# Patient Record
Sex: MALE | Race: WHITE | HISPANIC OR LATINO | Employment: OTHER | URBAN - METROPOLITAN AREA
[De-identification: names, ages, dates, MRNs, and addresses within clinical notes are randomized per-mention and may not be internally consistent; named-entity substitution may affect disease eponyms.]

---

## 2017-01-01 ENCOUNTER — ANTICOAGULATION VISIT (OUTPATIENT)
Dept: VASCULAR LAB | Facility: MEDICAL CENTER | Age: 57
End: 2017-01-01
Attending: INTERNAL MEDICINE
Payer: MEDICAID

## 2017-01-01 ENCOUNTER — NON-PROVIDER VISIT (OUTPATIENT)
Dept: HEMATOLOGY ONCOLOGY | Facility: MEDICAL CENTER | Age: 57
End: 2017-01-01
Payer: COMMERCIAL

## 2017-01-01 ENCOUNTER — HOSPITAL ENCOUNTER (OUTPATIENT)
Dept: RADIOLOGY | Facility: MEDICAL CENTER | Age: 57
End: 2017-06-02
Attending: INTERNAL MEDICINE
Payer: MEDICAID

## 2017-01-01 ENCOUNTER — TELEPHONE (OUTPATIENT)
Dept: HEMATOLOGY ONCOLOGY | Facility: MEDICAL CENTER | Age: 57
End: 2017-01-01

## 2017-01-01 ENCOUNTER — HOSPITAL ENCOUNTER (OUTPATIENT)
Dept: LAB | Facility: MEDICAL CENTER | Age: 57
End: 2017-03-20
Attending: NURSE PRACTITIONER
Payer: COMMERCIAL

## 2017-01-01 ENCOUNTER — APPOINTMENT (OUTPATIENT)
Dept: RADIOLOGY | Facility: MEDICAL CENTER | Age: 57
End: 2017-01-01
Attending: HOSPITALIST
Payer: MEDICAID

## 2017-01-01 ENCOUNTER — APPOINTMENT (OUTPATIENT)
Dept: RADIOLOGY | Facility: MEDICAL CENTER | Age: 57
DRG: 167 | End: 2017-01-01
Attending: EMERGENCY MEDICINE
Payer: MEDICAID

## 2017-01-01 ENCOUNTER — HOSPITAL ENCOUNTER (OUTPATIENT)
Dept: RADIATION ONCOLOGY | Facility: MEDICAL CENTER | Age: 57
End: 2017-04-20

## 2017-01-01 ENCOUNTER — PATIENT OUTREACH (OUTPATIENT)
Dept: HEALTH INFORMATION MANAGEMENT | Facility: OTHER | Age: 57
End: 2017-01-01

## 2017-01-01 ENCOUNTER — RESOLUTE PROFESSIONAL BILLING HOSPITAL PROF FEE (OUTPATIENT)
Dept: HOSPITALIST | Facility: MEDICAL CENTER | Age: 57
End: 2017-01-01
Payer: MEDICAID

## 2017-01-01 ENCOUNTER — APPOINTMENT (OUTPATIENT)
Dept: RADIOLOGY | Facility: IMAGING CENTER | Age: 57
End: 2017-01-01
Attending: NURSE PRACTITIONER
Payer: COMMERCIAL

## 2017-01-01 ENCOUNTER — OFFICE VISIT (OUTPATIENT)
Dept: MEDICAL GROUP | Facility: CLINIC | Age: 57
End: 2017-01-01
Payer: COMMERCIAL

## 2017-01-01 ENCOUNTER — HOSPITAL ENCOUNTER (OUTPATIENT)
Dept: RADIOLOGY | Facility: MEDICAL CENTER | Age: 57
End: 2017-01-16

## 2017-01-01 ENCOUNTER — OFFICE VISIT (OUTPATIENT)
Dept: HEMATOLOGY ONCOLOGY | Facility: MEDICAL CENTER | Age: 57
End: 2017-01-01
Payer: COMMERCIAL

## 2017-01-01 ENCOUNTER — RESOLUTE PROFESSIONAL BILLING HOSPITAL PROF FEE (OUTPATIENT)
Dept: HOSPITALIST | Facility: MEDICAL CENTER | Age: 57
End: 2017-01-01
Payer: COMMERCIAL

## 2017-01-01 ENCOUNTER — APPOINTMENT (OUTPATIENT)
Dept: RADIOLOGY | Facility: MEDICAL CENTER | Age: 57
End: 2017-01-01
Attending: INTERNAL MEDICINE
Payer: MEDICAID

## 2017-01-01 ENCOUNTER — APPOINTMENT (OUTPATIENT)
Dept: RADIOLOGY | Facility: MEDICAL CENTER | Age: 57
DRG: 871 | End: 2017-01-01
Attending: EMERGENCY MEDICINE
Payer: MEDICAID

## 2017-01-01 ENCOUNTER — TELEPHONE (OUTPATIENT)
Dept: VASCULAR LAB | Facility: MEDICAL CENTER | Age: 57
End: 2017-01-01

## 2017-01-01 ENCOUNTER — ANTICOAGULATION VISIT (OUTPATIENT)
Dept: VASCULAR LAB | Facility: MEDICAL CENTER | Age: 57
End: 2017-01-01
Attending: INTERNAL MEDICINE
Payer: COMMERCIAL

## 2017-01-01 ENCOUNTER — HOSPITAL ENCOUNTER (OUTPATIENT)
Dept: LAB | Facility: MEDICAL CENTER | Age: 57
End: 2017-08-11
Attending: INTERNAL MEDICINE
Payer: MEDICAID

## 2017-01-01 ENCOUNTER — HOSPITAL ENCOUNTER (OUTPATIENT)
Dept: RADIATION ONCOLOGY | Facility: MEDICAL CENTER | Age: 57
End: 2017-04-30
Attending: RADIOLOGY
Payer: COMMERCIAL

## 2017-01-01 ENCOUNTER — HOSPITAL ENCOUNTER (INPATIENT)
Facility: REHABILITATION | Age: 57
LOS: 18 days | DRG: 055 | End: 2017-02-28
Attending: PHYSICAL MEDICINE & REHABILITATION | Admitting: PHYSICAL MEDICINE & REHABILITATION
Payer: COMMERCIAL

## 2017-01-01 ENCOUNTER — HOME CARE VISIT (OUTPATIENT)
Dept: HOSPICE | Facility: HOSPICE | Age: 57
End: 2017-01-01
Payer: MEDICAID

## 2017-01-01 ENCOUNTER — TELEPHONE (OUTPATIENT)
Dept: MEDICAL GROUP | Facility: CLINIC | Age: 57
End: 2017-01-01

## 2017-01-01 ENCOUNTER — APPOINTMENT (OUTPATIENT)
Dept: RADIOLOGY | Facility: MEDICAL CENTER | Age: 57
DRG: 871 | End: 2017-01-01
Attending: INTERNAL MEDICINE
Payer: MEDICAID

## 2017-01-01 ENCOUNTER — APPOINTMENT (OUTPATIENT)
Dept: RADIOLOGY | Facility: MEDICAL CENTER | Age: 57
DRG: 193 | End: 2017-01-01
Attending: EMERGENCY MEDICINE
Payer: COMMERCIAL

## 2017-01-01 ENCOUNTER — HOSPITAL ENCOUNTER (EMERGENCY)
Facility: MEDICAL CENTER | Age: 57
End: 2017-08-09
Attending: EMERGENCY MEDICINE
Payer: MEDICAID

## 2017-01-01 ENCOUNTER — HOSPITAL ENCOUNTER (OUTPATIENT)
Dept: LAB | Facility: MEDICAL CENTER | Age: 57
End: 2017-08-14
Attending: FAMILY MEDICINE
Payer: MEDICAID

## 2017-01-01 ENCOUNTER — HOSPITAL ENCOUNTER (OUTPATIENT)
Dept: RADIOLOGY | Facility: MEDICAL CENTER | Age: 57
End: 2017-03-13
Attending: INTERNAL MEDICINE
Payer: COMMERCIAL

## 2017-01-01 ENCOUNTER — APPOINTMENT (OUTPATIENT)
Dept: PAIN MANAGEMENT | Facility: REHABILITATION | Age: 57
DRG: 055 | End: 2017-01-01
Attending: PHYSICAL MEDICINE & REHABILITATION
Payer: COMMERCIAL

## 2017-01-01 ENCOUNTER — HOSPITAL ENCOUNTER (OUTPATIENT)
Facility: MEDICAL CENTER | Age: 57
End: 2017-12-01
Attending: HOSPITALIST | Admitting: HOSPITALIST
Payer: MEDICAID

## 2017-01-01 ENCOUNTER — PATIENT OUTREACH (OUTPATIENT)
Dept: OTHER | Facility: MEDICAL CENTER | Age: 57
End: 2017-01-01

## 2017-01-01 ENCOUNTER — HOSPICE ADMISSION (OUTPATIENT)
Dept: HOSPICE | Facility: HOSPICE | Age: 57
End: 2017-01-01
Payer: MEDICAID

## 2017-01-01 ENCOUNTER — APPOINTMENT (OUTPATIENT)
Dept: LAB | Facility: MEDICAL CENTER | Age: 57
End: 2017-01-01
Attending: INTERNAL MEDICINE
Payer: COMMERCIAL

## 2017-01-01 ENCOUNTER — HOSPITAL ENCOUNTER (OUTPATIENT)
Dept: LAB | Facility: MEDICAL CENTER | Age: 57
End: 2017-07-08
Attending: INTERNAL MEDICINE
Payer: COMMERCIAL

## 2017-01-01 ENCOUNTER — HOSPITAL ENCOUNTER (OUTPATIENT)
Dept: RADIOLOGY | Facility: MEDICAL CENTER | Age: 57
End: 2017-07-14
Attending: NURSE PRACTITIONER
Payer: COMMERCIAL

## 2017-01-01 ENCOUNTER — APPOINTMENT (OUTPATIENT)
Dept: RADIOLOGY | Facility: MEDICAL CENTER | Age: 57
End: 2017-01-01
Attending: EMERGENCY MEDICINE
Payer: MEDICAID

## 2017-01-01 ENCOUNTER — APPOINTMENT (OUTPATIENT)
Dept: RADIOLOGY | Facility: REHABILITATION | Age: 57
DRG: 055 | End: 2017-01-01
Attending: PHYSICAL MEDICINE & REHABILITATION
Payer: COMMERCIAL

## 2017-01-01 ENCOUNTER — HOSPITAL ENCOUNTER (OUTPATIENT)
Facility: MEDICAL CENTER | Age: 57
End: 2017-07-11
Attending: NURSE PRACTITIONER
Payer: COMMERCIAL

## 2017-01-01 ENCOUNTER — TELEPHONE (OUTPATIENT)
Dept: OTHER | Facility: MEDICAL CENTER | Age: 57
End: 2017-01-01

## 2017-01-01 ENCOUNTER — RESOLUTE PROFESSIONAL BILLING HOSPITAL PROF FEE (OUTPATIENT)
Dept: PHYSICAL MEDICINE AND REHAB | Facility: REHABILITATION | Age: 57
End: 2017-01-01
Payer: COMMERCIAL

## 2017-01-01 ENCOUNTER — HOSPITAL ENCOUNTER (OUTPATIENT)
Facility: MEDICAL CENTER | Age: 57
End: 2017-02-10
Attending: EMERGENCY MEDICINE | Admitting: HOSPITALIST
Payer: COMMERCIAL

## 2017-01-01 ENCOUNTER — HOSPITAL ENCOUNTER (OUTPATIENT)
Dept: LAB | Facility: MEDICAL CENTER | Age: 57
End: 2017-05-31
Attending: RADIOLOGY
Payer: COMMERCIAL

## 2017-01-01 ENCOUNTER — HOSPITAL ENCOUNTER (OUTPATIENT)
Dept: RADIOLOGY | Facility: MEDICAL CENTER | Age: 57
End: 2017-03-10
Attending: RADIOLOGY
Payer: COMMERCIAL

## 2017-01-01 ENCOUNTER — APPOINTMENT (OUTPATIENT)
Dept: RADIOLOGY | Facility: MEDICAL CENTER | Age: 57
DRG: 949 | End: 2017-01-01
Attending: PHYSICAL MEDICINE & REHABILITATION
Payer: COMMERCIAL

## 2017-01-01 ENCOUNTER — APPOINTMENT (OUTPATIENT)
Dept: RADIOLOGY | Facility: MEDICAL CENTER | Age: 57
End: 2017-01-01
Attending: RADIOLOGY
Payer: COMMERCIAL

## 2017-01-01 ENCOUNTER — APPOINTMENT (OUTPATIENT)
Dept: RADIOLOGY | Facility: MEDICAL CENTER | Age: 57
DRG: 167 | End: 2017-01-01
Attending: HOSPITALIST
Payer: MEDICAID

## 2017-01-01 ENCOUNTER — APPOINTMENT (OUTPATIENT)
Dept: RADIOLOGY | Facility: MEDICAL CENTER | Age: 57
DRG: 871 | End: 2017-01-01
Attending: PSYCHIATRY & NEUROLOGY
Payer: MEDICAID

## 2017-01-01 ENCOUNTER — HOSPITAL ENCOUNTER (INPATIENT)
Facility: REHABILITATION | Age: 57
LOS: 2 days | DRG: 949 | End: 2017-02-06
Attending: PHYSICAL MEDICINE & REHABILITATION | Admitting: PHYSICAL MEDICINE & REHABILITATION
Payer: COMMERCIAL

## 2017-01-01 ENCOUNTER — AMB ONCOLOGY NURSE NAVIGATOR ENCOUNTER (OUTPATIENT)
Dept: OTHER | Facility: MEDICAL CENTER | Age: 57
End: 2017-01-01

## 2017-01-01 ENCOUNTER — HOSPITAL ENCOUNTER (OUTPATIENT)
Dept: RADIATION ONCOLOGY | Facility: MEDICAL CENTER | Age: 57
End: 2017-03-31
Attending: RADIOLOGY
Payer: COMMERCIAL

## 2017-01-01 ENCOUNTER — HOSPITAL ENCOUNTER (OUTPATIENT)
Dept: RADIATION ONCOLOGY | Facility: MEDICAL CENTER | Age: 57
End: 2017-06-30
Attending: RADIOLOGY
Payer: MEDICAID

## 2017-01-01 ENCOUNTER — HOSPITAL ENCOUNTER (INPATIENT)
Facility: MEDICAL CENTER | Age: 57
LOS: 11 days | DRG: 871 | End: 2017-10-25
Attending: EMERGENCY MEDICINE | Admitting: INTERNAL MEDICINE
Payer: MEDICAID

## 2017-01-01 ENCOUNTER — APPOINTMENT (OUTPATIENT)
Dept: HEMATOLOGY ONCOLOGY | Facility: MEDICAL CENTER | Age: 57
End: 2017-01-01
Payer: MEDICAID

## 2017-01-01 ENCOUNTER — APPOINTMENT (OUTPATIENT)
Dept: HEMATOLOGY ONCOLOGY | Facility: MEDICAL CENTER | Age: 57
End: 2017-01-01
Payer: COMMERCIAL

## 2017-01-01 ENCOUNTER — ANTICOAGULATION MONITORING (OUTPATIENT)
Dept: VASCULAR LAB | Facility: MEDICAL CENTER | Age: 57
End: 2017-01-01

## 2017-01-01 ENCOUNTER — HOSPITAL ENCOUNTER (OUTPATIENT)
Dept: RADIATION ONCOLOGY | Facility: MEDICAL CENTER | Age: 57
End: 2017-03-20

## 2017-01-01 ENCOUNTER — HOSPITAL ENCOUNTER (OUTPATIENT)
Dept: LAB | Facility: MEDICAL CENTER | Age: 57
End: 2017-09-16
Attending: INTERNAL MEDICINE
Payer: MEDICAID

## 2017-01-01 ENCOUNTER — RESOLUTE PROFESSIONAL BILLING HOSPITAL PROF FEE (OUTPATIENT)
Dept: MEDSURG UNIT | Facility: MEDICAL CENTER | Age: 57
End: 2017-01-01
Payer: MEDICAID

## 2017-01-01 ENCOUNTER — APPOINTMENT (OUTPATIENT)
Dept: VASCULAR LAB | Facility: MEDICAL CENTER | Age: 57
End: 2017-01-01
Payer: MEDICAID

## 2017-01-01 ENCOUNTER — HOSPITAL ENCOUNTER (OUTPATIENT)
Dept: RADIATION ONCOLOGY | Facility: MEDICAL CENTER | Age: 57
End: 2017-03-07

## 2017-01-01 ENCOUNTER — HOSPITAL ENCOUNTER (INPATIENT)
Facility: MEDICAL CENTER | Age: 57
LOS: 36 days | DRG: 193 | End: 2017-04-28
Attending: EMERGENCY MEDICINE | Admitting: INTERNAL MEDICINE
Payer: COMMERCIAL

## 2017-01-01 ENCOUNTER — TELEPHONE (OUTPATIENT)
Dept: RADIATION ONCOLOGY | Facility: MEDICAL CENTER | Age: 57
End: 2017-01-01

## 2017-01-01 ENCOUNTER — SUPERVISING PHYSICIAN REVIEW (OUTPATIENT)
Dept: MEDICAL GROUP | Facility: CLINIC | Age: 57
End: 2017-01-01

## 2017-01-01 ENCOUNTER — OFFICE VISIT (OUTPATIENT)
Dept: HEMATOLOGY ONCOLOGY | Facility: MEDICAL CENTER | Age: 57
End: 2017-01-01
Payer: MEDICAID

## 2017-01-01 ENCOUNTER — DOCUMENTATION (OUTPATIENT)
Dept: OTHER | Facility: MEDICAL CENTER | Age: 57
End: 2017-01-01

## 2017-01-01 ENCOUNTER — APPOINTMENT (OUTPATIENT)
Dept: LAB | Facility: MEDICAL CENTER | Age: 57
End: 2017-01-01
Payer: MEDICAID

## 2017-01-01 ENCOUNTER — APPOINTMENT (OUTPATIENT)
Dept: RADIOLOGY | Facility: MEDICAL CENTER | Age: 57
End: 2017-01-01
Attending: EMERGENCY MEDICINE
Payer: COMMERCIAL

## 2017-01-01 ENCOUNTER — HOSPITAL ENCOUNTER (INPATIENT)
Facility: MEDICAL CENTER | Age: 57
LOS: 2 days | DRG: 167 | End: 2017-06-24
Attending: EMERGENCY MEDICINE | Admitting: INTERNAL MEDICINE
Payer: MEDICAID

## 2017-01-01 ENCOUNTER — HOSPITAL ENCOUNTER (OUTPATIENT)
Dept: RADIATION ONCOLOGY | Facility: MEDICAL CENTER | Age: 57
End: 2017-01-31
Attending: RADIOLOGY
Payer: COMMERCIAL

## 2017-01-01 VITALS — RESPIRATION RATE: 15 BRPM | HEART RATE: 108 BPM | SYSTOLIC BLOOD PRESSURE: 70 MMHG | DIASTOLIC BLOOD PRESSURE: 46 MMHG

## 2017-01-01 VITALS
HEART RATE: 64 BPM | HEIGHT: 64 IN | RESPIRATION RATE: 16 BRPM | TEMPERATURE: 97.8 F | BODY MASS INDEX: 33.5 KG/M2 | SYSTOLIC BLOOD PRESSURE: 138 MMHG | DIASTOLIC BLOOD PRESSURE: 94 MMHG | OXYGEN SATURATION: 98 % | WEIGHT: 196.21 LBS

## 2017-01-01 VITALS
HEIGHT: 65 IN | RESPIRATION RATE: 22 BRPM | WEIGHT: 223 LBS | DIASTOLIC BLOOD PRESSURE: 92 MMHG | OXYGEN SATURATION: 92 % | SYSTOLIC BLOOD PRESSURE: 124 MMHG | BODY MASS INDEX: 37.15 KG/M2 | HEART RATE: 105 BPM

## 2017-01-01 VITALS
HEIGHT: 62 IN | OXYGEN SATURATION: 96 % | DIASTOLIC BLOOD PRESSURE: 70 MMHG | RESPIRATION RATE: 14 BRPM | TEMPERATURE: 97.3 F | WEIGHT: 214.95 LBS | HEART RATE: 73 BPM | SYSTOLIC BLOOD PRESSURE: 100 MMHG | BODY MASS INDEX: 39.56 KG/M2

## 2017-01-01 VITALS
HEART RATE: 79 BPM | DIASTOLIC BLOOD PRESSURE: 74 MMHG | OXYGEN SATURATION: 96 % | HEIGHT: 63 IN | RESPIRATION RATE: 20 BRPM | SYSTOLIC BLOOD PRESSURE: 112 MMHG | TEMPERATURE: 97.6 F | BODY MASS INDEX: 35.23 KG/M2 | WEIGHT: 198.85 LBS

## 2017-01-01 VITALS
SYSTOLIC BLOOD PRESSURE: 130 MMHG | HEART RATE: 111 BPM | WEIGHT: 204 LBS | BODY MASS INDEX: 35.92 KG/M2 | OXYGEN SATURATION: 95 % | DIASTOLIC BLOOD PRESSURE: 89 MMHG | TEMPERATURE: 98.2 F

## 2017-01-01 VITALS
HEIGHT: 65 IN | RESPIRATION RATE: 17 BRPM | DIASTOLIC BLOOD PRESSURE: 69 MMHG | TEMPERATURE: 97.3 F | HEART RATE: 61 BPM | SYSTOLIC BLOOD PRESSURE: 120 MMHG | OXYGEN SATURATION: 94 %

## 2017-01-01 VITALS
HEART RATE: 100 BPM | DIASTOLIC BLOOD PRESSURE: 114 MMHG | SYSTOLIC BLOOD PRESSURE: 126 MMHG | RESPIRATION RATE: 20 BRPM | OXYGEN SATURATION: 96 %

## 2017-01-01 VITALS
RESPIRATION RATE: 22 BRPM | SYSTOLIC BLOOD PRESSURE: 128 MMHG | WEIGHT: 226.41 LBS | HEIGHT: 65 IN | OXYGEN SATURATION: 92 % | BODY MASS INDEX: 37.72 KG/M2 | DIASTOLIC BLOOD PRESSURE: 80 MMHG | TEMPERATURE: 98.2 F | HEART RATE: 96 BPM

## 2017-01-01 VITALS
HEART RATE: 70 BPM | OXYGEN SATURATION: 99 % | RESPIRATION RATE: 16 BRPM | BODY MASS INDEX: 37.17 KG/M2 | TEMPERATURE: 97.6 F | DIASTOLIC BLOOD PRESSURE: 72 MMHG | SYSTOLIC BLOOD PRESSURE: 102 MMHG | WEIGHT: 209.77 LBS

## 2017-01-01 VITALS
HEART RATE: 70 BPM | SYSTOLIC BLOOD PRESSURE: 102 MMHG | BODY MASS INDEX: 37.17 KG/M2 | WEIGHT: 209.77 LBS | OXYGEN SATURATION: 99 % | RESPIRATION RATE: 16 BRPM | TEMPERATURE: 97.5 F | HEIGHT: 63 IN | DIASTOLIC BLOOD PRESSURE: 72 MMHG

## 2017-01-01 VITALS
TEMPERATURE: 97.7 F | RESPIRATION RATE: 16 BRPM | HEART RATE: 91 BPM | WEIGHT: 196.1 LBS | BODY MASS INDEX: 34.75 KG/M2 | DIASTOLIC BLOOD PRESSURE: 78 MMHG | OXYGEN SATURATION: 96 % | SYSTOLIC BLOOD PRESSURE: 110 MMHG | HEIGHT: 63 IN

## 2017-01-01 VITALS
RESPIRATION RATE: 16 BRPM | HEIGHT: 65 IN | BODY MASS INDEX: 37.21 KG/M2 | DIASTOLIC BLOOD PRESSURE: 83 MMHG | OXYGEN SATURATION: 93 % | TEMPERATURE: 97.8 F | HEART RATE: 84 BPM | WEIGHT: 223.33 LBS | SYSTOLIC BLOOD PRESSURE: 128 MMHG

## 2017-01-01 VITALS
RESPIRATION RATE: 14 BRPM | HEART RATE: 74 BPM | OXYGEN SATURATION: 95 % | DIASTOLIC BLOOD PRESSURE: 70 MMHG | SYSTOLIC BLOOD PRESSURE: 110 MMHG | TEMPERATURE: 97.8 F | WEIGHT: 203 LBS | BODY MASS INDEX: 34.66 KG/M2 | HEIGHT: 64 IN

## 2017-01-01 VITALS
OXYGEN SATURATION: 89 % | SYSTOLIC BLOOD PRESSURE: 86 MMHG | HEART RATE: 92 BPM | RESPIRATION RATE: 12 BRPM | DIASTOLIC BLOOD PRESSURE: 55 MMHG

## 2017-01-01 VITALS — HEART RATE: 104 BPM | RESPIRATION RATE: 14 BRPM

## 2017-01-01 VITALS
TEMPERATURE: 97.2 F | SYSTOLIC BLOOD PRESSURE: 140 MMHG | OXYGEN SATURATION: 94 % | HEART RATE: 103 BPM | DIASTOLIC BLOOD PRESSURE: 98 MMHG

## 2017-01-01 VITALS
DIASTOLIC BLOOD PRESSURE: 90 MMHG | RESPIRATION RATE: 16 BRPM | SYSTOLIC BLOOD PRESSURE: 108 MMHG | WEIGHT: 213.41 LBS | OXYGEN SATURATION: 94 % | HEART RATE: 110 BPM | TEMPERATURE: 97.5 F | OXYGEN SATURATION: 95 % | RESPIRATION RATE: 6 BRPM | SYSTOLIC BLOOD PRESSURE: 131 MMHG | HEIGHT: 66 IN | BODY MASS INDEX: 34.3 KG/M2 | HEART RATE: 113 BPM | DIASTOLIC BLOOD PRESSURE: 89 MMHG

## 2017-01-01 VITALS
HEART RATE: 104 BPM | SYSTOLIC BLOOD PRESSURE: 140 MMHG | BODY MASS INDEX: 39.14 KG/M2 | RESPIRATION RATE: 17 BRPM | OXYGEN SATURATION: 92 % | TEMPERATURE: 96.5 F | HEIGHT: 63 IN | WEIGHT: 220.9 LBS | DIASTOLIC BLOOD PRESSURE: 89 MMHG

## 2017-01-01 VITALS
SYSTOLIC BLOOD PRESSURE: 106 MMHG | HEART RATE: 119 BPM | BODY MASS INDEX: 38.02 KG/M2 | RESPIRATION RATE: 16 BRPM | OXYGEN SATURATION: 96 % | DIASTOLIC BLOOD PRESSURE: 82 MMHG | TEMPERATURE: 98.7 F | WEIGHT: 206.6 LBS | HEIGHT: 62 IN

## 2017-01-01 VITALS
SYSTOLIC BLOOD PRESSURE: 110 MMHG | WEIGHT: 209 LBS | TEMPERATURE: 99 F | OXYGEN SATURATION: 97 % | DIASTOLIC BLOOD PRESSURE: 80 MMHG | BODY MASS INDEX: 37.98 KG/M2 | HEART RATE: 70 BPM | RESPIRATION RATE: 16 BRPM

## 2017-01-01 VITALS
SYSTOLIC BLOOD PRESSURE: 136 MMHG | OXYGEN SATURATION: 92 % | WEIGHT: 215 LBS | TEMPERATURE: 96.4 F | DIASTOLIC BLOOD PRESSURE: 64 MMHG | HEART RATE: 108 BPM | RESPIRATION RATE: 20 BRPM | BODY MASS INDEX: 39.56 KG/M2 | HEIGHT: 62 IN

## 2017-01-01 VITALS
TEMPERATURE: 97.6 F | HEART RATE: 69 BPM | WEIGHT: 219.14 LBS | HEIGHT: 62 IN | OXYGEN SATURATION: 94 % | BODY MASS INDEX: 40.33 KG/M2 | SYSTOLIC BLOOD PRESSURE: 94 MMHG | RESPIRATION RATE: 16 BRPM | DIASTOLIC BLOOD PRESSURE: 59 MMHG

## 2017-01-01 VITALS
RESPIRATION RATE: 18 BRPM | SYSTOLIC BLOOD PRESSURE: 140 MMHG | HEIGHT: 65 IN | OXYGEN SATURATION: 91 % | HEART RATE: 101 BPM | DIASTOLIC BLOOD PRESSURE: 102 MMHG | TEMPERATURE: 97 F | WEIGHT: 222.88 LBS | BODY MASS INDEX: 37.13 KG/M2

## 2017-01-01 VITALS
HEART RATE: 107 BPM | DIASTOLIC BLOOD PRESSURE: 80 MMHG | BODY MASS INDEX: 35.17 KG/M2 | RESPIRATION RATE: 16 BRPM | WEIGHT: 206 LBS | OXYGEN SATURATION: 93 % | TEMPERATURE: 97.8 F | HEIGHT: 64 IN | SYSTOLIC BLOOD PRESSURE: 120 MMHG

## 2017-01-01 VITALS — SYSTOLIC BLOOD PRESSURE: 129 MMHG | DIASTOLIC BLOOD PRESSURE: 91 MMHG | HEART RATE: 86 BPM

## 2017-01-01 VITALS
DIASTOLIC BLOOD PRESSURE: 76 MMHG | BODY MASS INDEX: 37.5 KG/M2 | TEMPERATURE: 98.1 F | RESPIRATION RATE: 18 BRPM | SYSTOLIC BLOOD PRESSURE: 126 MMHG | HEIGHT: 63 IN | HEART RATE: 131 BPM | WEIGHT: 211.64 LBS | OXYGEN SATURATION: 96 %

## 2017-01-01 VITALS — DIASTOLIC BLOOD PRESSURE: 98 MMHG | SYSTOLIC BLOOD PRESSURE: 137 MMHG | HEART RATE: 75 BPM

## 2017-01-01 VITALS — DIASTOLIC BLOOD PRESSURE: 82 MMHG | SYSTOLIC BLOOD PRESSURE: 132 MMHG | HEART RATE: 100 BPM

## 2017-01-01 VITALS
HEIGHT: 62 IN | TEMPERATURE: 100.8 F | BODY MASS INDEX: 39.2 KG/M2 | DIASTOLIC BLOOD PRESSURE: 78 MMHG | HEART RATE: 131 BPM | OXYGEN SATURATION: 92 % | WEIGHT: 213 LBS | SYSTOLIC BLOOD PRESSURE: 130 MMHG | RESPIRATION RATE: 20 BRPM

## 2017-01-01 VITALS — SYSTOLIC BLOOD PRESSURE: 146 MMHG | HEART RATE: 69 BPM | DIASTOLIC BLOOD PRESSURE: 94 MMHG

## 2017-01-01 VITALS
HEART RATE: 64 BPM | SYSTOLIC BLOOD PRESSURE: 128 MMHG | DIASTOLIC BLOOD PRESSURE: 114 MMHG | RESPIRATION RATE: 18 BRPM | OXYGEN SATURATION: 88 %

## 2017-01-01 VITALS — HEART RATE: 98 BPM | SYSTOLIC BLOOD PRESSURE: 129 MMHG | DIASTOLIC BLOOD PRESSURE: 88 MMHG

## 2017-01-01 DIAGNOSIS — H53.9 VISION DISTURBANCE: ICD-10-CM

## 2017-01-01 DIAGNOSIS — G89.29 CHRONIC PAIN OF RIGHT KNEE: ICD-10-CM

## 2017-01-01 DIAGNOSIS — R05.9 COUGH: ICD-10-CM

## 2017-01-01 DIAGNOSIS — R60.1 ANASARCA: ICD-10-CM

## 2017-01-01 DIAGNOSIS — R60.9 EDEMA, UNSPECIFIED TYPE: ICD-10-CM

## 2017-01-01 DIAGNOSIS — C71.2 ANAPLASTIC ASTROCYTOMA OF TEMPORAL LOBE (HCC): ICD-10-CM

## 2017-01-01 DIAGNOSIS — I26.99 OTHER ACUTE PULMONARY EMBOLISM WITHOUT ACUTE COR PULMONALE (HCC): ICD-10-CM

## 2017-01-01 DIAGNOSIS — R07.81 PLEURITIC CHEST PAIN: ICD-10-CM

## 2017-01-01 DIAGNOSIS — G93.6 CEREBRAL EDEMA (HCC): ICD-10-CM

## 2017-01-01 DIAGNOSIS — I82.493 ACUTE DEEP VEIN THROMBOSIS (DVT) OF OTHER SPECIFIED VEIN OF BOTH LOWER EXTREMITIES (HCC): ICD-10-CM

## 2017-01-01 DIAGNOSIS — I26.99 RECURRENT PULMONARY EMBOLISM (HCC): ICD-10-CM

## 2017-01-01 DIAGNOSIS — R56.9 NEW ONSET SEIZURE (HCC): ICD-10-CM

## 2017-01-01 DIAGNOSIS — C71.9 ASTROCYTOMA BRAIN TUMOR (HCC): ICD-10-CM

## 2017-01-01 DIAGNOSIS — M17.11 PRIMARY OSTEOARTHRITIS OF RIGHT KNEE: ICD-10-CM

## 2017-01-01 DIAGNOSIS — R50.9 FEVER, UNSPECIFIED FEVER CAUSE: ICD-10-CM

## 2017-01-01 DIAGNOSIS — G40.909 SEIZURE DISORDER (HCC): ICD-10-CM

## 2017-01-01 DIAGNOSIS — C71.9 ASTROCYTOMA (HCC): ICD-10-CM

## 2017-01-01 DIAGNOSIS — B37.0 THRUSH, ORAL: ICD-10-CM

## 2017-01-01 DIAGNOSIS — M25.561 CHRONIC PAIN OF RIGHT KNEE: ICD-10-CM

## 2017-01-01 DIAGNOSIS — C71.9 MALIGNANT GLIOMA OF BRAIN (HCC): ICD-10-CM

## 2017-01-01 DIAGNOSIS — D64.9 ANEMIA, UNSPECIFIED TYPE: ICD-10-CM

## 2017-01-01 DIAGNOSIS — E66.9 OBESITY (BMI 35.0-39.9 WITHOUT COMORBIDITY): ICD-10-CM

## 2017-01-01 DIAGNOSIS — J18.9 PNEUMONIA OF LEFT LOWER LOBE DUE TO INFECTIOUS ORGANISM: ICD-10-CM

## 2017-01-01 DIAGNOSIS — D64.9 NORMOCYTIC ANEMIA: ICD-10-CM

## 2017-01-01 DIAGNOSIS — C71.8 MALIGNANT NEOPLASM OF OVERLAPPING SITES OF BRAIN (HCC): ICD-10-CM

## 2017-01-01 DIAGNOSIS — B37.0 THRUSH: ICD-10-CM

## 2017-01-01 DIAGNOSIS — Z09 HOSPITAL DISCHARGE FOLLOW-UP: ICD-10-CM

## 2017-01-01 DIAGNOSIS — R06.02 SOB (SHORTNESS OF BREATH): ICD-10-CM

## 2017-01-01 DIAGNOSIS — R51.9 PERSISTENT HEADACHES: ICD-10-CM

## 2017-01-01 DIAGNOSIS — J96.01 ACUTE RESPIRATORY FAILURE WITH HYPOXIA (HCC): ICD-10-CM

## 2017-01-01 DIAGNOSIS — I82.4Z3 ACUTE DEEP VEIN THROMBOSIS (DVT) OF DISTAL VEIN OF BOTH LOWER EXTREMITIES (HCC): ICD-10-CM

## 2017-01-01 DIAGNOSIS — Z95.828 PRESENCE OF IVC FILTER: ICD-10-CM

## 2017-01-01 DIAGNOSIS — R56.9 SEIZURE (HCC): ICD-10-CM

## 2017-01-01 DIAGNOSIS — C71.9 MALIGNANT NEOPLASM OF BRAIN, UNSPECIFIED LOCATION (HCC): ICD-10-CM

## 2017-01-01 DIAGNOSIS — T38.0X5A STEROID-INDUCED DIABETES MELLITUS (HCC): ICD-10-CM

## 2017-01-01 DIAGNOSIS — E09.9 STEROID-INDUCED DIABETES MELLITUS (HCC): ICD-10-CM

## 2017-01-01 DIAGNOSIS — R13.19 DYSPHAGIA, NEUROLOGIC: ICD-10-CM

## 2017-01-01 DIAGNOSIS — J40 BRONCHITIS: ICD-10-CM

## 2017-01-01 DIAGNOSIS — I26.99 PULMONARY EMBOLISM, OTHER: ICD-10-CM

## 2017-01-01 DIAGNOSIS — R06.02 SHORTNESS OF BREATH: ICD-10-CM

## 2017-01-01 DIAGNOSIS — C71.9 GLIOBLASTOMA (HCC): ICD-10-CM

## 2017-01-01 DIAGNOSIS — E44.0 MODERATE PROTEIN-CALORIE MALNUTRITION (HCC): ICD-10-CM

## 2017-01-01 DIAGNOSIS — R55 SYNCOPE, UNSPECIFIED SYNCOPE TYPE: ICD-10-CM

## 2017-01-01 DIAGNOSIS — Z91.81 AT RISK FOR FALL DUE TO COMORBID CONDITION: ICD-10-CM

## 2017-01-01 DIAGNOSIS — G51.0 LEFT-SIDED BELL'S PALSY: ICD-10-CM

## 2017-01-01 DIAGNOSIS — R58 BLEEDING: ICD-10-CM

## 2017-01-01 DIAGNOSIS — R41.0 CONFUSED: ICD-10-CM

## 2017-01-01 DIAGNOSIS — R10.9 ACUTE LEFT FLANK PAIN: ICD-10-CM

## 2017-01-01 LAB
ALBUMIN SERPL BCP-MCNC: 2.4 G/DL (ref 3.2–4.9)
ALBUMIN SERPL BCP-MCNC: 2.6 G/DL (ref 3.2–4.9)
ALBUMIN SERPL BCP-MCNC: 2.7 G/DL (ref 3.2–4.9)
ALBUMIN SERPL BCP-MCNC: 2.8 G/DL (ref 3.2–4.9)
ALBUMIN SERPL BCP-MCNC: 2.9 G/DL (ref 3.2–4.9)
ALBUMIN SERPL BCP-MCNC: 3 G/DL (ref 3.2–4.9)
ALBUMIN SERPL BCP-MCNC: 3.1 G/DL (ref 3.2–4.9)
ALBUMIN SERPL BCP-MCNC: 3.2 G/DL (ref 3.2–4.9)
ALBUMIN SERPL BCP-MCNC: 3.3 G/DL (ref 3.2–4.9)
ALBUMIN SERPL BCP-MCNC: 3.3 G/DL (ref 3.2–4.9)
ALBUMIN SERPL BCP-MCNC: 3.4 G/DL (ref 3.2–4.9)
ALBUMIN SERPL BCP-MCNC: 3.5 G/DL (ref 3.2–4.9)
ALBUMIN SERPL BCP-MCNC: 3.5 G/DL (ref 3.2–4.9)
ALBUMIN SERPL BCP-MCNC: 3.6 G/DL (ref 3.2–4.9)
ALBUMIN SERPL BCP-MCNC: 3.8 G/DL (ref 3.2–4.9)
ALBUMIN/GLOB SERPL: 0.8 G/DL
ALBUMIN/GLOB SERPL: 0.9 G/DL
ALBUMIN/GLOB SERPL: 1 G/DL
ALBUMIN/GLOB SERPL: 1.1 G/DL
ALBUMIN/GLOB SERPL: 1.2 G/DL
ALBUMIN/GLOB SERPL: 1.3 G/DL
ALBUMIN/GLOB SERPL: 1.4 G/DL
ALBUMIN/GLOB SERPL: 1.5 G/DL
ALBUMIN/GLOB SERPL: 1.5 G/DL
ALP SERPL-CCNC: 100 U/L (ref 30–99)
ALP SERPL-CCNC: 102 U/L (ref 30–99)
ALP SERPL-CCNC: 103 U/L (ref 30–99)
ALP SERPL-CCNC: 103 U/L (ref 30–99)
ALP SERPL-CCNC: 107 U/L (ref 30–99)
ALP SERPL-CCNC: 107 U/L (ref 30–99)
ALP SERPL-CCNC: 111 U/L (ref 30–99)
ALP SERPL-CCNC: 154 U/L (ref 30–99)
ALP SERPL-CCNC: 187 U/L (ref 30–99)
ALP SERPL-CCNC: 35 U/L (ref 30–99)
ALP SERPL-CCNC: 37 U/L (ref 30–99)
ALP SERPL-CCNC: 38 U/L (ref 30–99)
ALP SERPL-CCNC: 38 U/L (ref 30–99)
ALP SERPL-CCNC: 39 U/L (ref 30–99)
ALP SERPL-CCNC: 40 U/L (ref 30–99)
ALP SERPL-CCNC: 42 U/L (ref 30–99)
ALP SERPL-CCNC: 43 U/L (ref 30–99)
ALP SERPL-CCNC: 45 U/L (ref 30–99)
ALP SERPL-CCNC: 49 U/L (ref 30–99)
ALP SERPL-CCNC: 51 U/L (ref 30–99)
ALP SERPL-CCNC: 53 U/L (ref 30–99)
ALP SERPL-CCNC: 56 U/L (ref 30–99)
ALP SERPL-CCNC: 57 U/L (ref 30–99)
ALP SERPL-CCNC: 69 U/L (ref 30–99)
ALP SERPL-CCNC: 78 U/L (ref 30–99)
ALP SERPL-CCNC: 87 U/L (ref 30–99)
ALP SERPL-CCNC: 87 U/L (ref 30–99)
ALP SERPL-CCNC: 91 U/L (ref 30–99)
ALP SERPL-CCNC: 94 U/L (ref 30–99)
ALP SERPL-CCNC: 95 U/L (ref 30–99)
ALP SERPL-CCNC: 96 U/L (ref 30–99)
ALP SERPL-CCNC: 97 U/L (ref 30–99)
ALT SERPL-CCNC: 103 U/L (ref 2–50)
ALT SERPL-CCNC: 103 U/L (ref 2–50)
ALT SERPL-CCNC: 31 U/L (ref 2–50)
ALT SERPL-CCNC: 34 U/L (ref 2–50)
ALT SERPL-CCNC: 36 U/L (ref 2–50)
ALT SERPL-CCNC: 36 U/L (ref 2–50)
ALT SERPL-CCNC: 43 U/L (ref 2–50)
ALT SERPL-CCNC: 50 U/L (ref 2–50)
ALT SERPL-CCNC: 51 U/L (ref 2–50)
ALT SERPL-CCNC: 56 U/L (ref 2–50)
ALT SERPL-CCNC: 59 U/L (ref 2–50)
ALT SERPL-CCNC: 59 U/L (ref 2–50)
ALT SERPL-CCNC: 60 U/L (ref 2–50)
ALT SERPL-CCNC: 60 U/L (ref 2–50)
ALT SERPL-CCNC: 62 U/L (ref 2–50)
ALT SERPL-CCNC: 62 U/L (ref 2–50)
ALT SERPL-CCNC: 63 U/L (ref 2–50)
ALT SERPL-CCNC: 66 U/L (ref 2–50)
ALT SERPL-CCNC: 67 U/L (ref 2–50)
ALT SERPL-CCNC: 68 U/L (ref 2–50)
ALT SERPL-CCNC: 70 U/L (ref 2–50)
ALT SERPL-CCNC: 70 U/L (ref 2–50)
ALT SERPL-CCNC: 72 U/L (ref 2–50)
ALT SERPL-CCNC: 73 U/L (ref 2–50)
ALT SERPL-CCNC: 74 U/L (ref 2–50)
ALT SERPL-CCNC: 75 U/L (ref 2–50)
ALT SERPL-CCNC: 76 U/L (ref 2–50)
ALT SERPL-CCNC: 77 U/L (ref 2–50)
ALT SERPL-CCNC: 81 U/L (ref 2–50)
ALT SERPL-CCNC: 81 U/L (ref 2–50)
ALT SERPL-CCNC: 87 U/L (ref 2–50)
ALT SERPL-CCNC: 90 U/L (ref 2–50)
ALT SERPL-CCNC: 90 U/L (ref 2–50)
ALT SERPL-CCNC: 91 U/L (ref 2–50)
ALT SERPL-CCNC: 99 U/L (ref 2–50)
ALT SERPL-CCNC: 99 U/L (ref 2–50)
AMMONIA PLAS-SCNC: 28 UMOL/L (ref 11–45)
AMPHET UR QL SCN: NEGATIVE
ANION GAP SERPL CALC-SCNC: 10 MMOL/L (ref 0–11.9)
ANION GAP SERPL CALC-SCNC: 11 MMOL/L (ref 0–11.9)
ANION GAP SERPL CALC-SCNC: 12 MMOL/L (ref 0–11.9)
ANION GAP SERPL CALC-SCNC: 6 MMOL/L (ref 0–11.9)
ANION GAP SERPL CALC-SCNC: 7 MMOL/L (ref 0–11.9)
ANION GAP SERPL CALC-SCNC: 8 MMOL/L (ref 0–11.9)
ANION GAP SERPL CALC-SCNC: 9 MMOL/L (ref 0–11.9)
ANISOCYTOSIS BLD QL SMEAR: ABNORMAL
APPEARANCE UR: ABNORMAL
APPEARANCE UR: CLEAR
APTT PPP: 130.8 SEC (ref 24.7–36)
APTT PPP: 20.1 SEC (ref 24.7–36)
APTT PPP: 20.2 SEC (ref 24.7–36)
APTT PPP: 24.2 SEC (ref 24.7–36)
APTT PPP: 26.4 SEC (ref 24.7–36)
APTT PPP: 48.7 SEC (ref 24.7–36)
APTT PPP: 53 SEC (ref 24.7–36)
APTT PPP: 72.4 SEC (ref 24.7–36)
APTT PPP: 81.9 SEC (ref 24.7–36)
APTT PPP: 89.8 SEC (ref 24.7–36)
AST SERPL-CCNC: 14 U/L (ref 12–45)
AST SERPL-CCNC: 15 U/L (ref 12–45)
AST SERPL-CCNC: 16 U/L (ref 12–45)
AST SERPL-CCNC: 17 U/L (ref 12–45)
AST SERPL-CCNC: 18 U/L (ref 12–45)
AST SERPL-CCNC: 18 U/L (ref 12–45)
AST SERPL-CCNC: 19 U/L (ref 12–45)
AST SERPL-CCNC: 19 U/L (ref 12–45)
AST SERPL-CCNC: 20 U/L (ref 12–45)
AST SERPL-CCNC: 21 U/L (ref 12–45)
AST SERPL-CCNC: 21 U/L (ref 12–45)
AST SERPL-CCNC: 22 U/L (ref 12–45)
AST SERPL-CCNC: 23 U/L (ref 12–45)
AST SERPL-CCNC: 24 U/L (ref 12–45)
AST SERPL-CCNC: 25 U/L (ref 12–45)
AST SERPL-CCNC: 26 U/L (ref 12–45)
AST SERPL-CCNC: 28 U/L (ref 12–45)
AST SERPL-CCNC: 29 U/L (ref 12–45)
AST SERPL-CCNC: 29 U/L (ref 12–45)
AST SERPL-CCNC: 30 U/L (ref 12–45)
AST SERPL-CCNC: 31 U/L (ref 12–45)
AST SERPL-CCNC: 32 U/L (ref 12–45)
AST SERPL-CCNC: 32 U/L (ref 12–45)
AST SERPL-CCNC: 34 U/L (ref 12–45)
AST SERPL-CCNC: 42 U/L (ref 12–45)
AST SERPL-CCNC: 48 U/L (ref 12–45)
AST SERPL-CCNC: 48 U/L (ref 12–45)
AST SERPL-CCNC: 53 U/L (ref 12–45)
BACTERIA #/AREA URNS HPF: ABNORMAL /HPF
BACTERIA #/AREA URNS HPF: ABNORMAL /HPF
BACTERIA BLD CULT: NORMAL
BACTERIA UR CULT: NORMAL
BARBITURATES UR QL SCN: NEGATIVE
BASE EXCESS BLDA CALC-SCNC: 4 MMOL/L (ref -4–3)
BASE EXCESS BLDA CALC-SCNC: 4 MMOL/L (ref -4–3)
BASOPHILS # BLD AUTO: 0 % (ref 0–1.8)
BASOPHILS # BLD AUTO: 0 K/UL (ref 0–0.12)
BASOPHILS # BLD AUTO: 0.1 % (ref 0–1.8)
BASOPHILS # BLD AUTO: 0.2 % (ref 0–1.8)
BASOPHILS # BLD AUTO: 0.3 % (ref 0–1.8)
BASOPHILS # BLD AUTO: 0.4 % (ref 0–1.8)
BASOPHILS # BLD AUTO: 0.5 % (ref 0–1.8)
BASOPHILS # BLD AUTO: 0.6 % (ref 0–1.8)
BASOPHILS # BLD AUTO: 0.9 % (ref 0–1.8)
BASOPHILS # BLD AUTO: 1.1 % (ref 0–1.8)
BASOPHILS # BLD AUTO: 1.1 % (ref 0–1.8)
BASOPHILS # BLD AUTO: 3.3 % (ref 0–1.8)
BASOPHILS # BLD: 0 K/UL (ref 0–0.12)
BASOPHILS # BLD: 0.01 K/UL (ref 0–0.12)
BASOPHILS # BLD: 0.02 K/UL (ref 0–0.12)
BASOPHILS # BLD: 0.03 K/UL (ref 0–0.12)
BASOPHILS # BLD: 0.04 K/UL (ref 0–0.12)
BASOPHILS # BLD: 0.04 K/UL (ref 0–0.12)
BASOPHILS # BLD: 0.05 K/UL (ref 0–0.12)
BASOPHILS # BLD: 0.06 K/UL (ref 0–0.12)
BASOPHILS # BLD: 0.39 K/UL (ref 0–0.12)
BASOPHILS NFR BLD AUTO: 0 % (ref 0–1.8)
BENZODIAZ UR QL SCN: NEGATIVE
BILIRUB CONJ SERPL-MCNC: <0.1 MG/DL (ref 0.1–0.5)
BILIRUB INDIRECT SERPL-MCNC: ABNORMAL MG/DL (ref 0–1)
BILIRUB SERPL-MCNC: 0.3 MG/DL (ref 0.1–1.5)
BILIRUB SERPL-MCNC: 0.4 MG/DL (ref 0.1–1.5)
BILIRUB SERPL-MCNC: 0.5 MG/DL (ref 0.1–1.5)
BILIRUB SERPL-MCNC: 0.6 MG/DL (ref 0.1–1.5)
BILIRUB SERPL-MCNC: 0.7 MG/DL (ref 0.1–1.5)
BILIRUB SERPL-MCNC: 0.9 MG/DL (ref 0.1–1.5)
BILIRUB SERPL-MCNC: 1 MG/DL (ref 0.1–1.5)
BILIRUB SERPL-MCNC: 1.4 MG/DL (ref 0.1–1.5)
BILIRUB UR QL STRIP.AUTO: ABNORMAL
BILIRUB UR QL STRIP.AUTO: ABNORMAL
BILIRUB UR QL STRIP.AUTO: NEGATIVE
BNP SERPL-MCNC: 25 PG/ML (ref 0–100)
BNP SERPL-MCNC: 30 PG/ML (ref 0–100)
BNP SERPL-MCNC: 4 PG/ML (ref 0–100)
BNP SERPL-MCNC: 43 PG/ML (ref 0–100)
BNP SERPL-MCNC: 77 PG/ML (ref 0–100)
BODY TEMPERATURE: ABNORMAL CENTIGRADE
BODY TEMPERATURE: ABNORMAL CENTIGRADE
BUN SERPL-MCNC: 11 MG/DL (ref 8–22)
BUN SERPL-MCNC: 11 MG/DL (ref 8–22)
BUN SERPL-MCNC: 13 MG/DL (ref 8–22)
BUN SERPL-MCNC: 13 MG/DL (ref 8–22)
BUN SERPL-MCNC: 14 MG/DL (ref 8–22)
BUN SERPL-MCNC: 15 MG/DL (ref 8–22)
BUN SERPL-MCNC: 16 MG/DL (ref 8–22)
BUN SERPL-MCNC: 17 MG/DL (ref 8–22)
BUN SERPL-MCNC: 17 MG/DL (ref 8–22)
BUN SERPL-MCNC: 18 MG/DL (ref 8–22)
BUN SERPL-MCNC: 19 MG/DL (ref 8–22)
BUN SERPL-MCNC: 20 MG/DL (ref 8–22)
BUN SERPL-MCNC: 21 MG/DL (ref 8–22)
BUN SERPL-MCNC: 21 MG/DL (ref 8–22)
BUN SERPL-MCNC: 22 MG/DL (ref 8–22)
BUN SERPL-MCNC: 26 MG/DL (ref 8–22)
BUN SERPL-MCNC: 26 MG/DL (ref 8–22)
BUN SERPL-MCNC: 27 MG/DL (ref 8–22)
BUN SERPL-MCNC: 4 MG/DL (ref 8–22)
BUN SERPL-MCNC: 6 MG/DL (ref 8–22)
BUN SERPL-MCNC: 7 MG/DL (ref 8–22)
BUN SERPL-MCNC: 9 MG/DL (ref 8–22)
BZE UR QL SCN: NEGATIVE
CALCIUM SERPL-MCNC: 7.7 MG/DL (ref 8.5–10.5)
CALCIUM SERPL-MCNC: 7.8 MG/DL (ref 8.5–10.5)
CALCIUM SERPL-MCNC: 8 MG/DL (ref 8.5–10.5)
CALCIUM SERPL-MCNC: 8.1 MG/DL (ref 8.5–10.5)
CALCIUM SERPL-MCNC: 8.1 MG/DL (ref 8.5–10.5)
CALCIUM SERPL-MCNC: 8.2 MG/DL (ref 8.5–10.5)
CALCIUM SERPL-MCNC: 8.3 MG/DL (ref 8.4–10.2)
CALCIUM SERPL-MCNC: 8.3 MG/DL (ref 8.5–10.5)
CALCIUM SERPL-MCNC: 8.4 MG/DL (ref 8.4–10.2)
CALCIUM SERPL-MCNC: 8.4 MG/DL (ref 8.5–10.5)
CALCIUM SERPL-MCNC: 8.5 MG/DL (ref 8.4–10.2)
CALCIUM SERPL-MCNC: 8.5 MG/DL (ref 8.4–10.2)
CALCIUM SERPL-MCNC: 8.5 MG/DL (ref 8.5–10.5)
CALCIUM SERPL-MCNC: 8.5 MG/DL (ref 8.5–10.5)
CALCIUM SERPL-MCNC: 8.6 MG/DL (ref 8.5–10.5)
CALCIUM SERPL-MCNC: 8.7 MG/DL (ref 8.4–10.2)
CALCIUM SERPL-MCNC: 8.7 MG/DL (ref 8.5–10.5)
CALCIUM SERPL-MCNC: 8.8 MG/DL (ref 8.4–10.2)
CALCIUM SERPL-MCNC: 8.8 MG/DL (ref 8.4–10.2)
CALCIUM SERPL-MCNC: 8.8 MG/DL (ref 8.5–10.5)
CALCIUM SERPL-MCNC: 8.9 MG/DL (ref 8.5–10.5)
CALCIUM SERPL-MCNC: 9 MG/DL (ref 8.4–10.2)
CALCIUM SERPL-MCNC: 9 MG/DL (ref 8.4–10.2)
CALCIUM SERPL-MCNC: 9 MG/DL (ref 8.5–10.5)
CALCIUM SERPL-MCNC: 9.1 MG/DL (ref 8.5–10.5)
CALCIUM SERPL-MCNC: 9.4 MG/DL (ref 8.5–10.5)
CANNABINOIDS UR QL SCN: NEGATIVE
CHLORIDE SERPL-SCNC: 100 MMOL/L (ref 96–112)
CHLORIDE SERPL-SCNC: 101 MMOL/L (ref 96–112)
CHLORIDE SERPL-SCNC: 102 MMOL/L (ref 96–112)
CHLORIDE SERPL-SCNC: 103 MMOL/L (ref 96–112)
CHLORIDE SERPL-SCNC: 104 MMOL/L (ref 96–112)
CHLORIDE SERPL-SCNC: 105 MMOL/L (ref 96–112)
CHLORIDE SERPL-SCNC: 107 MMOL/L (ref 96–112)
CHLORIDE SERPL-SCNC: 108 MMOL/L (ref 96–112)
CHLORIDE SERPL-SCNC: 108 MMOL/L (ref 96–112)
CHLORIDE SERPL-SCNC: 93 MMOL/L (ref 96–112)
CHLORIDE SERPL-SCNC: 94 MMOL/L (ref 96–112)
CHLORIDE SERPL-SCNC: 95 MMOL/L (ref 96–112)
CHLORIDE SERPL-SCNC: 95 MMOL/L (ref 96–112)
CHLORIDE SERPL-SCNC: 96 MMOL/L (ref 96–112)
CHLORIDE SERPL-SCNC: 97 MMOL/L (ref 96–112)
CHLORIDE SERPL-SCNC: 97 MMOL/L (ref 96–112)
CHLORIDE SERPL-SCNC: 98 MMOL/L (ref 96–112)
CHLORIDE SERPL-SCNC: 99 MMOL/L (ref 96–112)
CHLORIDE SERPL-SCNC: 99 MMOL/L (ref 96–112)
CHOLEST SERPL-MCNC: 283 MG/DL (ref 100–199)
CHOLEST SERPL-MCNC: 341 MG/DL (ref 100–199)
CHOLEST SERPL-MCNC: 341 MG/DL (ref 100–199)
CK SERPL-CCNC: 29 U/L (ref 0–154)
CO2 SERPL-SCNC: 19 MMOL/L (ref 20–33)
CO2 SERPL-SCNC: 20 MMOL/L (ref 20–33)
CO2 SERPL-SCNC: 20 MMOL/L (ref 20–33)
CO2 SERPL-SCNC: 21 MMOL/L (ref 20–33)
CO2 SERPL-SCNC: 22 MMOL/L (ref 20–33)
CO2 SERPL-SCNC: 23 MMOL/L (ref 20–33)
CO2 SERPL-SCNC: 24 MMOL/L (ref 20–33)
CO2 SERPL-SCNC: 25 MMOL/L (ref 20–33)
CO2 SERPL-SCNC: 26 MMOL/L (ref 20–33)
CO2 SERPL-SCNC: 27 MMOL/L (ref 20–33)
CO2 SERPL-SCNC: 28 MMOL/L (ref 20–33)
CO2 SERPL-SCNC: 29 MMOL/L (ref 20–33)
CO2 SERPL-SCNC: 30 MMOL/L (ref 20–33)
CO2 SERPL-SCNC: 30 MMOL/L (ref 20–33)
COLOR UR: ABNORMAL
COLOR UR: COLORLESS
COLOR UR: COLORLESS
COLOR UR: YELLOW
COMMENT 1642: NORMAL
CORTIS SERPL-MCNC: 11.6 UG/DL (ref 0–23)
CREAT SERPL-MCNC: 0.31 MG/DL (ref 0.5–1.4)
CREAT SERPL-MCNC: 0.32 MG/DL (ref 0.5–1.4)
CREAT SERPL-MCNC: 0.33 MG/DL (ref 0.5–1.4)
CREAT SERPL-MCNC: 0.35 MG/DL (ref 0.5–1.4)
CREAT SERPL-MCNC: 0.35 MG/DL (ref 0.5–1.4)
CREAT SERPL-MCNC: 0.36 MG/DL (ref 0.5–1.4)
CREAT SERPL-MCNC: 0.37 MG/DL (ref 0.5–1.4)
CREAT SERPL-MCNC: 0.37 MG/DL (ref 0.5–1.4)
CREAT SERPL-MCNC: 0.38 MG/DL (ref 0.5–1.4)
CREAT SERPL-MCNC: 0.38 MG/DL (ref 0.5–1.4)
CREAT SERPL-MCNC: 0.4 MG/DL (ref 0.5–1.4)
CREAT SERPL-MCNC: 0.41 MG/DL (ref 0.5–1.4)
CREAT SERPL-MCNC: 0.43 MG/DL (ref 0.5–1.4)
CREAT SERPL-MCNC: 0.44 MG/DL (ref 0.5–1.4)
CREAT SERPL-MCNC: 0.46 MG/DL (ref 0.5–1.4)
CREAT SERPL-MCNC: 0.48 MG/DL (ref 0.5–1.4)
CREAT SERPL-MCNC: 0.48 MG/DL (ref 0.5–1.4)
CREAT SERPL-MCNC: 0.49 MG/DL (ref 0.5–1.4)
CREAT SERPL-MCNC: 0.49 MG/DL (ref 0.5–1.4)
CREAT SERPL-MCNC: 0.5 MG/DL (ref 0.5–1.4)
CREAT SERPL-MCNC: 0.5 MG/DL (ref 0.5–1.4)
CREAT SERPL-MCNC: 0.51 MG/DL (ref 0.5–1.4)
CREAT SERPL-MCNC: 0.52 MG/DL (ref 0.5–1.4)
CREAT SERPL-MCNC: 0.53 MG/DL (ref 0.5–1.4)
CREAT SERPL-MCNC: 0.54 MG/DL (ref 0.5–1.4)
CREAT SERPL-MCNC: 0.55 MG/DL (ref 0.5–1.4)
CREAT SERPL-MCNC: 0.55 MG/DL (ref 0.5–1.4)
CREAT SERPL-MCNC: 0.56 MG/DL (ref 0.5–1.4)
CREAT SERPL-MCNC: 0.57 MG/DL (ref 0.5–1.4)
CREAT SERPL-MCNC: 0.57 MG/DL (ref 0.5–1.4)
CREAT SERPL-MCNC: 0.58 MG/DL (ref 0.5–1.4)
CREAT SERPL-MCNC: 0.59 MG/DL (ref 0.5–1.4)
CREAT SERPL-MCNC: 0.62 MG/DL (ref 0.5–1.4)
CREAT SERPL-MCNC: 0.62 MG/DL (ref 0.5–1.4)
CREAT SERPL-MCNC: 0.63 MG/DL (ref 0.5–1.4)
CREAT SERPL-MCNC: 0.65 MG/DL (ref 0.5–1.4)
CREAT SERPL-MCNC: 0.7 MG/DL (ref 0.5–1.4)
CREAT SERPL-MCNC: 0.8 MG/DL (ref 0.5–1.4)
CRP SERPL HS-MCNC: 0.13 MG/DL (ref 0–0.75)
CRYSTALS 1718B: ABNORMAL /HPF
CRYSTALS 1718B: ABNORMAL /HPF
CULTURE IF INDICATED INDCX: NO UA CULTURE
CULTURE IF INDICATED INDCX: NO UA CULTURE
DEPRECATED D DIMER PPP IA-ACNC: 293 NG/ML(D-DU)
EKG IMPRESSION: NORMAL
EOSINOPHIL # BLD AUTO: 0 K/UL (ref 0–0.51)
EOSINOPHIL # BLD AUTO: 0.01 K/UL (ref 0–0.51)
EOSINOPHIL # BLD AUTO: 0.01 K/UL (ref 0–0.51)
EOSINOPHIL # BLD AUTO: 0.02 K/UL (ref 0–0.51)
EOSINOPHIL # BLD AUTO: 0.03 K/UL (ref 0–0.51)
EOSINOPHIL # BLD AUTO: 0.06 K/UL (ref 0–0.51)
EOSINOPHIL # BLD AUTO: 0.07 K/UL (ref 0–0.51)
EOSINOPHIL # BLD AUTO: 0.09 K/UL (ref 0–0.51)
EOSINOPHIL # BLD AUTO: 0.1 K/UL (ref 0–0.51)
EOSINOPHIL # BLD AUTO: 0.12 K/UL (ref 0–0.51)
EOSINOPHIL # BLD AUTO: 0.17 K/UL (ref 0–0.51)
EOSINOPHIL # BLD AUTO: 0.19 K/UL (ref 0–0.51)
EOSINOPHIL # BLD: 0.15 K/UL (ref 0–0.51)
EOSINOPHIL NFR BLD AUTO: 2.6 % (ref 0–6.9)
EOSINOPHIL NFR BLD: 0 % (ref 0–6.9)
EOSINOPHIL NFR BLD: 0.1 % (ref 0–6.9)
EOSINOPHIL NFR BLD: 0.2 % (ref 0–6.9)
EOSINOPHIL NFR BLD: 0.3 % (ref 0–6.9)
EOSINOPHIL NFR BLD: 0.5 % (ref 0–6.9)
EOSINOPHIL NFR BLD: 0.9 % (ref 0–6.9)
EOSINOPHIL NFR BLD: 1 % (ref 0–6.9)
EOSINOPHIL NFR BLD: 1 % (ref 0–6.9)
EOSINOPHIL NFR BLD: 1.5 % (ref 0–6.9)
EOSINOPHIL NFR BLD: 1.8 % (ref 0–6.9)
EOSINOPHIL NFR BLD: 1.9 % (ref 0–6.9)
EOSINOPHIL NFR BLD: 2.6 % (ref 0–6.9)
EPI CELLS #/AREA URNS HPF: ABNORMAL /HPF
ERYTHROCYTE [DISTWIDTH] IN BLOOD BY AUTOMATED COUNT: 43.9 FL (ref 35.9–50)
ERYTHROCYTE [DISTWIDTH] IN BLOOD BY AUTOMATED COUNT: 44.5 FL (ref 35.9–50)
ERYTHROCYTE [DISTWIDTH] IN BLOOD BY AUTOMATED COUNT: 44.8 FL (ref 35.9–50)
ERYTHROCYTE [DISTWIDTH] IN BLOOD BY AUTOMATED COUNT: 45 FL (ref 35.9–50)
ERYTHROCYTE [DISTWIDTH] IN BLOOD BY AUTOMATED COUNT: 45.3 FL (ref 35.9–50)
ERYTHROCYTE [DISTWIDTH] IN BLOOD BY AUTOMATED COUNT: 45.6 FL (ref 35.9–50)
ERYTHROCYTE [DISTWIDTH] IN BLOOD BY AUTOMATED COUNT: 46 FL (ref 35.9–50)
ERYTHROCYTE [DISTWIDTH] IN BLOOD BY AUTOMATED COUNT: 46.7 FL (ref 35.9–50)
ERYTHROCYTE [DISTWIDTH] IN BLOOD BY AUTOMATED COUNT: 47.1 FL (ref 35.9–50)
ERYTHROCYTE [DISTWIDTH] IN BLOOD BY AUTOMATED COUNT: 47.2 FL (ref 35.9–50)
ERYTHROCYTE [DISTWIDTH] IN BLOOD BY AUTOMATED COUNT: 52.2 FL (ref 35.9–50)
ERYTHROCYTE [DISTWIDTH] IN BLOOD BY AUTOMATED COUNT: 52.8 FL (ref 35.9–50)
ERYTHROCYTE [DISTWIDTH] IN BLOOD BY AUTOMATED COUNT: 53.1 FL (ref 35.9–50)
ERYTHROCYTE [DISTWIDTH] IN BLOOD BY AUTOMATED COUNT: 53.8 FL (ref 35.9–50)
ERYTHROCYTE [DISTWIDTH] IN BLOOD BY AUTOMATED COUNT: 53.8 FL (ref 35.9–50)
ERYTHROCYTE [DISTWIDTH] IN BLOOD BY AUTOMATED COUNT: 54.9 FL (ref 35.9–50)
ERYTHROCYTE [DISTWIDTH] IN BLOOD BY AUTOMATED COUNT: 55.2 FL (ref 35.9–50)
ERYTHROCYTE [DISTWIDTH] IN BLOOD BY AUTOMATED COUNT: 55.8 FL (ref 35.9–50)
ERYTHROCYTE [DISTWIDTH] IN BLOOD BY AUTOMATED COUNT: 55.8 FL (ref 35.9–50)
ERYTHROCYTE [DISTWIDTH] IN BLOOD BY AUTOMATED COUNT: 56 FL (ref 35.9–50)
ERYTHROCYTE [DISTWIDTH] IN BLOOD BY AUTOMATED COUNT: 56.1 FL (ref 35.9–50)
ERYTHROCYTE [DISTWIDTH] IN BLOOD BY AUTOMATED COUNT: 56.2 FL (ref 35.9–50)
ERYTHROCYTE [DISTWIDTH] IN BLOOD BY AUTOMATED COUNT: 56.8 FL (ref 35.9–50)
ERYTHROCYTE [DISTWIDTH] IN BLOOD BY AUTOMATED COUNT: 56.9 FL (ref 35.9–50)
ERYTHROCYTE [DISTWIDTH] IN BLOOD BY AUTOMATED COUNT: 56.9 FL (ref 35.9–50)
ERYTHROCYTE [DISTWIDTH] IN BLOOD BY AUTOMATED COUNT: 57 FL (ref 35.9–50)
ERYTHROCYTE [DISTWIDTH] IN BLOOD BY AUTOMATED COUNT: 57 FL (ref 35.9–50)
ERYTHROCYTE [DISTWIDTH] IN BLOOD BY AUTOMATED COUNT: 57.2 FL (ref 35.9–50)
ERYTHROCYTE [DISTWIDTH] IN BLOOD BY AUTOMATED COUNT: 57.7 FL (ref 35.9–50)
ERYTHROCYTE [DISTWIDTH] IN BLOOD BY AUTOMATED COUNT: 58.4 FL (ref 35.9–50)
ERYTHROCYTE [DISTWIDTH] IN BLOOD BY AUTOMATED COUNT: 58.6 FL (ref 35.9–50)
ERYTHROCYTE [DISTWIDTH] IN BLOOD BY AUTOMATED COUNT: 58.7 FL (ref 35.9–50)
ERYTHROCYTE [DISTWIDTH] IN BLOOD BY AUTOMATED COUNT: 58.8 FL (ref 35.9–50)
ERYTHROCYTE [DISTWIDTH] IN BLOOD BY AUTOMATED COUNT: 58.8 FL (ref 35.9–50)
ERYTHROCYTE [DISTWIDTH] IN BLOOD BY AUTOMATED COUNT: 58.9 FL (ref 35.9–50)
ERYTHROCYTE [DISTWIDTH] IN BLOOD BY AUTOMATED COUNT: 59 FL (ref 35.9–50)
ERYTHROCYTE [DISTWIDTH] IN BLOOD BY AUTOMATED COUNT: 59.1 FL (ref 35.9–50)
ERYTHROCYTE [DISTWIDTH] IN BLOOD BY AUTOMATED COUNT: 59.4 FL (ref 35.9–50)
ERYTHROCYTE [DISTWIDTH] IN BLOOD BY AUTOMATED COUNT: 59.6 FL (ref 35.9–50)
ERYTHROCYTE [DISTWIDTH] IN BLOOD BY AUTOMATED COUNT: 59.7 FL (ref 35.9–50)
ERYTHROCYTE [DISTWIDTH] IN BLOOD BY AUTOMATED COUNT: 59.8 FL (ref 35.9–50)
ERYTHROCYTE [DISTWIDTH] IN BLOOD BY AUTOMATED COUNT: 59.8 FL (ref 35.9–50)
ERYTHROCYTE [DISTWIDTH] IN BLOOD BY AUTOMATED COUNT: 60.2 FL (ref 35.9–50)
ERYTHROCYTE [DISTWIDTH] IN BLOOD BY AUTOMATED COUNT: 60.6 FL (ref 35.9–50)
ERYTHROCYTE [DISTWIDTH] IN BLOOD BY AUTOMATED COUNT: 61 FL (ref 35.9–50)
ERYTHROCYTE [DISTWIDTH] IN BLOOD BY AUTOMATED COUNT: 62.1 FL (ref 35.9–50)
ERYTHROCYTE [DISTWIDTH] IN BLOOD BY AUTOMATED COUNT: 63.7 FL (ref 35.9–50)
ERYTHROCYTE [DISTWIDTH] IN BLOOD BY AUTOMATED COUNT: 65.1 FL (ref 35.9–50)
ERYTHROCYTE [DISTWIDTH] IN BLOOD BY AUTOMATED COUNT: 67.9 FL (ref 35.9–50)
ERYTHROCYTE [DISTWIDTH] IN BLOOD BY AUTOMATED COUNT: 69.3 FL (ref 35.9–50)
ERYTHROCYTE [DISTWIDTH] IN BLOOD BY AUTOMATED COUNT: 71.1 FL (ref 35.9–50)
EST. AVERAGE GLUCOSE BLD GHB EST-MCNC: 108 MG/DL
EST. AVERAGE GLUCOSE BLD GHB EST-MCNC: 148 MG/DL
EST. AVERAGE GLUCOSE BLD GHB EST-MCNC: 186 MG/DL
FOLATE SERPL-MCNC: 20.2 NG/ML
GFR SERPL CREATININE-BSD FRML MDRD: >60 ML/MIN/1.73 M 2
GLOBULIN SER CALC-MCNC: 2 G/DL (ref 1.9–3.5)
GLOBULIN SER CALC-MCNC: 2 G/DL (ref 1.9–3.5)
GLOBULIN SER CALC-MCNC: 2.2 G/DL (ref 1.9–3.5)
GLOBULIN SER CALC-MCNC: 2.3 G/DL (ref 1.9–3.5)
GLOBULIN SER CALC-MCNC: 2.4 G/DL (ref 1.9–3.5)
GLOBULIN SER CALC-MCNC: 2.5 G/DL (ref 1.9–3.5)
GLOBULIN SER CALC-MCNC: 2.6 G/DL (ref 1.9–3.5)
GLOBULIN SER CALC-MCNC: 2.7 G/DL (ref 1.9–3.5)
GLOBULIN SER CALC-MCNC: 2.8 G/DL (ref 1.9–3.5)
GLOBULIN SER CALC-MCNC: 2.8 G/DL (ref 1.9–3.5)
GLOBULIN SER CALC-MCNC: 2.9 G/DL (ref 1.9–3.5)
GLOBULIN SER CALC-MCNC: 2.9 G/DL (ref 1.9–3.5)
GLOBULIN SER CALC-MCNC: 3 G/DL (ref 1.9–3.5)
GLOBULIN SER CALC-MCNC: 3.2 G/DL (ref 1.9–3.5)
GLUCOSE BLD-MCNC: 100 MG/DL (ref 65–99)
GLUCOSE BLD-MCNC: 100 MG/DL (ref 65–99)
GLUCOSE BLD-MCNC: 103 MG/DL (ref 65–99)
GLUCOSE BLD-MCNC: 104 MG/DL (ref 65–99)
GLUCOSE BLD-MCNC: 104 MG/DL (ref 65–99)
GLUCOSE BLD-MCNC: 105 MG/DL (ref 65–99)
GLUCOSE BLD-MCNC: 105 MG/DL (ref 65–99)
GLUCOSE BLD-MCNC: 106 MG/DL (ref 65–99)
GLUCOSE BLD-MCNC: 106 MG/DL (ref 65–99)
GLUCOSE BLD-MCNC: 107 MG/DL (ref 65–99)
GLUCOSE BLD-MCNC: 108 MG/DL (ref 65–99)
GLUCOSE BLD-MCNC: 109 MG/DL (ref 65–99)
GLUCOSE BLD-MCNC: 110 MG/DL (ref 65–99)
GLUCOSE BLD-MCNC: 111 MG/DL (ref 65–99)
GLUCOSE BLD-MCNC: 112 MG/DL (ref 65–99)
GLUCOSE BLD-MCNC: 113 MG/DL (ref 65–99)
GLUCOSE BLD-MCNC: 114 MG/DL (ref 65–99)
GLUCOSE BLD-MCNC: 115 MG/DL (ref 65–99)
GLUCOSE BLD-MCNC: 116 MG/DL (ref 65–99)
GLUCOSE BLD-MCNC: 118 MG/DL (ref 65–99)
GLUCOSE BLD-MCNC: 119 MG/DL (ref 65–99)
GLUCOSE BLD-MCNC: 120 MG/DL (ref 65–99)
GLUCOSE BLD-MCNC: 121 MG/DL (ref 65–99)
GLUCOSE BLD-MCNC: 122 MG/DL (ref 65–99)
GLUCOSE BLD-MCNC: 122 MG/DL (ref 65–99)
GLUCOSE BLD-MCNC: 123 MG/DL (ref 65–99)
GLUCOSE BLD-MCNC: 124 MG/DL (ref 65–99)
GLUCOSE BLD-MCNC: 125 MG/DL (ref 65–99)
GLUCOSE BLD-MCNC: 126 MG/DL (ref 65–99)
GLUCOSE BLD-MCNC: 127 MG/DL (ref 65–99)
GLUCOSE BLD-MCNC: 127 MG/DL (ref 65–99)
GLUCOSE BLD-MCNC: 128 MG/DL (ref 65–99)
GLUCOSE BLD-MCNC: 129 MG/DL (ref 65–99)
GLUCOSE BLD-MCNC: 130 MG/DL (ref 65–99)
GLUCOSE BLD-MCNC: 131 MG/DL (ref 65–99)
GLUCOSE BLD-MCNC: 132 MG/DL (ref 65–99)
GLUCOSE BLD-MCNC: 133 MG/DL (ref 65–99)
GLUCOSE BLD-MCNC: 134 MG/DL (ref 65–99)
GLUCOSE BLD-MCNC: 135 MG/DL (ref 65–99)
GLUCOSE BLD-MCNC: 136 MG/DL (ref 65–99)
GLUCOSE BLD-MCNC: 137 MG/DL (ref 65–99)
GLUCOSE BLD-MCNC: 138 MG/DL (ref 65–99)
GLUCOSE BLD-MCNC: 139 MG/DL (ref 65–99)
GLUCOSE BLD-MCNC: 140 MG/DL (ref 65–99)
GLUCOSE BLD-MCNC: 140 MG/DL (ref 65–99)
GLUCOSE BLD-MCNC: 141 MG/DL (ref 65–99)
GLUCOSE BLD-MCNC: 142 MG/DL (ref 65–99)
GLUCOSE BLD-MCNC: 143 MG/DL (ref 65–99)
GLUCOSE BLD-MCNC: 144 MG/DL (ref 65–99)
GLUCOSE BLD-MCNC: 145 MG/DL (ref 65–99)
GLUCOSE BLD-MCNC: 146 MG/DL (ref 65–99)
GLUCOSE BLD-MCNC: 147 MG/DL (ref 65–99)
GLUCOSE BLD-MCNC: 147 MG/DL (ref 65–99)
GLUCOSE BLD-MCNC: 148 MG/DL (ref 65–99)
GLUCOSE BLD-MCNC: 148 MG/DL (ref 65–99)
GLUCOSE BLD-MCNC: 149 MG/DL (ref 65–99)
GLUCOSE BLD-MCNC: 150 MG/DL (ref 65–99)
GLUCOSE BLD-MCNC: 151 MG/DL (ref 65–99)
GLUCOSE BLD-MCNC: 152 MG/DL (ref 65–99)
GLUCOSE BLD-MCNC: 152 MG/DL (ref 65–99)
GLUCOSE BLD-MCNC: 153 MG/DL (ref 65–99)
GLUCOSE BLD-MCNC: 153 MG/DL (ref 65–99)
GLUCOSE BLD-MCNC: 154 MG/DL (ref 65–99)
GLUCOSE BLD-MCNC: 154 MG/DL (ref 65–99)
GLUCOSE BLD-MCNC: 155 MG/DL (ref 65–99)
GLUCOSE BLD-MCNC: 156 MG/DL (ref 65–99)
GLUCOSE BLD-MCNC: 156 MG/DL (ref 65–99)
GLUCOSE BLD-MCNC: 157 MG/DL (ref 65–99)
GLUCOSE BLD-MCNC: 158 MG/DL (ref 65–99)
GLUCOSE BLD-MCNC: 159 MG/DL (ref 65–99)
GLUCOSE BLD-MCNC: 159 MG/DL (ref 65–99)
GLUCOSE BLD-MCNC: 160 MG/DL (ref 65–99)
GLUCOSE BLD-MCNC: 161 MG/DL (ref 65–99)
GLUCOSE BLD-MCNC: 162 MG/DL (ref 65–99)
GLUCOSE BLD-MCNC: 162 MG/DL (ref 65–99)
GLUCOSE BLD-MCNC: 163 MG/DL (ref 65–99)
GLUCOSE BLD-MCNC: 164 MG/DL (ref 65–99)
GLUCOSE BLD-MCNC: 164 MG/DL (ref 65–99)
GLUCOSE BLD-MCNC: 165 MG/DL (ref 65–99)
GLUCOSE BLD-MCNC: 165 MG/DL (ref 65–99)
GLUCOSE BLD-MCNC: 166 MG/DL (ref 65–99)
GLUCOSE BLD-MCNC: 167 MG/DL (ref 65–99)
GLUCOSE BLD-MCNC: 167 MG/DL (ref 65–99)
GLUCOSE BLD-MCNC: 168 MG/DL (ref 65–99)
GLUCOSE BLD-MCNC: 169 MG/DL (ref 65–99)
GLUCOSE BLD-MCNC: 170 MG/DL (ref 65–99)
GLUCOSE BLD-MCNC: 171 MG/DL (ref 65–99)
GLUCOSE BLD-MCNC: 172 MG/DL (ref 65–99)
GLUCOSE BLD-MCNC: 172 MG/DL (ref 65–99)
GLUCOSE BLD-MCNC: 173 MG/DL (ref 65–99)
GLUCOSE BLD-MCNC: 174 MG/DL (ref 65–99)
GLUCOSE BLD-MCNC: 175 MG/DL (ref 65–99)
GLUCOSE BLD-MCNC: 176 MG/DL (ref 65–99)
GLUCOSE BLD-MCNC: 177 MG/DL (ref 65–99)
GLUCOSE BLD-MCNC: 178 MG/DL (ref 65–99)
GLUCOSE BLD-MCNC: 179 MG/DL (ref 65–99)
GLUCOSE BLD-MCNC: 180 MG/DL (ref 65–99)
GLUCOSE BLD-MCNC: 181 MG/DL (ref 65–99)
GLUCOSE BLD-MCNC: 181 MG/DL (ref 65–99)
GLUCOSE BLD-MCNC: 182 MG/DL (ref 65–99)
GLUCOSE BLD-MCNC: 183 MG/DL (ref 65–99)
GLUCOSE BLD-MCNC: 184 MG/DL (ref 65–99)
GLUCOSE BLD-MCNC: 185 MG/DL (ref 65–99)
GLUCOSE BLD-MCNC: 185 MG/DL (ref 65–99)
GLUCOSE BLD-MCNC: 186 MG/DL (ref 65–99)
GLUCOSE BLD-MCNC: 187 MG/DL (ref 65–99)
GLUCOSE BLD-MCNC: 187 MG/DL (ref 65–99)
GLUCOSE BLD-MCNC: 188 MG/DL (ref 65–99)
GLUCOSE BLD-MCNC: 190 MG/DL (ref 65–99)
GLUCOSE BLD-MCNC: 191 MG/DL (ref 65–99)
GLUCOSE BLD-MCNC: 192 MG/DL (ref 65–99)
GLUCOSE BLD-MCNC: 192 MG/DL (ref 65–99)
GLUCOSE BLD-MCNC: 193 MG/DL (ref 65–99)
GLUCOSE BLD-MCNC: 194 MG/DL (ref 65–99)
GLUCOSE BLD-MCNC: 195 MG/DL (ref 65–99)
GLUCOSE BLD-MCNC: 196 MG/DL (ref 65–99)
GLUCOSE BLD-MCNC: 197 MG/DL (ref 65–99)
GLUCOSE BLD-MCNC: 197 MG/DL (ref 65–99)
GLUCOSE BLD-MCNC: 198 MG/DL (ref 65–99)
GLUCOSE BLD-MCNC: 199 MG/DL (ref 65–99)
GLUCOSE BLD-MCNC: 201 MG/DL (ref 65–99)
GLUCOSE BLD-MCNC: 201 MG/DL (ref 65–99)
GLUCOSE BLD-MCNC: 202 MG/DL (ref 65–99)
GLUCOSE BLD-MCNC: 203 MG/DL (ref 65–99)
GLUCOSE BLD-MCNC: 204 MG/DL (ref 65–99)
GLUCOSE BLD-MCNC: 205 MG/DL (ref 65–99)
GLUCOSE BLD-MCNC: 206 MG/DL (ref 65–99)
GLUCOSE BLD-MCNC: 206 MG/DL (ref 65–99)
GLUCOSE BLD-MCNC: 207 MG/DL (ref 65–99)
GLUCOSE BLD-MCNC: 208 MG/DL (ref 65–99)
GLUCOSE BLD-MCNC: 210 MG/DL (ref 65–99)
GLUCOSE BLD-MCNC: 211 MG/DL (ref 65–99)
GLUCOSE BLD-MCNC: 213 MG/DL (ref 65–99)
GLUCOSE BLD-MCNC: 213 MG/DL (ref 65–99)
GLUCOSE BLD-MCNC: 215 MG/DL (ref 65–99)
GLUCOSE BLD-MCNC: 216 MG/DL (ref 65–99)
GLUCOSE BLD-MCNC: 216 MG/DL (ref 65–99)
GLUCOSE BLD-MCNC: 217 MG/DL (ref 65–99)
GLUCOSE BLD-MCNC: 220 MG/DL (ref 65–99)
GLUCOSE BLD-MCNC: 222 MG/DL (ref 65–99)
GLUCOSE BLD-MCNC: 223 MG/DL (ref 65–99)
GLUCOSE BLD-MCNC: 223 MG/DL (ref 65–99)
GLUCOSE BLD-MCNC: 225 MG/DL (ref 65–99)
GLUCOSE BLD-MCNC: 227 MG/DL (ref 65–99)
GLUCOSE BLD-MCNC: 227 MG/DL (ref 65–99)
GLUCOSE BLD-MCNC: 228 MG/DL (ref 65–99)
GLUCOSE BLD-MCNC: 228 MG/DL (ref 65–99)
GLUCOSE BLD-MCNC: 229 MG/DL (ref 65–99)
GLUCOSE BLD-MCNC: 229 MG/DL (ref 65–99)
GLUCOSE BLD-MCNC: 232 MG/DL (ref 65–99)
GLUCOSE BLD-MCNC: 233 MG/DL (ref 65–99)
GLUCOSE BLD-MCNC: 233 MG/DL (ref 65–99)
GLUCOSE BLD-MCNC: 235 MG/DL (ref 65–99)
GLUCOSE BLD-MCNC: 239 MG/DL (ref 65–99)
GLUCOSE BLD-MCNC: 240 MG/DL (ref 65–99)
GLUCOSE BLD-MCNC: 241 MG/DL (ref 65–99)
GLUCOSE BLD-MCNC: 242 MG/DL (ref 65–99)
GLUCOSE BLD-MCNC: 253 MG/DL (ref 65–99)
GLUCOSE BLD-MCNC: 256 MG/DL (ref 65–99)
GLUCOSE BLD-MCNC: 257 MG/DL (ref 65–99)
GLUCOSE BLD-MCNC: 259 MG/DL (ref 65–99)
GLUCOSE BLD-MCNC: 262 MG/DL (ref 65–99)
GLUCOSE BLD-MCNC: 262 MG/DL (ref 65–99)
GLUCOSE BLD-MCNC: 263 MG/DL (ref 65–99)
GLUCOSE BLD-MCNC: 264 MG/DL (ref 65–99)
GLUCOSE BLD-MCNC: 266 MG/DL (ref 65–99)
GLUCOSE BLD-MCNC: 268 MG/DL (ref 65–99)
GLUCOSE BLD-MCNC: 271 MG/DL (ref 65–99)
GLUCOSE BLD-MCNC: 274 MG/DL (ref 65–99)
GLUCOSE BLD-MCNC: 278 MG/DL (ref 65–99)
GLUCOSE BLD-MCNC: 279 MG/DL (ref 65–99)
GLUCOSE BLD-MCNC: 281 MG/DL (ref 65–99)
GLUCOSE BLD-MCNC: 281 MG/DL (ref 65–99)
GLUCOSE BLD-MCNC: 283 MG/DL (ref 65–99)
GLUCOSE BLD-MCNC: 284 MG/DL (ref 65–99)
GLUCOSE BLD-MCNC: 285 MG/DL (ref 65–99)
GLUCOSE BLD-MCNC: 291 MG/DL (ref 65–99)
GLUCOSE BLD-MCNC: 291 MG/DL (ref 65–99)
GLUCOSE BLD-MCNC: 299 MG/DL (ref 65–99)
GLUCOSE BLD-MCNC: 300 MG/DL (ref 65–99)
GLUCOSE BLD-MCNC: 301 MG/DL (ref 65–99)
GLUCOSE BLD-MCNC: 302 MG/DL (ref 65–99)
GLUCOSE BLD-MCNC: 313 MG/DL (ref 65–99)
GLUCOSE BLD-MCNC: 317 MG/DL (ref 65–99)
GLUCOSE BLD-MCNC: 318 MG/DL (ref 65–99)
GLUCOSE BLD-MCNC: 321 MG/DL (ref 65–99)
GLUCOSE BLD-MCNC: 323 MG/DL (ref 65–99)
GLUCOSE BLD-MCNC: 370 MG/DL (ref 65–99)
GLUCOSE BLD-MCNC: 67 MG/DL (ref 65–99)
GLUCOSE BLD-MCNC: 67 MG/DL (ref 65–99)
GLUCOSE BLD-MCNC: 71 MG/DL (ref 65–99)
GLUCOSE BLD-MCNC: 73 MG/DL (ref 65–99)
GLUCOSE BLD-MCNC: 76 MG/DL (ref 65–99)
GLUCOSE BLD-MCNC: 78 MG/DL (ref 65–99)
GLUCOSE BLD-MCNC: 78 MG/DL (ref 65–99)
GLUCOSE BLD-MCNC: 79 MG/DL (ref 65–99)
GLUCOSE BLD-MCNC: 81 MG/DL (ref 65–99)
GLUCOSE BLD-MCNC: 83 MG/DL (ref 65–99)
GLUCOSE BLD-MCNC: 84 MG/DL (ref 65–99)
GLUCOSE BLD-MCNC: 86 MG/DL (ref 65–99)
GLUCOSE BLD-MCNC: 87 MG/DL (ref 65–99)
GLUCOSE BLD-MCNC: 88 MG/DL (ref 65–99)
GLUCOSE BLD-MCNC: 90 MG/DL (ref 65–99)
GLUCOSE BLD-MCNC: 93 MG/DL (ref 65–99)
GLUCOSE BLD-MCNC: 94 MG/DL (ref 65–99)
GLUCOSE BLD-MCNC: 95 MG/DL (ref 65–99)
GLUCOSE BLD-MCNC: 96 MG/DL (ref 65–99)
GLUCOSE SERPL-MCNC: 101 MG/DL (ref 65–99)
GLUCOSE SERPL-MCNC: 103 MG/DL (ref 65–99)
GLUCOSE SERPL-MCNC: 116 MG/DL (ref 65–99)
GLUCOSE SERPL-MCNC: 119 MG/DL (ref 65–99)
GLUCOSE SERPL-MCNC: 121 MG/DL (ref 65–99)
GLUCOSE SERPL-MCNC: 122 MG/DL (ref 65–99)
GLUCOSE SERPL-MCNC: 134 MG/DL (ref 65–99)
GLUCOSE SERPL-MCNC: 136 MG/DL (ref 65–99)
GLUCOSE SERPL-MCNC: 137 MG/DL (ref 65–99)
GLUCOSE SERPL-MCNC: 138 MG/DL (ref 65–99)
GLUCOSE SERPL-MCNC: 139 MG/DL (ref 65–99)
GLUCOSE SERPL-MCNC: 141 MG/DL (ref 65–99)
GLUCOSE SERPL-MCNC: 143 MG/DL (ref 65–99)
GLUCOSE SERPL-MCNC: 145 MG/DL (ref 65–99)
GLUCOSE SERPL-MCNC: 146 MG/DL (ref 65–99)
GLUCOSE SERPL-MCNC: 146 MG/DL (ref 65–99)
GLUCOSE SERPL-MCNC: 147 MG/DL (ref 65–99)
GLUCOSE SERPL-MCNC: 149 MG/DL (ref 65–99)
GLUCOSE SERPL-MCNC: 151 MG/DL (ref 65–99)
GLUCOSE SERPL-MCNC: 156 MG/DL (ref 65–99)
GLUCOSE SERPL-MCNC: 157 MG/DL (ref 65–99)
GLUCOSE SERPL-MCNC: 160 MG/DL (ref 65–99)
GLUCOSE SERPL-MCNC: 163 MG/DL (ref 65–99)
GLUCOSE SERPL-MCNC: 165 MG/DL (ref 65–99)
GLUCOSE SERPL-MCNC: 169 MG/DL (ref 65–99)
GLUCOSE SERPL-MCNC: 170 MG/DL (ref 65–99)
GLUCOSE SERPL-MCNC: 172 MG/DL (ref 65–99)
GLUCOSE SERPL-MCNC: 176 MG/DL (ref 65–99)
GLUCOSE SERPL-MCNC: 184 MG/DL (ref 65–99)
GLUCOSE SERPL-MCNC: 201 MG/DL (ref 65–99)
GLUCOSE SERPL-MCNC: 204 MG/DL (ref 65–99)
GLUCOSE SERPL-MCNC: 204 MG/DL (ref 65–99)
GLUCOSE SERPL-MCNC: 208 MG/DL (ref 65–99)
GLUCOSE SERPL-MCNC: 212 MG/DL (ref 65–99)
GLUCOSE SERPL-MCNC: 213 MG/DL (ref 65–99)
GLUCOSE SERPL-MCNC: 219 MG/DL (ref 65–99)
GLUCOSE SERPL-MCNC: 224 MG/DL (ref 65–99)
GLUCOSE SERPL-MCNC: 225 MG/DL (ref 65–99)
GLUCOSE SERPL-MCNC: 228 MG/DL (ref 65–99)
GLUCOSE SERPL-MCNC: 235 MG/DL (ref 65–99)
GLUCOSE SERPL-MCNC: 239 MG/DL (ref 65–99)
GLUCOSE SERPL-MCNC: 248 MG/DL (ref 65–99)
GLUCOSE SERPL-MCNC: 254 MG/DL (ref 65–99)
GLUCOSE SERPL-MCNC: 75 MG/DL (ref 65–99)
GLUCOSE SERPL-MCNC: 80 MG/DL (ref 65–99)
GLUCOSE SERPL-MCNC: 86 MG/DL (ref 65–99)
GLUCOSE SERPL-MCNC: 89 MG/DL (ref 65–99)
GLUCOSE SERPL-MCNC: 90 MG/DL (ref 65–99)
GLUCOSE SERPL-MCNC: 95 MG/DL (ref 65–99)
GLUCOSE SERPL-MCNC: 95 MG/DL (ref 65–99)
GLUCOSE UR STRIP.AUTO-MCNC: 100 MG/DL
GLUCOSE UR STRIP.AUTO-MCNC: 300 MG/DL
GLUCOSE UR STRIP.AUTO-MCNC: >1000 MG/DL
GLUCOSE UR STRIP.AUTO-MCNC: NEGATIVE MG/DL
HBA1C MFR BLD: 5.4 % (ref 0–5.6)
HBA1C MFR BLD: 6.8 % (ref 0–5.6)
HBA1C MFR BLD: 8.1 % (ref 0–5.6)
HBA1C MFR BLD: 8.3 % (ref ?–5.8)
HCO3 BLDA-SCNC: 28 MMOL/L (ref 17–25)
HCO3 BLDA-SCNC: 29 MMOL/L (ref 17–25)
HCT VFR BLD AUTO: 23.3 % (ref 42–52)
HCT VFR BLD AUTO: 23.4 % (ref 42–52)
HCT VFR BLD AUTO: 25.1 % (ref 42–52)
HCT VFR BLD AUTO: 26.7 % (ref 42–52)
HCT VFR BLD AUTO: 29.4 % (ref 42–52)
HCT VFR BLD AUTO: 32.5 % (ref 42–52)
HCT VFR BLD AUTO: 32.8 % (ref 42–52)
HCT VFR BLD AUTO: 35.8 % (ref 42–52)
HCT VFR BLD AUTO: 36.1 % (ref 42–52)
HCT VFR BLD AUTO: 36.5 % (ref 42–52)
HCT VFR BLD AUTO: 36.6 % (ref 42–52)
HCT VFR BLD AUTO: 36.7 % (ref 42–52)
HCT VFR BLD AUTO: 37.1 % (ref 42–52)
HCT VFR BLD AUTO: 37.1 % (ref 42–52)
HCT VFR BLD AUTO: 37.4 % (ref 42–52)
HCT VFR BLD AUTO: 37.7 % (ref 42–52)
HCT VFR BLD AUTO: 37.7 % (ref 42–52)
HCT VFR BLD AUTO: 37.8 % (ref 42–52)
HCT VFR BLD AUTO: 38.1 % (ref 42–52)
HCT VFR BLD AUTO: 38.4 % (ref 42–52)
HCT VFR BLD AUTO: 38.4 % (ref 42–52)
HCT VFR BLD AUTO: 38.6 % (ref 42–52)
HCT VFR BLD AUTO: 38.9 % (ref 42–52)
HCT VFR BLD AUTO: 39.1 % (ref 42–52)
HCT VFR BLD AUTO: 39.6 % (ref 42–52)
HCT VFR BLD AUTO: 39.6 % (ref 42–52)
HCT VFR BLD AUTO: 39.7 % (ref 42–52)
HCT VFR BLD AUTO: 39.8 % (ref 42–52)
HCT VFR BLD AUTO: 39.8 % (ref 42–52)
HCT VFR BLD AUTO: 40.1 % (ref 42–52)
HCT VFR BLD AUTO: 40.1 % (ref 42–52)
HCT VFR BLD AUTO: 40.3 % (ref 42–52)
HCT VFR BLD AUTO: 40.5 % (ref 42–52)
HCT VFR BLD AUTO: 40.5 % (ref 42–52)
HCT VFR BLD AUTO: 40.6 % (ref 42–52)
HCT VFR BLD AUTO: 40.8 % (ref 42–52)
HCT VFR BLD AUTO: 41 % (ref 42–52)
HCT VFR BLD AUTO: 41.2 % (ref 42–52)
HCT VFR BLD AUTO: 41.6 % (ref 42–52)
HCT VFR BLD AUTO: 42 % (ref 42–52)
HCT VFR BLD AUTO: 42.1 % (ref 42–52)
HCT VFR BLD AUTO: 42.7 % (ref 42–52)
HCT VFR BLD AUTO: 42.9 % (ref 42–52)
HCT VFR BLD AUTO: 43 % (ref 42–52)
HCT VFR BLD AUTO: 43 % (ref 42–52)
HCT VFR BLD AUTO: 43.1 % (ref 42–52)
HCT VFR BLD AUTO: 43.2 % (ref 42–52)
HCT VFR BLD AUTO: 43.2 % (ref 42–52)
HCT VFR BLD AUTO: 44 % (ref 42–52)
HCT VFR BLD AUTO: 44 % (ref 42–52)
HCT VFR BLD AUTO: 44.2 % (ref 42–52)
HCT VFR BLD AUTO: 44.3 % (ref 42–52)
HDLC SERPL-MCNC: 59 MG/DL
HDLC SERPL-MCNC: 59 MG/DL
HDLC SERPL-MCNC: 60 MG/DL
HEPIND PLTAB  51052: NEGATIVE
HGB BLD-MCNC: 10.6 G/DL (ref 14–18)
HGB BLD-MCNC: 10.9 G/DL (ref 14–18)
HGB BLD-MCNC: 11.2 G/DL (ref 14–18)
HGB BLD-MCNC: 11.4 G/DL (ref 14–18)
HGB BLD-MCNC: 11.6 G/DL (ref 14–18)
HGB BLD-MCNC: 11.8 G/DL (ref 14–18)
HGB BLD-MCNC: 12 G/DL (ref 14–18)
HGB BLD-MCNC: 12.1 G/DL (ref 14–18)
HGB BLD-MCNC: 12.1 G/DL (ref 14–18)
HGB BLD-MCNC: 12.2 G/DL (ref 14–18)
HGB BLD-MCNC: 12.3 G/DL (ref 14–18)
HGB BLD-MCNC: 12.3 G/DL (ref 14–18)
HGB BLD-MCNC: 12.4 G/DL (ref 14–18)
HGB BLD-MCNC: 12.7 G/DL (ref 14–18)
HGB BLD-MCNC: 12.7 G/DL (ref 14–18)
HGB BLD-MCNC: 12.8 G/DL (ref 14–18)
HGB BLD-MCNC: 13 G/DL (ref 14–18)
HGB BLD-MCNC: 13 G/DL (ref 14–18)
HGB BLD-MCNC: 13.1 G/DL (ref 14–18)
HGB BLD-MCNC: 13.2 G/DL (ref 14–18)
HGB BLD-MCNC: 13.4 G/DL (ref 14–18)
HGB BLD-MCNC: 13.5 G/DL (ref 14–18)
HGB BLD-MCNC: 13.6 G/DL (ref 14–18)
HGB BLD-MCNC: 13.6 G/DL (ref 14–18)
HGB BLD-MCNC: 13.8 G/DL (ref 14–18)
HGB BLD-MCNC: 13.8 G/DL (ref 14–18)
HGB BLD-MCNC: 13.9 G/DL (ref 14–18)
HGB BLD-MCNC: 14 G/DL (ref 14–18)
HGB BLD-MCNC: 14.1 G/DL (ref 14–18)
HGB BLD-MCNC: 14.3 G/DL (ref 14–18)
HGB BLD-MCNC: 14.5 G/DL (ref 14–18)
HGB BLD-MCNC: 14.9 G/DL (ref 14–18)
HGB BLD-MCNC: 7.5 G/DL (ref 14–18)
HGB BLD-MCNC: 7.8 G/DL (ref 14–18)
HGB BLD-MCNC: 8.2 G/DL (ref 14–18)
HGB BLD-MCNC: 8.4 G/DL (ref 14–18)
HGB BLD-MCNC: 9.8 G/DL (ref 14–18)
HGB RETIC QN AUTO: 19.2 PG/CELL (ref 29–35)
HIV 1+2 AB+HIV1 P24 AG SERPL QL IA: NON REACTIVE
IMM GRANULOCYTES # BLD AUTO: 0.06 K/UL (ref 0–0.11)
IMM GRANULOCYTES # BLD AUTO: 0.08 K/UL (ref 0–0.11)
IMM GRANULOCYTES # BLD AUTO: 0.09 K/UL (ref 0–0.11)
IMM GRANULOCYTES # BLD AUTO: 0.11 K/UL (ref 0–0.11)
IMM GRANULOCYTES # BLD AUTO: 0.11 K/UL (ref 0–0.11)
IMM GRANULOCYTES # BLD AUTO: 0.14 K/UL (ref 0–0.11)
IMM GRANULOCYTES # BLD AUTO: 0.17 K/UL (ref 0–0.11)
IMM GRANULOCYTES # BLD AUTO: 0.19 K/UL (ref 0–0.11)
IMM GRANULOCYTES # BLD AUTO: 0.2 K/UL (ref 0–0.11)
IMM GRANULOCYTES # BLD AUTO: 0.21 K/UL (ref 0–0.11)
IMM GRANULOCYTES # BLD AUTO: 0.21 K/UL (ref 0–0.11)
IMM GRANULOCYTES # BLD AUTO: 0.24 K/UL (ref 0–0.11)
IMM GRANULOCYTES # BLD AUTO: 0.26 K/UL (ref 0–0.11)
IMM GRANULOCYTES # BLD AUTO: 0.26 K/UL (ref 0–0.11)
IMM GRANULOCYTES # BLD AUTO: 0.28 K/UL (ref 0–0.11)
IMM GRANULOCYTES # BLD AUTO: 0.38 K/UL (ref 0–0.11)
IMM GRANULOCYTES # BLD AUTO: 0.44 K/UL (ref 0–0.11)
IMM GRANULOCYTES # BLD AUTO: 0.49 K/UL (ref 0–0.11)
IMM GRANULOCYTES # BLD AUTO: 0.5 K/UL (ref 0–0.11)
IMM GRANULOCYTES # BLD AUTO: 0.56 K/UL (ref 0–0.11)
IMM GRANULOCYTES # BLD AUTO: 0.76 K/UL (ref 0–0.11)
IMM GRANULOCYTES NFR BLD AUTO: 1.1 % (ref 0–0.9)
IMM GRANULOCYTES NFR BLD AUTO: 1.2 % (ref 0–0.9)
IMM GRANULOCYTES NFR BLD AUTO: 1.3 % (ref 0–0.9)
IMM GRANULOCYTES NFR BLD AUTO: 1.5 % (ref 0–0.9)
IMM GRANULOCYTES NFR BLD AUTO: 1.5 % (ref 0–0.9)
IMM GRANULOCYTES NFR BLD AUTO: 1.6 % (ref 0–0.9)
IMM GRANULOCYTES NFR BLD AUTO: 1.6 % (ref 0–0.9)
IMM GRANULOCYTES NFR BLD AUTO: 1.9 % (ref 0–0.9)
IMM GRANULOCYTES NFR BLD AUTO: 2.1 % (ref 0–0.9)
IMM GRANULOCYTES NFR BLD AUTO: 2.1 % (ref 0–0.9)
IMM GRANULOCYTES NFR BLD AUTO: 2.2 % (ref 0–0.9)
IMM GRANULOCYTES NFR BLD AUTO: 2.9 % (ref 0–0.9)
IMM GRANULOCYTES NFR BLD AUTO: 3 % (ref 0–0.9)
IMM GRANULOCYTES NFR BLD AUTO: 3.6 % (ref 0–0.9)
IMM GRANULOCYTES NFR BLD AUTO: 4 % (ref 0–0.9)
IMM GRANULOCYTES NFR BLD AUTO: 4.2 % (ref 0–0.9)
IMM GRANULOCYTES NFR BLD AUTO: 4.4 % (ref 0–0.9)
IMM GRANULOCYTES NFR BLD AUTO: 4.4 % (ref 0–0.9)
IMM GRANULOCYTES NFR BLD AUTO: 4.5 % (ref 0–0.9)
IMM GRANULOCYTES NFR BLD AUTO: 4.7 % (ref 0–0.9)
IMM GRANULOCYTES NFR BLD AUTO: 4.7 % (ref 0–0.9)
IMM GRANULOCYTES NFR BLD AUTO: 5.4 % (ref 0–0.9)
IMM GRANULOCYTES NFR BLD AUTO: 6 % (ref 0–0.9)
IMM RETICS NFR: 42.5 % (ref 9.3–17.4)
INHALED O2 FLOW RATE: 4 L/MIN (ref 2–10)
INR BLD: 1.8 (ref 0.9–1.2)
INR BLD: 2.1 (ref 0.9–1.2)
INR BLD: 2.3 (ref 0.9–1.2)
INR BLD: 2.5 (ref 0.9–1.2)
INR BLD: 3.4 (ref 0.9–1.2)
INR BLD: 3.5 (ref 0.9–1.2)
INR BLD: 3.6 (ref 0.9–1.2)
INR BLD: 3.6 (ref 0.9–1.2)
INR BLD: 3.7 (ref 0.9–1.2)
INR BLD: 3.9 (ref 0.9–1.2)
INR BLD: 7.2 (ref 0.9–1.2)
INR PPP: 0.82 (ref 0.87–1.13)
INR PPP: 0.95 (ref 0.87–1.13)
INR PPP: 1.13 (ref 0.87–1.13)
INR PPP: 1.13 (ref 0.87–1.13)
INR PPP: 1.8 (ref 2–3.5)
INR PPP: 1.85 (ref 0.87–1.13)
INR PPP: 2.05 (ref 0.87–1.13)
INR PPP: 2.1 (ref 2–3.5)
INR PPP: 2.14 (ref 0.87–1.13)
INR PPP: 2.3 (ref 2–3.5)
INR PPP: 2.5 (ref 2–3.5)
INR PPP: 3.3 (ref 0.87–1.13)
INR PPP: 3.4 (ref 2–3.5)
INR PPP: 3.5 (ref 2–3.5)
INR PPP: 3.6 (ref 2–3.5)
INR PPP: 3.6 (ref 2–3.5)
INR PPP: 3.7 (ref 2–3.5)
INR PPP: 3.9 (ref 2–3.5)
INR PPP: 7.2 (ref 2–3.5)
INT CON NEG: NEGATIVE
INT CON POS: POSITIVE
IRON SATN MFR SERPL: 16 % (ref 15–55)
IRON SATN MFR SERPL: 22 % (ref 15–55)
IRON SATN MFR SERPL: 5 % (ref 15–55)
IRON SERPL-MCNC: 21 UG/DL (ref 50–180)
IRON SERPL-MCNC: 59 UG/DL (ref 50–180)
IRON SERPL-MCNC: 71 UG/DL (ref 50–180)
KETONES UR STRIP.AUTO-MCNC: 80 MG/DL
KETONES UR STRIP.AUTO-MCNC: ABNORMAL MG/DL
KETONES UR STRIP.AUTO-MCNC: NEGATIVE MG/DL
LACTATE BLD-SCNC: 1.1 MMOL/L (ref 0.5–2)
LACTATE BLD-SCNC: 1.2 MMOL/L (ref 0.5–2)
LACTATE BLD-SCNC: 1.3 MMOL/L (ref 0.5–2)
LACTATE BLD-SCNC: 1.4 MMOL/L (ref 0.5–2)
LACTATE BLD-SCNC: 1.6 MMOL/L (ref 0.5–2)
LACTATE BLD-SCNC: 1.8 MMOL/L (ref 0.5–2)
LDH SERPL-CCNC: 312 U/L (ref 107–266)
LDH SERPL-CCNC: 316 U/L (ref 107–266)
LDH SERPL-CCNC: 368 U/L (ref 107–266)
LDH SERPL-CCNC: 407 U/L (ref 107–266)
LDH SERPL-CCNC: 453 U/L (ref 107–266)
LDLC SERPL CALC-MCNC: 198 MG/DL
LDLC SERPL CALC-MCNC: 243 MG/DL
LDLC SERPL CALC-MCNC: 245 MG/DL
LEUKOCYTE ESTERASE UR QL STRIP.AUTO: ABNORMAL
LEUKOCYTE ESTERASE UR QL STRIP.AUTO: NEGATIVE
LEVETIRACETAM SERPL-MCNC: 10 UG/ML (ref 12–46)
LEVETIRACETAM SERPL-MCNC: 18 UG/ML (ref 12–46)
LEVETIRACETAM SERPL-MCNC: 4 UG/ML (ref 12–46)
LIPASE SERPL-CCNC: 48 U/L (ref 11–82)
LIPASE SERPL-CCNC: 5 U/L (ref 11–82)
LIPASE SERPL-CCNC: 50 U/L (ref 11–82)
LIPASE SERPL-CCNC: 57 U/L (ref 11–82)
LV EJECT FRACT  99904: 50
LV EJECT FRACT MOD 2C 99903: 45.55
LV EJECT FRACT MOD 2C 99903: 58.77
LV EJECT FRACT MOD 4C 99902: 60.1
LV EJECT FRACT MOD 4C 99902: 61.04
LV EJECT FRACT MOD BP 99901: 57.39
LV EJECT FRACT MOD BP 99901: 58.36
LYMPHOCYTES # BLD AUTO: 0.18 K/UL (ref 1–4.8)
LYMPHOCYTES # BLD AUTO: 0.29 K/UL (ref 1–4.8)
LYMPHOCYTES # BLD AUTO: 0.3 K/UL (ref 1–4.8)
LYMPHOCYTES # BLD AUTO: 0.31 K/UL (ref 1–4.8)
LYMPHOCYTES # BLD AUTO: 0.37 K/UL (ref 1–4.8)
LYMPHOCYTES # BLD AUTO: 0.38 K/UL (ref 1–4.8)
LYMPHOCYTES # BLD AUTO: 0.4 K/UL (ref 1–4.8)
LYMPHOCYTES # BLD AUTO: 0.4 K/UL (ref 1–4.8)
LYMPHOCYTES # BLD AUTO: 0.45 K/UL (ref 1–4.8)
LYMPHOCYTES # BLD AUTO: 0.46 K/UL (ref 1–4.8)
LYMPHOCYTES # BLD AUTO: 0.46 K/UL (ref 1–4.8)
LYMPHOCYTES # BLD AUTO: 0.5 K/UL (ref 1–4.8)
LYMPHOCYTES # BLD AUTO: 0.5 K/UL (ref 1–4.8)
LYMPHOCYTES # BLD AUTO: 0.55 K/UL (ref 1–4.8)
LYMPHOCYTES # BLD AUTO: 0.57 K/UL (ref 1–4.8)
LYMPHOCYTES # BLD AUTO: 0.58 K/UL (ref 1–4.8)
LYMPHOCYTES # BLD AUTO: 0.59 K/UL (ref 1–4.8)
LYMPHOCYTES # BLD AUTO: 0.6 K/UL (ref 1–4.8)
LYMPHOCYTES # BLD AUTO: 0.6 K/UL (ref 1–4.8)
LYMPHOCYTES # BLD AUTO: 0.61 K/UL (ref 1–4.8)
LYMPHOCYTES # BLD AUTO: 0.62 K/UL (ref 1–4.8)
LYMPHOCYTES # BLD AUTO: 0.66 K/UL (ref 1–4.8)
LYMPHOCYTES # BLD AUTO: 0.69 K/UL (ref 1–4.8)
LYMPHOCYTES # BLD AUTO: 0.71 K/UL (ref 1–4.8)
LYMPHOCYTES # BLD AUTO: 0.72 K/UL (ref 1–4.8)
LYMPHOCYTES # BLD AUTO: 0.72 K/UL (ref 1–4.8)
LYMPHOCYTES # BLD AUTO: 0.78 K/UL (ref 1–4.8)
LYMPHOCYTES # BLD AUTO: 0.8 K/UL (ref 1–4.8)
LYMPHOCYTES # BLD AUTO: 0.81 K/UL (ref 1–4.8)
LYMPHOCYTES # BLD AUTO: 0.86 K/UL (ref 1–4.8)
LYMPHOCYTES # BLD AUTO: 0.88 K/UL (ref 1–4.8)
LYMPHOCYTES # BLD AUTO: 0.91 K/UL (ref 1–4.8)
LYMPHOCYTES # BLD AUTO: 0.94 K/UL (ref 1–4.8)
LYMPHOCYTES # BLD AUTO: 0.95 K/UL (ref 1–4.8)
LYMPHOCYTES # BLD AUTO: 0.99 K/UL (ref 1–4.8)
LYMPHOCYTES # BLD AUTO: 1.1 K/UL (ref 1–4.8)
LYMPHOCYTES # BLD AUTO: 1.16 K/UL (ref 1–4.8)
LYMPHOCYTES # BLD AUTO: 1.36 K/UL (ref 1–4.8)
LYMPHOCYTES # BLD AUTO: 2.05 K/UL (ref 1–4.8)
LYMPHOCYTES # BLD: 0.75 K/UL (ref 1–4.8)
LYMPHOCYTES NFR BLD AUTO: 13.2 % (ref 22–41)
LYMPHOCYTES NFR BLD: 10.4 % (ref 22–41)
LYMPHOCYTES NFR BLD: 10.6 % (ref 22–41)
LYMPHOCYTES NFR BLD: 12.2 % (ref 22–41)
LYMPHOCYTES NFR BLD: 12.3 % (ref 22–41)
LYMPHOCYTES NFR BLD: 12.8 % (ref 22–41)
LYMPHOCYTES NFR BLD: 13.1 % (ref 22–41)
LYMPHOCYTES NFR BLD: 13.1 % (ref 22–41)
LYMPHOCYTES NFR BLD: 13.6 % (ref 22–41)
LYMPHOCYTES NFR BLD: 14.9 % (ref 22–41)
LYMPHOCYTES NFR BLD: 2.6 % (ref 22–41)
LYMPHOCYTES NFR BLD: 20 % (ref 22–41)
LYMPHOCYTES NFR BLD: 20.9 % (ref 22–41)
LYMPHOCYTES NFR BLD: 3.5 % (ref 22–41)
LYMPHOCYTES NFR BLD: 3.7 % (ref 22–41)
LYMPHOCYTES NFR BLD: 4 % (ref 22–41)
LYMPHOCYTES NFR BLD: 4.3 % (ref 22–41)
LYMPHOCYTES NFR BLD: 5 % (ref 22–41)
LYMPHOCYTES NFR BLD: 5.3 % (ref 22–41)
LYMPHOCYTES NFR BLD: 5.4 % (ref 22–41)
LYMPHOCYTES NFR BLD: 6.1 % (ref 22–41)
LYMPHOCYTES NFR BLD: 6.1 % (ref 22–41)
LYMPHOCYTES NFR BLD: 6.3 % (ref 22–41)
LYMPHOCYTES NFR BLD: 6.4 % (ref 22–41)
LYMPHOCYTES NFR BLD: 6.4 % (ref 22–41)
LYMPHOCYTES NFR BLD: 6.6 % (ref 22–41)
LYMPHOCYTES NFR BLD: 6.7 % (ref 22–41)
LYMPHOCYTES NFR BLD: 7.1 % (ref 22–41)
LYMPHOCYTES NFR BLD: 7.2 % (ref 22–41)
LYMPHOCYTES NFR BLD: 7.2 % (ref 22–41)
LYMPHOCYTES NFR BLD: 7.4 % (ref 22–41)
LYMPHOCYTES NFR BLD: 7.5 % (ref 22–41)
LYMPHOCYTES NFR BLD: 7.7 % (ref 22–41)
LYMPHOCYTES NFR BLD: 7.8 % (ref 22–41)
LYMPHOCYTES NFR BLD: 7.8 % (ref 22–41)
LYMPHOCYTES NFR BLD: 8.5 % (ref 22–41)
LYMPHOCYTES NFR BLD: 8.6 % (ref 22–41)
LYMPHOCYTES NFR BLD: 8.6 % (ref 22–41)
LYMPHOCYTES NFR BLD: 8.8 % (ref 22–41)
LYMPHOCYTES NFR BLD: 8.8 % (ref 22–41)
LYMPHOCYTES NFR BLD: 9 % (ref 22–41)
LYMPHOCYTES NFR BLD: 9.6 % (ref 22–41)
LYMPHOCYTES NFR BLD: 9.8 % (ref 22–41)
MACROCYTES BLD QL SMEAR: ABNORMAL
MAGNESIUM SERPL-MCNC: 1.8 MG/DL (ref 1.5–2.5)
MAGNESIUM SERPL-MCNC: 1.9 MG/DL (ref 1.5–2.5)
MAGNESIUM SERPL-MCNC: 2 MG/DL (ref 1.5–2.5)
MAGNESIUM SERPL-MCNC: 2.1 MG/DL (ref 1.5–2.5)
MAGNESIUM SERPL-MCNC: 2.1 MG/DL (ref 1.5–2.5)
MAGNESIUM SERPL-MCNC: 2.2 MG/DL (ref 1.5–2.5)
MAGNESIUM SERPL-MCNC: 2.4 MG/DL (ref 1.5–2.5)
MAGNESIUM SERPL-MCNC: 2.5 MG/DL (ref 1.5–2.5)
MAGNESIUM SERPL-MCNC: 2.6 MG/DL (ref 1.5–2.5)
MANUAL DIFF BLD: ABNORMAL
MANUAL DIFF BLD: NORMAL
MCH RBC QN AUTO: 26.3 PG (ref 27–33)
MCH RBC QN AUTO: 26.4 PG (ref 27–33)
MCH RBC QN AUTO: 26.7 PG (ref 27–33)
MCH RBC QN AUTO: 27.3 PG (ref 27–33)
MCH RBC QN AUTO: 27.9 PG (ref 27–33)
MCH RBC QN AUTO: 27.9 PG (ref 27–33)
MCH RBC QN AUTO: 28.2 PG (ref 27–33)
MCH RBC QN AUTO: 28.2 PG (ref 27–33)
MCH RBC QN AUTO: 28.3 PG (ref 27–33)
MCH RBC QN AUTO: 28.3 PG (ref 27–33)
MCH RBC QN AUTO: 28.5 PG (ref 27–33)
MCH RBC QN AUTO: 28.6 PG (ref 27–33)
MCH RBC QN AUTO: 28.7 PG (ref 27–33)
MCH RBC QN AUTO: 28.7 PG (ref 27–33)
MCH RBC QN AUTO: 28.8 PG (ref 27–33)
MCH RBC QN AUTO: 28.9 PG (ref 27–33)
MCH RBC QN AUTO: 29 PG (ref 27–33)
MCH RBC QN AUTO: 29 PG (ref 27–33)
MCH RBC QN AUTO: 29.1 PG (ref 27–33)
MCH RBC QN AUTO: 29.1 PG (ref 27–33)
MCH RBC QN AUTO: 29.2 PG (ref 27–33)
MCH RBC QN AUTO: 29.4 PG (ref 27–33)
MCH RBC QN AUTO: 29.7 PG (ref 27–33)
MCH RBC QN AUTO: 29.8 PG (ref 27–33)
MCH RBC QN AUTO: 30 PG (ref 27–33)
MCH RBC QN AUTO: 30.1 PG (ref 27–33)
MCH RBC QN AUTO: 30.1 PG (ref 27–33)
MCH RBC QN AUTO: 30.2 PG (ref 27–33)
MCH RBC QN AUTO: 30.3 PG (ref 27–33)
MCH RBC QN AUTO: 30.5 PG (ref 27–33)
MCH RBC QN AUTO: 30.7 PG (ref 27–33)
MCH RBC QN AUTO: 31 PG (ref 27–33)
MCH RBC QN AUTO: 31.1 PG (ref 27–33)
MCH RBC QN AUTO: 31.4 PG (ref 27–33)
MCH RBC QN AUTO: 31.6 PG (ref 27–33)
MCH RBC QN AUTO: 31.7 PG (ref 27–33)
MCH RBC QN AUTO: 31.8 PG (ref 27–33)
MCH RBC QN AUTO: 31.9 PG (ref 27–33)
MCH RBC QN AUTO: 32 PG (ref 27–33)
MCH RBC QN AUTO: 32.2 PG (ref 27–33)
MCH RBC QN AUTO: 32.5 PG (ref 27–33)
MCH RBC QN AUTO: 32.7 PG (ref 27–33)
MCH RBC QN AUTO: 32.8 PG (ref 27–33)
MCH RBC QN AUTO: 33 PG (ref 27–33)
MCH RBC QN AUTO: 33.6 PG (ref 27–33)
MCH RBC QN AUTO: 33.7 PG (ref 27–33)
MCHC RBC AUTO-ENTMCNC: 29.1 G/DL (ref 33.7–35.3)
MCHC RBC AUTO-ENTMCNC: 29.4 G/DL (ref 33.7–35.3)
MCHC RBC AUTO-ENTMCNC: 29.4 G/DL (ref 33.7–35.3)
MCHC RBC AUTO-ENTMCNC: 30.7 G/DL (ref 33.7–35.3)
MCHC RBC AUTO-ENTMCNC: 30.7 G/DL (ref 33.7–35.3)
MCHC RBC AUTO-ENTMCNC: 31.8 G/DL (ref 33.7–35.3)
MCHC RBC AUTO-ENTMCNC: 32.1 G/DL (ref 33.7–35.3)
MCHC RBC AUTO-ENTMCNC: 32.2 G/DL (ref 33.7–35.3)
MCHC RBC AUTO-ENTMCNC: 32.2 G/DL (ref 33.7–35.3)
MCHC RBC AUTO-ENTMCNC: 32.3 G/DL (ref 33.7–35.3)
MCHC RBC AUTO-ENTMCNC: 32.3 G/DL (ref 33.7–35.3)
MCHC RBC AUTO-ENTMCNC: 32.4 G/DL (ref 33.7–35.3)
MCHC RBC AUTO-ENTMCNC: 32.4 G/DL (ref 33.7–35.3)
MCHC RBC AUTO-ENTMCNC: 32.5 G/DL (ref 33.7–35.3)
MCHC RBC AUTO-ENTMCNC: 32.6 G/DL (ref 33.7–35.3)
MCHC RBC AUTO-ENTMCNC: 32.7 G/DL (ref 33.7–35.3)
MCHC RBC AUTO-ENTMCNC: 32.7 G/DL (ref 33.7–35.3)
MCHC RBC AUTO-ENTMCNC: 32.8 G/DL (ref 33.7–35.3)
MCHC RBC AUTO-ENTMCNC: 32.9 G/DL (ref 33.7–35.3)
MCHC RBC AUTO-ENTMCNC: 33.2 G/DL (ref 33.7–35.3)
MCHC RBC AUTO-ENTMCNC: 33.2 G/DL (ref 33.7–35.3)
MCHC RBC AUTO-ENTMCNC: 33.3 G/DL (ref 33.7–35.3)
MCHC RBC AUTO-ENTMCNC: 33.4 G/DL (ref 33.7–35.3)
MCHC RBC AUTO-ENTMCNC: 33.5 G/DL (ref 33.7–35.3)
MCHC RBC AUTO-ENTMCNC: 33.6 G/DL (ref 33.7–35.3)
MCHC RBC AUTO-ENTMCNC: 33.7 G/DL (ref 33.7–35.3)
MCHC RBC AUTO-ENTMCNC: 33.9 G/DL (ref 33.7–35.3)
MCHC RBC AUTO-ENTMCNC: 34.4 G/DL (ref 33.7–35.3)
MCHC RBC AUTO-ENTMCNC: 34.5 G/DL (ref 33.7–35.3)
MCHC RBC AUTO-ENTMCNC: 34.5 G/DL (ref 33.7–35.3)
MCHC RBC AUTO-ENTMCNC: 34.7 G/DL (ref 33.7–35.3)
MCHC RBC AUTO-ENTMCNC: 34.8 G/DL (ref 33.7–35.3)
MCV RBC AUTO: 100.8 FL (ref 81.4–97.8)
MCV RBC AUTO: 100.9 FL (ref 81.4–97.8)
MCV RBC AUTO: 101.3 FL (ref 81.4–97.8)
MCV RBC AUTO: 85.8 FL (ref 81.4–97.8)
MCV RBC AUTO: 86.3 FL (ref 81.4–97.8)
MCV RBC AUTO: 86.7 FL (ref 81.4–97.8)
MCV RBC AUTO: 86.9 FL (ref 81.4–97.8)
MCV RBC AUTO: 87.5 FL (ref 81.4–97.8)
MCV RBC AUTO: 87.5 FL (ref 81.4–97.8)
MCV RBC AUTO: 87.6 FL (ref 81.4–97.8)
MCV RBC AUTO: 87.8 FL (ref 81.4–97.8)
MCV RBC AUTO: 87.8 FL (ref 81.4–97.8)
MCV RBC AUTO: 87.9 FL (ref 81.4–97.8)
MCV RBC AUTO: 88 FL (ref 81.4–97.8)
MCV RBC AUTO: 88.4 FL (ref 81.4–97.8)
MCV RBC AUTO: 88.4 FL (ref 81.4–97.8)
MCV RBC AUTO: 88.6 FL (ref 81.4–97.8)
MCV RBC AUTO: 88.7 FL (ref 81.4–97.8)
MCV RBC AUTO: 88.7 FL (ref 81.4–97.8)
MCV RBC AUTO: 88.9 FL (ref 81.4–97.8)
MCV RBC AUTO: 88.9 FL (ref 81.4–97.8)
MCV RBC AUTO: 89 FL (ref 81.4–97.8)
MCV RBC AUTO: 89.2 FL (ref 81.4–97.8)
MCV RBC AUTO: 89.4 FL (ref 81.4–97.8)
MCV RBC AUTO: 89.6 FL (ref 81.4–97.8)
MCV RBC AUTO: 89.7 FL (ref 81.4–97.8)
MCV RBC AUTO: 89.7 FL (ref 81.4–97.8)
MCV RBC AUTO: 89.8 FL (ref 81.4–97.8)
MCV RBC AUTO: 89.9 FL (ref 81.4–97.8)
MCV RBC AUTO: 90 FL (ref 81.4–97.8)
MCV RBC AUTO: 90 FL (ref 81.4–97.8)
MCV RBC AUTO: 90.1 FL (ref 81.4–97.8)
MCV RBC AUTO: 90.2 FL (ref 81.4–97.8)
MCV RBC AUTO: 90.4 FL (ref 81.4–97.8)
MCV RBC AUTO: 90.4 FL (ref 81.4–97.8)
MCV RBC AUTO: 91.4 FL (ref 81.4–97.8)
MCV RBC AUTO: 91.5 FL (ref 81.4–97.8)
MCV RBC AUTO: 91.5 FL (ref 81.4–97.8)
MCV RBC AUTO: 91.6 FL (ref 81.4–97.8)
MCV RBC AUTO: 91.8 FL (ref 81.4–97.8)
MCV RBC AUTO: 92.6 FL (ref 81.4–97.8)
MCV RBC AUTO: 93.3 FL (ref 81.4–97.8)
MCV RBC AUTO: 94.8 FL (ref 81.4–97.8)
MCV RBC AUTO: 95.3 FL (ref 81.4–97.8)
MCV RBC AUTO: 96.8 FL (ref 81.4–97.8)
MCV RBC AUTO: 97.1 FL (ref 81.4–97.8)
MCV RBC AUTO: 97.5 FL (ref 81.4–97.8)
MCV RBC AUTO: 97.9 FL (ref 81.4–97.8)
MCV RBC AUTO: 98.2 FL (ref 81.4–97.8)
METAMYELOCYTES NFR BLD MANUAL: 0.8 %
METAMYELOCYTES NFR BLD MANUAL: 0.9 %
METAMYELOCYTES NFR BLD MANUAL: 1 %
METAMYELOCYTES NFR BLD MANUAL: 1.1 %
METAMYELOCYTES NFR BLD MANUAL: 1.7 %
METAMYELOCYTES NFR BLD MANUAL: 1.8 %
METAMYELOCYTES NFR BLD MANUAL: 3.5 %
METAMYELOCYTES NFR BLD MANUAL: 5.3 %
METAMYELOCYTES NFR BLD MANUAL: 6.1 %
METHADONE UR QL SCN: NEGATIVE
MICRO URNS: ABNORMAL
MICRO URNS: NORMAL
MICRO URNS: NORMAL
MICROCYTES BLD QL SMEAR: ABNORMAL
MONOCYTES # BLD AUTO: 0.06 K/UL (ref 0–0.85)
MONOCYTES # BLD AUTO: 0.06 K/UL (ref 0–0.85)
MONOCYTES # BLD AUTO: 0.1 K/UL (ref 0–0.85)
MONOCYTES # BLD AUTO: 0.1 K/UL (ref 0–0.85)
MONOCYTES # BLD AUTO: 0.11 K/UL (ref 0–0.85)
MONOCYTES # BLD AUTO: 0.12 K/UL (ref 0–0.85)
MONOCYTES # BLD AUTO: 0.13 K/UL (ref 0–0.85)
MONOCYTES # BLD AUTO: 0.17 K/UL (ref 0–0.85)
MONOCYTES # BLD AUTO: 0.22 K/UL (ref 0–0.85)
MONOCYTES # BLD AUTO: 0.26 K/UL (ref 0–0.85)
MONOCYTES # BLD AUTO: 0.28 K/UL (ref 0–0.85)
MONOCYTES # BLD AUTO: 0.31 K/UL (ref 0–0.85)
MONOCYTES # BLD AUTO: 0.34 K/UL (ref 0–0.85)
MONOCYTES # BLD AUTO: 0.35 K/UL (ref 0–0.85)
MONOCYTES # BLD AUTO: 0.36 K/UL (ref 0–0.85)
MONOCYTES # BLD AUTO: 0.37 K/UL (ref 0–0.85)
MONOCYTES # BLD AUTO: 0.37 K/UL (ref 0–0.85)
MONOCYTES # BLD AUTO: 0.38 K/UL (ref 0–0.85)
MONOCYTES # BLD AUTO: 0.39 K/UL (ref 0–0.85)
MONOCYTES # BLD AUTO: 0.39 K/UL (ref 0–0.85)
MONOCYTES # BLD AUTO: 0.4 K/UL (ref 0–0.85)
MONOCYTES # BLD AUTO: 0.4 K/UL (ref 0–0.85)
MONOCYTES # BLD AUTO: 0.41 K/UL (ref 0–0.85)
MONOCYTES # BLD AUTO: 0.42 K/UL (ref 0–0.85)
MONOCYTES # BLD AUTO: 0.44 K/UL (ref 0–0.85)
MONOCYTES # BLD AUTO: 0.47 K/UL (ref 0–0.85)
MONOCYTES # BLD AUTO: 0.48 K/UL (ref 0–0.85)
MONOCYTES # BLD AUTO: 0.51 K/UL (ref 0–0.85)
MONOCYTES # BLD AUTO: 0.52 K/UL (ref 0–0.85)
MONOCYTES # BLD AUTO: 0.56 K/UL (ref 0–0.85)
MONOCYTES # BLD AUTO: 0.56 K/UL (ref 0–0.85)
MONOCYTES # BLD AUTO: 0.57 K/UL (ref 0–0.85)
MONOCYTES # BLD AUTO: 0.6 K/UL (ref 0–0.85)
MONOCYTES # BLD AUTO: 0.61 K/UL (ref 0–0.85)
MONOCYTES # BLD AUTO: 0.62 K/UL (ref 0–0.85)
MONOCYTES # BLD AUTO: 0.95 K/UL (ref 0–0.85)
MONOCYTES # BLD: 0.15 K/UL (ref 0–0.85)
MONOCYTES NFR BLD AUTO: 0.9 % (ref 0–13.4)
MONOCYTES NFR BLD AUTO: 1 % (ref 0–13.4)
MONOCYTES NFR BLD AUTO: 1.7 % (ref 0–13.4)
MONOCYTES NFR BLD AUTO: 1.7 % (ref 0–13.4)
MONOCYTES NFR BLD AUTO: 1.8 % (ref 0–13.4)
MONOCYTES NFR BLD AUTO: 2.6 % (ref 0–13.4)
MONOCYTES NFR BLD AUTO: 2.7 % (ref 0–13.4)
MONOCYTES NFR BLD AUTO: 2.9 % (ref 0–13.4)
MONOCYTES NFR BLD AUTO: 3 % (ref 0–13.4)
MONOCYTES NFR BLD AUTO: 3.3 % (ref 0–13.4)
MONOCYTES NFR BLD AUTO: 3.7 % (ref 0–13.4)
MONOCYTES NFR BLD AUTO: 3.7 % (ref 0–13.4)
MONOCYTES NFR BLD AUTO: 3.8 % (ref 0–13.4)
MONOCYTES NFR BLD AUTO: 3.8 % (ref 0–13.4)
MONOCYTES NFR BLD AUTO: 3.9 % (ref 0–13.4)
MONOCYTES NFR BLD AUTO: 4.3 % (ref 0–13.4)
MONOCYTES NFR BLD AUTO: 4.4 % (ref 0–13.4)
MONOCYTES NFR BLD AUTO: 4.5 % (ref 0–13.4)
MONOCYTES NFR BLD AUTO: 4.5 % (ref 0–13.4)
MONOCYTES NFR BLD AUTO: 4.7 % (ref 0–13.4)
MONOCYTES NFR BLD AUTO: 5.1 % (ref 0–13.4)
MONOCYTES NFR BLD AUTO: 5.3 % (ref 0–13.4)
MONOCYTES NFR BLD AUTO: 5.4 % (ref 0–13.4)
MONOCYTES NFR BLD AUTO: 5.6 % (ref 0–13.4)
MONOCYTES NFR BLD AUTO: 5.6 % (ref 0–13.4)
MONOCYTES NFR BLD AUTO: 5.9 % (ref 0–13.4)
MONOCYTES NFR BLD AUTO: 6.1 % (ref 0–13.4)
MONOCYTES NFR BLD AUTO: 6.2 % (ref 0–13.4)
MONOCYTES NFR BLD AUTO: 6.5 % (ref 0–13.4)
MONOCYTES NFR BLD AUTO: 6.5 % (ref 0–13.4)
MONOCYTES NFR BLD AUTO: 6.6 % (ref 0–13.4)
MONOCYTES NFR BLD AUTO: 6.8 % (ref 0–13.4)
MONOCYTES NFR BLD AUTO: 6.8 % (ref 0–13.4)
MONOCYTES NFR BLD AUTO: 7 % (ref 0–13.4)
MONOCYTES NFR BLD AUTO: 7.1 % (ref 0–13.4)
MONOCYTES NFR BLD AUTO: 7.4 % (ref 0–13.4)
MONOCYTES NFR BLD AUTO: 7.9 % (ref 0–13.4)
MONOCYTES NFR BLD AUTO: 7.9 % (ref 0–13.4)
MONOCYTES NFR BLD AUTO: 9.2 % (ref 0–13.4)
MORPHOLOGY BLD-IMP: NORMAL
MRSA DNA SPEC QL NAA+PROBE: NORMAL
MUCOUS THREADS #/AREA URNS HPF: ABNORMAL /HPF
MYELOCYTES NFR BLD MANUAL: 0.9 %
MYELOCYTES NFR BLD MANUAL: 0.9 %
MYELOCYTES NFR BLD MANUAL: 1.7 %
MYELOCYTES NFR BLD MANUAL: 2.6 %
MYELOCYTES NFR BLD MANUAL: 2.6 %
MYELOCYTES NFR BLD MANUAL: 3.5 %
MYELOCYTES NFR BLD MANUAL: 3.5 %
MYELOCYTES NFR BLD MANUAL: 3.6 %
MYELOCYTES NFR BLD MANUAL: 3.6 %
MYELOCYTES NFR BLD MANUAL: 4.3 %
MYELOCYTES NFR BLD MANUAL: 4.4 %
MYELOCYTES NFR BLD MANUAL: 4.7 %
MYELOCYTES NFR BLD MANUAL: 5.3 %
MYELOCYTES NFR BLD MANUAL: 5.7 %
MYELOCYTES NFR BLD MANUAL: 6.1 %
NEUTROPHILS # BLD AUTO: 11.18 K/UL (ref 1.82–7.42)
NEUTROPHILS # BLD AUTO: 14.57 K/UL (ref 1.82–7.42)
NEUTROPHILS # BLD AUTO: 3.35 K/UL (ref 1.82–7.42)
NEUTROPHILS # BLD AUTO: 3.56 K/UL (ref 1.82–7.42)
NEUTROPHILS # BLD AUTO: 3.68 K/UL (ref 1.82–7.42)
NEUTROPHILS # BLD AUTO: 4.25 K/UL (ref 1.82–7.42)
NEUTROPHILS # BLD AUTO: 4.41 K/UL (ref 1.82–7.42)
NEUTROPHILS # BLD AUTO: 4.73 K/UL (ref 1.82–7.42)
NEUTROPHILS # BLD AUTO: 4.78 K/UL (ref 1.82–7.42)
NEUTROPHILS # BLD AUTO: 4.88 K/UL (ref 1.82–7.42)
NEUTROPHILS # BLD AUTO: 5 K/UL (ref 1.82–7.42)
NEUTROPHILS # BLD AUTO: 5.05 K/UL (ref 1.82–7.42)
NEUTROPHILS # BLD AUTO: 5.08 K/UL (ref 1.82–7.42)
NEUTROPHILS # BLD AUTO: 5.13 K/UL (ref 1.82–7.42)
NEUTROPHILS # BLD AUTO: 5.52 K/UL (ref 1.82–7.42)
NEUTROPHILS # BLD AUTO: 5.56 K/UL (ref 1.82–7.42)
NEUTROPHILS # BLD AUTO: 5.58 K/UL (ref 1.82–7.42)
NEUTROPHILS # BLD AUTO: 5.77 K/UL (ref 1.82–7.42)
NEUTROPHILS # BLD AUTO: 5.81 K/UL (ref 1.82–7.42)
NEUTROPHILS # BLD AUTO: 5.85 K/UL (ref 1.82–7.42)
NEUTROPHILS # BLD AUTO: 5.99 K/UL (ref 1.82–7.42)
NEUTROPHILS # BLD AUTO: 6.26 K/UL (ref 1.82–7.42)
NEUTROPHILS # BLD AUTO: 6.3 K/UL (ref 1.82–7.42)
NEUTROPHILS # BLD AUTO: 6.57 K/UL (ref 1.82–7.42)
NEUTROPHILS # BLD AUTO: 6.59 K/UL (ref 1.82–7.42)
NEUTROPHILS # BLD AUTO: 6.72 K/UL (ref 1.82–7.42)
NEUTROPHILS # BLD AUTO: 7.09 K/UL (ref 1.82–7.42)
NEUTROPHILS # BLD AUTO: 7.33 K/UL (ref 1.82–7.42)
NEUTROPHILS # BLD AUTO: 7.35 K/UL (ref 1.82–7.42)
NEUTROPHILS # BLD AUTO: 7.42 K/UL (ref 1.82–7.42)
NEUTROPHILS # BLD AUTO: 7.45 K/UL (ref 1.82–7.42)
NEUTROPHILS # BLD AUTO: 7.54 K/UL (ref 1.82–7.42)
NEUTROPHILS # BLD AUTO: 7.65 K/UL (ref 1.82–7.42)
NEUTROPHILS # BLD AUTO: 7.69 K/UL (ref 1.82–7.42)
NEUTROPHILS # BLD AUTO: 7.9 K/UL (ref 1.82–7.42)
NEUTROPHILS # BLD AUTO: 8.46 K/UL (ref 1.82–7.42)
NEUTROPHILS # BLD AUTO: 8.46 K/UL (ref 1.82–7.42)
NEUTROPHILS # BLD AUTO: 8.6 K/UL (ref 1.82–7.42)
NEUTROPHILS # BLD AUTO: 9.22 K/UL (ref 1.82–7.42)
NEUTROPHILS # BLD AUTO: 9.3 K/UL (ref 1.82–7.42)
NEUTROPHILS # BLD AUTO: 9.69 K/UL (ref 1.82–7.42)
NEUTROPHILS # BLD AUTO: 9.89 K/UL (ref 1.82–7.42)
NEUTROPHILS # BLD: 4.35 K/UL (ref 1.82–7.42)
NEUTROPHILS NFR BLD AUTO: 68.4 % (ref 44–72)
NEUTROPHILS NFR BLD: 66.7 % (ref 44–72)
NEUTROPHILS NFR BLD: 72.8 % (ref 44–72)
NEUTROPHILS NFR BLD: 73 % (ref 44–72)
NEUTROPHILS NFR BLD: 73.3 % (ref 44–72)
NEUTROPHILS NFR BLD: 73.9 % (ref 44–72)
NEUTROPHILS NFR BLD: 76 % (ref 44–72)
NEUTROPHILS NFR BLD: 76.1 % (ref 44–72)
NEUTROPHILS NFR BLD: 76.6 % (ref 44–72)
NEUTROPHILS NFR BLD: 77 % (ref 44–72)
NEUTROPHILS NFR BLD: 77.6 % (ref 44–72)
NEUTROPHILS NFR BLD: 78.1 % (ref 44–72)
NEUTROPHILS NFR BLD: 78.1 % (ref 44–72)
NEUTROPHILS NFR BLD: 80.1 % (ref 44–72)
NEUTROPHILS NFR BLD: 80.1 % (ref 44–72)
NEUTROPHILS NFR BLD: 80.2 % (ref 44–72)
NEUTROPHILS NFR BLD: 80.7 % (ref 44–72)
NEUTROPHILS NFR BLD: 80.9 % (ref 44–72)
NEUTROPHILS NFR BLD: 81.1 % (ref 44–72)
NEUTROPHILS NFR BLD: 81.2 % (ref 44–72)
NEUTROPHILS NFR BLD: 81.5 % (ref 44–72)
NEUTROPHILS NFR BLD: 81.6 % (ref 44–72)
NEUTROPHILS NFR BLD: 82.4 % (ref 44–72)
NEUTROPHILS NFR BLD: 82.5 % (ref 44–72)
NEUTROPHILS NFR BLD: 82.8 % (ref 44–72)
NEUTROPHILS NFR BLD: 82.9 % (ref 44–72)
NEUTROPHILS NFR BLD: 83.2 % (ref 44–72)
NEUTROPHILS NFR BLD: 83.6 % (ref 44–72)
NEUTROPHILS NFR BLD: 83.8 % (ref 44–72)
NEUTROPHILS NFR BLD: 84 % (ref 44–72)
NEUTROPHILS NFR BLD: 84.2 % (ref 44–72)
NEUTROPHILS NFR BLD: 84.3 % (ref 44–72)
NEUTROPHILS NFR BLD: 84.5 % (ref 44–72)
NEUTROPHILS NFR BLD: 85.9 % (ref 44–72)
NEUTROPHILS NFR BLD: 86 % (ref 44–72)
NEUTROPHILS NFR BLD: 86.1 % (ref 44–72)
NEUTROPHILS NFR BLD: 86.3 % (ref 44–72)
NEUTROPHILS NFR BLD: 86.3 % (ref 44–72)
NEUTROPHILS NFR BLD: 86.4 % (ref 44–72)
NEUTROPHILS NFR BLD: 87.4 % (ref 44–72)
NEUTROPHILS NFR BLD: 87.8 % (ref 44–72)
NEUTROPHILS NFR BLD: 88 % (ref 44–72)
NEUTROPHILS NFR BLD: 90.3 % (ref 44–72)
NEUTS BAND NFR BLD MANUAL: 0.9 % (ref 0–10)
NEUTS BAND NFR BLD MANUAL: 1.7 % (ref 0–10)
NEUTS BAND NFR BLD MANUAL: 1.9 % (ref 0–10)
NEUTS BAND NFR BLD MANUAL: 17 % (ref 0–10)
NEUTS BAND NFR BLD MANUAL: 2.7 % (ref 0–10)
NEUTS BAND NFR BLD MANUAL: 3 % (ref 0–10)
NEUTS BAND NFR BLD MANUAL: 3.5 % (ref 0–10)
NEUTS BAND NFR BLD MANUAL: 4.4 % (ref 0–10)
NEUTS BAND NFR BLD MANUAL: 4.7 % (ref 0–10)
NEUTS BAND NFR BLD MANUAL: 5 % (ref 0–10)
NEUTS BAND NFR BLD MANUAL: 6.1 % (ref 0–10)
NEUTS BAND NFR BLD MANUAL: 7.9 % (ref 0–10)
NITRITE UR QL STRIP.AUTO: NEGATIVE
NITRITE UR QL STRIP.AUTO: POSITIVE
NRBC # BLD AUTO: 0 K/UL
NRBC # BLD AUTO: 0.02 K/UL
NRBC # BLD AUTO: 0.03 K/UL
NRBC # BLD AUTO: 0.04 K/UL
NRBC # BLD AUTO: 0.05 K/UL
NRBC # BLD AUTO: 0.06 K/UL
NRBC # BLD AUTO: 0.07 K/UL
NRBC # BLD AUTO: 0.09 K/UL
NRBC # BLD AUTO: 0.1 K/UL
NRBC # BLD AUTO: 0.11 K/UL
NRBC # BLD AUTO: 0.15 K/UL
NRBC # BLD AUTO: 0.18 K/UL
NRBC # BLD AUTO: 0.35 K/UL
NRBC # BLD AUTO: 0.94 K/UL
NRBC # BLD AUTO: 2.19 K/UL
NRBC BLD AUTO-RTO: 0 /100 WBC
NRBC BLD AUTO-RTO: 0.2 /100 WBC
NRBC BLD AUTO-RTO: 0.2 /100 WBC
NRBC BLD AUTO-RTO: 0.3 /100 WBC
NRBC BLD AUTO-RTO: 0.4 /100 WBC
NRBC BLD AUTO-RTO: 0.5 /100 WBC
NRBC BLD AUTO-RTO: 0.6 /100 WBC
NRBC BLD AUTO-RTO: 0.7 /100 WBC
NRBC BLD AUTO-RTO: 0.8 /100 WBC
NRBC BLD AUTO-RTO: 1.3 /100 WBC
NRBC BLD AUTO-RTO: 1.3 /100 WBC
NRBC BLD AUTO-RTO: 1.9 /100 WBC
NRBC BLD AUTO-RTO: 18.8 /100 WBC
NRBC BLD AUTO-RTO: 2.1 /100 WBC
NRBC BLD AUTO-RTO: 6.1 /100 WBC
NRBC BLD AUTO-RTO: 8.5 /100 WBC
NRBC BLD-RTO: 1.8 /100 WBC
OPIATES UR QL SCN: POSITIVE
OVALOCYTES BLD QL SMEAR: NORMAL
OXYCODONE UR QL SCN: POSITIVE
PCO2 BLDA: 39.9 MMHG (ref 26–37)
PCO2 BLDA: 42.5 MMHG (ref 26–37)
PCP UR QL SCN: NEGATIVE
PH BLDA: 7.45 [PH] (ref 7.4–7.5)
PH BLDA: 7.46 [PH] (ref 7.4–7.5)
PH UR STRIP.AUTO: 5.5 [PH]
PH UR STRIP.AUTO: 6 [PH]
PH UR STRIP.AUTO: 6 [PH]
PH UR STRIP.AUTO: 6.5 [PH]
PH UR STRIP.AUTO: 7 [PH]
PHOSPHATE SERPL-MCNC: 1.7 MG/DL (ref 2.5–4.5)
PHOSPHATE SERPL-MCNC: 2 MG/DL (ref 2.5–4.5)
PHOSPHATE SERPL-MCNC: 2.3 MG/DL (ref 2.5–4.5)
PHOSPHATE SERPL-MCNC: 2.3 MG/DL (ref 2.5–4.5)
PHOSPHATE SERPL-MCNC: 2.7 MG/DL (ref 2.5–4.5)
PHOSPHATE SERPL-MCNC: 3.1 MG/DL (ref 2.5–4.5)
PHOSPHATE SERPL-MCNC: 3.2 MG/DL (ref 2.5–4.5)
PHOSPHATE SERPL-MCNC: 3.2 MG/DL (ref 2.5–4.5)
PHOSPHATE SERPL-MCNC: 3.3 MG/DL (ref 2.5–4.5)
PHOSPHATE SERPL-MCNC: 3.3 MG/DL (ref 2.5–4.5)
PHOSPHATE SERPL-MCNC: 3.4 MG/DL (ref 2.5–4.5)
PHOSPHATE SERPL-MCNC: 3.4 MG/DL (ref 2.5–4.5)
PHOSPHATE SERPL-MCNC: 3.6 MG/DL (ref 2.5–4.5)
PHOSPHATE SERPL-MCNC: 3.8 MG/DL (ref 2.5–4.5)
PHOSPHATE SERPL-MCNC: 3.8 MG/DL (ref 2.5–4.5)
PHOSPHATE SERPL-MCNC: 4 MG/DL (ref 2.5–4.5)
PLATELET # BLD AUTO: 102 K/UL (ref 164–446)
PLATELET # BLD AUTO: 132 K/UL (ref 164–446)
PLATELET # BLD AUTO: 138 K/UL (ref 164–446)
PLATELET # BLD AUTO: 138 K/UL (ref 164–446)
PLATELET # BLD AUTO: 142 K/UL (ref 164–446)
PLATELET # BLD AUTO: 144 K/UL (ref 164–446)
PLATELET # BLD AUTO: 144 K/UL (ref 164–446)
PLATELET # BLD AUTO: 146 K/UL (ref 164–446)
PLATELET # BLD AUTO: 147 K/UL (ref 164–446)
PLATELET # BLD AUTO: 151 K/UL (ref 164–446)
PLATELET # BLD AUTO: 153 K/UL (ref 164–446)
PLATELET # BLD AUTO: 155 K/UL (ref 164–446)
PLATELET # BLD AUTO: 157 K/UL (ref 164–446)
PLATELET # BLD AUTO: 159 K/UL (ref 164–446)
PLATELET # BLD AUTO: 163 K/UL (ref 164–446)
PLATELET # BLD AUTO: 164 K/UL (ref 164–446)
PLATELET # BLD AUTO: 165 K/UL (ref 164–446)
PLATELET # BLD AUTO: 166 K/UL (ref 164–446)
PLATELET # BLD AUTO: 170 K/UL (ref 164–446)
PLATELET # BLD AUTO: 172 K/UL (ref 164–446)
PLATELET # BLD AUTO: 172 K/UL (ref 164–446)
PLATELET # BLD AUTO: 173 K/UL (ref 164–446)
PLATELET # BLD AUTO: 180 K/UL (ref 164–446)
PLATELET # BLD AUTO: 181 K/UL (ref 164–446)
PLATELET # BLD AUTO: 185 K/UL (ref 164–446)
PLATELET # BLD AUTO: 189 K/UL (ref 164–446)
PLATELET # BLD AUTO: 202 K/UL (ref 164–446)
PLATELET # BLD AUTO: 204 K/UL (ref 164–446)
PLATELET # BLD AUTO: 208 K/UL (ref 164–446)
PLATELET # BLD AUTO: 213 K/UL (ref 164–446)
PLATELET # BLD AUTO: 241 K/UL (ref 164–446)
PLATELET # BLD AUTO: 242 K/UL (ref 164–446)
PLATELET # BLD AUTO: 245 K/UL (ref 164–446)
PLATELET # BLD AUTO: 255 K/UL (ref 164–446)
PLATELET # BLD AUTO: 256 K/UL (ref 164–446)
PLATELET # BLD AUTO: 257 K/UL (ref 164–446)
PLATELET # BLD AUTO: 266 K/UL (ref 164–446)
PLATELET # BLD AUTO: 275 K/UL (ref 164–446)
PLATELET # BLD AUTO: 276 K/UL (ref 164–446)
PLATELET # BLD AUTO: 282 K/UL (ref 164–446)
PLATELET # BLD AUTO: 303 K/UL (ref 164–446)
PLATELET # BLD AUTO: 320 K/UL (ref 164–446)
PLATELET # BLD AUTO: 327 K/UL (ref 164–446)
PLATELET # BLD AUTO: 355 K/UL (ref 164–446)
PLATELET # BLD AUTO: 365 K/UL (ref 164–446)
PLATELET # BLD AUTO: 384 K/UL (ref 164–446)
PLATELET # BLD AUTO: 398 K/UL (ref 164–446)
PLATELET # BLD AUTO: 90 K/UL (ref 164–446)
PLATELET # BLD AUTO: 98 K/UL (ref 164–446)
PLATELET BLD QL SMEAR: NORMAL
PMV BLD AUTO: 10.1 FL (ref 9–12.9)
PMV BLD AUTO: 10.2 FL (ref 9–12.9)
PMV BLD AUTO: 8.3 FL (ref 9–12.9)
PMV BLD AUTO: 8.3 FL (ref 9–12.9)
PMV BLD AUTO: 8.4 FL (ref 9–12.9)
PMV BLD AUTO: 8.5 FL (ref 9–12.9)
PMV BLD AUTO: 8.5 FL (ref 9–12.9)
PMV BLD AUTO: 8.6 FL (ref 9–12.9)
PMV BLD AUTO: 8.7 FL (ref 9–12.9)
PMV BLD AUTO: 8.8 FL (ref 9–12.9)
PMV BLD AUTO: 8.9 FL (ref 9–12.9)
PMV BLD AUTO: 9 FL (ref 9–12.9)
PMV BLD AUTO: 9.1 FL (ref 9–12.9)
PMV BLD AUTO: 9.2 FL (ref 9–12.9)
PMV BLD AUTO: 9.3 FL (ref 9–12.9)
PMV BLD AUTO: 9.4 FL (ref 9–12.9)
PMV BLD AUTO: 9.4 FL (ref 9–12.9)
PMV BLD AUTO: 9.5 FL (ref 9–12.9)
PMV BLD AUTO: 9.6 FL (ref 9–12.9)
PMV BLD AUTO: 9.7 FL (ref 9–12.9)
PMV BLD AUTO: 9.7 FL (ref 9–12.9)
PMV BLD AUTO: 9.8 FL (ref 9–12.9)
PMV BLD AUTO: 9.8 FL (ref 9–12.9)
PO2 BLDA: 75.2 MMHG (ref 64–87)
PO2 BLDA: 79 MMHG (ref 64–87)
POIKILOCYTOSIS BLD QL SMEAR: NORMAL
POLYCHROMASIA BLD QL SMEAR: NORMAL
POTASSIUM SERPL-SCNC: 3.1 MMOL/L (ref 3.6–5.5)
POTASSIUM SERPL-SCNC: 3.3 MMOL/L (ref 3.6–5.5)
POTASSIUM SERPL-SCNC: 3.4 MMOL/L (ref 3.6–5.5)
POTASSIUM SERPL-SCNC: 3.5 MMOL/L (ref 3.6–5.5)
POTASSIUM SERPL-SCNC: 3.6 MMOL/L (ref 3.6–5.5)
POTASSIUM SERPL-SCNC: 3.7 MMOL/L (ref 3.6–5.5)
POTASSIUM SERPL-SCNC: 3.8 MMOL/L (ref 3.6–5.5)
POTASSIUM SERPL-SCNC: 3.9 MMOL/L (ref 3.6–5.5)
POTASSIUM SERPL-SCNC: 4 MMOL/L (ref 3.6–5.5)
POTASSIUM SERPL-SCNC: 4.1 MMOL/L (ref 3.6–5.5)
POTASSIUM SERPL-SCNC: 4.2 MMOL/L (ref 3.6–5.5)
POTASSIUM SERPL-SCNC: 4.4 MMOL/L (ref 3.6–5.5)
POTASSIUM SERPL-SCNC: 4.5 MMOL/L (ref 3.6–5.5)
POTASSIUM SERPL-SCNC: 4.6 MMOL/L (ref 3.6–5.5)
PREALB SERPL-MCNC: 25 MG/DL (ref 18–38)
PREALB SERPL-MCNC: 32 MG/DL (ref 18–38)
PREALB SERPL-MCNC: 33 MG/DL (ref 18–38)
PREALB SERPL-MCNC: 34 MG/DL (ref 18–38)
PREALB SERPL-MCNC: 8 MG/DL (ref 18–38)
PROCALCITONIN SERPL-MCNC: 0.17 NG/ML
PROCALCITONIN SERPL-MCNC: 0.27 NG/ML
PROCALCITONIN SERPL-MCNC: 0.44 NG/ML
PROPOXYPH UR QL SCN: NEGATIVE
PROT SERPL-MCNC: 4.8 G/DL (ref 6–8.2)
PROT SERPL-MCNC: 4.9 G/DL (ref 6–8.2)
PROT SERPL-MCNC: 5.1 G/DL (ref 6–8.2)
PROT SERPL-MCNC: 5.1 G/DL (ref 6–8.2)
PROT SERPL-MCNC: 5.2 G/DL (ref 6–8.2)
PROT SERPL-MCNC: 5.2 G/DL (ref 6–8.2)
PROT SERPL-MCNC: 5.3 G/DL (ref 6–8.2)
PROT SERPL-MCNC: 5.3 G/DL (ref 6–8.2)
PROT SERPL-MCNC: 5.4 G/DL (ref 6–8.2)
PROT SERPL-MCNC: 5.5 G/DL (ref 6–8.2)
PROT SERPL-MCNC: 5.6 G/DL (ref 6–8.2)
PROT SERPL-MCNC: 5.7 G/DL (ref 6–8.2)
PROT SERPL-MCNC: 5.8 G/DL (ref 6–8.2)
PROT SERPL-MCNC: 5.9 G/DL (ref 6–8.2)
PROT SERPL-MCNC: 6 G/DL (ref 6–8.2)
PROT SERPL-MCNC: 6 G/DL (ref 6–8.2)
PROT SERPL-MCNC: 6.1 G/DL (ref 6–8.2)
PROT SERPL-MCNC: 6.2 G/DL (ref 6–8.2)
PROT SERPL-MCNC: 6.2 G/DL (ref 6–8.2)
PROT SERPL-MCNC: 6.3 G/DL (ref 6–8.2)
PROT SERPL-MCNC: 6.6 G/DL (ref 6–8.2)
PROT UR QL STRIP: 100 MG/DL
PROT UR QL STRIP: NEGATIVE MG/DL
PROTHROMBIN TIME: 11.6 SEC (ref 12–14.6)
PROTHROMBIN TIME: 12.5 SEC (ref 12–14.6)
PROTHROMBIN TIME: 14.9 SEC (ref 12–14.6)
PROTHROMBIN TIME: 14.9 SEC (ref 12–14.6)
PROTHROMBIN TIME: 21.9 SEC (ref 12–14.6)
PROTHROMBIN TIME: 23.8 SEC (ref 12–14.6)
PROTHROMBIN TIME: 24.6 SEC (ref 12–14.6)
PROTHROMBIN TIME: 34.6 SEC (ref 12–14.6)
RBC # BLD AUTO: 2.32 M/UL (ref 4.7–6.1)
RBC # BLD AUTO: 2.49 M/UL (ref 4.7–6.1)
RBC # BLD AUTO: 2.91 M/UL (ref 4.7–6.1)
RBC # BLD AUTO: 3.1 M/UL (ref 4.7–6.1)
RBC # BLD AUTO: 3.51 M/UL (ref 4.7–6.1)
RBC # BLD AUTO: 3.58 M/UL (ref 4.7–6.1)
RBC # BLD AUTO: 3.67 M/UL (ref 4.7–6.1)
RBC # BLD AUTO: 3.75 M/UL (ref 4.7–6.1)
RBC # BLD AUTO: 3.81 M/UL (ref 4.7–6.1)
RBC # BLD AUTO: 3.82 M/UL (ref 4.7–6.1)
RBC # BLD AUTO: 3.84 M/UL (ref 4.7–6.1)
RBC # BLD AUTO: 3.85 M/UL (ref 4.7–6.1)
RBC # BLD AUTO: 4.02 M/UL (ref 4.7–6.1)
RBC # BLD AUTO: 4.04 M/UL (ref 4.7–6.1)
RBC # BLD AUTO: 4.15 M/UL (ref 4.7–6.1)
RBC # BLD AUTO: 4.17 M/UL (ref 4.7–6.1)
RBC # BLD AUTO: 4.17 M/UL (ref 4.7–6.1)
RBC # BLD AUTO: 4.23 M/UL (ref 4.7–6.1)
RBC # BLD AUTO: 4.23 M/UL (ref 4.7–6.1)
RBC # BLD AUTO: 4.25 M/UL (ref 4.7–6.1)
RBC # BLD AUTO: 4.29 M/UL (ref 4.7–6.1)
RBC # BLD AUTO: 4.32 M/UL (ref 4.7–6.1)
RBC # BLD AUTO: 4.32 M/UL (ref 4.7–6.1)
RBC # BLD AUTO: 4.34 M/UL (ref 4.7–6.1)
RBC # BLD AUTO: 4.35 M/UL (ref 4.7–6.1)
RBC # BLD AUTO: 4.37 M/UL (ref 4.7–6.1)
RBC # BLD AUTO: 4.4 M/UL (ref 4.7–6.1)
RBC # BLD AUTO: 4.44 M/UL (ref 4.7–6.1)
RBC # BLD AUTO: 4.45 M/UL (ref 4.7–6.1)
RBC # BLD AUTO: 4.46 M/UL (ref 4.7–6.1)
RBC # BLD AUTO: 4.51 M/UL (ref 4.7–6.1)
RBC # BLD AUTO: 4.52 M/UL (ref 4.7–6.1)
RBC # BLD AUTO: 4.53 M/UL (ref 4.7–6.1)
RBC # BLD AUTO: 4.53 M/UL (ref 4.7–6.1)
RBC # BLD AUTO: 4.55 M/UL (ref 4.7–6.1)
RBC # BLD AUTO: 4.55 M/UL (ref 4.7–6.1)
RBC # BLD AUTO: 4.61 M/UL (ref 4.7–6.1)
RBC # BLD AUTO: 4.62 M/UL (ref 4.7–6.1)
RBC # BLD AUTO: 4.67 M/UL (ref 4.7–6.1)
RBC # BLD AUTO: 4.71 M/UL (ref 4.7–6.1)
RBC # BLD AUTO: 4.72 M/UL (ref 4.7–6.1)
RBC # BLD AUTO: 4.74 M/UL (ref 4.7–6.1)
RBC # BLD AUTO: 4.8 M/UL (ref 4.7–6.1)
RBC # BLD AUTO: 4.81 M/UL (ref 4.7–6.1)
RBC # BLD AUTO: 4.82 M/UL (ref 4.7–6.1)
RBC # BLD AUTO: 4.85 M/UL (ref 4.7–6.1)
RBC # BLD AUTO: 4.9 M/UL (ref 4.7–6.1)
RBC # BLD AUTO: 4.94 M/UL (ref 4.7–6.1)
RBC # BLD AUTO: 4.95 M/UL (ref 4.7–6.1)
RBC # BLD AUTO: 5 M/UL (ref 4.7–6.1)
RBC # BLD AUTO: 5 M/UL (ref 4.7–6.1)
RBC # URNS HPF: >150 /HPF
RBC # URNS HPF: >150 /HPF
RBC # URNS HPF: ABNORMAL /HPF
RBC BLD AUTO: NORMAL
RBC BLD AUTO: PRESENT
RBC UR QL AUTO: ABNORMAL
RBC UR QL AUTO: ABNORMAL
RBC UR QL AUTO: NEGATIVE
RETICS # AUTO: 0.15 M/UL (ref 0.04–0.06)
RETICS/RBC NFR: 5.2 % (ref 0.8–2.1)
SAO2 % BLDA: 94.4 % (ref 93–99)
SAO2 % BLDA: 95.1 % (ref 93–99)
SIGNIFICANT IND 70042: NORMAL
SITE SITE: NORMAL
SMUDGE CELLS BLD QL SMEAR: NORMAL
SODIUM SERPL-SCNC: 129 MMOL/L (ref 135–145)
SODIUM SERPL-SCNC: 130 MMOL/L (ref 135–145)
SODIUM SERPL-SCNC: 130 MMOL/L (ref 135–145)
SODIUM SERPL-SCNC: 131 MMOL/L (ref 135–145)
SODIUM SERPL-SCNC: 132 MMOL/L (ref 135–145)
SODIUM SERPL-SCNC: 133 MMOL/L (ref 135–145)
SODIUM SERPL-SCNC: 134 MMOL/L (ref 135–145)
SODIUM SERPL-SCNC: 135 MMOL/L (ref 135–145)
SODIUM SERPL-SCNC: 136 MMOL/L (ref 135–145)
SODIUM SERPL-SCNC: 137 MMOL/L (ref 135–145)
SODIUM SERPL-SCNC: 138 MMOL/L (ref 135–145)
SODIUM SERPL-SCNC: 138 MMOL/L (ref 135–145)
SODIUM SERPL-SCNC: 139 MMOL/L (ref 135–145)
SODIUM SERPL-SCNC: 140 MMOL/L (ref 135–145)
SODIUM SERPL-SCNC: 140 MMOL/L (ref 135–145)
SODIUM SERPL-SCNC: 141 MMOL/L (ref 135–145)
SOURCE SOURCE: NORMAL
SP GR UR STRIP.AUTO: 1
SP GR UR STRIP.AUTO: 1.01
SP GR UR STRIP.AUTO: 1.02
SP GR UR STRIP.AUTO: 1.02
SP GR UR STRIP.AUTO: 1.03
SPHEROCYTES BLD QL SMEAR: NORMAL
SPHEROCYTES BLD QL SMEAR: NORMAL
T4 FREE SERPL-MCNC: 0.63 NG/DL (ref 0.53–1.43)
T4 FREE SERPL-MCNC: 1.08 NG/DL (ref 0.53–1.43)
TEST NAME 95000: ABNORMAL
TIBC SERPL-MCNC: 316 UG/DL (ref 250–450)
TIBC SERPL-MCNC: 370 UG/DL (ref 250–450)
TIBC SERPL-MCNC: 441 UG/DL (ref 250–450)
TOXIC GRANULES BLD QL SMEAR: SLIGHT
TREPONEMA PALLIDUM IGG+IGM AB [PRESENCE] IN SERUM OR PLASMA BY IMMUNOASSAY: NON REACTIVE
TRIGL SERPL-MCNC: 127 MG/DL (ref 0–149)
TRIGL SERPL-MCNC: 185 MG/DL (ref 0–149)
TRIGL SERPL-MCNC: 194 MG/DL (ref 0–149)
TROPONIN I SERPL-MCNC: 0.04 NG/ML (ref 0–0.04)
TROPONIN I SERPL-MCNC: <0.01 NG/ML (ref 0–0.04)
TSH SERPL DL<=0.005 MIU/L-ACNC: 1.31 UIU/ML (ref 0.3–3.7)
TSH SERPL DL<=0.005 MIU/L-ACNC: 2.26 UIU/ML (ref 0.3–3.7)
TSH SERPL DL<=0.005 MIU/L-ACNC: 3.24 UIU/ML (ref 0.3–3.7)
UNIDENT CRYS URNS QL MICRO: ABNORMAL /HPF
UROBILINOGEN UR STRIP.AUTO-MCNC: 0.2 MG/DL
UROBILINOGEN UR STRIP.AUTO-MCNC: 1 MG/DL
VIT B12 SERPL-MCNC: 313 PG/ML (ref 211–911)
VIT B12 SERPL-MCNC: 359 PG/ML (ref 211–911)
VIT B12 SERPL-MCNC: 839 PG/ML (ref 211–911)
WBC # BLD AUTO: 10 K/UL (ref 4.8–10.8)
WBC # BLD AUTO: 10.1 K/UL (ref 4.8–10.8)
WBC # BLD AUTO: 10.4 K/UL (ref 4.8–10.8)
WBC # BLD AUTO: 10.7 K/UL (ref 4.8–10.8)
WBC # BLD AUTO: 11 K/UL (ref 4.8–10.8)
WBC # BLD AUTO: 11.7 K/UL (ref 4.8–10.8)
WBC # BLD AUTO: 12.7 K/UL (ref 4.8–10.8)
WBC # BLD AUTO: 17.4 K/UL (ref 4.8–10.8)
WBC # BLD AUTO: 4.1 K/UL (ref 4.8–10.8)
WBC # BLD AUTO: 4.6 K/UL (ref 4.8–10.8)
WBC # BLD AUTO: 4.7 K/UL (ref 4.8–10.8)
WBC # BLD AUTO: 5.5 K/UL (ref 4.8–10.8)
WBC # BLD AUTO: 5.7 K/UL (ref 4.8–10.8)
WBC # BLD AUTO: 5.8 K/UL (ref 4.8–10.8)
WBC # BLD AUTO: 6 K/UL (ref 4.8–10.8)
WBC # BLD AUTO: 6.1 K/UL (ref 4.8–10.8)
WBC # BLD AUTO: 6.2 K/UL (ref 4.8–10.8)
WBC # BLD AUTO: 6.4 K/UL (ref 4.8–10.8)
WBC # BLD AUTO: 6.5 K/UL (ref 4.8–10.8)
WBC # BLD AUTO: 6.6 K/UL (ref 4.8–10.8)
WBC # BLD AUTO: 6.7 K/UL (ref 4.8–10.8)
WBC # BLD AUTO: 6.8 K/UL (ref 4.8–10.8)
WBC # BLD AUTO: 6.9 K/UL (ref 4.8–10.8)
WBC # BLD AUTO: 7 K/UL (ref 4.8–10.8)
WBC # BLD AUTO: 7 K/UL (ref 4.8–10.8)
WBC # BLD AUTO: 7.1 K/UL (ref 4.8–10.8)
WBC # BLD AUTO: 7.2 K/UL (ref 4.8–10.8)
WBC # BLD AUTO: 7.2 K/UL (ref 4.8–10.8)
WBC # BLD AUTO: 7.4 K/UL (ref 4.8–10.8)
WBC # BLD AUTO: 7.6 K/UL (ref 4.8–10.8)
WBC # BLD AUTO: 8 K/UL (ref 4.8–10.8)
WBC # BLD AUTO: 8.1 K/UL (ref 4.8–10.8)
WBC # BLD AUTO: 8.1 K/UL (ref 4.8–10.8)
WBC # BLD AUTO: 8.2 K/UL (ref 4.8–10.8)
WBC # BLD AUTO: 8.2 K/UL (ref 4.8–10.8)
WBC # BLD AUTO: 8.4 K/UL (ref 4.8–10.8)
WBC # BLD AUTO: 8.6 K/UL (ref 4.8–10.8)
WBC # BLD AUTO: 8.9 K/UL (ref 4.8–10.8)
WBC # BLD AUTO: 9 K/UL (ref 4.8–10.8)
WBC # BLD AUTO: 9.2 K/UL (ref 4.8–10.8)
WBC # BLD AUTO: 9.2 K/UL (ref 4.8–10.8)
WBC # BLD AUTO: 9.3 K/UL (ref 4.8–10.8)
WBC # BLD AUTO: 9.3 K/UL (ref 4.8–10.8)
WBC # BLD AUTO: 9.4 K/UL (ref 4.8–10.8)
WBC # BLD AUTO: 9.4 K/UL (ref 4.8–10.8)
WBC # BLD AUTO: 9.5 K/UL (ref 4.8–10.8)
WBC # BLD AUTO: 9.8 K/UL (ref 4.8–10.8)
WBC #/AREA URNS HPF: ABNORMAL /HPF

## 2017-01-01 PROCEDURE — 85025 COMPLETE CBC W/AUTO DIFF WBC: CPT

## 2017-01-01 PROCEDURE — 85730 THROMBOPLASTIN TIME PARTIAL: CPT

## 2017-01-01 PROCEDURE — 37191 INS ENDOVAS VENA CAVA FILTR: CPT

## 2017-01-01 PROCEDURE — 700102 HCHG RX REV CODE 250 W/ 637 OVERRIDE(OP): Performed by: HOSPITALIST

## 2017-01-01 PROCEDURE — 77386 HCHG IMRT DELIVERY COMPLEX: CPT | Performed by: RADIOLOGY

## 2017-01-01 PROCEDURE — A9270 NON-COVERED ITEM OR SERVICE: HCPCS | Performed by: INTERNAL MEDICINE

## 2017-01-01 PROCEDURE — 700102 HCHG RX REV CODE 250 W/ 637 OVERRIDE(OP): Performed by: INTERNAL MEDICINE

## 2017-01-01 PROCEDURE — 97535 SELF CARE MNGMENT TRAINING: CPT

## 2017-01-01 PROCEDURE — 92507 TX SP LANG VOICE COMM INDIV: CPT

## 2017-01-01 PROCEDURE — 82962 GLUCOSE BLOOD TEST: CPT

## 2017-01-01 PROCEDURE — 93970 EXTREMITY STUDY: CPT | Mod: 26 | Performed by: SURGERY

## 2017-01-01 PROCEDURE — 700111 HCHG RX REV CODE 636 W/ 250 OVERRIDE (IP): Performed by: HOSPITALIST

## 2017-01-01 PROCEDURE — 93005 ELECTROCARDIOGRAM TRACING: CPT | Performed by: EMERGENCY MEDICINE

## 2017-01-01 PROCEDURE — 80053 COMPREHEN METABOLIC PANEL: CPT

## 2017-01-01 PROCEDURE — 85610 PROTHROMBIN TIME: CPT

## 2017-01-01 PROCEDURE — 90471 IMMUNIZATION ADMIN: CPT

## 2017-01-01 PROCEDURE — 770020 HCHG ROOM/CARE - TELE (206)

## 2017-01-01 PROCEDURE — 84145 PROCALCITONIN (PCT): CPT

## 2017-01-01 PROCEDURE — 36415 COLL VENOUS BLD VENIPUNCTURE: CPT

## 2017-01-01 PROCEDURE — 700102 HCHG RX REV CODE 250 W/ 637 OVERRIDE(OP): Performed by: PHYSICAL MEDICINE & REHABILITATION

## 2017-01-01 PROCEDURE — 770009 HCHG ROOM/CARE - ONCOLOGY SEMI PRI*

## 2017-01-01 PROCEDURE — 99285 EMERGENCY DEPT VISIT HI MDM: CPT

## 2017-01-01 PROCEDURE — 97110 THERAPEUTIC EXERCISES: CPT

## 2017-01-01 PROCEDURE — 99232 SBSQ HOSP IP/OBS MODERATE 35: CPT | Performed by: HOSPITALIST

## 2017-01-01 PROCEDURE — 700111 HCHG RX REV CODE 636 W/ 250 OVERRIDE (IP): Performed by: INTERNAL MEDICINE

## 2017-01-01 PROCEDURE — 99214 OFFICE O/P EST MOD 30 MIN: CPT | Performed by: INTERNAL MEDICINE

## 2017-01-01 PROCEDURE — 77014 PR CT GUIDANCE PLACEMENT RAD THERAPY FIELDS: CPT | Mod: 26 | Performed by: RADIOLOGY

## 2017-01-01 PROCEDURE — 307059 PAD,EAR PROTECTOR: Performed by: INTERNAL MEDICINE

## 2017-01-01 PROCEDURE — 99233 SBSQ HOSP IP/OBS HIGH 50: CPT | Performed by: PHYSICAL MEDICINE & REHABILITATION

## 2017-01-01 PROCEDURE — 87070 CULTURE OTHR SPECIMN AEROBIC: CPT

## 2017-01-01 PROCEDURE — 700105 HCHG RX REV CODE 258: Performed by: INTERNAL MEDICINE

## 2017-01-01 PROCEDURE — 92526 ORAL FUNCTION THERAPY: CPT

## 2017-01-01 PROCEDURE — 87086 URINE CULTURE/COLONY COUNT: CPT

## 2017-01-01 PROCEDURE — 94668 MNPJ CHEST WALL SBSQ: CPT

## 2017-01-01 PROCEDURE — A9270 NON-COVERED ITEM OR SERVICE: HCPCS | Performed by: HOSPITALIST

## 2017-01-01 PROCEDURE — 85027 COMPLETE CBC AUTOMATED: CPT

## 2017-01-01 PROCEDURE — 83540 ASSAY OF IRON: CPT

## 2017-01-01 PROCEDURE — 99220 PR INITIAL OBSERVATION CARE,LEVL III: CPT | Performed by: HOSPITALIST

## 2017-01-01 PROCEDURE — 99232 SBSQ HOSP IP/OBS MODERATE 35: CPT | Performed by: INTERNAL MEDICINE

## 2017-01-01 PROCEDURE — 84100 ASSAY OF PHOSPHORUS: CPT

## 2017-01-01 PROCEDURE — 82962 GLUCOSE BLOOD TEST: CPT | Mod: 91

## 2017-01-01 PROCEDURE — 83605 ASSAY OF LACTIC ACID: CPT

## 2017-01-01 PROCEDURE — G0156 HHCP-SVS OF AIDE,EA 15 MIN: HCPCS

## 2017-01-01 PROCEDURE — 770001 HCHG ROOM/CARE - MED/SURG/GYN PRIV*

## 2017-01-01 PROCEDURE — A9579 GAD-BASE MR CONTRAST NOS,1ML: HCPCS | Performed by: PSYCHIATRY & NEUROLOGY

## 2017-01-01 PROCEDURE — A9270 NON-COVERED ITEM OR SERVICE: HCPCS | Performed by: ANESTHESIOLOGY

## 2017-01-01 PROCEDURE — 83036 HEMOGLOBIN GLYCOSYLATED A1C: CPT

## 2017-01-01 PROCEDURE — 97532 HCHG COGNITIVE SKILL DEV. 15 MIN: CPT

## 2017-01-01 PROCEDURE — 97530 THERAPEUTIC ACTIVITIES: CPT

## 2017-01-01 PROCEDURE — G8996 SWALLOW CURRENT STATUS: HCPCS | Mod: CL

## 2017-01-01 PROCEDURE — 700111 HCHG RX REV CODE 636 W/ 250 OVERRIDE (IP): Performed by: NURSE PRACTITIONER

## 2017-01-01 PROCEDURE — A9270 NON-COVERED ITEM OR SERVICE: HCPCS | Performed by: PHYSICAL MEDICINE & REHABILITATION

## 2017-01-01 PROCEDURE — 99233 SBSQ HOSP IP/OBS HIGH 50: CPT | Mod: GC | Performed by: INTERNAL MEDICINE

## 2017-01-01 PROCEDURE — 99214 OFFICE O/P EST MOD 30 MIN: CPT | Performed by: NURSE PRACTITIONER

## 2017-01-01 PROCEDURE — 92610 EVALUATE SWALLOWING FUNCTION: CPT

## 2017-01-01 PROCEDURE — 83735 ASSAY OF MAGNESIUM: CPT

## 2017-01-01 PROCEDURE — 83690 ASSAY OF LIPASE: CPT

## 2017-01-01 PROCEDURE — A9270 NON-COVERED ITEM OR SERVICE: HCPCS | Performed by: PSYCHIATRY & NEUROLOGY

## 2017-01-01 PROCEDURE — 85007 BL SMEAR W/DIFF WBC COUNT: CPT

## 2017-01-01 PROCEDURE — 99221 1ST HOSP IP/OBS SF/LOW 40: CPT | Performed by: HOSPITALIST

## 2017-01-01 PROCEDURE — G0299 HHS/HOSPICE OF RN EA 15 MIN: HCPCS

## 2017-01-01 PROCEDURE — 700105 HCHG RX REV CODE 258: Performed by: HOSPITALIST

## 2017-01-01 PROCEDURE — 80048 BASIC METABOLIC PNL TOTAL CA: CPT

## 2017-01-01 PROCEDURE — 700111 HCHG RX REV CODE 636 W/ 250 OVERRIDE (IP): Performed by: STUDENT IN AN ORGANIZED HEALTH CARE EDUCATION/TRAINING PROGRAM

## 2017-01-01 PROCEDURE — 700112 HCHG RX REV CODE 229: Performed by: HOSPITALIST

## 2017-01-01 PROCEDURE — 700101 HCHG RX REV CODE 250: Performed by: PHYSICAL MEDICINE & REHABILITATION

## 2017-01-01 PROCEDURE — 71010 DX-CHEST-PORTABLE (1 VIEW): CPT

## 2017-01-01 PROCEDURE — S9126 HOSPICE CARE, IN THE HOME, P: HCPCS

## 2017-01-01 PROCEDURE — 84484 ASSAY OF TROPONIN QUANT: CPT

## 2017-01-01 PROCEDURE — 87040 BLOOD CULTURE FOR BACTERIA: CPT | Mod: 91

## 2017-01-01 PROCEDURE — 92523 SPEECH SOUND LANG COMPREHEN: CPT

## 2017-01-01 PROCEDURE — 71275 CT ANGIOGRAPHY CHEST: CPT

## 2017-01-01 PROCEDURE — 700102 HCHG RX REV CODE 250 W/ 637 OVERRIDE(OP): Performed by: PSYCHIATRY & NEUROLOGY

## 2017-01-01 PROCEDURE — 700111 HCHG RX REV CODE 636 W/ 250 OVERRIDE (IP)

## 2017-01-01 PROCEDURE — 700102 HCHG RX REV CODE 250 W/ 637 OVERRIDE(OP)

## 2017-01-01 PROCEDURE — 77300 RADIATION THERAPY DOSE PLAN: CPT | Mod: 26 | Performed by: RADIOLOGY

## 2017-01-01 PROCEDURE — 86780 TREPONEMA PALLIDUM: CPT

## 2017-01-01 PROCEDURE — 82803 BLOOD GASES ANY COMBINATION: CPT

## 2017-01-01 PROCEDURE — 83615 LACTATE (LD) (LDH) ENZYME: CPT

## 2017-01-01 PROCEDURE — 99232 SBSQ HOSP IP/OBS MODERATE 35: CPT | Performed by: PHYSICAL MEDICINE & REHABILITATION

## 2017-01-01 PROCEDURE — 770010 HCHG ROOM/CARE - REHAB SEMI PRIVAT*

## 2017-01-01 PROCEDURE — G0378 HOSPITAL OBSERVATION PER HR: HCPCS

## 2017-01-01 PROCEDURE — 94760 N-INVAS EAR/PLS OXIMETRY 1: CPT

## 2017-01-01 PROCEDURE — 70450 CT HEAD/BRAIN W/O DYE: CPT

## 2017-01-01 PROCEDURE — 81001 URINALYSIS AUTO W/SCOPE: CPT

## 2017-01-01 PROCEDURE — 99226 PR SUBSEQUENT OBSERVATION CARE,LEVEL III: CPT | Performed by: HOSPITALIST

## 2017-01-01 PROCEDURE — 74000 DX-ABDOMEN-1 VIEW: CPT

## 2017-01-01 PROCEDURE — 80061 LIPID PANEL: CPT

## 2017-01-01 PROCEDURE — 99233 SBSQ HOSP IP/OBS HIGH 50: CPT | Performed by: HOSPITALIST

## 2017-01-01 PROCEDURE — 97116 GAIT TRAINING THERAPY: CPT

## 2017-01-01 PROCEDURE — 77263 THER RADIOLOGY TX PLNG CPLX: CPT | Performed by: RADIOLOGY

## 2017-01-01 PROCEDURE — 82607 VITAMIN B-12: CPT

## 2017-01-01 PROCEDURE — 82746 ASSAY OF FOLIC ACID SERUM: CPT

## 2017-01-01 PROCEDURE — 96367 TX/PROPH/DG ADDL SEQ IV INF: CPT

## 2017-01-01 PROCEDURE — 86022 PLATELET ANTIBODIES: CPT

## 2017-01-01 PROCEDURE — 99239 HOSP IP/OBS DSCHRG MGMT >30: CPT | Performed by: HOSPITALIST

## 2017-01-01 PROCEDURE — 92612 ENDOSCOPY SWALLOW (FEES) VID: CPT

## 2017-01-01 PROCEDURE — G8988 SELF CARE GOAL STATUS: HCPCS | Mod: CI

## 2017-01-01 PROCEDURE — 700117 HCHG RX CONTRAST REV CODE 255: Performed by: EMERGENCY MEDICINE

## 2017-01-01 PROCEDURE — 99233 SBSQ HOSP IP/OBS HIGH 50: CPT | Performed by: INTERNAL MEDICINE

## 2017-01-01 PROCEDURE — G8997 SWALLOW GOAL STATUS: HCPCS | Mod: CI

## 2017-01-01 PROCEDURE — 700105 HCHG RX REV CODE 258

## 2017-01-01 PROCEDURE — 83605 ASSAY OF LACTIC ACID: CPT | Mod: 91

## 2017-01-01 PROCEDURE — 700117 HCHG RX CONTRAST REV CODE 255: Performed by: HOSPITALIST

## 2017-01-01 PROCEDURE — 93306 TTE W/DOPPLER COMPLETE: CPT

## 2017-01-01 PROCEDURE — 99254 IP/OBS CNSLTJ NEW/EST MOD 60: CPT | Performed by: HOSPITALIST

## 2017-01-01 PROCEDURE — 99232 SBSQ HOSP IP/OBS MODERATE 35: CPT | Mod: GC | Performed by: INTERNAL MEDICINE

## 2017-01-01 PROCEDURE — G8980 MOBILITY D/C STATUS: HCPCS | Mod: CI

## 2017-01-01 PROCEDURE — A4344 CATH INDW FOLEY 2 WAY SILICN: HCPCS

## 2017-01-01 PROCEDURE — 99211 OFF/OP EST MAY X REQ PHY/QHP: CPT

## 2017-01-01 PROCEDURE — 700111 HCHG RX REV CODE 636 W/ 250 OVERRIDE (IP): Performed by: PSYCHIATRY & NEUROLOGY

## 2017-01-01 PROCEDURE — 84134 ASSAY OF PREALBUMIN: CPT

## 2017-01-01 PROCEDURE — 302128 INFUSION PUMP: Performed by: EMERGENCY MEDICINE

## 2017-01-01 PROCEDURE — 86140 C-REACTIVE PROTEIN: CPT

## 2017-01-01 PROCEDURE — A9579 GAD-BASE MR CONTRAST NOS,1ML: HCPCS | Performed by: HOSPITALIST

## 2017-01-01 PROCEDURE — 97162 PT EVAL MOD COMPLEX 30 MIN: CPT

## 2017-01-01 PROCEDURE — 77427 RADIATION TX MANAGEMENT X5: CPT | Performed by: RADIOLOGY

## 2017-01-01 PROCEDURE — 81003 URINALYSIS AUTO W/O SCOPE: CPT

## 2017-01-01 PROCEDURE — 700117 HCHG RX CONTRAST REV CODE 255: Performed by: NURSE PRACTITIONER

## 2017-01-01 PROCEDURE — 99212 OFFICE O/P EST SF 10 MIN: CPT | Performed by: RADIOLOGY

## 2017-01-01 PROCEDURE — A9270 NON-COVERED ITEM OR SERVICE: HCPCS

## 2017-01-01 PROCEDURE — G8997 SWALLOW GOAL STATUS: HCPCS | Mod: CH

## 2017-01-01 PROCEDURE — 97112 NEUROMUSCULAR REEDUCATION: CPT

## 2017-01-01 PROCEDURE — 85014 HEMATOCRIT: CPT

## 2017-01-01 PROCEDURE — 700101 HCHG RX REV CODE 250: Performed by: INTERNAL MEDICINE

## 2017-01-01 PROCEDURE — G8979 MOBILITY GOAL STATUS: HCPCS | Mod: CI

## 2017-01-01 PROCEDURE — 700102 HCHG RX REV CODE 250 W/ 637 OVERRIDE(OP): Performed by: ANESTHESIOLOGY

## 2017-01-01 PROCEDURE — 83880 ASSAY OF NATRIURETIC PEPTIDE: CPT

## 2017-01-01 PROCEDURE — G8996 SWALLOW CURRENT STATUS: HCPCS | Mod: CJ

## 2017-01-01 PROCEDURE — 36415 COLL VENOUS BLD VENIPUNCTURE: CPT | Performed by: NURSE PRACTITIONER

## 2017-01-01 PROCEDURE — A9579 GAD-BASE MR CONTRAST NOS,1ML: HCPCS | Performed by: NURSE PRACTITIONER

## 2017-01-01 PROCEDURE — 700105 HCHG RX REV CODE 258: Performed by: PSYCHIATRY & NEUROLOGY

## 2017-01-01 PROCEDURE — 74176 CT ABD & PELVIS W/O CONTRAST: CPT

## 2017-01-01 PROCEDURE — 80177 DRUG SCRN QUAN LEVETIRACETAM: CPT

## 2017-01-01 PROCEDURE — 93970 EXTREMITY STUDY: CPT

## 2017-01-01 PROCEDURE — 99212 OFFICE O/P EST SF 10 MIN: CPT | Performed by: NURSE PRACTITIONER

## 2017-01-01 PROCEDURE — 87389 HIV-1 AG W/HIV-1&-2 AB AG IA: CPT

## 2017-01-01 PROCEDURE — 700111 HCHG RX REV CODE 636 W/ 250 OVERRIDE (IP): Performed by: EMERGENCY MEDICINE

## 2017-01-01 PROCEDURE — 77334 RADIATION TREATMENT AID(S): CPT | Performed by: RADIOLOGY

## 2017-01-01 PROCEDURE — G8997 SWALLOW GOAL STATUS: HCPCS | Mod: CK

## 2017-01-01 PROCEDURE — 87641 MR-STAPH DNA AMP PROBE: CPT

## 2017-01-01 PROCEDURE — 92611 MOTION FLUOROSCOPY/SWALLOW: CPT

## 2017-01-01 PROCEDURE — 700112 HCHG RX REV CODE 229: Performed by: PHYSICAL MEDICINE & REHABILITATION

## 2017-01-01 PROCEDURE — A4554 DISPOSABLE UNDERPADS: HCPCS

## 2017-01-01 PROCEDURE — 96375 TX/PRO/DX INJ NEW DRUG ADDON: CPT

## 2017-01-01 PROCEDURE — 302244 HCHG LTACH STAT

## 2017-01-01 PROCEDURE — 99291 CRITICAL CARE FIRST HOUR: CPT | Performed by: INTERNAL MEDICINE

## 2017-01-01 PROCEDURE — 82140 ASSAY OF AMMONIA: CPT

## 2017-01-01 PROCEDURE — 77470 SPECIAL RADIATION TREATMENT: CPT | Mod: 26 | Performed by: RADIOLOGY

## 2017-01-01 PROCEDURE — 96376 TX/PRO/DX INJ SAME DRUG ADON: CPT

## 2017-01-01 PROCEDURE — 73560 X-RAY EXAM OF KNEE 1 OR 2: CPT | Mod: LT

## 2017-01-01 PROCEDURE — 85730 THROMBOPLASTIN TIME PARTIAL: CPT | Mod: 91

## 2017-01-01 PROCEDURE — 93306 TTE W/DOPPLER COMPLETE: CPT | Mod: 26 | Performed by: INTERNAL MEDICINE

## 2017-01-01 PROCEDURE — 99214 OFFICE O/P EST MOD 30 MIN: CPT | Performed by: RADIOLOGY

## 2017-01-01 PROCEDURE — 700117 HCHG RX CONTRAST REV CODE 255: Performed by: RADIOLOGY

## 2017-01-01 PROCEDURE — 99213 OFFICE O/P EST LOW 20 MIN: CPT | Performed by: NURSE PRACTITIONER

## 2017-01-01 PROCEDURE — 700117 HCHG RX CONTRAST REV CODE 255: Performed by: INTERNAL MEDICINE

## 2017-01-01 PROCEDURE — 85018 HEMOGLOBIN: CPT

## 2017-01-01 PROCEDURE — A4357 BEDSIDE DRAINAGE BAG: HCPCS | Performed by: INTERNAL MEDICINE

## 2017-01-01 PROCEDURE — 77301 RADIOTHERAPY DOSE PLAN IMRT: CPT | Mod: 26 | Performed by: RADIOLOGY

## 2017-01-01 PROCEDURE — 87449 NOS EACH ORGANISM AG IA: CPT

## 2017-01-01 PROCEDURE — 99205 OFFICE O/P NEW HI 60 MIN: CPT | Performed by: RADIOLOGY

## 2017-01-01 PROCEDURE — 77336 RADIATION PHYSICS CONSULT: CPT | Performed by: RADIOLOGY

## 2017-01-01 PROCEDURE — 99231 SBSQ HOSP IP/OBS SF/LOW 25: CPT | Performed by: PHYSICAL MEDICINE & REHABILITATION

## 2017-01-01 PROCEDURE — A9579 GAD-BASE MR CONTRAST NOS,1ML: HCPCS | Performed by: RADIOLOGY

## 2017-01-01 PROCEDURE — 80076 HEPATIC FUNCTION PANEL: CPT

## 2017-01-01 PROCEDURE — 96365 THER/PROPH/DIAG IV INF INIT: CPT

## 2017-01-01 PROCEDURE — DWY17ZZ CONTACT RADIATION OF HEAD AND NECK: ICD-10-PCS | Performed by: RADIOLOGY

## 2017-01-01 PROCEDURE — A5120 SKIN BARRIER, WIPE OR SWAB: HCPCS

## 2017-01-01 PROCEDURE — 77338 DESIGN MLC DEVICE FOR IMRT: CPT | Mod: 26 | Performed by: RADIOLOGY

## 2017-01-01 PROCEDURE — 93005 ELECTROCARDIOGRAM TRACING: CPT | Performed by: HOSPITALIST

## 2017-01-01 PROCEDURE — 85379 FIBRIN DEGRADATION QUANT: CPT

## 2017-01-01 PROCEDURE — 3E0234Z INTRODUCTION OF SERUM, TOXOID AND VACCINE INTO MUSCLE, PERCUTANEOUS APPROACH: ICD-10-PCS | Performed by: INTERNAL MEDICINE

## 2017-01-01 PROCEDURE — 77290 THER RAD SIMULAJ FIELD CPLX: CPT | Performed by: RADIOLOGY

## 2017-01-01 PROCEDURE — 97165 OT EVAL LOW COMPLEX 30 MIN: CPT | Mod: XE

## 2017-01-01 PROCEDURE — 83036 HEMOGLOBIN GLYCOSYLATED A1C: CPT | Performed by: NURSE PRACTITIONER

## 2017-01-01 PROCEDURE — 93978 VASCULAR STUDY: CPT

## 2017-01-01 PROCEDURE — 77280 THER RAD SIMULAJ FIELD SMPL: CPT | Mod: 26 | Performed by: RADIOLOGY

## 2017-01-01 PROCEDURE — 83550 IRON BINDING TEST: CPT

## 2017-01-01 PROCEDURE — 99284 EMERGENCY DEPT VISIT MOD MDM: CPT

## 2017-01-01 PROCEDURE — 70553 MRI BRAIN STEM W/O & W/DYE: CPT

## 2017-01-01 PROCEDURE — G8978 MOBILITY CURRENT STATUS: HCPCS | Mod: CK

## 2017-01-01 PROCEDURE — 700105 HCHG RX REV CODE 258: Performed by: EMERGENCY MEDICINE

## 2017-01-01 PROCEDURE — 97166 OT EVAL MOD COMPLEX 45 MIN: CPT

## 2017-01-01 PROCEDURE — 99212 OFFICE O/P EST SF 10 MIN: CPT

## 2017-01-01 PROCEDURE — 74230 X-RAY XM SWLNG FUNCJ C+: CPT

## 2017-01-01 PROCEDURE — 73560 X-RAY EXAM OF KNEE 1 OR 2: CPT | Mod: RT

## 2017-01-01 PROCEDURE — 90670 PCV13 VACCINE IM: CPT | Performed by: HOSPITALIST

## 2017-01-01 PROCEDURE — 84443 ASSAY THYROID STIM HORMONE: CPT

## 2017-01-01 PROCEDURE — C9254 INJECTION, LACOSAMIDE: HCPCS | Performed by: PSYCHIATRY & NEUROLOGY

## 2017-01-01 PROCEDURE — 87040 BLOOD CULTURE FOR BACTERIA: CPT

## 2017-01-01 PROCEDURE — 77300 RADIATION THERAPY DOSE PLAN: CPT | Performed by: RADIOLOGY

## 2017-01-01 PROCEDURE — G8979 MOBILITY GOAL STATUS: HCPCS | Mod: CJ

## 2017-01-01 PROCEDURE — 99217 PR OBSERVATION CARE DISCHARGE: CPT | Performed by: HOSPITALIST

## 2017-01-01 PROCEDURE — 85046 RETICYTE/HGB CONCENTRATE: CPT

## 2017-01-01 PROCEDURE — 77280 THER RAD SIMULAJ FIELD SMPL: CPT | Performed by: RADIOLOGY

## 2017-01-01 PROCEDURE — 71020 DX-CHEST-2 VIEWS: CPT | Mod: TC | Performed by: NURSE PRACTITIONER

## 2017-01-01 PROCEDURE — 82550 ASSAY OF CK (CPK): CPT

## 2017-01-01 PROCEDURE — A4520 INCONTINENCE GARMENT ANYTYPE: HCPCS

## 2017-01-01 PROCEDURE — G8987 SELF CARE CURRENT STATUS: HCPCS | Mod: CL

## 2017-01-01 PROCEDURE — 99239 HOSP IP/OBS DSCHRG MGMT >30: CPT | Performed by: PHYSICAL MEDICINE & REHABILITATION

## 2017-01-01 PROCEDURE — G8996 SWALLOW CURRENT STATUS: HCPCS | Mod: CM

## 2017-01-01 PROCEDURE — 84439 ASSAY OF FREE THYROXINE: CPT

## 2017-01-01 PROCEDURE — 96374 THER/PROPH/DIAG INJ IV PUSH: CPT

## 2017-01-01 PROCEDURE — 4A10X4Z MONITORING OF CENTRAL NERVOUS ELECTRICAL ACTIVITY, EXTERNAL APPROACH: ICD-10-PCS | Performed by: PSYCHIATRY & NEUROLOGY

## 2017-01-01 PROCEDURE — G8987 SELF CARE CURRENT STATUS: HCPCS | Mod: CJ

## 2017-01-01 PROCEDURE — 3E0234Z INTRODUCTION OF SERUM, TOXOID AND VACCINE INTO MUSCLE, PERCUTANEOUS APPROACH: ICD-10-PCS | Performed by: HOSPITALIST

## 2017-01-01 PROCEDURE — 99212 OFFICE O/P EST SF 10 MIN: CPT | Performed by: PHARMACIST

## 2017-01-01 PROCEDURE — 90686 IIV4 VACC NO PRSV 0.5 ML IM: CPT | Performed by: INTERNAL MEDICINE

## 2017-01-01 PROCEDURE — 96105 ASSESSMENT OF APHASIA: CPT | Mod: XE

## 2017-01-01 PROCEDURE — 94667 MNPJ CHEST WALL 1ST: CPT

## 2017-01-01 PROCEDURE — 95951 EEG: CPT | Mod: 52

## 2017-01-01 PROCEDURE — 99223 1ST HOSP IP/OBS HIGH 75: CPT | Performed by: INTERNAL MEDICINE

## 2017-01-01 PROCEDURE — 82533 TOTAL CORTISOL: CPT

## 2017-01-01 PROCEDURE — G0155 HHCP-SVS OF CSW,EA 15 MIN: HCPCS

## 2017-01-01 PROCEDURE — G8997 SWALLOW GOAL STATUS: HCPCS | Mod: CJ

## 2017-01-01 PROCEDURE — 93010 ELECTROCARDIOGRAM REPORT: CPT | Performed by: INTERNAL MEDICINE

## 2017-01-01 PROCEDURE — 99223 1ST HOSP IP/OBS HIGH 75: CPT | Performed by: PHYSICAL MEDICINE & REHABILITATION

## 2017-01-01 PROCEDURE — G8978 MOBILITY CURRENT STATUS: HCPCS | Mod: CI

## 2017-01-01 PROCEDURE — 99202 OFFICE O/P NEW SF 15 MIN: CPT

## 2017-01-01 PROCEDURE — 99211 OFF/OP EST MAY X REQ PHY/QHP: CPT | Performed by: PHARMACIST

## 2017-01-01 PROCEDURE — 302136 NUTRITION PUMP: Performed by: INTERNAL MEDICINE

## 2017-01-01 PROCEDURE — 93005 ELECTROCARDIOGRAM TRACING: CPT | Performed by: INTERNAL MEDICINE

## 2017-01-01 PROCEDURE — A9579 GAD-BASE MR CONTRAST NOS,1ML: HCPCS | Performed by: INTERNAL MEDICINE

## 2017-01-01 PROCEDURE — 99153 MOD SED SAME PHYS/QHP EA: CPT

## 2017-01-01 PROCEDURE — 97161 PT EVAL LOW COMPLEX 20 MIN: CPT

## 2017-01-01 PROCEDURE — 77338 DESIGN MLC DEVICE FOR IMRT: CPT | Performed by: RADIOLOGY

## 2017-01-01 PROCEDURE — 77470 SPECIAL RADIATION TREATMENT: CPT | Performed by: RADIOLOGY

## 2017-01-01 PROCEDURE — 99244 OFF/OP CNSLTJ NEW/EST MOD 40: CPT | Performed by: INTERNAL MEDICINE

## 2017-01-01 PROCEDURE — 71010 DX-CHEST-LIMITED (1 VIEW): CPT

## 2017-01-01 PROCEDURE — 99223 1ST HOSP IP/OBS HIGH 75: CPT | Mod: GC | Performed by: INTERNAL MEDICINE

## 2017-01-01 PROCEDURE — 06H03DZ INSERTION OF INTRALUMINAL DEVICE INTO INFERIOR VENA CAVA, PERCUTANEOUS APPROACH: ICD-10-PCS | Performed by: RADIOLOGY

## 2017-01-01 PROCEDURE — 700117 HCHG RX CONTRAST REV CODE 255: Performed by: PSYCHIATRY & NEUROLOGY

## 2017-01-01 PROCEDURE — 51798 US URINE CAPACITY MEASURE: CPT

## 2017-01-01 PROCEDURE — 80307 DRUG TEST PRSMV CHEM ANLYZR: CPT

## 2017-01-01 PROCEDURE — 97167 OT EVAL HIGH COMPLEX 60 MIN: CPT

## 2017-01-01 PROCEDURE — 77301 RADIOTHERAPY DOSE PLAN IMRT: CPT | Performed by: RADIOLOGY

## 2017-01-01 PROCEDURE — 77334 RADIATION TREATMENT AID(S): CPT | Mod: 26 | Performed by: RADIOLOGY

## 2017-01-01 PROCEDURE — 99215 OFFICE O/P EST HI 40 MIN: CPT | Performed by: INTERNAL MEDICINE

## 2017-01-01 RX ORDER — ENEMA 19; 7 G/133ML; G/133ML
1 ENEMA RECTAL
Status: DISCONTINUED | OUTPATIENT
Start: 2017-01-01 | End: 2017-01-01

## 2017-01-01 RX ORDER — SODIUM CHLORIDE AND POTASSIUM CHLORIDE 150; 900 MG/100ML; MG/100ML
INJECTION, SOLUTION INTRAVENOUS CONTINUOUS
Status: DISCONTINUED | OUTPATIENT
Start: 2017-01-01 | End: 2017-01-01

## 2017-01-01 RX ORDER — SODIUM CHLORIDE 9 MG/ML
INJECTION, SOLUTION INTRAVENOUS CONTINUOUS
Status: DISCONTINUED | OUTPATIENT
Start: 2017-01-01 | End: 2017-01-01 | Stop reason: HOSPADM

## 2017-01-01 RX ORDER — LORAZEPAM 2 MG/ML
INJECTION INTRAMUSCULAR
Status: DISPENSED
Start: 2017-01-01 | End: 2017-01-01

## 2017-01-01 RX ORDER — DEXTROSE MONOHYDRATE 25 G/50ML
25 INJECTION, SOLUTION INTRAVENOUS
Status: DISCONTINUED | OUTPATIENT
Start: 2017-01-01 | End: 2017-01-01 | Stop reason: HOSPADM

## 2017-01-01 RX ORDER — BISACODYL 10 MG
10 SUPPOSITORY, RECTAL RECTAL
Status: DISCONTINUED | OUTPATIENT
Start: 2017-01-01 | End: 2017-01-01 | Stop reason: HOSPADM

## 2017-01-01 RX ORDER — ATORVASTATIN CALCIUM 10 MG/1
20 TABLET, FILM COATED ORAL
Status: DISCONTINUED | OUTPATIENT
Start: 2017-01-01 | End: 2017-01-01

## 2017-01-01 RX ORDER — AMOXICILLIN 250 MG
1 CAPSULE ORAL DAILY
Status: ON HOLD | COMMUNITY
End: 2017-01-01

## 2017-01-01 RX ORDER — ACETAMINOPHEN 325 MG/1
650 TABLET ORAL EVERY 4 HOURS PRN
Status: DISCONTINUED | OUTPATIENT
Start: 2017-01-01 | End: 2017-01-01

## 2017-01-01 RX ORDER — HEPARIN SODIUM 1000 [USP'U]/ML
6000 INJECTION, SOLUTION INTRAVENOUS; SUBCUTANEOUS ONCE
Status: COMPLETED | OUTPATIENT
Start: 2017-01-01 | End: 2017-01-01

## 2017-01-01 RX ORDER — FAMOTIDINE 20 MG/1
20 TABLET, FILM COATED ORAL 2 TIMES DAILY
Status: CANCELLED | OUTPATIENT
Start: 2017-01-01

## 2017-01-01 RX ORDER — AMOXICILLIN 250 MG
1 CAPSULE ORAL DAILY
Qty: 30 TAB | Refills: 3 | Status: SHIPPED | OUTPATIENT
Start: 2017-01-01

## 2017-01-01 RX ORDER — DEXAMETHASONE SODIUM PHOSPHATE 4 MG/ML
12 INJECTION, SOLUTION INTRA-ARTICULAR; INTRALESIONAL; INTRAMUSCULAR; INTRAVENOUS; SOFT TISSUE ONCE
Status: COMPLETED | OUTPATIENT
Start: 2017-01-01 | End: 2017-01-01

## 2017-01-01 RX ORDER — LEVETIRACETAM 500 MG/1
1000 TABLET ORAL EVERY 12 HOURS
Status: DISCONTINUED | OUTPATIENT
Start: 2017-01-01 | End: 2017-01-01

## 2017-01-01 RX ORDER — FAMOTIDINE 20 MG/1
20 TABLET, FILM COATED ORAL 2 TIMES DAILY
Qty: 60 TAB | Status: ON HOLD | DISCHARGE
Start: 2017-01-01 | End: 2017-01-01

## 2017-01-01 RX ORDER — ENEMA 19; 7 G/133ML; G/133ML
1 ENEMA RECTAL
Status: DISCONTINUED | OUTPATIENT
Start: 2017-01-01 | End: 2017-01-01 | Stop reason: HOSPADM

## 2017-01-01 RX ORDER — SODIUM CHLORIDE 9 MG/ML
INJECTION, SOLUTION INTRAVENOUS
Status: ACTIVE
Start: 2017-01-01 | End: 2017-01-01

## 2017-01-01 RX ORDER — POTASSIUM CHLORIDE 20 MEQ/1
20 TABLET, EXTENDED RELEASE ORAL DAILY
Qty: 5 TAB | Refills: 0 | Status: ON HOLD | OUTPATIENT
Start: 2017-01-01 | End: 2017-01-01

## 2017-01-01 RX ORDER — LEVETIRACETAM 100 MG/ML
1500 SOLUTION ORAL EVERY 12 HOURS
Qty: 900 ML | Refills: 0 | Status: SHIPPED | OUTPATIENT
Start: 2017-01-01 | End: 2017-01-01

## 2017-01-01 RX ORDER — FUROSEMIDE 10 MG/ML
40 INJECTION INTRAMUSCULAR; INTRAVENOUS ONCE
Status: ACTIVE | OUTPATIENT
Start: 2017-01-01 | End: 2017-01-01

## 2017-01-01 RX ORDER — LEVETIRACETAM 100 MG/ML
1500 SOLUTION ORAL EVERY 12 HOURS
Status: DISCONTINUED | OUTPATIENT
Start: 2017-01-01 | End: 2017-01-01 | Stop reason: HOSPADM

## 2017-01-01 RX ORDER — FUROSEMIDE 10 MG/ML
40 INJECTION INTRAMUSCULAR; INTRAVENOUS ONCE
Status: COMPLETED | OUTPATIENT
Start: 2017-01-01 | End: 2017-01-01

## 2017-01-01 RX ORDER — ONDANSETRON 2 MG/ML
4 INJECTION INTRAMUSCULAR; INTRAVENOUS EVERY 4 HOURS PRN
Status: DISCONTINUED | OUTPATIENT
Start: 2017-01-01 | End: 2017-01-01 | Stop reason: HOSPADM

## 2017-01-01 RX ORDER — LEVETIRACETAM 100 MG/ML
1000 SOLUTION ORAL EVERY 12 HOURS
Status: DISCONTINUED | OUTPATIENT
Start: 2017-01-01 | End: 2017-01-01

## 2017-01-01 RX ORDER — ACETAMINOPHEN 325 MG/1
650 TABLET ORAL EVERY 6 HOURS PRN
Status: DISCONTINUED | OUTPATIENT
Start: 2017-01-01 | End: 2017-01-01 | Stop reason: HOSPADM

## 2017-01-01 RX ORDER — POTASSIUM CHLORIDE 20 MEQ/1
20 TABLET, EXTENDED RELEASE ORAL 2 TIMES DAILY
Qty: 60 TAB | Refills: 11 | Status: SHIPPED | OUTPATIENT
Start: 2017-01-01 | End: 2017-01-01 | Stop reason: SDUPTHER

## 2017-01-01 RX ORDER — SODIUM CHLORIDE 9 MG/ML
500 INJECTION, SOLUTION INTRAVENOUS ONCE
Status: COMPLETED | OUTPATIENT
Start: 2017-01-01 | End: 2017-01-01

## 2017-01-01 RX ORDER — BISACODYL 10 MG
10 SUPPOSITORY, RECTAL RECTAL DAILY
Status: ON HOLD | COMMUNITY
End: 2017-01-01

## 2017-01-01 RX ORDER — FUROSEMIDE 80 MG
80 TABLET ORAL ONCE
Qty: 1 TAB | Refills: 0 | Status: SHIPPED | OUTPATIENT
Start: 2017-01-01 | End: 2017-01-01

## 2017-01-01 RX ORDER — CHOLECALCIFEROL (VITAMIN D3) 125 MCG
1000 CAPSULE ORAL DAILY
Status: DISCONTINUED | OUTPATIENT
Start: 2017-01-01 | End: 2017-01-01

## 2017-01-01 RX ORDER — DEXAMETHASONE 1 MG
1 TABLET ORAL DAILY
Status: DISCONTINUED | OUTPATIENT
Start: 2017-01-01 | End: 2017-01-01

## 2017-01-01 RX ORDER — AMOXICILLIN 250 MG
1 CAPSULE ORAL NIGHTLY
Status: DISCONTINUED | OUTPATIENT
Start: 2017-01-01 | End: 2017-01-01 | Stop reason: HOSPADM

## 2017-01-01 RX ORDER — FAMOTIDINE 20 MG/1
20 TABLET, FILM COATED ORAL 2 TIMES DAILY
Status: DISCONTINUED | OUTPATIENT
Start: 2017-01-01 | End: 2017-01-01 | Stop reason: HOSPADM

## 2017-01-01 RX ORDER — BENZONATATE 100 MG/1
100 CAPSULE ORAL 3 TIMES DAILY PRN
Status: DISCONTINUED | OUTPATIENT
Start: 2017-01-01 | End: 2017-01-01 | Stop reason: HOSPADM

## 2017-01-01 RX ORDER — POTASSIUM CHLORIDE 20 MEQ/1
20 TABLET, EXTENDED RELEASE ORAL 2 TIMES DAILY
Qty: 2 TAB | Refills: 0 | Status: SHIPPED | OUTPATIENT
Start: 2017-01-01 | End: 2017-01-01

## 2017-01-01 RX ORDER — BISACODYL 10 MG
10 SUPPOSITORY, RECTAL RECTAL
Status: DISCONTINUED | OUTPATIENT
Start: 2017-01-01 | End: 2017-01-01

## 2017-01-01 RX ORDER — DOCUSATE SODIUM 100 MG/1
100 CAPSULE, LIQUID FILLED ORAL EVERY MORNING
Status: DISCONTINUED | OUTPATIENT
Start: 2017-01-01 | End: 2017-01-01 | Stop reason: HOSPADM

## 2017-01-01 RX ORDER — OXYCODONE HYDROCHLORIDE 10 MG/1
10 TABLET ORAL EVERY 4 HOURS PRN
Status: DISCONTINUED | OUTPATIENT
Start: 2017-01-01 | End: 2017-01-01 | Stop reason: HOSPADM

## 2017-01-01 RX ORDER — WARFARIN SODIUM 5 MG/1
5 TABLET ORAL DAILY
Qty: 30 TAB | Refills: 3 | Status: SHIPPED | OUTPATIENT
Start: 2017-01-01 | End: 2017-01-01

## 2017-01-01 RX ORDER — SODIUM CHLORIDE 9 MG/ML
INJECTION, SOLUTION INTRAVENOUS
Status: COMPLETED
Start: 2017-01-01 | End: 2017-01-01

## 2017-01-01 RX ORDER — ECHINACEA PURPUREA EXTRACT 125 MG
2 TABLET ORAL PRN
Status: DISCONTINUED | OUTPATIENT
Start: 2017-01-01 | End: 2017-01-01 | Stop reason: HOSPADM

## 2017-01-01 RX ORDER — PROMETHAZINE HYDROCHLORIDE 25 MG/1
12.5-25 TABLET ORAL EVERY 4 HOURS PRN
Status: DISCONTINUED | OUTPATIENT
Start: 2017-01-01 | End: 2017-01-01 | Stop reason: HOSPADM

## 2017-01-01 RX ORDER — ONDANSETRON 2 MG/ML
4 INJECTION INTRAMUSCULAR; INTRAVENOUS EVERY 4 HOURS PRN
Status: DISCONTINUED | OUTPATIENT
Start: 2017-01-01 | End: 2017-01-01 | Stop reason: ALTCHOICE

## 2017-01-01 RX ORDER — DEXAMETHASONE 1 MG
2 TABLET ORAL 2 TIMES DAILY
Status: DISCONTINUED | OUTPATIENT
Start: 2017-01-01 | End: 2017-01-01 | Stop reason: HOSPADM

## 2017-01-01 RX ORDER — DEXAMETHASONE SODIUM PHOSPHATE 4 MG/ML
4 INJECTION, SOLUTION INTRA-ARTICULAR; INTRALESIONAL; INTRAMUSCULAR; INTRAVENOUS; SOFT TISSUE EVERY 8 HOURS
Status: DISCONTINUED | OUTPATIENT
Start: 2017-01-01 | End: 2017-01-01 | Stop reason: HOSPADM

## 2017-01-01 RX ORDER — DEXAMETHASONE 4 MG/1
4 TABLET ORAL EVERY 8 HOURS
Start: 2017-01-01

## 2017-01-01 RX ORDER — DEXAMETHASONE 1 MG
2 TABLET ORAL 4 TIMES DAILY
Status: CANCELLED | OUTPATIENT
Start: 2017-01-01 | End: 2017-01-01

## 2017-01-01 RX ORDER — LACTULOSE 20 G/30ML
30 SOLUTION ORAL
Status: DISCONTINUED | OUTPATIENT
Start: 2017-01-01 | End: 2017-01-01 | Stop reason: HOSPADM

## 2017-01-01 RX ORDER — DEXAMETHASONE 1 MG
1 TABLET ORAL 4 TIMES DAILY
Status: DISCONTINUED | OUTPATIENT
Start: 2017-01-01 | End: 2017-01-01 | Stop reason: HOSPADM

## 2017-01-01 RX ORDER — LEVETIRACETAM 500 MG/1
500 TABLET ORAL EVERY 12 HOURS
Status: DISCONTINUED | OUTPATIENT
Start: 2017-01-01 | End: 2017-01-01

## 2017-01-01 RX ORDER — DEXAMETHASONE 2 MG/1
2 TABLET ORAL 2 TIMES DAILY
Qty: 60 TAB | Refills: 1 | Status: SHIPPED | OUTPATIENT
Start: 2017-01-01 | End: 2017-01-01

## 2017-01-01 RX ORDER — AMOXICILLIN 250 MG
2 CAPSULE ORAL 2 TIMES DAILY
Status: DISCONTINUED | OUTPATIENT
Start: 2017-01-01 | End: 2017-01-01 | Stop reason: HOSPADM

## 2017-01-01 RX ORDER — DEXTROSE MONOHYDRATE 25 G/50ML
25 INJECTION, SOLUTION INTRAVENOUS ONCE
Status: COMPLETED | OUTPATIENT
Start: 2017-01-01 | End: 2017-01-01

## 2017-01-01 RX ORDER — LEVETIRACETAM 500 MG/1
TABLET ORAL
Refills: 0 | COMMUNITY
Start: 2016-01-01 | End: 2017-01-01 | Stop reason: SDUPTHER

## 2017-01-01 RX ORDER — LORAZEPAM 2 MG/ML
1 INJECTION INTRAMUSCULAR EVERY 4 HOURS PRN
Refills: 0
Start: 2017-01-01

## 2017-01-01 RX ORDER — LACTULOSE 20 G/30ML
30 SOLUTION ORAL
Status: DISCONTINUED | OUTPATIENT
Start: 2017-01-01 | End: 2017-01-01

## 2017-01-01 RX ORDER — DEXAMETHASONE 1 MG
2 TABLET ORAL 4 TIMES DAILY
Status: DISCONTINUED | OUTPATIENT
Start: 2017-01-01 | End: 2017-01-01

## 2017-01-01 RX ORDER — DEXAMETHASONE 4 MG/1
4 TABLET ORAL 2 TIMES DAILY
Qty: 60 TAB | Refills: 1 | Status: CANCELLED | OUTPATIENT
Start: 2017-01-01

## 2017-01-01 RX ORDER — CEFTRIAXONE 2 G/1
2 INJECTION, POWDER, FOR SOLUTION INTRAMUSCULAR; INTRAVENOUS ONCE
Status: COMPLETED | OUTPATIENT
Start: 2017-01-01 | End: 2017-01-01

## 2017-01-01 RX ORDER — LACTULOSE 20 G/30ML
30 SOLUTION ORAL 3 TIMES DAILY
Status: ON HOLD | COMMUNITY
End: 2017-01-01

## 2017-01-01 RX ORDER — AMOXICILLIN 250 MG
1 CAPSULE ORAL
Status: DISCONTINUED | OUTPATIENT
Start: 2017-01-01 | End: 2017-01-01 | Stop reason: HOSPADM

## 2017-01-01 RX ORDER — ONDANSETRON 2 MG/ML
4 INJECTION INTRAMUSCULAR; INTRAVENOUS ONCE
Status: COMPLETED | OUTPATIENT
Start: 2017-01-01 | End: 2017-01-01

## 2017-01-01 RX ORDER — DEXAMETHASONE 4 MG/1
2 TABLET ORAL EVERY 12 HOURS
Status: DISCONTINUED | OUTPATIENT
Start: 2017-01-01 | End: 2017-01-01 | Stop reason: HOSPADM

## 2017-01-01 RX ORDER — ONDANSETRON 4 MG/1
4 TABLET, FILM COATED ORAL EVERY 4 HOURS PRN
Status: ON HOLD | COMMUNITY
End: 2017-01-01

## 2017-01-01 RX ORDER — FUROSEMIDE 10 MG/ML
40 INJECTION INTRAMUSCULAR; INTRAVENOUS ONCE
Status: CANCELLED | OUTPATIENT
Start: 2017-01-01 | End: 2017-01-01

## 2017-01-01 RX ORDER — HYDROCODONE BITARTRATE AND ACETAMINOPHEN 5; 325 MG/1; MG/1
1-2 TABLET ORAL EVERY 4 HOURS PRN
Qty: 20 TAB | Refills: 0 | Status: ON HOLD | OUTPATIENT
Start: 2017-01-01 | End: 2017-01-01

## 2017-01-01 RX ORDER — LORAZEPAM 2 MG/ML
0.5 INJECTION INTRAMUSCULAR EVERY 4 HOURS PRN
Status: DISCONTINUED | OUTPATIENT
Start: 2017-01-01 | End: 2017-01-01 | Stop reason: HOSPADM

## 2017-01-01 RX ORDER — WARFARIN SODIUM 2.5 MG/1
2.5 TABLET ORAL EVERY EVENING
Status: ON HOLD | COMMUNITY
End: 2017-01-01

## 2017-01-01 RX ORDER — DEXAMETHASONE 1 MG
1 TABLET ORAL 4 TIMES DAILY
Status: CANCELLED | OUTPATIENT
Start: 2017-01-01 | End: 2017-01-01

## 2017-01-01 RX ORDER — ZONISAMIDE 50 MG/1
200 CAPSULE ORAL 2 TIMES DAILY
Status: DISCONTINUED | OUTPATIENT
Start: 2017-01-01 | End: 2017-01-01 | Stop reason: HOSPADM

## 2017-01-01 RX ORDER — DOCUSATE SODIUM 100 MG/1
100 CAPSULE, LIQUID FILLED ORAL
Status: DISCONTINUED | OUTPATIENT
Start: 2017-01-01 | End: 2017-01-01 | Stop reason: HOSPADM

## 2017-01-01 RX ORDER — ONDANSETRON 4 MG/1
4 TABLET, ORALLY DISINTEGRATING ORAL EVERY 4 HOURS PRN
Status: DISCONTINUED | OUTPATIENT
Start: 2017-01-01 | End: 2017-01-01 | Stop reason: HOSPADM

## 2017-01-01 RX ORDER — FAMOTIDINE 20 MG/1
20 TABLET, FILM COATED ORAL
COMMUNITY
Start: 2017-01-01 | End: 2017-01-01

## 2017-01-01 RX ORDER — LORAZEPAM 2 MG/ML
2 INJECTION INTRAMUSCULAR
Status: DISCONTINUED | OUTPATIENT
Start: 2017-01-01 | End: 2017-01-01 | Stop reason: HOSPADM

## 2017-01-01 RX ORDER — DEXAMETHASONE 2 MG/1
2 TABLET ORAL DAILY
Qty: 30 TAB | Refills: 1 | Status: SHIPPED | OUTPATIENT
Start: 2017-01-01 | End: 2017-01-01 | Stop reason: SDUPTHER

## 2017-01-01 RX ORDER — LEVETIRACETAM 100 MG/ML
1500 SOLUTION ORAL EVERY 12 HOURS
Qty: 240 ML | Refills: 0 | Status: SHIPPED | OUTPATIENT
Start: 2017-01-01 | End: 2017-01-01 | Stop reason: SDUPTHER

## 2017-01-01 RX ORDER — DEXAMETHASONE 1 MG
1 TABLET ORAL EVERY 12 HOURS
Status: DISCONTINUED | OUTPATIENT
Start: 2017-01-01 | End: 2017-01-01 | Stop reason: DRUGHIGH

## 2017-01-01 RX ORDER — ACETAMINOPHEN 650 MG/1
650 SUPPOSITORY RECTAL EVERY 4 HOURS PRN
Status: ON HOLD | COMMUNITY
End: 2017-01-01

## 2017-01-01 RX ORDER — LIDOCAINE 50 MG/G
1 PATCH TOPICAL DAILY
Qty: 10 PATCH | Refills: 11 | Status: ON HOLD | OUTPATIENT
Start: 2017-01-01 | End: 2017-01-01

## 2017-01-01 RX ORDER — DEXAMETHASONE SODIUM PHOSPHATE 4 MG/ML
4 INJECTION, SOLUTION INTRA-ARTICULAR; INTRALESIONAL; INTRAMUSCULAR; INTRAVENOUS; SOFT TISSUE EVERY 6 HOURS
Status: DISCONTINUED | OUTPATIENT
Start: 2017-01-01 | End: 2017-01-01

## 2017-01-01 RX ORDER — ZOLPIDEM TARTRATE 5 MG/1
5 TABLET ORAL
Status: DISCONTINUED | OUTPATIENT
Start: 2017-01-01 | End: 2017-01-01 | Stop reason: HOSPADM

## 2017-01-01 RX ORDER — DEXAMETHASONE 4 MG/1
4 TABLET ORAL 3 TIMES DAILY
Qty: 90 TAB | Refills: 1 | Status: ON HOLD | OUTPATIENT
Start: 2017-01-01 | End: 2017-01-01

## 2017-01-01 RX ORDER — PANTOPRAZOLE SODIUM 20 MG/1
TABLET, DELAYED RELEASE ORAL
Qty: 30 TAB | Refills: 5 | Status: SHIPPED | OUTPATIENT
Start: 2017-01-01 | End: 2017-01-01

## 2017-01-01 RX ORDER — WARFARIN SODIUM 2.5 MG/1
2.5 TABLET ORAL DAILY
Status: DISCONTINUED | OUTPATIENT
Start: 2017-01-01 | End: 2017-01-01

## 2017-01-01 RX ORDER — AMOXICILLIN 250 MG
1 CAPSULE ORAL DAILY
Status: DISCONTINUED | OUTPATIENT
Start: 2017-01-01 | End: 2017-01-01

## 2017-01-01 RX ORDER — DEXAMETHASONE 1 MG
TABLET ORAL
Status: COMPLETED
Start: 2017-01-01 | End: 2017-01-01

## 2017-01-01 RX ORDER — CEFDINIR 300 MG/1
300 CAPSULE ORAL 2 TIMES DAILY
Qty: 14 CAP | Refills: 0 | Status: ON HOLD | OUTPATIENT
Start: 2017-01-01 | End: 2017-01-01

## 2017-01-01 RX ORDER — MIDAZOLAM HYDROCHLORIDE 1 MG/ML
.5-2 INJECTION INTRAMUSCULAR; INTRAVENOUS PRN
Status: ACTIVE | OUTPATIENT
Start: 2017-01-01 | End: 2017-01-01

## 2017-01-01 RX ORDER — ATORVASTATIN CALCIUM 20 MG/1
20 TABLET, FILM COATED ORAL
COMMUNITY

## 2017-01-01 RX ORDER — POTASSIUM CHLORIDE 20 MEQ/1
40 TABLET, EXTENDED RELEASE ORAL ONCE
Status: COMPLETED | OUTPATIENT
Start: 2017-01-01 | End: 2017-01-01

## 2017-01-01 RX ORDER — TEMOZOLOMIDE 180 MG/1
180 CAPSULE ORAL EVERY EVENING
Status: ON HOLD | COMMUNITY
End: 2017-01-01

## 2017-01-01 RX ORDER — LACTULOSE 20 G/30ML
30 SOLUTION ORAL
Status: CANCELLED | OUTPATIENT
Start: 2017-01-01

## 2017-01-01 RX ORDER — AMOXICILLIN 250 MG
1 CAPSULE ORAL NIGHTLY
Status: DISCONTINUED | OUTPATIENT
Start: 2017-01-01 | End: 2017-01-01

## 2017-01-01 RX ORDER — PROMETHAZINE HYDROCHLORIDE 25 MG/1
12.5-25 SUPPOSITORY RECTAL EVERY 4 HOURS PRN
Status: DISCONTINUED | OUTPATIENT
Start: 2017-01-01 | End: 2017-01-01 | Stop reason: ALTCHOICE

## 2017-01-01 RX ORDER — CLONIDINE HYDROCHLORIDE 0.1 MG/1
0.1 TABLET ORAL EVERY 6 HOURS PRN
Status: DISCONTINUED | OUTPATIENT
Start: 2017-01-01 | End: 2017-01-01 | Stop reason: HOSPADM

## 2017-01-01 RX ORDER — LEVETIRACETAM 100 MG/ML
1500 SOLUTION ORAL EVERY 12 HOURS
Qty: 240 ML | Refills: 11 | Status: ON HOLD | OUTPATIENT
Start: 2017-01-01 | End: 2017-01-01

## 2017-01-01 RX ORDER — POLYETHYLENE GLYCOL 3350 17 G/17G
1 POWDER, FOR SOLUTION ORAL
Status: DISCONTINUED | OUTPATIENT
Start: 2017-01-01 | End: 2017-01-01 | Stop reason: HOSPADM

## 2017-01-01 RX ORDER — POTASSIUM CHLORIDE 7.45 MG/ML
10 INJECTION INTRAVENOUS ONCE
Status: DISCONTINUED | OUTPATIENT
Start: 2017-01-01 | End: 2017-01-01

## 2017-01-01 RX ORDER — OXYCODONE HYDROCHLORIDE 5 MG/1
5 TABLET ORAL EVERY 4 HOURS PRN
Status: DISCONTINUED | OUTPATIENT
Start: 2017-01-01 | End: 2017-01-01 | Stop reason: HOSPADM

## 2017-01-01 RX ORDER — ZONISAMIDE 50 MG/1
200 CAPSULE ORAL DAILY
Status: DISCONTINUED | OUTPATIENT
Start: 2017-01-01 | End: 2017-01-01

## 2017-01-01 RX ORDER — POTASSIUM CHLORIDE 7.45 MG/ML
10 INJECTION INTRAVENOUS
Status: COMPLETED | OUTPATIENT
Start: 2017-01-01 | End: 2017-01-01

## 2017-01-01 RX ORDER — OMEPRAZOLE 20 MG/1
20 CAPSULE, DELAYED RELEASE ORAL DAILY
Status: DISCONTINUED | OUTPATIENT
Start: 2017-01-01 | End: 2017-01-01

## 2017-01-01 RX ORDER — ONDANSETRON 4 MG/1
4 TABLET, ORALLY DISINTEGRATING ORAL EVERY 4 HOURS PRN
Status: CANCELLED | OUTPATIENT
Start: 2017-01-01

## 2017-01-01 RX ORDER — BISACODYL 10 MG
10 SUPPOSITORY, RECTAL RECTAL
Status: CANCELLED | OUTPATIENT
Start: 2017-01-01

## 2017-01-01 RX ORDER — LIDOCAINE 50 MG/G
1 PATCH TOPICAL DAILY
Qty: 10 PATCH | Refills: 0 | Status: SHIPPED | OUTPATIENT
Start: 2017-01-01 | End: 2017-01-01 | Stop reason: SDUPTHER

## 2017-01-01 RX ORDER — LEVETIRACETAM 500 MG/1
1500 TABLET ORAL EVERY 12 HOURS
Status: DISCONTINUED | OUTPATIENT
Start: 2017-01-01 | End: 2017-01-01

## 2017-01-01 RX ORDER — SODIUM CHLORIDE 9 MG/ML
500 INJECTION, SOLUTION INTRAVENOUS PRN
Status: DISCONTINUED | OUTPATIENT
Start: 2017-01-01 | End: 2017-01-01 | Stop reason: HOSPADM

## 2017-01-01 RX ORDER — DEXAMETHASONE 4 MG/1
4 TABLET ORAL EVERY 6 HOURS
Status: DISCONTINUED | OUTPATIENT
Start: 2017-01-01 | End: 2017-01-01 | Stop reason: HOSPADM

## 2017-01-01 RX ORDER — DEXTROSE MONOHYDRATE, SODIUM CHLORIDE, SODIUM LACTATE, POTASSIUM CHLORIDE, CALCIUM CHLORIDE 5; 600; 310; 179; 20 G/100ML; MG/100ML; MG/100ML; MG/100ML; MG/100ML
INJECTION, SOLUTION INTRAVENOUS CONTINUOUS
Status: DISCONTINUED | OUTPATIENT
Start: 2017-01-01 | End: 2017-01-01

## 2017-01-01 RX ORDER — DEXAMETHASONE 1 MG
1 TABLET ORAL DAILY
Status: CANCELLED | OUTPATIENT
Start: 2017-01-01 | End: 2017-01-01

## 2017-01-01 RX ORDER — LEVETIRACETAM 1000 MG/1
TABLET ORAL
Status: ON HOLD | DISCHARGE
Start: 2017-01-01 | End: 2017-01-01

## 2017-01-01 RX ORDER — FUROSEMIDE 10 MG/ML
40 INJECTION INTRAMUSCULAR; INTRAVENOUS
Status: DISCONTINUED | OUTPATIENT
Start: 2017-01-01 | End: 2017-01-01

## 2017-01-01 RX ORDER — DEXAMETHASONE 1 MG
2 TABLET ORAL 4 TIMES DAILY
Status: DISCONTINUED | OUTPATIENT
Start: 2017-01-01 | End: 2017-01-01 | Stop reason: HOSPADM

## 2017-01-01 RX ORDER — PANTOPRAZOLE SODIUM 20 MG/1
TABLET, DELAYED RELEASE ORAL
Refills: 0 | COMMUNITY
Start: 2016-01-01 | End: 2017-01-01 | Stop reason: SDUPTHER

## 2017-01-01 RX ORDER — DEXAMETHASONE 4 MG/1
4 TABLET ORAL 2 TIMES DAILY
Qty: 60 TAB | Refills: 1 | Status: ON HOLD | OUTPATIENT
Start: 2017-01-01 | End: 2017-01-01

## 2017-01-01 RX ORDER — AMOXICILLIN 250 MG
1 CAPSULE ORAL
Status: CANCELLED | OUTPATIENT
Start: 2017-01-01

## 2017-01-01 RX ORDER — ACETAMINOPHEN 325 MG/1
650 TABLET ORAL EVERY 6 HOURS PRN
Status: CANCELLED | OUTPATIENT
Start: 2017-01-01

## 2017-01-01 RX ORDER — DOCUSATE SODIUM 100 MG/1
100 CAPSULE, LIQUID FILLED ORAL EVERY MORNING
Status: DISCONTINUED | OUTPATIENT
Start: 2017-01-01 | End: 2017-01-01

## 2017-01-01 RX ORDER — LEVETIRACETAM 500 MG/1
TABLET ORAL
Qty: 60 TAB | Refills: 5 | Status: ON HOLD | OUTPATIENT
Start: 2017-01-01 | End: 2017-01-01

## 2017-01-01 RX ORDER — FAMOTIDINE 20 MG/1
TABLET, FILM COATED ORAL
Status: COMPLETED
Start: 2017-01-01 | End: 2017-01-01

## 2017-01-01 RX ORDER — EPINEPHRINE 0.1 MG/ML
0.5 SYRINGE (ML) INJECTION
Status: DISCONTINUED | OUTPATIENT
Start: 2017-01-01 | End: 2017-01-01

## 2017-01-01 RX ORDER — DOCUSATE SODIUM 100 MG
CAPSULE ORAL
Refills: 0 | COMMUNITY
Start: 2016-01-01 | End: 2017-01-01

## 2017-01-01 RX ORDER — DEXAMETHASONE 1 MG
1 TABLET ORAL EVERY 12 HOURS
Status: DISCONTINUED | OUTPATIENT
Start: 2017-01-01 | End: 2017-01-01

## 2017-01-01 RX ORDER — HYDROCODONE BITARTRATE AND ACETAMINOPHEN 5; 325 MG/1; MG/1
1-2 TABLET ORAL EVERY 6 HOURS PRN
COMMUNITY

## 2017-01-01 RX ORDER — POTASSIUM CHLORIDE 7.45 MG/ML
10 INJECTION INTRAVENOUS ONCE
Status: COMPLETED | OUTPATIENT
Start: 2017-01-01 | End: 2017-01-01

## 2017-01-01 RX ORDER — ACETAMINOPHEN 325 MG/1
650 TABLET ORAL EVERY 6 HOURS PRN
Qty: 30 TAB | Refills: 0
Start: 2017-01-01

## 2017-01-01 RX ORDER — DOCUSATE SODIUM 100 MG/1
100 CAPSULE, LIQUID FILLED ORAL EVERY MORNING
Status: CANCELLED | OUTPATIENT
Start: 2017-01-01

## 2017-01-01 RX ORDER — DEXAMETHASONE 4 MG/1
4 TABLET ORAL 3 TIMES DAILY
Status: ON HOLD | DISCHARGE
Start: 2017-01-01 | End: 2017-01-01

## 2017-01-01 RX ORDER — DEXAMETHASONE 1 MG
1 TABLET ORAL DAILY
Status: DISCONTINUED | OUTPATIENT
Start: 2017-01-01 | End: 2017-01-01 | Stop reason: HOSPADM

## 2017-01-01 RX ORDER — TEMOZOLOMIDE 100 MG/1
100 CAPSULE ORAL DAILY
Status: DISCONTINUED | OUTPATIENT
Start: 2017-01-01 | End: 2017-01-01

## 2017-01-01 RX ORDER — SODIUM CHLORIDE 9 MG/ML
1000 INJECTION, SOLUTION INTRAVENOUS ONCE
Status: DISCONTINUED | OUTPATIENT
Start: 2017-01-01 | End: 2017-01-01

## 2017-01-01 RX ORDER — LIDOCAINE 50 MG/G
1 PATCH TOPICAL DAILY
Status: DISCONTINUED | OUTPATIENT
Start: 2017-01-01 | End: 2017-01-01 | Stop reason: HOSPADM

## 2017-01-01 RX ORDER — AMOXICILLIN 250 MG
1 CAPSULE ORAL NIGHTLY
Status: CANCELLED | OUTPATIENT
Start: 2017-01-01

## 2017-01-01 RX ORDER — ACETAMINOPHEN 325 MG/1
650 TABLET ORAL EVERY 6 HOURS PRN
Status: DISCONTINUED | OUTPATIENT
Start: 2017-01-01 | End: 2017-01-01

## 2017-01-01 RX ORDER — LABETALOL HYDROCHLORIDE 5 MG/ML
10 INJECTION, SOLUTION INTRAVENOUS EVERY 4 HOURS PRN
Status: DISCONTINUED | OUTPATIENT
Start: 2017-01-01 | End: 2017-01-01

## 2017-01-01 RX ORDER — OXYCODONE HYDROCHLORIDE 5 MG/1
5 TABLET ORAL EVERY 4 HOURS PRN
Status: DISCONTINUED | OUTPATIENT
Start: 2017-01-01 | End: 2017-01-01

## 2017-01-01 RX ORDER — DEXAMETHASONE 4 MG/1
TABLET ORAL
Qty: 30 TAB | Refills: 0 | Status: SHIPPED | OUTPATIENT
Start: 2017-01-01 | End: 2017-01-01 | Stop reason: SDUPTHER

## 2017-01-01 RX ORDER — FOLIC ACID 1 MG/1
1 TABLET ORAL DAILY
Status: DISCONTINUED | OUTPATIENT
Start: 2017-01-01 | End: 2017-01-01 | Stop reason: HOSPADM

## 2017-01-01 RX ORDER — WARFARIN SODIUM 2.5 MG/1
TABLET ORAL
Qty: 30 TAB | Refills: 5 | Status: SHIPPED | OUTPATIENT
Start: 2017-01-01 | End: 2017-01-01

## 2017-01-01 RX ORDER — POTASSIUM CHLORIDE 20 MEQ/1
40 TABLET, EXTENDED RELEASE ORAL ONCE
Status: DISCONTINUED | OUTPATIENT
Start: 2017-01-01 | End: 2017-01-01

## 2017-01-01 RX ORDER — FUROSEMIDE 20 MG/1
20 TABLET ORAL 2 TIMES DAILY
Qty: 60 TAB | Refills: 0 | Status: SHIPPED | OUTPATIENT
Start: 2017-01-01 | End: 2017-01-01 | Stop reason: SDUPTHER

## 2017-01-01 RX ORDER — OXYCODONE HYDROCHLORIDE 5 MG/1
5 TABLET ORAL EVERY 4 HOURS PRN
Status: CANCELLED | OUTPATIENT
Start: 2017-01-01

## 2017-01-01 RX ORDER — DOXYCYCLINE 100 MG/1
100 TABLET ORAL EVERY 12 HOURS
Status: COMPLETED | OUTPATIENT
Start: 2017-01-01 | End: 2017-01-01

## 2017-01-01 RX ORDER — DEXAMETHASONE 4 MG/1
4 TABLET ORAL EVERY 6 HOURS
Status: DISCONTINUED | OUTPATIENT
Start: 2017-01-01 | End: 2017-01-01

## 2017-01-01 RX ORDER — DEXAMETHASONE 1 MG
1 TABLET ORAL 3 TIMES DAILY
Status: DISCONTINUED | OUTPATIENT
Start: 2017-01-01 | End: 2017-01-01

## 2017-01-01 RX ORDER — ZONISAMIDE 100 MG/1
200 CAPSULE ORAL 2 TIMES DAILY
Qty: 30 CAP | Refills: 3
Start: 2017-01-01

## 2017-01-01 RX ORDER — LABETALOL HYDROCHLORIDE 5 MG/ML
10 INJECTION, SOLUTION INTRAVENOUS EVERY 4 HOURS PRN
Refills: 0 | Status: ON HOLD | DISCHARGE
Start: 2017-01-01 | End: 2017-01-01

## 2017-01-01 RX ORDER — TEMAZEPAM 15 MG/1
15 CAPSULE ORAL
Status: DISCONTINUED | OUTPATIENT
Start: 2017-01-01 | End: 2017-01-01 | Stop reason: HOSPADM

## 2017-01-01 RX ORDER — ACETAMINOPHEN 325 MG/1
650 TABLET ORAL EVERY 4 HOURS PRN
Status: CANCELLED | OUTPATIENT
Start: 2017-01-01

## 2017-01-01 RX ORDER — HYDROCODONE BITARTRATE AND ACETAMINOPHEN 5; 325 MG/1; MG/1
1-2 TABLET ORAL EVERY 4 HOURS PRN
Status: DISCONTINUED | OUTPATIENT
Start: 2017-01-01 | End: 2017-01-01 | Stop reason: HOSPADM

## 2017-01-01 RX ORDER — ONDANSETRON 2 MG/ML
4 INJECTION INTRAMUSCULAR; INTRAVENOUS PRN
Status: ACTIVE | OUTPATIENT
Start: 2017-01-01 | End: 2017-01-01

## 2017-01-01 RX ORDER — TEMOZOLOMIDE 140 MG/1
140 CAPSULE ORAL DAILY
Qty: 42 CAP | Refills: 0 | Status: SHIPPED | OUTPATIENT
Start: 2017-01-01 | End: 2017-01-01

## 2017-01-01 RX ORDER — HEPARIN SODIUM 1000 [USP'U]/ML
3200 INJECTION, SOLUTION INTRAVENOUS; SUBCUTANEOUS PRN
Status: DISCONTINUED | OUTPATIENT
Start: 2017-01-01 | End: 2017-01-01 | Stop reason: HOSPADM

## 2017-01-01 RX ORDER — HYDROCODONE BITARTRATE AND ACETAMINOPHEN 5; 325 MG/1; MG/1
1-2 TABLET ORAL EVERY 6 HOURS PRN
Status: DISCONTINUED | OUTPATIENT
Start: 2017-01-01 | End: 2017-01-01 | Stop reason: HOSPADM

## 2017-01-01 RX ORDER — DEXAMETHASONE 1 MG
3 TABLET ORAL 4 TIMES DAILY
Status: COMPLETED | OUTPATIENT
Start: 2017-01-01 | End: 2017-01-01

## 2017-01-01 RX ORDER — MIDAZOLAM HYDROCHLORIDE 1 MG/ML
INJECTION INTRAMUSCULAR; INTRAVENOUS
Status: COMPLETED
Start: 2017-01-01 | End: 2017-01-01

## 2017-01-01 RX ORDER — DOCUSATE SODIUM 100 MG/1
100 CAPSULE, LIQUID FILLED ORAL 2 TIMES DAILY
Status: ON HOLD | COMMUNITY
End: 2017-01-01

## 2017-01-01 RX ORDER — DEXAMETHASONE 1 MG
1 TABLET ORAL 4 TIMES DAILY
Status: DISCONTINUED | OUTPATIENT
Start: 2017-01-01 | End: 2017-01-01

## 2017-01-01 RX ORDER — LORAZEPAM 2 MG/ML
1 INJECTION INTRAMUSCULAR EVERY 4 HOURS PRN
Status: DISCONTINUED | OUTPATIENT
Start: 2017-01-01 | End: 2017-01-01 | Stop reason: HOSPADM

## 2017-01-01 RX ORDER — DEXAMETHASONE SODIUM PHOSPHATE 4 MG/ML
4 INJECTION, SOLUTION INTRA-ARTICULAR; INTRALESIONAL; INTRAMUSCULAR; INTRAVENOUS; SOFT TISSUE EVERY 6 HOURS
Status: DISCONTINUED | OUTPATIENT
Start: 2017-01-01 | End: 2017-01-01 | Stop reason: HOSPADM

## 2017-01-01 RX ORDER — PROMETHAZINE HYDROCHLORIDE 25 MG/1
12.5-25 SUPPOSITORY RECTAL EVERY 4 HOURS PRN
Status: DISCONTINUED | OUTPATIENT
Start: 2017-01-01 | End: 2017-01-01 | Stop reason: HOSPADM

## 2017-01-01 RX ORDER — LEVETIRACETAM 500 MG/1
500 TABLET ORAL EVERY 12 HOURS
Status: DISCONTINUED | OUTPATIENT
Start: 2017-01-01 | End: 2017-01-01 | Stop reason: HOSPADM

## 2017-01-01 RX ORDER — MORPHINE SULFATE 4 MG/ML
4 INJECTION, SOLUTION INTRAMUSCULAR; INTRAVENOUS ONCE
Status: COMPLETED | OUTPATIENT
Start: 2017-01-01 | End: 2017-01-01

## 2017-01-01 RX ORDER — LORAZEPAM 2 MG/ML
1 INJECTION INTRAMUSCULAR EVERY 4 HOURS PRN
Status: DISCONTINUED | OUTPATIENT
Start: 2017-01-01 | End: 2017-01-01

## 2017-01-01 RX ORDER — DEXAMETHASONE 4 MG/1
4 TABLET ORAL 3 TIMES DAILY
Status: DISCONTINUED | OUTPATIENT
Start: 2017-01-01 | End: 2017-01-01

## 2017-01-01 RX ORDER — ONDANSETRON 4 MG/1
4 TABLET, FILM COATED ORAL EVERY 4 HOURS PRN
Status: DISCONTINUED | OUTPATIENT
Start: 2017-01-01 | End: 2017-01-01

## 2017-01-01 RX ORDER — WARFARIN SODIUM 6 MG/1
6 TABLET ORAL
Status: DISCONTINUED | OUTPATIENT
Start: 2017-01-01 | End: 2017-01-01 | Stop reason: HOSPADM

## 2017-01-01 RX ORDER — DEXAMETHASONE SODIUM PHOSPHATE 4 MG/ML
4 INJECTION, SOLUTION INTRA-ARTICULAR; INTRALESIONAL; INTRAMUSCULAR; INTRAVENOUS; SOFT TISSUE EVERY 12 HOURS
Status: DISCONTINUED | OUTPATIENT
Start: 2017-01-01 | End: 2017-01-01

## 2017-01-01 RX ORDER — BISACODYL 10 MG
10 SUPPOSITORY, RECTAL RECTAL ONCE
Status: COMPLETED | OUTPATIENT
Start: 2017-01-01 | End: 2017-01-01

## 2017-01-01 RX ORDER — DEXAMETHASONE 1 MG
1 TABLET ORAL 3 TIMES DAILY
Status: DISCONTINUED | OUTPATIENT
Start: 2017-01-01 | End: 2017-01-01 | Stop reason: HOSPADM

## 2017-01-01 RX ORDER — LABETALOL HYDROCHLORIDE 5 MG/ML
10 INJECTION, SOLUTION INTRAVENOUS EVERY 4 HOURS PRN
Status: DISCONTINUED | OUTPATIENT
Start: 2017-01-01 | End: 2017-01-01 | Stop reason: HOSPADM

## 2017-01-01 RX ORDER — THIAMINE MONONITRATE (VIT B1) 100 MG
50 TABLET ORAL DAILY
Status: DISCONTINUED | OUTPATIENT
Start: 2017-01-01 | End: 2017-01-01 | Stop reason: HOSPADM

## 2017-01-01 RX ORDER — ENEMA 19; 7 G/133ML; G/133ML
1 ENEMA RECTAL
Status: CANCELLED | OUTPATIENT
Start: 2017-01-01

## 2017-01-01 RX ORDER — TEMAZEPAM 15 MG/1
15 CAPSULE ORAL NIGHTLY PRN
Status: ON HOLD | COMMUNITY
End: 2017-01-01

## 2017-01-01 RX ORDER — METHYLPREDNISOLONE 4 MG/1
TABLET ORAL
Refills: 0 | COMMUNITY
Start: 2016-01-01 | End: 2017-01-01

## 2017-01-01 RX ORDER — FAMOTIDINE 20 MG/1
20 TABLET, FILM COATED ORAL DAILY
Status: DISCONTINUED | OUTPATIENT
Start: 2017-01-01 | End: 2017-01-01 | Stop reason: HOSPADM

## 2017-01-01 RX ORDER — FUROSEMIDE 20 MG/1
20 TABLET ORAL 2 TIMES DAILY
Qty: 10 TAB | Refills: 0 | Status: ON HOLD | OUTPATIENT
Start: 2017-01-01 | End: 2017-01-01

## 2017-01-01 RX ORDER — PROMETHAZINE HYDROCHLORIDE 25 MG/1
12.5-25 TABLET ORAL EVERY 4 HOURS PRN
Status: DISCONTINUED | OUTPATIENT
Start: 2017-01-01 | End: 2017-01-01 | Stop reason: ALTCHOICE

## 2017-01-01 RX ORDER — TEMOZOLOMIDE 140 MG/1
140 CAPSULE ORAL DAILY
Status: ON HOLD | COMMUNITY
End: 2017-01-01

## 2017-01-01 RX ORDER — AMOXICILLIN 250 MG
1 CAPSULE ORAL ONCE
Status: COMPLETED | OUTPATIENT
Start: 2017-01-01 | End: 2017-01-01

## 2017-01-01 RX ORDER — DEXAMETHASONE 1 MG
1 TABLET ORAL 3 TIMES DAILY
Status: CANCELLED | OUTPATIENT
Start: 2017-01-01 | End: 2017-01-01

## 2017-01-01 RX ORDER — DEXAMETHASONE 4 MG/1
4 TABLET ORAL EVERY 12 HOURS
Status: DISCONTINUED | OUTPATIENT
Start: 2017-01-01 | End: 2017-01-01

## 2017-01-01 RX ORDER — AMOXICILLIN 250 MG
1 CAPSULE ORAL
Status: DISCONTINUED | OUTPATIENT
Start: 2017-01-01 | End: 2017-01-01

## 2017-01-01 RX ORDER — ONDANSETRON 4 MG/1
4 TABLET, ORALLY DISINTEGRATING ORAL EVERY 4 HOURS PRN
Status: DISCONTINUED | OUTPATIENT
Start: 2017-01-01 | End: 2017-01-01 | Stop reason: ALTCHOICE

## 2017-01-01 RX ORDER — AZITHROMYCIN 250 MG/1
TABLET, FILM COATED ORAL
Qty: 1 QUANTITY SUFFICIENT | Refills: 0 | Status: ON HOLD | OUTPATIENT
Start: 2017-01-01 | End: 2017-01-01

## 2017-01-01 RX ORDER — PROMETHAZINE HYDROCHLORIDE 25 MG/1
25 SUPPOSITORY RECTAL EVERY 6 HOURS PRN
Status: DISCONTINUED | OUTPATIENT
Start: 2017-01-01 | End: 2017-01-01 | Stop reason: HOSPADM

## 2017-01-01 RX ORDER — AZITHROMYCIN 500 MG/1
500 INJECTION, POWDER, LYOPHILIZED, FOR SOLUTION INTRAVENOUS ONCE
Status: COMPLETED | OUTPATIENT
Start: 2017-01-01 | End: 2017-01-01

## 2017-01-01 RX ORDER — ACETAMINOPHEN 325 MG/1
650 TABLET ORAL EVERY 4 HOURS PRN
Status: DISCONTINUED | OUTPATIENT
Start: 2017-01-01 | End: 2017-01-01 | Stop reason: HOSPADM

## 2017-01-01 RX ORDER — LEVETIRACETAM 500 MG/1
500 TABLET ORAL EVERY 12 HOURS
Status: CANCELLED | OUTPATIENT
Start: 2017-01-01

## 2017-01-01 RX ORDER — DEXAMETHASONE 1 MG
1 TABLET ORAL EVERY 12 HOURS
Status: CANCELLED | OUTPATIENT
Start: 2017-01-01 | End: 2017-01-01

## 2017-01-01 RX ORDER — LEVETIRACETAM 500 MG/1
TABLET ORAL
Status: COMPLETED
Start: 2017-01-01 | End: 2017-01-01

## 2017-01-01 RX ORDER — SULFAMETHOXAZOLE AND TRIMETHOPRIM 800; 160 MG/1; MG/1
1 TABLET ORAL
Qty: 18 TAB | Refills: 0 | Status: SHIPPED | OUTPATIENT
Start: 2017-01-01 | End: 2017-01-01

## 2017-01-01 RX ORDER — TEMOZOLOMIDE 100 MG/1
100 CAPSULE ORAL DAILY
COMMUNITY
End: 2017-01-01

## 2017-01-01 RX ORDER — LORAZEPAM 2 MG/ML
2 INJECTION INTRAMUSCULAR
Refills: 0 | Status: ON HOLD | DISCHARGE
Start: 2017-01-01 | End: 2017-01-01

## 2017-01-01 RX ORDER — DEXAMETHASONE 1 MG
1 TABLET ORAL EVERY 12 HOURS
Status: DISCONTINUED | OUTPATIENT
Start: 2017-01-01 | End: 2017-01-01 | Stop reason: HOSPADM

## 2017-01-01 RX ORDER — FAMOTIDINE 20 MG/1
20 TABLET, FILM COATED ORAL 2 TIMES DAILY
Status: DISCONTINUED | OUTPATIENT
Start: 2017-01-01 | End: 2017-01-01

## 2017-01-01 RX ADMIN — FOLIC ACID 1 MG: 1 TABLET ORAL at 12:51

## 2017-01-01 RX ADMIN — FAMOTIDINE 20 MG: 20 TABLET, FILM COATED ORAL at 08:56

## 2017-01-01 RX ADMIN — DIBASIC SODIUM PHOSPHATE, MONOBASIC POTASSIUM PHOSPHATE AND MONOBASIC SODIUM PHOSPHATE 1 TABLET: 852; 155; 130 TABLET ORAL at 02:02

## 2017-01-01 RX ADMIN — STANDARDIZED SENNA CONCENTRATE AND DOCUSATE SODIUM 1 TABLET: 8.6; 5 TABLET, FILM COATED ORAL at 20:46

## 2017-01-01 RX ADMIN — INSULIN LISPRO 2 UNITS: 100 INJECTION, SOLUTION INTRAVENOUS; SUBCUTANEOUS at 20:17

## 2017-01-01 RX ADMIN — LEVETIRACETAM 1000 MG: 100 SOLUTION ORAL at 21:07

## 2017-01-01 RX ADMIN — BENZONATATE 100 MG: 100 CAPSULE ORAL at 20:58

## 2017-01-01 RX ADMIN — LEVETIRACETAM 1500 MG: 100 SOLUTION ORAL at 20:46

## 2017-01-01 RX ADMIN — FENTANYL CITRATE 25 MCG: 50 INJECTION, SOLUTION INTRAMUSCULAR; INTRAVENOUS at 15:50

## 2017-01-01 RX ADMIN — DEXAMETHASONE 4 MG: 4 TABLET ORAL at 11:29

## 2017-01-01 RX ADMIN — FAMOTIDINE 20 MG: 20 TABLET ORAL at 08:15

## 2017-01-01 RX ADMIN — STANDARDIZED SENNA CONCENTRATE AND DOCUSATE SODIUM 2 TABLET: 8.6; 5 TABLET, FILM COATED ORAL at 07:33

## 2017-01-01 RX ADMIN — OMEPRAZOLE 40 MG: 20 CAPSULE, DELAYED RELEASE ORAL at 07:33

## 2017-01-01 RX ADMIN — NYSTATIN 500000 UNITS: 100000 SUSPENSION ORAL at 12:37

## 2017-01-01 RX ADMIN — NYSTATIN 500000 UNITS: 100000 SUSPENSION ORAL at 08:24

## 2017-01-01 RX ADMIN — NYSTATIN 500000 UNITS: 100000 SUSPENSION ORAL at 18:01

## 2017-01-01 RX ADMIN — DOXYCYCLINE 100 MG: 100 TABLET ORAL at 08:33

## 2017-01-01 RX ADMIN — INSULIN HUMAN 35 UNITS: 100 INJECTION, SUSPENSION SUBCUTANEOUS at 21:29

## 2017-01-01 RX ADMIN — INSULIN LISPRO 1 UNITS: 100 INJECTION, SOLUTION INTRAVENOUS; SUBCUTANEOUS at 07:50

## 2017-01-01 RX ADMIN — FAMOTIDINE 20 MG: 20 TABLET, FILM COATED ORAL at 09:10

## 2017-01-01 RX ADMIN — INSULIN LISPRO 2 UNITS: 100 INJECTION, SOLUTION INTRAVENOUS; SUBCUTANEOUS at 10:56

## 2017-01-01 RX ADMIN — DEXTROSE MONOHYDRATE 2000 MG: 50 INJECTION, SOLUTION INTRAVENOUS at 17:48

## 2017-01-01 RX ADMIN — INSULIN LISPRO 3 UNITS: 100 INJECTION, SOLUTION INTRAVENOUS; SUBCUTANEOUS at 12:21

## 2017-01-01 RX ADMIN — LEVETIRACETAM 1500 MG: 100 SOLUTION ORAL at 08:29

## 2017-01-01 RX ADMIN — FAMOTIDINE 20 MG: 20 TABLET ORAL at 07:52

## 2017-01-01 RX ADMIN — INSULIN HUMAN 35 UNITS: 100 INJECTION, SUSPENSION SUBCUTANEOUS at 17:47

## 2017-01-01 RX ADMIN — STANDARDIZED SENNA CONCENTRATE AND DOCUSATE SODIUM 1 TABLET: 8.6; 5 TABLET, FILM COATED ORAL at 20:32

## 2017-01-01 RX ADMIN — STANDARDIZED SENNA CONCENTRATE AND DOCUSATE SODIUM 2 TABLET: 8.6; 5 TABLET, FILM COATED ORAL at 11:04

## 2017-01-01 RX ADMIN — WARFARIN SODIUM 2.5 MG: 2.5 TABLET ORAL at 20:21

## 2017-01-01 RX ADMIN — RIVAROXABAN 20 MG: 20 TABLET, FILM COATED ORAL at 17:56

## 2017-01-01 RX ADMIN — NEOMYCIN AND POLYMYXIN B SULFATES AND BACITRACIN ZINC: 400; 3.5; 5 OINTMENT TOPICAL at 09:06

## 2017-01-01 RX ADMIN — HYDROCODONE BITARTRATE AND ACETAMINOPHEN 1 TABLET: 5; 325 TABLET ORAL at 02:02

## 2017-01-01 RX ADMIN — DOXYCYCLINE 100 MG: 100 INJECTION, POWDER, LYOPHILIZED, FOR SOLUTION INTRAVENOUS at 09:40

## 2017-01-01 RX ADMIN — STANDARDIZED SENNA CONCENTRATE AND DOCUSATE SODIUM 1 TABLET: 8.6; 5 TABLET, FILM COATED ORAL at 20:53

## 2017-01-01 RX ADMIN — INSULIN LISPRO 4 UNITS: 100 INJECTION, SOLUTION INTRAVENOUS; SUBCUTANEOUS at 17:27

## 2017-01-01 RX ADMIN — INSULIN LISPRO 3 UNITS: 100 INJECTION, SOLUTION INTRAVENOUS; SUBCUTANEOUS at 18:29

## 2017-01-01 RX ADMIN — LIDOCAINE 1 PATCH: 50 PATCH CUTANEOUS at 07:46

## 2017-01-01 RX ADMIN — AZITHROMYCIN FOR INJECTION INJECTION, POWDER, LYOPHILIZED, FOR SOLUTION 500 MG: 500 INJECTION INTRAVENOUS at 08:01

## 2017-01-01 RX ADMIN — DEXAMETHASONE SODIUM PHOSPHATE 4 MG: 4 INJECTION, SOLUTION INTRAMUSCULAR; INTRAVENOUS at 00:14

## 2017-01-01 RX ADMIN — INSULIN LISPRO 2 UNITS: 100 INJECTION, SOLUTION INTRAVENOUS; SUBCUTANEOUS at 20:48

## 2017-01-01 RX ADMIN — INSULIN HUMAN 35 UNITS: 100 INJECTION, SUSPENSION SUBCUTANEOUS at 17:40

## 2017-01-01 RX ADMIN — DEXTROSE MONOHYDRATE 1500 MG: 50 INJECTION, SOLUTION INTRAVENOUS at 09:49

## 2017-01-01 RX ADMIN — DIBASIC SODIUM PHOSPHATE, MONOBASIC POTASSIUM PHOSPHATE AND MONOBASIC SODIUM PHOSPHATE 1 TABLET: 852; 155; 130 TABLET ORAL at 17:51

## 2017-01-01 RX ADMIN — INSULIN LISPRO 1 UNITS: 100 INJECTION, SOLUTION INTRAVENOUS; SUBCUTANEOUS at 07:59

## 2017-01-01 RX ADMIN — INSULIN LISPRO 1 UNITS: 100 INJECTION, SOLUTION INTRAVENOUS; SUBCUTANEOUS at 17:09

## 2017-01-01 RX ADMIN — NYSTATIN 500000 UNITS: 100000 SUSPENSION ORAL at 09:00

## 2017-01-01 RX ADMIN — DEXAMETHASONE 4 MG: 4 TABLET ORAL at 14:44

## 2017-01-01 RX ADMIN — DEXAMETHASONE 4 MG: 4 TABLET ORAL at 04:54

## 2017-01-01 RX ADMIN — INSULIN LISPRO 5 UNITS: 100 INJECTION, SOLUTION INTRAVENOUS; SUBCUTANEOUS at 12:36

## 2017-01-01 RX ADMIN — INSULIN HUMAN 18 UNITS: 100 INJECTION, SUSPENSION SUBCUTANEOUS at 07:38

## 2017-01-01 RX ADMIN — LEVETIRACETAM 1500 MG: 100 SOLUTION ORAL at 20:24

## 2017-01-01 RX ADMIN — OMEPRAZOLE 40 MG: 20 CAPSULE, DELAYED RELEASE ORAL at 10:12

## 2017-01-01 RX ADMIN — LEVETIRACETAM 1500 MG: 100 SOLUTION ORAL at 20:32

## 2017-01-01 RX ADMIN — NYSTATIN 500000 UNITS: 100000 SUSPENSION ORAL at 17:57

## 2017-01-01 RX ADMIN — INSULIN HUMAN 35 UNITS: 100 INJECTION, SUSPENSION SUBCUTANEOUS at 17:57

## 2017-01-01 RX ADMIN — FAMOTIDINE 20 MG: 20 TABLET ORAL at 20:50

## 2017-01-01 RX ADMIN — INSULIN HUMAN 35 UNITS: 100 INJECTION, SUSPENSION SUBCUTANEOUS at 18:17

## 2017-01-01 RX ADMIN — FAMOTIDINE 20 MG: 20 TABLET, FILM COATED ORAL at 07:56

## 2017-01-01 RX ADMIN — STANDARDIZED SENNA CONCENTRATE AND DOCUSATE SODIUM 2 TABLET: 8.6; 5 TABLET, FILM COATED ORAL at 20:34

## 2017-01-01 RX ADMIN — LEVETIRACETAM 1500 MG: 100 SOLUTION ORAL at 08:36

## 2017-01-01 RX ADMIN — IOHEXOL 65 ML: 350 INJECTION, SOLUTION INTRAVENOUS at 05:08

## 2017-01-01 RX ADMIN — NYSTATIN 500000 UNITS: 100000 SUSPENSION ORAL at 10:25

## 2017-01-01 RX ADMIN — FAMOTIDINE 20 MG: 20 TABLET ORAL at 20:00

## 2017-01-01 RX ADMIN — LEVETIRACETAM 1500 MG: 100 SOLUTION ORAL at 08:41

## 2017-01-01 RX ADMIN — DEXAMETHASONE 2 MG: 4 TABLET ORAL at 21:14

## 2017-01-01 RX ADMIN — NYSTATIN 500000 UNITS: 100000 SUSPENSION ORAL at 08:08

## 2017-01-01 RX ADMIN — POTASSIUM CHLORIDE 10 MEQ: 7.46 INJECTION, SOLUTION INTRAVENOUS at 10:16

## 2017-01-01 RX ADMIN — LEVETIRACETAM 1000 MG: 100 SOLUTION ORAL at 08:12

## 2017-01-01 RX ADMIN — NYSTATIN 500000 UNITS: 100000 SUSPENSION ORAL at 07:53

## 2017-01-01 RX ADMIN — DEXAMETHASONE 4 MG: 4 TABLET ORAL at 14:13

## 2017-01-01 RX ADMIN — INSULIN LISPRO 2 UNITS: 100 INJECTION, SOLUTION INTRAVENOUS; SUBCUTANEOUS at 12:01

## 2017-01-01 RX ADMIN — RIVAROXABAN 20 MG: 20 TABLET, FILM COATED ORAL at 17:34

## 2017-01-01 RX ADMIN — STANDARDIZED SENNA CONCENTRATE AND DOCUSATE SODIUM 1 TABLET: 8.6; 5 TABLET, FILM COATED ORAL at 20:00

## 2017-01-01 RX ADMIN — DEXAMETHASONE SODIUM PHOSPHATE 4 MG: 4 INJECTION, SOLUTION INTRAMUSCULAR; INTRAVENOUS at 11:06

## 2017-01-01 RX ADMIN — THERA TABS 1 TABLET: TAB at 12:51

## 2017-01-01 RX ADMIN — DEXAMETHASONE 2 MG: 4 TABLET ORAL at 21:31

## 2017-01-01 RX ADMIN — FAMOTIDINE 20 MG: 20 TABLET, FILM COATED ORAL at 08:20

## 2017-01-01 RX ADMIN — THERA TABS 1 TABLET: TAB at 08:29

## 2017-01-01 RX ADMIN — LIDOCAINE 1 PATCH: 50 PATCH CUTANEOUS at 07:59

## 2017-01-01 RX ADMIN — DOXYCYCLINE 100 MG: 100 INJECTION, POWDER, LYOPHILIZED, FOR SOLUTION INTRAVENOUS at 02:23

## 2017-01-01 RX ADMIN — INSULIN LISPRO 3 UNITS: 100 INJECTION, SOLUTION INTRAVENOUS; SUBCUTANEOUS at 21:31

## 2017-01-01 RX ADMIN — INSULIN LISPRO 2 UNITS: 100 INJECTION, SOLUTION INTRAVENOUS; SUBCUTANEOUS at 17:50

## 2017-01-01 RX ADMIN — DEXTROSE MONOHYDRATE 500 MG: 50 INJECTION, SOLUTION INTRAVENOUS at 16:09

## 2017-01-01 RX ADMIN — LEVETIRACETAM 1500 MG: 100 SOLUTION ORAL at 09:46

## 2017-01-01 RX ADMIN — NYSTATIN 500000 UNITS: 100000 SUSPENSION ORAL at 12:11

## 2017-01-01 RX ADMIN — INSULIN LISPRO 3 UNITS: 100 INJECTION, SOLUTION INTRAVENOUS; SUBCUTANEOUS at 21:01

## 2017-01-01 RX ADMIN — ACETAMINOPHEN 650 MG: 325 TABLET, FILM COATED ORAL at 15:55

## 2017-01-01 RX ADMIN — LEVETIRACETAM 1500 MG: 100 SOLUTION ORAL at 21:31

## 2017-01-01 RX ADMIN — INSULIN LISPRO 2 UNITS: 100 INJECTION, SOLUTION INTRAVENOUS; SUBCUTANEOUS at 11:29

## 2017-01-01 RX ADMIN — DEXTROSE MONOHYDRATE 1500 MG: 50 INJECTION, SOLUTION INTRAVENOUS at 09:00

## 2017-01-01 RX ADMIN — CEFTRIAXONE SODIUM 2 G: 2 INJECTION, POWDER, FOR SOLUTION INTRAMUSCULAR; INTRAVENOUS at 23:07

## 2017-01-01 RX ADMIN — INSULIN LISPRO 1 UNITS: 100 INJECTION, SOLUTION INTRAVENOUS; SUBCUTANEOUS at 08:58

## 2017-01-01 RX ADMIN — DIBASIC SODIUM PHOSPHATE, MONOBASIC POTASSIUM PHOSPHATE AND MONOBASIC SODIUM PHOSPHATE 1 TABLET: 852; 155; 130 TABLET ORAL at 12:13

## 2017-01-01 RX ADMIN — INSULIN HUMAN 35 UNITS: 100 INJECTION, SUSPENSION SUBCUTANEOUS at 17:19

## 2017-01-01 RX ADMIN — NYSTATIN 500000 UNITS: 100000 SUSPENSION ORAL at 18:17

## 2017-01-01 RX ADMIN — DEXAMETHASONE 4 MG: 4 TABLET ORAL at 11:32

## 2017-01-01 RX ADMIN — CEFEPIME 2 G: 2 INJECTION, POWDER, FOR SOLUTION INTRAVENOUS at 08:04

## 2017-01-01 RX ADMIN — FAMOTIDINE 20 MG: 20 TABLET, FILM COATED ORAL at 08:48

## 2017-01-01 RX ADMIN — LEVETIRACETAM 1500 MG: 100 SOLUTION ORAL at 11:04

## 2017-01-01 RX ADMIN — HYDROCODONE BITARTRATE AND ACETAMINOPHEN 2 TABLET: 5; 325 TABLET ORAL at 20:20

## 2017-01-01 RX ADMIN — LEVETIRACETAM 500 MG: 500 TABLET ORAL at 20:33

## 2017-01-01 RX ADMIN — RIVAROXABAN 20 MG: 20 TABLET, FILM COATED ORAL at 18:17

## 2017-01-01 RX ADMIN — DEXAMETHASONE 4 MG: 4 TABLET ORAL at 00:08

## 2017-01-01 RX ADMIN — CEFEPIME 2 G: 2 INJECTION, POWDER, FOR SOLUTION INTRAVENOUS at 21:23

## 2017-01-01 RX ADMIN — STANDARDIZED SENNA CONCENTRATE AND DOCUSATE SODIUM 2 TABLET: 8.6; 5 TABLET, FILM COATED ORAL at 08:48

## 2017-01-01 RX ADMIN — LEVETIRACETAM 1500 MG: 100 SOLUTION ORAL at 08:04

## 2017-01-01 RX ADMIN — DEXAMETHASONE 4 MG: 4 TABLET ORAL at 17:18

## 2017-01-01 RX ADMIN — INSULIN HUMAN 35 UNITS: 100 INJECTION, SUSPENSION SUBCUTANEOUS at 06:36

## 2017-01-01 RX ADMIN — DIBASIC SODIUM PHOSPHATE, MONOBASIC POTASSIUM PHOSPHATE AND MONOBASIC SODIUM PHOSPHATE 1 TABLET: 852; 155; 130 TABLET ORAL at 17:36

## 2017-01-01 RX ADMIN — AMPICILLIN SODIUM AND SULBACTAM SODIUM 3 G: 2; 1 INJECTION, POWDER, FOR SOLUTION INTRAMUSCULAR; INTRAVENOUS at 18:03

## 2017-01-01 RX ADMIN — DEXAMETHASONE SODIUM PHOSPHATE 4 MG: 4 INJECTION, SOLUTION INTRAMUSCULAR; INTRAVENOUS at 17:41

## 2017-01-01 RX ADMIN — STANDARDIZED SENNA CONCENTRATE AND DOCUSATE SODIUM 2 TABLET: 8.6; 5 TABLET, FILM COATED ORAL at 09:45

## 2017-01-01 RX ADMIN — DEXAMETHASONE 4 MG: 4 TABLET ORAL at 01:40

## 2017-01-01 RX ADMIN — DEXAMETHASONE 4 MG: 4 TABLET ORAL at 09:22

## 2017-01-01 RX ADMIN — INSULIN HUMAN 18 UNITS: 100 INJECTION, SUSPENSION SUBCUTANEOUS at 07:35

## 2017-01-01 RX ADMIN — DEXAMETHASONE 2 MG: 4 TABLET ORAL at 20:21

## 2017-01-01 RX ADMIN — DEXAMETHASONE 4 MG: 4 TABLET ORAL at 14:26

## 2017-01-01 RX ADMIN — NYSTATIN 500000 UNITS: 100000 SUSPENSION ORAL at 08:13

## 2017-01-01 RX ADMIN — INSULIN LISPRO 3 UNITS: 100 INJECTION, SOLUTION INTRAVENOUS; SUBCUTANEOUS at 00:34

## 2017-01-01 RX ADMIN — NEOMYCIN AND POLYMYXIN B SULFATES AND BACITRACIN ZINC 1 APPLICATION: 400; 3.5; 5 OINTMENT TOPICAL at 20:10

## 2017-01-01 RX ADMIN — DEXAMETHASONE SODIUM PHOSPHATE 4 MG: 4 INJECTION, SOLUTION INTRAMUSCULAR; INTRAVENOUS at 17:03

## 2017-01-01 RX ADMIN — FAMOTIDINE 20 MG: 20 TABLET, FILM COATED ORAL at 09:45

## 2017-01-01 RX ADMIN — INSULIN LISPRO 3 UNITS: 100 INJECTION, SOLUTION INTRAVENOUS; SUBCUTANEOUS at 05:13

## 2017-01-01 RX ADMIN — INSULIN LISPRO 2 UNITS: 100 INJECTION, SOLUTION INTRAVENOUS; SUBCUTANEOUS at 21:53

## 2017-01-01 RX ADMIN — LEVETIRACETAM 1500 MG: 100 SOLUTION ORAL at 09:21

## 2017-01-01 RX ADMIN — NYSTATIN 500000 UNITS: 100000 SUSPENSION ORAL at 14:20

## 2017-01-01 RX ADMIN — STANDARDIZED SENNA CONCENTRATE AND DOCUSATE SODIUM 2 TABLET: 8.6; 5 TABLET, FILM COATED ORAL at 08:22

## 2017-01-01 RX ADMIN — STANDARDIZED SENNA CONCENTRATE AND DOCUSATE SODIUM 2 TABLET: 8.6; 5 TABLET, FILM COATED ORAL at 09:03

## 2017-01-01 RX ADMIN — NYSTATIN 500000 UNITS: 100000 SUSPENSION ORAL at 16:43

## 2017-01-01 RX ADMIN — INSULIN LISPRO 2 UNITS: 100 INJECTION, SOLUTION INTRAVENOUS; SUBCUTANEOUS at 06:36

## 2017-01-01 RX ADMIN — INSULIN LISPRO 3 UNITS: 100 INJECTION, SOLUTION INTRAVENOUS; SUBCUTANEOUS at 21:06

## 2017-01-01 RX ADMIN — INSULIN HUMAN 35 UNITS: 100 INJECTION, SUSPENSION SUBCUTANEOUS at 18:02

## 2017-01-01 RX ADMIN — STANDARDIZED SENNA CONCENTRATE AND DOCUSATE SODIUM 2 TABLET: 8.6; 5 TABLET, FILM COATED ORAL at 19:37

## 2017-01-01 RX ADMIN — NYSTATIN 500000 UNITS: 100000 SUSPENSION ORAL at 21:58

## 2017-01-01 RX ADMIN — DIBASIC SODIUM PHOSPHATE, MONOBASIC POTASSIUM PHOSPHATE AND MONOBASIC SODIUM PHOSPHATE 1 TABLET: 852; 155; 130 TABLET ORAL at 10:06

## 2017-01-01 RX ADMIN — DEXAMETHASONE 4 MG: 4 TABLET ORAL at 00:03

## 2017-01-01 RX ADMIN — FAMOTIDINE 20 MG: 20 TABLET, FILM COATED ORAL at 09:00

## 2017-01-01 RX ADMIN — STANDARDIZED SENNA CONCENTRATE AND DOCUSATE SODIUM 2 TABLET: 8.6; 5 TABLET, FILM COATED ORAL at 09:38

## 2017-01-01 RX ADMIN — STANDARDIZED SENNA CONCENTRATE AND DOCUSATE SODIUM 2 TABLET: 8.6; 5 TABLET, FILM COATED ORAL at 07:57

## 2017-01-01 RX ADMIN — DEXAMETHASONE SODIUM PHOSPHATE 4 MG: 4 INJECTION, SOLUTION INTRAMUSCULAR; INTRAVENOUS at 17:23

## 2017-01-01 RX ADMIN — LEVETIRACETAM 1000 MG: 100 SOLUTION ORAL at 07:59

## 2017-01-01 RX ADMIN — AMPICILLIN SODIUM AND SULBACTAM SODIUM 3 G: 2; 1 INJECTION, POWDER, FOR SOLUTION INTRAMUSCULAR; INTRAVENOUS at 00:20

## 2017-01-01 RX ADMIN — DEXAMETHASONE 4 MG: 4 TABLET ORAL at 09:10

## 2017-01-01 RX ADMIN — ENOXAPARIN SODIUM 40 MG: 100 INJECTION SUBCUTANEOUS at 20:12

## 2017-01-01 RX ADMIN — INSULIN HUMAN 35 UNITS: 100 INJECTION, SUSPENSION SUBCUTANEOUS at 05:49

## 2017-01-01 RX ADMIN — FAMOTIDINE 20 MG: 20 TABLET ORAL at 08:57

## 2017-01-01 RX ADMIN — INSULIN LISPRO 2 UNITS: 100 INJECTION, SOLUTION INTRAVENOUS; SUBCUTANEOUS at 21:00

## 2017-01-01 RX ADMIN — LEVETIRACETAM 1500 MG: 100 SOLUTION ORAL at 08:11

## 2017-01-01 RX ADMIN — DEXAMETHASONE 2 MG: 4 TABLET ORAL at 10:18

## 2017-01-01 RX ADMIN — DIBASIC SODIUM PHOSPHATE, MONOBASIC POTASSIUM PHOSPHATE AND MONOBASIC SODIUM PHOSPHATE 1 TABLET: 852; 155; 130 TABLET ORAL at 18:22

## 2017-01-01 RX ADMIN — INSULIN HUMAN 35 UNITS: 100 INJECTION, SUSPENSION SUBCUTANEOUS at 07:06

## 2017-01-01 RX ADMIN — DEXTROSE MONOHYDRATE 1000 MG: 50 INJECTION, SOLUTION INTRAVENOUS at 23:23

## 2017-01-01 RX ADMIN — INSULIN LISPRO 1 UNITS: 100 INJECTION, SOLUTION INTRAVENOUS; SUBCUTANEOUS at 18:02

## 2017-01-01 RX ADMIN — INSULIN HUMAN 18 UNITS: 100 INJECTION, SUSPENSION SUBCUTANEOUS at 07:55

## 2017-01-01 RX ADMIN — DIBASIC SODIUM PHOSPHATE, MONOBASIC POTASSIUM PHOSPHATE AND MONOBASIC SODIUM PHOSPHATE 1 TABLET: 852; 155; 130 TABLET ORAL at 12:08

## 2017-01-01 RX ADMIN — LEVETIRACETAM 1500 MG: 100 SOLUTION ORAL at 21:13

## 2017-01-01 RX ADMIN — NYSTATIN 500000 UNITS: 100000 SUSPENSION ORAL at 20:40

## 2017-01-01 RX ADMIN — NEOMYCIN AND POLYMYXIN B SULFATES AND BACITRACIN ZINC: 400; 3.5; 5 OINTMENT TOPICAL at 20:08

## 2017-01-01 RX ADMIN — INSULIN LISPRO 3 UNITS: 100 INJECTION, SOLUTION INTRAVENOUS; SUBCUTANEOUS at 05:12

## 2017-01-01 RX ADMIN — INSULIN LISPRO 3 UNITS: 100 INJECTION, SOLUTION INTRAVENOUS; SUBCUTANEOUS at 23:39

## 2017-01-01 RX ADMIN — DEXAMETHASONE 2 MG: 1 TABLET ORAL at 13:38

## 2017-01-01 RX ADMIN — NYSTATIN 500000 UNITS: 100000 SUSPENSION ORAL at 15:57

## 2017-01-01 RX ADMIN — STANDARDIZED SENNA CONCENTRATE AND DOCUSATE SODIUM 2 TABLET: 8.6; 5 TABLET, FILM COATED ORAL at 20:05

## 2017-01-01 RX ADMIN — LEVETIRACETAM 500 MG: 500 TABLET ORAL at 20:55

## 2017-01-01 RX ADMIN — AMPICILLIN SODIUM AND SULBACTAM SODIUM 3 G: 2; 1 INJECTION, POWDER, FOR SOLUTION INTRAMUSCULAR; INTRAVENOUS at 18:22

## 2017-01-01 RX ADMIN — TEMAZEPAM 15 MG: 15 CAPSULE ORAL at 20:00

## 2017-01-01 RX ADMIN — LEVETIRACETAM 1500 MG: 100 SOLUTION ORAL at 08:52

## 2017-01-01 RX ADMIN — INSULIN LISPRO 2 UNITS: 100 INJECTION, SOLUTION INTRAVENOUS; SUBCUTANEOUS at 05:27

## 2017-01-01 RX ADMIN — GADODIAMIDE 20 ML: 287 INJECTION INTRAVENOUS at 17:32

## 2017-01-01 RX ADMIN — POTASSIUM CHLORIDE 10 MEQ: 7.46 INJECTION, SOLUTION INTRAVENOUS at 20:17

## 2017-01-01 RX ADMIN — DEXAMETHASONE 4 MG: 4 TABLET ORAL at 08:20

## 2017-01-01 RX ADMIN — NYSTATIN 500000 UNITS: 100000 SUSPENSION ORAL at 17:27

## 2017-01-01 RX ADMIN — LEVETIRACETAM 1500 MG: 100 SOLUTION ORAL at 20:40

## 2017-01-01 RX ADMIN — LEVETIRACETAM 1500 MG: 100 SOLUTION ORAL at 08:23

## 2017-01-01 RX ADMIN — INSULIN LISPRO 2 UNITS: 100 INJECTION, SOLUTION INTRAVENOUS; SUBCUTANEOUS at 17:05

## 2017-01-01 RX ADMIN — DEXAMETHASONE SODIUM PHOSPHATE 4 MG: 4 INJECTION, SOLUTION INTRAMUSCULAR; INTRAVENOUS at 09:53

## 2017-01-01 RX ADMIN — POTASSIUM CHLORIDE 10 MEQ: 7.46 INJECTION, SOLUTION INTRAVENOUS at 19:09

## 2017-01-01 RX ADMIN — LEVETIRACETAM 1500 MG: 100 SOLUTION ORAL at 08:39

## 2017-01-01 RX ADMIN — INSULIN HUMAN 35 UNITS: 100 INJECTION, SUSPENSION SUBCUTANEOUS at 16:55

## 2017-01-01 RX ADMIN — NYSTATIN 500000 UNITS: 100000 SUSPENSION ORAL at 07:54

## 2017-01-01 RX ADMIN — INSULIN HUMAN 35 UNITS: 100 INJECTION, SUSPENSION SUBCUTANEOUS at 09:12

## 2017-01-01 RX ADMIN — AMPICILLIN SODIUM AND SULBACTAM SODIUM 3 G: 2; 1 INJECTION, POWDER, FOR SOLUTION INTRAMUSCULAR; INTRAVENOUS at 23:25

## 2017-01-01 RX ADMIN — LEVETIRACETAM 1500 MG: 100 SOLUTION ORAL at 21:14

## 2017-01-01 RX ADMIN — MIDAZOLAM HYDROCHLORIDE 2 MG: 1 INJECTION INTRAMUSCULAR; INTRAVENOUS at 15:50

## 2017-01-01 RX ADMIN — INSULIN LISPRO 3 UNITS: 100 INJECTION, SOLUTION INTRAVENOUS; SUBCUTANEOUS at 20:52

## 2017-01-01 RX ADMIN — AMPICILLIN SODIUM AND SULBACTAM SODIUM 3 G: 2; 1 INJECTION, POWDER, FOR SOLUTION INTRAMUSCULAR; INTRAVENOUS at 07:20

## 2017-01-01 RX ADMIN — LIDOCAINE 1 PATCH: 50 PATCH CUTANEOUS at 08:12

## 2017-01-01 RX ADMIN — DEXAMETHASONE 4 MG: 4 TABLET ORAL at 23:16

## 2017-01-01 RX ADMIN — INSULIN HUMAN 35 UNITS: 100 INJECTION, SUSPENSION SUBCUTANEOUS at 05:39

## 2017-01-01 RX ADMIN — NYSTATIN 500000 UNITS: 100000 SUSPENSION ORAL at 18:15

## 2017-01-01 RX ADMIN — DEXAMETHASONE 2 MG: 4 TABLET ORAL at 19:56

## 2017-01-01 RX ADMIN — NYSTATIN 500000 UNITS: 100000 SUSPENSION ORAL at 09:10

## 2017-01-01 RX ADMIN — INSULIN LISPRO 3 UNITS: 100 INJECTION, SOLUTION INTRAVENOUS; SUBCUTANEOUS at 20:21

## 2017-01-01 RX ADMIN — SODIUM CHLORIDE: 9 INJECTION, SOLUTION INTRAVENOUS at 11:49

## 2017-01-01 RX ADMIN — RIVAROXABAN 15 MG: 10 TABLET, FILM COATED ORAL at 08:20

## 2017-01-01 RX ADMIN — LORAZEPAM 0.5 MG: 2 INJECTION INTRAMUSCULAR at 14:31

## 2017-01-01 RX ADMIN — DEXAMETHASONE 4 MG: 4 TABLET ORAL at 21:31

## 2017-01-01 RX ADMIN — INSULIN LISPRO 2 UNITS: 100 INJECTION, SOLUTION INTRAVENOUS; SUBCUTANEOUS at 21:04

## 2017-01-01 RX ADMIN — AZITHROMYCIN MONOHYDRATE 500 MG: 500 INJECTION, POWDER, LYOPHILIZED, FOR SOLUTION INTRAVENOUS at 14:51

## 2017-01-01 RX ADMIN — PROCHLORPERAZINE EDISYLATE 10 MG: 5 INJECTION INTRAMUSCULAR; INTRAVENOUS at 00:31

## 2017-01-01 RX ADMIN — DEXTROSE MONOHYDRATE 1500 MG: 50 INJECTION, SOLUTION INTRAVENOUS at 21:33

## 2017-01-01 RX ADMIN — INSULIN LISPRO 3 UNITS: 100 INJECTION, SOLUTION INTRAVENOUS; SUBCUTANEOUS at 12:06

## 2017-01-01 RX ADMIN — INSULIN LISPRO 3 UNITS: 100 INJECTION, SOLUTION INTRAVENOUS; SUBCUTANEOUS at 17:32

## 2017-01-01 RX ADMIN — FAMOTIDINE 20 MG: 20 TABLET ORAL at 08:12

## 2017-01-01 RX ADMIN — INSULIN HUMAN 7 UNITS: 100 INJECTION, SUSPENSION SUBCUTANEOUS at 17:39

## 2017-01-01 RX ADMIN — AMPICILLIN SODIUM AND SULBACTAM SODIUM 3 G: 2; 1 INJECTION, POWDER, FOR SOLUTION INTRAMUSCULAR; INTRAVENOUS at 05:43

## 2017-01-01 RX ADMIN — NYSTATIN 500000 UNITS: 100000 SUSPENSION ORAL at 11:34

## 2017-01-01 RX ADMIN — DEXAMETHASONE SODIUM PHOSPHATE 4 MG: 4 INJECTION, SOLUTION INTRAMUSCULAR; INTRAVENOUS at 12:37

## 2017-01-01 RX ADMIN — INSULIN HUMAN 35 UNITS: 100 INJECTION, SUSPENSION SUBCUTANEOUS at 20:58

## 2017-01-01 RX ADMIN — DEXAMETHASONE 2 MG: 4 TABLET ORAL at 09:04

## 2017-01-01 RX ADMIN — NYSTATIN 500000 UNITS: 100000 SUSPENSION ORAL at 20:16

## 2017-01-01 RX ADMIN — LORAZEPAM 0.5 MG: 2 INJECTION INTRAMUSCULAR at 19:29

## 2017-01-01 RX ADMIN — NEOMYCIN AND POLYMYXIN B SULFATES AND BACITRACIN ZINC: 400; 3.5; 5 OINTMENT TOPICAL at 20:07

## 2017-01-01 RX ADMIN — DIBASIC SODIUM PHOSPHATE, MONOBASIC POTASSIUM PHOSPHATE AND MONOBASIC SODIUM PHOSPHATE 1 TABLET: 852; 155; 130 TABLET ORAL at 07:20

## 2017-01-01 RX ADMIN — DEXAMETHASONE SODIUM PHOSPHATE 4 MG: 4 INJECTION, SOLUTION INTRAMUSCULAR; INTRAVENOUS at 05:08

## 2017-01-01 RX ADMIN — AMPICILLIN SODIUM AND SULBACTAM SODIUM 3 G: 2; 1 INJECTION, POWDER, FOR SOLUTION INTRAMUSCULAR; INTRAVENOUS at 17:37

## 2017-01-01 RX ADMIN — NYSTATIN 500000 UNITS: 100000 SUSPENSION ORAL at 07:52

## 2017-01-01 RX ADMIN — LEVETIRACETAM 1500 MG: 100 SOLUTION ORAL at 08:20

## 2017-01-01 RX ADMIN — DEXAMETHASONE SODIUM PHOSPHATE 4 MG: 4 INJECTION, SOLUTION INTRAMUSCULAR; INTRAVENOUS at 21:04

## 2017-01-01 RX ADMIN — STANDARDIZED SENNA CONCENTRATE AND DOCUSATE SODIUM 2 TABLET: 8.6; 5 TABLET, FILM COATED ORAL at 21:03

## 2017-01-01 RX ADMIN — BENZOCAINE: 200 GEL TOPICAL at 18:16

## 2017-01-01 RX ADMIN — FAMOTIDINE 20 MG: 20 TABLET, FILM COATED ORAL at 08:43

## 2017-01-01 RX ADMIN — FAMOTIDINE 20 MG: 20 TABLET, FILM COATED ORAL at 08:52

## 2017-01-01 RX ADMIN — LEVETIRACETAM 1500 MG: 100 SOLUTION ORAL at 21:11

## 2017-01-01 RX ADMIN — CEFEPIME 2 G: 2 INJECTION, POWDER, FOR SOLUTION INTRAVENOUS at 20:44

## 2017-01-01 RX ADMIN — NYSTATIN 500000 UNITS: 100000 SUSPENSION ORAL at 20:08

## 2017-01-01 RX ADMIN — INSULIN HUMAN 35 UNITS: 100 INJECTION, SUSPENSION SUBCUTANEOUS at 17:52

## 2017-01-01 RX ADMIN — INSULIN LISPRO 2 UNITS: 100 INJECTION, SOLUTION INTRAVENOUS; SUBCUTANEOUS at 17:34

## 2017-01-01 RX ADMIN — DEXTROSE MONOHYDRATE 1500 MG: 50 INJECTION, SOLUTION INTRAVENOUS at 09:56

## 2017-01-01 RX ADMIN — DEXTROSE MONOHYDRATE, SODIUM CHLORIDE, SODIUM LACTATE, POTASSIUM CHLORIDE, CALCIUM CHLORIDE: 5; 600; 310; 179; 20 INJECTION, SOLUTION INTRAVENOUS at 11:28

## 2017-01-01 RX ADMIN — INSULIN LISPRO 3 UNITS: 100 INJECTION, SOLUTION INTRAVENOUS; SUBCUTANEOUS at 20:17

## 2017-01-01 RX ADMIN — NYSTATIN 500000 UNITS: 100000 SUSPENSION ORAL at 21:50

## 2017-01-01 RX ADMIN — DEXAMETHASONE 4 MG: 4 TABLET ORAL at 08:43

## 2017-01-01 RX ADMIN — LIDOCAINE 1 PATCH: 50 PATCH CUTANEOUS at 07:52

## 2017-01-01 RX ADMIN — INSULIN HUMAN 35 UNITS: 100 INJECTION, SUSPENSION SUBCUTANEOUS at 09:15

## 2017-01-01 RX ADMIN — FAMOTIDINE 20 MG: 20 TABLET ORAL at 07:51

## 2017-01-01 RX ADMIN — LIDOCAINE 1 PATCH: 50 PATCH CUTANEOUS at 09:02

## 2017-01-01 RX ADMIN — LEVETIRACETAM 1500 MG: 100 SOLUTION ORAL at 20:09

## 2017-01-01 RX ADMIN — NYSTATIN 500000 UNITS: 100000 SUSPENSION ORAL at 17:34

## 2017-01-01 RX ADMIN — INSULIN HUMAN 50 UNITS: 100 INJECTION, SUSPENSION SUBCUTANEOUS at 05:24

## 2017-01-01 RX ADMIN — METOPROLOL TARTRATE 25 MG: 25 TABLET ORAL at 07:59

## 2017-01-01 RX ADMIN — DIBASIC SODIUM PHOSPHATE, MONOBASIC POTASSIUM PHOSPHATE AND MONOBASIC SODIUM PHOSPHATE 1 TABLET: 852; 155; 130 TABLET ORAL at 05:08

## 2017-01-01 RX ADMIN — BENZONATATE 100 MG: 100 CAPSULE ORAL at 21:05

## 2017-01-01 RX ADMIN — DEXAMETHASONE 2 MG: 4 TABLET ORAL at 11:16

## 2017-01-01 RX ADMIN — DIBASIC SODIUM PHOSPHATE, MONOBASIC POTASSIUM PHOSPHATE AND MONOBASIC SODIUM PHOSPHATE 1 TABLET: 852; 155; 130 TABLET ORAL at 00:25

## 2017-01-01 RX ADMIN — STANDARDIZED SENNA CONCENTRATE AND DOCUSATE SODIUM 2 TABLET: 8.6; 5 TABLET, FILM COATED ORAL at 21:02

## 2017-01-01 RX ADMIN — DEXTROSE MONOHYDRATE 1500 MG: 50 INJECTION, SOLUTION INTRAVENOUS at 08:10

## 2017-01-01 RX ADMIN — FAMOTIDINE 20 MG: 20 TABLET, FILM COATED ORAL at 07:53

## 2017-01-01 RX ADMIN — LEVETIRACETAM 1500 MG: 100 SOLUTION ORAL at 08:43

## 2017-01-01 RX ADMIN — RIVAROXABAN 15 MG: 10 TABLET, FILM COATED ORAL at 20:09

## 2017-01-01 RX ADMIN — DIBASIC SODIUM PHOSPHATE, MONOBASIC POTASSIUM PHOSPHATE AND MONOBASIC SODIUM PHOSPHATE 1 TABLET: 852; 155; 130 TABLET ORAL at 12:15

## 2017-01-01 RX ADMIN — DEXAMETHASONE SODIUM PHOSPHATE 4 MG: 4 INJECTION, SOLUTION INTRAMUSCULAR; INTRAVENOUS at 05:41

## 2017-01-01 RX ADMIN — INSULIN LISPRO 4 UNITS: 100 INJECTION, SOLUTION INTRAVENOUS; SUBCUTANEOUS at 05:21

## 2017-01-01 RX ADMIN — INSULIN LISPRO 5 UNITS: 100 INJECTION, SOLUTION INTRAVENOUS; SUBCUTANEOUS at 11:53

## 2017-01-01 RX ADMIN — NYSTATIN 500000 UNITS: 100000 SUSPENSION ORAL at 21:00

## 2017-01-01 RX ADMIN — STANDARDIZED SENNA CONCENTRATE AND DOCUSATE SODIUM 2 TABLET: 8.6; 5 TABLET, FILM COATED ORAL at 09:22

## 2017-01-01 RX ADMIN — NEOMYCIN AND POLYMYXIN B SULFATES AND BACITRACIN ZINC: 400; 3.5; 5 OINTMENT TOPICAL at 08:14

## 2017-01-01 RX ADMIN — DEXAMETHASONE 4 MG: 4 TABLET ORAL at 20:25

## 2017-01-01 RX ADMIN — LEVETIRACETAM 1500 MG: 100 SOLUTION ORAL at 19:58

## 2017-01-01 RX ADMIN — DEXAMETHASONE SODIUM PHOSPHATE 4 MG: 4 INJECTION, SOLUTION INTRAMUSCULAR; INTRAVENOUS at 00:07

## 2017-01-01 RX ADMIN — FAMOTIDINE 20 MG: 20 TABLET, FILM COATED ORAL at 10:25

## 2017-01-01 RX ADMIN — NYSTATIN 500000 UNITS: 100000 SUSPENSION ORAL at 21:06

## 2017-01-01 RX ADMIN — STANDARDIZED SENNA CONCENTRATE AND DOCUSATE SODIUM 2 TABLET: 8.6; 5 TABLET, FILM COATED ORAL at 21:14

## 2017-01-01 RX ADMIN — LEVETIRACETAM 1500 MG: 100 SOLUTION ORAL at 21:24

## 2017-01-01 RX ADMIN — INSULIN LISPRO 1 UNITS: 100 INJECTION, SOLUTION INTRAVENOUS; SUBCUTANEOUS at 11:35

## 2017-01-01 RX ADMIN — TEMAZEPAM 15 MG: 15 CAPSULE ORAL at 20:50

## 2017-01-01 RX ADMIN — SODIUM CHLORIDE: 9 INJECTION, SOLUTION INTRAVENOUS at 18:26

## 2017-01-01 RX ADMIN — NYSTATIN 500000 UNITS: 100000 SUSPENSION ORAL at 21:03

## 2017-01-01 RX ADMIN — INSULIN LISPRO 2 UNITS: 100 INJECTION, SOLUTION INTRAVENOUS; SUBCUTANEOUS at 20:09

## 2017-01-01 RX ADMIN — DEXAMETHASONE SODIUM PHOSPHATE 4 MG: 4 INJECTION, SOLUTION INTRAMUSCULAR; INTRAVENOUS at 21:24

## 2017-01-01 RX ADMIN — INSULIN HUMAN 35 UNITS: 100 INJECTION, SUSPENSION SUBCUTANEOUS at 17:51

## 2017-01-01 RX ADMIN — STANDARDIZED SENNA CONCENTRATE AND DOCUSATE SODIUM 2 TABLET: 8.6; 5 TABLET, FILM COATED ORAL at 20:12

## 2017-01-01 RX ADMIN — DIBASIC SODIUM PHOSPHATE, MONOBASIC POTASSIUM PHOSPHATE AND MONOBASIC SODIUM PHOSPHATE 1 TABLET: 852; 155; 130 TABLET ORAL at 05:17

## 2017-01-01 RX ADMIN — NEOMYCIN AND POLYMYXIN B SULFATES AND BACITRACIN ZINC: 400; 3.5; 5 OINTMENT TOPICAL at 20:36

## 2017-01-01 RX ADMIN — INSULIN LISPRO 1 UNITS: 100 INJECTION, SOLUTION INTRAVENOUS; SUBCUTANEOUS at 17:13

## 2017-01-01 RX ADMIN — DEXAMETHASONE 2 MG: 4 TABLET ORAL at 09:00

## 2017-01-01 RX ADMIN — DEXAMETHASONE 4 MG: 4 TABLET ORAL at 05:07

## 2017-01-01 RX ADMIN — NYSTATIN 500000 UNITS: 100000 SUSPENSION ORAL at 14:11

## 2017-01-01 RX ADMIN — DEXAMETHASONE 4 MG: 4 TABLET ORAL at 09:46

## 2017-01-01 RX ADMIN — FENTANYL CITRATE 50 MCG: 50 INJECTION, SOLUTION INTRAMUSCULAR; INTRAVENOUS at 19:06

## 2017-01-01 RX ADMIN — DIBASIC SODIUM PHOSPHATE, MONOBASIC POTASSIUM PHOSPHATE AND MONOBASIC SODIUM PHOSPHATE 1 TABLET: 852; 155; 130 TABLET ORAL at 12:39

## 2017-01-01 RX ADMIN — DEXAMETHASONE SODIUM PHOSPHATE 4 MG: 4 INJECTION, SOLUTION INTRAMUSCULAR; INTRAVENOUS at 09:55

## 2017-01-01 RX ADMIN — FAMOTIDINE 20 MG: 20 TABLET, FILM COATED ORAL at 09:04

## 2017-01-01 RX ADMIN — DEXAMETHASONE 4 MG: 4 TABLET ORAL at 08:03

## 2017-01-01 RX ADMIN — LEVETIRACETAM 1000 MG: 100 SOLUTION ORAL at 19:56

## 2017-01-01 RX ADMIN — POTASSIUM CHLORIDE 10 MEQ: 7.46 INJECTION, SOLUTION INTRAVENOUS at 11:39

## 2017-01-01 RX ADMIN — DEXAMETHASONE 4 MG: 4 TABLET ORAL at 17:14

## 2017-01-01 RX ADMIN — DOXYCYCLINE 100 MG: 100 TABLET ORAL at 09:46

## 2017-01-01 RX ADMIN — STANDARDIZED SENNA CONCENTRATE AND DOCUSATE SODIUM 2 TABLET: 8.6; 5 TABLET, FILM COATED ORAL at 21:58

## 2017-01-01 RX ADMIN — INSULIN LISPRO 2 UNITS: 100 INJECTION, SOLUTION INTRAVENOUS; SUBCUTANEOUS at 11:41

## 2017-01-01 RX ADMIN — NYSTATIN 500000 UNITS: 100000 SUSPENSION ORAL at 13:46

## 2017-01-01 RX ADMIN — DEXAMETHASONE SODIUM PHOSPHATE 4 MG: 4 INJECTION, SOLUTION INTRAMUSCULAR; INTRAVENOUS at 05:43

## 2017-01-01 RX ADMIN — METOPROLOL TARTRATE 25 MG: 25 TABLET ORAL at 20:34

## 2017-01-01 RX ADMIN — INSULIN LISPRO 1 UNITS: 100 INJECTION, SOLUTION INTRAVENOUS; SUBCUTANEOUS at 16:58

## 2017-01-01 RX ADMIN — FAMOTIDINE 20 MG: 20 TABLET, FILM COATED ORAL at 08:34

## 2017-01-01 RX ADMIN — DEXAMETHASONE 4 MG: 4 TABLET ORAL at 05:44

## 2017-01-01 RX ADMIN — FAMOTIDINE 20 MG: 20 TABLET, FILM COATED ORAL at 08:23

## 2017-01-01 RX ADMIN — INSULIN LISPRO 2 UNITS: 100 INJECTION, SOLUTION INTRAVENOUS; SUBCUTANEOUS at 18:27

## 2017-01-01 RX ADMIN — INSULIN LISPRO 5 UNITS: 100 INJECTION, SOLUTION INTRAVENOUS; SUBCUTANEOUS at 11:32

## 2017-01-01 RX ADMIN — SODIUM CHLORIDE 500 ML: 9 INJECTION, SOLUTION INTRAVENOUS at 05:03

## 2017-01-01 RX ADMIN — DEXAMETHASONE 2 MG: 4 TABLET ORAL at 07:54

## 2017-01-01 RX ADMIN — AMPICILLIN SODIUM AND SULBACTAM SODIUM 3 G: 2; 1 INJECTION, POWDER, FOR SOLUTION INTRAMUSCULAR; INTRAVENOUS at 12:13

## 2017-01-01 RX ADMIN — INSULIN LISPRO 4 UNITS: 100 INJECTION, SOLUTION INTRAVENOUS; SUBCUTANEOUS at 05:18

## 2017-01-01 RX ADMIN — INSULIN HUMAN 35 UNITS: 100 INJECTION, SUSPENSION SUBCUTANEOUS at 06:07

## 2017-01-01 RX ADMIN — DEXAMETHASONE 4 MG: 4 TABLET ORAL at 05:16

## 2017-01-01 RX ADMIN — LORAZEPAM 1 MG: 2 INJECTION INTRAMUSCULAR at 11:26

## 2017-01-01 RX ADMIN — STANDARDIZED SENNA CONCENTRATE AND DOCUSATE SODIUM 2 TABLET: 8.6; 5 TABLET, FILM COATED ORAL at 08:30

## 2017-01-01 RX ADMIN — STANDARDIZED SENNA CONCENTRATE AND DOCUSATE SODIUM 1 TABLET: 8.6; 5 TABLET, FILM COATED ORAL at 23:22

## 2017-01-01 RX ADMIN — LEVETIRACETAM 1500 MG: 100 SOLUTION ORAL at 21:20

## 2017-01-01 RX ADMIN — LEVETIRACETAM 1500 MG: 100 SOLUTION ORAL at 08:15

## 2017-01-01 RX ADMIN — DEXAMETHASONE 2 MG: 4 TABLET ORAL at 21:15

## 2017-01-01 RX ADMIN — LEVETIRACETAM 1500 MG: 100 SOLUTION ORAL at 21:19

## 2017-01-01 RX ADMIN — AMPICILLIN SODIUM AND SULBACTAM SODIUM 3 G: 2; 1 INJECTION, POWDER, FOR SOLUTION INTRAMUSCULAR; INTRAVENOUS at 05:08

## 2017-01-01 RX ADMIN — DEXAMETHASONE 2 MG: 4 TABLET ORAL at 08:12

## 2017-01-01 RX ADMIN — RIVAROXABAN 20 MG: 20 TABLET, FILM COATED ORAL at 17:33

## 2017-01-01 RX ADMIN — INSULIN LISPRO 3 UNITS: 100 INJECTION, SOLUTION INTRAVENOUS; SUBCUTANEOUS at 11:40

## 2017-01-01 RX ADMIN — RIVAROXABAN 20 MG: 20 TABLET, FILM COATED ORAL at 17:18

## 2017-01-01 RX ADMIN — INSULIN LISPRO 2 UNITS: 100 INJECTION, SOLUTION INTRAVENOUS; SUBCUTANEOUS at 20:25

## 2017-01-01 RX ADMIN — INSULIN LISPRO 2 UNITS: 100 INJECTION, SOLUTION INTRAVENOUS; SUBCUTANEOUS at 05:54

## 2017-01-01 RX ADMIN — STANDARDIZED SENNA CONCENTRATE AND DOCUSATE SODIUM 1 TABLET: 8.6; 5 TABLET, FILM COATED ORAL at 20:49

## 2017-01-01 RX ADMIN — INSULIN LISPRO 1 UNITS: 100 INJECTION, SOLUTION INTRAVENOUS; SUBCUTANEOUS at 09:49

## 2017-01-01 RX ADMIN — DEXAMETHASONE 4 MG: 4 TABLET ORAL at 23:04

## 2017-01-01 RX ADMIN — ACYCLOVIR SODIUM 615 MG: 500 INJECTION, SOLUTION INTRAVENOUS at 05:43

## 2017-01-01 RX ADMIN — FAMOTIDINE 20 MG: 20 TABLET, FILM COATED ORAL at 08:10

## 2017-01-01 RX ADMIN — ATORVASTATIN CALCIUM 20 MG: 10 TABLET, FILM COATED ORAL at 20:35

## 2017-01-01 RX ADMIN — NYSTATIN 500000 UNITS: 100000 SUSPENSION ORAL at 08:14

## 2017-01-01 RX ADMIN — SODIUM CHLORIDE: 9 INJECTION, SOLUTION INTRAVENOUS at 08:19

## 2017-01-01 RX ADMIN — DEXAMETHASONE 2 MG: 4 TABLET ORAL at 20:05

## 2017-01-01 RX ADMIN — NEOMYCIN AND POLYMYXIN B SULFATES AND BACITRACIN ZINC: 400; 3.5; 5 OINTMENT TOPICAL at 08:12

## 2017-01-01 RX ADMIN — DEXAMETHASONE 3 MG: 1 TABLET ORAL at 22:34

## 2017-01-01 RX ADMIN — POTASSIUM CHLORIDE 40 MEQ: 1500 TABLET, EXTENDED RELEASE ORAL at 08:25

## 2017-01-01 RX ADMIN — DEXAMETHASONE 4 MG: 4 TABLET ORAL at 17:20

## 2017-01-01 RX ADMIN — RIVAROXABAN 20 MG: 20 TABLET, FILM COATED ORAL at 21:17

## 2017-01-01 RX ADMIN — METOPROLOL TARTRATE 18.75 MG: 25 TABLET ORAL at 17:29

## 2017-01-01 RX ADMIN — NYSTATIN 500000 UNITS: 100000 SUSPENSION ORAL at 17:31

## 2017-01-01 RX ADMIN — DEXAMETHASONE 4 MG: 4 TABLET ORAL at 05:19

## 2017-01-01 RX ADMIN — INSULIN LISPRO 3 UNITS: 100 INJECTION, SOLUTION INTRAVENOUS; SUBCUTANEOUS at 19:58

## 2017-01-01 RX ADMIN — METOPROLOL TARTRATE 25 MG: 25 TABLET ORAL at 21:04

## 2017-01-01 RX ADMIN — LIDOCAINE 1 PATCH: 50 PATCH CUTANEOUS at 08:19

## 2017-01-01 RX ADMIN — FAMOTIDINE 20 MG: 20 TABLET, FILM COATED ORAL at 08:36

## 2017-01-01 RX ADMIN — FAMOTIDINE 20 MG: 20 TABLET, FILM COATED ORAL at 08:12

## 2017-01-01 RX ADMIN — FAMOTIDINE 20 MG: 20 TABLET, FILM COATED ORAL at 08:17

## 2017-01-01 RX ADMIN — INSULIN HUMAN 18 UNITS: 100 INJECTION, SUSPENSION SUBCUTANEOUS at 17:10

## 2017-01-01 RX ADMIN — FAMOTIDINE 20 MG: 20 TABLET, FILM COATED ORAL at 08:03

## 2017-01-01 RX ADMIN — FOLIC ACID 1 MG: 1 TABLET ORAL at 09:40

## 2017-01-01 RX ADMIN — DEXAMETHASONE SODIUM PHOSPHATE 4 MG: 4 INJECTION, SOLUTION INTRAMUSCULAR; INTRAVENOUS at 00:00

## 2017-01-01 RX ADMIN — LEVETIRACETAM 1500 MG: 100 SOLUTION ORAL at 10:29

## 2017-01-01 RX ADMIN — LEVETIRACETAM 1500 MG: 100 SOLUTION ORAL at 19:56

## 2017-01-01 RX ADMIN — ONDANSETRON 4 MG: 2 INJECTION, SOLUTION INTRAMUSCULAR; INTRAVENOUS at 22:58

## 2017-01-01 RX ADMIN — DOCUSATE SODIUM 100 MG: 100 CAPSULE ORAL at 08:17

## 2017-01-01 RX ADMIN — RIVAROXABAN 20 MG: 20 TABLET, FILM COATED ORAL at 17:47

## 2017-01-01 RX ADMIN — INSULIN LISPRO 2 UNITS: 100 INJECTION, SOLUTION INTRAVENOUS; SUBCUTANEOUS at 17:20

## 2017-01-01 RX ADMIN — DEXAMETHASONE 2 MG: 4 TABLET ORAL at 08:41

## 2017-01-01 RX ADMIN — NEOMYCIN AND POLYMYXIN B SULFATES AND BACITRACIN ZINC: 400; 3.5; 5 OINTMENT TOPICAL at 20:46

## 2017-01-01 RX ADMIN — RIVAROXABAN 15 MG: 10 TABLET, FILM COATED ORAL at 08:12

## 2017-01-01 RX ADMIN — INSULIN LISPRO 1 UNITS: 100 INJECTION, SOLUTION INTRAVENOUS; SUBCUTANEOUS at 07:22

## 2017-01-01 RX ADMIN — DIBASIC SODIUM PHOSPHATE, MONOBASIC POTASSIUM PHOSPHATE AND MONOBASIC SODIUM PHOSPHATE 1 TABLET: 852; 155; 130 TABLET ORAL at 23:21

## 2017-01-01 RX ADMIN — POLYETHYLENE GLYCOL 3350 1 PACKET: 17 POWDER, FOR SOLUTION ORAL at 04:04

## 2017-01-01 RX ADMIN — INSULIN HUMAN 35 UNITS: 100 INJECTION, SUSPENSION SUBCUTANEOUS at 16:38

## 2017-01-01 RX ADMIN — CEFEPIME 2 G: 2 INJECTION, POWDER, FOR SOLUTION INTRAMUSCULAR; INTRAVENOUS at 21:30

## 2017-01-01 RX ADMIN — INSULIN HUMAN 30 UNITS: 100 INJECTION, SUSPENSION SUBCUTANEOUS at 17:10

## 2017-01-01 RX ADMIN — DEXTROSE MONOHYDRATE 1500 MG: 50 INJECTION, SOLUTION INTRAVENOUS at 09:28

## 2017-01-01 RX ADMIN — NYSTATIN 500000 UNITS: 100000 SUSPENSION ORAL at 16:46

## 2017-01-01 RX ADMIN — NYSTATIN 500000 UNITS: 100000 SUSPENSION ORAL at 09:04

## 2017-01-01 RX ADMIN — NYSTATIN 500000 UNITS: 100000 SUSPENSION ORAL at 14:44

## 2017-01-01 RX ADMIN — STANDARDIZED SENNA CONCENTRATE AND DOCUSATE SODIUM 2 TABLET: 8.6; 5 TABLET, FILM COATED ORAL at 21:04

## 2017-01-01 RX ADMIN — NYSTATIN 500000 UNITS: 100000 SUSPENSION ORAL at 17:52

## 2017-01-01 RX ADMIN — DEXAMETHASONE 4 MG: 4 TABLET ORAL at 21:05

## 2017-01-01 RX ADMIN — NYSTATIN 500000 UNITS: 100000 SUSPENSION ORAL at 17:37

## 2017-01-01 RX ADMIN — NYSTATIN 500000 UNITS: 100000 SUSPENSION ORAL at 17:33

## 2017-01-01 RX ADMIN — INSULIN HUMAN 18 UNITS: 100 INJECTION, SUSPENSION SUBCUTANEOUS at 17:15

## 2017-01-01 RX ADMIN — AMPICILLIN SODIUM AND SULBACTAM SODIUM 3 G: 2; 1 INJECTION, POWDER, FOR SOLUTION INTRAMUSCULAR; INTRAVENOUS at 17:36

## 2017-01-01 RX ADMIN — FAMOTIDINE 20 MG: 20 TABLET, FILM COATED ORAL at 10:56

## 2017-01-01 RX ADMIN — FAMOTIDINE 20 MG: 20 TABLET, FILM COATED ORAL at 08:41

## 2017-01-01 RX ADMIN — STANDARDIZED SENNA CONCENTRATE AND DOCUSATE SODIUM 1 TABLET: 8.6; 5 TABLET, FILM COATED ORAL at 23:23

## 2017-01-01 RX ADMIN — DOCUSATE SODIUM 100 MG: 100 CAPSULE ORAL at 09:14

## 2017-01-01 RX ADMIN — NYSTATIN 500000 UNITS: 100000 SUSPENSION ORAL at 13:59

## 2017-01-01 RX ADMIN — INSULIN LISPRO 1 UNITS: 100 INJECTION, SOLUTION INTRAVENOUS; SUBCUTANEOUS at 11:29

## 2017-01-01 RX ADMIN — STANDARDIZED SENNA CONCENTRATE AND DOCUSATE SODIUM 2 TABLET: 8.6; 5 TABLET, FILM COATED ORAL at 08:13

## 2017-01-01 RX ADMIN — DEXAMETHASONE SODIUM PHOSPHATE 4 MG: 4 INJECTION, SOLUTION INTRAMUSCULAR; INTRAVENOUS at 20:04

## 2017-01-01 RX ADMIN — INSULIN HUMAN 35 UNITS: 100 INJECTION, SUSPENSION SUBCUTANEOUS at 09:47

## 2017-01-01 RX ADMIN — AMPICILLIN SODIUM AND SULBACTAM SODIUM 3 G: 2; 1 INJECTION, POWDER, FOR SOLUTION INTRAMUSCULAR; INTRAVENOUS at 01:37

## 2017-01-01 RX ADMIN — METOPROLOL TARTRATE 18.75 MG: 25 TABLET ORAL at 17:36

## 2017-01-01 RX ADMIN — STANDARDIZED SENNA CONCENTRATE AND DOCUSATE SODIUM 2 TABLET: 8.6; 5 TABLET, FILM COATED ORAL at 21:20

## 2017-01-01 RX ADMIN — INSULIN LISPRO 2 UNITS: 100 INJECTION, SOLUTION INTRAVENOUS; SUBCUTANEOUS at 11:20

## 2017-01-01 RX ADMIN — NEOMYCIN AND POLYMYXIN B SULFATES AND BACITRACIN ZINC: 400; 3.5; 5 OINTMENT TOPICAL at 08:06

## 2017-01-01 RX ADMIN — DEXTROSE MONOHYDRATE 1500 MG: 50 INJECTION, SOLUTION INTRAVENOUS at 10:04

## 2017-01-01 RX ADMIN — INSULIN HUMAN 35 UNITS: 100 INJECTION, SUSPENSION SUBCUTANEOUS at 06:15

## 2017-01-01 RX ADMIN — FAMOTIDINE 20 MG: 20 TABLET, FILM COATED ORAL at 08:30

## 2017-01-01 RX ADMIN — DEXTROSE MONOHYDRATE 1000 MG: 50 INJECTION, SOLUTION INTRAVENOUS at 08:31

## 2017-01-01 RX ADMIN — LEVETIRACETAM 1500 MG: 100 SOLUTION ORAL at 10:18

## 2017-01-01 RX ADMIN — NYSTATIN 500000 UNITS: 100000 SUSPENSION ORAL at 17:22

## 2017-01-01 RX ADMIN — POTASSIUM CHLORIDE 10 MEQ: 7.46 INJECTION, SOLUTION INTRAVENOUS at 17:54

## 2017-01-01 RX ADMIN — SODIUM CHLORIDE 400 MG: 9 INJECTION, SOLUTION INTRAVENOUS at 19:36

## 2017-01-01 RX ADMIN — OXYCODONE HYDROCHLORIDE 5 MG: 5 TABLET ORAL at 11:43

## 2017-01-01 RX ADMIN — DOXYCYCLINE 100 MG: 100 TABLET ORAL at 20:41

## 2017-01-01 RX ADMIN — INSULIN LISPRO 3 UNITS: 100 INJECTION, SOLUTION INTRAVENOUS; SUBCUTANEOUS at 06:23

## 2017-01-01 RX ADMIN — INSULIN HUMAN 35 UNITS: 100 INJECTION, SUSPENSION SUBCUTANEOUS at 06:27

## 2017-01-01 RX ADMIN — LEVETIRACETAM 1000 MG: 500 TABLET ORAL at 20:49

## 2017-01-01 RX ADMIN — DEXAMETHASONE 3 MG: 1 TABLET ORAL at 18:32

## 2017-01-01 RX ADMIN — INSULIN LISPRO 2 UNITS: 100 INJECTION, SOLUTION INTRAVENOUS; SUBCUTANEOUS at 20:03

## 2017-01-01 RX ADMIN — STANDARDIZED SENNA CONCENTRATE AND DOCUSATE SODIUM 1 TABLET: 8.6; 5 TABLET, FILM COATED ORAL at 20:09

## 2017-01-01 RX ADMIN — DEXAMETHASONE 4 MG: 4 TABLET ORAL at 05:23

## 2017-01-01 RX ADMIN — FAMOTIDINE 20 MG: 20 TABLET, FILM COATED ORAL at 23:22

## 2017-01-01 RX ADMIN — LEVETIRACETAM 1500 MG: 100 SOLUTION ORAL at 07:56

## 2017-01-01 RX ADMIN — DEXAMETHASONE 2 MG: 4 TABLET ORAL at 22:07

## 2017-01-01 RX ADMIN — CEFEPIME 2 G: 2 INJECTION, POWDER, FOR SOLUTION INTRAVENOUS at 08:43

## 2017-01-01 RX ADMIN — DEXAMETHASONE 4 MG: 4 TABLET ORAL at 18:01

## 2017-01-01 RX ADMIN — STANDARDIZED SENNA CONCENTRATE AND DOCUSATE SODIUM 2 TABLET: 8.6; 5 TABLET, FILM COATED ORAL at 20:15

## 2017-01-01 RX ADMIN — DEXAMETHASONE 4 MG: 4 TABLET ORAL at 08:39

## 2017-01-01 RX ADMIN — DIBASIC SODIUM PHOSPHATE, MONOBASIC POTASSIUM PHOSPHATE AND MONOBASIC SODIUM PHOSPHATE 1 TABLET: 852; 155; 130 TABLET ORAL at 04:54

## 2017-01-01 RX ADMIN — INSULIN HUMAN 35 UNITS: 100 INJECTION, SUSPENSION SUBCUTANEOUS at 17:48

## 2017-01-01 RX ADMIN — DEXAMETHASONE SODIUM PHOSPHATE 4 MG: 4 INJECTION, SOLUTION INTRAMUSCULAR; INTRAVENOUS at 05:50

## 2017-01-01 RX ADMIN — ZONISAMIDE 200 MG: 50 CAPSULE ORAL at 07:58

## 2017-01-01 RX ADMIN — ACETAMINOPHEN 650 MG: 325 TABLET, FILM COATED ORAL at 12:26

## 2017-01-01 RX ADMIN — Medication 50 MG: at 12:51

## 2017-01-01 RX ADMIN — DEXTROSE MONOHYDRATE 1500 MG: 50 INJECTION, SOLUTION INTRAVENOUS at 21:04

## 2017-01-01 RX ADMIN — WARFARIN SODIUM 6 MG: 6 TABLET ORAL at 18:12

## 2017-01-01 RX ADMIN — LEVETIRACETAM 1500 MG: 100 SOLUTION ORAL at 21:17

## 2017-01-01 RX ADMIN — SODIUM CHLORIDE 200 MG: 9 INJECTION, SOLUTION INTRAVENOUS at 08:27

## 2017-01-01 RX ADMIN — SODIUM CHLORIDE: 9 INJECTION, SOLUTION INTRAVENOUS at 05:35

## 2017-01-01 RX ADMIN — NYSTATIN 500000 UNITS: 100000 SUSPENSION ORAL at 22:07

## 2017-01-01 RX ADMIN — DEXTROSE MONOHYDRATE 25 ML: 25 INJECTION, SOLUTION INTRAVENOUS at 10:33

## 2017-01-01 RX ADMIN — NYSTATIN 500000 UNITS: 100000 SUSPENSION ORAL at 21:05

## 2017-01-01 RX ADMIN — ZONISAMIDE 200 MG: 50 CAPSULE ORAL at 09:52

## 2017-01-01 RX ADMIN — AMPICILLIN SODIUM AND SULBACTAM SODIUM 3 G: 2; 1 INJECTION, POWDER, FOR SOLUTION INTRAMUSCULAR; INTRAVENOUS at 17:48

## 2017-01-01 RX ADMIN — STANDARDIZED SENNA CONCENTRATE AND DOCUSATE SODIUM 2 TABLET: 8.6; 5 TABLET, FILM COATED ORAL at 10:11

## 2017-01-01 RX ADMIN — INSULIN LISPRO 5 UNITS: 100 INJECTION, SOLUTION INTRAVENOUS; SUBCUTANEOUS at 20:08

## 2017-01-01 RX ADMIN — DEXAMETHASONE SODIUM PHOSPHATE 4 MG: 4 INJECTION, SOLUTION INTRAMUSCULAR; INTRAVENOUS at 18:22

## 2017-01-01 RX ADMIN — INSULIN LISPRO 2 UNITS: 100 INJECTION, SOLUTION INTRAVENOUS; SUBCUTANEOUS at 17:10

## 2017-01-01 RX ADMIN — FAMOTIDINE 20 MG: 20 TABLET ORAL at 20:10

## 2017-01-01 RX ADMIN — LEVETIRACETAM 1500 MG: 100 SOLUTION ORAL at 08:26

## 2017-01-01 RX ADMIN — FUROSEMIDE 40 MG: 10 INJECTION, SOLUTION INTRAVENOUS at 01:05

## 2017-01-01 RX ADMIN — NYSTATIN 500000 UNITS: 100000 SUSPENSION ORAL at 19:51

## 2017-01-01 RX ADMIN — NYSTATIN 500000 UNITS: 100000 SUSPENSION ORAL at 08:30

## 2017-01-01 RX ADMIN — LEVETIRACETAM 1500 MG: 100 SOLUTION ORAL at 08:38

## 2017-01-01 RX ADMIN — STANDARDIZED SENNA CONCENTRATE AND DOCUSATE SODIUM 2 TABLET: 8.6; 5 TABLET, FILM COATED ORAL at 20:24

## 2017-01-01 RX ADMIN — INSULIN LISPRO 3 UNITS: 100 INJECTION, SOLUTION INTRAVENOUS; SUBCUTANEOUS at 20:05

## 2017-01-01 RX ADMIN — DEXTROSE MONOHYDRATE 500 MG: 50 INJECTION, SOLUTION INTRAVENOUS at 11:10

## 2017-01-01 RX ADMIN — INSULIN LISPRO 4 UNITS: 100 INJECTION, SOLUTION INTRAVENOUS; SUBCUTANEOUS at 07:24

## 2017-01-01 RX ADMIN — DEXAMETHASONE 4 MG: 4 TABLET ORAL at 11:14

## 2017-01-01 RX ADMIN — STANDARDIZED SENNA CONCENTRATE AND DOCUSATE SODIUM 1 TABLET: 8.6; 5 TABLET, FILM COATED ORAL at 21:29

## 2017-01-01 RX ADMIN — NYSTATIN 500000 UNITS: 100000 SUSPENSION ORAL at 17:47

## 2017-01-01 RX ADMIN — LEVETIRACETAM 1000 MG: 100 SOLUTION ORAL at 07:52

## 2017-01-01 RX ADMIN — STANDARDIZED SENNA CONCENTRATE AND DOCUSATE SODIUM 2 TABLET: 8.6; 5 TABLET, FILM COATED ORAL at 08:23

## 2017-01-01 RX ADMIN — DIBASIC SODIUM PHOSPHATE, MONOBASIC POTASSIUM PHOSPHATE AND MONOBASIC SODIUM PHOSPHATE 1 TABLET: 852; 155; 130 TABLET ORAL at 13:22

## 2017-01-01 RX ADMIN — DEXTROSE MONOHYDRATE 1500 MG: 50 INJECTION, SOLUTION INTRAVENOUS at 20:21

## 2017-01-01 RX ADMIN — LEVETIRACETAM 1500 MG: 100 SOLUTION ORAL at 20:05

## 2017-01-01 RX ADMIN — RIVAROXABAN 20 MG: 20 TABLET, FILM COATED ORAL at 17:32

## 2017-01-01 RX ADMIN — DEXAMETHASONE 4 MG: 4 TABLET ORAL at 23:58

## 2017-01-01 RX ADMIN — POTASSIUM CHLORIDE 10 MEQ: 7.46 INJECTION, SOLUTION INTRAVENOUS at 07:30

## 2017-01-01 RX ADMIN — LEVETIRACETAM 1500 MG: 100 SOLUTION ORAL at 09:00

## 2017-01-01 RX ADMIN — DEXAMETHASONE 4 MG: 4 TABLET ORAL at 05:22

## 2017-01-01 RX ADMIN — ONDANSETRON 4 MG: 2 INJECTION INTRAMUSCULAR; INTRAVENOUS at 11:45

## 2017-01-01 RX ADMIN — POTASSIUM CHLORIDE 10 MEQ: 7.46 INJECTION, SOLUTION INTRAVENOUS at 07:54

## 2017-01-01 RX ADMIN — POTASSIUM CHLORIDE 10 MEQ: 7.46 INJECTION, SOLUTION INTRAVENOUS at 09:14

## 2017-01-01 RX ADMIN — INSULIN HUMAN 35 UNITS: 100 INJECTION, SUSPENSION SUBCUTANEOUS at 21:48

## 2017-01-01 RX ADMIN — FUROSEMIDE 40 MG: 10 INJECTION, SOLUTION INTRAVENOUS at 08:00

## 2017-01-01 RX ADMIN — NYSTATIN 500000 UNITS: 100000 SUSPENSION ORAL at 20:57

## 2017-01-01 RX ADMIN — HEPARIN SODIUM 1100 UNITS/HR: 5000 INJECTION, SOLUTION INTRAVENOUS at 05:20

## 2017-01-01 RX ADMIN — DEXAMETHASONE 2 MG: 1 TABLET ORAL at 21:11

## 2017-01-01 RX ADMIN — LEVETIRACETAM 1500 MG: 100 SOLUTION ORAL at 11:17

## 2017-01-01 RX ADMIN — INSULIN LISPRO 3 UNITS: 100 INJECTION, SOLUTION INTRAVENOUS; SUBCUTANEOUS at 11:57

## 2017-01-01 RX ADMIN — DEXTROSE MONOHYDRATE 1500 MG: 50 INJECTION, SOLUTION INTRAVENOUS at 10:17

## 2017-01-01 RX ADMIN — DEXAMETHASONE 2 MG: 4 TABLET ORAL at 09:44

## 2017-01-01 RX ADMIN — CEFEPIME 2 G: 2 INJECTION, POWDER, FOR SOLUTION INTRAVENOUS at 08:32

## 2017-01-01 RX ADMIN — NYSTATIN 500000 UNITS: 100000 SUSPENSION ORAL at 17:43

## 2017-01-01 RX ADMIN — INSULIN LISPRO 2 UNITS: 100 INJECTION, SOLUTION INTRAVENOUS; SUBCUTANEOUS at 11:07

## 2017-01-01 RX ADMIN — AMPICILLIN SODIUM AND SULBACTAM SODIUM 3 G: 2; 1 INJECTION, POWDER, FOR SOLUTION INTRAMUSCULAR; INTRAVENOUS at 05:17

## 2017-01-01 RX ADMIN — INSULIN LISPRO 4 UNITS: 100 INJECTION, SOLUTION INTRAVENOUS; SUBCUTANEOUS at 01:37

## 2017-01-01 RX ADMIN — NEOMYCIN AND POLYMYXIN B SULFATES AND BACITRACIN ZINC: 400; 3.5; 5 OINTMENT TOPICAL at 07:52

## 2017-01-01 RX ADMIN — STANDARDIZED SENNA CONCENTRATE AND DOCUSATE SODIUM 2 TABLET: 8.6; 5 TABLET, FILM COATED ORAL at 21:59

## 2017-01-01 RX ADMIN — NEOMYCIN AND POLYMYXIN B SULFATES AND BACITRACIN ZINC: 400; 3.5; 5 OINTMENT TOPICAL at 20:18

## 2017-01-01 RX ADMIN — INSULIN LISPRO 2 UNITS: 100 INJECTION, SOLUTION INTRAVENOUS; SUBCUTANEOUS at 18:53

## 2017-01-01 RX ADMIN — AMPICILLIN SODIUM AND SULBACTAM SODIUM 3 G: 2; 1 INJECTION, POWDER, FOR SOLUTION INTRAMUSCULAR; INTRAVENOUS at 23:21

## 2017-01-01 RX ADMIN — INSULIN HUMAN 50 UNITS: 100 INJECTION, SUSPENSION SUBCUTANEOUS at 08:41

## 2017-01-01 RX ADMIN — NYSTATIN 500000 UNITS: 100000 SUSPENSION ORAL at 08:43

## 2017-01-01 RX ADMIN — INSULIN LISPRO 3 UNITS: 100 INJECTION, SOLUTION INTRAVENOUS; SUBCUTANEOUS at 12:18

## 2017-01-01 RX ADMIN — LEVETIRACETAM 1500 MG: 100 SOLUTION ORAL at 20:07

## 2017-01-01 RX ADMIN — DEXAMETHASONE SODIUM PHOSPHATE 4 MG: 4 INJECTION, SOLUTION INTRAMUSCULAR; INTRAVENOUS at 10:23

## 2017-01-01 RX ADMIN — LORAZEPAM 0.5 MG: 2 INJECTION INTRAMUSCULAR at 08:03

## 2017-01-01 RX ADMIN — INSULIN LISPRO 4 UNITS: 100 INJECTION, SOLUTION INTRAVENOUS; SUBCUTANEOUS at 17:38

## 2017-01-01 RX ADMIN — STANDARDIZED SENNA CONCENTRATE AND DOCUSATE SODIUM 1 TABLET: 8.6; 5 TABLET, FILM COATED ORAL at 19:57

## 2017-01-01 RX ADMIN — DEXAMETHASONE SODIUM PHOSPHATE 4 MG: 4 INJECTION, SOLUTION INTRAMUSCULAR; INTRAVENOUS at 13:10

## 2017-01-01 RX ADMIN — LEVETIRACETAM 1500 MG: 100 SOLUTION ORAL at 09:04

## 2017-01-01 RX ADMIN — DEXAMETHASONE 4 MG: 4 TABLET ORAL at 20:14

## 2017-01-01 RX ADMIN — INSULIN LISPRO 3 UNITS: 100 INJECTION, SOLUTION INTRAVENOUS; SUBCUTANEOUS at 17:36

## 2017-01-01 RX ADMIN — LIDOCAINE 1 PATCH: 50 PATCH CUTANEOUS at 08:06

## 2017-01-01 RX ADMIN — NEOMYCIN AND POLYMYXIN B SULFATES AND BACITRACIN ZINC: 400; 3.5; 5 OINTMENT TOPICAL at 08:36

## 2017-01-01 RX ADMIN — DEXAMETHASONE SODIUM PHOSPHATE 4 MG: 4 INJECTION, SOLUTION INTRAMUSCULAR; INTRAVENOUS at 21:10

## 2017-01-01 RX ADMIN — RIVAROXABAN 20 MG: 20 TABLET, FILM COATED ORAL at 17:51

## 2017-01-01 RX ADMIN — ZONISAMIDE 200 MG: 50 CAPSULE ORAL at 10:14

## 2017-01-01 RX ADMIN — INSULIN HUMAN 35 UNITS: 100 INJECTION, SUSPENSION SUBCUTANEOUS at 06:13

## 2017-01-01 RX ADMIN — METOPROLOL TARTRATE 12.5 MG: 25 TABLET ORAL at 17:26

## 2017-01-01 RX ADMIN — AMPICILLIN SODIUM AND SULBACTAM SODIUM 3 G: 2; 1 INJECTION, POWDER, FOR SOLUTION INTRAMUSCULAR; INTRAVENOUS at 13:32

## 2017-01-01 RX ADMIN — RIVAROXABAN 20 MG: 20 TABLET, FILM COATED ORAL at 17:39

## 2017-01-01 RX ADMIN — INSULIN HUMAN 35 UNITS: 100 INJECTION, SUSPENSION SUBCUTANEOUS at 05:28

## 2017-01-01 RX ADMIN — ENOXAPARIN SODIUM 40 MG: 100 INJECTION SUBCUTANEOUS at 10:12

## 2017-01-01 RX ADMIN — STANDARDIZED SENNA CONCENTRATE AND DOCUSATE SODIUM 2 TABLET: 8.6; 5 TABLET, FILM COATED ORAL at 22:32

## 2017-01-01 RX ADMIN — LIDOCAINE 1 PATCH: 50 PATCH CUTANEOUS at 08:13

## 2017-01-01 RX ADMIN — HEPARIN SODIUM 1100 UNITS/HR: 5000 INJECTION, SOLUTION INTRAVENOUS at 05:26

## 2017-01-01 RX ADMIN — FAMOTIDINE 20 MG: 20 TABLET ORAL at 20:49

## 2017-01-01 RX ADMIN — INSULIN LISPRO 3 UNITS: 100 INJECTION, SOLUTION INTRAVENOUS; SUBCUTANEOUS at 11:44

## 2017-01-01 RX ADMIN — FAMOTIDINE 20 MG: 20 TABLET, FILM COATED ORAL at 08:22

## 2017-01-01 RX ADMIN — CEFTRIAXONE SODIUM 2 G: 2 INJECTION, POWDER, FOR SOLUTION INTRAMUSCULAR; INTRAVENOUS at 13:53

## 2017-01-01 RX ADMIN — ENOXAPARIN SODIUM 40 MG: 100 INJECTION SUBCUTANEOUS at 07:33

## 2017-01-01 RX ADMIN — INSULIN LISPRO 2 UNITS: 100 INJECTION, SOLUTION INTRAVENOUS; SUBCUTANEOUS at 17:44

## 2017-01-01 RX ADMIN — NYSTATIN 500000 UNITS: 100000 SUSPENSION ORAL at 13:00

## 2017-01-01 RX ADMIN — ENOXAPARIN SODIUM 100 MG: 100 INJECTION SUBCUTANEOUS at 08:13

## 2017-01-01 RX ADMIN — INSULIN LISPRO 2 UNITS: 100 INJECTION, SOLUTION INTRAVENOUS; SUBCUTANEOUS at 06:00

## 2017-01-01 RX ADMIN — INSULIN HUMAN 18 UNITS: 100 INJECTION, SUSPENSION SUBCUTANEOUS at 17:24

## 2017-01-01 RX ADMIN — DEXAMETHASONE 4 MG: 4 TABLET ORAL at 11:38

## 2017-01-01 RX ADMIN — FAMOTIDINE 20 MG: 20 TABLET ORAL at 20:35

## 2017-01-01 RX ADMIN — NYSTATIN 500000 UNITS: 100000 SUSPENSION ORAL at 12:44

## 2017-01-01 RX ADMIN — STANDARDIZED SENNA CONCENTRATE AND DOCUSATE SODIUM 2 TABLET: 8.6; 5 TABLET, FILM COATED ORAL at 19:57

## 2017-01-01 RX ADMIN — DEXAMETHASONE 2 MG: 1 TABLET ORAL at 08:40

## 2017-01-01 RX ADMIN — INSULIN LISPRO 3 UNITS: 100 INJECTION, SOLUTION INTRAVENOUS; SUBCUTANEOUS at 17:26

## 2017-01-01 RX ADMIN — DEXAMETHASONE 4 MG: 4 TABLET ORAL at 17:46

## 2017-01-01 RX ADMIN — DEXAMETHASONE 2 MG: 4 TABLET ORAL at 21:19

## 2017-01-01 RX ADMIN — DEXAMETHASONE 4 MG: 4 TABLET ORAL at 17:21

## 2017-01-01 RX ADMIN — DEXTROSE MONOHYDRATE 500 MG: 50 INJECTION, SOLUTION INTRAVENOUS at 23:15

## 2017-01-01 RX ADMIN — NYSTATIN 500000 UNITS: 100000 SUSPENSION ORAL at 20:05

## 2017-01-01 RX ADMIN — DEXAMETHASONE SODIUM PHOSPHATE 4 MG: 4 INJECTION, SOLUTION INTRAMUSCULAR; INTRAVENOUS at 23:22

## 2017-01-01 RX ADMIN — NYSTATIN 500000 UNITS: 100000 SUSPENSION ORAL at 08:34

## 2017-01-01 RX ADMIN — DEXAMETHASONE 2 MG: 4 TABLET ORAL at 19:37

## 2017-01-01 RX ADMIN — FAMOTIDINE 20 MG: 20 TABLET, FILM COATED ORAL at 09:19

## 2017-01-01 RX ADMIN — INSULIN HUMAN 35 UNITS: 100 INJECTION, SUSPENSION SUBCUTANEOUS at 05:40

## 2017-01-01 RX ADMIN — NYSTATIN 500000 UNITS: 100000 SUSPENSION ORAL at 18:32

## 2017-01-01 RX ADMIN — INSULIN HUMAN 7 UNITS: 100 INJECTION, SUSPENSION SUBCUTANEOUS at 08:09

## 2017-01-01 RX ADMIN — DEXTROSE MONOHYDRATE 1000 MG: 50 INJECTION, SOLUTION INTRAVENOUS at 22:18

## 2017-01-01 RX ADMIN — NYSTATIN 500000 UNITS: 100000 SUSPENSION ORAL at 17:00

## 2017-01-01 RX ADMIN — DEXTROSE MONOHYDRATE 1000 MG: 50 INJECTION, SOLUTION INTRAVENOUS at 21:28

## 2017-01-01 RX ADMIN — DEXAMETHASONE 4 MG: 4 TABLET ORAL at 12:01

## 2017-01-01 RX ADMIN — DEXAMETHASONE 2 MG: 4 TABLET ORAL at 08:56

## 2017-01-01 RX ADMIN — ACETAMINOPHEN 650 MG: 325 TABLET, FILM COATED ORAL at 16:53

## 2017-01-01 RX ADMIN — DIBASIC SODIUM PHOSPHATE, MONOBASIC POTASSIUM PHOSPHATE AND MONOBASIC SODIUM PHOSPHATE 1 TABLET: 852; 155; 130 TABLET ORAL at 17:37

## 2017-01-01 RX ADMIN — DOCUSATE SODIUM 100 MG: 100 CAPSULE, LIQUID FILLED ORAL at 08:15

## 2017-01-01 RX ADMIN — INSULIN HUMAN 35 UNITS: 100 INJECTION, SUSPENSION SUBCUTANEOUS at 18:37

## 2017-01-01 RX ADMIN — NYSTATIN 500000 UNITS: 100000 SUSPENSION ORAL at 21:20

## 2017-01-01 RX ADMIN — ZONISAMIDE 200 MG: 50 CAPSULE ORAL at 09:55

## 2017-01-01 RX ADMIN — FAMOTIDINE 20 MG: 20 TABLET ORAL at 07:46

## 2017-01-01 RX ADMIN — LEVETIRACETAM 1500 MG: 500 TABLET, FILM COATED ORAL at 20:12

## 2017-01-01 RX ADMIN — INSULIN LISPRO 3 UNITS: 100 INJECTION, SOLUTION INTRAVENOUS; SUBCUTANEOUS at 06:04

## 2017-01-01 RX ADMIN — NEOMYCIN AND POLYMYXIN B SULFATES AND BACITRACIN ZINC: 400; 3.5; 5 OINTMENT TOPICAL at 08:00

## 2017-01-01 RX ADMIN — INSULIN LISPRO 3 UNITS: 100 INJECTION, SOLUTION INTRAVENOUS; SUBCUTANEOUS at 18:03

## 2017-01-01 RX ADMIN — AMPICILLIN SODIUM AND SULBACTAM SODIUM 3 G: 2; 1 INJECTION, POWDER, FOR SOLUTION INTRAMUSCULAR; INTRAVENOUS at 12:06

## 2017-01-01 RX ADMIN — INSULIN LISPRO 2 UNITS: 100 INJECTION, SOLUTION INTRAVENOUS; SUBCUTANEOUS at 16:40

## 2017-01-01 RX ADMIN — INSULIN LISPRO 3 UNITS: 100 INJECTION, SOLUTION INTRAVENOUS; SUBCUTANEOUS at 17:37

## 2017-01-01 RX ADMIN — NYSTATIN 500000 UNITS: 100000 SUSPENSION ORAL at 21:31

## 2017-01-01 RX ADMIN — FAMOTIDINE 20 MG: 20 TABLET, FILM COATED ORAL at 10:22

## 2017-01-01 RX ADMIN — ENOXAPARIN SODIUM 40 MG: 100 INJECTION SUBCUTANEOUS at 08:24

## 2017-01-01 RX ADMIN — INSULIN HUMAN 35 UNITS: 100 INJECTION, SUSPENSION SUBCUTANEOUS at 17:37

## 2017-01-01 RX ADMIN — INSULIN LISPRO 3 UNITS: 100 INJECTION, SOLUTION INTRAVENOUS; SUBCUTANEOUS at 22:31

## 2017-01-01 RX ADMIN — LEVETIRACETAM 1000 MG: 100 SOLUTION ORAL at 20:00

## 2017-01-01 RX ADMIN — STANDARDIZED SENNA CONCENTRATE AND DOCUSATE SODIUM 2 TABLET: 8.6; 5 TABLET, FILM COATED ORAL at 08:56

## 2017-01-01 RX ADMIN — INSULIN LISPRO 3 UNITS: 100 INJECTION, SOLUTION INTRAVENOUS; SUBCUTANEOUS at 11:36

## 2017-01-01 RX ADMIN — INSULIN LISPRO 3 UNITS: 100 INJECTION, SOLUTION INTRAVENOUS; SUBCUTANEOUS at 18:10

## 2017-01-01 RX ADMIN — LEVETIRACETAM 1500 MG: 100 SOLUTION ORAL at 21:50

## 2017-01-01 RX ADMIN — THERA TABS 1 TABLET: TAB at 09:39

## 2017-01-01 RX ADMIN — DEXTROSE MONOHYDRATE 1500 MG: 50 INJECTION, SOLUTION INTRAVENOUS at 22:33

## 2017-01-01 RX ADMIN — LEVETIRACETAM 1500 MG: 100 SOLUTION ORAL at 21:05

## 2017-01-01 RX ADMIN — INSULIN LISPRO 2 UNITS: 100 INJECTION, SOLUTION INTRAVENOUS; SUBCUTANEOUS at 21:27

## 2017-01-01 RX ADMIN — DEXAMETHASONE 2 MG: 1 TABLET ORAL at 08:29

## 2017-01-01 RX ADMIN — NEOMYCIN AND POLYMYXIN B SULFATES AND BACITRACIN ZINC: 400; 3.5; 5 OINTMENT TOPICAL at 08:58

## 2017-01-01 RX ADMIN — INSULIN HUMAN 7 UNITS: 100 INJECTION, SUSPENSION SUBCUTANEOUS at 05:13

## 2017-01-01 RX ADMIN — DEXAMETHASONE 4 MG: 4 TABLET ORAL at 17:36

## 2017-01-01 RX ADMIN — FAMOTIDINE 20 MG: 20 TABLET ORAL at 20:53

## 2017-01-01 RX ADMIN — STANDARDIZED SENNA CONCENTRATE AND DOCUSATE SODIUM 2 TABLET: 8.6; 5 TABLET, FILM COATED ORAL at 22:02

## 2017-01-01 RX ADMIN — DEXAMETHASONE 4 MG: 4 TABLET ORAL at 21:20

## 2017-01-01 RX ADMIN — STANDARDIZED SENNA CONCENTRATE AND DOCUSATE SODIUM 1 TABLET: 8.6; 5 TABLET, FILM COATED ORAL at 21:07

## 2017-01-01 RX ADMIN — STANDARDIZED SENNA CONCENTRATE AND DOCUSATE SODIUM 1 TABLET: 8.6; 5 TABLET, FILM COATED ORAL at 21:51

## 2017-01-01 RX ADMIN — NYSTATIN 500000 UNITS: 100000 SUSPENSION ORAL at 17:06

## 2017-01-01 RX ADMIN — INSULIN HUMAN 35 UNITS: 100 INJECTION, SUSPENSION SUBCUTANEOUS at 17:50

## 2017-01-01 RX ADMIN — DEXAMETHASONE 4 MG: 4 TABLET ORAL at 00:19

## 2017-01-01 RX ADMIN — PNEUMOCOCCAL 13-VALENT CONJUGATE VACCINE 0.5 ML: 2.2; 2.2; 2.2; 2.2; 2.2; 4.4; 2.2; 2.2; 2.2; 2.2; 2.2; 2.2; 2.2 INJECTION, SUSPENSION INTRAMUSCULAR at 11:18

## 2017-01-01 RX ADMIN — INSULIN LISPRO 2 UNITS: 100 INJECTION, SOLUTION INTRAVENOUS; SUBCUTANEOUS at 17:08

## 2017-01-01 RX ADMIN — LIDOCAINE 1 PATCH: 50 PATCH CUTANEOUS at 08:03

## 2017-01-01 RX ADMIN — DEXTROSE MONOHYDRATE 1500 MG: 50 INJECTION, SOLUTION INTRAVENOUS at 21:00

## 2017-01-01 RX ADMIN — TEMAZEPAM 15 MG: 15 CAPSULE ORAL at 20:13

## 2017-01-01 RX ADMIN — DOXYCYCLINE 100 MG: 100 TABLET ORAL at 07:53

## 2017-01-01 RX ADMIN — NYSTATIN 500000 UNITS: 100000 SUSPENSION ORAL at 12:38

## 2017-01-01 RX ADMIN — DEXAMETHASONE 4 MG: 4 TABLET ORAL at 08:35

## 2017-01-01 RX ADMIN — FAMOTIDINE 20 MG: 20 TABLET ORAL at 07:53

## 2017-01-01 RX ADMIN — DEXAMETHASONE 2 MG: 4 TABLET ORAL at 08:30

## 2017-01-01 RX ADMIN — FAMOTIDINE 20 MG: 20 TABLET, FILM COATED ORAL at 09:38

## 2017-01-01 RX ADMIN — DEXAMETHASONE 4 MG: 4 TABLET ORAL at 05:02

## 2017-01-01 RX ADMIN — INSULIN LISPRO 3 UNITS: 100 INJECTION, SOLUTION INTRAVENOUS; SUBCUTANEOUS at 17:44

## 2017-01-01 RX ADMIN — ZONISAMIDE 200 MG: 50 CAPSULE ORAL at 19:57

## 2017-01-01 RX ADMIN — DIBASIC SODIUM PHOSPHATE, MONOBASIC POTASSIUM PHOSPHATE AND MONOBASIC SODIUM PHOSPHATE 1 TABLET: 852; 155; 130 TABLET ORAL at 13:20

## 2017-01-01 RX ADMIN — DEXTROSE MONOHYDRATE 1500 MG: 50 INJECTION, SOLUTION INTRAVENOUS at 08:15

## 2017-01-01 RX ADMIN — NYSTATIN 500000 UNITS: 100000 SUSPENSION ORAL at 08:52

## 2017-01-01 RX ADMIN — Medication 50 MG: at 08:29

## 2017-01-01 RX ADMIN — DIBASIC SODIUM PHOSPHATE, MONOBASIC POTASSIUM PHOSPHATE AND MONOBASIC SODIUM PHOSPHATE 1 TABLET: 852; 155; 130 TABLET ORAL at 19:55

## 2017-01-01 RX ADMIN — ZONISAMIDE 200 MG: 50 CAPSULE ORAL at 17:12

## 2017-01-01 RX ADMIN — STANDARDIZED SENNA CONCENTRATE AND DOCUSATE SODIUM 1 TABLET: 8.6; 5 TABLET, FILM COATED ORAL at 09:19

## 2017-01-01 RX ADMIN — INSULIN LISPRO 3 UNITS: 100 INJECTION, SOLUTION INTRAVENOUS; SUBCUTANEOUS at 21:32

## 2017-01-01 RX ADMIN — METOPROLOL TARTRATE 12.5 MG: 25 TABLET ORAL at 05:19

## 2017-01-01 RX ADMIN — INSULIN LISPRO 3 UNITS: 100 INJECTION, SOLUTION INTRAVENOUS; SUBCUTANEOUS at 23:27

## 2017-01-01 RX ADMIN — FAMOTIDINE 20 MG: 20 TABLET ORAL at 21:51

## 2017-01-01 RX ADMIN — LEVETIRACETAM 1500 MG: 100 SOLUTION ORAL at 20:18

## 2017-01-01 RX ADMIN — DEXAMETHASONE SODIUM PHOSPHATE 12 MG: 4 INJECTION, SOLUTION INTRAMUSCULAR; INTRAVENOUS at 18:19

## 2017-01-01 RX ADMIN — FAMOTIDINE 20 MG: 20 TABLET ORAL at 20:32

## 2017-01-01 RX ADMIN — NYSTATIN 500000 UNITS: 100000 SUSPENSION ORAL at 12:27

## 2017-01-01 RX ADMIN — VANCOMYCIN HYDROCHLORIDE 2400 MG: 100 INJECTION, POWDER, LYOPHILIZED, FOR SOLUTION INTRAVENOUS at 03:22

## 2017-01-01 RX ADMIN — DEXAMETHASONE 4 MG: 4 TABLET ORAL at 17:33

## 2017-01-01 RX ADMIN — ATORVASTATIN CALCIUM 20 MG: 10 TABLET, FILM COATED ORAL at 21:51

## 2017-01-01 RX ADMIN — INSULIN LISPRO 3 UNITS: 100 INJECTION, SOLUTION INTRAVENOUS; SUBCUTANEOUS at 13:26

## 2017-01-01 RX ADMIN — LIDOCAINE 1 PATCH: 50 PATCH CUTANEOUS at 08:11

## 2017-01-01 RX ADMIN — FAMOTIDINE 20 MG: 20 TABLET, FILM COATED ORAL at 11:03

## 2017-01-01 RX ADMIN — DEXAMETHASONE 4 MG: 4 TABLET ORAL at 00:21

## 2017-01-01 RX ADMIN — INSULIN LISPRO 5 UNITS: 100 INJECTION, SOLUTION INTRAVENOUS; SUBCUTANEOUS at 22:52

## 2017-01-01 RX ADMIN — STANDARDIZED SENNA CONCENTRATE AND DOCUSATE SODIUM 2 TABLET: 8.6; 5 TABLET, FILM COATED ORAL at 19:56

## 2017-01-01 RX ADMIN — DEXAMETHASONE 4 MG: 4 TABLET ORAL at 08:23

## 2017-01-01 RX ADMIN — BENZOCAINE: 200 GEL TOPICAL at 20:31

## 2017-01-01 RX ADMIN — NYSTATIN 500000 UNITS: 100000 SUSPENSION ORAL at 17:35

## 2017-01-01 RX ADMIN — RIVAROXABAN 20 MG: 20 TABLET, FILM COATED ORAL at 17:52

## 2017-01-01 RX ADMIN — NYSTATIN 500000 UNITS: 100000 SUSPENSION ORAL at 08:36

## 2017-01-01 RX ADMIN — ATORVASTATIN CALCIUM 20 MG: 10 TABLET, FILM COATED ORAL at 20:53

## 2017-01-01 RX ADMIN — SODIUM PHOSPHATE, MONOBASIC, MONOHYDRATE AND SODIUM PHOSPHATE, DIBASIC, ANHYDROUS 30 MMOL: 276; 142 INJECTION, SOLUTION INTRAVENOUS at 19:36

## 2017-01-01 RX ADMIN — STANDARDIZED SENNA CONCENTRATE AND DOCUSATE SODIUM 2 TABLET: 8.6; 5 TABLET, FILM COATED ORAL at 10:18

## 2017-01-01 RX ADMIN — LEVETIRACETAM 1000 MG: 100 SOLUTION ORAL at 20:09

## 2017-01-01 RX ADMIN — DEXAMETHASONE SODIUM PHOSPHATE 4 MG: 4 INJECTION, SOLUTION INTRAMUSCULAR; INTRAVENOUS at 05:27

## 2017-01-01 RX ADMIN — NYSTATIN 500000 UNITS: 100000 SUSPENSION ORAL at 18:16

## 2017-01-01 RX ADMIN — INSULIN LISPRO 3 UNITS: 100 INJECTION, SOLUTION INTRAVENOUS; SUBCUTANEOUS at 04:57

## 2017-01-01 RX ADMIN — INSULIN HUMAN 35 UNITS: 100 INJECTION, SUSPENSION SUBCUTANEOUS at 06:26

## 2017-01-01 RX ADMIN — INSULIN LISPRO 1 UNITS: 100 INJECTION, SOLUTION INTRAVENOUS; SUBCUTANEOUS at 11:12

## 2017-01-01 RX ADMIN — NYSTATIN 500000 UNITS: 100000 SUSPENSION ORAL at 14:02

## 2017-01-01 RX ADMIN — NYSTATIN 500000 UNITS: 100000 SUSPENSION ORAL at 12:22

## 2017-01-01 RX ADMIN — INSULIN LISPRO 3 UNITS: 100 INJECTION, SOLUTION INTRAVENOUS; SUBCUTANEOUS at 17:22

## 2017-01-01 RX ADMIN — STANDARDIZED SENNA CONCENTRATE AND DOCUSATE SODIUM 2 TABLET: 8.6; 5 TABLET, FILM COATED ORAL at 21:00

## 2017-01-01 RX ADMIN — NYSTATIN 500000 UNITS: 100000 SUSPENSION ORAL at 09:38

## 2017-01-01 RX ADMIN — INSULIN HUMAN 18 UNITS: 100 INJECTION, SUSPENSION SUBCUTANEOUS at 17:16

## 2017-01-01 RX ADMIN — MIDAZOLAM 2 MG: 1 INJECTION INTRAMUSCULAR; INTRAVENOUS at 15:50

## 2017-01-01 RX ADMIN — DEXAMETHASONE 4 MG: 4 TABLET ORAL at 11:12

## 2017-01-01 RX ADMIN — CEFEPIME 2 G: 2 INJECTION, POWDER, FOR SOLUTION INTRAMUSCULAR; INTRAVENOUS at 15:17

## 2017-01-01 RX ADMIN — LEVETIRACETAM 1500 MG: 100 SOLUTION ORAL at 23:23

## 2017-01-01 RX ADMIN — DEXAMETHASONE 2 MG: 1 TABLET ORAL at 09:39

## 2017-01-01 RX ADMIN — LEVETIRACETAM 1500 MG: 100 SOLUTION ORAL at 20:10

## 2017-01-01 RX ADMIN — BENZONATATE 100 MG: 100 CAPSULE ORAL at 21:20

## 2017-01-01 RX ADMIN — STANDARDIZED SENNA CONCENTRATE AND DOCUSATE SODIUM 1 TABLET: 8.6; 5 TABLET, FILM COATED ORAL at 20:08

## 2017-01-01 RX ADMIN — DEXAMETHASONE 4 MG: 4 TABLET ORAL at 01:13

## 2017-01-01 RX ADMIN — INSULIN LISPRO 4 UNITS: 100 INJECTION, SOLUTION INTRAVENOUS; SUBCUTANEOUS at 23:18

## 2017-01-01 RX ADMIN — FAMOTIDINE 20 MG: 20 TABLET, FILM COATED ORAL at 08:33

## 2017-01-01 RX ADMIN — ENOXAPARIN SODIUM 40 MG: 100 INJECTION SUBCUTANEOUS at 10:05

## 2017-01-01 RX ADMIN — INSULIN LISPRO 3 UNITS: 100 INJECTION, SOLUTION INTRAVENOUS; SUBCUTANEOUS at 12:56

## 2017-01-01 RX ADMIN — INSULIN HUMAN 7 UNITS: 100 INJECTION, SUSPENSION SUBCUTANEOUS at 05:27

## 2017-01-01 RX ADMIN — INSULIN LISPRO 3 UNITS: 100 INJECTION, SOLUTION INTRAVENOUS; SUBCUTANEOUS at 21:11

## 2017-01-01 RX ADMIN — NYSTATIN 500000 UNITS: 100000 SUSPENSION ORAL at 21:23

## 2017-01-01 RX ADMIN — DEXAMETHASONE SODIUM PHOSPHATE 4 MG: 4 INJECTION, SOLUTION INTRAMUSCULAR; INTRAVENOUS at 07:23

## 2017-01-01 RX ADMIN — FAMOTIDINE 20 MG: 20 TABLET ORAL at 19:57

## 2017-01-01 RX ADMIN — STANDARDIZED SENNA CONCENTRATE AND DOCUSATE SODIUM 2 TABLET: 8.6; 5 TABLET, FILM COATED ORAL at 20:20

## 2017-01-01 RX ADMIN — STANDARDIZED SENNA CONCENTRATE AND DOCUSATE SODIUM 1 TABLET: 8.6; 5 TABLET, FILM COATED ORAL at 21:24

## 2017-01-01 RX ADMIN — SODIUM CHLORIDE 500 ML: 9 INJECTION, SOLUTION INTRAVENOUS at 00:07

## 2017-01-01 RX ADMIN — INSULIN LISPRO 3 UNITS: 100 INJECTION, SOLUTION INTRAVENOUS; SUBCUTANEOUS at 19:56

## 2017-01-01 RX ADMIN — LEVETIRACETAM 1500 MG: 100 SOLUTION ORAL at 02:24

## 2017-01-01 RX ADMIN — NEOMYCIN AND POLYMYXIN B SULFATES AND BACITRACIN ZINC: 400; 3.5; 5 OINTMENT TOPICAL at 08:03

## 2017-01-01 RX ADMIN — NYSTATIN 500000 UNITS: 100000 SUSPENSION ORAL at 08:03

## 2017-01-01 RX ADMIN — LEVETIRACETAM 1500 MG: 100 SOLUTION ORAL at 09:45

## 2017-01-01 RX ADMIN — DEXAMETHASONE 2 MG: 1 TABLET ORAL at 20:56

## 2017-01-01 RX ADMIN — DEXAMETHASONE 4 MG: 4 TABLET ORAL at 00:00

## 2017-01-01 RX ADMIN — ZONISAMIDE 200 MG: 50 CAPSULE ORAL at 09:28

## 2017-01-01 RX ADMIN — DOXYCYCLINE 100 MG: 100 TABLET ORAL at 21:20

## 2017-01-01 RX ADMIN — NYSTATIN 500000 UNITS: 100000 SUSPENSION ORAL at 21:02

## 2017-01-01 RX ADMIN — FAMOTIDINE 20 MG: 20 TABLET ORAL at 21:00

## 2017-01-01 RX ADMIN — LEVETIRACETAM 1500 MG: 100 SOLUTION ORAL at 20:25

## 2017-01-01 RX ADMIN — INSULIN LISPRO 4 UNITS: 100 INJECTION, SOLUTION INTRAVENOUS; SUBCUTANEOUS at 20:42

## 2017-01-01 RX ADMIN — RIVAROXABAN 20 MG: 20 TABLET, FILM COATED ORAL at 18:32

## 2017-01-01 RX ADMIN — DEXAMETHASONE SODIUM PHOSPHATE 4 MG: 4 INJECTION, SOLUTION INTRAMUSCULAR; INTRAVENOUS at 17:09

## 2017-01-01 RX ADMIN — FAMOTIDINE 20 MG: 20 TABLET ORAL at 07:59

## 2017-01-01 RX ADMIN — Medication 16 G: at 07:05

## 2017-01-01 RX ADMIN — RIVAROXABAN 20 MG: 20 TABLET, FILM COATED ORAL at 18:26

## 2017-01-01 RX ADMIN — INSULIN LISPRO 2 UNITS: 100 INJECTION, SOLUTION INTRAVENOUS; SUBCUTANEOUS at 11:17

## 2017-01-01 RX ADMIN — STANDARDIZED SENNA CONCENTRATE AND DOCUSATE SODIUM 2 TABLET: 8.6; 5 TABLET, FILM COATED ORAL at 21:25

## 2017-01-01 RX ADMIN — DEXAMETHASONE 2 MG: 4 TABLET ORAL at 09:18

## 2017-01-01 RX ADMIN — STANDARDIZED SENNA CONCENTRATE AND DOCUSATE SODIUM 2 TABLET: 8.6; 5 TABLET, FILM COATED ORAL at 20:10

## 2017-01-01 RX ADMIN — STANDARDIZED SENNA CONCENTRATE AND DOCUSATE SODIUM 2 TABLET: 8.6; 5 TABLET, FILM COATED ORAL at 21:15

## 2017-01-01 RX ADMIN — HYDROCODONE BITARTRATE AND ACETAMINOPHEN 2 TABLET: 5; 325 TABLET ORAL at 17:08

## 2017-01-01 RX ADMIN — GADODIAMIDE 20 ML: 287 INJECTION INTRAVENOUS at 11:46

## 2017-01-01 RX ADMIN — DEXAMETHASONE 2 MG: 4 TABLET ORAL at 09:46

## 2017-01-01 RX ADMIN — GADODIAMIDE 20 ML: 287 INJECTION INTRAVENOUS at 17:35

## 2017-01-01 RX ADMIN — MAGNESIUM HYDROXIDE 30 ML: 400 SUSPENSION ORAL at 08:13

## 2017-01-01 RX ADMIN — LEVETIRACETAM 1000 MG: 100 SOLUTION ORAL at 20:07

## 2017-01-01 RX ADMIN — DEXAMETHASONE 4 MG: 4 TABLET ORAL at 18:16

## 2017-01-01 RX ADMIN — ZONISAMIDE 200 MG: 50 CAPSULE ORAL at 08:27

## 2017-01-01 RX ADMIN — FAMOTIDINE 20 MG: 20 TABLET, FILM COATED ORAL at 09:22

## 2017-01-01 RX ADMIN — DEXTROSE MONOHYDRATE 1500 MG: 50 INJECTION, SOLUTION INTRAVENOUS at 19:34

## 2017-01-01 RX ADMIN — FOLIC ACID 1 MG: 1 TABLET ORAL at 08:29

## 2017-01-01 RX ADMIN — FAMOTIDINE 20 MG: 20 TABLET, FILM COATED ORAL at 09:14

## 2017-01-01 RX ADMIN — DEXAMETHASONE 4 MG: 4 TABLET ORAL at 11:43

## 2017-01-01 RX ADMIN — AMPICILLIN SODIUM AND SULBACTAM SODIUM 3 G: 2; 1 INJECTION, POWDER, FOR SOLUTION INTRAMUSCULAR; INTRAVENOUS at 23:33

## 2017-01-01 RX ADMIN — ZONISAMIDE 200 MG: 50 CAPSULE ORAL at 21:04

## 2017-01-01 RX ADMIN — LEVETIRACETAM 1500 MG: 100 SOLUTION ORAL at 07:54

## 2017-01-01 RX ADMIN — INSULIN LISPRO 2 UNITS: 100 INJECTION, SOLUTION INTRAVENOUS; SUBCUTANEOUS at 17:18

## 2017-01-01 RX ADMIN — DEXAMETHASONE 4 MG: 4 TABLET ORAL at 17:26

## 2017-01-01 RX ADMIN — INSULIN LISPRO 1 UNITS: 100 INJECTION, SOLUTION INTRAVENOUS; SUBCUTANEOUS at 07:39

## 2017-01-01 RX ADMIN — DEXAMETHASONE 4 MG: 4 TABLET ORAL at 05:01

## 2017-01-01 RX ADMIN — INSULIN HUMAN 7 UNITS: 100 INJECTION, SUSPENSION SUBCUTANEOUS at 07:23

## 2017-01-01 RX ADMIN — INSULIN LISPRO 2 UNITS: 100 INJECTION, SOLUTION INTRAVENOUS; SUBCUTANEOUS at 11:22

## 2017-01-01 RX ADMIN — LACTULOSE 30 ML: 10 SOLUTION ORAL at 05:48

## 2017-01-01 RX ADMIN — RIVAROXABAN 20 MG: 20 TABLET, FILM COATED ORAL at 17:57

## 2017-01-01 RX ADMIN — DEXAMETHASONE 4 MG: 4 TABLET ORAL at 19:51

## 2017-01-01 RX ADMIN — AMPICILLIN SODIUM AND SULBACTAM SODIUM 3 G: 2; 1 INJECTION, POWDER, FOR SOLUTION INTRAMUSCULAR; INTRAVENOUS at 12:00

## 2017-01-01 RX ADMIN — INSULIN HUMAN 18 UNITS: 100 INJECTION, SUSPENSION SUBCUTANEOUS at 07:22

## 2017-01-01 RX ADMIN — NYSTATIN 500000 UNITS: 100000 SUSPENSION ORAL at 08:02

## 2017-01-01 RX ADMIN — INSULIN LISPRO 2 UNITS: 100 INJECTION, SOLUTION INTRAVENOUS; SUBCUTANEOUS at 12:53

## 2017-01-01 RX ADMIN — LEVETIRACETAM 1500 MG: 100 SOLUTION ORAL at 21:02

## 2017-01-01 RX ADMIN — GADODIAMIDE 20 ML: 287 INJECTION INTRAVENOUS at 10:43

## 2017-01-01 RX ADMIN — OMEPRAZOLE 40 MG: 20 CAPSULE, DELAYED RELEASE ORAL at 09:28

## 2017-01-01 RX ADMIN — STANDARDIZED SENNA CONCENTRATE AND DOCUSATE SODIUM 1 TABLET: 8.6; 5 TABLET, FILM COATED ORAL at 20:07

## 2017-01-01 RX ADMIN — STANDARDIZED SENNA CONCENTRATE AND DOCUSATE SODIUM 1 TABLET: 8.6; 5 TABLET, FILM COATED ORAL at 20:35

## 2017-01-01 RX ADMIN — SODIUM CHLORIDE 1000 ML: 9 INJECTION, SOLUTION INTRAVENOUS at 09:49

## 2017-01-01 RX ADMIN — DEXTROSE MONOHYDRATE 1500 MG: 50 INJECTION, SOLUTION INTRAVENOUS at 12:08

## 2017-01-01 RX ADMIN — LEVETIRACETAM 1500 MG: 100 SOLUTION ORAL at 10:23

## 2017-01-01 RX ADMIN — DEXAMETHASONE SODIUM PHOSPHATE 4 MG: 4 INJECTION, SOLUTION INTRAMUSCULAR; INTRAVENOUS at 05:15

## 2017-01-01 RX ADMIN — DEXAMETHASONE SODIUM PHOSPHATE 4 MG: 4 INJECTION, SOLUTION INTRAMUSCULAR; INTRAVENOUS at 20:50

## 2017-01-01 RX ADMIN — RIVAROXABAN 20 MG: 20 TABLET, FILM COATED ORAL at 17:22

## 2017-01-01 RX ADMIN — LEVETIRACETAM 1000 MG: 500 TABLET ORAL at 20:50

## 2017-01-01 RX ADMIN — STANDARDIZED SENNA CONCENTRATE AND DOCUSATE SODIUM 2 TABLET: 8.6; 5 TABLET, FILM COATED ORAL at 21:18

## 2017-01-01 RX ADMIN — LEVETIRACETAM 1500 MG: 100 SOLUTION ORAL at 20:36

## 2017-01-01 RX ADMIN — LIDOCAINE 1 PATCH: 50 PATCH CUTANEOUS at 08:37

## 2017-01-01 RX ADMIN — INSULIN LISPRO 3 UNITS: 100 INJECTION, SOLUTION INTRAVENOUS; SUBCUTANEOUS at 00:23

## 2017-01-01 RX ADMIN — INSULIN LISPRO 4 UNITS: 100 INJECTION, SOLUTION INTRAVENOUS; SUBCUTANEOUS at 20:17

## 2017-01-01 RX ADMIN — NYSTATIN 500000 UNITS: 100000 SUSPENSION ORAL at 20:43

## 2017-01-01 RX ADMIN — DEXAMETHASONE 4 MG: 4 TABLET ORAL at 08:10

## 2017-01-01 RX ADMIN — RIVAROXABAN 20 MG: 20 TABLET, FILM COATED ORAL at 17:44

## 2017-01-01 RX ADMIN — FAMOTIDINE 20 MG: 20 TABLET ORAL at 21:07

## 2017-01-01 RX ADMIN — DEXAMETHASONE 4 MG: 4 TABLET ORAL at 17:15

## 2017-01-01 RX ADMIN — NYSTATIN 500000 UNITS: 100000 SUSPENSION ORAL at 08:56

## 2017-01-01 RX ADMIN — SODIUM CHLORIDE: 9 INJECTION, SOLUTION INTRAVENOUS at 22:59

## 2017-01-01 RX ADMIN — STANDARDIZED SENNA CONCENTRATE AND DOCUSATE SODIUM 2 TABLET: 8.6; 5 TABLET, FILM COATED ORAL at 20:58

## 2017-01-01 RX ADMIN — DEXAMETHASONE 2 MG: 4 TABLET ORAL at 09:32

## 2017-01-01 RX ADMIN — SODIUM CHLORIDE 200 MG: 9 INJECTION, SOLUTION INTRAVENOUS at 10:05

## 2017-01-01 RX ADMIN — CEFEPIME 2 G: 2 INJECTION, POWDER, FOR SOLUTION INTRAVENOUS at 08:52

## 2017-01-01 RX ADMIN — INSULIN LISPRO 1 UNITS: 100 INJECTION, SOLUTION INTRAVENOUS; SUBCUTANEOUS at 17:29

## 2017-01-01 RX ADMIN — DEXAMETHASONE 4 MG: 4 TABLET ORAL at 11:34

## 2017-01-01 RX ADMIN — DEXAMETHASONE SODIUM PHOSPHATE 4 MG: 4 INJECTION, SOLUTION INTRAMUSCULAR; INTRAVENOUS at 11:53

## 2017-01-01 RX ADMIN — INSULIN HUMAN 7 UNITS: 100 INJECTION, SUSPENSION SUBCUTANEOUS at 17:08

## 2017-01-01 RX ADMIN — LEVETIRACETAM 1500 MG: 100 SOLUTION ORAL at 08:06

## 2017-01-01 RX ADMIN — ATORVASTATIN CALCIUM 20 MG: 10 TABLET, FILM COATED ORAL at 20:32

## 2017-01-01 RX ADMIN — DOXYCYCLINE 100 MG: 100 TABLET ORAL at 20:15

## 2017-01-01 RX ADMIN — DEXAMETHASONE 4 MG: 4 TABLET ORAL at 00:47

## 2017-01-01 RX ADMIN — AMPICILLIN SODIUM AND SULBACTAM SODIUM 3 G: 2; 1 INJECTION, POWDER, FOR SOLUTION INTRAMUSCULAR; INTRAVENOUS at 04:54

## 2017-01-01 RX ADMIN — DEXAMETHASONE 2 MG: 4 TABLET ORAL at 21:58

## 2017-01-01 RX ADMIN — INSULIN HUMAN 18 UNITS: 100 INJECTION, SUSPENSION SUBCUTANEOUS at 17:42

## 2017-01-01 RX ADMIN — DEXAMETHASONE 4 MG: 4 TABLET ORAL at 11:41

## 2017-01-01 RX ADMIN — DEXAMETHASONE 4 MG: 4 TABLET ORAL at 11:16

## 2017-01-01 RX ADMIN — DEXAMETHASONE 4 MG: 4 TABLET ORAL at 23:45

## 2017-01-01 RX ADMIN — FAMOTIDINE 20 MG: 20 TABLET, FILM COATED ORAL at 07:54

## 2017-01-01 RX ADMIN — INSULIN LISPRO 2 UNITS: 100 INJECTION, SOLUTION INTRAVENOUS; SUBCUTANEOUS at 06:13

## 2017-01-01 RX ADMIN — FUROSEMIDE 40 MG: 10 INJECTION, SOLUTION INTRAVENOUS at 08:07

## 2017-01-01 RX ADMIN — LEVETIRACETAM 1500 MG: 100 SOLUTION ORAL at 08:14

## 2017-01-01 RX ADMIN — HYDROCODONE BITARTRATE AND ACETAMINOPHEN 2 TABLET: 5; 325 TABLET ORAL at 12:12

## 2017-01-01 RX ADMIN — ACETAMINOPHEN 650 MG: 325 TABLET, FILM COATED ORAL at 08:15

## 2017-01-01 RX ADMIN — POTASSIUM CHLORIDE 10 MEQ: 10 INJECTION, SOLUTION INTRAVENOUS at 19:34

## 2017-01-01 RX ADMIN — NYSTATIN 500000 UNITS: 100000 SUSPENSION ORAL at 17:18

## 2017-01-01 RX ADMIN — DIBASIC SODIUM PHOSPHATE, MONOBASIC POTASSIUM PHOSPHATE AND MONOBASIC SODIUM PHOSPHATE 1 TABLET: 852; 155; 130 TABLET ORAL at 00:36

## 2017-01-01 RX ADMIN — DEXAMETHASONE 2 MG: 4 TABLET ORAL at 08:22

## 2017-01-01 RX ADMIN — LEVETIRACETAM 1500 MG: 100 SOLUTION ORAL at 08:30

## 2017-01-01 RX ADMIN — LEVETIRACETAM 1500 MG: 100 SOLUTION ORAL at 08:37

## 2017-01-01 RX ADMIN — VANCOMYCIN HYDROCHLORIDE 1600 MG: 100 INJECTION, POWDER, LYOPHILIZED, FOR SOLUTION INTRAVENOUS at 14:23

## 2017-01-01 RX ADMIN — Medication 50 MG: at 09:39

## 2017-01-01 RX ADMIN — DEXAMETHASONE 2 MG: 4 TABLET ORAL at 20:10

## 2017-01-01 RX ADMIN — LEVETIRACETAM 1500 MG: 100 SOLUTION ORAL at 09:38

## 2017-01-01 RX ADMIN — METOPROLOL TARTRATE 25 MG: 25 TABLET ORAL at 21:25

## 2017-01-01 RX ADMIN — AMPICILLIN SODIUM AND SULBACTAM SODIUM 3 G: 2; 1 INJECTION, POWDER, FOR SOLUTION INTRAMUSCULAR; INTRAVENOUS at 12:41

## 2017-01-01 RX ADMIN — NYSTATIN 500000 UNITS: 100000 SUSPENSION ORAL at 11:33

## 2017-01-01 RX ADMIN — INSULIN LISPRO 1 UNITS: 100 INJECTION, SOLUTION INTRAVENOUS; SUBCUTANEOUS at 07:57

## 2017-01-01 RX ADMIN — AMPICILLIN SODIUM AND SULBACTAM SODIUM 3 G: 2; 1 INJECTION, POWDER, FOR SOLUTION INTRAMUSCULAR; INTRAVENOUS at 12:21

## 2017-01-01 RX ADMIN — STANDARDIZED SENNA CONCENTRATE AND DOCUSATE SODIUM 2 TABLET: 8.6; 5 TABLET, FILM COATED ORAL at 09:27

## 2017-01-01 RX ADMIN — POTASSIUM BICARBONATE 50 MEQ: 25 TABLET, EFFERVESCENT ORAL at 09:54

## 2017-01-01 RX ADMIN — INSULIN LISPRO 5 UNITS: 100 INJECTION, SOLUTION INTRAVENOUS; SUBCUTANEOUS at 21:47

## 2017-01-01 RX ADMIN — ACETAMINOPHEN 650 MG: 325 TABLET, FILM COATED ORAL at 20:39

## 2017-01-01 RX ADMIN — INSULIN HUMAN 35 UNITS: 100 INJECTION, SUSPENSION SUBCUTANEOUS at 09:34

## 2017-01-01 RX ADMIN — HYDROMORPHONE HYDROCHLORIDE 0.2 MG: 1 INJECTION, SOLUTION INTRAMUSCULAR; INTRAVENOUS; SUBCUTANEOUS at 23:21

## 2017-01-01 RX ADMIN — CEFEPIME 2 G: 2 INJECTION, POWDER, FOR SOLUTION INTRAVENOUS at 21:01

## 2017-01-01 RX ADMIN — DEXAMETHASONE 4 MG: 4 TABLET ORAL at 00:54

## 2017-01-01 RX ADMIN — STANDARDIZED SENNA CONCENTRATE AND DOCUSATE SODIUM 1 TABLET: 8.6; 5 TABLET, FILM COATED ORAL at 21:00

## 2017-01-01 RX ADMIN — DEXAMETHASONE 2 MG: 4 TABLET ORAL at 21:03

## 2017-01-01 RX ADMIN — FUROSEMIDE 40 MG: 10 INJECTION, SOLUTION INTRAVENOUS at 10:06

## 2017-01-01 RX ADMIN — STANDARDIZED SENNA CONCENTRATE AND DOCUSATE SODIUM 2 TABLET: 8.6; 5 TABLET, FILM COATED ORAL at 08:25

## 2017-01-01 RX ADMIN — NYSTATIN 500000 UNITS: 100000 SUSPENSION ORAL at 21:15

## 2017-01-01 RX ADMIN — POTASSIUM CHLORIDE 10 MEQ: 7.46 INJECTION, SOLUTION INTRAVENOUS at 11:28

## 2017-01-01 RX ADMIN — FAMOTIDINE 20 MG: 20 TABLET ORAL at 20:30

## 2017-01-01 RX ADMIN — FUROSEMIDE 40 MG: 10 INJECTION, SOLUTION INTRAMUSCULAR; INTRAVENOUS at 13:52

## 2017-01-01 RX ADMIN — NYSTATIN 500000 UNITS: 100000 SUSPENSION ORAL at 12:57

## 2017-01-01 RX ADMIN — METOPROLOL TARTRATE 12.5 MG: 25 TABLET ORAL at 05:22

## 2017-01-01 RX ADMIN — AMPICILLIN SODIUM AND SULBACTAM SODIUM 3 G: 2; 1 INJECTION, POWDER, FOR SOLUTION INTRAMUSCULAR; INTRAVENOUS at 04:04

## 2017-01-01 RX ADMIN — NYSTATIN 500000 UNITS: 100000 SUSPENSION ORAL at 12:55

## 2017-01-01 RX ADMIN — DOXYCYCLINE 100 MG: 100 TABLET ORAL at 21:01

## 2017-01-01 RX ADMIN — CEFTRIAXONE 2 G: 2 INJECTION, POWDER, FOR SOLUTION INTRAMUSCULAR; INTRAVENOUS at 07:43

## 2017-01-01 RX ADMIN — DEXAMETHASONE 2 MG: 1 TABLET ORAL at 20:10

## 2017-01-01 RX ADMIN — DEXAMETHASONE 4 MG: 4 TABLET ORAL at 11:59

## 2017-01-01 RX ADMIN — LEVETIRACETAM 1500 MG: 100 SOLUTION ORAL at 21:57

## 2017-01-01 RX ADMIN — METOPROLOL TARTRATE 25 MG: 25 TABLET ORAL at 08:25

## 2017-01-01 RX ADMIN — DOXYCYCLINE 100 MG: 100 TABLET ORAL at 08:52

## 2017-01-01 RX ADMIN — INSULIN HUMAN 35 UNITS: 100 INJECTION, SUSPENSION SUBCUTANEOUS at 09:11

## 2017-01-01 RX ADMIN — AMPICILLIN SODIUM AND SULBACTAM SODIUM 3 G: 2; 1 INJECTION, POWDER, FOR SOLUTION INTRAMUSCULAR; INTRAVENOUS at 17:51

## 2017-01-01 RX ADMIN — DEXAMETHASONE SODIUM PHOSPHATE 4 MG: 4 INJECTION, SOLUTION INTRAMUSCULAR; INTRAVENOUS at 23:25

## 2017-01-01 RX ADMIN — NYSTATIN 500000 UNITS: 100000 SUSPENSION ORAL at 19:56

## 2017-01-01 RX ADMIN — DOXYCYCLINE 100 MG: 100 TABLET ORAL at 08:43

## 2017-01-01 RX ADMIN — DEXAMETHASONE 4 MG: 4 TABLET ORAL at 05:14

## 2017-01-01 RX ADMIN — BENZONATATE 100 MG: 100 CAPSULE ORAL at 03:26

## 2017-01-01 RX ADMIN — DEXAMETHASONE SODIUM PHOSPHATE 4 MG: 4 INJECTION, SOLUTION INTRAMUSCULAR; INTRAVENOUS at 22:17

## 2017-01-01 RX ADMIN — INSULIN HUMAN 35 UNITS: 100 INJECTION, SUSPENSION SUBCUTANEOUS at 17:33

## 2017-01-01 RX ADMIN — DEXAMETHASONE SODIUM PHOSPHATE 4 MG: 4 INJECTION, SOLUTION INTRAMUSCULAR; INTRAVENOUS at 12:39

## 2017-01-01 RX ADMIN — LEVETIRACETAM 1500 MG: 100 SOLUTION ORAL at 21:06

## 2017-01-01 RX ADMIN — FAMOTIDINE 20 MG: 20 TABLET ORAL at 08:35

## 2017-01-01 RX ADMIN — INSULIN HUMAN 35 UNITS: 100 INJECTION, SUSPENSION SUBCUTANEOUS at 07:02

## 2017-01-01 RX ADMIN — SODIUM CHLORIDE 200 MG: 9 INJECTION, SOLUTION INTRAVENOUS at 22:20

## 2017-01-01 RX ADMIN — DIBASIC SODIUM PHOSPHATE, MONOBASIC POTASSIUM PHOSPHATE AND MONOBASIC SODIUM PHOSPHATE 1 TABLET: 852; 155; 130 TABLET ORAL at 11:29

## 2017-01-01 RX ADMIN — INSULIN LISPRO 1 UNITS: 100 INJECTION, SOLUTION INTRAVENOUS; SUBCUTANEOUS at 11:24

## 2017-01-01 RX ADMIN — FUROSEMIDE 40 MG: 10 INJECTION, SOLUTION INTRAVENOUS at 07:59

## 2017-01-01 RX ADMIN — DEXAMETHASONE 4 MG: 4 TABLET ORAL at 17:56

## 2017-01-01 RX ADMIN — DEXAMETHASONE 2 MG: 4 TABLET ORAL at 07:56

## 2017-01-01 RX ADMIN — NYSTATIN 500000 UNITS: 100000 SUSPENSION ORAL at 09:46

## 2017-01-01 RX ADMIN — LIDOCAINE 1 PATCH: 50 PATCH CUTANEOUS at 09:04

## 2017-01-01 RX ADMIN — SENNOSIDES AND DOCUSATE SODIUM 2 TABLET: 8.6; 5 TABLET ORAL at 08:29

## 2017-01-01 RX ADMIN — ACETAMINOPHEN 650 MG: 325 TABLET, FILM COATED ORAL at 15:10

## 2017-01-01 RX ADMIN — DEXAMETHASONE 4 MG: 4 TABLET ORAL at 21:41

## 2017-01-01 RX ADMIN — INSULIN LISPRO 4 UNITS: 100 INJECTION, SOLUTION INTRAVENOUS; SUBCUTANEOUS at 20:37

## 2017-01-01 RX ADMIN — INFLUENZA A VIRUS A/MICHIGAN/45/2015 X-275 (H1N1) ANTIGEN (FORMALDEHYDE INACTIVATED), INFLUENZA A VIRUS A/HONG KONG/4801/2014 X-263B (H3N2) ANTIGEN (FORMALDEHYDE INACTIVATED), INFLUENZA B VIRUS B/PHUKET/3073/2013 ANTIGEN (FORMALDEHYDE INACTIVATED), AND INFLUENZA B VIRUS B/BRISBANE/60/2008 ANTIGEN (FORMALDEHYDE INACTIVATED) 0.5 ML: 15; 15; 15; 15 INJECTION, SUSPENSION INTRAMUSCULAR at 12:12

## 2017-01-01 RX ADMIN — DEXAMETHASONE 4 MG: 4 TABLET ORAL at 17:17

## 2017-01-01 RX ADMIN — LEVETIRACETAM 1500 MG: 100 SOLUTION ORAL at 21:01

## 2017-01-01 RX ADMIN — INSULIN HUMAN 35 UNITS: 100 INJECTION, SUSPENSION SUBCUTANEOUS at 06:12

## 2017-01-01 RX ADMIN — INSULIN LISPRO 2 UNITS: 100 INJECTION, SOLUTION INTRAVENOUS; SUBCUTANEOUS at 06:26

## 2017-01-01 RX ADMIN — DEXAMETHASONE 4 MG: 4 TABLET ORAL at 21:06

## 2017-01-01 RX ADMIN — INSULIN LISPRO 3 UNITS: 100 INJECTION, SOLUTION INTRAVENOUS; SUBCUTANEOUS at 17:57

## 2017-01-01 RX ADMIN — NYSTATIN 500000 UNITS: 100000 SUSPENSION ORAL at 17:54

## 2017-01-01 RX ADMIN — LORAZEPAM 1 MG: 2 INJECTION INTRAMUSCULAR at 12:15

## 2017-01-01 RX ADMIN — DEXAMETHASONE 4 MG: 4 TABLET ORAL at 14:22

## 2017-01-01 RX ADMIN — DEXAMETHASONE 2 MG: 4 TABLET ORAL at 21:05

## 2017-01-01 RX ADMIN — RIVAROXABAN 20 MG: 20 TABLET, FILM COATED ORAL at 17:37

## 2017-01-01 RX ADMIN — RIVAROXABAN 20 MG: 20 TABLET, FILM COATED ORAL at 17:01

## 2017-01-01 RX ADMIN — OMEPRAZOLE 40 MG: 20 CAPSULE, DELAYED RELEASE ORAL at 17:24

## 2017-01-01 RX ADMIN — DIBASIC SODIUM PHOSPHATE, MONOBASIC POTASSIUM PHOSPHATE AND MONOBASIC SODIUM PHOSPHATE 1 TABLET: 852; 155; 130 TABLET ORAL at 00:00

## 2017-01-01 RX ADMIN — RIVAROXABAN 20 MG: 20 TABLET, FILM COATED ORAL at 18:15

## 2017-01-01 RX ADMIN — INSULIN HUMAN 35 UNITS: 100 INJECTION, SUSPENSION SUBCUTANEOUS at 05:59

## 2017-01-01 RX ADMIN — DEXAMETHASONE 2 MG: 4 TABLET ORAL at 11:03

## 2017-01-01 RX ADMIN — FENTANYL CITRATE 25 MCG: 50 INJECTION, SOLUTION INTRAMUSCULAR; INTRAVENOUS at 12:44

## 2017-01-01 RX ADMIN — ENOXAPARIN SODIUM 40 MG: 100 INJECTION SUBCUTANEOUS at 09:55

## 2017-01-01 RX ADMIN — DEXAMETHASONE 2 MG: 4 TABLET ORAL at 10:25

## 2017-01-01 RX ADMIN — DEXAMETHASONE 3 MG: 1 TABLET ORAL at 08:16

## 2017-01-01 RX ADMIN — BENZONATATE 100 MG: 100 CAPSULE ORAL at 20:43

## 2017-01-01 RX ADMIN — FAMOTIDINE 20 MG: 20 TABLET, FILM COATED ORAL at 09:46

## 2017-01-01 RX ADMIN — INSULIN LISPRO 3 UNITS: 100 INJECTION, SOLUTION INTRAVENOUS; SUBCUTANEOUS at 21:04

## 2017-01-01 RX ADMIN — DEXTROSE MONOHYDRATE 2000 MG: 50 INJECTION, SOLUTION INTRAVENOUS at 18:09

## 2017-01-01 RX ADMIN — INSULIN LISPRO 2 UNITS: 100 INJECTION, SOLUTION INTRAVENOUS; SUBCUTANEOUS at 03:59

## 2017-01-01 RX ADMIN — SODIUM CHLORIDE: 9 INJECTION, SOLUTION INTRAVENOUS at 17:10

## 2017-01-01 RX ADMIN — DEXAMETHASONE 2 MG: 4 TABLET ORAL at 21:17

## 2017-01-01 RX ADMIN — FAMOTIDINE 20 MG: 20 TABLET, FILM COATED ORAL at 08:13

## 2017-01-01 RX ADMIN — DEXAMETHASONE 2 MG: 1 TABLET ORAL at 10:24

## 2017-01-01 RX ADMIN — DEXAMETHASONE 4 MG: 4 TABLET ORAL at 05:59

## 2017-01-01 RX ADMIN — LORAZEPAM 0.5 MG: 2 INJECTION INTRAMUSCULAR at 10:39

## 2017-01-01 RX ADMIN — INSULIN LISPRO 2 UNITS: 100 INJECTION, SOLUTION INTRAVENOUS; SUBCUTANEOUS at 17:09

## 2017-01-01 RX ADMIN — INSULIN HUMAN 35 UNITS: 100 INJECTION, SUSPENSION SUBCUTANEOUS at 20:27

## 2017-01-01 RX ADMIN — DEXAMETHASONE 2 MG: 4 TABLET ORAL at 10:21

## 2017-01-01 RX ADMIN — INSULIN LISPRO 2 UNITS: 100 INJECTION, SOLUTION INTRAVENOUS; SUBCUTANEOUS at 05:40

## 2017-01-01 RX ADMIN — BENZONATATE 100 MG: 100 CAPSULE ORAL at 20:14

## 2017-01-01 RX ADMIN — VANCOMYCIN HYDROCHLORIDE 2600 MG: 100 INJECTION, POWDER, LYOPHILIZED, FOR SOLUTION INTRAVENOUS at 12:08

## 2017-01-01 RX ADMIN — INSULIN HUMAN 35 UNITS: 100 INJECTION, SUSPENSION SUBCUTANEOUS at 08:05

## 2017-01-01 RX ADMIN — INSULIN LISPRO 4 UNITS: 100 INJECTION, SOLUTION INTRAVENOUS; SUBCUTANEOUS at 11:18

## 2017-01-01 RX ADMIN — VANCOMYCIN HYDROCHLORIDE 1600 MG: 100 INJECTION, POWDER, LYOPHILIZED, FOR SOLUTION INTRAVENOUS at 03:04

## 2017-01-01 RX ADMIN — LEVETIRACETAM 1500 MG: 100 SOLUTION ORAL at 20:59

## 2017-01-01 RX ADMIN — STANDARDIZED SENNA CONCENTRATE AND DOCUSATE SODIUM 2 TABLET: 8.6; 5 TABLET, FILM COATED ORAL at 19:55

## 2017-01-01 RX ADMIN — INSULIN LISPRO 1 UNITS: 100 INJECTION, SOLUTION INTRAVENOUS; SUBCUTANEOUS at 17:23

## 2017-01-01 RX ADMIN — INSULIN HUMAN 35 UNITS: 100 INJECTION, SUSPENSION SUBCUTANEOUS at 16:59

## 2017-01-01 RX ADMIN — NYSTATIN 500000 UNITS: 100000 SUSPENSION ORAL at 18:26

## 2017-01-01 RX ADMIN — NYSTATIN 500000 UNITS: 100000 SUSPENSION ORAL at 11:42

## 2017-01-01 RX ADMIN — LEVETIRACETAM 1500 MG: 100 SOLUTION ORAL at 09:10

## 2017-01-01 RX ADMIN — DEXAMETHASONE 4 MG: 4 TABLET ORAL at 11:46

## 2017-01-01 RX ADMIN — DEXAMETHASONE SODIUM PHOSPHATE 4 MG: 4 INJECTION, SOLUTION INTRAMUSCULAR; INTRAVENOUS at 13:23

## 2017-01-01 RX ADMIN — LEVETIRACETAM 1500 MG: 100 SOLUTION ORAL at 21:18

## 2017-01-01 RX ADMIN — RIVAROXABAN 20 MG: 20 TABLET, FILM COATED ORAL at 18:52

## 2017-01-01 RX ADMIN — INSULIN HUMAN 35 UNITS: 100 INJECTION, SUSPENSION SUBCUTANEOUS at 17:41

## 2017-01-01 RX ADMIN — DOXYCYCLINE 100 MG: 100 TABLET ORAL at 21:06

## 2017-01-01 RX ADMIN — NYSTATIN 500000 UNITS: 100000 SUSPENSION ORAL at 13:32

## 2017-01-01 RX ADMIN — INSULIN HUMAN 18 UNITS: 100 INJECTION, SUSPENSION SUBCUTANEOUS at 07:52

## 2017-01-01 RX ADMIN — STANDARDIZED SENNA CONCENTRATE AND DOCUSATE SODIUM 2 TABLET: 8.6; 5 TABLET, FILM COATED ORAL at 10:23

## 2017-01-01 RX ADMIN — DEXAMETHASONE 2 MG: 4 TABLET ORAL at 20:32

## 2017-01-01 RX ADMIN — POTASSIUM CHLORIDE 10 MEQ: 7.46 INJECTION, SOLUTION INTRAVENOUS at 10:17

## 2017-01-01 RX ADMIN — NYSTATIN 500000 UNITS: 100000 SUSPENSION ORAL at 11:04

## 2017-01-01 RX ADMIN — DEXAMETHASONE 4 MG: 4 TABLET ORAL at 11:31

## 2017-01-01 RX ADMIN — NYSTATIN 500000 UNITS: 100000 SUSPENSION ORAL at 14:27

## 2017-01-01 RX ADMIN — INSULIN HUMAN 35 UNITS: 100 INJECTION, SUSPENSION SUBCUTANEOUS at 17:08

## 2017-01-01 RX ADMIN — LEVETIRACETAM 500 MG: 500 TABLET ORAL at 08:15

## 2017-01-01 RX ADMIN — RIVAROXABAN 20 MG: 20 TABLET, FILM COATED ORAL at 17:31

## 2017-01-01 RX ADMIN — CEFEPIME 2 G: 2 INJECTION, POWDER, FOR SOLUTION INTRAVENOUS at 09:08

## 2017-01-01 RX ADMIN — LEVETIRACETAM 1500 MG: 100 SOLUTION ORAL at 08:48

## 2017-01-01 RX ADMIN — NYSTATIN 500000 UNITS: 100000 SUSPENSION ORAL at 20:25

## 2017-01-01 RX ADMIN — NEOMYCIN AND POLYMYXIN B SULFATES AND BACITRACIN ZINC: 400; 3.5; 5 OINTMENT TOPICAL at 21:24

## 2017-01-01 RX ADMIN — DEXAMETHASONE 4 MG: 4 TABLET ORAL at 09:38

## 2017-01-01 RX ADMIN — INSULIN HUMAN 7 UNITS: 100 INJECTION, SUSPENSION SUBCUTANEOUS at 18:04

## 2017-01-01 RX ADMIN — INSULIN HUMAN 18 UNITS: 100 INJECTION, SUSPENSION SUBCUTANEOUS at 07:57

## 2017-01-01 RX ADMIN — METOPROLOL TARTRATE 25 MG: 25 TABLET ORAL at 19:54

## 2017-01-01 RX ADMIN — DEXAMETHASONE 2 MG: 4 TABLET ORAL at 21:02

## 2017-01-01 RX ADMIN — INSULIN LISPRO 2 UNITS: 100 INJECTION, SOLUTION INTRAVENOUS; SUBCUTANEOUS at 11:31

## 2017-01-01 RX ADMIN — DEXAMETHASONE SODIUM PHOSPHATE 4 MG: 4 INJECTION, SOLUTION INTRAMUSCULAR; INTRAVENOUS at 20:33

## 2017-01-01 RX ADMIN — POTASSIUM PHOSPHATE, MONOBASIC AND POTASSIUM PHOSPHATE, DIBASIC 10 MMOL: 224; 236 INJECTION, SOLUTION INTRAVENOUS at 10:20

## 2017-01-01 RX ADMIN — DEXAMETHASONE 4 MG: 4 TABLET ORAL at 05:33

## 2017-01-01 RX ADMIN — NYSTATIN 500000 UNITS: 100000 SUSPENSION ORAL at 20:53

## 2017-01-01 RX ADMIN — INSULIN HUMAN 35 UNITS: 100 INJECTION, SUSPENSION SUBCUTANEOUS at 17:26

## 2017-01-01 RX ADMIN — LEVETIRACETAM 1500 MG: 100 SOLUTION ORAL at 08:12

## 2017-01-01 RX ADMIN — INSULIN HUMAN 35 UNITS: 100 INJECTION, SUSPENSION SUBCUTANEOUS at 06:01

## 2017-01-01 RX ADMIN — LEVETIRACETAM 1500 MG: 100 SOLUTION ORAL at 21:00

## 2017-01-01 RX ADMIN — INSULIN HUMAN 18 UNITS: 100 INJECTION, SUSPENSION SUBCUTANEOUS at 17:18

## 2017-01-01 RX ADMIN — TEMAZEPAM 15 MG: 15 CAPSULE ORAL at 20:10

## 2017-01-01 RX ADMIN — DEXAMETHASONE 4 MG: 4 TABLET ORAL at 14:27

## 2017-01-01 RX ADMIN — INSULIN HUMAN 18 UNITS: 100 INJECTION, SUSPENSION SUBCUTANEOUS at 08:58

## 2017-01-01 RX ADMIN — NYSTATIN 500000 UNITS: 100000 SUSPENSION ORAL at 17:46

## 2017-01-01 RX ADMIN — RIVAROXABAN 20 MG: 20 TABLET, FILM COATED ORAL at 17:54

## 2017-01-01 RX ADMIN — PROMETHAZINE HYDROCHLORIDE 25 MG: 25 TABLET ORAL at 21:29

## 2017-01-01 RX ADMIN — SODIUM CHLORIDE 200 MG: 9 INJECTION, SOLUTION INTRAVENOUS at 21:00

## 2017-01-01 RX ADMIN — FAMOTIDINE 20 MG: 20 TABLET ORAL at 08:59

## 2017-01-01 RX ADMIN — INSULIN HUMAN 18 UNITS: 100 INJECTION, SUSPENSION SUBCUTANEOUS at 17:22

## 2017-01-01 RX ADMIN — FAMOTIDINE 20 MG: 20 TABLET ORAL at 10:24

## 2017-01-01 RX ADMIN — DEXAMETHASONE SODIUM PHOSPHATE 4 MG: 4 INJECTION, SOLUTION INTRAMUSCULAR; INTRAVENOUS at 08:02

## 2017-01-01 RX ADMIN — NYSTATIN 500000 UNITS: 100000 SUSPENSION ORAL at 20:14

## 2017-01-01 RX ADMIN — FAMOTIDINE 20 MG: 20 TABLET, FILM COATED ORAL at 10:18

## 2017-01-01 RX ADMIN — DEXAMETHASONE 2 MG: 4 TABLET ORAL at 21:59

## 2017-01-01 RX ADMIN — VANCOMYCIN HYDROCHLORIDE 2000 MG: 100 INJECTION, POWDER, LYOPHILIZED, FOR SOLUTION INTRAVENOUS at 01:05

## 2017-01-01 RX ADMIN — LIDOCAINE 1 PATCH: 50 PATCH CUTANEOUS at 14:42

## 2017-01-01 RX ADMIN — NEOMYCIN AND POLYMYXIN B SULFATES AND BACITRACIN ZINC: 400; 3.5; 5 OINTMENT TOPICAL at 08:20

## 2017-01-01 RX ADMIN — DEXAMETHASONE 2 MG: 1 TABLET ORAL at 12:44

## 2017-01-01 RX ADMIN — ACYCLOVIR SODIUM 615 MG: 500 INJECTION, SOLUTION INTRAVENOUS at 21:30

## 2017-01-01 RX ADMIN — NEOMYCIN AND POLYMYXIN B SULFATES AND BACITRACIN ZINC: 400; 3.5; 5 OINTMENT TOPICAL at 20:53

## 2017-01-01 RX ADMIN — METOPROLOL TARTRATE 18.75 MG: 25 TABLET ORAL at 06:04

## 2017-01-01 RX ADMIN — FAMOTIDINE 20 MG: 20 TABLET ORAL at 20:55

## 2017-01-01 RX ADMIN — ENOXAPARIN SODIUM 40 MG: 100 INJECTION SUBCUTANEOUS at 09:53

## 2017-01-01 RX ADMIN — INSULIN LISPRO 1 UNITS: 100 INJECTION, SOLUTION INTRAVENOUS; SUBCUTANEOUS at 18:03

## 2017-01-01 RX ADMIN — RIVAROXABAN 20 MG: 20 TABLET, FILM COATED ORAL at 18:42

## 2017-01-01 RX ADMIN — FAMOTIDINE 20 MG: 20 TABLET ORAL at 08:00

## 2017-01-01 RX ADMIN — STANDARDIZED SENNA CONCENTRATE AND DOCUSATE SODIUM 2 TABLET: 8.6; 5 TABLET, FILM COATED ORAL at 20:07

## 2017-01-01 RX ADMIN — LEVETIRACETAM 1500 MG: 100 SOLUTION ORAL at 20:13

## 2017-01-01 RX ADMIN — INSULIN LISPRO 1 UNITS: 100 INJECTION, SOLUTION INTRAVENOUS; SUBCUTANEOUS at 11:21

## 2017-01-01 RX ADMIN — INSULIN HUMAN 35 UNITS: 100 INJECTION, SUSPENSION SUBCUTANEOUS at 06:25

## 2017-01-01 RX ADMIN — POTASSIUM CHLORIDE 10 MEQ: 7.46 INJECTION, SOLUTION INTRAVENOUS at 12:24

## 2017-01-01 RX ADMIN — LACTULOSE 30 ML: 10 SOLUTION ORAL at 05:25

## 2017-01-01 RX ADMIN — DIBASIC SODIUM PHOSPHATE, MONOBASIC POTASSIUM PHOSPHATE AND MONOBASIC SODIUM PHOSPHATE 1 TABLET: 852; 155; 130 TABLET ORAL at 02:00

## 2017-01-01 RX ADMIN — NYSTATIN 500000 UNITS: 100000 SUSPENSION ORAL at 09:47

## 2017-01-01 RX ADMIN — DEXAMETHASONE SODIUM PHOSPHATE 4 MG: 4 INJECTION, SOLUTION INTRAMUSCULAR; INTRAVENOUS at 13:55

## 2017-01-01 RX ADMIN — INSULIN LISPRO 2 UNITS: 100 INJECTION, SOLUTION INTRAVENOUS; SUBCUTANEOUS at 08:10

## 2017-01-01 RX ADMIN — DEXAMETHASONE 4 MG: 4 TABLET ORAL at 23:46

## 2017-01-01 RX ADMIN — SODIUM CHLORIDE 500 ML: 9 INJECTION, SOLUTION INTRAVENOUS at 23:03

## 2017-01-01 RX ADMIN — DEXAMETHASONE 4 MG: 4 TABLET ORAL at 20:43

## 2017-01-01 RX ADMIN — RIVAROXABAN 20 MG: 20 TABLET, FILM COATED ORAL at 17:43

## 2017-01-01 RX ADMIN — INSULIN LISPRO 3 UNITS: 100 INJECTION, SOLUTION INTRAVENOUS; SUBCUTANEOUS at 20:11

## 2017-01-01 RX ADMIN — INSULIN LISPRO 2 UNITS: 100 INJECTION, SOLUTION INTRAVENOUS; SUBCUTANEOUS at 13:00

## 2017-01-01 RX ADMIN — INSULIN LISPRO 1 UNITS: 100 INJECTION, SOLUTION INTRAVENOUS; SUBCUTANEOUS at 17:18

## 2017-01-01 RX ADMIN — LEVETIRACETAM 1500 MG: 100 SOLUTION ORAL at 09:37

## 2017-01-01 RX ADMIN — POTASSIUM CHLORIDE 10 MEQ: 7.46 INJECTION, SOLUTION INTRAVENOUS at 08:09

## 2017-01-01 RX ADMIN — SODIUM CHLORIDE: 9 INJECTION, SOLUTION INTRAVENOUS at 05:16

## 2017-01-01 RX ADMIN — STANDARDIZED SENNA CONCENTRATE AND DOCUSATE SODIUM 2 TABLET: 8.6; 5 TABLET, FILM COATED ORAL at 09:32

## 2017-01-01 RX ADMIN — INSULIN HUMAN 35 UNITS: 100 INJECTION, SUSPENSION SUBCUTANEOUS at 05:42

## 2017-01-01 RX ADMIN — AMPICILLIN SODIUM AND SULBACTAM SODIUM 3 G: 2; 1 INJECTION, POWDER, FOR SOLUTION INTRAMUSCULAR; INTRAVENOUS at 11:50

## 2017-01-01 RX ADMIN — OMEPRAZOLE 40 MG: 20 CAPSULE, DELAYED RELEASE ORAL at 10:05

## 2017-01-01 RX ADMIN — LEVETIRACETAM 1500 MG: 100 SOLUTION ORAL at 19:37

## 2017-01-01 RX ADMIN — INSULIN LISPRO 1 UNITS: 100 INJECTION, SOLUTION INTRAVENOUS; SUBCUTANEOUS at 07:37

## 2017-01-01 RX ADMIN — DEXAMETHASONE 4 MG: 4 TABLET ORAL at 18:02

## 2017-01-01 RX ADMIN — NYSTATIN 500000 UNITS: 100000 SUSPENSION ORAL at 13:58

## 2017-01-01 RX ADMIN — NYSTATIN 500000 UNITS: 100000 SUSPENSION ORAL at 20:10

## 2017-01-01 RX ADMIN — HEPARIN SODIUM 6000 UNITS: 1000 INJECTION, SOLUTION INTRAVENOUS; SUBCUTANEOUS at 06:35

## 2017-01-01 RX ADMIN — DIBASIC SODIUM PHOSPHATE, MONOBASIC POTASSIUM PHOSPHATE AND MONOBASIC SODIUM PHOSPHATE 1 TABLET: 852; 155; 130 TABLET ORAL at 13:10

## 2017-01-01 RX ADMIN — POTASSIUM CHLORIDE AND SODIUM CHLORIDE: 900; 150 INJECTION, SOLUTION INTRAVENOUS at 02:24

## 2017-01-01 RX ADMIN — RIVAROXABAN 15 MG: 10 TABLET, FILM COATED ORAL at 20:13

## 2017-01-01 RX ADMIN — INSULIN HUMAN 50 UNITS: 100 INJECTION, SUSPENSION SUBCUTANEOUS at 05:27

## 2017-01-01 RX ADMIN — LORAZEPAM 0.5 MG: 2 INJECTION INTRAMUSCULAR at 12:20

## 2017-01-01 RX ADMIN — DEXAMETHASONE SODIUM PHOSPHATE 4 MG: 4 INJECTION, SOLUTION INTRAMUSCULAR; INTRAVENOUS at 12:26

## 2017-01-01 RX ADMIN — LEVETIRACETAM 1500 MG: 100 SOLUTION ORAL at 08:10

## 2017-01-01 RX ADMIN — METOPROLOL TARTRATE 12.5 MG: 25 TABLET ORAL at 17:33

## 2017-01-01 RX ADMIN — DEXAMETHASONE 4 MG: 4 TABLET ORAL at 07:54

## 2017-01-01 RX ADMIN — FAMOTIDINE 20 MG: 20 TABLET, FILM COATED ORAL at 08:39

## 2017-01-01 RX ADMIN — DEXAMETHASONE 4 MG: 4 TABLET ORAL at 17:44

## 2017-01-01 RX ADMIN — NYSTATIN 500000 UNITS: 100000 SUSPENSION ORAL at 14:56

## 2017-01-01 RX ADMIN — LEVETIRACETAM 1500 MG: 100 SOLUTION ORAL at 09:22

## 2017-01-01 RX ADMIN — OMEPRAZOLE 40 MG: 20 CAPSULE, DELAYED RELEASE ORAL at 09:53

## 2017-01-01 RX ADMIN — ACETAMINOPHEN 650 MG: 325 TABLET, FILM COATED ORAL at 20:53

## 2017-01-01 RX ADMIN — INSULIN HUMAN 35 UNITS: 100 INJECTION, SUSPENSION SUBCUTANEOUS at 17:20

## 2017-01-01 RX ADMIN — LEVETIRACETAM 1500 MG: 100 SOLUTION ORAL at 08:13

## 2017-01-01 RX ADMIN — INSULIN LISPRO 1 UNITS: 100 INJECTION, SOLUTION INTRAVENOUS; SUBCUTANEOUS at 17:11

## 2017-01-01 RX ADMIN — DEXAMETHASONE 4 MG: 4 TABLET ORAL at 20:40

## 2017-01-01 RX ADMIN — DEXAMETHASONE 4 MG: 4 TABLET ORAL at 17:31

## 2017-01-01 RX ADMIN — CEFEPIME 2 G: 2 INJECTION, POWDER, FOR SOLUTION INTRAVENOUS at 21:21

## 2017-01-01 RX ADMIN — ENOXAPARIN SODIUM 40 MG: 100 INJECTION SUBCUTANEOUS at 07:58

## 2017-01-01 RX ADMIN — INSULIN LISPRO 2 UNITS: 100 INJECTION, SOLUTION INTRAVENOUS; SUBCUTANEOUS at 05:49

## 2017-01-01 RX ADMIN — LEVETIRACETAM 1000 MG: 100 SOLUTION ORAL at 07:51

## 2017-01-01 RX ADMIN — NYSTATIN 500000 UNITS: 100000 SUSPENSION ORAL at 21:17

## 2017-01-01 RX ADMIN — NYSTATIN 500000 UNITS: 100000 SUSPENSION ORAL at 20:18

## 2017-01-01 RX ADMIN — NYSTATIN 500000 UNITS: 100000 SUSPENSION ORAL at 08:20

## 2017-01-01 RX ADMIN — INSULIN LISPRO 2 UNITS: 100 INJECTION, SOLUTION INTRAVENOUS; SUBCUTANEOUS at 21:22

## 2017-01-01 RX ADMIN — DIBASIC SODIUM PHOSPHATE, MONOBASIC POTASSIUM PHOSPHATE AND MONOBASIC SODIUM PHOSPHATE 1 TABLET: 852; 155; 130 TABLET ORAL at 18:03

## 2017-01-01 RX ADMIN — DOXYCYCLINE 100 MG: 100 TABLET ORAL at 08:10

## 2017-01-01 RX ADMIN — FAMOTIDINE 20 MG: 20 TABLET, FILM COATED ORAL at 11:16

## 2017-01-01 RX ADMIN — CEFEPIME 2 G: 2 INJECTION, POWDER, FOR SOLUTION INTRAVENOUS at 20:17

## 2017-01-01 RX ADMIN — STANDARDIZED SENNA CONCENTRATE AND DOCUSATE SODIUM 2 TABLET: 8.6; 5 TABLET, FILM COATED ORAL at 21:31

## 2017-01-01 RX ADMIN — DEXAMETHASONE 4 MG: 4 TABLET ORAL at 23:01

## 2017-01-01 RX ADMIN — STANDARDIZED SENNA CONCENTRATE AND DOCUSATE SODIUM 1 TABLET: 8.6; 5 TABLET, FILM COATED ORAL at 22:18

## 2017-01-01 RX ADMIN — DIBASIC SODIUM PHOSPHATE, MONOBASIC POTASSIUM PHOSPHATE AND MONOBASIC SODIUM PHOSPHATE 1 TABLET: 852; 155; 130 TABLET ORAL at 17:08

## 2017-01-01 RX ADMIN — DEXTROSE MONOHYDRATE 1500 MG: 50 INJECTION, SOLUTION INTRAVENOUS at 20:34

## 2017-01-01 RX ADMIN — DOXYCYCLINE 100 MG: 100 TABLET ORAL at 20:43

## 2017-01-01 RX ADMIN — CEFEPIME 2 G: 2 INJECTION, POWDER, FOR SOLUTION INTRAMUSCULAR; INTRAVENOUS at 04:28

## 2017-01-01 RX ADMIN — RIVAROXABAN 20 MG: 20 TABLET, FILM COATED ORAL at 16:49

## 2017-01-01 RX ADMIN — NYSTATIN 500000 UNITS: 100000 SUSPENSION ORAL at 20:46

## 2017-01-01 RX ADMIN — DEXTROSE MONOHYDRATE 1500 MG: 50 INJECTION, SOLUTION INTRAVENOUS at 22:03

## 2017-01-01 RX ADMIN — DEXTROSE MONOHYDRATE 500 MG: 50 INJECTION, SOLUTION INTRAVENOUS at 09:32

## 2017-01-01 RX ADMIN — DEXAMETHASONE 4 MG: 4 TABLET ORAL at 05:48

## 2017-01-01 RX ADMIN — FAMOTIDINE 20 MG: 20 TABLET, FILM COATED ORAL at 21:29

## 2017-01-01 RX ADMIN — ENOXAPARIN SODIUM 40 MG: 100 INJECTION SUBCUTANEOUS at 09:28

## 2017-01-01 RX ADMIN — RIVAROXABAN 20 MG: 20 TABLET, FILM COATED ORAL at 17:20

## 2017-01-01 RX ADMIN — NEOMYCIN AND POLYMYXIN B SULFATES AND BACITRACIN ZINC: 400; 3.5; 5 OINTMENT TOPICAL at 20:13

## 2017-01-01 RX ADMIN — DEXAMETHASONE 4 MG: 4 TABLET ORAL at 15:28

## 2017-01-01 RX ADMIN — AMPICILLIN SODIUM AND SULBACTAM SODIUM 3 G: 2; 1 INJECTION, POWDER, FOR SOLUTION INTRAMUSCULAR; INTRAVENOUS at 12:19

## 2017-01-01 RX ADMIN — DEXAMETHASONE 4 MG: 4 TABLET ORAL at 01:00

## 2017-01-01 RX ADMIN — INSULIN LISPRO 1 UNITS: 100 INJECTION, SOLUTION INTRAVENOUS; SUBCUTANEOUS at 07:35

## 2017-01-01 RX ADMIN — INSULIN LISPRO 1 UNITS: 100 INJECTION, SOLUTION INTRAVENOUS; SUBCUTANEOUS at 17:41

## 2017-01-01 RX ADMIN — DEXAMETHASONE 4 MG: 4 TABLET ORAL at 08:36

## 2017-01-01 RX ADMIN — SODIUM CHLORIDE 200 MG: 9 INJECTION, SOLUTION INTRAVENOUS at 23:06

## 2017-01-01 RX ADMIN — TEMAZEPAM 15 MG: 15 CAPSULE ORAL at 21:24

## 2017-01-01 RX ADMIN — METOPROLOL TARTRATE 25 MG: 25 TABLET ORAL at 09:28

## 2017-01-01 RX ADMIN — FUROSEMIDE 40 MG: 10 INJECTION, SOLUTION INTRAVENOUS at 09:55

## 2017-01-01 RX ADMIN — BENZONATATE 100 MG: 100 CAPSULE ORAL at 06:32

## 2017-01-01 RX ADMIN — DEXTROSE MONOHYDRATE 1500 MG: 50 INJECTION, SOLUTION INTRAVENOUS at 20:59

## 2017-01-01 RX ADMIN — INSULIN LISPRO 2 UNITS: 100 INJECTION, SOLUTION INTRAVENOUS; SUBCUTANEOUS at 05:14

## 2017-01-01 RX ADMIN — STANDARDIZED SENNA CONCENTRATE AND DOCUSATE SODIUM 2 TABLET: 8.6; 5 TABLET, FILM COATED ORAL at 08:11

## 2017-01-01 RX ADMIN — LEVETIRACETAM 1000 MG: 100 SOLUTION ORAL at 10:54

## 2017-01-01 RX ADMIN — NYSTATIN 500000 UNITS: 100000 SUSPENSION ORAL at 09:22

## 2017-01-01 RX ADMIN — NYSTATIN 500000 UNITS: 100000 SUSPENSION ORAL at 12:19

## 2017-01-01 RX ADMIN — FAMOTIDINE 20 MG: 20 TABLET, FILM COATED ORAL at 22:18

## 2017-01-01 RX ADMIN — LEVETIRACETAM 1500 MG: 100 SOLUTION ORAL at 08:40

## 2017-01-01 RX ADMIN — INSULIN LISPRO 4 UNITS: 100 INJECTION, SOLUTION INTRAVENOUS; SUBCUTANEOUS at 20:51

## 2017-01-01 RX ADMIN — RIVAROXABAN 20 MG: 20 TABLET, FILM COATED ORAL at 17:06

## 2017-01-01 RX ADMIN — NYSTATIN 500000 UNITS: 100000 SUSPENSION ORAL at 21:14

## 2017-01-01 RX ADMIN — AMPICILLIN SODIUM AND SULBACTAM SODIUM 3 G: 2; 1 INJECTION, POWDER, FOR SOLUTION INTRAMUSCULAR; INTRAVENOUS at 02:00

## 2017-01-01 RX ADMIN — INSULIN LISPRO 2 UNITS: 100 INJECTION, SOLUTION INTRAVENOUS; SUBCUTANEOUS at 17:32

## 2017-01-01 RX ADMIN — LEVETIRACETAM 1500 MG: 100 SOLUTION ORAL at 07:52

## 2017-01-01 RX ADMIN — FUROSEMIDE 40 MG: 10 INJECTION, SOLUTION INTRAMUSCULAR; INTRAVENOUS at 07:33

## 2017-01-01 RX ADMIN — ENOXAPARIN SODIUM 100 MG: 100 INJECTION SUBCUTANEOUS at 20:21

## 2017-01-01 RX ADMIN — LEVETIRACETAM 1500 MG: 100 SOLUTION ORAL at 20:22

## 2017-01-01 RX ADMIN — INSULIN LISPRO 3 UNITS: 100 INJECTION, SOLUTION INTRAVENOUS; SUBCUTANEOUS at 20:22

## 2017-01-01 RX ADMIN — LEVETIRACETAM 1500 MG: 100 SOLUTION ORAL at 22:03

## 2017-01-01 RX ADMIN — ENOXAPARIN SODIUM 100 MG: 100 INJECTION SUBCUTANEOUS at 08:00

## 2017-01-01 RX ADMIN — DEXTROSE MONOHYDRATE 1500 MG: 50 INJECTION, SOLUTION INTRAVENOUS at 21:23

## 2017-01-01 RX ADMIN — DEXAMETHASONE 4 MG: 4 TABLET ORAL at 06:04

## 2017-01-01 RX ADMIN — INSULIN HUMAN 7 UNITS: 100 INJECTION, SUSPENSION SUBCUTANEOUS at 18:24

## 2017-01-01 RX ADMIN — INSULIN LISPRO 3 UNITS: 100 INJECTION, SOLUTION INTRAVENOUS; SUBCUTANEOUS at 11:42

## 2017-01-01 RX ADMIN — IOHEXOL 75 ML: 350 INJECTION, SOLUTION INTRAVENOUS at 02:15

## 2017-01-01 RX ADMIN — ENOXAPARIN SODIUM 100 MG: 100 INJECTION SUBCUTANEOUS at 19:33

## 2017-01-01 RX ADMIN — NYSTATIN 500000 UNITS: 100000 SUSPENSION ORAL at 10:23

## 2017-01-01 RX ADMIN — FAMOTIDINE 20 MG: 20 TABLET, FILM COATED ORAL at 09:32

## 2017-01-01 RX ADMIN — LORAZEPAM 0.5 MG: 2 INJECTION INTRAMUSCULAR at 19:07

## 2017-01-01 RX ADMIN — NYSTATIN 500000 UNITS: 100000 SUSPENSION ORAL at 13:24

## 2017-01-01 RX ADMIN — DEXAMETHASONE 2 MG: 4 TABLET ORAL at 08:48

## 2017-01-01 RX ADMIN — DIBASIC SODIUM PHOSPHATE, MONOBASIC POTASSIUM PHOSPHATE AND MONOBASIC SODIUM PHOSPHATE 1 TABLET: 852; 155; 130 TABLET ORAL at 05:41

## 2017-01-01 RX ADMIN — NYSTATIN 500000 UNITS: 100000 SUSPENSION ORAL at 17:13

## 2017-01-01 RX ADMIN — STANDARDIZED SENNA CONCENTRATE AND DOCUSATE SODIUM 2 TABLET: 8.6; 5 TABLET, FILM COATED ORAL at 09:00

## 2017-01-01 RX ADMIN — AMPICILLIN SODIUM AND SULBACTAM SODIUM 3 G: 2; 1 INJECTION, POWDER, FOR SOLUTION INTRAMUSCULAR; INTRAVENOUS at 17:24

## 2017-01-01 RX ADMIN — INSULIN HUMAN 35 UNITS: 100 INJECTION, SUSPENSION SUBCUTANEOUS at 18:15

## 2017-01-01 RX ADMIN — LEVETIRACETAM 1500 MG: 100 SOLUTION ORAL at 08:58

## 2017-01-01 RX ADMIN — INSULIN LISPRO 1 UNITS: 100 INJECTION, SOLUTION INTRAVENOUS; SUBCUTANEOUS at 07:09

## 2017-01-01 RX ADMIN — DEXTROSE MONOHYDRATE 1500 MG: 50 INJECTION, SOLUTION INTRAVENOUS at 10:14

## 2017-01-01 RX ADMIN — LEVETIRACETAM 1500 MG: 100 SOLUTION ORAL at 09:32

## 2017-01-01 RX ADMIN — DOXYCYCLINE 100 MG: 100 TABLET ORAL at 09:38

## 2017-01-01 RX ADMIN — DEXTROSE MONOHYDRATE 1500 MG: 50 INJECTION, SOLUTION INTRAVENOUS at 09:53

## 2017-01-01 RX ADMIN — INSULIN LISPRO 3 UNITS: 100 INJECTION, SOLUTION INTRAVENOUS; SUBCUTANEOUS at 17:41

## 2017-01-01 RX ADMIN — INSULIN LISPRO 1 UNITS: 100 INJECTION, SOLUTION INTRAVENOUS; SUBCUTANEOUS at 11:33

## 2017-01-01 RX ADMIN — NYSTATIN 500000 UNITS: 100000 SUSPENSION ORAL at 17:20

## 2017-01-01 RX ADMIN — DEXAMETHASONE 4 MG: 4 TABLET ORAL at 15:04

## 2017-01-01 RX ADMIN — DEXAMETHASONE 4 MG: 4 TABLET ORAL at 17:29

## 2017-01-01 RX ADMIN — BENZONATATE 100 MG: 100 CAPSULE ORAL at 14:26

## 2017-01-01 RX ADMIN — LEVETIRACETAM 1500 MG: 100 SOLUTION ORAL at 20:43

## 2017-01-01 RX ADMIN — INSULIN LISPRO 4 UNITS: 100 INJECTION, SOLUTION INTRAVENOUS; SUBCUTANEOUS at 20:09

## 2017-01-01 RX ADMIN — DOXYCYCLINE 100 MG: 100 TABLET ORAL at 21:23

## 2017-01-01 RX ADMIN — INSULIN LISPRO 2 UNITS: 100 INJECTION, SOLUTION INTRAVENOUS; SUBCUTANEOUS at 21:11

## 2017-01-01 RX ADMIN — POTASSIUM CHLORIDE 10 MEQ: 7.46 INJECTION, SOLUTION INTRAVENOUS at 13:32

## 2017-01-01 RX ADMIN — HEPARIN SODIUM 3200 UNITS: 1000 INJECTION, SOLUTION INTRAVENOUS; SUBCUTANEOUS at 20:03

## 2017-01-01 RX ADMIN — NYSTATIN 500000 UNITS: 100000 SUSPENSION ORAL at 08:12

## 2017-01-01 RX ADMIN — FAMOTIDINE 20 MG: 20 TABLET ORAL at 09:35

## 2017-01-01 RX ADMIN — DEXAMETHASONE 4 MG: 4 TABLET ORAL at 21:01

## 2017-01-01 RX ADMIN — DEXAMETHASONE 4 MG: 4 TABLET ORAL at 05:25

## 2017-01-01 RX ADMIN — LEVETIRACETAM 1500 MG: 100 SOLUTION ORAL at 08:56

## 2017-01-01 RX ADMIN — INSULIN HUMAN 18 UNITS: 100 INJECTION, SUSPENSION SUBCUTANEOUS at 07:39

## 2017-01-01 RX ADMIN — INSULIN LISPRO 2 UNITS: 100 INJECTION, SOLUTION INTRAVENOUS; SUBCUTANEOUS at 05:04

## 2017-01-01 RX ADMIN — MORPHINE SULFATE 4 MG: 4 INJECTION INTRAVENOUS at 11:45

## 2017-01-01 RX ADMIN — DEXAMETHASONE 4 MG: 4 TABLET ORAL at 20:07

## 2017-01-01 RX ADMIN — DEXAMETHASONE 4 MG: 4 TABLET ORAL at 08:34

## 2017-01-01 RX ADMIN — DEXAMETHASONE SODIUM PHOSPHATE 4 MG: 4 INJECTION, SOLUTION INTRAMUSCULAR; INTRAVENOUS at 07:33

## 2017-01-01 RX ADMIN — CEFEPIME 2 G: 2 INJECTION, POWDER, FOR SOLUTION INTRAVENOUS at 09:45

## 2017-01-01 RX ADMIN — INSULIN LISPRO 2 UNITS: 100 INJECTION, SOLUTION INTRAVENOUS; SUBCUTANEOUS at 12:34

## 2017-01-01 RX ADMIN — CEFEPIME 2 G: 2 INJECTION, POWDER, FOR SOLUTION INTRAVENOUS at 21:06

## 2017-01-01 RX ADMIN — DEXAMETHASONE 2 MG: 4 TABLET ORAL at 21:16

## 2017-01-01 RX ADMIN — INSULIN LISPRO 2 UNITS: 100 INJECTION, SOLUTION INTRAVENOUS; SUBCUTANEOUS at 16:59

## 2017-01-01 RX ADMIN — NYSTATIN 500000 UNITS: 100000 SUSPENSION ORAL at 12:21

## 2017-01-01 RX ADMIN — AMPICILLIN SODIUM AND SULBACTAM SODIUM 3 G: 2; 1 INJECTION, POWDER, FOR SOLUTION INTRAMUSCULAR; INTRAVENOUS at 23:22

## 2017-01-01 RX ADMIN — LEVETIRACETAM 1500 MG: 100 SOLUTION ORAL at 21:15

## 2017-01-01 RX ADMIN — HYDROCODONE BITARTRATE AND ACETAMINOPHEN 1 TABLET: 5; 325 TABLET ORAL at 20:12

## 2017-01-01 RX ADMIN — LEVETIRACETAM 500 MG: 500 TABLET ORAL at 10:24

## 2017-01-01 RX ADMIN — INSULIN LISPRO 2 UNITS: 100 INJECTION, SOLUTION INTRAVENOUS; SUBCUTANEOUS at 11:59

## 2017-01-01 RX ADMIN — STANDARDIZED SENNA CONCENTRATE AND DOCUSATE SODIUM 2 TABLET: 8.6; 5 TABLET, FILM COATED ORAL at 08:04

## 2017-01-01 RX ADMIN — DEXAMETHASONE 4 MG: 4 TABLET ORAL at 23:36

## 2017-01-01 RX ADMIN — METOPROLOL TARTRATE 25 MG: 25 TABLET ORAL at 09:53

## 2017-01-01 RX ADMIN — INSULIN LISPRO 2 UNITS: 100 INJECTION, SOLUTION INTRAVENOUS; SUBCUTANEOUS at 12:12

## 2017-01-01 RX ADMIN — INSULIN LISPRO 2 UNITS: 100 INJECTION, SOLUTION INTRAVENOUS; SUBCUTANEOUS at 17:45

## 2017-01-01 RX ADMIN — NYSTATIN 500000 UNITS: 100000 SUSPENSION ORAL at 11:29

## 2017-01-01 RX ADMIN — STANDARDIZED SENNA CONCENTRATE AND DOCUSATE SODIUM 1 TABLET: 8.6; 5 TABLET, FILM COATED ORAL at 20:10

## 2017-01-01 RX ADMIN — DOCUSATE SODIUM 100 MG: 100 CAPSULE ORAL at 08:31

## 2017-01-01 RX ADMIN — INSULIN LISPRO 1 UNITS: 100 INJECTION, SOLUTION INTRAVENOUS; SUBCUTANEOUS at 07:49

## 2017-01-01 RX ADMIN — AMPICILLIN SODIUM AND SULBACTAM SODIUM 3 G: 2; 1 INJECTION, POWDER, FOR SOLUTION INTRAMUSCULAR; INTRAVENOUS at 07:29

## 2017-01-01 RX ADMIN — STANDARDIZED SENNA CONCENTRATE AND DOCUSATE SODIUM 1 TABLET: 8.6; 5 TABLET, FILM COATED ORAL at 20:18

## 2017-01-01 RX ADMIN — DEXAMETHASONE SODIUM PHOSPHATE 4 MG: 4 INJECTION, SOLUTION INTRAMUSCULAR; INTRAVENOUS at 22:32

## 2017-01-01 RX ADMIN — INSULIN HUMAN 35 UNITS: 100 INJECTION, SUSPENSION SUBCUTANEOUS at 08:30

## 2017-01-01 RX ADMIN — INSULIN LISPRO 2 UNITS: 100 INJECTION, SOLUTION INTRAVENOUS; SUBCUTANEOUS at 17:52

## 2017-01-01 RX ADMIN — LEVETIRACETAM 1500 MG: 100 SOLUTION ORAL at 10:25

## 2017-01-01 RX ADMIN — LEVETIRACETAM 1500 MG: 100 SOLUTION ORAL at 09:50

## 2017-01-01 RX ADMIN — STANDARDIZED SENNA CONCENTRATE AND DOCUSATE SODIUM 1 TABLET: 8.6; 5 TABLET, FILM COATED ORAL at 09:42

## 2017-01-01 RX ADMIN — NEOMYCIN AND POLYMYXIN B SULFATES AND BACITRACIN ZINC: 400; 3.5; 5 OINTMENT TOPICAL at 21:53

## 2017-01-01 RX ADMIN — SODIUM CHLORIDE: 9 INJECTION, SOLUTION INTRAVENOUS at 12:26

## 2017-01-01 RX ADMIN — METOPROLOL TARTRATE 25 MG: 25 TABLET ORAL at 10:12

## 2017-01-01 RX ADMIN — INSULIN HUMAN 35 UNITS: 100 INJECTION, SUSPENSION SUBCUTANEOUS at 17:43

## 2017-01-01 RX ADMIN — INSULIN LISPRO 2 UNITS: 100 INJECTION, SOLUTION INTRAVENOUS; SUBCUTANEOUS at 05:42

## 2017-01-01 RX ADMIN — INSULIN LISPRO 2 UNITS: 100 INJECTION, SOLUTION INTRAVENOUS; SUBCUTANEOUS at 05:26

## 2017-01-01 RX ADMIN — STANDARDIZED SENNA CONCENTRATE AND DOCUSATE SODIUM 2 TABLET: 8.6; 5 TABLET, FILM COATED ORAL at 10:06

## 2017-01-01 RX ADMIN — CEFEPIME 2 G: 2 INJECTION, POWDER, FOR SOLUTION INTRAVENOUS at 20:40

## 2017-01-01 RX ADMIN — INSULIN LISPRO 1 UNITS: 100 INJECTION, SOLUTION INTRAVENOUS; SUBCUTANEOUS at 17:27

## 2017-01-01 RX ADMIN — INSULIN LISPRO 1 UNITS: 100 INJECTION, SOLUTION INTRAVENOUS; SUBCUTANEOUS at 07:47

## 2017-01-01 RX ADMIN — INSULIN LISPRO 3 UNITS: 100 INJECTION, SOLUTION INTRAVENOUS; SUBCUTANEOUS at 11:31

## 2017-01-01 RX ADMIN — FAMOTIDINE 20 MG: 20 TABLET, FILM COATED ORAL at 08:31

## 2017-01-01 RX ADMIN — LEVETIRACETAM 1000 MG: 100 SOLUTION ORAL at 08:01

## 2017-01-01 RX ADMIN — BENZOCAINE 1 APPLICATION: 200 GEL TOPICAL at 11:19

## 2017-01-01 RX ADMIN — RIVAROXABAN 20 MG: 20 TABLET, FILM COATED ORAL at 18:14

## 2017-01-01 RX ADMIN — POTASSIUM CHLORIDE 10 MEQ: 7.46 INJECTION, SOLUTION INTRAVENOUS at 21:36

## 2017-01-01 RX ADMIN — INSULIN LISPRO 2 UNITS: 100 INJECTION, SOLUTION INTRAVENOUS; SUBCUTANEOUS at 21:21

## 2017-01-01 RX ADMIN — DEXTROSE MONOHYDRATE 1000 MG: 50 INJECTION, SOLUTION INTRAVENOUS at 08:17

## 2017-01-01 RX ADMIN — OMEPRAZOLE 40 MG: 20 CAPSULE, DELAYED RELEASE ORAL at 08:25

## 2017-01-01 RX ADMIN — RIVAROXABAN 20 MG: 20 TABLET, FILM COATED ORAL at 18:02

## 2017-01-01 RX ADMIN — NYSTATIN 500000 UNITS: 100000 SUSPENSION ORAL at 20:36

## 2017-01-01 RX ADMIN — DEXAMETHASONE 4 MG: 4 TABLET ORAL at 11:26

## 2017-01-01 RX ADMIN — RIVAROXABAN 20 MG: 20 TABLET, FILM COATED ORAL at 17:46

## 2017-01-01 RX ADMIN — DEXAMETHASONE 4 MG: 4 TABLET ORAL at 08:52

## 2017-01-01 RX ADMIN — DEXAMETHASONE 4 MG: 4 TABLET ORAL at 20:58

## 2017-01-01 RX ADMIN — INSULIN HUMAN 35 UNITS: 100 INJECTION, SUSPENSION SUBCUTANEOUS at 11:22

## 2017-01-01 RX ADMIN — BENZONATATE 100 MG: 100 CAPSULE ORAL at 09:00

## 2017-01-01 RX ADMIN — NYSTATIN 500000 UNITS: 100000 SUSPENSION ORAL at 09:32

## 2017-01-01 RX ADMIN — INSULIN LISPRO 2 UNITS: 100 INJECTION, SOLUTION INTRAVENOUS; SUBCUTANEOUS at 17:51

## 2017-01-01 RX ADMIN — STANDARDIZED SENNA CONCENTRATE AND DOCUSATE SODIUM 2 TABLET: 8.6; 5 TABLET, FILM COATED ORAL at 08:03

## 2017-01-01 RX ADMIN — FAMOTIDINE 20 MG: 20 TABLET ORAL at 08:14

## 2017-01-01 RX ADMIN — RIVAROXABAN 20 MG: 20 TABLET, FILM COATED ORAL at 20:44

## 2017-01-01 RX ADMIN — INSULIN LISPRO 2 UNITS: 100 INJECTION, SOLUTION INTRAVENOUS; SUBCUTANEOUS at 05:59

## 2017-01-01 RX ADMIN — METOPROLOL TARTRATE 18.75 MG: 25 TABLET ORAL at 05:33

## 2017-01-01 RX ADMIN — ZONISAMIDE 200 MG: 50 CAPSULE ORAL at 07:33

## 2017-01-01 RX ADMIN — DEXAMETHASONE 4 MG: 4 TABLET ORAL at 00:09

## 2017-01-01 RX ADMIN — LEVETIRACETAM 1500 MG: 100 SOLUTION ORAL at 22:09

## 2017-01-01 RX ADMIN — DEXAMETHASONE 4 MG: 4 TABLET ORAL at 11:36

## 2017-01-01 RX ADMIN — INSULIN LISPRO 1 UNITS: 100 INJECTION, SOLUTION INTRAVENOUS; SUBCUTANEOUS at 12:20

## 2017-01-01 RX ADMIN — DEXAMETHASONE SODIUM PHOSPHATE 4 MG: 4 INJECTION, SOLUTION INTRAMUSCULAR; INTRAVENOUS at 04:04

## 2017-01-01 RX ADMIN — INSULIN HUMAN 35 UNITS: 100 INJECTION, SUSPENSION SUBCUTANEOUS at 17:53

## 2017-01-01 RX ADMIN — LEVETIRACETAM 1000 MG: 500 TABLET ORAL at 08:15

## 2017-01-01 RX ADMIN — DIBASIC SODIUM PHOSPHATE, MONOBASIC POTASSIUM PHOSPHATE AND MONOBASIC SODIUM PHOSPHATE 1 TABLET: 852; 155; 130 TABLET ORAL at 23:33

## 2017-01-01 RX ADMIN — STANDARDIZED SENNA CONCENTRATE AND DOCUSATE SODIUM 2 TABLET: 8.6; 5 TABLET, FILM COATED ORAL at 09:46

## 2017-01-01 RX ADMIN — INSULIN LISPRO 2 UNITS: 100 INJECTION, SOLUTION INTRAVENOUS; SUBCUTANEOUS at 16:54

## 2017-01-01 RX ADMIN — INSULIN LISPRO 3 UNITS: 100 INJECTION, SOLUTION INTRAVENOUS; SUBCUTANEOUS at 11:29

## 2017-01-01 RX ADMIN — INSULIN LISPRO 3 UNITS: 100 INJECTION, SOLUTION INTRAVENOUS; SUBCUTANEOUS at 17:56

## 2017-01-01 RX ADMIN — SODIUM CHLORIDE 500 ML: 9 INJECTION, SOLUTION INTRAVENOUS at 21:30

## 2017-01-01 RX ADMIN — DEXAMETHASONE 4 MG: 4 TABLET ORAL at 05:51

## 2017-01-01 RX ADMIN — INSULIN LISPRO 3 UNITS: 100 INJECTION, SOLUTION INTRAVENOUS; SUBCUTANEOUS at 20:35

## 2017-01-01 RX ADMIN — INSULIN HUMAN 35 UNITS: 100 INJECTION, SUSPENSION SUBCUTANEOUS at 17:31

## 2017-01-01 RX ADMIN — POTASSIUM CHLORIDE 40 MEQ: 1500 TABLET, EXTENDED RELEASE ORAL at 10:30

## 2017-01-01 RX ADMIN — STANDARDIZED SENNA CONCENTRATE AND DOCUSATE SODIUM 2 TABLET: 8.6; 5 TABLET, FILM COATED ORAL at 19:51

## 2017-01-01 RX ADMIN — DEXAMETHASONE 2 MG: 4 TABLET ORAL at 08:13

## 2017-01-01 RX ADMIN — INSULIN LISPRO 2 UNITS: 100 INJECTION, SOLUTION INTRAVENOUS; SUBCUTANEOUS at 17:54

## 2017-01-01 RX ADMIN — INSULIN LISPRO 2 UNITS: 100 INJECTION, SOLUTION INTRAVENOUS; SUBCUTANEOUS at 20:54

## 2017-01-01 RX ADMIN — DEXAMETHASONE 4 MG: 4 TABLET ORAL at 11:57

## 2017-01-01 RX ADMIN — NEOMYCIN AND POLYMYXIN B SULFATES AND BACITRACIN ZINC: 400; 3.5; 5 OINTMENT TOPICAL at 20:32

## 2017-01-01 RX ADMIN — NYSTATIN 500000 UNITS: 100000 SUSPENSION ORAL at 13:52

## 2017-01-01 RX ADMIN — FAMOTIDINE 20 MG: 20 TABLET ORAL at 20:07

## 2017-01-01 RX ADMIN — DEXAMETHASONE 2 MG: 1 TABLET ORAL at 08:15

## 2017-01-01 RX ADMIN — OMEPRAZOLE 40 MG: 20 CAPSULE, DELAYED RELEASE ORAL at 07:59

## 2017-01-01 RX ADMIN — STANDARDIZED SENNA CONCENTRATE AND DOCUSATE SODIUM 2 TABLET: 8.6; 5 TABLET, FILM COATED ORAL at 21:19

## 2017-01-01 RX ADMIN — LEVETIRACETAM 1000 MG: 100 SOLUTION ORAL at 07:53

## 2017-01-01 RX ADMIN — CEFEPIME 2 G: 2 INJECTION, POWDER, FOR SOLUTION INTRAVENOUS at 07:54

## 2017-01-01 RX ADMIN — DEXAMETHASONE SODIUM PHOSPHATE 4 MG: 4 INJECTION, SOLUTION INTRAMUSCULAR; INTRAVENOUS at 11:16

## 2017-01-01 RX ADMIN — BISACODYL 10 MG: 10 SUPPOSITORY RECTAL at 10:22

## 2017-01-01 RX ADMIN — ACYCLOVIR SODIUM 615 MG: 500 INJECTION, SOLUTION INTRAVENOUS at 12:20

## 2017-01-01 RX ADMIN — INSULIN HUMAN 35 UNITS: 100 INJECTION, SUSPENSION SUBCUTANEOUS at 05:05

## 2017-01-01 RX ADMIN — OMEPRAZOLE 40 MG: 20 CAPSULE, DELAYED RELEASE ORAL at 09:54

## 2017-01-01 RX ADMIN — INSULIN LISPRO 2 UNITS: 100 INJECTION, SOLUTION INTRAVENOUS; SUBCUTANEOUS at 21:02

## 2017-01-01 RX ADMIN — LEVETIRACETAM 1000 MG: 100 SOLUTION ORAL at 07:56

## 2017-01-01 RX ADMIN — DIBASIC SODIUM PHOSPHATE, MONOBASIC POTASSIUM PHOSPHATE AND MONOBASIC SODIUM PHOSPHATE 1 TABLET: 852; 155; 130 TABLET ORAL at 06:27

## 2017-01-01 RX ADMIN — NYSTATIN 500000 UNITS: 100000 SUSPENSION ORAL at 08:10

## 2017-01-01 RX ADMIN — INSULIN LISPRO 2 UNITS: 100 INJECTION, SOLUTION INTRAVENOUS; SUBCUTANEOUS at 14:31

## 2017-01-01 RX ADMIN — NEOMYCIN AND POLYMYXIN B SULFATES AND BACITRACIN ZINC: 400; 3.5; 5 OINTMENT TOPICAL at 20:09

## 2017-01-01 RX ADMIN — DEXTROSE MONOHYDRATE 1000 MG: 50 INJECTION, SOLUTION INTRAVENOUS at 10:49

## 2017-01-01 RX ADMIN — GADODIAMIDE 20 ML: 287 INJECTION INTRAVENOUS at 10:44

## 2017-01-01 RX ADMIN — LEVETIRACETAM 1500 MG: 100 SOLUTION ORAL at 20:53

## 2017-01-01 RX ADMIN — DEXAMETHASONE 3 MG: 1 TABLET ORAL at 13:38

## 2017-01-01 SDOH — ECONOMIC STABILITY: HOUSING INSECURITY: UNSAFE APPLIANCES: 0

## 2017-01-01 SDOH — ECONOMIC STABILITY: HOUSING INSECURITY: UNSAFE COOKING RANGE AREA: 0

## 2017-01-01 SDOH — ECONOMIC STABILITY: GENERAL

## 2017-01-01 ASSESSMENT — PAIN SCALES - GENERAL
PAINLEVEL_OUTOF10: ASSUMED PAIN PRESENT
PAINLEVEL_OUTOF10: 0
PAINLEVEL_OUTOF10: ASSUMED PAIN PRESENT
PAINLEVEL_OUTOF10: 0
PAINLEVEL_OUTOF10: 3
PAINLEVEL_OUTOF10: 0
PAINLEVEL: NO PAIN
PAINLEVEL_OUTOF10: 0
PAINLEVEL_OUTOF10: 7
PAINLEVEL_OUTOF10: 6
PAINLEVEL_OUTOF10: 0
PAINLEVEL_OUTOF10: 3
PAINLEVEL_OUTOF10: 0
PAINLEVEL_OUTOF10: 3
PAINLEVEL_OUTOF10: 3
PAINLEVEL: 8=MODERATE-SEVERE PAIN
PAINLEVEL_OUTOF10: 0
PAINLEVEL_OUTOF10: ASSUMED PAIN PRESENT
PAINLEVEL_OUTOF10: 0
PAINLEVEL: NO PAIN
PAINLEVEL_OUTOF10: 0
PAINLEVEL: NO PAIN
PAINLEVEL_OUTOF10: 0
PAINLEVEL: 10=SEVERE PAIN
PAINLEVEL_OUTOF10: 0
PAINLEVEL_OUTOF10: ASSUMED PAIN PRESENT
PAINLEVEL_OUTOF10: 0
PAINLEVEL_OUTOF10: 10
PAINLEVEL_OUTOF10: 0
PAINLEVEL_OUTOF10: ASSUMED PAIN PRESENT
PAINLEVEL_OUTOF10: 3
PAINLEVEL_OUTOF10: 0
PAINLEVEL_OUTOF10: ASSUMED PAIN PRESENT
PAINLEVEL_OUTOF10: 4
PAINLEVEL_OUTOF10: 0
PAINLEVEL_OUTOF10: ASSUMED PAIN PRESENT
PAINLEVEL_OUTOF10: 0
PAINLEVEL: 8=MODERATE-SEVERE PAIN
PAINLEVEL_OUTOF10: 0
PAINLEVEL_OUTOF10: 0
PAINLEVEL_OUTOF10: 3
PAINLEVEL_OUTOF10: 0
PAINLEVEL_OUTOF10: 0
PAINLEVEL_OUTOF10: ASSUMED PAIN PRESENT
PAINLEVEL_OUTOF10: ASSUMED PAIN PRESENT
PAINLEVEL_OUTOF10: 0
PAINLEVEL: NO PAIN
PAINLEVEL_OUTOF10: 3
PAINLEVEL_OUTOF10: 0
PAINLEVEL: 8=MODERATE-SEVERE PAIN
PAINLEVEL_OUTOF10: 0
PAINLEVEL_OUTOF10: 4
PAINLEVEL_OUTOF10: 0
PAINLEVEL_OUTOF10: 3
PAINLEVEL_OUTOF10: 0
PAINLEVEL_OUTOF10: 5
PAINLEVEL_OUTOF10: 0
PAINLEVEL_OUTOF10: 6
PAINLEVEL_OUTOF10: 0
PAINLEVEL_OUTOF10: ASSUMED PAIN PRESENT
PAINLEVEL_OUTOF10: 0
PAINLEVEL_OUTOF10: 3
PAINLEVEL_OUTOF10: 0
PAINLEVEL: 8=MODERATE-SEVERE PAIN
PAINLEVEL_OUTOF10: 0
PAINLEVEL_OUTOF10: 3
PAINLEVEL_OUTOF10: 3
PAINLEVEL_OUTOF10: 0

## 2017-01-01 ASSESSMENT — ENCOUNTER SYMPTOMS
STRIDOR: 0
NAUSEA: 0
SORE THROAT: 0
FEVER: 0
EYE PAIN: 0
ABDOMINAL PAIN: 0
FLANK PAIN: 0
INSOMNIA: 0
COUGH: 0
MYALGIAS: 0
BLURRED VISION: 0
MUSCULOSKELETAL NEGATIVE: 1
DEPRESSION: 0
HALLUCINATIONS: 0
FEVER: 0
HEARTBURN: 0
VOMITING: 0
INSOMNIA: 0
FEVER: 0
BACK PAIN: 0
DEPRESSION: 0
BLURRED VISION: 0
DIZZINESS: 0
HEADACHES: 0
SPEECH CHANGE: 0
DIZZINESS: 0
HEMOPTYSIS: 0
BLURRED VISION: 0
SHORTNESS OF BREATH: 0
BACK PAIN: 0
FLANK PAIN: 0
FEVER: 0
SHORTNESS OF BREATH: 0
SPEECH CHANGE: 0
BLURRED VISION: 0
DEPRESSION: 0
PALPITATIONS: 0
BLURRED VISION: 0
DEPRESSION: 0
ABDOMINAL PAIN: 0
SHORTNESS OF BREATH: 0
SORE THROAT: 0
BLURRED VISION: 0
COUGH: 0
CHILLS: 0
LOSS OF CONSCIOUSNESS: 1
PSYCHIATRIC NEGATIVE: 1
FOCAL WEAKNESS: 0
EYE PAIN: 0
DIARRHEA: 0
FEVER: 0
DIARRHEA: 0
MUSCULOSKELETAL NEGATIVE: 1
ABDOMINAL PAIN: 0
DIZZINESS: 0
ABDOMINAL PAIN: 0
COUGH: 0
FLANK PAIN: 0
SHORTNESS OF BREATH: 0
VOMITING: 0
BOWEL PATTERN NORMAL: 1
DIZZINESS: 1
INSOMNIA: 0
DIARRHEA: 0
HEADACHES: 0
FLANK PAIN: 0
CHILLS: 0
COUGH: 0
NAUSEA: 1
BLURRED VISION: 0
DIZZINESS: 0
HEADACHES: 0
NAUSEA: 0
FATIGUES EASILY: 1
VOMITING: 0
HEADACHES: 0
HEADACHES: 0
VOMITING: 1
EYE PAIN: 0
HEARTBURN: 0
VOMITING: 0
WEAKNESS: 1
HEARTBURN: 0
SHORTNESS OF BREATH: 1
GASTROINTESTINAL NEGATIVE: 1
WEAKNESS: 0
EYE PAIN: 0
SHORTNESS OF BREATH: 0
WEIGHT LOSS: 1
GASTROINTESTINAL NEGATIVE: 1
SEIZURES: 0
FEVER: 0
FEVER: 0
WEAKNESS: 0
EYE REDNESS: 0
MYALGIAS: 0
FEVER: 0
HALLUCINATIONS: 0
FEVER: 0
EYE PAIN: 0
BACK PAIN: 0
HEADACHES: 0
BLURRED VISION: 0
MUSCULOSKELETAL NEGATIVE: 1
HEADACHES: 0
DIZZINESS: 0
CONSTIPATION: 0
BLURRED VISION: 0
WEAKNESS: 1
HEADACHES: 0
MUSCULOSKELETAL NEGATIVE: 1
COUGH: 0
NAUSEA: 0
PALPITATIONS: 0
HEMOPTYSIS: 0
SEIZURES: 0
COUGH: 0
HEARTBURN: 0
FEVER: 0
FEVER: 0
DIARRHEA: 0
COUGH: 0
WEIGHT LOSS: 0
CHILLS: 0
SEIZURES: 0
LAST BOWEL MOVEMENT: 64618
COUGH: 0
NAUSEA: 0
PALPITATIONS: 0
HEADACHES: 0
WEAKNESS: 0
INSOMNIA: 0
HALLUCINATIONS: 0
FEVER: 0
NAUSEA: 0
STOOL FREQUENCY: LESS THAN DAILY
ABDOMINAL PAIN: 0
MYALGIAS: 1
DEPRESSION: 0
HALLUCINATIONS: 0
CARDIOVASCULAR NEGATIVE: 1
DEPRESSION: 0
DIZZINESS: 0
BRUISES/BLEEDS EASILY: 0
SEIZURES: 0
PALPITATIONS: 0
INSOMNIA: 0
FLANK PAIN: 0
NERVOUS/ANXIOUS: 0
SHORTNESS OF BREATH: 0
BLURRED VISION: 0
SHORTNESS OF BREATH: 0
FOCAL WEAKNESS: 0
ABDOMINAL PAIN: 0
BRUISES/BLEEDS EASILY: 0
HEADACHES: 0
CHILLS: 0
FEVER: 0
HALLUCINATIONS: 0
FEVER: 0
HALLUCINATIONS: 0
SEIZURES: 0
COUGH: 0
EYE PAIN: 0
COUGH: 0
TINGLING: 0
NAUSEA: 0
HEADACHES: 1
MYALGIAS: 0
VOMITING: 0
DIZZINESS: 0
MYALGIAS: 1
HEARTBURN: 0
HEADACHES: 0
DIZZINESS: 0
FEVER: 0
VOMITING: 0
NAUSEA: 0
GASTROINTESTINAL NEGATIVE: 1
CHILLS: 0
DIARRHEA: 0
MUSCULOSKELETAL NEGATIVE: 1
DEPRESSION: 0
SEIZURES: 0
DIARRHEA: 0
HEADACHES: 0
FLANK PAIN: 0
DIZZINESS: 0
SHORTNESS OF BREATH: 0
DEPRESSION: 0
DIZZINESS: 0
DIZZINESS: 0
INSOMNIA: 0
DIZZINESS: 0
INSOMNIA: 0
FEVER: 0
PALPITATIONS: 0
HEARTBURN: 0
LOSS OF CONSCIOUSNESS: 1
SHORTNESS OF BREATH: 0
NAUSEA: 0
STOOL FREQUENCY: DAILY
NERVOUS/ANXIOUS: 0
FOCAL WEAKNESS: 0
DEPRESSION: 0
INSOMNIA: 0
FOCAL WEAKNESS: 0
HEARTBURN: 0
DEPRESSION: 0
CHILLS: 1
RESPIRATORY NEGATIVE: 1
COUGH: 0
MYALGIAS: 0
HEADACHES: 0
ABDOMINAL PAIN: 0
EYE PAIN: 0
CHILLS: 0
PSYCHIATRIC NEGATIVE: 1
BOWEL PATTERN NORMAL: 1
CARDIOVASCULAR NEGATIVE: 1
DIZZINESS: 0
PHOTOPHOBIA: 0
DEPRESSION: 0
BLURRED VISION: 0
PHOTOPHOBIA: 0
BACK PAIN: 0
FEVER: 0
HALLUCINATIONS: 0
FEVER: 0
SORE THROAT: 0
CHILLS: 0
SHORTNESS OF BREATH: 0
NERVOUS/ANXIOUS: 0
HEADACHES: 0
EYE PAIN: 0
GASTROINTESTINAL NEGATIVE: 1
DIZZINESS: 0
SORE THROAT: 0
FEVER: 0
SPUTUM PRODUCTION: 0
INSOMNIA: 0
MYALGIAS: 0
DIARRHEA: 0
INSOMNIA: 0
BLURRED VISION: 0
WEAKNESS: 0
SHORTNESS OF BREATH: 0
PHOTOPHOBIA: 0
HEARTBURN: 0
PALPITATIONS: 0
ORTHOPNEA: 0
COUGH: 0
EYE PAIN: 0
WEAKNESS: 1
BLOOD IN STOOL: 0
ABDOMINAL PAIN: 0
TINGLING: 0
HEADACHES: 0
VOMITING: 0
FEVER: 0
BLURRED VISION: 0
HEADACHES: 0
CHANGE IN LEVEL OF CONSCIOUSNESS: 1
DIZZINESS: 0
HEARTBURN: 0
HEADACHES: 0
HALLUCINATIONS: 0
FLANK PAIN: 0
BLURRED VISION: 0
SHORTNESS OF BREATH: 0
HEADACHES: 0
ABDOMINAL PAIN: 0
EYE PAIN: 0
HEARTBURN: 0
BLURRED VISION: 0
DEPRESSION: 0
VOMITING: 0
DIARRHEA: 0
FEVER: 0
PALPITATIONS: 0
MUSCULOSKELETAL NEGATIVE: 1
STRIDOR: 0
COUGH: 0
SORE THROAT: 0
SEIZURES: 0
BLURRED VISION: 0
DEPRESSION: 0
BLURRED VISION: 0
DEPRESSION: 0
COUGH: 0
FEVER: 0
ABDOMINAL PAIN: 0
PALPITATIONS: 0
SHORTNESS OF BREATH: 0
DEPRESSION: 0
DIARRHEA: 0
NAUSEA: 0
NECK PAIN: 0
SORE THROAT: 0
NECK PAIN: 0
SPUTUM PRODUCTION: 0
WEIGHT LOSS: 0
SORE THROAT: 0
SHORTNESS OF BREATH: 1
SHORTNESS OF BREATH: 0
FEVER: 0
SORE THROAT: 0
BACK PAIN: 0
DIZZINESS: 0
DIZZINESS: 0
MYALGIAS: 1
INSOMNIA: 0
NECK PAIN: 0
EYE PAIN: 0
BLURRED VISION: 0
RESPIRATORY NEGATIVE: 1
WEAKNESS: 1
GASTROINTESTINAL NEGATIVE: 1
MYALGIAS: 0
ORTHOPNEA: 0
PALPITATIONS: 0
WEAKNESS: 1
MUSCULOSKELETAL NEGATIVE: 1
DIZZINESS: 0
VOMITING: 0
CHILLS: 0
GASTROINTESTINAL NEGATIVE: 1
EYES NEGATIVE: 1
COUGH: 0
TINGLING: 0
PALPITATIONS: 0
WEAKNESS: 0
HEADACHES: 0
INSOMNIA: 0
NAUSEA: 0
EYE REDNESS: 0
BLURRED VISION: 0
FEVER: 0
FATIGUE: 1
TINGLING: 0
SORE THROAT: 0
FEVER: 0
GASTROINTESTINAL NEGATIVE: 1
BRUISES/BLEEDS EASILY: 0
AGITATION: 1
WEAKNESS: 1
EYES NEGATIVE: 1
NAUSEA: 0
DIZZINESS: 0
HEADACHES: 0
TINGLING: 0
FEVER: 0
BLURRED VISION: 0
PHOTOPHOBIA: 0
HEADACHES: 1
NECK PAIN: 0
TINGLING: 0
FEVER: 0
ABDOMINAL PAIN: 0
PALPITATIONS: 0
EYE REDNESS: 0
MYALGIAS: 0
FOCAL WEAKNESS: 0
SHORTNESS OF BREATH: 0
NAUSEA: 0
VOMITING: 0
TREMORS: 0
EYE PAIN: 0
NECK PAIN: 0
DIZZINESS: 0
BLURRED VISION: 0
VOMITING: 0
FEVER: 0
TINGLING: 0
INSOMNIA: 0
PSYCHIATRIC NEGATIVE: 1
DIZZINESS: 0
NERVOUS/ANXIOUS: 0
NERVOUS/ANXIOUS: 0
HEARTBURN: 0
NECK PAIN: 0
HEARTBURN: 0
RESPIRATORY NEGATIVE: 1
INSOMNIA: 0
DIZZINESS: 0
HEARTBURN: 0
CHILLS: 0
LAST BOWEL MOVEMENT: 64618
VOMITING: 0
NERVOUS/ANXIOUS: 0
BACK PAIN: 0
PALPITATIONS: 0
ABDOMINAL PAIN: 0
BLURRED VISION: 0
HEADACHES: 0
BACK PAIN: 0
SHORTNESS OF BREATH: 0
SHORTNESS OF BREATH: 0
FEVER: 0
MYALGIAS: 0
NECK PAIN: 0
DIARRHEA: 0
ABDOMINAL PAIN: 0
TREMORS: 0
FEVER: 0
BLOOD IN STOOL: 0
FEVER: 0
BLURRED VISION: 0
VOMITING: 0
DOUBLE VISION: 0
FEVER: 0
EYES NEGATIVE: 1
CHILLS: 0
COUGH: 1
NERVOUS/ANXIOUS: 0
ABDOMINAL PAIN: 0
FEVER: 0
NERVOUS/ANXIOUS: 0
GASTROINTESTINAL NEGATIVE: 1
BLURRED VISION: 1
STRIDOR: 0
BACK PAIN: 0
DIARRHEA: 0
SEIZURES: 0
ABDOMINAL PAIN: 0
COUGH: 0
SEIZURES: 1
SHORTNESS OF BREATH: 0
COUGH: 0
DIZZINESS: 0
SPEECH CHANGE: 0
NECK PAIN: 0
NECK PAIN: 0
BLURRED VISION: 0
SHORTNESS OF BREATH: 0
BRUISES/BLEEDS EASILY: 0
SEIZURES: 0
VOMITING: 0
NAUSEA: 0
NAUSEA: 0
HALLUCINATIONS: 0
LOSS OF CONSCIOUSNESS: 1
HEADACHES: 0
BLURRED VISION: 0
SHORTNESS OF BREATH: 0
BLURRED VISION: 0
SKIN LESIONS: 1
SHORTNESS OF BREATH: 0
NERVOUS/ANXIOUS: 0
HEADACHES: 0
VOMITING: 0
PALPITATIONS: 0
DIARRHEA: 0
HEMOPTYSIS: 0
COUGH: 0
DEPRESSION: 0
BLOOD IN STOOL: 0
DIZZINESS: 0
MYALGIAS: 0
ABDOMINAL PAIN: 0
FEVER: 0
MYALGIAS: 0
HEADACHES: 0
FLANK PAIN: 0
ABDOMINAL PAIN: 0
HEADACHES: 0
WEAKNESS: 0
GASTROINTESTINAL NEGATIVE: 1
CHILLS: 0
INSOMNIA: 0
NERVOUS/ANXIOUS: 0
NAUSEA: 0
DIZZINESS: 0
HEADACHES: 0
NAUSEA: 0
CARDIOVASCULAR NEGATIVE: 1
EYES NEGATIVE: 1
PALPITATIONS: 0
WEIGHT LOSS: 0
HEARTBURN: 0
COUGH: 0
HALLUCINATIONS: 0
SEIZURES: 1
HEADACHES: 0
SPUTUM PRODUCTION: 1
DOUBLE VISION: 0
PALPITATIONS: 0
FOCAL WEAKNESS: 0
SPUTUM PRODUCTION: 0
WEIGHT LOSS: 0
FEVER: 0
ABDOMINAL PAIN: 0
COUGH: 0
RESPIRATORY NEGATIVE: 1
NAUSEA: 0
NECK PAIN: 0
NAUSEA: 0
NECK PAIN: 0
HEADACHES: 0
HEARTBURN: 0
NERVOUS/ANXIOUS: 0
SHORTNESS OF BREATH: 0
DEPRESSION: 0
NECK PAIN: 0
COUGH: 0
NECK PAIN: 0
INSOMNIA: 0
CHILLS: 0
NERVOUS/ANXIOUS: 0
CARDIOVASCULAR NEGATIVE: 1
HEARTBURN: 0
WHEEZING: 0
FLANK PAIN: 0
ORTHOPNEA: 0
GASTROINTESTINAL NEGATIVE: 1
DIZZINESS: 0
COUGH: 0
NAUSEA: 0
PALPITATIONS: 0
TINGLING: 0
CHILLS: 0
ABDOMINAL PAIN: 0
COUGH: 0
SORE THROAT: 0
COUGH: 0
ORTHOPNEA: 0
DEPRESSION: 0
PALPITATIONS: 0
MYALGIAS: 0
HALLUCINATIONS: 0
EYE PAIN: 0
SHORTNESS OF BREATH: 0
TINGLING: 0
BLURRED VISION: 0
WEAKNESS: 1
HEADACHES: 0
DEPRESSION: 0
SOMNOLENCE: 1
PSYCHIATRIC NEGATIVE: 1
BACK PAIN: 0
FOCAL WEAKNESS: 0
DOUBLE VISION: 0
ABDOMINAL PAIN: 0
GASTROINTESTINAL NEGATIVE: 1
HEADACHES: 0
SHORTNESS OF BREATH: 0
EYE PAIN: 0
NAUSEA: 0
DEPRESSION: 0
WEAKNESS: 0
PALPITATIONS: 0
DEPRESSION: 0
DIARRHEA: 0
FOCAL WEAKNESS: 0
SEIZURES: 1
DIZZINESS: 0
DIZZINESS: 0
EYE PAIN: 0
DIZZINESS: 0
DIZZINESS: 0
COUGH: 0
DOUBLE VISION: 1
BLURRED VISION: 0
BLURRED VISION: 0
DEPRESSION: 0
DIZZINESS: 0
SHORTNESS OF BREATH: 0
TREMORS: 0
FOCAL WEAKNESS: 0
MYALGIAS: 1
PALPITATIONS: 0
FEVER: 0
MYALGIAS: 0

## 2017-01-01 ASSESSMENT — ACTIVITIES OF DAILY LIVING (ADL)
MONEY MANAGEMENT (EXPENSES/BILLS): TOTALLY DEPENDENT
TOILETING_LEVEL_OF_ASSIST: REQUIRES SUPERVISION WITH TOILETING
TOILETING: INDEPENDENT
CONTINENCE_REQUIRES_ASSISTANCE: 1
DRESSING_REQUIRES_ASSISTANCE: 1
TOILETING: REQUIRES ASSIST
SHOWER_TRANSFER_LEVEL_OF_ASSIST: REQUIRES SUPERVISION WITH SHOWER TRANSFER
EATING_REQUIRES_ASSISTANCE: 1
PHYSICAL_TRANSFER_REQUIRES_ASSISTANCE: 1
BATHING_REQUIRES_ASSISTANCE: 1
TOILET_TRANSFER_LEVEL_OF_ASSIST: REQUIRES SUPERVISION WITH TOILET TRANSFER

## 2017-01-01 ASSESSMENT — GAIT ASSESSMENTS
DEVIATION: ANTALGIC;BRADYKINETIC;SHUFFLED GAIT;DECREASED HEEL STRIKE;DECREASED TOE OFF
DEVIATION: ANTALGIC;BRADYKINETIC;SHUFFLED GAIT;DECREASED BASE OF SUPPORT
GAIT LEVEL OF ASSIST: CONTACT GUARD ASSIST
GAIT LEVEL OF ASSIST: CONTACT GUARD ASSIST
DEVIATION: ANTALGIC;BRADYKINETIC;SHUFFLED GAIT
DISTANCE (FEET): 330
DEVIATION: ANTALGIC;BRADYKINETIC;SHUFFLED GAIT;DECREASED HEEL STRIKE;DECREASED TOE OFF
GAIT LEVEL OF ASSIST: CONTACT GUARD ASSIST
DISTANCE (FEET): 50
DISTANCE (FEET): 425
DEVIATION: BRADYKINETIC;SHUFFLED GAIT
DEVIATION: BRADYKINETIC;OTHER (COMMENT)
DISTANCE (FEET): 125
ASSISTIVE DEVICE: NONE;OTHER (COMMENTS)
GAIT LEVEL OF ASSIST: CONTACT GUARD ASSIST
DEVIATION: ANTALGIC;BRADYKINETIC;SHUFFLED GAIT;DECREASED HEEL STRIKE;DECREASED TOE OFF
ASSISTIVE DEVICE: PARALLEL BARS
GAIT LEVEL OF ASSIST: CONTACT GUARD ASSIST
GAIT LEVEL OF ASSIST: CONTACT GUARD ASSIST
ASSISTIVE DEVICE: OTHER (COMMENTS)
DISTANCE (FEET): 250
ASSISTIVE DEVICE: FRONT WHEEL WALKER
DISTANCE (FEET): 280
GAIT LEVEL OF ASSIST: CONTACT GUARD ASSIST
GAIT LEVEL OF ASSIST: CONTACT GUARD ASSIST
DISTANCE (FEET): 375
DEVIATION: ANTALGIC;BRADYKINETIC;SHUFFLED GAIT;DECREASED BASE OF SUPPORT
DISTANCE (FEET): 250
DEVIATION: ANTALGIC;BRADYKINETIC;SHUFFLED GAIT
DISTANCE (FEET): 500
GAIT LEVEL OF ASSIST: CONTACT GUARD ASSIST
DISTANCE (FEET): 250
GAIT LEVEL OF ASSIST: MINIMAL ASSIST
GAIT LEVEL OF ASSIST: UNABLE TO PARTICIPATE
DEVIATION: ANTALGIC;BRADYKINETIC;SHUFFLED GAIT;DECREASED HEEL STRIKE;DECREASED TOE OFF
GAIT LEVEL OF ASSIST: SUPERVISED
DISTANCE (FEET): 200
GAIT LEVEL OF ASSIST: CONTACT GUARD ASSIST
DISTANCE (FEET): 500
DEVIATION: DECREASED HEEL STRIKE;DECREASED TOE OFF;BRADYKINETIC
GAIT LEVEL OF ASSIST: STAND BY ASSIST
DEVIATION: ANTALGIC;BRADYKINETIC;SHUFFLED GAIT;DECREASED BASE OF SUPPORT
GAIT LEVEL OF ASSIST: CONTACT GUARD ASSIST
DISTANCE (FEET): 375
GAIT LEVEL OF ASSIST: STAND BY ASSIST
DEVIATION: ANTALGIC;BRADYKINETIC;DECREASED HEEL STRIKE;DECREASED TOE OFF
DISTANCE (FEET): 250
GAIT LEVEL OF ASSIST: STAND BY ASSIST
DISTANCE (FEET): 10
DISTANCE (FEET): 375

## 2017-01-01 ASSESSMENT — PATIENT HEALTH QUESTIONNAIRE - PHQ9
SUM OF ALL RESPONSES TO PHQ QUESTIONS 1-9: 0
1. LITTLE INTEREST OR PLEASURE IN DOING THINGS: NOT AT ALL
SUM OF ALL RESPONSES TO PHQ9 QUESTIONS 1 AND 2: 0
1. LITTLE INTEREST OR PLEASURE IN DOING THINGS: NOT AT ALL
SUM OF ALL RESPONSES TO PHQ9 QUESTIONS 1 AND 2: 0
2. FEELING DOWN, DEPRESSED, IRRITABLE, OR HOPELESS: NOT AT ALL
1. LITTLE INTEREST OR PLEASURE IN DOING THINGS: NOT AT ALL
1. LITTLE INTEREST OR PLEASURE IN DOING THINGS: NOT AT ALL
2. FEELING DOWN, DEPRESSED, IRRITABLE, OR HOPELESS: NOT AT ALL
CLINICAL INTERPRETATION OF PHQ2 SCORE: 2
SUM OF ALL RESPONSES TO PHQ9 QUESTIONS 1 AND 2: 0
2. FEELING DOWN, DEPRESSED, IRRITABLE, OR HOPELESS: NOT AT ALL
SUM OF ALL RESPONSES TO PHQ9 QUESTIONS 1 AND 2: 0
2. FEELING DOWN, DEPRESSED, IRRITABLE, OR HOPELESS: NOT AT ALL
SUM OF ALL RESPONSES TO PHQ QUESTIONS 1-9: 0

## 2017-01-01 ASSESSMENT — COGNITIVE AND FUNCTIONAL STATUS - GENERAL
DAILY ACTIVITIY SCORE: 12
MOVING FROM LYING ON BACK TO SITTING ON SIDE OF FLAT BED: A LITTLE
CLIMB 3 TO 5 STEPS WITH RAILING: A LOT
STANDING UP FROM CHAIR USING ARMS: A LITTLE
TURNING FROM BACK TO SIDE WHILE IN FLAT BAD: UNABLE
TURNING FROM BACK TO SIDE WHILE IN FLAT BAD: A LITTLE
EATING MEALS: A LOT
CLIMB 3 TO 5 STEPS WITH RAILING: TOTAL
MOBILITY SCORE: 13
SUGGESTED CMS G CODE MODIFIER DAILY ACTIVITY: CL
HELP NEEDED FOR BATHING: A LOT
PERSONAL GROOMING: A LOT
STANDING UP FROM CHAIR USING ARMS: A LITTLE
MOBILITY SCORE: 15
PERSONAL GROOMING: A LOT
DAILY ACTIVITIY SCORE: 11
DRESSING REGULAR UPPER BODY CLOTHING: A LOT
MOVING TO AND FROM BED TO CHAIR: UNABLE
EATING MEALS: TOTAL
HELP NEEDED FOR BATHING: A LOT
SUGGESTED CMS G CODE MODIFIER MOBILITY: CK
MOVING FROM LYING ON BACK TO SITTING ON SIDE OF FLAT BED: A LITTLE
TOILETING: A LOT
DRESSING REGULAR LOWER BODY CLOTHING: A LOT
WALKING IN HOSPITAL ROOM: A LOT
SUGGESTED CMS G CODE MODIFIER DAILY ACTIVITY: CL
DRESSING REGULAR UPPER BODY CLOTHING: A LOT
TOILETING: A LOT
MOVING TO AND FROM BED TO CHAIR: A LITTLE
DRESSING REGULAR LOWER BODY CLOTHING: A LOT
WALKING IN HOSPITAL ROOM: A LITTLE
SUGGESTED CMS G CODE MODIFIER MOBILITY: CL

## 2017-01-01 ASSESSMENT — LIFESTYLE VARIABLES
SUBSTANCE_ABUSE: 0
ALCOHOL_USE: NO
SUBSTANCE_ABUSE: 0
EVER_SMOKED: NEVER
SUBSTANCE_ABUSE: 0
DO YOU DRINK ALCOHOL: NO
EVER_SMOKED: YES
EVER_SMOKED: YES
DO YOU DRINK ALCOHOL: NO
SUBSTANCE_ABUSE: 0
DO YOU DRINK ALCOHOL: NO
DO YOU DRINK ALCOHOL: NO
EVER_SMOKED: YES
SUBSTANCE_ABUSE: 0
EVER_SMOKED: YES
EVER_SMOKED: YES
SUBSTANCE_ABUSE: 0
DO YOU DRINK ALCOHOL: NO
EVER_SMOKED: UNABLE TO EVALUATE AT THIS TIME - NEEDS ASSESSMENT PRIOR TO DISCHARGE
ALCOHOL_USE: NO
EVER_SMOKED: NEVER
EVER_SMOKED: YES
DO YOU DRINK ALCOHOL: NO

## 2017-01-01 ASSESSMENT — COPD QUESTIONNAIRES
DO YOU EVER COUGH UP ANY MUCUS OR PHLEGM?: NO/ONLY WITH OCCASIONAL COLDS OR INFECTIONS
COPD SCREENING SCORE: 3
DURING THE PAST 4 WEEKS HOW MUCH DID YOU FEEL SHORT OF BREATH: NONE/LITTLE OF THE TIME
HAVE YOU SMOKED AT LEAST 100 CIGARETTES IN YOUR ENTIRE LIFE: YES
HAVE YOU SMOKED AT LEAST 100 CIGARETTES IN YOUR ENTIRE LIFE: YES
DURING THE PAST 4 WEEKS HOW MUCH DID YOU FEEL SHORT OF BREATH: NONE/LITTLE OF THE TIME
HAVE YOU SMOKED AT LEAST 100 CIGARETTES IN YOUR ENTIRE LIFE: YES
COPD SCREENING SCORE: 3
COPD SCREENING SCORE: 3
DURING THE PAST 4 WEEKS HOW MUCH DID YOU FEEL SHORT OF BREATH: NONE/LITTLE OF THE TIME
DO YOU EVER COUGH UP ANY MUCUS OR PHLEGM?: NO/ONLY WITH OCCASIONAL COLDS OR INFECTIONS
DO YOU EVER COUGH UP ANY MUCUS OR PHLEGM?: NO/ONLY WITH OCCASIONAL COLDS OR INFECTIONS

## 2017-01-01 ASSESSMENT — BRIEF INTERVIEW FOR MENTAL STATUS (BIMS)
ASKED TO RECALL BLUE: NONSENSICAL
GENERAL MEMORY AND RECALL ABILITY: RECOGNIZES APPROPRIATE HEALTHCARE SETTING
ASKED TO RECALL SOCK: NONSENSICAL
BIMS SUMMARY SCORE: 99
INITIAL REPETITION OF BED BLUE SOCK - FIRST ATTEMPT: NONSENSICAL
WHAT YEAR IS IT: NONSENSICAL
WHAT MONTH IS IT: NONSENSICAL
ASKED TO RECALL BED: NONSENSICAL
WHAT DAY OF THE WEEK IS IT: NONSENSICAL

## 2017-01-01 ASSESSMENT — PAIN SCALES - WONG BAKER
WONGBAKER_NUMERICALRESPONSE: DOESN'T HURT AT ALL
WONGBAKER_NUMERICALRESPONSE: HURTS A LITTLE MORE
WONGBAKER_NUMERICALRESPONSE: DOESN'T HURT AT ALL
WONGBAKER_NUMERICALRESPONSE: DOESN'T HURT AT ALL

## 2017-01-03 NOTE — MR AVS SNAPSHOT
"        Dale Mathew   1/3/2017 7:00 AM   Office Visit   MRN: [id number]    Department:  [facility]   Dept Phone:  [phone]    Description:  Male : 1960   Provider:  JAGDEEP Kinsey           Reason for Visit     Hospital Follow-up fainting, prior work injury      Allergies as of 1/3/2017     No Known Allergies      You were diagnosed with     Hospital discharge follow-up   [171370]       Syncope, unspecified syncope type   [5648024]       Left-sided Bell's palsy   [6048032]         Vital Signs     Blood Pressure Pulse Temperature Respirations Height Weight    110/70 mmHg 74 36.6 °C (97.8 °F) 14 1.626 m (5' 4\") 92.08 kg (203 lb)    Body Mass Index Oxygen Saturation Smoking Status             34.83 kg/m2 95% Former Smoker         Basic Information     Date Of Birth Sex Race Ethnicity Preferred Language    1960 Male  or  Unknown English      Problem List              ICD-10-CM Priority Class Noted - Resolved    Obesity (BMI 30-39.9) E66.9   5/15/2015 - Present    Chronic pain of right knee M25.561, G89.29   5/15/2015 - Present      Health Maintenance        Date Due Completion Dates    IMM DTaP/Tdap/Td Vaccine (1 - Tdap) 1977 ---    COLONOSCOPY 2008 ---    IMM INFLUENZA (1) 2016 ---            Current Immunizations     No immunizations on file.      Below and/or attached are the medications your provider expects you to take. Review all of your home medications and newly ordered medications with your provider and/or pharmacist. Follow medication instructions as directed by your provider and/or pharmacist. Please keep your medication list with you and share with your provider. Update the information when medications are discontinued, doses are changed, or new medications (including over-the-counter products) are added; and carry medication information at all times in the event of emergency situations     Allergies:  No Known Allergies          Medications  " Valid as of: January 03, 2017 -  7:42 AM    Generic Name Brand Name Tablet Size Instructions for use    Acetaminophen (Tab) PAIN RELIEVER 325 MG TK 2 T PO Q 6 H PRN        Docusate Sodium (Cap) STOOL SOFTENER 100 MG TK ONE C PO  BID PRN        LevETIRAcetam (Tab) KEPPRA 500 MG TK  1 T PO  BID        Pantoprazole Sodium (Tablet Delayed Response) PROTONIX 20 MG TK 1 T PO QD        .                 Medicines prescribed today were sent to:     [id number] - [place], [place] - [address]    [address] [place] [address]    Phone: [phone] Fax: [phone]    Open 24 Hours?: No    [id number] - [place], [place] - [address]    [address] [place] [address]    Phone: [phone] Fax: [phone]    Open 24 Hours?: Yes      Medication refill instructions:       If your prescription bottle indicates you have medication refills left, it is not necessary to call your provider’s office. Please contact your pharmacy and they will refill your medication.    If your prescription bottle indicates you do not have any refills left, you may request refills at any time through one of the following ways: The online [employer] system (except Urgent Care), by calling your provider’s office, or by asking your pharmacy to contact your provider’s office with a refill request. Medication refills are processed only during regular business hours and may not be available until the next business day. Your provider may request additional information or to have a follow-up visit with you prior to refilling your medication.   *Please Note: Medication refills are assigned a new Rx number when refilled electronically. Your pharmacy may indicate that no refills were authorized even though a new prescription for the same medication is available at the pharmacy. Please request the medicine by name with the pharmacy before contacting your provider for a  refill.           MyChart Status: Patient Declined

## 2017-01-03 NOTE — PROGRESS NOTES
CC: Hospital Follow-up        HPI:     Dale presents today for the followin. Hospital discharge follow-up/Syncope, unspecified syncope type/ Left-sided Bell's palsy  Here with his daughter following up for [place] visit. He states that about 2 weeks ago he started with left-sided Bell's palsy. This was diagnosed as such in [place]. Then sometime after this he passed out at work was taken to the [place] by ambulance. He was diagnosed with what they described as a brain tumor. While he was present there there was some sort of debate if he needed to have a biopsy or further workup possibly surgery while inpatient however he is here today in my office stating they felt he was okay to be discharged and worked up as outpatient.  I have out fully no notes, imaging her discharge paperwork. He was put on Keppra for seizure prevention. He denies having any seizures. He was told he couldn't drive. Both the patient and his daughter unfortunately have very limited understanding of what was going on.    He believes a lot of this could've been related to an injury at work over year ago, daughter does not believe this to be the case. It sounds like this may been disproved while he was inpatient in the hospital.    He denies any headaches, dizziness, changes in vision. No nausea vomiting. Tolerating Keppra well. He is not driving. Current medications are Protonix and Keppra. Needs refills.    Current Outpatient Prescriptions   Medication Sig Dispense Refill   • pantoprazole (PROTONIX) 20 MG tablet TK 1 T PO QD 30 Tab 5   • levetiracetam (KEPPRA) 500 MG Tab TK  1 T PO  BID 60 Tab 5   • STOOL SOFTENER 100 MG Cap TK ONE C PO  BID PRN  0   • PAIN RELIEVER 325 MG Tab TK 2 T PO Q 6 H PRN  0     No current facility-administered medications for this visit.     Social History   Substance Use Topics   • Smoking status: Former Smoker -- 1.00 packs/day for 30 years     Types: Cigarettes   • Smokeless tobacco: Never Used   •  "Alcohol Use: No     I reviewed patients allergies, problem list and medications today in [facility].    ROS: Any/all pertinent positives listed in the HPI, otherwise all others reviewed are negative today.      /70 mmHg  Pulse 74  Temp(Src) 36.6 °C (97.8 °F)  Resp 14  Ht 1.626 m (5' 4\")  Wt 92.08 kg (203 lb)  BMI 34.83 kg/m2  SpO2 95%    Exam:    Gen: Alert and oriented, No apparent distress. WDWN  Psych: A+Ox3, normal affect and mood  Skin: Warm, dry and intact. Good turgor   No rashes in visible areas.  Eye: Conjunctiva clear, lids normal,   ENMT: Lips without lesions, good dentition  Lungs: Clear to auscultation bilaterally, no rales or rhonchi   Unlabored respiratory effort.   CV: Regular rate and rhythm, S1, S2. No murmurs.   No Edema  Ext: No clubbing, cyanosis, edema.   Normal speech. Normal gait. Improving Bell's palsy left side of the face  Normal DTRs patellar and radialis bilaterally symmetrical      Assessment and Plan.   56 y.o. male with the following issues.    1. Hospital discharge follow-up/Syncope, unspecified syncope type/Left-sided Bell's palsy  Stable. He'll continue his medications. Refills were given  Requesting records today and as soon as he receive them will likely be referring him urgently for neurosurgeon evaluation. This was discussed with the patient and his daughter. They do not receive information on how to make an appointment with regarding the referral today and will call my office on Thursday. I did discuss for any new or worsening symptoms they're to be evaluated again in an emergency room as I unfortunately cannot comment or elaborate further on anything other than what they told me today in the office.         "

## 2017-01-04 PROBLEM — C71.9: Status: ACTIVE | Noted: 2017-01-01

## 2017-01-04 NOTE — TELEPHONE ENCOUNTER
Records received from [facility] showing a diffuse malignant glioma of the brain, new onset seizure associated with a large mass on the left side of the brain.  Do not appear to have full records.  Patient is on [facility]  Urgent neurosurgery referral placed

## 2017-01-05 PROBLEM — R56.9 NEW ONSET SEIZURE (HCC): Status: ACTIVE | Noted: 2017-01-01

## 2017-01-05 PROBLEM — G93.6 CEREBRAL EDEMA (HCC): Status: ACTIVE | Noted: 2017-01-01

## 2017-01-05 NOTE — PROGRESS NOTES
"CC: Follow-Up        HPI:     Dale presents today for the followin. Malignant glioma of brain (HCC)  Here with his daughters for FMLA paperwork r.t to not being back at work since 17.  This was the last day he had a seziure and was inpt at [facility].  Received imaging reports from [facility] yesterday with CT scan of head showing a \"large left hemisphere malignant glioma\" with cerebral edema.  No discharge paperwork.  Family states he was supposed to be transported to [place] for surgery to remove it but were unable to get insurance approval.  Dr Presley was seeing patient in the hospital.  He was sent home at on  with a steroid taper for the brain swelling.  He was unable to finish, they are requesting more.  Family and patient are poor historians however daughters were not present when patient was discharged and state taht they were only told that he \"had something in his head that needed to come out\".    State he was being sent to [place] to have biopsy and removal done all at once versus two separate procedures here locally per Dr. Presley.  He did inform them that the mass was causing the seizures.    CT scan at [facility] does state that they compared it to one done previously 2016 and state there wasn't any change-suggesting his mass may have been present discussed with the patient at the time.  I do not have access to this  scan.  CT scan done at [facility]  was normal.    A referral was placed from our office to [facility] to consult.  At this point they have heard from their office, she is reviewing the case and will call them back today.    He is stable, has not had anymore symptoms    Current Outpatient Prescriptions   Medication Sig Dispense Refill   • PAIN RELIEVER 325 MG Tab TK 2 T PO Q 6 H PRN  0   • pantoprazole (PROTONIX) 20 MG tablet TK 1 T PO QD 30 Tab 5   • levetiracetam (KEPPRA) 500 MG Tab TK  1 T PO  BID 60 Tab 5   • STOOL SOFTENER 100 MG Cap TK ONE C PO  BID PRN  0 " "    No current facility-administered medications for this visit.     Social History   Substance Use Topics   • Smoking status: Former Smoker -- 1.00 packs/day for 30 years     Types: Cigarettes   • Smokeless tobacco: Never Used   • Alcohol Use: No     I reviewed patients allergies, problem list and medications today in [facility].    ROS: Any/all pertinent positives listed in the HPI, otherwise all others reviewed are negative today.      /80 mmHg  Pulse 107  Temp(Src) 36.6 °C (97.8 °F)  Resp 16  Ht 1.626 m (5' 4.02\")  Wt 93.441 kg (206 lb)  BMI 35.34 kg/m2  SpO2 93%    Exam:    Gen: Alert and oriented, No apparent distress. WDWN  Psych: A+Ox3, normal affect and mood  Skin: Warm, dry and intact. Good turgor   No rashes in visible areas.  Eye: Conjunctiva clear, lids normal  ENMT: Lips without lesions, good dentition    Lungs: Unlabored respiratory effort.         Assessment and Plan.   56 y.o. male with the following issues.    1. Malignant glioma of brain (HCC)  Stable.  FMLA paperwork completed.  Unsure to the amount of time for leave/treatment/prognosis.  Did place a referral to consult with onc as well.  They need to talk with neurosurg first.    - REFERRAL TO ONCOLOGY      Over 50% of this 25 minute visit was spent on counseling and coordination of care regarding medication management, side effects, and complications in addition to extensive review of her history, current medications and today's plan of care.    Reviewed with Dr. Knapp, did not receive [facility]'s notes. Started Decadron. This was reviewed with patient's family over the phone. See telephone note  "

## 2017-01-05 NOTE — MR AVS SNAPSHOT
"        Dale Carmona   2017 12:00 PM   Office Visit   MRN: [id number]    Department:  [facility]   Dept Phone:  [phone]    Description:  Male : 1960   Provider:  JAGDEEP Kinsey           Reason for Visit     Follow-Up Review [facility] results      Allergies as of 2017     No Known Allergies      You were diagnosed with     Malignant glioma of brain (HCC)   [[id number]]         Vital Signs     Blood Pressure Pulse Temperature Respirations Height Weight    120/80 mmHg 107 36.6 °C (97.8 °F) 16 1.626 m (5' 4.02\") 93.441 kg (206 lb)    Body Mass Index Oxygen Saturation Smoking Status             35.34 kg/m2 93% Former Smoker         Basic Information     Date Of Birth Sex Race Ethnicity Preferred Language    1960 Male  or  Unknown English      Problem List              ICD-10-CM Priority Class Noted - Resolved    Obesity (BMI 30-39.9) E66.9   5/15/2015 - Present    Chronic pain of right knee M25.561, G89.29   5/15/2015 - Present    Malignant glioma of brain (HCC) C71.9   2017 - Present      Health Maintenance        Date Due Completion Dates    IMM DTaP/Tdap/Td Vaccine (1 - Tdap) 1977 ---    COLONOSCOPY 2008 ---    IMM INFLUENZA (1) 2016 ---            Current Immunizations     No immunizations on file.      Below and/or attached are the medications your provider expects you to take. Review all of your home medications and newly ordered medications with your provider and/or pharmacist. Follow medication instructions as directed by your provider and/or pharmacist. Please keep your medication list with you and share with your provider. Update the information when medications are discontinued, doses are changed, or new medications (including over-the-counter products) are added; and carry medication information at all times in the event of emergency situations     Allergies:  No Known Allergies          Medications  Valid as of: 2017 - " 12:43 PM    Generic Name Brand Name Tablet Size Instructions for use    Acetaminophen (Tab) PAIN RELIEVER 325 MG TK 2 T PO Q 6 H PRN        Docusate Sodium (Cap) STOOL SOFTENER 100 MG TK ONE C PO  BID PRN        LevETIRAcetam (Tab) KEPPRA 500 MG TK  1 T PO  BID        Pantoprazole Sodium (Tablet Delayed Response) PROTONIX 20 MG TK 1 T PO QD        .                 Medicines prescribed today were sent to:     [id number] - [place], [place] - [address]    [address] [address]    Phone: [phone] Fax: [phone]    Open 24 Hours?: No    [id number] - [place], [place] - [address]    [address] [address]    Phone: [phone] Fax: [phone]    Open 24 Hours?: Yes      Medication refill instructions:       If your prescription bottle indicates you have medication refills left, it is not necessary to call your provider’s office. Please contact your pharmacy and they will refill your medication.    If your prescription bottle indicates you do not have any refills left, you may request refills at any time through one of the following ways: The online [employer] system (except Urgent Care), by calling your provider’s office, or by asking your pharmacy to contact your provider’s office with a refill request. Medication refills are processed only during regular business hours and may not be available until the next business day. Your provider may request additional information or to have a follow-up visit with you prior to refilling your medication.   *Please Note: Medication refills are assigned a new Rx number when refilled electronically. Your pharmacy may indicate that no refills were authorized even though a new prescription for the same medication is available at the pharmacy. Please request the medicine by name with the pharmacy before contacting your provider for a refill.        Referral     A  referral request has been sent to our patient care coordination department. Please allow 3-5 business days for us to process this request and contact you either by phone or mail. If you do not hear from us by the 5th business day, please call us at [phone].           MyChart Status: Patient Declined

## 2017-01-05 NOTE — TELEPHONE ENCOUNTER
1. Caller Name: Priscilla pts daughter                                         Call Back Number: [phone]      Patient approves a detailed voicemail message: N\A    pts daughter Priscilla left a vm stating she did spoke with Dr. Mota today and was told that Dr. Mota cannot see the pt because the pt had already saw Dr. Presley @ [facility] and he supposedly has already sent a referral to [facility] and since he has done that she cannot see him.    Priscilla did state that Dr. Mota is very upset at this moment because she stated pt should have already had the surgery and she does not understand why it is taking so long and that if Dr. Presley has not yet placed the referral then Dr. Mota will take over care for pt.     Pt has called Dr. Presley's office to see what the status of the referral is but she has not been able to reach anyone and has left a vm for them to call back. i also have tried to call multiple times and they are not currently answering their phones, i am just going to voicemail on all lines.

## 2017-01-06 NOTE — TELEPHONE ENCOUNTER
Reviewed patient's case with Dr. Knapp. Were unable to receive discharge notes from [facility] at this point. Suggested to use Decadron     Contacted [facility], spoke with Ting, to place an urgent.  Fax number given was [phone] for the referral.      Spoke with patient's daughter Priscilla. Discussed the place an urgent referral to [facility]. Discussed they told me they should review and get back to the patient within 24-48 hours. Discussion continue trying to contact Dr. Presley however we were unable to get a hold of him in the office today.  Discussed with her that I'm sending Decadron 4 mg twice daily. Discussed her father continues medication until he specifically told to stop it and how to stop it by a physician. I discussed this is very important. She voiced understanding.

## 2017-01-09 NOTE — PROGRESS NOTES
Situation was discussed 1/5/17I have reviewed and agree with history, assessment and plan for office encounter on 1/5/17 with midlevel provider: Lisette ANDERSON. We discussed the plan and Ms. Reji made a phone call to [facility]. An urgent referral has been placed. There was an attempt to reach Dr. suárez. We understand from his notes that he did desire a [facility] referral to be placed. We were not able to reach him but [facility] has says they will schedule urgently and will make contact with the patient and family directly..  Face to face encounter/direct observation: No  Suggested changes to plan or follow-up: none, very sad situation  Hyacinth Knapp M.D.

## 2017-01-10 NOTE — TELEPHONE ENCOUNTER
Spoke with Dr. Presley's office last night, they stated they are unsure what the status of pts [facility] referral was and they are still trying to find a slot to schedule the patient. i have called and left a voicemail for Karena Calvin's  to inform her that Dr. Presley's office still has not done the referral and is not able to get him in and to please schedule him with dr. Mota and to call pts simon cuevas for an appointment.   Priscilla: emergency# [phone] or #[phone]

## 2017-01-11 NOTE — TELEPHONE ENCOUNTER
Mayela from Dr. Darling's office left a vm @ 4:15pm, she asked for me to call her back. i have called her back at 5:28pm to ask her to call us back.     Ph#[phone]

## 2017-01-11 NOTE — TELEPHONE ENCOUNTER
"Spoke with Mayela @ 's office she stated she spoke with Lore @ Dr. Presley's office and Lore stated Dr. Presley needs to see the pt before he can write the referral and she tried to offer pts daughter Priscilla an appointment for either this Thursday or Monday, but she refused. Mayela did state she then called Priscilla and explained the situation again and stressed the importance for then to make an appointment with Dr. Presley's office.     I just spoke with Priscilla and informed her as well of what i have learned and i did inform her to try to contact Dr. Presley's office again today to make her father an appointment. Priscilla stated that she has tried calling them back last night and this morning but they \"never\" answer. I did inform her that the best thing to do is to continue to call them for an appointment until they answer or leave them voicemails for the  to answer. I offered to try to call for an appointment, Priscilla declined and stated she will call them herself and get the pt an appointment.   "

## 2017-01-11 NOTE — TELEPHONE ENCOUNTER
Spoke with Mayela ([phone]) @ Dr. Mota's office, she states she has been in contact with [facility] re:patient. Mayela states that Dr. Mota is unable to see the pt because Dr. Presley had seen the pt first at [facility] and that pt needs to have a urgent referral to be sent to [facility] or [facility] not [facility] [place]. Mayela states her and her  has spoke with Lore @ Dr. Presley's office and that Lore know's the pt does not need to see Dr. Presley they just need an urgent referral to [facility] or [facility].     i have left a vm for Dr. Presley's office to see what is going on with pts referral, Mayela did state that if needed she will call Dr. Presley's office and speak with them again about what needs to be done.

## 2017-01-16 NOTE — TELEPHONE ENCOUNTER
1. Caller Name: Sia with [facility]     Call Back Number: [phone]      Patient approves a detailed voicemail message: yes    So Sia is scheduling patient's Radiology appointment but she noticed there are no pathology reports. I looked in to patients chart as well & I did not see actual pathology. I will attempt to request records from [facility], but I am wondering if Lisette Mendoza perhaps saw some pathology report that wasn't scanned in yet?

## 2017-01-16 NOTE — TELEPHONE ENCOUNTER
As far as I know no exsiting path report.  He has not had a biopsy.  I would suggest holding off for radiology appt until neuro decides that what they are going to do at his appointment-send to [place] or do here.        Can you check to see if he has been seen by [facility] yet or if he has an appointment at least

## 2017-01-18 NOTE — PROGRESS NOTES
RADIATION ONCOLOGY CONSULT    DATE OF SERVICE: 1/17/2017    IDENTIFICATION: A 56 y.o. 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  He is here at the kind request of Dr. Suárez radiotherapy evaluation.    HISTORY OF PRESENT ILLNESS: Previously healthy male no significant past medical history works as a  at the "[employer]. Presented with Bell's palsy in mid December 2016. Had workup at [place] and was discharged after 3 days in hospital. Daughters state that MRI noted a mass in the brain at that hospitalization. He was discharged with neurosurgical follow-up. In the interim he had a seizure and readmitted. MRI brain demonstrated nonenhancing mass left temporal lobe. Dr. suárez consulted. Dr. suárez was arranged for patient to have neurosurgical evaluation at [place] [facility].    PAST MEDICAL HISTORY:   Past Medical History   Diagnosis Date   • Hyperlipidemia 2010     no meds   • New onset seizure (CMS-HCC) 1/5/2017   • Brain cancer (CMS-HCC)      Glioma       PAST SURGICAL HISTORY:  No past surgical history on file.    CURRENT MEDICATIONS:  Current Outpatient Prescriptions   Medication Sig Dispense Refill   • dexamethasone (DECADRON) 4 MG Tab Take 1 Tab by mouth 2 times a day. DO NOT STOP THIS MEDICATION WITHOUT DISCUSSING WITH YOUR DOCTOR 60 Tab 1   • PAIN RELIEVER 325 MG Tab TK 2 T PO Q 6 H PRN  0   • levetiracetam (KEPPRA) 500 MG Tab TK  1 T PO  BID 60 Tab 5     No current facility-administered medications for this encounter.       ALLERGIES:     Review of patient's allergies indicates no known allergies.    FAMILY HISTORY:    family history includes Seizures in his daughter. There is no history of Diabetes.    SOCIAL HISTORY:     reports that he has quit smoking. His smoking use included Cigarettes. He has a 30 pack-year smoking history. He has never used smokeless tobacco. He reports that he does not drink alcohol or use illicit drugs.    REVIEW OF SYSTEMS:  An 18-point review  of systems for today's date of service was reviewed in the electronic medical record.  Pertinence have been stated in the present illness.  Constitutional ROS: No unexpected change in weight, No weakness, No fatigue, No unexplained fevers, sweats, or chills  Eye ROS: No recent significant change in vision  Mouth/Throat ROS: No teeth or gum problems, No bleeding gums, No tongue complaints, No sore throat, No recent change in voice or hoarseness  Neck ROS: No lumps or masses, No swollen glands, No recent swelling in thyroid area, No significant pain in neck, No h/o goiter or thyroid disease  Pulmonary ROS: No chronic cough, sputum, or hemoptysis, No dyspnea on exertion, No wheezing, No shortness of breath, No recent change in breathing  Cardiovascular ROS: No chest pain, No shortness of breath, No dyspnea on exertion, No diaphoresis, No palpitations  Gastrointestinal ROS: No abdominal pain, No change in bowel habits, No significant change in appetite, No nausea, vomiting, diarrhea, or constipation, No hematemesis, No abdominal bloating or early satiety  Musculoskeletal/Extremities ROS: No clubbing, No cyanosis, No peripheral edema, No pain, redness or swelling on the joints  Skin/Integumentary ROS: color, texture and temperature normal, mobility and turgor normal, No evidence of bleeding or bruising, No abnormal skin lesions noted, No evidence of rash, No itching  Neurologic ROS: No chronic headaches, No weakness    PHYSICAL EXAM:    VITAL SIGNS: Weight: 207 pounds BP: 135/84 Pulse: 91 Respirations: 16 Temperature: 98 O2 Saturation: 92%   GENERAL: Alert oriented male no acute distress  HEENT:  Pupils are equal, round, and reactive to light.  Extraocular muscles   are intact. Sclerae nonicteric.  Conjunctivae pink.  Oral cavity, tongue   protrudes midline.   NECK:  Supple without evidence of thyromegaly.  NODES:  No peripheral adenopathy of the neck, supraclavicular fossa or axillae   bilaterally.  LUNGS:  Clear to  ascultation and resonant to percussion.  HEART:  Regular rate and rhythm.  No murmur appreciated  ABDOMEN:  Soft. No evidence of hepatosplenomegaly.  Positive bowel sounds.  EXTREMITIES:  Without Edema.  NEUROLOGIC:  Cranial nerves II through XII were intact.  Strength is 5/5 in   lower extremities bilaterally.  DTRs were symmetrical.  There was no focal   sensory deficit appreciated.    RADIOLOGY DATA:  MRI [facility] 12/19/2016        IMPRESSION:  A 56 y.o. 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    RECOMMENDATIONS: He is scheduled see Dr. Shea [place] neurosurgery department. Suspect he'll either have biopsy or resection. Have asked him to return here for follow-up of post surgery to review path and make radiation plans as needed.    Thank you for the opportunity to participate in his care.  If any questions or comments, please do not hesitate in calling.    Eugenia POOL M.D.

## 2017-01-18 NOTE — TELEPHONE ENCOUNTER
Great. He doesn't need to see radiology.    Can we please get the office notes from Fernandez's office requested and put in his chart.    thanks

## 2017-01-18 NOTE — TELEPHONE ENCOUNTER
Spoke with Jammie at Dr. Presley's office. They just saw patient on Monday. They do not have a path report and they are not planning to do a biopsy. They have referred patient out to a [facility] (she thought it was [facility]) in [place] with Dr. Shea. I will contact Radiology to forward this information.

## 2017-02-04 PROBLEM — D49.6 BRAIN TUMOR (HCC): Status: ACTIVE | Noted: 2017-01-01

## 2017-02-05 NOTE — H&P
REHABILITATION HISTORY AND PHYSICAL/POST ADMISSION EVALUATION    2/5/2017  1:30 PM  Dale GillMarciaCarmona  RH20/02    Reason for admission: Non tbi from brain tumor      HPI:    The patient is a 56 year old male who presented for medical care with  Left Bell's Palsy in mid December of 2016. Evaluation include an MR head scan revealed a left temporofrontal primarily nonenhancing tumor. [facility] and Decadron. He was hospitalized at [facility] in [place]. He was referred to [facility] and the MRI of the head from 1-23-17 revealed extensive infiltrating predominantly nonenhancing left temporofrontal tumor including involvemtn of the left insula and deep frontal lobe as well as a large portion of the antierior and mid temporal lobe with minimal punctate enhancement in the left temoporal region. The patient underwent a sterotactic left frontotemporal craniotomy with microscopic computer assisted partial resection of the left temporal low grade glioma using intraoperative functional cortical mapping with awake patient. The patient had difficulty with speech. The discharge summary stated that the patient had a new acute infarct posterior to the resection of the cavity in the left temporal occipital lobe. In addition, new small focus of GRE hypointensity in left frontoparietal regional likely represents small hemorrhage or blood products from surgery  Patient was evaluated by Rehab Medicine physician and therapists and determined to be appropriate for acute inpatient rehab and was transferred to [facility] on 2-4-2017    ROS:  no F/C, N/V, HA, vision changes/dizziness, CP/SOB, abd pain/constipation, diarrhea, dysuria/frequency/urgency, bowel/bladder incontinence, calf pain/swelling, joint pain/swelling/myalgias, anxiety/depression/mood changes.  History obtained from medical records due to aphasia  PMH:  Past Medical History   Diagnosis Date   • Hyperlipidemia  "2010     no meds   • New onset seizure (CMS-HCC) 1/5/2017   • Brain cancer (CMS-HCC)      Glioma   • Bell palsy 12/2016   • Cancer (CMS-HCC) 1/31/2017     Glioma   • Diabetes (CMS-[facility])      R/t steroid use per dtr   • Bell's palsy 1/2017       PSH:  Past Surgical History   Procedure Laterality Date   • Craniotomy tumor  1/31/2017     Lt frontotemporal       Family Hx: no hx of DVT    MEDICATIONS:  Current Facility-Administered Medications   Medication Dose   • Respiratory Care per Protocol     • levetiracetam (KEPPRA) tablet 500 mg  500 mg   • dexamethasone (DECADRON) tablet 3 mg  3 mg   • dexamethasone (DECADRON) tablet 2 mg  2 mg   • [START ON 2/7/2017] dexamethasone (DECADRON) tablet 1 mg  1 mg   • [START ON 2/9/2017] dexamethasone (DECADRON) tablet 1 mg  1 mg   • [START ON 2/11/2017] dexamethasone (DECADRON) tablet 1 mg  1 mg   • [START ON 2/10/2017] dexamethasone (DECADRON) tablet 1 mg  1 mg   • [START ON 2/15/2017] dexamethasone (DECADRON) tablet 1 mg  1 mg   • [START ON 2/11/2017] dexamethasone (DECADRON) tablet 1 mg  1 mg   • famotidine (PEPCID) tablet 20 mg  20 mg   • oxycodone immediate-release (ROXICODONE) tablet 5 mg  5 mg   • insulin NPH (HUMULIN,NOVOLIN) injection 7 Units  7 Units   • acetaminophen (TYLENOL) tablet 650 mg  650 mg   • insulin lispro (HUMALOG) injection 1-6 Units  1-6 Units       ALLERGIES:  Review of patient's allergies indicates no known allergies.    LEVEL OF FUNCTION PRIOR TO ADMISSION: min transfers, min eating,min dressing,     CURRENT LEVEL OF FUNCTION: Independent with self care and mobility,,living with family    PHYSICAL EXAM:     height is 1.626 m (5' 4\") and weight is 91 kg (200 lb 9.9 oz). His temperature is 36.4 °C (97.6 °F). His blood pressure is 112/81 and his pulse is 101. His respiration is 18 and oxygen saturation is 91%.     GEN: well hydrated, round face, left crani incision cdi, staples in place  HEENT: NC/AT; EOMI, PERRL, no nystagmus  CARDIAC: RRR  LUNGS: " CTA  ABD: +BS, soft, NT/ND  EXT: No contractures, spasticity, or edema.  No bilateral calf tenderness  2+ bilateral DP/PT pulses.    NEURO:    Mental status:  Alert to self, unable answers questions appropriately, has difficulty with following commands    Speech: nonfluent, +aphasia + dysarthria    CRANIAL NERVES:  2,3: visual acuity grossly intact, PERRL  3,4,6: EOMI bilaterally, no nystagmus or diplopia  7: no facial asymmetry  8: hearing grossly intact  9,10: impaired  11: SCM/Trapezius strength 5/5 bilaterally  12: impaired  Motor:    Right Left  Had difficulty with muscle strength testing but can move all extremities  Sensory: intact to light touch through out    Tone: no spasticity noted    Proprioception:  Coordination: finger to nose patient unable to understand instructions    Imaging:    Labs:  Recent Labs      02/05/17   0508   SODIUM  137   POTASSIUM  4.0   CHLORIDE  101   CO2  28   GLUCOSE  254*   BUN  26*   CREATININE  0.57   CALCIUM  8.6     Recent Labs      02/05/17   0508   WBC  7.6   RBC  4.17*   HEMOGLOBIN  12.4*   HEMATOCRIT  38.1*   MCV  91.4   MCH  29.7   MCHC  32.5*   RDW  46.0   PLATELETCT  147*   MPV  9.2             PRIMARY REHAB DIAGNOSIS:  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 S/0 sterotactic left frontotemporal craniotomy with microscopic computer assisted partial resection of the left temporal low grade glioma using intraoperative functional cortical mapping with awake patient.    new acute infarct posterior to the resection of the cavity in the left temporal occipital lobe. And new small focus of GRE hypointensity in left frontoparietal regional likely represents small hemorrhage or blood products from surgery  Aphasia  Decadron taper  Increase bun 26:  encourage fluids, repeat in a few days  Initiate bladder program  Initiate bowel program  IMPAIRMENTS:   Mobility  Self-care  IADLs  Cognitive  Speech  Swallow    SECONDARY DIAGNOSIS/MEDICAL CO-MORBIDITIES AFFECTING FUNCTION:    RELEVANT CHANGES SINCE PREADMISSION EVALUATION:    Status unchanged    The patient's rehabilitation potential is good  The patient's medical prognosis is good    PLAN:   Discussion and Recommendations:   1. The patient requires an acute inpatient rehabilitation program with a coordinated program of care at an intensity and frequency not available at a lower level of care. This recommendation is substantiated by the patient's medical physicians who recommend that the patient's intervention and assessment of medical issues needs to be done at an acute level of care for patient's safety and maximum outcome.   2. A coordinated program of care will be supplied by an interdisciplinary team of physical therapy, occupational therapy, rehab physician, rehab nursing, and, if needed, speech therapy and rehab psychology. Rehab team presents a patient-specific rehabilitation and education program concentrating on prevention of future problems related to accessibility, mobility, skin, bowel, bladder, sexuality, and psychosocial and medical/surgical problems.   3. Need for Rehabilitation Physician: The rehab physician will be evaluating the patient on a multi-weekly basis to help coordinate the program of care. The rehab physician communicates between medical physicians, therapists, and nurses to maximize the patient's potential outcome. Specific areas in which the rehab physician will be providing daily assessment include the following:   A. Assessing the patient's heart rate and blood pressure response (vitals monitoring) to activity and making adjustments in medications or conservative measures as needed.   B. The rehab physician will be assessing the frequency at which the program can be increased to  allow the patient to reach optimal functional outcome.   C. The rehab physician will also provide assessments in daily skin care, especially in light of patient's impairments in mobility.   D. The rehab physician will provide special expertise in understanding how to work with functional impairment and recommend appropriate interventions, compensatory techniques, and education that will facilitate the patient's outcome.   4. Rehab R.N.   The rehab RN will be working with patient to carry over in room mobility and activities of daily living when the patient is not in 3 hours of skilled therapy. Rehab nursing will be working in conjunction with rehab physician to address all the medical issues above and continue to assess laboratory work and discuss abnormalities with the treating physicians, assess vitals, and response to activity, and discuss and report abnormalities with the rehab physician. Rehab RN will also continue daily skin care, supervise bladder/bowel program, instruct in medication administration, and ensure patient safety.     MEDICAL DECISION MAKING and INTERDISCIPLINARY PLAN OF CARE:    REHABILITATION ISSUES/ADVERSE POTENTIAL::  New CVA (Cerebrovascular Accident):Patient demonstrates functional deficits in strength, balance, coordination, and ADL's. Patient is admitted to [facility] for comprehensive rehabilitation therapy as described below.   Rehabilitation nursing monitors bowel and bladder control, educates on medication administration, co-morbidities and monitors patient s    NonTBI (Traumatic Brain Injury): Patient demonstrates functional deficits in cognition, behavior, strength, balance, coordination, and ADL's. The patient requires therapy to correct these deficits prior to discharge. Patient is admitted to [facility] for comprehensive rehabilitation therapy, including physical, occupational and speech therapy.     Rehabilitation nursing monitors bowel  and bladder control, educates on medication administration, co-morbidities and monitors patient safety.    Therapies to treat at intensity and frequency of (may change after completion of evaluation by all therapeutic disciplines):       PT:  Physical therapy to address mobility, transfer, gait training and evaluation for adaptive equipment needs 1hour/day at least 5 days/week for the duration of the ELOS (see below)       OT:  Occupational therapy to address ADLs, self-care, home management training, functional mobility/transfers and assistive device evaluation, and community re-intergration 1hour/day at least 5 days/week for the duration of the ELOS (see below).        ST/Dysphagia:  Speech therapy to address speech, language,and cognitive deficits as well as swallowing difficulties with retraining/dysphagia management and community re-integration with comprehension, expression, cognitive training 1hour/day at least 5 days/week for the duration of the ELOS (see below).     2.  Neurostimulants: None at this time but continue to assess daily for need to initiate should status change.    3.  DVT prophylaxis:  Patient is on not due to small hemmrhage in brain post op for anticoagulation upon transfer. Encourage OOB. Monitor daily for signs and symptoms of DVT including but not limited to swelling and pain to prevent the development of DVT that may interfere with therapies.    4.  GI prophylaxis:  On prilosec to prevent gastritis/dyspepsia which may interfere with therapies.    5.  Pain: No issues with pain currently     6.  Nutrition/Dysphagia: Dietician monitors nutrient intake, recommend supplements prn and provide nutrition education to pt/family to promote optimal nutrition for wound healing/recovery.     7.  Bladder/bowel:  Start bowel and bladder program as described below, to prevent constipation, urinary retention (which may lead to UTI), and urinary incontinence (which will impact upon pt's functional  independence).   - TV Q3h while awake with post void bladder scans, I&O cath for PVRs >400  - up to commode after meal     8.  Skin/dermal ulcer prophylaxis: Monitor for new skin conditions with q.2 h. turns as required to prevent the development of skin breakdown.     9.  Cognition/Behavior:  Psychologist Dr. Kingsley provides adjustment counseling to illness and psychosocial barriers that may be potential barriers to rehabilitation.     10. Respiratory therapy: RT performs O2 management prn, breathing retraining, pulmonary hygeine and bronchospasm management prn to optimize participation in therapies.     MEDICAL CO-MORBIDITIES/ADVERSE POTENTIAL AFFECTING FUNCTION:       GOALS/EXPECTED LEVEL OF FUNCTION BASED ON CURRENT MEDICAL AND FUNCTIONAL STATUS (may change based on patient's medical status and rate of impairment recovery):     Transfers: sup to mod I     Mobility/Gait:sup to mod I     ADL's:sup to mod I     Cognition:sup    DISPOSITION: home with assistance     ELOS: 2 weeks

## 2017-02-05 NOTE — THERAPY
Occupational Therapy Initial Plan of Care Note    1) Assessment:  Patient is 56 y.o. male with a diagnosis of brain tumor resection in left temporal lobe.  Additional factors influencing patient status / progress (ie: cognitive factors, co-morbidities, social support, etc): Independent PLOF, motivation and good family support but is limited by decrease safety awareness/cognition, balance, activity tolerance during ADL and functional mobility.  Long term and short term goals have been discussed with patient and they are in agreement.  2) Plan:  Recommend Occupational Therapy  minutes per day 5-7 days per week for 4  weeks for the following treatments:  OT Group Therapy, OT Self Care/ADL, OT Cognitive Skill Dev, OT Community Reintegration, OT Neuro Re-Ed/Balance, OT Therapeutic Activity and OT Therapeutic Exercise.  3) Goals:  Please refer to care plan for goals.

## 2017-02-05 NOTE — THERAPY
Speech Therapy Initial Plan of Care Note    1) Assessment:  Patient is 56 y.o. male with a diagnosis of NTBI, Brain tumor.  Additional factors influencing patient status / progress (ie: cognitive factors, co-morbidities, social support, etc): diabetes.  Long term and short term goals have been discussed with patient and they are in agreement.  2) Plan:  Recommend Speech Therapy  minutes per day 5-7 days per week for 2  weeks for the following treatments:  SLP Aphasia Evaluation, SLP Speech Language Treatment, SLP Swallowing Dysfunction Treatment, SLP Video Swallow Evaluation, SLP Self Care / ADL Training , SLP Evaluation for Nonspeech-General Device, SLP Nonspeech General Device Treatment and SLP Cognitive Skill Development.  3) Goals:  Please refer to care plan for goals.

## 2017-02-05 NOTE — CARE PLAN
Problem: Communication  Goal: The ability to communicate needs accurately and effectively will improve  Outcome: PROGRESSING SLOWER THAN EXPECTED  Patient nods or uses a thumbs up or thumbs down to answer yes or no questions.  He responds the same way when spoken to in English or Malay.     Problem: Safety  Goal: Will remain free from injury  Outcome: PROGRESSING SLOWER THAN EXPECTED  Patient attempts to self transfer this evening. Bed alarm is in place and functional. He is instructed to use call light for assistance. He verbalizes understanding.

## 2017-02-05 NOTE — PROGRESS NOTES
Admitted via stretcher at 1745 with dx of brain tumor resection with Dr Vanegas attending. Cooperative, mainly Estonian speaking. At times appears to understand simple questions or commands but other times appears confused. Dtr, Desiree at bedside and states that this is the issue. Family is supportive. Pt denies pain. Head incision is dry with staples.

## 2017-02-05 NOTE — THERAPY
Physical Therapy Initial Plan of Care Note    1) Assessment: Patient is 56 y.o. male with a diagnosis of NTBI, brain tumor, L temporal lobe resection.  Additional factors influencing patient status / progress (ie: cognitive factors, co-morbidities, social support, etc): expressive aphasia, dec safety awareness.  Long term and short term goals have been discussed with patient and they are in agreement.  2) Plan: Recommend Physical Therapy  minutes per day 5-7 days per week for 2  weeks for the following treatments:  PT Group Therapy, PT E Stim Attended, PT Gait Training, PT Self Care/Home Eval, PT Therapeutic Exercises, PT Neuro Re-Ed/Balance, PT Aquatic Therapy, PT Therapeutic Activity and PT Manual Therapy.  3) Goals:  Please refer to care plan for goals.

## 2017-02-05 NOTE — FLOWSHEET NOTE
02/04/17 1805   Events/Summary/Plan   Events/Summary/Plan Received via ground transport from [place]   Respiratory WDL   Respiratory (WDL) X   Chest Exam   Respiration 16   Pulse 76   Breath Sounds   RUL Breath Sounds Clear   RML Breath Sounds Clear   RLL Breath Sounds Clear;Diminished   LAYA Breath Sounds Clear   LLL Breath Sounds Clear;Diminished   Secretions   Cough (no cough on request)   Oximetry   #Pulse Oximetry (Single Determination) Yes   Oxygen   Home O2 Use Prior To Admission? No   Pulse Oximetry 91 %   O2 (LPM) 0

## 2017-02-06 PROBLEM — R56.9 SEIZURE (HCC): Status: ACTIVE | Noted: 2017-01-01

## 2017-02-06 PROBLEM — C71.9 GLIOBLASTOMA (HCC): Status: ACTIVE | Noted: 2017-01-01

## 2017-02-06 NOTE — DISCHARGE PLANNING
Case management   TC to pt's dtr Desiree Gill  who provided me with information for assessment.   Full report to follow.

## 2017-02-06 NOTE — PROGRESS NOTES
"Rehab Progress Note     Interval Events (Subjective)  Patient seen and examined today. Responded to rapid response where patient seized. He had completed seizure. Has headache.   ROS: last bm 2/5/2017    Objective:  VITAL SIGNS: /76 mmHg  Pulse 56  Temp(Src) 36.8 °C (98.3 °F)  Resp 16  Ht 1.626 m (5' 4\")  Wt 89 kg (196 lb 3.4 oz)  BMI 33.66 kg/m2  SpO2 98%  Heart regular rate and rhythm  Lungs clear to auscultation   Abdomen bowel sounds x 4    Recent Results (from the past 72 hour(s))   ACCU-CHEK GLUCOSE    Collection Time: 02/04/17  6:06 PM   Result Value Ref Range    Glucose - Accu-Ck 208 (H) 65 - 99 mg/dL   ACCU-CHEK GLUCOSE    Collection Time: 02/04/17 10:31 PM   Result Value Ref Range    Glucose - Accu-Ck 253 (H) 65 - 99 mg/dL   CBC WITH DIFFERENTIAL    Collection Time: 02/05/17  5:08 AM   Result Value Ref Range    WBC 7.6 4.8 - 10.8 K/uL    RBC 4.17 (L) 4.70 - 6.10 M/uL    Hemoglobin 12.4 (L) 14.0 - 18.0 g/dL    Hematocrit 38.1 (L) 42.0 - 52.0 %    MCV 91.4 81.4 - 97.8 fL    MCH 29.7 27.0 - 33.0 pg    MCHC 32.5 (L) 33.7 - 35.3 g/dL    RDW 46.0 35.9 - 50.0 fL    Platelet Count 147 (L) 164 - 446 K/uL    MPV 9.2 9.0 - 12.9 fL    Nucleated RBC 0.40 /100 WBC    NRBC (Absolute) 0.03 K/uL    Neutrophils-Polys 80.20 (H) 44.00 - 72.00 %    Lymphocytes 7.20 (L) 22.00 - 41.00 %    Monocytes 4.50 0.00 - 13.40 %    Eosinophils 0.00 0.00 - 6.90 %    Basophils 0.00 0.00 - 1.80 %    Neutrophils (Absolute) 6.30 1.82 - 7.42 K/uL    Lymphs (Absolute) 0.55 (L) 1.00 - 4.80 K/uL    Monos (Absolute) 0.34 0.00 - 0.85 K/uL    Eos (Absolute) 0.00 0.00 - 0.51 K/uL    Baso (Absolute) 0.00 0.00 - 0.12 K/uL   COMP METABOLIC PANEL    Collection Time: 02/05/17  5:08 AM   Result Value Ref Range    Sodium 137 135 - 145 mmol/L    Potassium 4.0 3.6 - 5.5 mmol/L    Chloride 101 96 - 112 mmol/L    Co2 28 20 - 33 mmol/L    Anion Gap 8.0 0.0 - 11.9    Glucose 254 (H) 65 - 99 mg/dL    Bun 26 (H) 8 - 22 mg/dL    Creatinine 0.57 0.50 - " 1.40 mg/dL    Calcium 8.6 8.5 - 10.5 mg/dL    AST(SGOT) 17 12 - 45 U/L    ALT(SGPT) 43 2 - 50 U/L    Alkaline Phosphatase 35 30 - 99 U/L    Total Bilirubin 0.4 0.1 - 1.5 mg/dL    Albumin 3.1 (L) 3.2 - 4.9 g/dL    Total Protein 5.4 (L) 6.0 - 8.2 g/dL    Globulin 2.3 1.9 - 3.5 g/dL    A-G Ratio 1.3 g/dL   PREALBUMIN    Collection Time: 02/05/17  5:08 AM   Result Value Ref Range    Pre-Albumin 25.0 18.0 - 38.0 mg/dL   ESTIMATED GFR    Collection Time: 02/05/17  5:08 AM   Result Value Ref Range    GFR If African American >60 >60 mL/min/1.73 m 2    GFR If Non African American >60 >60 mL/min/1.73 m 2   DIFFERENTIAL MANUAL    Collection Time: 02/05/17  5:08 AM   Result Value Ref Range    Bands-Stabs 2.70 0.00 - 10.00 %    Metamyelocytes 1.80 %    Myelocytes 3.60 %    Manual Diff Status PERFORMED    PERIPHERAL SMEAR REVIEW    Collection Time: 02/05/17  5:08 AM   Result Value Ref Range    Peripheral Smear Review see below    PLATELET ESTIMATE    Collection Time: 02/05/17  5:08 AM   Result Value Ref Range    Plt Estimation Decreased    MORPHOLOGY    Collection Time: 02/05/17  5:08 AM   Result Value Ref Range    RBC Morphology Normal    URINALYSIS    Collection Time: 02/05/17  6:30 AM   Result Value Ref Range    Color Lt. Yellow     Character Clear     Specific Gravity 1.028 <1.035    Ph 6.5 5.0-8.0    Glucose 300 (A) Negative mg/dL    Ketones Negative Negative mg/dL    Protein Negative Negative mg/dL    Bilirubin Negative Negative    Nitrite Negative Negative    Leukocyte Esterase Negative Negative    Occult Blood Negative Negative    Micro Urine Req see below    ACCU-CHEK GLUCOSE    Collection Time: 02/05/17  7:49 AM   Result Value Ref Range    Glucose - Accu-Ck 182 (H) 65 - 99 mg/dL   ACCU-CHEK GLUCOSE    Collection Time: 02/05/17 11:39 AM   Result Value Ref Range    Glucose - Accu-Ck 206 (H) 65 - 99 mg/dL   ACCU-CHEK GLUCOSE    Collection Time: 02/05/17  4:56 PM   Result Value Ref Range    Glucose - Accu-Ck 198 (H) 65 - 99  mg/dL   ACCU-CHEK GLUCOSE    Collection Time: 02/05/17  9:04 PM   Result Value Ref Range    Glucose - Accu-Ck 299 (H) 65 - 99 mg/dL   ACCU-CHEK GLUCOSE    Collection Time: 02/06/17  7:58 AM   Result Value Ref Range    Glucose - Accu-Ck 135 (H) 65 - 99 mg/dL       Current Facility-Administered Medications   Medication Frequency   • [START ON 2/10/2017] dexamethasone (DECADRON) tablet 1 mg Q12HRS   • Respiratory Care per Protocol Continuous RT   • levetiracetam (KEPPRA) tablet 500 mg Q12HRS   • dexamethasone (DECADRON) tablet 2 mg 4X/DAY   • [START ON 2/7/2017] dexamethasone (DECADRON) tablet 1 mg 4X/DAY   • [START ON 2/9/2017] dexamethasone (DECADRON) tablet 1 mg TID   • [START ON 2/11/2017] dexamethasone (DECADRON) tablet 1 mg DAILY   • famotidine (PEPCID) tablet 20 mg BID   • oxycodone immediate-release (ROXICODONE) tablet 5 mg Q4HRS PRN   • insulin NPH (HUMULIN,NOVOLIN) injection 7 Units BID INSULIN   • acetaminophen (TYLENOL) tablet 650 mg Q4HRS PRN   • insulin lispro (HUMALOG) injection 1-6 Units 4X/DAY ACHS       Orders Placed This Encounter   Procedures   • DIET ORDER     Standing Status: Standing      Number of Occurrences: 1      Standing Expiration Date:      Order Specific Question:  Diet:     Answer:  Diabetic [3]     Order Specific Question:  Texture/Fiber modifications:     Answer:  Dysphagia 2(Pureed/Chopped)specify fluid consistency(question 6) [2]     Order Specific Question:  Consistency/Fluid modifications:     Answer:  Thin Liquids [3]       Assessment:  Active Hospital Problems    Diagnosis   • Brain tumor (CMS-HCC)     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 S/0 sterotactic left frontotemporal craniotomy with microscopic computer assisted partial resection of the left  temporal low grade glioma using intraoperative functional cortical mapping with awake patient.     new acute infarct posterior to the resection of the cavity in the left temporal occipital lobe. And new small focus of GRE hypointensity in left frontoparietal regional likely represents small hemorrhage or blood products from surgery  Aphasia  Decadron taper  Increase bun 26: encourage fluids, repeat in a few days  Initiate bladder program  Initiate bowel program  IMPAIRMENTS:    Mobility  Self-care  IADLs  Cognitive  Speech  Swallow    SECONDARY DIAGNOSIS/MEDICAL CO-MORBIDITIES AFFECTING FUNCTION:    RELEVANT CHANGES SINCE PREADMISSION EVALUATION:    Status unchanged    The patient's rehabilitation potential is good  The patient's medical prognosis is good    PLAN:    Discussion and Recommendations:    1. The patient requires an acute inpatient rehabilitation program with a coordinated program of care at an intensity and frequency not available at a lower level of care. This recommendation is substantiated by the patient's medical physicians who recommend that the patient's intervention and assessment of medical issues needs to be done at an acute level of care for patient's safety and maximum outcome.    2. A coordinated program of care will be supplied by an interdisciplinary team of physical therapy, occupational therapy, rehab physician, rehab nursing, and, if needed, speech therapy and rehab psychology. Rehab team presents a patient-specific rehabilitation and education program concentrating on prevention of future problems related to accessibility, mobility, skin, bowel, bladder, sexuality, and psychosocial and medical/surgical problems.    3. Need for Rehabilitation Physician: The rehab physician will be evaluating the patient on a multi-weekly basis to help coordinate the program of care. The rehab physician communicates between medical physicians, therapists, and nurses to maximize the patient's potential  outcome. Specific areas in which the rehab physician will be providing daily assessment include the following:    A. Assessing the patient's heart rate and blood pressure response (vitals monitoring) to activity and making adjustments in medications or conservative measures as needed.    B. The rehab physician will be assessing the frequency at which the program can be increased to allow the patient to reach optimal functional outcome.    C. The rehab physician will also provide assessments in daily skin care, especially in light of patient's impairments in mobility.    D. The rehab physician will provide special expertise in understanding how to work with functional impairment and recommend appropriate interventions, compensatory techniques, and education that will facilitate the patient's outcome.    4. Rehab R.N.    The rehab RN will be working with patient to carry over in room mobility and activities of daily living when the patient is not in 3 hours of skilled therapy. Rehab nursing will be working in conjunction with rehab physician to address all the medical issues above and continue to assess laboratory work and discuss abnormalities with the treating physicians, assess vitals, and response to activity, and discuss and report abnormalities with the rehab physician. Rehab RN will also continue daily skin care, supervise bladder/bowel program, instruct in medication administration, and ensure patient safety.     MEDICAL DECISION MAKING and INTERDISCIPLINARY PLAN OF CARE:    REHABILITATION ISSUES/ADVERSE POTENTIAL::  New CVA (Cerebrovascular Accident):Patient demonstrates functional deficits in strength, balance, coordination, and ADL's. Patient is admitted to [facility] for comprehensive rehabilitation therapy as described below.   Rehabilitation nursing monitors bowel and bladder control, educates on medication administration, co-morbidities and monitors patient s    NonTBI (Traumatic  Brain Injury): Patient demonstrates functional deficits in cognition, behavior, strength, balance, coordination, and ADL's. The patient requires therapy to correct these deficits prior to discharge. Patient is admitted to [facility] for comprehensive rehabilitation therapy, including physical, occupational and speech therapy.     Rehabilitation nursing monitors bowel and bladder control, educates on medication administration, co-morbidities and monitors patient safety.    Therapies to treat at intensity and frequency of (may change after completion of evaluation by all therapeutic disciplines):       PT:  Physical therapy to address mobility, transfer, gait training and evaluation for adaptive equipment needs 1hour/day at least 5 days/week for the duration of the ELOS (see below)       OT:  Occupational therapy to address ADLs, self-care, home management training, functional mobility/transfers and assistive device evaluation, and community re-intergration 1hour/day at least 5 days/week for the duration of the ELOS (see below).        ST/Dysphagia:  Speech therapy to address speech, language,and cognitive deficits as well as swallowing difficulties with retraining/dysphagia management and community re-integration with comprehension, expression, cognitive training 1hour/day at least 5 days/week for the duration of the ELOS (see below).     2.  Neurostimulants: None at this time but continue to assess daily for need to initiate should status change.    3.  DVT prophylaxis:  Patient is on not due to small hemmrhage in brain post op for anticoagulation upon transfer. Encourage OOB. Monitor daily for signs and symptoms of DVT including but not limited to swelling and pain to prevent the development of DVT that may interfere with therapies.    4.  GI prophylaxis:  On prilosec to prevent gastritis/dyspepsia which may interfere with therapies.    5.  Pain: No issues with pain currently     6.   Nutrition/Dysphagia: Dietician monitors nutrient intake, recommend supplements prn and provide nutrition education to pt/family to promote optimal nutrition for wound healing/recovery.     7.  Bladder/bowel:  Start bowel and bladder program as described below, to prevent constipation, urinary retention (which may lead to UTI), and urinary incontinence (which will impact upon pt's functional independence).    - TV Q3h while awake with post void bladder scans, I&O cath for PVRs >400  - up to commode after meal     8.  Skin/dermal ulcer prophylaxis: Monitor for new skin conditions with q.2 h. turns as required to prevent the development of skin breakdown.     9.  Cognition/Behavior:  Psychologist Dr. Kingsley provides adjustment counseling to illness and psychosocial barriers that may be potential barriers to rehabilitation.     10. Respiratory therapy: RT performs O2 management prn, breathing retraining, pulmonary hygeine and bronchospasm management prn to optimize participation in therapies.      MEDICAL CO-MORBIDITIES/ADVERSE POTENTIAL AFFECTING FUNCTION:        GOALS/EXPECTED LEVEL OF FUNCTION BASED ON CURRENT MEDICAL AND FUNCTIONAL STATUS (may change based on patient's medical status and rate of impairment recovery):     Transfers: sup to mod I     Mobility/Gait:sup to mod I     ADL's:sup to mod I     Cognition:sup    DISPOSITION: home with assistance     ELOS: 2 weeks                            Medical Decision Making and Plan:  Dr Quevedo also responded to rapid response. Patient had seizure and was alert. Still aphasic. Send patient to ER for CT scan stat(CT was scheduled for 5 pm today) not appropriate to wait that long for scan.     Total time:  >40minutes.  I spent greater than 50% of the time for patient care and coordination on this date, including unit/floor time, and face-to-face time with the patient as per assessment and plan above.    Lisette Vanegas D.O.

## 2017-02-06 NOTE — ED PROVIDER NOTES
ED Provider Note    CHIEF COMPLAINT  Chief Complaint   Patient presents with   • Seizure       HPI  Dale Dorado is a 56 y.o. male who presents from rehab after discussion with Dr. Nciole, the hospitalist at the rehab hospital. The patient had 2 seizures today, his concern was the patient was in status epilepticus. Most of the history is obtained from the chart, as the patient has aphasia. This apparently has been since his surgery. From the H&P to rehab, the patient had an extensive infiltrating left temporal frontal tumor which had a resection. Apparently there was a new infarct posterior to this area in the left temporal lobe. 2 small areas of blood were also noted post surgery. He is admitted to rehab on the 4th. The H&P at that time notes that he has a speech deficit, and was difficulty following commands. Apparently doing well until today.    PAST MEDICAL HISTORY  Past Medical History   Diagnosis Date   • Hyperlipidemia 2010     no meds   • New onset seizure (CMS-HCC) 1/5/2017   • Brain cancer (CMS-HCC)      Glioma   • Bell palsy 12/2016   • Cancer (CMS-[facility]) 1/31/2017     Glioma   • Diabetes (CMS-HCC)      R/t steroid use per dtr   • Bell's palsy 1/2017       FAMILY HISTORY  Family History   Problem Relation Age of Onset   • Diabetes Neg Hx    • Seizures Daughter        SOCIAL HISTORY  Social History   Substance Use Topics   • Smoking status: Former Smoker -- 1.00 packs/day for 30 years     Types: Cigarettes   • Smokeless tobacco: Never Used   • Alcohol Use: No         SURGICAL HISTORY  Past Surgical History   Procedure Laterality Date   • Craniotomy tumor  1/31/2017     Lt frontotemporal       CURRENT MEDICATIONS    I have reviewed the nurses notes and/or the list brought with the patient.    ALLERGIES  No Known Allergies    REVIEW OF SYSTEMS  See HPI for further details. Review of systems as above, limited secondary to aphasia. We are presents to try to track down records from  "[place].    PHYSICAL EXAM  VITAL SIGNS: /78 mmHg  Pulse 60  Temp(Src) 36.3 °C (97.3 °F)  Resp 16  Ht 1.68 m (5' 6.14\")  Wt 89 kg (196 lb 3.4 oz)  BMI 31.53 kg/m2  SpO2 97%  Constitutional: Although not toxic, he does appear chronically ill. However, he seems to be in good spirits.  HENT: Mucus membranes moist.  Oropharynx is clear. Stapled surgical incision of the left scalp, as well as above the left ear. Ecchymosis associated with this. No fluctuance or discharge. No tenderness or warmth.  Eyes: Pupils equally round.  No scleral icterus.   Neck: Full nontender range of motion. There is no meningismus, Brudzinski's or Kernig's signs.  Lymphatic: No cervical lymphadenopathy noted.   Cardiovascular: Regular heart rate and rhythm.  No murmurs, rubs, nor gallop appreciated.   Thorax & Lungs: Chest is nontender.  Lungs are clear to auscultation with good air movement bilaterally.  No wheeze, rhonchi, nor rales.   Abdomen: Soft, with no tenderness, rebound nor guarding.  No mass, pulsatile mass, nor hepatosplenomegaly appreciated.  Skin: As above. Also some bruising near left collarbone consistent with central line.  Extremities/Musculoskeletal: No sign of trauma.  Calves are nontender with no cords nor edema.  No Mickey's sign.  Pulses are intact all around.   Neurologic: Alert. Seems to be oriented. Cranial nerves notable for dense aphasia. Moving all extremities. Gait is not assessed  Psychiatric: Normal affect appropriate for the clinical situation.    EKG  I interpreted this EKG myself.  This is a 12-lead study.  The rhythm is sinus bradycardia with a rate of 56.  There are no ST segment nor T wave abnormalities.  Interpretation: No ST segment elevation myocardial infarction.    LABS  Labs Reviewed   CBC WITH DIFFERENTIAL - Abnormal; Notable for the following:     MCHC 33.6 (*)     Platelet Count 163 (*)     MPV 8.7 (*)     Lymphocytes 20.90 (*)     All other components within normal limits    " Narrative:     Indicate which anticoagulants the patient is on:->UNKNOWN   COMP METABOLIC PANEL - Abnormal; Notable for the following:     Sodium 134 (*)     Glucose 151 (*)     ALT(SGPT) 59 (*)     All other components within normal limits    Narrative:     Indicate which anticoagulants the patient is on:->UNKNOWN   PROTHROMBIN TIME - Abnormal; Notable for the following:     PT 11.6 (*)     INR 0.82 (*)     All other components within normal limits    Narrative:     Indicate which anticoagulants the patient is on:->UNKNOWN   APTT - Abnormal; Notable for the following:     APTT 20.2 (*)     All other components within normal limits    Narrative:     Indicate which anticoagulants the patient is on:->UNKNOWN   TROPONIN    Narrative:     Indicate which anticoagulants the patient is on:->UNKNOWN   LIPASE    Narrative:     Indicate which anticoagulants the patient is on:->UNKNOWN   ESTIMATED GFR    Narrative:     Indicate which anticoagulants the patient is on:->UNKNOWN   DIFFERENTIAL MANUAL    Narrative:     Indicate which anticoagulants the patient is on:->UNKNOWN   PERIPHERAL SMEAR REVIEW    Narrative:     Indicate which anticoagulants the patient is on:->UNKNOWN   PLATELET ESTIMATE    Narrative:     Indicate which anticoagulants the patient is on:->UNKNOWN   MORPHOLOGY    Narrative:     Indicate which anticoagulants the patient is on:->UNKNOWN   TSH   [facility] POC GLUCOSE   [facility] POC GLUCOSE         RADIOLOGY/PROCEDURES  I have reviewed the patient's film interpretations myself, and they are read out by the radiologist as:   CT-HEAD W/O   Final Result      1.  There has been interval left-sided craniotomy with a frontal temporal defect. There is associated pneumocephalus.   2.  There is been interval development of hypoattenuation in the left posterior frontal parietal junction, anterior left frontal region and left temporal lobe consistent with areas of encephalomalacia.   3.  There is one remaining area of  increased density in the left posterior frontal lobe medially which could be residual mass or focal ill-defined area of hemorrhage.   4.  There is a small amount of left sided holohemispheric extra-axial blood adjacent to the craniotomy site.   5.  There is estimated 9 mm of left-to-right midline shift.   6.  There is mass effect in the left lateral ventricle with slight dilatation of the left temporal horn.      Findings were discussed with HILLARY MACIAS on 2/6/2017 5:27 PM.      DX-CHEST-PORTABLE (1 VIEW)   Final Result      1.  Mildly enlarged cardiac silhouette with hypoinflation and probable atelectasis.        .    MEDICAL RECORD  I have reviewed patient's medical record and pertinent results are listed above.    COURSE & MEDICAL DECISION MAKING  I have reviewed any medical record information, laboratory studies and radiographic results as noted above.  Patient's sent over for question of status epilepticus, as noted discussed the patient's case with Dr. Nicole. At this point, his mental status seems to be good, and presently does not send to be in status. However, workup was undertaken to include CT laboratories. He is on Keppra 500 b.i.d. orally, will go ahead and increase that presently with a 500 mg dose IV. We will get further records from [place], although have had some difficulty doing this. I discussed the patient's case with Dr. Felix, radiology, who notes a significant abnormality on his CT as noted above. I discussed this with Dr. suárez, neurosurgery, who suggested I call the neurosurgeon who operated on him. This was done; I spoke with Dr. Shea who knows the patient well. He does not think he needs further neurosurgery, agrees with Keppra 1 g b.i.d., as well as steroids. I ordered this. He points out the much of that edema is actually tumor. He would like to be kept informed but does not think the patient needs neurosurgical intervention nor to be transferred this time. The family for their  part prefers he stay here possible, and I think that we can make this happen. I discussed the patient's case with Dr. Leavitt, hospitalist, who will be seeing the patient. Of note, the patient also is already established with radiation oncology; they were waiting for him was operatively before treating. While here, the patient did have what sounds to be a focal motor seizure which generalized as he was unresponsive however which spontaneously resolved. Obviously we will be keeping an eye on him. Does not seem to be infectious in etiology, do not think this is a postoperative infection nor do I think it is meningitis.    FINAL IMPRESSION  1. Seizure (CMS-HCC)    2. Malignant neoplasm of brain, unspecified location (CMS-HCC)     3.Critical care time, 35 minutes, which includes obtaining history from patient and/or family/prehospital historians, examination of patient, reevaluation of patient, direction of nursing staff, and discussion with referring, admitting and consulting physicians. It does not include any procedures.    This dictation was created using voice recognition software.    Electronically signed by: Syed Raya, 2/6/2017 3:32 PM

## 2017-02-06 NOTE — IP AVS SNAPSHOT
" Home Care Instructions                                                                                                                  Name:Dale Dorado  Medical Record Number:[id number]  CSN: [id number]    YOB: 1960   Age: 56 y.o.  Sex: male  HT:1.68 m (5' 6.14\") WT: 96.8 kg (213 lb 6.5 oz)          Admit Date: 2/6/2017     Discharge Date:   Today's Date: 2/10/2017  Attending Doctor:  Jayne Washington M.D.                  Allergies:  Review of patient's allergies indicates no known allergies.            Discharge Instructions       Discharge Instructions    Discharged to other by medical transportation with escort. Discharged via wheelchair, hospital escort: Yes.  Special equipment needed: Not Applicable    Be sure to schedule a follow-up appointment with your primary care doctor or any specialists as instructed.     Discharge Plan:   Diet Plan: Discussed  Activity Level: Discussed  Confirmed Follow up Appointment: Patient to Call and Schedule Appointment  Confirmed Symptoms Management: Discussed  Medication Reconciliation Updated: Yes  Influenza Vaccine Indication: Patient Refuses    I understand that a diet low in cholesterol, fat, and sodium is recommended for good health. Unless I have been given specific instructions below for another diet, I accept this instruction as my diet prescription.   Other diet:Dysphagia 2 nectar thick liquids      Special Instructions:   Convulsiones en el adulto  (Seizure, Adult)   Maria Antonia convulsión significa que hay maria antonia actividad inusual en el cerebro. Puede causar modificaciones en la atención o en la conducta. Las convulsiones causan sacudidas (convulsiones). Generalmente espinoza entre 30 segundos y 2 minutos.   CUIDADOS EN EL HOGAR   · Si le recetaron medicamentos, tómelos exactamente según las indicaciones del médico.  · Cumpla con las visitas de control jareth humaira se le indicó.  · No No conduzca vehículos ni practique natación hasta que el médico lo " autorice.  · Explique a otras personas qué deben hacer si usted sufre maria antonia convulsión. Ellos tienen que:  ¨ Recostarlo en el suelo.  ¨ Colocar un almohadón debajo de ramirez fredis.  ¨ Aflojar toda la ropa ajustada que pueda tener alrededor del moody.  ¨ Colocarlo de lado.  ¨ Permanecer con usted hasta que se sienta mejor.  SOLICITE AYUDA DE INMEDIATO SI:   · La convulsión dura más de 2 a 5 minutos.  · La convulsión es muy intensa.  · La persona no se despierta después de sufrir maria antonia convulsión.  · La atención o la conducta se modifican.  Lleve a la persona hasta un servicio de emergencias o comuníquese inmediatamente con el servicio de urgencias de ramirez localidad (911 en los [place]).  ASEGÚRESE DE QUE:   · Comprende estas instrucciones.  · Controlará ramirez enfermedad.  · Solicitará ayuda de inmediato si no mejora o empeora.     Esta información no tiene humaira fin reemplazar el consejo del médico. Asegúrese de hacerle al médico cualquier pregunta que tenga.     Document Released: 01/20/2012 Document Revised: 03/11/2013  [employer] Interactive Patient Education ©2016 [employer]      · Is patient discharged on Warfarin / Coumadin?   No     · Is patient Post Blood Transfusion?  No    Depression / Suicide Risk    As you are discharged from this [facility] facility, it is important to learn how to keep safe from harming yourself.    Recognize the warning signs:  · Abrupt changes in personality, positive or negative- including increase in energy   · Giving away possessions  · Change in eating patterns- significant weight changes-  positive or negative  · Change in sleeping patterns- unable to sleep or sleeping all the time   · Unwillingness or inability to communicate  · Depression  · Unusual sadness, discouragement and loneliness  · Talk of wanting to die  · Neglect of personal appearance   · Rebelliousness- reckless behavior  · Withdrawal from people/activities they love  · Confusion- inability to concentrate     If you  or a loved one observes any of these behaviors or has concerns about self-harm, here's what you can do:  · Talk about it- your feelings and reasons for harming yourself  · Remove any means that you might use to hurt yourself (examples: pills, rope, extension cords, firearm)  · Get professional help from the community (Mental Health, Substance Abuse, psychological counseling)  · Do not be alone:Call your Safe Contact- someone whom you trust who will be there for you.  · Call your local CRISIS HOTLINE 822-7928 or 881-654-4747  · Call your local Children's Mobile Crisis Response Team [place] (180) 627-8002 or www.Admittedly  · Call the toll free National Suicide Prevention Hotlines   · National Suicide Prevention Lifeline 575-056-WNYE (0582)  · [place] Hope Line Network 800-SUICIDE (669-9805)        Your appointments     Mar 16, 2017  2:00 PM   ONCOLOGY NEW PATIENT 60 MIN with Olivier Osman M.D.   [facility] (--)    [address], [address]  [facility] [address] [phone]              Follow-up Information     1. Follow up with JAGDEEP Kinsey In 2 weeks.    Specialty:  Family Medicine    Contact information    [address] [facility] [address]  [address]  [place] [address] [phone]          2. Follow up with Olivier Osman M.D. On 2/16/2017.    Specialty:  Oncology    Why:  Appt @ 2:00 pm    Contact information    [address] [facility]  [address]  [facility] [address] [phone]          3. Call Eugenia POOL M.D..    Specialty:  Radiation Therapy    Contact information    [address]  [address]  [place] [address] [phone]           Discharge Medication Instructions:    Below are the medications your physician expects you to take upon discharge:    Review all your home medications and newly ordered medications with your doctor and/or pharmacist. Follow medication instructions as directed by your doctor and/or pharmacist.    Please keep your medication list with you and share with your  physician.               Medication List      START taking these medications        Instructions    famotidine 20 MG Tabs   Last time this was given:  20 mg on 2/10/2017  8:31 AM   Commonly known as:  PEPCID    Take 1 Tab by mouth 2 Times a Day.   Dose:  20 mg       insulin lispro 100 UNIT/ML Soln   Last time this was given:  4 Units on 2/10/2017 11:18 AM   Commonly known as:  HUMALOG    Inject 1-6 Units as instructed 4 Times a Day,Before Meals and at Bedtime.   Dose:  1-6 Units       insulin  UNIT/ML Susp   Last time this was given:  7 Units on 2/10/2017  5:13 AM   Commonly known as:  HUMULIN,NOVOLIN    Inject 10 Units as instructed 2 Times a Day.   Dose:  10 Units       labetalol 5 MG/ML Soln   Commonly known as:  NORMODYNE,TRANDATE    2 mL by Intravenous route every four hours as needed (see admin instructions for parameters).   Dose:  10 mg       lorazepam 2 MG/ML Soln   Commonly known as:  ATIVAN    1 mL by Intravenous route every 1 hour as needed (May repeat x 1 if ineffective).   Dose:  2 mg         CHANGE how you take these medications        Instructions    dexamethasone 4 MG Tabs   What changed:  when to take this   Commonly known as:  DECADRON    Take 1 Tab by mouth 3 times a day. DO NOT STOP THIS MEDICATION WITHOUT DISCUSSING WITH YOUR DOCTOR   Dose:  4 mg       levetiracetam 1000 MG tablet   What changed:  medication strength   Commonly known as:  KEPPRA    TK  1 T PO  BID         CONTINUE taking these medications        Instructions    PAIN RELIEVER 325 MG Tabs   Last time this was given:  650 mg on 2/9/2017  3:10 PM   Generic drug:  acetaminophen    TK 2 T PO Q 6 H PRN               Instructions           Diet / Nutrition:    Follow any diet instructions given to you by your doctor or the dietician, including how much salt (sodium) you are allowed each day.    If you are overweight, talk to your doctor about a weight reduction plan.    Activity:    Remain physically active following your  doctor's instructions about exercise and activity.    Rest often.     Any time you become even a little tired or short of breath, SIT DOWN and rest.    Worsening Symptoms:    Report any of the following signs and symptoms to the doctor's office immediately:    *Pain of jaw, arm, or neck  *Chest pain not relieved by medication                               *Dizziness or loss of consciousness  *Difficulty breathing even when at rest   *More tired than usual                                       *Bleeding drainage or swelling of surgical site  *Swelling of feet, ankles, legs or stomach                 *Fever (>100ºF)  *Pink or blood tinged sputum  *Weight gain (3lbs/day or 5lbs /week)           *Shock from internal defibrillator (if applicable)  *Palpitations or irregular heartbeats                *Cool and/or numb extremities    Stroke Awareness    Common Risk Factors for Stroke include:    Age  Atrial Fibrillation  Carotid Artery Stenosis  Diabetes Mellitus  Excessive alcohol consumption  High blood pressure  Overweight   Physical inactivity  Smoking    Warning signs and symptoms of a stroke include:    *Sudden numbness or weakness of the face, arm or leg (especially on one side of the body).  *Sudden confusion, trouble speaking or understanding.  *Sudden trouble seeing in one or both eyes.  *Sudden trouble walking, dizziness, loss of balance or coordination.Sudden severe headache with no known cause.    It is very important to get treatment quickly when a stroke occurs. If you experience any of the above warning signs, call 911 immediately.                   Disclaimer         Quit Smoking / Tobacco Use:    I understand the use of any tobacco products increases my chance of suffering from future heart disease or stroke and could cause other illnesses which may shorten my life. Quitting the use of tobacco products is the single most important thing I can do to improve my health. For further information on smoking /  tobacco cessation call a Toll Free Quit Line at 1-168.828.7825 (*National Cancer [place]) or 1-678.355.7576 (American Lung Association) or you can access the web based program at www.lungusa.org.    [place] Tobacco Users Help Line:  (134) 822-1238       Toll Free: 1-330.642.2120  Quit Tobacco Program [facility] (063)469-5094    Crisis Hotline:    [place] Crisis Hotline:  7-084-TMBFSCP or 1-946.427.2624    [place] Crisis Hotline:    1-306.833.6173 or 613-949-7139    Discharge Survey:   Thank you for choosing [facility]. We hope we did everything we could to make your hospital stay a pleasant one. You may be receiving a phone survey and we would appreciate your time and participation in answering the questions. Your input is very valuable to us in our efforts to improve our service to our patients and their families.        My signature on this form indicates that:    1. I have reviewed and understand the above information.  2. My questions regarding this information have been answered to my satisfaction.  3. I have formulated a plan with my discharge nurse to obtain my prescribed medications for home.                  Disclaimer         __________________________________                     __________       ________                       Patient Signature                                                 Date                    Time

## 2017-02-06 NOTE — DISCHARGE PLANNING
CASE MANAGEMENT INITIAL ASSESSMENT    Admit Date:  2/4/2017     Tc to pt's dtr Desiree Gill  providing education on role of case management / discharge planning / team conference.  She provided me with information for assessment as pt is aphasic.  PT's  primary language is Bel[place]ian.  She reports he understands some English.  I left my card and introduction to case management letter @ bedside along with providing dtr my contact no.     Patient is a  56 y.o. male transferred from  [place] where he was hospitalized form .    Accepting MD to [facility] is Dr. Lisette Vanegas.     Diagnosis: NTBI  Brain tumor (CMS-HCC)    Co-morbidities:   Patient Active Problem List    Diagnosis Date Noted   • Brain tumor (CMS-HCC) 02/04/2017   • Cerebral edema (CMS-[facility]) 01/05/2017   • New onset seizure (CMS-[facility]) 01/05/2017   • Malignant glioma of brain (CMS-[facility]) 01/04/2017   • Obesity (BMI 30-39.9) 05/15/2015   • Chronic pain of right knee 05/15/2015     Prior Living Situation:  Housing / Facility: 2 Story Apartment in [place]; tub/shower combo; flight of stairs to negotiate.  Lives with - Patient's Self Care Capacity: Alone and Unable to Care For Self    Prior Level of Function:  Medication Management: Independent  Finances: Independent  Home Management: Independent  Shopping: Independent  Prior Level Of Mobility: Independent Without Device in Community  Driving / Transportation: Driving Independent    Support Systems:  Primary : Desiree Gill Dtr   Other support systems: Jeniferdevon Jairo Dtr   Advance Directives: No  Power of  (Name & Phone): None     Previous Services Utilized:   Equipment Owned: None  Prior Services: None    Other Information:  Occupation (Pre-Hospital Vocational): Employed Full Time (as a  @ [employer])  Pharmacy: [facility] @ [place]  Primary Payor Source: [employer] PPO plan  Primary Care Practitioner : Lisette ANDERSON  Other  "MDs: Dr. Presley Neurosurgeon; Dr. Gonzales and Dr Martinez both at [facility]       Additional Case Management Questions:  Have you ever received case management services for yourself or a family member?  Yes    Do you feel you have an an understanding of of what services  provide?  Yes    Do you have any additional questions regarding case management?    Yes-\"Do you know when my dad will be able to come home?\"  \"will he be able to live on his own?  \"do you think he is able to understand?\"     Patient / Family Goal:  Patient's dtr, Desiree indicates after Rehab, pt will be staying with her sister, Priscilla Chowdhury who lives in [place].  It is a single story home, no stairs, and a tub/shower  Combo.  Priscilla is not working out side of the home, and will be able to assist w/ pt's transition home.  Family has requested from pt's apartment complex, to be placed on a waiting list to move into a first floor apartment.  At this time, there are no first floor units available.  Pt's dtr was inquiring about the status of her father and if he will be able to live on his own.  I  Explained to her a team conference will be held on Thursday, and I will follow up with her after our meeting.      Plan:  1. Continue to follow patient through hospitalization and provide discharge planning in collaboration with patient, family, physicians and ancillary services.     2. Utilize community resources to ensure a safe discharge.         "

## 2017-02-06 NOTE — CARE PLAN
"Problem: Communication  Goal: The ability to communicate needs accurately and effectively will improve  Intervention: Develop alternate methods of communication with patient and significant other/support system  Patient with expressive aphasia.  Able to nod yes or no.  Able to say \"yes\".  Also makes needs known with gestures.  Able to follow commands for neurologic exam. Educated family regarding aphasia and medication regimen- reviewed printed material.  Family verbalized understanding.       Problem: Safety  Goal: Will remain free from injury  Outcome: PROGRESSING SLOWER THAN EXPECTED  Patient found sitting in wheelchair in bathroom with chair alarm going off - assisted patient to toilet. Alarms remain on for safety. Continue to offer toileting with hourly rounding.     Problem: Pain Management  Goal: Pain level will decrease to patient’s comfort goal  Outcome: PROGRESSING AS EXPECTED  Medicated this AM with tylenol for headache- patient unable to specify further- patient still reported headache.  Given roxicodone at around 1140 - no fruther complaints of headache. Will continue to monitor        "

## 2017-02-06 NOTE — ED NOTES
Pt bib ambulance from [facility], pt reported to have 2 seizures today, the second was at [id number] and lasted 2-3 min. Pt currently has brain cancer and had a tumor removed a few days ago. Vital signs stable.

## 2017-02-06 NOTE — IP AVS SNAPSHOT
" <p align=\"LEFT\"><IMG SRC=\"//[email or web address]/blob$/[email or web address]\" alt=\"Image\" WIDTH=\"50%\" HEIGHT=\"200\" BORDER=\"\"></p>                   Name:Dale Dorado  Medical Record Number:[id number]  CSN: [id number]    YOB: 1960   Age: 56 y.o.  Sex: male  HT:1.68 m (5' 6.14\") WT: 96.8 kg (213 lb 6.5 oz)          Admit Date: 2/6/2017     Discharge Date:   Today's Date: 2/10/2017  Attending Doctor:  Jayne Washington M.D.                  Allergies:  Review of patient's allergies indicates no known allergies.          Your appointments     Mar 16, 2017  2:00 PM   ONCOLOGY NEW PATIENT 60 MIN with Olivier Osman M.D.   [facility] (--)    [address], [address]  [facility] [address] [phone]              Follow-up Information     1. Follow up with JAGDEEP Kinsey In 2 weeks.    Specialty:  Family Medicine    Contact information    [address]  [address]  [facility] [address] [phone]          2. Follow up with Olivier Osman M.D. On 2/16/2017.    Specialty:  Oncology    Why:  Appt @ 2:00 pm    Contact information    [address] [facility]  [address]  [facility] [address] [phone]          3. Call Eugenia POOL M.D..    Specialty:  Radiation Therapy    Contact information    [address] [facility]  [address]  [facility] [address] [phone]           Medication List      Take these Medications        Instructions    dexamethasone 4 MG Tabs   What changed:  when to take this   Commonly known as:  DECADRON    Take 1 Tab by mouth 3 times a day. DO NOT STOP THIS MEDICATION WITHOUT DISCUSSING WITH YOUR DOCTOR   Dose:  4 mg       famotidine 20 MG Tabs   Commonly known as:  PEPCID    Take 1 Tab by mouth 2 Times a Day.   Dose:  20 mg       insulin lispro 100 UNIT/ML Soln   Commonly known as:  HUMALOG    Inject 1-6 Units as instructed 4 Times a Day,Before Meals and at Bedtime.   Dose:  1-6 Units       insulin  UNIT/ML Susp   Commonly known as:  HUMULIN,NOVOLIN    Inject 10 Units as " instructed 2 Times a Day.   Dose:  10 Units       labetalol 5 MG/ML Soln   Commonly known as:  NORMODYNE,TRANDATE    2 mL by Intravenous route every four hours as needed (see admin instructions for parameters).   Dose:  10 mg       levetiracetam 1000 MG tablet   What changed:  medication strength   Commonly known as:  KEPPRA    TK  1 T PO  BID       lorazepam 2 MG/ML Soln   Commonly known as:  ATIVAN    1 mL by Intravenous route every 1 hour as needed (May repeat x 1 if ineffective).   Dose:  2 mg       PAIN RELIEVER 325 MG Tabs   Generic drug:  acetaminophen    TK 2 T PO Q 6 H PRN

## 2017-02-06 NOTE — FLOWSHEET NOTE
02/06/17 1415   Events/Summary/Plan   Events/Summary/Plan Rapid Response, pt awake up in chair RA 97% HR 64, RR 16, pt possible seizure activity, [facility] called pt being shipped to have CT.   Respiratory WDL   Respiratory (WDL) X   Chest Exam   Work Of Breathing / Effort Mild   Respiration 16   Pulse 64   Breath Sounds   RUL Breath Sounds Clear   RML Breath Sounds Clear;Diminished   RLL Breath Sounds Diminished   LAYA Breath Sounds Clear   LLL Breath Sounds Diminished   Oximetry   #Pulse Oximetry (Single Determination) Yes   Oxygen   Home O2 Use Prior To Admission? No   Pulse Oximetry 98 %   O2 (LPM) 0   Events   Rapid Response Smart Text Completed? Yes

## 2017-02-06 NOTE — IP AVS SNAPSHOT
2/10/2017          Dale Gill-Mathew  [address]  [address]  [place] [address]    Dear Dale:    [facility] wants to ensure your discharge home is safe and you or your loved ones have had all your questions answered regarding your care after you leave the hospital.    You may receive a telephone call within two days of your discharge.  This call is to make certain you understand your discharge instructions as well as ensure we provided you with the best care possible during your stay with us.     The call will only last approximately 3-5 minutes and will be done by a nurse.    Once again, we want to ensure your discharge home is safe and that you have a clear understanding of any next steps in your care.  If you have any questions or concerns, please do not hesitate to contact us, we are here for you.  Thank you for choosing [facility] for your healthcare needs.    Sincerely,    Edis Mosqueda    [facility]

## 2017-02-06 NOTE — PROGRESS NOTES
This CN escorted pt to BR, pt had continent BM and assisted back to w/c and back to bedside where he wanted to watch TV. Pt began to twitch on right side of body, primarily his face and right eye closed. Pt was also biting the inside of his mouth, perhaps his cheek. RRT called, Dr Quevedo responding and determined pt could be closely monitored at [facility]. Pt was discharged to ED via [facility]. Family notified

## 2017-02-06 NOTE — PROGRESS NOTES
"Pt was in ST session when therapist noted pt having siezure.  ST stated \"lasted 2.5 mins, pt vomited 1 piece of undigested food, had gagging/convusive movements in upper body only, eyes were closed. Eyes re-opened approx 10 sec > siezure.\"   This RN notified MD. STAT CT ordered and hospitalist to follow pt.   "

## 2017-02-06 NOTE — IP AVS SNAPSHOT
Noquo Access Code: Activation code not generated  Current Noquo Status: Patient Declined    Your email address is not on file at [facility].  Email Addresses are required for you to sign up for Noquo, please contact [phone] to verify your personal information and to provide your email address prior to attempting to register for Noquo.    Dale Azaelligia Gill-Mathew  [address]  [place] [address]    Acrolinxt  A secure, online tool to manage your health information     [facility]’s Noquo® is a secure, online tool that connects you to your personalized health information from the privacy of your home -- day or night - making it very easy for you to manage your healthcare. Once the activation process is completed, you can even access your medical information using the Noquo radha, which is available for free in the Apple Radha store or Google Play store.     To learn more about Noquo, visit [email or web address]    There are two levels of access available (as shown below):   My Chart Features  [facility] Primary Care Doctor [facility]  Specialists [facility]  Urgent  Care Non-[facility] Primary Care Doctor   Email your healthcare team securely and privately 24/7 X X X    Manage appointments: schedule your next appointment; view details of past/upcoming appointments X      Request prescription refills. X      View recent personal medical records, including lab and immunizations X X X X   View health record, including health history, allergies, medications X X X X   Read reports about your outpatient visits, procedures, consult and ER notes X X X X   See your discharge summary, which is a recap of your hospital and/or ER visit that includes your diagnosis, lab results, and care plan X X  X     How to register for Acrolinxt:  Once your e-mail address has been verified, follow the following steps to sign up for Acrolinxt.     1. Go to  [email or web address]  2. Click on the Sign Up Now box, which takes  you to the New Member Sign Up page. You will need to provide the following information:  a. Enter your Enefgy Access Code exactly as it appears at the top of this page. (You will not need to use this code after you’ve completed the sign-up process. If you do not sign up before the expiration date, you must request a new code.)   b. Enter your date of birth.   c. Enter your home email address.   d. Click Submit, and follow the next screen’s instructions.  3. Create a Enefgy ID. This will be your Enefgy login ID and cannot be changed, so think of one that is secure and easy to remember.  4. Create a Enefgy password. You can change your password at any time.  5. Enter your Password Reset Question and Answer. This can be used at a later time if you forget your password.   6. Enter your e-mail address. This allows you to receive e-mail notifications when new information is available in Enefgy.  7. Click Sign Up. You can now view your health information.    For assistance activating your Enefgy account, call [phone]

## 2017-02-06 NOTE — PROGRESS NOTES
Patient noted to have apparent right facial and right arm jerking/twitching while eating dinner in cafeteria approximately 1750. Lasted for about 6-7 seconds. Patient spontaneously resumed eating dinner. Noted to have food spillage out of right side of face.  Patient able to make sounds, nod yes and no, and  nurse's hand  on command, still with difficulty articulating words. Denies pain or headache. Returned patient to bed - HOB elevated    Charge nurse notified on call physician - plan to check keppra level in AM.  Notify MD for any further suspected seizure activity as patient may require an additional dose of keppra

## 2017-02-06 NOTE — CARE PLAN
Problem: Safety  Goal: Will remain free from injury  Outcome: PROGRESSING AS EXPECTED  No seizure activity noted this shift. Keppra administered at bedtime. Seizure precautions are in place.     Problem: Pain Management  Goal: Pain level will decrease to patient’s comfort goal  Outcome: PROGRESSING AS EXPECTED  No complaints of pain reported this shift, no signs of distress observed, will continue to monitor for pain.

## 2017-02-06 NOTE — DISCHARGE PLANNING
Rapid Response called due to seizure.  CT of head ordered.   Lisa called.  COBRA completed.   Pt to transfer to Acute.   Since I had just spoken with pt's dtr, Desiree Gill, I called her  @ [phone] informed her pt was to be transferred out for a CT of head as this was his second seizure today.  She is in agreement w/ transfer.    Dtr is requesting a call back if pt is admitted to acute setting.

## 2017-02-06 NOTE — PROGRESS NOTES
This note enter in error at [facility]. This event occurred at [facility]. Note re-entered in correct [facility] chart at approx 8171 2/8/17.

## 2017-02-06 NOTE — PROGRESS NOTES
Dr Vanegas notified of recent seizure activity witnessed by OT staff, new orders received for STAT CT of head.

## 2017-02-07 NOTE — PROGRESS NOTES
Hospitalist RN note:    Per family request, call placed to Dr. Shea's office to obtain path results.  No results available at this time, office to fax when available.

## 2017-02-07 NOTE — ED NOTES
Pt had a witnessed seizure lasting approximately one minute. Pt had right sided rhythmic jerking with right sided facial ticks. Pt unsreponsive during seizure episode. Pt maintained a stable airway and stable VS during the seizure.   Pt is currently resting comfortably, follows commands and answers questions appropriately.

## 2017-02-07 NOTE — PROGRESS NOTES
Called to pt's room by daughters. Pt was having a seizure. No post ictal phase. Pt began smacking his lips and then became non responsive and not breathing; per daughters. They sat him up in bed. Pt was observed seizing for approximately 20 seconds by myself then came around. Was able to point to his head in pain. Iv keppra started and iv decadron please see mar. meds given for nausea.daughters were extremely tearful and stressed.  Families expectations are not congruent with pts' diagnosis.team aware of seizure ativan ordered. Cheli nurse practitioner notified about failed swallow and conversion of po meds to iv meds. Also made aware of seizre.

## 2017-02-07 NOTE — THERAPY
"Speech Language Therapy dysphagia treatment completed.   Functional Status:  The patient was seen for clinical swallow evaluation this date. The patient was awake, alert and attempted to respond to questions and directives however appeared hindered by apraxia and suspected receptive aphasia which hindered patient's ability to complete oral motor exam. Right labial droop noted at rest and during PO trials. The patient was given Po trials of ice chips, nectars, purees, thin liquids and soft solids. Patient was noted to have delayed cough following 50% of thin liquid trials despite external cues and attempts at swallow strategies. Soft solids were mastciated appropriately however, patient required mod cues to clear minimal pocketing of PO in right cheek.     Recommendations: 1)  begin D2/Nectar thick liquid meals with strict 1:1 feeding assistance and close monitoring for any s/s of aspiration.   Plan of Care: Will benefit from Speech Therapy 3 times per week.    See \"Rehab Therapy-Acute\" Patient Summary Report for complete documentation.     "

## 2017-02-07 NOTE — H&P
CHIEF COMPLAINT:  Acute seizure.    HISTORY OF CURRENT ILLNESS:  The patient is a 56-year-old gentleman who has a   recent history of glioblastoma.  The patient was sent down to [place] where   he had surgical resection of the glioblastoma on Tuesday.  He had a left-sided   craniotomy, which was closed and well healing and then he was transferred to   the [facility] on Saturday.  Over the weekend, the patient was   stable, but this morning, the patient suddenly had a seizure disorder.  He was   placed on 500 mg b.i.d. of Keppra orally for seizure prophylaxis.  Despite of   this, the patient had a seizure and thus the patient's Keppra will be   increased at this point to 1000 mg b.i.d. and we will monitor at this point   Keppra levels.  The patient will also be monitored for further neurological   deficits.  If the patient's condition stabilizes, then he will be able to be   discharged back to rehab.    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    PAST SURGICAL HISTORY:  Includes a recent craniotomy.    ALLERGIES:  No known drug allergies.    MEDICATIONS:  At the time of admission include Decadron 4 mg every 12 hours,   Keppra 500 mg twice daily and then he has a pain relieving medication that the   family was not sure of that he received every 6 hours as needed for pain.    SOCIAL HISTORY:  He is a former smoker.  He smoked a pack a day for 30 years,   quit 15 years ago.  No alcohol, no drug use.  He has 5 children.  He has an   advanced directive.  He wishes to be DNR code status.  He is .    FAMILY HISTORY:  Mother is diabetic.  Father is diabetic.    REVIEW OF SYSTEMS:  Unable to get true review of systems from the patient at   this point as he seems postictal and the patient at this point is able to   follow commands, but is not making sense, even to his  family.  The family   states, however, that he is close back to his baseline except for some of the   things that he answers.  He is hungry and is starting to eat an apple at this   point.    PHYSICAL EXAMINATION:  VITAL SIGNS:  Temperature 36.3 degrees Celsius, pulse 57, respirations 13,   blood pressure 125/78, he is 122/83 for his blood pressure.  His weight is 89   kilograms and his height is 168 cm tall, he has a BMI of 31.5.  GENERAL:  He is currently awake, alert, not oriented to person, place, or   time.  HEENT:  Head has a recent craniotomy, which is well healing.  There is no   discharge from the craniotomy site.  No redness, no tenderness around the   site.  The eyes follow in normal range of gaze.  Pupils are equal, round,   reactive bilaterally.  Nose midline without discharge.  Ears bilaterally   intact without discharge.  Oral cavity, moist mucous membranes.  No apparent   focus infection in the oral cavity.  NECK:  Soft, supple.  No JVD, carotid bruit, thyromegaly, or lymphadenopathy   appreciated.  CHEST:  Chest wall moves equal with inspiration and expiration.  No   paradoxical motion.  No reproducible chest wall tenderness.  HEART:  S1, S2.  No murmurs or gallops detected.  LUNGS:  At this point, clear to auscultation.  No rales or rhonchi detected.  ABDOMEN:  Soft.  Bowel sounds present in all 4 quadrants.  No hepatomegaly, no   splenomegaly, no rebound, no tenderness elicited.  GENITAL:  Exam within normal limits.  RECTAL:  Deferred.  EXTREMITIES:  Upper and lower extremities, positive pulses, no edema, good   muscle strength, good muscle tone, positive deep tendon reflexes.  NEUROLOGIC:  Cranial nerves II-XII grossly within normal limits without any   focal deficits appreciated.  SKIN:  Normal turgor.  No rashes or abrasions identified.    LABORATORY EVALUATION:  WBC count is 9.8 with a hemoglobin of 14.5, hematocrit   43.2, platelets are 163.  Sodium 134, potassium 3.7, chloride 97, CO2 of 28,    BUN 19, creatinine 0.55 with a calcium 9.4 and glucose of 151.  Liver function   tests are within normal limits.  INR is 0.82 with a troponin less than 0.01.    IMAGING STUDIES:  CT of the head shows interval left-sided craniotomy with a   frontotemporal defect.  There is associated pneumocephalus, interval   development of a hypoattenuation left posterior frontoparietal junction   consistent with area of encephalomalacia.  There is an increased density,   which remains in the left posterior frontal lobe medially which could be   residual mass or focally ill-defined hemorrhage, small amount of left-sided   holohemispheric extraaxial blood adjacent to the craniotomy site, 9 mm left to   right midline shift.  There is a mass effect in the left lateral ventricle   and slight dilation of the temporal horn.  Chest x-ray shows mildly enlarged   cardiac silhouette, atelectasis bilaterally.  Clavicles are intact.    Mediastinum is of normal size, heart is of normal size.  I have reviewed the   chest x-ray myself.  A 12-lead EKG, which I have reviewed from today shows a   sinus bradycardia, rate of 56 with a WY of 136, QT of 416.  There are no   ischemic changes.    IMPRESSION AND PLAN:  At this point:  1.  With the worsening edema, the patient had breakthrough seizure today and   was transferred from rehab to the emergency room.  We will be admitting him at   this point to the floor.  We will at this point increase his Decadron from   every 12 hours to every 6 hours.  We will monitor at this point for the   increased pressure.  If the pressure does not improve, neurosurgical   consultation will need to be requested.  2.  The patient in the meantime for his seizures, we will increase from a 500   mg b.i.d. dose to 1000 mg b.i.d. dose.  3.  The patient will continue with pain management.  We will continue at this   point with deep vein thrombosis and gastroesophageal reflux disease   prophylaxis.  4.  For his  steroid-induced diabetes, which was already aggressive with the   b.i.d. dosing of Decadron, will now be changed to a q.i.d. dosing of the   Decadron, thus at this point, we will increase his insulin coverage as well.  5.  At this point, patient will be observation admission to the neurology   floor.  Patient will also be evaluated by physical therapy and occupational   therapy and will try to get him back to rehab once he is stabilized.    Thank you for the opportunity to take care of this patient during his hospital   stay.       ____________________________________     MD ALBIN DEUTSCH / HARRIET    DD:  02/06/2017 21:13:01  DT:  02/06/2017 22:55:15    D#:  [id number]  Job#:  [id number]    cc: BETTY YOUNG MD, COREEN ROBERTSON

## 2017-02-07 NOTE — ED NOTES
Report received from ANÍBAL Bruno. [place] comfortably, [place] Pt and family updated on POC. All questions at this time. Will continue to monitor.

## 2017-02-07 NOTE — PROGRESS NOTES
"Pt admitted to floor for seizures. Side rails padded. o2 and suction at bs. Daughters are at bs. Pt failed swallow screen. Hob >30. C/o headache.  Does not speak english. Daughters translating. Understands and follows commands but can not express wishes or complaints. Words are not correct. Pt attempted to right but only quentin a box. Bed low. Alarm on. Wheels locked. Plan of care discussed with family. Full code. Discussed and educated family members in great detail what the plan of care is for pt. Discussed seizure precaution, and pain control. Also reinforced strict npo as pt failed initial swallow assessment. Pt and family could benefit from counseling in regards to pt's prognosis. When they were asked what they expected they stated \"we were told he would be normal again\". I encouraged them to write down questions for the doctors and make sure they ask them when the team rounds.  Family needs lots of reinforcement and emotional support. Pt and daughters were teary eyed. Expressed multiple concerns. They were not sure if they wanted him back at rehab or to take him home.   "

## 2017-02-08 NOTE — THERAPY
"Physical Therapy Evaluation completed.   Bed Mobility:  Supine to Sit: Minimal Assist  Transfers: Sit to Stand: Supervised  Gait: Level Of Assist: Supervised with No Equipment Needed       Plan of Care: Patient with no further skilled PT needs in the acute care setting at this time  Discharge Recommendations: Equipment: No Equipment Needed. Post-acute therapy recommended before discharged home.    See \"Rehab Therapy-Acute\" Patient Summary Report for complete documentation.     "

## 2017-02-08 NOTE — PROGRESS NOTES
Pt resting comfortably. Sitter at bs per order. No seizure activity noted. Up with one assist to br. Voids. Continent. Pleasant and cooperative.  No s/s of distress or pain at this time.

## 2017-02-08 NOTE — PROGRESS NOTES
Pt resting comfortably in bed. Maltese speaking, daughters are at bedside translating. Denies pain or discomfort. No seizure activity noted.

## 2017-02-08 NOTE — CONSULTS
"Reason for PC Consult: Advance Care Planning    Consulted by: Dr. Roberts    Assessment:  General: 56 year old male admitted from [facility] due to seizures on 2/6/17. Recent glioma resection 1/31/17 at [facility] - pending pathology. History of bell's palsy, DM2, and HLD. Patient is Latvian Speaking. Sleeping. Confused per daughters, RN, and SLP.     Dyspnea: No signs. 97% on room air with normal work of breathing.   Last BM: 02/08/17.   Pain: No sings.     Depression: Unable to assess.      Spiritual:  Is Sikh or spirituality important for coping with this illness? Yes. Cheondoism per daughters; daughters declined spiritual visit at this time; notified them to request spiritual care visit  via RN who can enter into EPIC or call me.   Has a  or spiritual provider visit been requested? No    Palliative Performance Scale: 40%    Advance Directive: None on file. Per daughters, they believe he completed AD at [facility] and Desiree stated, \"I signed something at [facility].\" Request for AD/POLST faxed to [facility] Medical Records [phone] as well as [facility] [phone].   DPOA: None on file.    POLST: None on file.     Code Status: Full. Per daughter Desiree, \"He didn't want to be on life support, but we would want that.\" Priscilla asked, \"Can he come off of life support?\" Education provided about various interventions and provided some generic statistics regarding survival rates of CPR. Encouraged them to take into account their father's wishes.     Outcome:  Introduced myself to patient's daughters and discussed role of palliative care. Desiree (youngest) and Priscilla (middle) called patient's oldest daughter Sia and placed on speaker phone. Daughters are awaiting pathology reports and are hesitant to discuss goals of care until they are more aware of patient's prognosis. Patient has an appointment with Dr. Barrios 3/1/17. They believe patient completed an AD and " "that, \"He didn't want to be on life support.\" They indicated however, that they would want him on life support if he had a reasonable chance to come off of it. Provided education regarding chest compressions, intubation/mechanical ventilation. I asked again the patient's values. Desiree stated, \"He doesn't want to suffer.\" Stated I would request medical records from [facility] as well as [facility] regarding ACP documents and that I would review with daughters if I receive them. Priscilla asked, \"Is it okay to wait to make a decision about his code status until then?\" Encouraged them to keep their father's wishes at the center of their decision making. Provided statements of support and answered all questions. Provided palliative care brochure as well as business cards and encouraged patient's daughters to call with any questions, needs, or concerns.     Updated: Bedside RN.     Plan: Follow-up after pathology received from [facility]. Hopeful to receive AD and/or POLST from [facility] or [facility] to assist daughters with goals of care.     Thank you for allowing Palliative Care to participate in this patient's care. Please feel free to call [phone] with any questions or concerns.  "

## 2017-02-08 NOTE — THERAPY
"Occupational Therapy Evaluation completed.   Functional Status:  Min A supine > EOB, min A LB dressing, SBA transfers without AD, min A standing oral care, CGA toileting   Plan of Care: Will benefit from Occupational Therapy 3 times per week  Discharge Recommendations:  Equipment: Will Continue to Assess for Equipment Needs. Post-acute therapy recommended before discharged home.    See \"Rehab Therapy-Acute\" Patient Summary Report for complete documentation.    56 y.o. male with recent dx of glioblastoma. Pt was at [facility] when he developed seizure d/o. Pt presents with receptive/expressive apasia, mild balance impairment, variable object apraxia. Pt initially trying to brush hair and chin with tootbrush. Once guided to brush teeth, was able to complete this step of task automatically. Pt would benefit from acute OT and post-acute therapies to maximize functional independence and safety.     "

## 2017-02-08 NOTE — THERAPY
"Speech Language Therapy Evaluation completed to address speech and communication  Functional Status:  Pt with severe deficits across expressive/receptive domains; pt with perseveration, with repetitive jargon and impaired naming, impaired reading of single, simple words written in Senegalese, impaired gesture and inappropriate obj use.  Pt was cooperative with all tasks.  Family not currently present; will follow-up with family ed as able.  Recommendations:  SLP post acute care discharge.  Plan of Care: 5x/wk    See \"Rehab Therapy-Acute\" Patient Summary Report for complete documentation.   "

## 2017-02-08 NOTE — PROGRESS NOTES
Hospital Medicine Progress Note, Adult, Complex               Author: Jayne Washington Date & Time created: 2/7/2017  4:51 PM     Interval History:    56 y.o male with hx HLD, hx of bell's palsy,Type 2 DM, admitted for acute seizure, after recent glioma resection several days ago at [place]. Patient was apparently on keppra 500mg BID, however had a break through seizure.   Patient denies fevers/chills, chest pain, shortness of breath or nausea/vommiting.   Will increased keppra to 1g BID, continue steroids  Prn IV ativan if breakthrough seizure.   Obtaining records from [facility] for pathology results. No results available yet.     Review of Systems:  Review of Systems   Constitutional: Negative for fever, chills and weight loss.   HENT: Negative for ear pain, hearing loss, sore throat and tinnitus.    Eyes: Negative for blurred vision, double vision and photophobia.   Respiratory: Negative for cough, hemoptysis, sputum production and stridor.    Cardiovascular: Negative for chest pain, palpitations and orthopnea.   Gastrointestinal: Negative for heartburn, nausea, vomiting and abdominal pain.   Genitourinary: Negative for dysuria, urgency, frequency and hematuria.   Musculoskeletal: Negative for myalgias, back pain, joint pain and neck pain.   Neurological: Negative for dizziness, tingling, tremors, speech change and headaches.   Psychiatric/Behavioral: Negative for depression, suicidal ideas, hallucinations and substance abuse.       Physical Exam:  Physical Exam   Constitutional: He is oriented to person, place, and time. He appears well-developed and well-nourished. No distress.   HENT:   Head: Normocephalic and atraumatic.   Mouth/Throat: No oropharyngeal exudate.   Multiple scalp staples, clean, no drainage.    Eyes: Conjunctivae are normal. Pupils are equal, round, and reactive to light. No scleral icterus.   Neck: Neck supple. No thyromegaly present.   Cardiovascular: Intact distal pulses.     Pulmonary/Chest: Effort normal and breath sounds normal. No stridor. No respiratory distress. He has no wheezes. He has no rales.   Abdominal: Soft. Bowel sounds are normal. He exhibits no distension. There is no tenderness. There is no rebound and no guarding.   Musculoskeletal: Normal range of motion. He exhibits no edema.   Neurological: He is alert and oriented to person, place, and time. No cranial nerve deficit.   Skin: Skin is warm and dry. No rash noted. He is not diaphoretic. No erythema.   Psychiatric: He has a normal mood and affect. His behavior is normal. Thought content normal.       Labs:        Invalid input(s): [id number]  Recent Labs      17   TROPONINI  <0.01     Recent Labs      17   SODIUM  137  134*  133*   POTASSIUM  4.0  3.7  4.0   CHLORIDE  101  97  98   CO2  28  28  26   BUN  26*  19  18   CREATININE  0.57  0.55  0.57   CALCIUM  8.6  9.4  9.1     Recent Labs      17   15017   0333   ALTSGPT  43  59*   --    ASTSGOT  17  25   --    ALKPHOSPHAT  35  49   --    TBILIRUBIN  0.4  0.6   --    LIPASE   --   48   --    PREALBUMIN  25.0   --    --    GLUCOSE  254*  151*  204*     Recent Labs      17   15017   0333   RBC  4.17*  4.72  4.46*   HEMOGLOBIN  12.4*  14.5  13.5*   HEMATOCRIT  38.1*  43.2  40.1*   PLATELETCT  147*  163*  170   PROTHROMBTM   --   11.6*   --    APTT   --   20.2*   --    INR   --   0.82*   --      Recent Labs      17   15017   0333   WBC  7.6  9.8  8.2   NEUTSPOLYS  80.20*  66.70   --    LYMPHOCYTES  7.20*  20.90*   --    MONOCYTES  4.50  2.90   --    EOSINOPHILS  0.00  1.90   --    BASOPHILS  0.00  0.00   --    ASTSGOT  17  25   --    ALTSGPT  43  59*   --    ALKPHOSPHAT  35  49   --    TBILIRUBIN  0.4  0.6   --            Hemodynamics:  Temp (24hrs), Av.5 °C (97.7 °F), Min:36.2 °C (97.1 °F), Max:36.8 °C (98.2  °F)  Temperature: 36.8 °C (98.2 °F)  Pulse  Av.5  Min: 52  Max: 117Heart Rate (Monitored): (!) 57  Blood Pressure: 113/75 mmHg, NIBP: 138/87 mmHg     Respiratory:    Respiration: 20, Pulse Oximetry: 93 %, O2 Daily Delivery Respiratory : Room Air with O2 Available           Fluids:    Intake/Output Summary (Last 24 hours) at 17 1651  Last data filed at 17 2200   Gross per 24 hour   Intake      0 ml   Output    225 ml   Net   -225 ml     Weight: 94.2 kg (207 lb 10.8 oz)  GI/Nutrition:  Orders Placed This Encounter   Procedures   • DIET ORDER     Standing Status: Standing      Number of Occurrences: 1      Standing Expiration Date:      Order Specific Question:  Diet:     Answer:  Regular [1]     Order Specific Question:  Texture/Fiber modifications:     Answer:  Dysphagia 2(Pureed/Chopped)specify fluid consistency(question 6) [2]     Order Specific Question:  Consistency/Fluid modifications:     Answer:  Nectar Thick [2]     Medical Decision Making, by Problem:  Active Hospital Problems    Diagnosis   • Seizure (CMS-HCC) [R56.9]  - most likely 2/2 to effects of glioma and recent resection.   - increase keppra to 1g BID from 500mg BID.  - increased decadron , will wean as tolerated.     • Glioblastoma (CMS-HCC) [C71.9]  - final pathology pending at [facility].       IDDM  - cont NPH, adjust as needed.  - Continue Insulin-sliding scale, accu-checks and hypoglycemia protocol.    Hyperlipidemia  - will need statin on discharge.    Patient plan of care discussed at multidisplinary team rounds and with patient and R.N at beside.      Radiology images reviewed, Labs reviewed and Medications reviewed  Mesa catheter: No Mesa        DVT prophylaxis - mechanical: SCDs  Ulcer prophylaxis: Yes

## 2017-02-08 NOTE — PROGRESS NOTES
Assumed pt care at 0700.  Received report from NOHEMI Stovall RN.  AM assessment completed.  Alert, unable to assess orientation at this time - expressive aphasia, dysarthria present. Pt denies SOB or pain at this time.  Pt educated on use of call light and bed alarm, provided pt with RN and CNA extension numbers on white board and encouraged pt to call when needed, and discussed plan of care for the day (sitter at bedside, safety, comfort); pt verbalizes understanding.  Denies any additional needs at this time.  Call light and phone within reach; treaded socks in place and bed alarm on.  Hourly rounding in place.  Will continue to monitor.

## 2017-02-08 NOTE — PROGRESS NOTES
Family at bs with multiple concerns. They are expressing their frustration that they have not gotten any questions answered by the team. The were also irate that the ed team promised them 24 hour supervision to monitor for seizures. I educated the family that we did not have video monitoring rooms available. Spoke with dr. connolly who placed a palliative consult and ordered a sitter for family comfort. Family is very emotional and distressed. Spent approximately 45 min trying to comfort pt and explain plan of care for shift.  Family appeared appreciative of sitter order. Will continue to monitor.

## 2017-02-08 NOTE — PROGRESS NOTES
Hospital Medicine Progress Note, Adult, Complex               Author: Jayne Washington Date & Time created: 2/8/2017  3:22 PM     Interval History:    56 y.o male with hx HLD, hx of bell's palsy,Type 2 DM, admitted for acute seizure, after recent glioma resection several days ago at [facility]. Patient was apparently on keppra 500mg BID, however had a break through seizure.     2/7  Patient denies fevers/chills, chest pain, shortness of breath or nausea/vommiting.   Will increased keppra to 1g BID, continue steroids  Prn IV ativan if breakthrough seizure.   Obtaining records from [facility] for pathology results. No results available yet.     2/8  No acute issues overnight, patient has not had recurrent seizure episodes. No acute complaints.   Pending pathology from [facility]  Return back to [facility]    Review of Systems:grossly unchanged  Review of Systems   Constitutional: Negative for fever, chills and weight loss.   HENT: Negative for ear pain, hearing loss, sore throat and tinnitus.    Eyes: Negative for blurred vision, double vision and photophobia.   Respiratory: Negative for cough, hemoptysis, sputum production and stridor.    Cardiovascular: Negative for chest pain, palpitations and orthopnea.   Gastrointestinal: Negative for heartburn, nausea, vomiting and abdominal pain.   Genitourinary: Negative for dysuria, urgency, frequency and hematuria.   Musculoskeletal: Negative for myalgias, back pain, joint pain and neck pain.   Neurological: Negative for dizziness, tingling, tremors, speech change and headaches.   Psychiatric/Behavioral: Negative for depression, suicidal ideas, hallucinations and substance abuse.       Physical Exam:grossly unchanged.  Physical Exam   Constitutional: He is oriented to person, place, and time. He appears well-developed and well-nourished. No distress.   HENT:   Head: Normocephalic and atraumatic.   Mouth/Throat: No oropharyngeal exudate.   Multiple scalp staples, clean, no drainage.     Eyes: Conjunctivae are normal. Pupils are equal, round, and reactive to light. No scleral icterus.   Neck: Neck supple. No thyromegaly present.   Cardiovascular: Intact distal pulses.    Pulmonary/Chest: Effort normal and breath sounds normal. No stridor. No respiratory distress. He has no wheezes. He has no rales.   Abdominal: Soft. Bowel sounds are normal. He exhibits no distension. There is no tenderness. There is no rebound and no guarding.   Musculoskeletal: Normal range of motion. He exhibits no edema.   Neurological: He is alert and oriented to person, place, and time. No cranial nerve deficit.   Skin: Skin is warm and dry. No rash noted. He is not diaphoretic. No erythema.   Psychiatric: He has a normal mood and affect. His behavior is normal. Thought content normal.       Labs:        Invalid input(s): [id number]  Recent Labs      02/06/17 1505   TROPONINI  <0.01     Recent Labs      02/06/17   1505  02/07/17 0333 02/08/17 0127   SODIUM  134*  133*  132*   POTASSIUM  3.7  4.0  4.0   CHLORIDE  97  98  101   CO2  28  26  23   BUN  19  18  21   CREATININE  0.55  0.57  0.59   MAGNESIUM   --    --   2.2   CALCIUM  9.4  9.1  8.5     Recent Labs      02/06/17   1505  02/07/17 0333 02/08/17 0127   ALTSGPT  59*   --   60*   ASTSGOT  25   --   17   ALKPHOSPHAT  49   --   39   TBILIRUBIN  0.6   --   0.5   LIPASE  48   --    --    GLUCOSE  151*  204*  213*     Recent Labs      02/06/17   1505  02/07/17 0333 02/08/17 0127   RBC  4.72  4.46*  4.25*   HEMOGLOBIN  14.5  13.5*  12.8*   HEMATOCRIT  43.2  40.1*  38.4*   PLATELETCT  163*  170  181   PROTHROMBTM  11.6*   --    --    APTT  20.2*   --    --    INR  0.82*   --    --      Recent Labs      02/06/17   1505  02/07/17 0333 02/08/17 0127   WBC  9.8  8.2  8.1   NEUTSPOLYS  66.70   --   73.90*   LYMPHOCYTES  20.90*   --   12.20*   MONOCYTES  2.90   --   4.30   EOSINOPHILS  1.90   --   0.00   BASOPHILS  0.00   --   0.00   ASTSGOT  25   --   17    ALTSGPT  59*   --   60*   ALKPHOSPHAT  49   --   39   TBILIRUBIN  0.6   --   0.5           Hemodynamics:  Temp (24hrs), Av.8 °C (98.2 °F), Min:36.6 °C (97.9 °F), Max:37.1 °C (98.8 °F)  Temperature: 36.7 °C (98.1 °F)  Pulse  Av.2  Min: 52  Max: 117  Blood Pressure: 140/82 mmHg     Respiratory:    Respiration: 16, Pulse Oximetry: 97 %           Fluids:    Intake/Output Summary (Last 24 hours) at 17 1522  Last data filed at 17 1400   Gross per 24 hour   Intake   1860 ml   Output      0 ml   Net   1860 ml        GI/Nutrition:  Orders Placed This Encounter   Procedures   • DIET ORDER     Standing Status: Standing      Number of Occurrences: 1      Standing Expiration Date:      Order Specific Question:  Diet:     Answer:  Regular [1]     Order Specific Question:  Texture/Fiber modifications:     Answer:  Dysphagia 2(Pureed/Chopped)specify fluid consistency(question 6) [2]     Order Specific Question:  Consistency/Fluid modifications:     Answer:  Nectar Thick [2]     Medical Decision Making, by Problem:  Active Hospital Problems    Diagnosis   • Seizure (CMS-HCC) [R56.9]  - most likely 2/2 to effects of glioma and recent resection.   - Continue keppra 1g BID.  - Continue increased decadron , will wean as tolerated.     • Glioblastoma (CMS-HCC) [C71.9]  - final pathology pending at [place].       IDDM  - cont NPH, adjust as needed.  - Continue Insulin-sliding scale, accu-checks and hypoglycemia protocol.    Hyperlipidemia  - will need statin on discharge.    Hyponatremia  - cont IVF, check bmp in the am.      Patient plan of care discussed at multidisplinary team rounds and with patient and R.N at [facility].      Radiology images reviewed, Labs reviewed and Medications reviewed  Mesa catheter: No Mesa        DVT prophylaxis - mechanical: SCDs  Ulcer prophylaxis: Yes

## 2017-02-08 NOTE — CARE PLAN
Problem: Safety  Goal: Will remain free from falls  Outcome: PROGRESSING AS EXPECTED  Fall precautions remain in place: treaded socks on pt, appropriate signs on doorway, armbands on pt, IV pole on side of bathroom, lowest bed rails down, bed in lowest position and locked, call light and phone within reach, bed alarm on.    Sitter at bedside.      Problem: Venous Thromboembolism (VTW)/Deep Vein Thrombosis (DVT) Prevention:  Goal: Patient will participate in Venous Thrombosis (VTE)/Deep Vein Thrombosis (DVT)Prevention Measures  Outcome: PROGRESSING AS EXPECTED  SCDs applied to BLEs.

## 2017-02-09 NOTE — PROGRESS NOTES
Late entry, entered in error, [facility] chart.       Received shift report and assumed care of patient.  Patient awake, calm and stable, currently positioned in bed for comfort and safety; call light within reach.  No S & S of pain or discomfort at this time.  Will continue to monitor.

## 2017-02-09 NOTE — PROGRESS NOTES
bs report. Sitter and family at bs. Pt is pleasant more alert and involved. Assisted pt with shower, linen change, new iv. No s/s of distress.no c/o pain at this time.

## 2017-02-09 NOTE — THERAPY
"Speech Language Therapy dysphagia treatment and speech treatment completed.   Functional Status:  Re communication, pt with severe-to-profound exp/rec deficits, however pt with subtle improvements in 24 hrs and is cooperative with all tasks!.  Re swallow, do not upgrade diet yet; overt asp of thin liquids during trial upgrade.  Recommendations: SLP rehab post acute care d/c.   Plan of Care: 5x/wk.    See \"Rehab Therapy-Acute\" Patient Summary Report for complete documentation.     "

## 2017-02-09 NOTE — CARE PLAN
Problem: Venous Thromboembolism (VTW)/Deep Vein Thrombosis (DVT) Prevention:  Goal: Patient will participate in Venous Thrombosis (VTE)/Deep Vein Thrombosis (DVT)Prevention Measures  Intervention: Ensure patient wears graduated elastic stockings (KRYSTEN hose) and/or SCDs, if ordered, when in bed or chair (Remove at least once per shift for skin check)  SCD's       Problem: Mobility  Goal: Risk for activity intolerance will decrease  Intervention: Assess and monitor signs of activity intolerance  Pt is up with one assist and a steady gait.

## 2017-02-09 NOTE — PREADMISSION SCREENING NOTE
Pre-Admission Screening Form    Patient Information:   Name: Dale Dorado     MRN: [id number]       : 1960      Age: 56 y.o.   Gender: male      Race: White [7]       Marital Status: Single [1]  Family Contact: JairoDesireeJenifer Mikenica        Relationship: Daughter [2]  Daughter [2]  Home Phone:   [phone]           Cell Phone: [phone]    Advanced Directives: None  Code Status:  FULL  Current Attending Provider: Jayne Washington M.D.  Referring Physician: Dr. Johnson       Physiatrist Consult: Dr. Lisette Vanegas       Referral Date: 2017  Primary Payor Source:  [employer] Approved by Shahana   Secondary Payor Source:      Medical Information:   Date of Admission to Acute Care [place]2017  Room Number: S178/02  Rehabilitation Diagnosis: 02.9 Other Brain  @IMM@  No Known Allergies  Past Medical History   Diagnosis Date   • Hyperlipidemia      no meds   • New onset seizure (CMS-HCC) 2017   • Brain cancer (CMS-HCC)      Glioma   • Bell palsy 2016   • Cancer (CMS-HCC) 2017     Glioma   • Diabetes (CMS-[facility])      R/t steroid use per dtr   • Bell's palsy 2017     Past Surgical History   Procedure Laterality Date   • Craniotomy tumor  2017     Lt frontotemporal       History Leading to Admission, Conditions that Caused the Need for Rehab (CMS):     Nicko Jade M.D. Physician Signed  H&P 2017  9:13 PM      Expand All Collapse All    CHIEF COMPLAINT:  Acute seizure.     HISTORY OF CURRENT ILLNESS:  The patient is a 56-year-old gentleman who has a    recent history of glioblastoma.  The patient was sent down to [place] where    he had surgical resection of the glioblastoma on Tuesday.  He had a left-sided   craniotomy, which was closed and well healing and then he was transferred to    the [facility] on Saturday.  Over the weekend, the patient was    stable, but this morning, the patient suddenly had a seizure disorder.  He  "was   placed on 500 mg b.i.d. of Keppra orally for seizure prophylaxis.  Despite of   this, the patient had a seizure and thus the patient's Keppra will be    increased at this point to 1000 mg b.i.d. and we will monitor at this point    Keppra levels.  The patient will also be monitored for further neurological    deficits.  If the patient's condition stabilizes, then he will be able to be    discharged back to rehab.            Co-morbidities: {CO-MORBIDITIES:42982}  Potential Risk - Complications: {RISK/COMPLICATIONS:18682}  Level of Risk: {LEVEL OF RISK:87313}    Ongoing Medical Management Needed (Medical/Nursing Needs):   Patient Active Problem List    Diagnosis Date Noted   • Seizure (CMS-HCC) 02/06/2017   • Glioblastoma (CMS-[facility]) 02/06/2017   • Brain tumor (CMS-[facility]) 02/04/2017   • Cerebral edema (CMS-[facility]) 01/05/2017   • New onset seizure (CMS-[facility]) 01/05/2017   • Malignant glioma of brain (CMS-[facility]) 01/04/2017   • Obesity (BMI 30-39.9) 05/15/2015   • Chronic pain of right knee 05/15/2015     ***  Current Vital Signs:   Temperature: 36.5 °C (97.7 °F) Pulse: 68 Respiration: 20 Blood Pressure: 124/84 mmHg  Weight: 96.8 kg (213 lb 6.5 oz) Height: 168 cm (5' 6.14\")  Pulse Oximetry: 96 % O2 (LPM): 0      Completed Laboratory Reports:  Recent Labs      02/06/17   1134  02/06/17   1505  02/07/17   0021  02/07/17   0333  02/07/17   1104  02/07/17   1729  02/08/17   0040  02/08/17   0127  02/08/17   1150  02/08/17   1651  02/09/17   0206  02/09/17   1054   WBC   --   9.8   --   8.2   --    --    --   8.1   --    --   9.4   --    HEMOGLOBIN   --   14.5   --   13.5*   --    --    --   12.8*   --    --   13.1*   --    HEMATOCRIT   --   43.2   --   40.1*   --    --    --   38.4*   --    --   37.7*   --    PLATELETCT   --   163*   --   170   --    --    --   181   --    --   189   --    SODIUM   --   134*   --   133*   --    --    --   132*   --    --   133*   --    POTASSIUM   --   3.7   --   4.0   --    --    --   4.0   --  "   --   3.9   --    BUN   --   19   --   18   --    --    --   21   --    --   16   --    CREATININE   --   0.55   --   0.57   --    --    --   0.59   --    --   0.50   --    ALBUMIN   --   3.8   --    --    --    --    --   2.9*   --    --   2.9*   --    GLUCOSE   --   151*   --   204*   --    --    --   213*   --    --   248*   --    POCGLUCOSE  143*   --   199*   --   198*  302*  203*   --   370*  204*   --   210*   INR   --   0.82*   --    --    --    --    --    --    --    --    --    --      Additional Labs: {none:67192}    Prior Living Situation:   Housing / Facility: [address] [place] [address] Apartment / [facility]  Lives with - Patient's Self Care Capacity: Alone and Unable to Care For Self    Prior Level of Function / Living Situation:   Physical Therapy: Prior Services: None (recent admit to Rehab)  Housing / Facility: [address] / [facility]  Lives with - Patient's Self Care Capacity: Alone and Unable to Care For Self  Bed Mobility: Unable To Determine At This Time  Current Level of Function:   Level Of Assist: Supervised  Assistive Device: None  Distance (Feet): 200  # of Stairs Climbed: 2  Level of Assist with Stairs: Contact Guard Assist  Weight Bearing Status: no restrictions  Supine to Sit: Minimal Assist  Sit to Supine:  (pt remained up in chair )  Scooting: Supervised  Rolling: Supervised  Sit to Stand: Supervised  Bed, Chair, Wheelchair Transfer: Stand by Assist  Toilet Transfers: Stand by Assist  Transfer Method: Stand Pivot  Sitting in Chair: 15+  Standin min   Occupational Therapy:   Self Feeding: Unable To Determine At This Time  Medication Management: Unable To Determine At This Time  Prior Services: None (recent admit to Rehab)  Housing / Facility: 2 Story Apartment / [facility]  Occupation (Pre-Hospital Vocational): Unable To Determine At This Time  Current Level of Function:   Eating: Supervision  Lower Body Dressing: Minimal Assist (sock donning, unable to tailor sit either side )  Toileting: Contact Guard  Assist  Speech Language Pathology:   Assessment : Full report to follow; pt with severe exp/rec deficits and inappropriate obj use intermittently.  Hand over hand to achieve a yes/no gesture.  Inconsistent yes/no, poor object naming, poor repetition. Characteristics of global-type aphasia.  Problem List: Expressive Language Deficit, Receptive Language Deficit  Diet / Liquid Recommendation: Dysphagia II, [place] Thick Liquid  Rehabilitation Prognosis/Potential: {good/fair/poor:83487}  Estimated Length of Stay: *** days    Nursing:   Orientation : Disoriented to Event, Disoriented to Place, Disoriented to Time  {Bladder/Bowel:28590}    Scope/Intensity of Services Recommended:  {SERVICE:44258}    Rehabilitation Goals and Plan (Expected frequency & duration of treatment in the IRF):   {GOALS & PLANS:95311}  Anticipated Date of Rehabilitation Admission: ***  Patient/Family oriented IRF level of care/facility/plan: {YES (DEF)/NO:50808}  Patient/Family willing to participate in IRF care/facility/plan: {YES (DEF)/NO:07035}  Patient able to tolerate IRF level of care proposed: {YES (DEF)/NO:08705}  Patient has potential to benefit IRF level of care proposed: {YES (DEF)/NO:75953}  Comments: {none:98097}    Special Needs or Precautions - Medical Necessity:  {SPECIAL NEEDS:56739}  Diet:   DIET ORDERS (Through next 24h)    Start     Ordered    02/07/17 1428  SUPPLEMENTS   ONCE     Question:  Which Supplement  Answer:  OTHER (see comments)  Comment:  Magic cups - vanilla    02/07/17 1428    02/07/17 1021  DIET ORDER   ALL MEALS     Question Answer Comment   Diet: Regular    Texture/Fiber modifications: Dysphagia 2(Pureed/Chopped)specify fluid consistency(question 6)    Consistency/Fluid modifications: Nectar Thick        02/07/17 1020          Anticipated Discharge Destination / Patient/Family Goal:  Destination: {DESTINATION:89033} Support System: {SUPPORT SYSTEM:13956}  Anticipated home health services: {HOME  HEALTH:82888}  Previously used HH service/ provider: {none:90950}  Anticipated DME Needs: {DME:52136}  Outpatient Services: {OP SERVICES:23099}  Alternative resources to address additional identified needs:   ***  Pre-Screen Completed: 2/9/2017 11:13 AM Preston Benitez

## 2017-02-09 NOTE — PROGRESS NOTES
Name:  Nic   ID Number:  [id number]     Content Discussed:  Assessment completed Pt A&O X 1 to self only. UT numbness and tingling, KELLY pain,  Assist of one. Pt in bed, bed in lowest position, call light in place, bed alarm is on, treaded slipper socks on.  Sitter is at bedside.

## 2017-02-10 PROBLEM — R56.9 SEIZURE (HCC): Status: RESOLVED | Noted: 2017-01-01 | Resolved: 2017-01-01

## 2017-02-10 NOTE — DISCHARGE INSTRUCTIONS
Discharge Instructions    Discharged to other by medical transportation with escort. Discharged via wheelchair, hospital escort: Yes.  Special equipment needed: Not Applicable    Be sure to schedule a follow-up appointment with your primary care doctor or any specialists as instructed.     Discharge Plan:   Diet Plan: Discussed  Activity Level: Discussed  Confirmed Follow up Appointment: Patient to Call and Schedule Appointment  Confirmed Symptoms Management: Discussed  Medication Reconciliation Updated: Yes  Influenza Vaccine Indication: Patient Refuses    I understand that a diet low in cholesterol, fat, and sodium is recommended for good health. Unless I have been given specific instructions below for another diet, I accept this instruction as my diet prescription.   Other diet:Dysphagia 2 nectar thick liquids      Special Instructions:   Convulsiones en el adulto  (Seizure, Adult)   Maria Antonia convulsión significa que hay maria antonia actividad inusual en el cerebro. Puede causar modificaciones en la atención o en la conducta. Las convulsiones causan sacudidas (convulsiones). Generalmente espinoza entre 30 segundos y 2 minutos.   CUIDADOS EN EL HOGAR   · Si le recetaron medicamentos, tómelos exactamente según las indicaciones del médico.  · Cumpla con las visitas de control jareth humaira se le indicó.  · No No conduzca vehículos ni practique natación hasta que el médico lo autorice.  · Explique a otras personas qué deben hacer si usted sufre maria antonia convulsión. Ellos tienen que:  ¨ Recostarlo en el suelo.  ¨ Colocar un almohadón debajo de ramirez fredis.  ¨ Aflojar toda la ropa ajustada que pueda tener alrededor del moody.  ¨ Colocarlo de lado.  ¨ Permanecer con usted hasta que se sienta mejor.  SOLICITE AYUDA DE INMEDIATO SI:   · La convulsión dura más de 2 a 5 minutos.  · La convulsión es muy intensa.  · La persona no se despierta después de sufrir maria antonia convulsión.  · La atención o la conducta se modifican.  Lleve a la persona hasta un  servicio de emergencias o comuníquese inmediatamente con el servicio de urgencias de ramirez localidad (911 en [place]).  ASEGÚRESE DE QUE:   · Comprende estas instrucciones.  · Controlará ramirez enfermedad.  · Solicitará ayuda de inmediato si no mejora o empeora.     Esta información no tiene humaira fin reemplazar el consejo del médico. Asegúrese de hacerle al médico cualquier pregunta que tenga.     Document Released: 01/20/2012 Document Revised: 03/11/2013  [employer] Interactive Patient Education ©2016 [employer]      · Is patient discharged on Warfarin / Coumadin?   No     · Is patient Post Blood Transfusion?  No    Depression / Suicide Risk    As you are discharged from this [facility] facility, it is important to learn how to keep safe from harming yourself.    Recognize the warning signs:  · Abrupt changes in personality, positive or negative- including increase in energy   · Giving away possessions  · Change in eating patterns- significant weight changes-  positive or negative  · Change in sleeping patterns- unable to sleep or sleeping all the time   · Unwillingness or inability to communicate  · Depression  · Unusual sadness, discouragement and loneliness  · Talk of wanting to die  · Neglect of personal appearance   · Rebelliousness- reckless behavior  · Withdrawal from people/activities they love  · Confusion- inability to concentrate     If you or a loved one observes any of these behaviors or has concerns about self-harm, here's what you can do:  · Talk about it- your feelings and reasons for harming yourself  · Remove any means that you might use to hurt yourself (examples: pills, rope, extension cords, firearm)  · Get professional help from the community (Mental Health, Substance Abuse, psychological counseling)  · Do not be alone:Call your Safe Contact- someone whom you trust who will be there for you.  · Call your local CRISIS HOTLINE 435-6554 or 373-812-2372  · Call your local [employer]  Crisis Response Team [place] (068) 892-5700 or www.[employer]  · Call the toll free National Suicide Prevention Hotlines   · National Suicide Prevention Lifeline 397-107-GKUL (1178)  · National Hope Line Network 800-SUICIDE (458-0532)

## 2017-02-10 NOTE — PROGRESS NOTES
"Received report from night nurse at the bedside. Assume care of Pt at 0700. Assessment completed Pt A&O X 1 to self only. Pt not responding to other questions besides to say \"I Don't know\" or \"okay\". Assist of one stand by. Staples in place to left scalp Pt in bed, bed in lowest position, call light in place, bed alarm is on, sitter is at bedside, treaded slipper socks on.  "

## 2017-02-10 NOTE — CARE PLAN
Problem: Venous Thromboembolism (VTW)/Deep Vein Thrombosis (DVT) Prevention:  Goal: Patient will participate in Venous Thrombosis (VTE)/Deep Vein Thrombosis (DVT)Prevention Measures  Intervention: Ensure patient wears graduated elastic stockings (KRYSTEN hose) and/or SCDs, if ordered, when in bed or chair (Remove at least once per shift for skin check)  SCDs are on.      Problem: Mobility  Goal: Risk for activity intolerance will decrease  Pt is up one assist with sitter.

## 2017-02-10 NOTE — DISCHARGE SUMMARY
Hospital Medicine Discharge Note     Admit Date:  2/6/2017       Discharge Date:   2/10/2017      Primary Care Provider:    JAGDEEP Kinsey    Attending Physician:  Jayne Washington     Discharge Diagnoses:   Active Problems:    Glioblastoma (CMS-HCC)        Chronic Medical Problems:      Consultants:      None    Studies:    Imaging/ Testing:      CT-HEAD W/O   Final Result      1.  There has been interval left-sided craniotomy with a frontal temporal defect. There is associated pneumocephalus.   2.  There is been interval development of hypoattenuation in the left posterior frontal parietal junction, anterior left frontal region and left temporal lobe consistent with areas of encephalomalacia.   3.  There is one remaining area of increased density in the left posterior frontal lobe medially which could be residual mass or focal ill-defined area of hemorrhage.   4.  There is a small amount of left sided holohemispheric extra-axial blood adjacent to the craniotomy site.   5.  There is estimated 9 mm of left-to-right midline shift.   6.  There is mass effect in the left lateral ventricle with slight dilatation of the left temporal horn.      Findings were discussed with HILLARY MACIAS on 2/6/2017 5:27 PM.      DX-CHEST-PORTABLE (1 VIEW)   Final Result      1.  Mildly enlarged cardiac silhouette with hypoinflation and probable atelectasis.      CT-HEAD W/O    (Results Pending)         Procedures:        None    Hospital Summary (Brief Narrative):         In brief, Dale Dorado is a very pleasant 56 y.o. male who was admitted 2/6/2017 for acute seizure, after recent glioma resection several days ago at [place]. Patient was apparently on keppra 500mg BID, however had a break through seizure. As a result patient was placed on Keppra 1g BID, and his steroids were increased from 4mg decadron bid to IV q6, currently decreased to TID 4mg. 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 He has an appointment to see  in the next several weeks, thus he would not be undergoing radiation near his recent resection anyway's. We have also made an appointment for outpatient oncology with Olivier Osman MD        For H and P on admission, please refer to full H and P dictated by Dr. Kalie URBINA        Disposition:   Discharge home    Condition:  Stable    Activity:   As tolerated     Diet:   Diabetic Diet    Discharge Medications:           Medication List      START taking these medications       Instructions    famotidine 20 MG Tabs   Last time this was given:  20 mg on 2/10/2017  8:31 AM   Commonly known as:  PEPCID    Take 1 Tab by mouth 2 Times a Day.   Dose:  20 mg       insulin lispro 100 UNIT/ML Soln   Last time this was given:  4 Units on 2/10/2017 11:18 AM   Commonly known as:  HUMALOG    Inject 1-6 Units as instructed 4 Times a Day,Before Meals and at Bedtime.   Dose:  1-6 Units       insulin  UNIT/ML Susp   Last time this was given:  7 Units on 2/10/2017  5:13 AM   Commonly known as:  HUMULIN,NOVOLIN    Inject 10 Units as instructed 2 Times a Day.   Dose:  10 Units       labetalol 5 MG/ML Soln   Commonly known as:  NORMODYNE,TRANDATE    2 mL by Intravenous route every four hours as needed (see admin instructions for parameters).   Dose:  10 mg       lorazepam 2 MG/ML Soln   Commonly known as:  ATIVAN    1 mL by Intravenous route every 1 hour as needed (May repeat x 1 if ineffective).   Dose:  2 mg         CHANGE how you take these medications       Instructions    dexamethasone 4 MG Tabs   What changed:  when to take this   Commonly known as:  DECADRON    Take 1 Tab by mouth 3 times a day. DO NOT STOP THIS MEDICATION WITHOUT DISCUSSING WITH YOUR DOCTOR   Dose:  4 mg       levetiracetam 1000 MG tablet   What changed:  medication strength   Commonly known as:  KEPPRA    TK  1 T PO  BID         CONTINUE taking these medications        Instructions    PAIN RELIEVER 325 MG Tabs   Last time this was given:  650 mg on 2/9/2017  3:10 PM   Generic drug:  acetaminophen    TK 2 T PO Q 6 H PRN               Follow up appointment details :      I encouraged him to call his PCP to confirm follow up after discharge.    No future appointments.      Instructions:      The were given instructions to return to the ER if patient's condition worsens      Time Spent on Discharge:     Discharge instructions were discussed with the patient at bedside. Patient  expressed understanding and agreed to comply with all discharge instructions.    37 minutes were spent in the discharge planning and management of this  patient, including more than 50% of the time spent face to face in   Counseling.

## 2017-02-10 NOTE — IP AVS SNAPSHOT
" Home Care Instructions                                                                                                                  Name:Dale Calzada  Medical Record Number:[id number]  CSN: [id number]    YOB: 1960   Age: 56 y.o.  Sex: male  HT:1.6 m (5' 2.99\") WT: 90.2 kg (198 lb 13.7 oz)          Admit Date: 2/10/2017     Discharge Date:   Today's Date: 2/28/2017  Attending Doctor:  Lisette Vanegas D.O.                  Allergies:  Review of patient's allergies indicates no known allergies.            Discharge Instructions           [facility] NURSING DISCHARGE INSTRUCTIONS    Blood Pressure: 111/71 mmHg  Weight: 90.2 kg (198 lb 13.7 oz)  Nursing recommendations for Dale Dorado at time of discharge are as follows:  Client and Family Member verbalized understanding of all discharge instructions and prescriptions.     Review all your home medications and newly ordered medications with your doctor and/or pharmacist. Follow medication instructions as directed by your doctor and/or pharmacist.    Pain Management:   Discharge Pain Medication Instructions:  Comfort Goal: Comfort at Rest, Comfort with Movement, Sleep Comfortably  Notify your primary care provider if pain is unrelieved with these measures, if the pain is new, or increased in intensity.    Discharge Skin Characteristics: Warm, Dry  Discharge Skin Exam: Clear  Surgical Incision  Incision Left Posterior Head (Active)   Wound Bed Pale;Pink 2/27/2017  8:23 PM   Drainage  None 2/27/2017  8:23 PM   Periwound Skin Pink 2/27/2017  8:23 PM   Daily - Wound Closure Approximated;Open to Air 2/27/2017  8:23 PM   Dressing Options Open to Air 2/27/2017  8:23 PM   Dressing Status / Change Not Applicable 2/27/2017  8:23 PM   Weekly Photo (Inpatient Only) 02/25/17 2/25/2017  7:30 PM     Skin / Wound Care Instructions: Please contact your primary care physician for any change in skin integrity.     If " You Have Surgical Incisions / Wounds:  Monitor surgical site(s) for signs of increased swelling, redness or symptoms of drainage from the site or fever as this could indicate signs and symptoms of infection. If these symptoms are noted, notifiy your primary care provider.      Discharge Safety Instructions: Should Not Be Left Alone In The House     Discharge Safety Concerns: Balance Problems (Dizziness, Light Headedness), Impaired Judgement, History Of Falls, Unsteady Gait  The interdisciplinary team has made recommendation that you should not be left alone  in the house due to balance problem, impaired judgment, history of falls and unsteady gait  Anti-embolic stockings are not required to increase circulation to the lower extremities.    Discharge Diet: Diabetic     Discharge Liquids: Thin liquids  Discharge Bowel Function: Continent  Please contact your primary care physician for any changes in bowel habits.  Discharge Bowel Program:    Discharge Bladder Function: Continent  Discharge Urinary Devices:        Nursing Discharge Plan:   Influenza Vaccine Indication: Patient Refuses    Case Management Discharge Instructions:   Discharge Location: Home with Outpatient Services  Agency Name/Address/Phone: [facility] for Physical Therapy, Occupational Therapy, and Speech Therapy. [phone].  Address-[address] [place] [address].  Please call to schedule follow up appointments.    Home Health:    Outpatient Services:    DME Provider/Phone: None  Medical Equipment Ordered:    Prescription Faxed to:        Discharge Medication Instructions:  Below are the medications your physician expects you to take upon discharge: See attached.    Tumor cerebral  (Brain Tumor)  Un tumor cerebral es maria antonia enfermedad en la cual las células cancerosas (malignas) comienzan a desarrollarse en los tejidos del cerebro. Representan entre el 85% y el 90% de todos los tumores primarios del sistema nervioso central (SNC). Un tumor  "cerebral \"primario\" es el que comienza en el cerebro, a diferencia del que se ha diseminado desde maria antonia dafne diferente del cuerpo.  El cerebro controla la memoria y el aprendizaje, los sentidos (la audición, la vista, el olftato, el gusto y el tacto) y las emociones. También controla otras partes del cuerpo, incluyendo músculos, órganos y vasos sanguíneos. Ramu folleto se refiere a los tumores que comienzan en el cerebro (tumores cerebrales primarios).   La mayoría comienzan en personas de 45 años o más. Un yolanda de tumores raros ocurre jay exclusivamente en niños.   Los tumores cerebrales primarios son mucho menos frecuentes que los secundarios. Un tumor cerebral secundario comienza en maria antonia parte diferente del cuerpo y se disemina al cerebro.  CAUSAS  La mayoría de los tumores cerebrales primarios comienzan cuando las células normales desarrollan errores en ramirez genética. En muchos casos no se conoce el modo en que comienza ramu proceso. Estos errores hacen que las células se desarrollen y se dividan a mayor velocidad y continúen ramirez ciclo mientras las células sanas mueren. El resultado es un yolanda de células anormales que chuy un tumor.   Hay diferentes tipos de neuronas. En muchos casos, un tipo de neurona desarrolla un error que conduce a la formación del tumor. El tumor recibe ramirez nombre según el tipo de célula en la que se ha desarrollado.  Los factores ambientales y las infecciones que pueden causar un tumor cerebral son:  1. Exposición a sustancias químicas y solventes. La exposición puede ocurrir en el trabajo o realizando un hobby. Por ejemplo, contacto con las siguientes sustancias:   2. Cloruro de vinilo   3. Pesticidas   4. Maria Antonia variedad de químicos industriales.   Se están llevando a cabo trabajos de investigación debido a que no se sabe si existe maria antonia relación cierta entre estas sustancias químicas y el desarrollo de tumores cerebrales primarios.  1. La exposición de la fredis a la radiación, humaira dez " un accidente nuclear.   2. Infección por virus de Larry-Barr puede causar un tumor cerebral poco frecuente.   3. Los transplantados y pacientes con síndrome de inmunodeficiencia adquirida.   SÍNTOMAS  Los síntomas de tumor cerebral se relacionan con la dafne del cerebro que afectan. Por ejemplo, un tumor cerebral que afecta la dafne del cerebro que controla la visión puede afectar ramu sentido.  Los signos y síntomas generales son:   · Dolor de fredis. Cambio en los patrones, más frecuentes o más graves.   · Problemas estomacales o intestinales humaira:   · Náuseas.   · Pérdida del apetito.   · Vómitos   · Cambios en:   · La personalidad.   · El estado de ánimo   · La capacidad mental   · La concentración   · Trastornos del equilibrio   · Confusión en las actividades cotidianas   · Trastornos del habla   · Convulsiones en alguien que nunca las tuvo. [place] puede ocurrir en el 20% de los pacientes que tienen el tumor localizado en la dafne superior del cerebro. Las convulsiones pueden aparecer dez meses antes de tener un diagnóstico confirmado.   DIAGNÓSTICO  El diagnóstico se basa en:  · La historia clínica   · El examen neurológico   · Procedimientos diagnósticos. Los procedimientos diagnósticos son:   · Tomografía computarizada (TC) y resonancia magnética (IMR).   · Después del tratamiento, puede ser de utilidad que le soliciten un SPECT (tomografía por emisión de fotón único) y un PET (tomografía por emisión de positrones) para observar si existe un nuevo desarrollo tumoral. Ambas pruebas consisten en formas muy especiales de obtener imágenes del cerebro. Estas pruebas sólo están disponibles en los grandes hospitales.   Veronica vez que el tumor cerebral es hallado, se realizarán nuevos estudios para determinar el tipo de tumor. El médico también necesitará saber si las células cerebrales son diferentes de las células de los tejidos circundantes. [place] se denomina lisa histológico del tumor. Para elaborar un plan de  tratamiento, es importante conocer el tipo y lisa del tumor cerebral.  TRATAMIENTO  Se dispone de cuatro tipos de tratamiento: Los que valdez de elegirse dependen del tipo y ubicación del tumor.   · Cirugía: es el tratamiento más frecuente. Para extirpar el tumor, el cirujano seccionará maria antonia dafne del hueso del cráneo para llegar hasta el cerebro. Esta operación se denomina craneotomía. Luego de extirpar el tumor, el hueso se coloca nuevamente en ramirez lugar, o se utiliza maria antonia pieza metálica o algún tejido para cubrir el orificio del cráneo.   · Terapia de radiación y radiocirugía: Se utilizan donald x para destruir las células cancerosas y achicar el tumor desde el exterior. La terapia de radiación también puede utilizarse colocando materiales que producen radiación (radioisótopos) a través de delgados tubos plásticos dentro del tumor para destruir las células desde el interior.   · Quimioterapia: se utilizan drogas para destruir las células cancerosas. La quimioterápia se denomina tratamiento sistémico debido a que la droga ingresa en el torrente sanguíneo, a trasvés del organismo, y destruye las células cancerosas de todo el cuerpo. La quimioterapia puede ser:   · Ugo medicamentos.   · Colocar maria antonia aguja en maria antonia vena o músculo.   · La terapia biológica utiliza el sistema inmunológico del organismo para luchar contra el cáncer. Se están realizando ensayos clínicos para usar en el tratamiento del cáncer cerebral. Se utilizan productos del organismo o fabricados en laboratorio para ayudar a que las defensas naturales del cuerpo luchen contra la enfermedad.   La rehabilitación después del tratamiento  Debido a que los tumores cerebrales pueden desarrollarse en zonas del cerebro que controlan las habilidades, el habla, la visión y el pensamiento, la rehabilitación puede ser maria antonia parte necesaria de la recuperación. El cerebro en algunos casos puede curarse solo después de un traumatismo o tratamiento de un tumor cerebral, kristy  puede llevar tiempo y paciencia.   · Rehabilitación cognitiva: ayuda a enfrentar los problemas de pensamiento y conciencia.   · Terapia física: ayuda a recuperar las capacidades motoras o la fuerza muscular perdidas.   · Terapia vocacional ayuda a volver al trabajo luego de sufrir esta enfermedad.   · Especialistas en dificultades del habla (patólogos del habla) son sólo melita de los muchos tipos de terapeutas que pueden ayudarlo a recuperarse.   Los niños en edad escolar que sufren tumores cerebrales pueden beneficiarse especialmente de tutores humaira parte de un plan de tratamiento integral. Un tumor cerebral puede causar modificaciones en el cerebro que afectan el pensamiento y el aprendizaje. Cuanto antes se identifiquen estos problemas, más rápidamente podrán tratarse con estrategias que proporcionan mayores beneficios para el bettye.   SOLICITE ANTENCIÓN MÉDICA SI:  · Siente efectos adversos por los medicamentos prescriptos.   · Siente que los analgésicos no son efectivos.   · Desarrolla nuevos síntomas.   SOLICITE ATENCIÓN MÉDICA DE INMEDIATO SI:  · Le sube la fiebre, tiene rigidez en el moody o está confundido.   · Sufre un dolor de fredis que se vuelve más intenso, o que no responde a los medicamentos para aliviar el dolor.   · Tiene un episodio de desmayo.   · Sufre convulsiones.   · Aparece maria antonia erupción cutánea.   · Comienza a vomitar de manera preocupante o no puede tolerar los alimentos o los líquidos.         Prevención de caídas en el hogar   (Fall Prevention in the Home)  Las caídas pueden causar lesiones. Las personas de todas las edades pueden sufrir caídas. Hay muchas cosas que puede hacer para que ramirez casa sea un lugar seguro y para ayudar a prevenir las caídas.   ¿QUÉ PUEDO HACER EN EL EXTERIOR DE MI CASA?  5. Repare habitualmente los bordes de las aceras y las calzadas, así humaira las grietas.  6. Retire las cosas que pueden hacerlo tropezar cuando cruce maria antonia maurizio, por ejemplo, un escalón o un umbral  elevados.  7. Pode los arbustos o los árboles que se encuentran en el abhijeet a ramirez casa.  8. Use maria antonia iluminación brillante en el exterior.  9. Retire los objetos que estén en el abhijeet y que pueden hacer que maria antonia persona se tropiece, humaira piedras o herramientas.  10. Verifique con frecuencia que los pasamanos no estén flojos ni rotos. Asegúrese de que haya pasamanos a ambos lados de los escalones.  11. Las lilly y las galerías elevadas deben tener barandillas de protección en los bordes.  12. Limpie regularmente las hojas, la halima y el hielo.  13. Utilice arena o sal en los caminos dez el invierno.  14. Limpie de inmediato los derrames en el garaje. [place] incluye los derrames de aceite o grasa.  ¿QUÉ PUEDO HACER EN EL BAÑO?   4. Use luces nocturnas.  5. Instale barras de apoyo en el inodoro, en la bañera y en la ducha. No use los toalleros humaira barras de apoyo.  6. Utilice alfombras o calcomanías antideslizantes en la bañera o ducha.  7. Si necesita sentarse mientras está debajo de la ducha, use un banco plástico antideslizante.  8. Mantenga el piso seco. Seque de inmediato cualquier derrame de agua en el piso.  9. Elimine con frecuencia la acumulación de jabón en la bañera o la ducha.  10. Asegure las alfombras del baño con maria antonia cinta antideslizante doble tracy para alfombras.  11. No tenga alfombras ni otros objetos en el piso que puedan hacerlo tropezar.  ¿QUÉ PUEDO HACER EN LA HABITACIÓN?  · Use luces nocturnas.  · Asegúrese de tener maria antonia ernesto junto a la cama que sea fácil de alcanzar.  · No use sábanas ni mantas que modesto demasiado grandes para la cama. No deben colgar sobre el piso.  · Tenga maria antonia silla firme con apoyabrazos. Puede usarla para apoyarse cuando se viste.  · No tenga alfombras ni otros objetos en el piso que puedan hacerlo tropezar.  ¿QUÉ PUEDO HACER EN LA COCINA?  · Limpie de inmediato cualquier derrame.  · Evite caminar sobre pisos mojados.  · Mantenga los objetos que usa con frecuencia en lugares  de fácil acceso.  · Si necesita alcanzar algo que esté elevado, use maria antonia mariann firme con maria antonia javier de apoyo.  · Mantenga los cables eléctricos fuera del abhijeet.  · No use un pulidor o cera para pisos que dejen los pisos resbaladizos. Si tiene que usar cera, utilice cera antideslizante.  · No tenga alfombras ni otros objetos en el piso que puedan hacerlo tropezar.  ¿QUÉ PUEDO HACER CON LAS ESCALERAS?  · No deje ningún objeto en las escaleras.  · Asegúrese de que haya pasamanos en ambos lados de las escaleras y úselos. Repare los pasamanos que estén flojos o rotos. Asegúrese de que los pasamanos tengan la misma longitud que la mariann.  · Verifique que las alfombras estén libertad adheridas a las escaleras. Arregle las alfombras flojas o gastadas.  · No coloque alfombras en la parte superior o inferior de las escaleras. Si tiene alfombras, péguelas al piso con cinta adhesiva para alfombras.  · Asegúrese de tener un interruptor de ernesto en la parte superior e inferior de las escaleras. Si no lo tiene, pídale a alguien que se lo instale.  ¿QUÉ MÁS PUEDO HACER PARA AYUDAR A PREVENIR LAS CAÍDAS?  · Use calzado con estas características:  ¨ No tienen tacos altos.  ¨ Tienen suela de goma.  ¨ Son cómodos y le quedan libertad.  ¨ Son cerrados. No use sandalias.  · Si usa maria antonia mariann de mano:  ¨ Asegúrese de que esté abierta por completo. No suba a maria antonia mariann de mano cerrada.  ¨ Verifique que ambos lados de la mariann de mano estén asegurados.  ¨ Pídale a maria antonia persona que la sostenga, si es posible.  · Ubique claramente y asegúrese de que puede rosanna lo siguiente:  ¨ Las barras de apoyo o los pasamanos.  ¨ El primer y el último escalón.  ¨ Dónde está el borde de cada escalón.  · Use herramientas que lo ayuden a desplazarse (dispositivos de ayuda para la movilidad), si son necesarias. Estas incluyen las siguientes:  ¨ Bastones.  ¨ Andadores.  ¨ Patinetes con soporte para el pie.  ¨ Muletas.  · Encienda las luces cuando ingrese a maria antonia  dafne oscura. Reemplace las bombillas de ernesto tovar pronto humaira se quemen.  · Organice los muebles de modo de que el abhijeet esté despejado. No cambie los muebles de lugar.  · Si los pisos están desparejos, repárelos.  · Si hay mascotas cerca, fíjese dónde están.  · Revise los medicamentos con el médico. Algunos medicamentos pueden hacer que se sienta mareado. [place] puede aumentar el riesgo de caerse.  Pregúntele al médico qué otras cosas puede hacer para ayudar a prevenir las caídas.     Esta información no tiene humaira fin reemplazar el consejo del médico. Asegúrese de hacerle al médico cualquier pregunta que tenga.         Depresión / Riesgo al Suicidio    Cuando se le da de hussein de alguna entidad de [facility], es importante aprender a mantener a arsenio de hacerse daño a sí mismo.    Reconocer los signos de advertencia:  · Cambios bruscos en la peronalidad, positiva o negativa, incluyendo aumento de la energia  · Regalar posesiones  · Cambios en patrones de comer - significativos cambios de peso - positivos o negativos  · Cambio de patrones para dormir - no poder dormir o dormir todo el tiempo  · Falta de voluntad o incapacidad de comunicarse  · Depresión  · Radha, desánimo y tristeza inusual  · Habla de querer morir  · Descuido del aspecto personal  · Rebeldía - comportamiento imprudente  · Retiro de personas y actividades que les gusta  · Confusión-incapacidad para concentrarse    Si usted o un ser querido observa cualquiera de estos comportamientos o tiene preocupaciones de hacerse daño a si mismo, aquí le damos lo que usted puede hacer:  · Hablar de ti - tus sentimientos y razones para hacerse santo a si mismo  · Retire cualquier medio que se podría utilizar para lastimarse (ejemplos: píldoras, cuerdas, cordones de extensión, arma de tino)  · Obtenga ayuda profesional de la comunidad (luz mental, abuso de sustancias, orientación psicológica)  · No estar solo: llamar a un contacto seguro - maria antonia persona que  confía en que estará allí por usted  · Llamada local L´INEA de CRISIS [phone] y [phone]  · Llamada local [employer] Crisis de [place] [phone] or www.[employer].WireImage  · Llamada gratuita nacional, líneas de prevención del suicidio  · Preventión del Suicidio Nacional Lifeline 125-017-FTIH (9672)  · Línea Nacional de la [place] 800-SUICIDE (004-0034)          Occupational Therapy Discharge Instructions for Dale HernandezKoko    2/27/2017    Level of Assist Required for Eating: Requires Supervision with Eating  Level of Assist Required for Grooming: Requires Supervision with Grooming  Level of Assist Required for Dressing: Requires Supervision with Dressing  Equipment for Dressing: Other  (Stool to elevated feet to assist with donning socks/shoes)  Level of Assist Required for Toileting: Requires Supervision with Toileting  Level of Assist Required for Toilet Transfer: Requires Supervision with Toilet Transfer  Level of Assist Required for Bathing: Requires Supervision with Bathing  Equipment for Bathing: Shower Chair  Level of Assist Required for Shower Transfer: Requires Supervision with Shower Transfer  Equipment for Shower Transfer: Shower Chair  Driving: May not Drive, Please Contact Physician for Further Information  Home Exercise Program: None Issued    Good luck Dale on your continued recovery!   Jenny, you father needs 24 hours supervision for safety.  He would benefit from a shower chair to assist with bathing when washing his lower body.  Please remind him to sit down when completing lower body dressing.  He benefited from a small foot stool to elevate his feet to assist with putting on and taking off his socks and shoes.  If you have any questions or concerns, please do not hesitate to ask!  Best wishes,  Wendy Sandhu OTR/L          Physical Therapy Discharge Instructions for Dale [address]  2/28/2017  Level of  Assist Required for Ambulation: Assist for Balance on Flat Surfaces, Physical Assist on Stairs, Should Not Attempt Curbs at This Time  Distance Patient May Ambulate: 250 feet  Device Recommended for Ambulation:  (Gait Belt)  Level of Assist Required for Transfers: Supervision  Device Recommended for Transfers:  (bed rail as needed only)  Home Exercise Program: None Issued  Prosthesis / Orthosis Recommendation / Location: No Prosthesis  or Orthosis Recommended    Terapia Física Instrucciones de hussein para Dale Gill-Carmona  2/28/2017  Nivel de Asistencia Requerido para la Ambulación: Asistencia para el Balance en Superficies Silvina, Asistencia Física en Escaleras, No Debe Tentar Bordillos en ramu Momento   Distancia Paciente Puede Ambular: 250 pies   Dispositivo recomendado para ambulación: (Cinturón de marcha)   Nivel de asistencia requerida para las transferencias: Supervisión   Dispositivo recomendado para transferencias: (giovana de cama según sea necesario)   Programa de ejercicios para el hogar: [place]   Prótesis / Orthosis Recomendación / localización: [place] prótesis o orthosis recomendada    Speech Therapy Discharge Instructions for Dale Donahue-Carmona    2/28/2017    Diet: Regular - all foods allowed, Thin Liquids - any liquid like water, coffee, tea, juices, or clear fluids like Gatorade, etc.  Swallow Strategies: n/a  Other Diet Instructions: n/a  Supervision: 24 hour supervision is recommended for safety      Aphasia is a communication impairment usually acquired as a result of a stroke or other brain injury. It affects both the ability to express oneself through speech, gesture, and writing, and to understand the speech, gesture, and writing of others. Aphasia thus changes the way in which we communicate with those people most important to us: family, friends, and co-workers. Make sure you have the person's attention before communicating.   1. During conversation, minimize or  eliminate background noise (such as television, radio, other people) as much as possible.   2. Keep communication simple but adult. Simplify your own sentence structure and reduce your own rate of speech. You don't need to speak louder than normal but do emphasize key words. Don't talk down to the person with aphasia.   3. Encourage and use other modes of communication (writing, drawing, yes/no responses, choices, gestures, eye contact, facial expressions) in addition to speech. Use what seems to work consistently.  4. Give them time to talk and let them have a reasonable amount of time to respond. Avoid speaking for the person with aphasia except when necessary and ask permission before doing so.   5. Praise all attempts to speak; make speaking a pleasant experience and provide stimulating conversation. Downplay errors and avoid frequent criticisms/corrections. Avoid insisting that each word be produced perfectly.   6. Augment speech with gesture and visual aids whenever possible. Repeat a statement when necessary.   7. Encourage them to be as independent as possible. Avoid being overprotective.   Whenever possible continue normal activities (such as dinner with family, company, going out). Do not shield people with aphasia from family or friends or ignore them in a group conversation. Rather, try to involve them in family decision-making as much as possible. Keep them informed of events but avoid burdening them with day to day details.     Dale is able to better understand language when gestures or pictures are used along with speech (For example, if asking about using the bathroom, point to the bathroom so he is able to understand the topic you are discussing).  Speak in short sentences, one at a time so he is not overwhelmed with information.  He is also able to understand some singel written words.   Moises Flower MS, CCC-SLP         Follow-up Information     1. Follow up with JAGDEEP Kinsey On  3/13/2017.    Specialty:  Family Medicine    Why:  Monday @ 11:40am with a 11:30am check in time.     Contact information    [address]  [address]  [place] [id number] [phone]          2. Follow up with Olivier Osman M.D. On 3/6/2017.    Specialty:  Oncology    Why:  Monday @ 1:00pm.     Contact information    [address] [facility]  [address]  [place] [id number] [phone]           Discharge Medication Instructions:    Below are the medications your physician expects you to take upon discharge:    Review all your home medications and newly ordered medications with your doctor and/or pharmacist. Follow medication instructions as directed by your doctor and/or pharmacist.    Please keep your medication list with you and share with your physician.               Medication List      START taking these medications        Instructions    Morning Afternoon Evening Bedtime    levetiracetam 100 MG/ML Soln   Last time this was given:  1,500 mg on 2/28/2017  8:11 AM   Commonly known as:  KEPPRA   Replaces:  levetiracetam 1000 MG tablet   Next Dose Due:  2/28/17, 8PM   Notes to Patient:  Seizure prevention    Take 15 mL by mouth every 12 hours.   Dose:  1500 mg                        lidocaine 5 % Ptch   Last time this was given:  1 Patch on 2/28/2017  8:11 AM   Commonly known as:  LIDODERM   Next Dose Due:  Take off at night and replace at 8AM each morning.   Notes to Patient:  Topical pain management.    Apply 1 Patch to skin as directed every day.   Dose:  1 Patch                        metoprolol 25 MG Tabs   Last time this was given:  18.75 mg on 2/28/2017  6:04 AM   Commonly known as:  LOPRESSOR   Next Dose Due:  2/28/17, 6PM   Notes to Patient:  Blood pressure and heart rate management    Take 0.75 Tabs by mouth 2 Times a Day.   Dose:  18.75 mg                        rivaroxaban 15 MG Tabs tablet   Last time this was given:  15 mg on 2/28/2017  8:12 AM   Commonly known as:  XARELTO   Next Dose Due:  2/28/17,  bedtime.   Notes to Patient:  Prevent blood clots from forming or getting bigger.    Take 1 Tab by mouth 2 Times a Day.   Dose:  15 mg                          CHANGE how you take these medications        Instructions    Morning Afternoon Evening Bedtime    dexamethasone 4 MG Tabs   What changed:    - how much to take  - how to take this  - when to take this  - additional instructions   Last time this was given:  4 mg on 2/28/2017 11:12 AM   Commonly known as:  DECADRON   Next Dose Due:  2/28/17, 6PM   Notes to Patient:  To help with shrinking/stablizing tumor.    Doctor's comments:  Take 4 mg q 6 hours. Patient has oncologist appointment on 3-6-2017 and oncologist to determine if decadron is to continue or taper.   Take 4 mg q 6 hours. Patient has oncologist appointment on                        * insulin  UNIT/ML Susp   What changed:    - how much to take  - when to take this   Last time this was given:  35 Units on 2/28/2017  8:13 AM   Commonly known as:  HUMULIN,NOVOLIN   Next Dose Due:  3/1/17, breakfast time.   Notes to Patient:  To control blood sugar.    Inject 35 Units as instructed every morning.   Dose:  35 Units                        * insulin  UNIT/ML Susp   What changed:  You were already taking a medication with the same name, and this prescription was added. Make sure you understand how and when to take each.   Last time this was given:  35 Units on 2/28/2017  8:13 AM   Commonly known as:  HUMULIN,NOVOLIN   Next Dose Due:  Before dinner, 2/28/17   Notes to Patient:  To control blood sugar.    Inject 35 Units as instructed 1/2 hour before dinner.   Dose:  35 Units                        * Notice:  This list has 2 medication(s) that are the same as other medications prescribed for you. Read the directions carefully, and ask your doctor or other care provider to review them with you.      STOP taking these medications     acetaminophen 650 MG Supp   Commonly known as:  TYLENOL        bisacodyl 10 MG Supp   Commonly known as:  DULCOLAX       docusate sodium 100 MG Caps   Commonly known as:  COLACE       famotidine 20 MG Tabs   Commonly known as:  PEPCID       insulin lispro 100 UNIT/ML Soln   Commonly known as:  HUMALOG       labetalol 5 MG/ML Soln   Commonly known as:  NORMODYNE,TRANDATE       lactulose 20 GM/30ML Soln       levetiracetam 1000 MG tablet   Commonly known as:  KEPPRA   Replaced by:  levetiracetam 100 MG/ML Soln       lorazepam 2 MG/ML Soln   Commonly known as:  ATIVAN       PAIN RELIEVER 325 MG Tabs   Generic drug:  acetaminophen       senna-docusate 8.6-50 MG Tabs   Commonly known as:  PERICOLACE or SENOKOT S       temazepam 15 MG Caps   Commonly known as:  RESTORIL       ZOFRAN 4 MG Tabs tablet   Generic drug:  ondansetron               Instructions           Diet / Nutrition:    Follow any diet instructions given to you by your doctor or the dietician, including how much salt (sodium) you are allowed each day.    If you are overweight, talk to your doctor about a weight reduction plan.    Activity:    Remain physically active following your doctor's instructions about exercise and activity.    Rest often.     Any time you become even a little tired or short of breath, SIT DOWN and rest.    Worsening Symptoms:    Report any of the following signs and symptoms to the doctor's office immediately:    *Pain of jaw, arm, or neck  *Chest pain not relieved by medication                               *Dizziness or loss of consciousness  *Difficulty breathing even when at rest   *More tired than usual                                       *Bleeding drainage or swelling of surgical site  *Swelling of feet, ankles, legs or stomach                 *Fever (>100ºF)  *Pink or blood tinged sputum  *Weight gain (3lbs/day or 5lbs /week)           *Shock from internal defibrillator (if applicable)  *Palpitations or irregular heartbeats                *Cool and/or numb extremities    Stroke  Awareness    Common Risk Factors for Stroke include:    Age  Atrial Fibrillation  Carotid Artery Stenosis  Diabetes Mellitus  Excessive alcohol consumption  High blood pressure  Overweight   Physical inactivity  Smoking    Warning signs and symptoms of a stroke include:    *Sudden numbness or weakness of the face, arm or leg (especially on one side of the body).  *Sudden confusion, trouble speaking or understanding.  *Sudden trouble seeing in one or both eyes.  *Sudden trouble walking, dizziness, loss of balance or coordination.Sudden severe headache with no known cause.    It is very important to get treatment quickly when a stroke occurs. If you experience any of the above warning signs, call 911 immediately.                   Disclaimer         Quit Smoking / Tobacco Use:    I understand the use of any tobacco products increases my chance of suffering from future heart disease or stroke and could cause other illnesses which may shorten my life. Quitting the use of tobacco products is the single most important thing I can do to improve my health. For further information on smoking / tobacco cessation call a Toll Free Quit Line at 1-659.550.8597 (*[employer]) or 1-369.902.4409 ([employer]) or you can access the web based program at www.lungusa.org.    [place] Tobacco Users Help Line:  (309) 645-6836       Toll Free: 1-419.390.5675  Quit Tobacco Program [facility] (311)086-0316    Crisis Hotline:    [place] Crisis Hotline:  3-857-KLNVZWQ or 1-910.802.9464    [place] Crisis Hotline:    1-294.985.6301 or 050-055-9492    Discharge Survey:   Thank you for choosing [facility]. We hope we did everything we could to make your hospital stay a pleasant one. You may be receiving a phone survey and we would appreciate your time and participation in answering the questions. Your input is very valuable to us in our efforts to improve our service to our patients and their  families.        My signature on this form indicates that:    1. I have reviewed and understand the above information.  2. My questions regarding this information have been answered to my satisfaction.  3. I have formulated a plan with my discharge nurse to obtain my prescribed medications for home.                  Disclaimer         __________________________________                     __________       ________                       Patient Signature                                                 Date                    Time

## 2017-02-10 NOTE — PROGRESS NOTES
Name:  [id number]   ID Number:  Mallory    Content Discussed:  Discussed pt discharging to go back to Rehab and that pt need to get dressed. Pt said okay however still appears confused.

## 2017-02-10 NOTE — PREADMISSION SCREENING NOTE
Pre-Admission Screening Form    Patient Information:   Name: Dale Dorado     MRN: [id number]       : 1960      Age: 56 y.o.   Gender: male      Race: White [7]       Marital Status: Single [1]  Family Contact: DavidceciliakemarDesireecarl ChowdhuryPriscilla        Relationship: Daughter [2]  Daughter [2]  Home Phone:   [phone]           Cell Phone: [phone]    Advanced Directives: None  Code Status:  FULL  Current Attending Provider: Jayne Washington M.D.  Referring Physician: Dr. Washington  Physiatrist Consult: Dr. Lisette Vanegas  Referral Date: 17  Primary Payor Source:  [facility]  Secondary Payor Source:      Medical Information:   Date of Admission to Acute Care [facility]  Room Number: S178/02  Rehabilitation Diagnosis: 02.1 Non-Traumatic and 03.9 Other Neurologic  @IMM@  No Known Allergies  Past Medical History   Diagnosis Date   • Hyperlipidemia      no meds   • New onset seizure (CMS-HCC) 2017   • Brain cancer (CMS-[facility])      Glioma   • Bell palsy 2016   • Cancer (CMS-HCC) 2017     Glioma   • Diabetes (CMS-HCC)      R/t steroid use per dtr   • Bell's palsy 2017     Past Surgical History   Procedure Laterality Date   • Craniotomy tumor  2017     Lt frontotemporal       History Leading to Admission, Conditions that Caused the Need for Rehab (CMS):     Nicko Jade M.D. Physician Signed  H&P 2017  9:13 PM      Expand All Collapse All    CHIEF COMPLAINT:  Acute seizure.     HISTORY OF CURRENT ILLNESS:  The patient is a 56-year-old gentleman who has a    recent history of glioblastoma.  The patient was sent down to [facility] where    he had surgical resection of the glioblastoma on Tuesday.  He had a left-sided   craniotomy, which was closed and well healing and then he was transferred to    the [facility] on Saturday.  Over the weekend, the patient was    stable, but this morning, the patient suddenly had a seizure disorder.  He  was   placed on 500 mg b.i.d. of Keppra orally for seizure prophylaxis.  Despite of   this, the patient had a seizure and thus the patient's Keppra will be    increased at this point to 1000 mg b.i.d. and we will monitor at this point    Keppra levels.  The patient will also be monitored for further neurological    deficits.  If the patient's condition stabilizes, then he will be able to be    discharged back to rehab.     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     PAST SURGICAL HISTORY:  Includes a recent craniotomy.     ALLERGIES:  No known drug allergies.     MEDICATIONS:  At the time of admission include Decadron 4 mg every 12 hours,    Keppra 500 mg twice daily and then he has a pain relieving medication that the   family was not sure of that he received every 6 hours as needed for pain.     SOCIAL HISTORY:  He is a former smoker.  He smoked a pack a day for 30 years,    quit 15 years ago.  No alcohol, no drug use.  He has 5 children.  He has an    advanced directive.  He wishes to be DNR code status.  He is .     FAMILY HISTORY:  Mother is diabetic.  Father is diabetic.     REVIEW OF SYSTEMS:  Unable to get true review of systems from the patient at    this point as he seems postictal and the patient at this point is able to    follow commands, but is not making sense, even to his family.  The family    states, however, that he is close back to his baseline except for some of the    things that he answers.  He is hungry and is starting to eat an apple at this    point.     PHYSICAL EXAMINATION:  VITAL SIGNS:  Temperature 36.3 degrees Celsius, pulse 57, respirations 13,    blood pressure 125/78, he is 122/83 for his blood pressure.  His weight is 89    kilograms and his height is 168 cm tall, he has a BMI of 31.5.  GENERAL:  He is currently awake, alert, not  oriented to person, place, or    time.  HEENT:  Head has a recent craniotomy, which is well healing.  There is no    discharge from the craniotomy site.  No redness, no tenderness around the    site.  The eyes follow in normal range of gaze.  Pupils are equal, round,    reactive bilaterally.  Nose midline without discharge.  Ears bilaterally    intact without discharge.  Oral cavity, moist mucous membranes.  No apparent    focus infection in the oral cavity.  NECK:  Soft, supple.  No JVD, carotid bruit, thyromegaly, or lymphadenopathy    appreciated.  CHEST:  Chest wall moves equal with inspiration and expiration.  No    paradoxical motion.  No reproducible chest wall tenderness.  HEART:  S1, S2.  No murmurs or gallops detected.  LUNGS:  At this point, clear to auscultation.  No rales or rhonchi detected.  ABDOMEN:  Soft.  Bowel sounds present in all 4 quadrants.  No hepatomegaly, no   splenomegaly, no rebound, no tenderness elicited.  GENITAL:  Exam within normal limits.  RECTAL:  Deferred.  EXTREMITIES:  Upper and lower extremities, positive pulses, no edema, good    muscle strength, good muscle tone, positive deep tendon reflexes.  NEUROLOGIC:  Cranial nerves II-XII grossly within normal limits without any    focal deficits appreciated.  SKIN:  Normal turgor.  No rashes or abrasions identified.     LABORATORY EVALUATION:  WBC count is 9.8 with a hemoglobin of 14.5, hematocrit   43.2, platelets are 163.  Sodium 134, potassium 3.7, chloride 97, CO2 of 28,    BUN 19, creatinine 0.55 with a calcium 9.4 and glucose of 151.  Liver function   tests are within normal limits.  INR is 0.82 with a troponin less than 0.01.     IMAGING STUDIES:  CT of the head shows interval left-sided craniotomy with a    frontotemporal defect.  There is associated pneumocephalus, interval    development of a hypoattenuation left posterior frontoparietal junction    consistent with area of encephalomalacia.  There is an increased density,     which remains in the left posterior frontal lobe medially which could be    residual mass or focally ill-defined hemorrhage, small amount of left-sided    holohemispheric extraaxial blood adjacent to the craniotomy site, 9 mm left to   right midline shift.  There is a mass effect in the left lateral ventricle    and slight dilation of the temporal horn.  Chest x-ray shows mildly enlarged    cardiac silhouette, atelectasis bilaterally.  Clavicles are intact.     Mediastinum is of normal size, heart is of normal size.  I have reviewed the    chest x-ray myself.  A 12-lead EKG, which I have reviewed from today shows a    sinus bradycardia, rate of 56 with a SD of 136, QT of 416.  There are no    ischemic changes.     IMPRESSION AND PLAN:  At this point:  1.  With the worsening edema, the patient had breakthrough seizure today and    was transferred from rehab to the emergency room.  We will be admitting him at   this point to the floor.  We will at this point increase his Decadron from    every 12 hours to every 6 hours.  We will monitor at this point for the    increased pressure.  If the pressure does not improve, neurosurgical    consultation will need to be requested.  2.  The patient in the meantime for his seizures, we will increase from a 500    mg b.i.d. dose to 1000 mg b.i.d. dose.  3.  The patient will continue with pain management.  We will continue at this    point with deep vein thrombosis and gastroesophageal reflux disease    prophylaxis.  4.  For his steroid-induced diabetes, which was already aggressive with the    b.i.d. dosing of Decadron, will now be changed to a q.i.d. dosing of the    Decadron, thus at this point, we will increase his insulin coverage as well.  5.  At this point, patient will be observation admission to the neurology    floor.  Patient will also be evaluated by physical therapy and occupational    therapy and will try to get him back to rehab once he is stabilized.         Jayne Washington M.D. Physician Signed  Progress Notes 2/9/2017  4:32 PM      Expand All Collapse All    Hospital Medicine Progress Note, Adult, Complex                Author: Jayne Washington  Date & Time created: 2/9/2017  4:32 PM      Interval History:    56 y.o male with hx HLD, hx of bell's palsy,Type 2 DM, admitted for acute seizure, after recent glioma resection several days ago at [facility]. Patient was apparently on keppra 500mg BID, however had a break through seizure.     2/7  Patient denies fevers/chills, chest pain, shortness of breath or nausea/vommiting.    Will increased keppra to 1g BID, continue steroids  Prn IV ativan if breakthrough seizure.    Obtaining records from [facility] for pathology results. No results available yet.     2/8  No acute issues overnight, patient has not had recurrent seizure episodes. No acute complaints.    Pending pathology from [facility]  Return back to SNF    2/9  No acute issues overnight, no apparent seizures overnight.  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    Pending back to [facility].    Review of Systems:grossly unchanged  Review of Systems   Constitutional: Negative for fever, chills and weight loss.   HENT: Negative for ear pain, hearing loss, sore throat and tinnitus.    Eyes: Negative for blurred vision, double vision and photophobia.   Respiratory: Negative for cough, hemoptysis, sputum production and stridor.    Cardiovascular: Negative for chest pain, palpitations and orthopnea.   Gastrointestinal: Negative for heartburn, nausea, vomiting and abdominal pain.   Genitourinary: Negative for dysuria, urgency, frequency and hematuria.   Musculoskeletal: Negative for myalgias, back pain, joint pain and neck pain.   Neurological: Negative for dizziness, tingling, tremors, speech change and headaches.   Psychiatric/Behavioral: Negative for depression, suicidal ideas, hallucinations and substance abuse. The patient is not nervous/anxious and does not have  insomnia.        Physical Exam:grossly unchanged.  Physical Exam   Constitutional: He is oriented to person, place, and time. He appears well-developed and well-nourished. No distress.   HENT:    Head: Normocephalic and atraumatic.    Mouth/Throat: No oropharyngeal exudate.   Multiple scalp staples, clean, no drainage.    Eyes: Conjunctivae are normal. Pupils are equal, round, and reactive to light. No scleral icterus.   Neck: Neck supple. No thyromegaly present.   Cardiovascular: Intact distal pulses.    Pulmonary/Chest: Effort normal and breath sounds normal. No stridor. No respiratory distress. He has no wheezes. He has no rales.   Abdominal: Soft. Bowel sounds are normal. He exhibits no distension. There is no tenderness. There is no rebound and no guarding.   Musculoskeletal: Normal range of motion. He exhibits no edema.   Neurological: He is alert and oriented to person, place, and time. No cranial nerve deficit.   Skin: Skin is warm and dry. No rash noted. He is not diaphoretic. No erythema.   Psychiatric: His behavior is normal.       Labs:        Invalid input(s): [id number]      Recent Labs       02/07/17 0333 02/08/17 0127 02/09/17   0206    SODIUM   133*   132*   133*    POTASSIUM   4.0   4.0   3.9    CHLORIDE   98   101   99    CO2   26   23   25    BUN   18   21   16    CREATININE   0.57   0.59   0.50    MAGNESIUM    --    2.2   2.2    CALCIUM   9.1   8.5   8.3*      Recent Labs       02/07/17 0333 02/08/17 0127 02/09/17   0206    ALTSGPT    --    60*   62*    ASTSGOT    --    17   16    ALKPHOSPHAT    --    39   40    TBILIRUBIN    --    0.5   0.4    GLUCOSE   204*   213*   248*      Recent Labs       02/07/17 0333 02/08/17 0127 02/09/17   0206    RBC   4.46*   4.25*   4.17*    HEMOGLOBIN   13.5*   12.8*   13.1*    HEMATOCRIT   40.1*   38.4*   37.7*    PLATELETCT   170   181   189      Recent Labs       02/07/17 0333 02/08/17 0127 02/09/17   0206    WBC   8.2   8.1    9.4    NEUTSPOLYS    --    73.90*   77.00*    LYMPHOCYTES    --    12.20*   5.30*    MONOCYTES    --    4.30   4.40    EOSINOPHILS    --    0.00   0.00    BASOPHILS    --    0.00   0.00    ASTSGOT    --    17   16    ALTSGPT    --    60*   62*    ALKPHOSPHAT    --    39   40    TBILIRUBIN    --    0.5   0.4            Hemodynamics:  Temp (24hrs), Av.6 °C (97.8 °F), Min:36.5 °C (97.7 °F), Max:36.6 °C (97.8 °F)  Temperature: 36.5 °C (97.7 °F)  Pulse  Av.8  Min: 52  Max: 117  Blood Pressure: 124/84 mmHg     Respiratory:    Respiration: 20, Pulse Oximetry: 96 %           Fluids:    Intake/Output Summary (Last 24 hours) at 17 1632  Last data filed at 17 0400    Gross per 24 hour    Intake     986 ml    Output       1 ml    Net     985 ml      Weight: 96.8 kg (213 lb 6.5 oz)  GI/Nutrition:  Orders Placed This Encounter    Procedures    •  DIET ORDER        Standing Status:  Standing          Number of Occurrences:  1          Standing Expiration Date:          Order Specific Question:   Diet:        Answer:   Regular [1]        Order Specific Question:   Texture/Fiber modifications:        Answer:   Dysphagia 2(Pureed/Chopped)specify fluid consistency(question 6) [2]        Order Specific Question:   Consistency/Fluid modifications:        Answer:   Nectar Thick [2]      Medical Decision Making, by Problem:  Active Hospital Problems      Diagnosis    •  Seizure (CMS-HCC) [R56.9]  - most likely 2/2 to effects of glioma and recent resection.    - Continue keppra 1g BID.  - Continue decadron , will wean as tolerated.        •  Glioblastoma (CMS-HCC) [C71.9]  - final pathology pending at [facility].        IDDM  - cont NPH, overall blood sugars better.  - Continue Insulin-sliding scale, accu-checks and hypoglycemia protocol.    Hyperlipidemia  - will need statin on discharge.    Hyponatremia  - cont IVF, improving.  - check bmp in the am.    Patient plan of care discussed at multidisplinary team rounds and  "with patient and R.N at beside.      Radiology images reviewed, Labs reviewed and Medications reviewed  Mesa catheter: No Mesa  DVT prophylaxis - mechanical: SCDs  Ulcer prophylaxis: Yes        Co-morbidities: See PMH  Potential Risk - Complications: Aphasia, Cognitive Impairment, Contractures, Deep Vein Thrombosis, Dysphagia, Incontinence, Malnutrition, Pain, Paralysis, Perceptual Impairment, Pneumonia, Pressure Ulcer, Seizures and Urinary Tract Infection  Level of Risk: High    Ongoing Medical Management Needed (Medical/Nursing Needs):   Patient Active Problem List    Diagnosis Date Noted   • Seizure (CMS-[facility]) 02/06/2017   • Glioblastoma (CMS-HCC) 02/06/2017   • Brain tumor (CMS-HCC) 02/04/2017   • Cerebral edema (CMS-HCC) 01/05/2017   • New onset seizure (CMS-HCC) 01/05/2017   • Malignant glioma of brain (CMS-HCC) 01/04/2017   • Obesity (BMI 30-39.9) 05/15/2015   • Chronic pain of right knee 05/15/2015     A & O to self omly    Current Vital Signs:   Temperature: 36.4 °C (97.5 °F) Pulse: (!) 113 Respiration: 16 Blood Pressure: 131/89 mmHg  Weight: 96.8 kg (213 lb 6.5 oz) Height: 168 cm (5' 6.14\")  Pulse Oximetry: 95 % O2 (LPM): 0      Completed Laboratory Reports:  Recent Labs      02/07/17   1104  02/07/17   1729  02/08/17   0040  02/08/17   0127  02/08/17   1150  02/08/17   1651  02/09/17   0206  02/09/17   1054  02/09/17   1705  02/10/17   0151   WBC   --    --    --   8.1   --    --   9.4   --    --   10.0   HEMOGLOBIN   --    --    --   12.8*   --    --   13.1*   --    --   13.0*   HEMATOCRIT   --    --    --   38.4*   --    --   37.7*   --    --   38.9*   PLATELETCT   --    --    --   181   --    --   189   --    --   208   SODIUM   --    --    --   132*   --    --   133*   --    --   132*   POTASSIUM   --    --    --   4.0   --    --   3.9   --    --   3.9   BUN   --    --    --   21   --    --   16   --    --   15   CREATININE   --    --    --   0.59   --    --   0.50   --    --   0.52   ALBUMIN   --    " --    --   2.9*   --    --   2.9*   --    --   2.9*   GLUCOSE   --    --    --   213*   --    --   248*   --    --   224*   POCGLUCOSE  198*  302*  203*   --   370*  204*   --   210*  215*   --      Additional Labs: Not Applicable    Prior Living Situation:   Housing / Facility: [address] / [facility]  Lives with - Patient's Self Care Capacity: Unable To Determine At This Time    Prior Level of Function / Living Situation:   Physical Therapy: Prior Services: None (recently at Rehab)  Housing / Facility: [address] / [facility]  Lives with - Patient's Self Care Capacity: Unable To Determine At This Time  Bed Mobility: Unable To Determine At This Time  Current Level of Function:   Level Of Assist: Supervised  Assistive Device: None  Distance (Feet): 200  # of Stairs Climbed: 2  Level of Assist with Stairs: Contact Guard Assist  Weight Bearing Status: no restrictions  Supine to Sit: Minimal Assist  Sit to Supine:  (pt remained up in chair )  Scooting: Supervised  Rolling: Supervised  Sit to Stand: Supervised  Bed, Chair, Wheelchair Transfer: Stand by Assist  Toilet Transfers: Stand by Assist  Transfer Method: Stand Pivot  Sitting in Chair: 15+  Standin min   Occupational Therapy:   Self Feeding: Unable To Determine At This Time  Medication Management: Unable To Determine At This Time  Prior Services: None (recently at Rehab)  H[facility] / Facility[facility] [address] [place] [address] [facility]  Occupation (Pre-Hospital Vocational): Unable To Determine At This Time  Current Level of Function:   Eating: Supervision  Lower Body Dressing: Minimal Assist (sock donning, unable to tailor sit either side )  Toileting: Contact Guard Assist  Speech Language Pathology:   Assessment : Full report to follow; pt with severe exp/rec deficits and inappropriate obj use intermittently.  Hand over hand to achieve a yes/no gesture.  Inconsistent yes/no, poor object naming, poor repetition. Characteristics of global-type aphasia.  Sentence completion,  singing did not facilitate verbal expression.  Pt cooperative and attempting all tasks.  Pt unable to repeat neologism used repetitively upon command.  Would benefit from continued SLP.  No family at bedside; will follow for family ed as family available.  Problem List: Expressive Language Deficit, Receptive Language Deficit  Diet / Liquid Recommendation: Dysphagia II, Nectar Thick Liquid  Comments: Pt seen for dysphagia f/u post aphasia tx at request of RN staff.  Pt with water at bedside that was thickened but not to ntl consistency; pt did not tolerate, with overt coughing post swallow.  Recommend continue ntl.  Pt with improving object use for eating and self-fdg during this interaction with breakfast vs when seen yesterday.  Min set-up required and supervision with tactile cues to facilitate self fdg and completing meal.  Tactile cue to limit bolus size.  Sitter educated re current status and recs.  Strategies posted HOB re diet.  Rehabilitation Prognosis/Potential: Fair  Estimated Length of Stay: 14-21 days    Nursing:   Orientation : Unable to Assess  Incontinent @ times    Scope/Intensity of Services Recommended:  Physical Therapy: 1 hr / day  5 days / week. Therapeutic Interventions Required: Maximize Endurance, Mobility, Strength and Safety  Occupational Therapy: 1 hr / day 5 days / week. Therapeutic Interventions Required: Maximize Self Care, ADLs, IADLs and Energy Conservation  Speech & Language Pathology: 1 hr / day 5 days / week. Therapeutic Interventions Required: Maximize Cognition, Swallowing and Safety  Rehabilitation Nursin/7. Therapeutic Interventions Required: Monitor Pain, Skin, Wound(s), Vital Signs, Intake and Output, Labs, Safety, Aspiration Risk and Family Training  Rehabilitation Physician: 3 - 5 days / week. Therapeutic Interventions Required: Medical Management    Rehabilitation Goals and Plan (Expected frequency & duration of treatment in the IRF):   Return to the Community,  Minimal Assist Level of Care, Outpatient Support and Family Able to Provide 24/7 Assistance  Anticipated Date of Rehabilitation Admission: 02-10-17  Patient/Family oriented IRF level of care/facility/plan: Yes  Patient/Family willing to participate in IRF care/facility/plan: Yes  Patient able to tolerate IRF level of care proposed: Yes  Patient has potential to benefit IRF level of care proposed: Yes  Comments: Not Applicable    Special Needs or Precautions - Medical Necessity:  Safety Concerns/Precautions:  Fall Risk / High Risk for Falls, Balance, Cognition, Bed / Chair Alarm and Sitter  24 hours/ day  Complex Wound Care: Surgical  Pain Management  IV Site: Peripheral  Current Medications:    Current Facility-Administered Medications Ordered in Epic   Medication Dose Route Frequency Provider Last Rate Last Dose   • insulin lispro (HUMALOG) injection 1-6 Units  1-6 Units Subcutaneous 4X/DAY SERAFIN Washington M.D.   2 Units at 02/10/17 0514   • acetaminophen (TYLENOL) tablet 650 mg  650 mg Oral Q6HRS PRN Jayne Washington M.D.   650 mg at 02/09/17 1510   • lorazepam (ATIVAN) injection 2 mg  2 mg Intravenous Q HOUR PRN Cheli Lopez A.P.R.N.       • levetiracetam (KEPPRA) 1,000 mg in D5W 100 mL IVPB  1,000 mg Intravenous Q12HRS Jayne Washington M.D. 400 mL/hr at 02/10/17 0831 1,000 mg at 02/10/17 0831   • insulin NPH (HUMULIN,NOVOLIN) injection 7 Units  7 Units Subcutaneous BID INSULIN Jayne Washington M.D.   7 Units at 02/10/17 0513   • docusate sodium (COLACE) capsule 100 mg  100 mg Oral QAM Nicko Jade M.D.   100 mg at 02/10/17 0831    And   • senna-docusate (PERICOLACE or SENOKOT S) 8.6-50 MG per tablet 1 Tab  1 Tab Oral Nightly Nicko Jade M.D.   1 Tab at 02/09/17 2323    And   • senna-docusate (PERICOLACE or SENOKOT S) 8.6-50 MG per tablet 1 Tab  1 Tab Oral Q24HRS PRN Nicko Jade M.D.   1 Tab at 02/09/17 3692    And   • lactulose 20 GM/30ML solution 30 mL  30 mL Oral Q24HRS PRN Nicko  SUNG Jade        And   • bisacodyl (DULCOLAX) suppository 10 mg  10 mg Rectal Q24HRS PRN Nicko Jade M.D.        And   • fleet enema 133 mL  1 Each Rectal Once PRN Nicko Jade M.D.       • NS infusion   Intravenous Continuous Nicko Jade M.D. 83 mL/hr at 02/10/17 0516     • labetalol (NORMODYNE,TRANDATE) injection 10 mg  10 mg Intravenous Q4HRS PRN Nicko Jade M.D.       • ondansetron (ZOFRAN) syringe/vial injection 4 mg  4 mg Intravenous Q4HRS PRN Nicko Jade M.D.       • ondansetron (ZOFRAN ODT) dispertab 4 mg  4 mg Oral Q4HRS PRN Nicko Jade M.D.       • promethazine (PHENERGAN) tablet 12.5-25 mg  12.5-25 mg Oral Q4HRS PRN Nicko Jade M.D.   25 mg at 02/07/17 2129   • promethazine (PHENERGAN) suppository 12.5-25 mg  12.5-25 mg Rectal Q4HRS PRN Nicko Jade M.D.       • prochlorperazine (COMPAZINE) injection 5-10 mg  5-10 mg Intravenous Q4HRS PRN Nicko Jade M.D.   10 mg at 02/07/17 0031   • zolpidem (AMBIEN) tablet 5 mg  5 mg Oral HS PRN - MR X 1 Nicko Jade M.D.       • glucose 4 g chewable tablet 16 g  16 g Oral Q15 MIN PRN Nicko Jade M.D.        And   • dextrose 50% (D50W) injection 25 mL  25 mL Intravenous Q15 MIN PRN Nicko Jade M.D.       • famotidine (PEPCID) tablet 20 mg  20 mg Oral BID Salvador Quevedo M.D.   20 mg at 02/10/17 0831   • dexamethasone (DECADRON) injection 4 mg  4 mg Intravenous Q6HRS Cheli Lopez A.P.R.N.   4 mg at 02/10/17 0515     No current Epic-ordered outpatient prescriptions on file.     Diet:   DIET ORDERS (Through next 24h)    Start     Ordered    02/07/17 1428  SUPPLEMENTS   ONCE     Question:  Which Supplement  Answer:  OTHER (see comments)  Comment:  Magic cups - vanilla    02/07/17 1428    02/07/17 1021  DIET ORDER   ALL MEALS     Question Answer Comment   Diet: Regular    Texture/Fiber modifications: Dysphagia 2(Pureed/Chopped)specify fluid consistency(question 6)    Consistency/Fluid modifications: Nectar Thick        02/07/17 1020           Anticipated Discharge Destination / Patient/Family Goal:  Destination: Home with Assistance Support System: Daughters  Anticipated home health services: OT, PT, SLP, Nursing, Social Work and Aide  Previously used HH service/ provider: Not Applicable  Anticipated DME Needs: Walker, Wheelchair, Commode, Hospital Bed and Life Line  Outpatient Services: OT, PT and SLP  Alternative resources to address additional identified needs:     SITTER @ BEDSIDE 24/7    F/U WITH DR MOTA, ONCOLOGIST, ON 03-01-17.  PATHOLOGY REVEALS: ANAPLASTIC GLIOMA    Pre-Screen Completed: 2/10/2017 9:07 AM Daniel Martinez L.P.N.

## 2017-02-10 NOTE — PROGRESS NOTES
Hospital Medicine Progress Note, Adult, Complex               Author: Jayne Washington Date & Time created: 2/9/2017  4:32 PM     Interval History:    56 y.o male with hx HLD, hx of bell's palsy,Type 2 DM, admitted for acute seizure, after recent glioma resection several days ago at [facility]. Patient was apparently on keppra 500mg BID, however had a break through seizure.     2/7  Patient denies fevers/chills, chest pain, shortness of breath or nausea/vommiting.   Will increased keppra to 1g BID, continue steroids  Prn IV ativan if breakthrough seizure.   Obtaining records from [facility] for pathology results. No results available yet.     2/8  No acute issues overnight, patient has not had recurrent seizure episodes. No acute complaints.   Pending pathology from [facility]  Return back to SNF    2/9  No acute issues overnight, no apparent seizures overnight.  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   Pending back to [facility].    Review of Systems:grossly unchanged  Review of Systems   Constitutional: Negative for fever, chills and weight loss.   HENT: Negative for ear pain, hearing loss, sore throat and tinnitus.    Eyes: Negative for blurred vision, double vision and photophobia.   Respiratory: Negative for cough, hemoptysis, sputum production and stridor.    Cardiovascular: Negative for chest pain, palpitations and orthopnea.   Gastrointestinal: Negative for heartburn, nausea, vomiting and abdominal pain.   Genitourinary: Negative for dysuria, urgency, frequency and hematuria.   Musculoskeletal: Negative for myalgias, back pain, joint pain and neck pain.   Neurological: Negative for dizziness, tingling, tremors, speech change and headaches.   Psychiatric/Behavioral: Negative for depression, suicidal ideas, hallucinations and substance abuse. The patient is not nervous/anxious and does not have insomnia.        Physical Exam:grossly unchanged.  Physical Exam   Constitutional: He is oriented to person, place, and  time. He appears well-developed and well-nourished. No distress.   HENT:   Head: Normocephalic and atraumatic.   Mouth/Throat: No oropharyngeal exudate.   Multiple scalp staples, clean, no drainage.    Eyes: Conjunctivae are normal. Pupils are equal, round, and reactive to light. No scleral icterus.   Neck: Neck supple. No thyromegaly present.   Cardiovascular: Intact distal pulses.    Pulmonary/Chest: Effort normal and breath sounds normal. No stridor. No respiratory distress. He has no wheezes. He has no rales.   Abdominal: Soft. Bowel sounds are normal. He exhibits no distension. There is no tenderness. There is no rebound and no guarding.   Musculoskeletal: Normal range of motion. He exhibits no edema.   Neurological: He is alert and oriented to person, place, and time. No cranial nerve deficit.   Skin: Skin is warm and dry. No rash noted. He is not diaphoretic. No erythema.   Psychiatric: His behavior is normal.       Labs:        Invalid input(s): [id number]      Recent Labs      02/07/17 0333 02/08/17 0127 02/09/17   0206   SODIUM  133*  132*  133*   POTASSIUM  4.0  4.0  3.9   CHLORIDE  98  101  99   CO2  26  23  25   BUN  18  21  16   CREATININE  0.57  0.59  0.50   MAGNESIUM   --   2.2  2.2   CALCIUM  9.1  8.5  8.3*     Recent Labs      02/07/17 0333 02/08/17 0127 02/09/17   0206   ALTSGPT   --   60*  62*   ASTSGOT   --   17  16   ALKPHOSPHAT   --   39  40   TBILIRUBIN   --   0.5  0.4   GLUCOSE  204*  213*  248*     Recent Labs      02/07/17 0333 02/08/17 0127 02/09/17   0206   RBC  4.46*  4.25*  4.17*   HEMOGLOBIN  13.5*  12.8*  13.1*   HEMATOCRIT  40.1*  38.4*  37.7*   PLATELETCT  170  181  189     Recent Labs      02/07/17 0333 02/08/17 0127 02/09/17   0206   WBC  8.2  8.1  9.4   NEUTSPOLYS   --   73.90*  77.00*   LYMPHOCYTES   --   12.20*  5.30*   MONOCYTES   --   4.30  4.40   EOSINOPHILS   --   0.00  0.00   BASOPHILS   --   0.00  0.00   ASTSGOT   --   17  16   ALTSGPT   --    60*  62*   ALKPHOSPHAT   --   39  40   TBILIRUBIN   --   0.5  0.4           Hemodynamics:  Temp (24hrs), Av.6 °C (97.8 °F), Min:36.5 °C (97.7 °F), Max:36.6 °C (97.8 °F)  Temperature: 36.5 °C (97.7 °F)  Pulse  Av.8  Min: 52  Max: 117  Blood Pressure: 124/84 mmHg     Respiratory:    Respiration: 20, Pulse Oximetry: 96 %           Fluids:    Intake/Output Summary (Last 24 hours) at 17 1632  Last data filed at 17 0400   Gross per 24 hour   Intake    986 ml   Output      1 ml   Net    985 ml     Weight: 96.8 kg (213 lb 6.5 oz)  GI/Nutrition:  Orders Placed This Encounter   Procedures   • DIET ORDER     Standing Status: Standing      Number of Occurrences: 1      Standing Expiration Date:      Order Specific Question:  Diet:     Answer:  Regular [1]     Order Specific Question:  Texture/Fiber modifications:     Answer:  Dysphagia 2(Pureed/Chopped)specify fluid consistency(question 6) [2]     Order Specific Question:  Consistency/Fluid modifications:     Answer:  Nectar Thick [2]     Medical Decision Making, by Problem:  Active Hospital Problems    Diagnosis   • Seizure (CMS-HCC) [R56.9]  - most likely 2/2 to effects of glioma and recent resection.   - Continue keppra 1g BID.  - Continue decadron , will wean as tolerated.      • Glioblastoma (CMS-HCC) [C71.9]  - final pathology pending at [facility].       IDDM  - cont NPH, overall blood sugars better.  - Continue Insulin-sliding scale, accu-checks and hypoglycemia protocol.    Hyperlipidemia  - will need statin on discharge.    Hyponatremia  - cont IVF, improving.  - check bmp in the am.    Patient plan of care discussed at multidisplinary team rounds and with patient and R.N at beside.      Radiology images reviewed, Labs reviewed and Medications reviewed  Mesa catheter: No [place]        DVT prophylaxis - mechanical: SCDs  Ulcer prophylaxis: Yes

## 2017-02-11 NOTE — THERAPY
Occupational Therapy Initial Plan of Care Note    1) Assessment:  Patient is 56 y.o. male with a diagnosis of brain tumor and seizure disorder.  Additional factors influencing patient status / progress (ie: cognitive factors, co-morbidities, social support, etc): global aphasia, Greek speaking only.  Long term and short term goals have been discussed with patient and they are in agreement.  2) Plan:  Recommend Occupational Therapy  minutes per day 5-7 days per week for 2-3  weeks for the following treatments:  OT Self Care/ADL, OT Cognitive Skill Dev, OT Community Reintegration, OT Neuro Re-Ed/Balance, OT Therapeutic Activity, OT Evaluation and OT Therapeutic Exercise.  3) Goals:  Please refer to care plan for goals.

## 2017-02-11 NOTE — FLOWSHEET NOTE
Patient new admit to rehabilitation. Arrived in R/A, sat=95%. No distress. Azeri speaking only, has friend at bedside to interpret. Seems alert, but confused to self and place. Able to teach I.S. With help from his friend, able to achieve 1750ml or more with cues. Good cough.     02/10/17 1635   Events/Summary/Plan   Events/Summary/Plan Respiratory assessment   Interdisciplinary Plan of Care-Goals (Indications)   Hyperinflation Protocol Indications Physical Exam   Interdisciplinary Plan of Care-Outcomes    Hyperinflation Protocol Goals/Outcome Greater Than 60% of Predicted I.S. Volume x 24 hrs   Education   Education Yes - Pt. / Family has been Instructed in use of Respiratory Equipment   Incentive Spirometry Group   Incentive Spirometry Instruction Yes   Breathing Exercises Yes   Incentive Spirometer Volume 1750 mL  (Requires cues for proper use.)   Incentive Spirometer Date Last Changed 02/10/17   Incentive Spirometer Next Change Date (Q 30 Days) 03/10/17   Respiratory WDL   Respiratory (WDL) X   Chest Exam   Respiration 18   Pulse 98   Breath Sounds   RUL Breath Sounds Clear   RML Breath Sounds Clear   RLL Breath Sounds Clear;Diminished   LAYA Breath Sounds Clear   LLL Breath Sounds Clear;Diminished   Secretions   Cough Non Productive   How Sputum Obtained Cough on Request   Oximetry   #Pulse Oximetry (Single Determination) Yes   Oxygen   Home O2 Use Prior To Admission? No   Pulse Oximetry 95 %   O2 (LPM) 0

## 2017-02-11 NOTE — CARE PLAN
Problem: Safety  Goal: Will remain free from injury  Outcome: PROGRESSING AS EXPECTED  Seizure precautions in place, bed is in low position, room is close to nurse's station. He is encouraged to call for assistance using call light. Frequent rounding performed by RN and CNA.    Problem: Skin Integrity  Goal: Risk for impaired skin integrity will decrease  Outcome: PROGRESSING AS EXPECTED  Head incision is clean, dry, edges are well approximated, no drainage noted.

## 2017-02-11 NOTE — CARE PLAN
Problem: Bowel/Gastric:  Goal: Normal bowel function is maintained or improved  Outcome: PROGRESSING AS EXPECTED  Patient having regular bowel movements; last BM 2/11/17.  Denies s/s constipation; bowel meds available if needed.  Will continue to monitor.    Problem: IP BLADDER/VOIDING  Goal: LTG - patient will complete bladder elimination  Patient is continent of bladder this shift. Patient voiding adequate amounts of clear, yellow urine. Denies dysuria and flank pain: afebrile.

## 2017-02-11 NOTE — THERAPY
Physical Therapy Initial Plan of Care Note    1) Assessment: Patient is 56 y.o. male with a diagnosis of no-traumatic brain tumor.  Additional factors influencing patient status / progress (ie: cognitive factors, co-morbidities, social support, etc): aphasia (expressive and receptive), Kazakh-speaking.  Long term and short term goals have been discussed with patient and spouse and they are in agreement.  2) Plan: Recommend Physical Therapy 30-60 minutes per day 5-7 days per week for 2-3  weeks for the following treatments:  PT Gait Training, PT Self Care/Home Eval, PT Therapeutic Exercises, PT Neuro Re-Ed/Balance and PT Therapeutic Activity.  3) Goals:  Please refer to care plan for goals.

## 2017-02-11 NOTE — PROGRESS NOTES
Patient admitted to facility at 1545 via w/c; accompanied by hospital transport.  Patient assisted to room and positioned in bed for comfort and safety; call light within reach.  Patient assisted with stowing belongings and oriented to room and facility.  Admission assessment performed and documented in computer. Patient received and reviewed education binder. Admission paperwork completed; signed copies placed in chart.  Patient family member for interpretation. Will continue to monitor.

## 2017-02-11 NOTE — PROGRESS NOTES
Received shift report and assumed care of patient.  Patient sleeping, calm and stable, currently positioned in bed for comfort and safety; call light within reach.  No s/s of pain or discomfort at this time.  Will continue to monitor.

## 2017-02-11 NOTE — H&P
REHABILITATION HISTORY AND PHYSICAL/POST ADMISSION EVALUATION    2/10/2017  4:21 PM  Dale GillMarciaCarmona  RH28/01    Reason for admission: NTBI secondary to brain tumor      HPI:  The patient is a 56 year old male who presented for medical care with  Left Bell's Palsy in mid December of 2016. Evaluation include an MR head scan revealed a left temporofrontal primarily nonenhancing tumor. [facility] and Decadron. He was hospitalized at [facility] in [place]. 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 The patient underwent a sterotactic left frontotemporal craniotomy with microscopic computer assisted partial resection of the left temporal low grade glioma using intraoperative functional cortical mapping with awake patient. The patient had difficulty with speech. The discharge summary stated that the patient had a new acute infarct posterior to the resection of the cavity in the left temporal occipital lobe. In addition, new small focus of GRE hypointensity in left frontoparietal regional likely represents small hemorrhage or blood products from surgery  Patient was evaluated by Rehab Medicine physician and therapists and determined to be appropriate for acute inpatient rehab and was transferred to [facility] on 2-4-2017. The patient had a few second seizure the second evening of his admission to rehab but then had 2 more and was subsequently evaluated in rapid response by hospitalist and physiatrist and the decision to send the patient to the ER for further evaluation was made. The patient was admitted to the neuro floor. Patient was  on keppra 500mg BID,  however had a break through seizure. As a result patient was placed on Keppra 1g BID, and his steroids were increased from 4mg decadron bid to IV q6, currently decreased to TID 4mg. 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 He has an appointment to see  in the next several weeks, thus he would not be undergoing radiation near his recent resection anyway's. The patient has and apppointment for outpatient oncology with Olivier Osman MD    Patient was evaluated by Rehab Medicine physician and therapists and determined to be appropriate for acute inpatient rehab and was transferred to [facility] on 2-    ROS:  no F/C, N/V, HA, vision changes/dizziness, CP/SOB, abd pain/constipation, diarrhea, dysuria/frequency/urgency, bowel/bladder incontinence, calf pain/swelling, joint pain/swelling/myalgias, anxiety/depression/mood changes.    PMH:  Past Medical History   Diagnosis Date   • Hyperlipidemia 2010     no meds   • New onset seizure (CMS-HCC) 1/5/2017   • Brain cancer (CMS-[facility])      Glioma   • Bell palsy 12/2016   • Cancer (CMS-HCC) 1/31/2017     Glioma   • Diabetes (CMS-[facility])      R/t steroid use per dtr   • Bell's palsy 1/2017       PSH:  Past Surgical History   Procedure Laterality Date   • Craniotomy tumor  1/31/2017     Lt frontotemporal       Family Hx: no hx of DVT    MEDICATIONS:  Current Facility-Administered Medications   Medication Dose   • Respiratory Care per Protocol     • levetiracetam (KEPPRA) tablet 1,000 mg  1,000 mg   • dexamethasone (DECADRON) tablet 4 mg  4 mg   • temazepam (RESTORIL) capsule 15 mg  15 mg   • [START ON 2/11/2017] docusate sodium (COLACE) capsule 100 mg  100 mg    And   • senna-docusate (PERICOLACE or SENOKOT S) 8.6-50 MG per tablet 1 Tab  1 Tab    And   • senna-docusate (PERICOLACE or SENOKOT S) 8.6-50 MG per tablet 1 Tab  1 Tab    And   • lactulose 20 GM/30ML solution 30 mL  30 mL    And    • bisacodyl (DULCOLAX) suppository 10 mg  10 mg    And   • fleet enema 133 mL  1 Each   • ondansetron (ZOFRAN ODT) dispertab 4 mg  4 mg   • famotidine (PEPCID) tablet 20 mg  20 mg   • acetaminophen (TYLENOL) tablet 650 mg  650 mg   • insulin lispro (HUMALOG) injection 1-6 Units  1-6 Units   • insulin NPH (HUMULIN,NOVOLIN) injection 7 Units  7 Units       ALLERGIES:  Review of patient's allergies indicates no known allergies.  Prior Living Situation:    Housing / Facility: [address] / [facility]  Lives with - Patient's Self Care Capacity: Unable To Determine At This Time    Prior Level of Function / Living Situation:    Physical Therapy: Prior Services: None (recently at Rehab)  Housing / Facility: [address] / [facility]  Lives with - Patient's Self Care Capacity: Unable To Determine At This Time  Bed Mobility: Unable To Determine At This Time  Current Level of Function:   Level Of Assist: Supervised  Assistive Device: None  Distance (Feet): 200  # of Stairs Climbed: 2  Level of Assist with Stairs: Contact Guard Assist  Weight Bearing Status: no restrictions  Supine to Sit: Minimal Assist  Sit to Supine:  (pt remained up in chair )  Scooting: Supervised  Rolling: Supervised  Sit to Stand: Supervised  Bed, Chair, Wheelchair Transfer: Stand by Assist  Toilet Transfers: Stand by Assist  Transfer Method: Stand Pivot  Sitting in Chair: 15+  Standin min    Occupational Therapy:    Self Feeding: Unable To Determine At This Time  Medication Management: Unable To Determine At This Time  Prior Services: None (recently at Rehab)  Housing / Facility: 2 [address] Apart[address] / [facility]  Occupation (Pre-Hospital Vocational): Unable To Determine At This Time  Current Level of Function:   Eating: Supervision  Lower Body Dressing: Minimal Assist (sock donning, unable to tailor sit either side )  Toileting: Contact Guard Assist  Speech Language Pathology:    Assessment : Full report to follow; pt with severe exp/rec deficits and  "inappropriate obj use intermittently.  Hand over hand to achieve a yes/no gesture.  Inconsistent yes/no, poor object naming, poor repetition. Characteristics of global-type aphasia.  Sentence completion, singing did not facilitate verbal expression.  Pt cooperative and attempting all tasks.  Pt unable to repeat neologism used repetitively upon command.  Would benefit from continued SLP.  No family at bedside; will follow for family ed as family available.  Problem List: Expressive Language Deficit, Receptive Language Deficit  Diet / Liquid Recommendation: Dysphagia II, Nectar Thick Liquid  Comments: Pt seen for dysphagia f/u post aphasia tx at request of RN staff.  Pt with water at bedside that was thickened but not to ntl consistency; pt did not tolerate, with overt coughing post swallow.  Recommend continue ntl.  Pt with improving object use for eating and self-fdg during this interaction with breakfast vs when seen yesterday.  Min set-up required and supervision with tactile cues to facilitate self fdg and completing meal.  Tactile cue to limit bolus size.  Sitter educated re current status and recs.  Strategies posted HOB re diet.  Rehabilitation Prognosis/Potential: Fair  Estimated Length of Stay: 14-21 days    Nursing:    Orientation : Unable to Assess  Incontinent @ times      PHYSICAL EXAM:     height is 1.6 m (5' 2.99\") and weight is 91.1 kg (200 lb 13.4 oz). His temperature is 36.7 °C (98.1 °F). His blood pressure is 117/85 and his pulse is 113. His respiration is 18 and oxygen saturation is 93%.     GEN: well hydrated, round face, left crani incision cdi, staples in place  HEENT: NC/AT; EOMI, PERRL, no nystagmus  CARDIAC: RRR  LUNGS: CTA  ABD: +BS, soft, NT/ND  EXT: No contractures, spasticity, or edema.  No bilateral calf tenderness  2+ bilateral DP/PT pulses.    NEURO:    Mental status:  Alert to self, unable answers questions appropriately, has difficulty with following commands    Speech: nonfluent, " +aphasia + dysarthria    CRANIAL NERVES:  2,3: visual acuity grossly intact, PERRL  3,4,6: EOMI bilaterally, no nystagmus or diplopia  7: no facial asymmetry  8: hearing grossly intact  9,10: impaired  11: SCM/Trapezius strength 5/5 bilaterally  12: impaired  Motor:                          Right Left  Had difficulty with muscle strength testing but can move all extremities  Sensory: intact to light touch through out    Tone: no spasticity noted    Proprioception:  Coordination: finger to nose patient unable to understand instructions    Imaging:    Labs:  Recent Labs      02/08/17 0127 02/09/17   0206  02/10/17   0151   SODIUM  132*  133*  132*   POTASSIUM  4.0  3.9  3.9   CHLORIDE  101  99  98   CO2  23  25  26   GLUCOSE  213*  248*  224*   BUN  21  16  15   CREATININE  0.59  0.50  0.52   CALCIUM  8.5  8.3*  8.7     Recent Labs      02/08/17 0127 02/09/17   0206  02/10/17   0151   WBC  8.1  9.4  10.0   RBC  4.25*  4.17*  4.32*   HEMOGLOBIN  12.8*  13.1*  13.0*   HEMATOCRIT  38.4*  37.7*  38.9*   MCV  90.4  90.4  90.0   MCH  30.1  31.4  30.1   MCHC  33.3*  34.7  33.4*   RDW  45.0  45.3  44.8   PLATELETCT  181  189  208   MPV  8.9*  8.8*  8.9*             PRIMARY REHAB DIAGNOSIS:  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 S/0 sterotactic left frontotemporal craniotomy with microscopic computer assisted partial resection of the left temporal low grade glioma using intraoperative functional cortical mapping with awake patient.     new acute infarct posterior to the resection of the cavity in the left temporal occipital lobe. And new small focus of GRE hypointensity in left frontoparietal regional likely represents small hemorrhage or blood products from surgery  Anaplastic Grade III  astrocytoma. He has an appointment to see  in the next several weeks, thus he would not be undergoing radiation near his recent resection anyway's. The patient has and apppointment for outpatient oncology with Olivier Osman MD    Aphasia  Decadron taper as tolererated: continue 4 mg q 6 hours for now  Seizure: 1000 mg keppra bid  Initiate bladder program  Initiate bowel program  Check labs in am  IMPAIRMENTS:    Mobility  Self-care  IADLs  Cognitive  Speech      SECONDARY DIAGNOSIS/MEDICAL CO-MORBIDITIES AFFECTING FUNCTION:    RELEVANT CHANGES SINCE PREADMISSION EVALUATION:    Status unchanged    The patient's rehabilitation potential is good  The patient's medical prognosis is good    PLAN:    Discussion and Recommendations:    1. The patient requires an acute inpatient rehabilitation program with a coordinated program of care at an intensity and frequency not available at a lower level of care. This recommendation is substantiated by the patient's medical physicians who recommend that the patient's intervention and assessment of medical issues needs to be done at an acute level of care for patient's safety and maximum outcome.    2. A coordinated program of care will be supplied by an interdisciplinary team of physical therapy, occupational therapy, rehab physician, rehab nursing, and, if needed, speech therapy and rehab psychology. Rehab team presents a patient-specific rehabilitation and education program concentrating on prevention of future problems related to accessibility, mobility, skin, bowel, bladder, sexuality, and psychosocial and medical/surgical problems.    3. Need for Rehabilitation Physician: The rehab physician will be evaluating the patient on a multi-weekly basis to help coordinate the program of care. The rehab physician communicates between medical physicians, therapists, and nurses to maximize the patient's potential outcome. Specific areas in which the rehab physician will be  providing daily assessment include the following:    A. Assessing the patient's heart rate and blood pressure response (vitals monitoring) to activity and making adjustments in medications or conservative measures as needed.    B. The rehab physician will be assessing the frequency at which the program can be increased to allow the patient to reach optimal functional outcome.    C. The rehab physician will also provide assessments in daily skin care, especially in light of patient's impairments in mobility.    D. The rehab physician will provide special expertise in understanding how to work with functional impairment and recommend appropriate interventions, compensatory techniques, and education that will facilitate the patient's outcome.    4. Rehab R.N.    The rehab RN will be working with patient to carry over in room mobility and activities of daily living when the patient is not in 3 hours of skilled therapy. Rehab nursing will be working in conjunction with rehab physician to address all the medical issues above and continue to assess laboratory work and discuss abnormalities with the treating physicians, assess vitals, and response to activity, and discuss and report abnormalities with the rehab physician. Rehab RN will also continue daily skin care, supervise bladder/bowel program, instruct in medication administration, and ensure patient safety.     MEDICAL DECISION MAKING and INTERDISCIPLINARY PLAN OF CARE:    REHABILITATION ISSUES/ADVERSE POTENTIAL::  New CVA (Cerebrovascular Accident):Patient demonstrates functional deficits in strength, balance, coordination, and ADL's. Patient is admitted to [facility] for comprehensive rehabilitation therapy as described below.   Rehabilitation nursing monitors bowel and bladder control, educates on medication administration, co-morbidities and monitors patient s    NonTBI (Traumatic Brain Injury): Patient demonstrates functional deficits in  cognition, behavior, strength, balance, coordination, and ADL's. The patient requires therapy to correct these deficits prior to discharge. Patient is admitted to [facility] for comprehensive rehabilitation therapy, including physical, occupational and speech therapy.     Rehabilitation nursing monitors bowel and bladder control, educates on medication administration, co-morbidities and monitors patient safety.    Therapies to treat at intensity and frequency of (may change after completion of evaluation by all therapeutic disciplines):       PT:  Physical therapy to address mobility, transfer, gait training and evaluation for adaptive equipment needs 1hour/day at least 5 days/week for the duration of the ELOS (see below)       OT:  Occupational therapy to address ADLs, self-care, home management training, functional mobility/transfers and assistive device evaluation, and community re-intergration 1hour/day at least 5 days/week for the duration of the ELOS (see below).        ST/Dysphagia:  Speech therapy to address speech, language,and cognitive deficits as well as swallowing difficulties with retraining/dysphagia management and community re-integration with comprehension, expression, cognitive training 1hour/day at least 5 days/week for the duration of the ELOS (see below).     2.  Neurostimulants: None at this time but continue to assess daily for need to initiate should status change.    3.  DVT prophylaxis:  Patient is on not due to small hemmrhage in brain post op for anticoagulation upon transfer. Encourage OOB. Monitor daily for signs and symptoms of DVT including but not limited to swelling and pain to prevent the development of DVT that may interfere with therapies.    4.  GI prophylaxis:  On prilosec to prevent gastritis/dyspepsia which may interfere with therapies.    5.  Pain: No issues with pain currently     6.  Nutrition/Dysphagia: Dietician monitors nutrient intake, recommend  supplements prn and provide nutrition education to pt/family to promote optimal nutrition for wound healing/recovery.     7.  Bladder/bowel:  Start bowel and bladder program as described below, to prevent constipation, urinary retention (which may lead to UTI), and urinary incontinence (which will impact upon pt's functional independence).    - TV Q3h while awake with post void bladder scans, I&O cath for PVRs >400  - up to commode after meal     8.  Skin/dermal ulcer prophylaxis: Monitor for new skin conditions with q.2 h. turns as required to prevent the development of skin breakdown.     9.  Cognition/Behavior:  Psychologist Dr. Kingsley provides adjustment counseling to illness and psychosocial barriers that may be potential barriers to rehabilitation.     10. Respiratory therapy: RT performs O2 management prn, breathing retraining, pulmonary hygeine and bronchospasm management prn to optimize participation in therapies.      MEDICAL CO-MORBIDITIES/ADVERSE POTENTIAL AFFECTING FUNCTION:        GOALS/EXPECTED LEVEL OF FUNCTION BASED ON CURRENT MEDICAL AND FUNCTIONAL STATUS (may change based on patient's medical status and rate of impairment recovery):     Transfers: sup to mod I     Mobility/Gait:sup to mod I     ADL's:sup to mod I     Cognition:sup    DISPOSITION: home with assistance     ELOS: 2 weeks

## 2017-02-12 NOTE — PROGRESS NOTES
Hospital Medicine Progress Note, Adult, Complex               Author: Yanick Barth Date & Time created: 2/12/2017  10:46 AM     Interval History:  No significant events or changes since last visit  Patient denies SOB, cough, or diarrhea  Patient slept ok last night  Continue managing diabetes      Review of Systems:  Review of Systems   Constitutional: Negative for fever and chills.   Respiratory: Negative for shortness of breath.    Cardiovascular: Negative for chest pain.   Gastrointestinal: Negative for nausea, vomiting, abdominal pain and diarrhea.   Psychiatric/Behavioral: The patient is not nervous/anxious.        Physical Exam:  Physical Exam   Constitutional: He is oriented to person, place, and time. He appears well-nourished.   HENT:   Head: Atraumatic.   Eyes: Conjunctivae are normal. Pupils are equal, round, and reactive to light.   Neck: Normal range of motion. Neck supple.   Cardiovascular: Normal rate, regular rhythm, S1 normal and S2 normal.    Pulmonary/Chest: Effort normal. He has no wheezes. He has no rhonchi. He has no rales.   Abdominal: Soft. He exhibits no distension. There is no tenderness. Hernia confirmed negative in the right inguinal area and confirmed negative in the left inguinal area.   Neurological: He is alert and oriented to person, place, and time. No sensory deficit.   Skin: Skin is warm and dry. No cyanosis.   Psychiatric: He has a normal mood and affect. His behavior is normal.   Nursing note and vitals reviewed.      Labs:        Invalid input(s): [id number]      Recent Labs      02/10/17   0151  02/11/17   0530   SODIUM  132*  132*   POTASSIUM  3.9  4.0   CHLORIDE  98  95*   CO2  26  28   BUN  15  15   CREATININE  0.52  0.50   MAGNESIUM  2.2   --    CALCIUM  8.7  9.1     Recent Labs      02/10/17   0151  02/11/17   0530   ALTSGPT  56*  62*   ASTSGOT  15  18   ALKPHOSPHAT  43  45   TBILIRUBIN  0.5  0.6   PREALBUMIN   --   34.0   GLUCOSE  224*  201*     Recent Labs       02/10/17   0151  17   0530   RBC  4.32*  4.51*   HEMOGLOBIN  13.0*  13.6*   HEMATOCRIT  38.9*  40.1*   PLATELETCT  208  202     Recent Labs      02/10/17   0151  17   0530   WBC  10.0  10.4   NEUTSPOLYS  82.50*  76.60*   LYMPHOCYTES  8.80*  13.10*   MONOCYTES  1.70  3.80   EOSINOPHILS  0.00  0.00   BASOPHILS  0.00  0.00   ASTSGOT  15  18   ALTSGPT  56*  62*   ALKPHOSPHAT  43  45   TBILIRUBIN  0.5  0.6           Hemodynamics:  Temp (24hrs), Av.6 °C (97.9 °F), Min:36.4 °C (97.6 °F), Max:36.9 °C (98.4 °F)  Temperature: 36.5 °C (97.7 °F)  Pulse  Av.1  Min: 61  Max: 113   Blood Pressure: 104/68 mmHg     Respiratory:    Respiration: 16, Pulse Oximetry: 96 %        RUL Breath Sounds: Clear;Diminished, RML Breath Sounds: Clear;Diminished, RLL Breath Sounds: Clear, LAYA Breath Sounds: Clear, LLL Breath Sounds: Clear;Diminished  Fluids:    Intake/Output Summary (Last 24 hours) at 17 1046  Last data filed at 17 0900   Gross per 24 hour   Intake    860 ml   Output      0 ml   Net    860 ml     Weight: 88.9 kg (195 lb 15.8 oz)  GI/Nutrition:  Orders Placed This Encounter   Procedures   • DIET ORDER     Standing Status: Standing      Number of Occurrences: 1      Standing Expiration Date:      Order Specific Question:  Diet:     Answer:  Diabetic [3]     Order Specific Question:  Texture/Fiber modifications:     Answer:  Dysphagia 2(Pureed/Chopped)specify fluid consistency(question 6) [2]     Order Specific Question:  Consistency/Fluid modifications:     Answer:  Nectar Thick [2]     Medical Decision Making, by Problem:  Active Hospital Problems    Diagnosis   • Brain tumor (CMS-HCC) [D49.6]     *  Recent Glioblastoma  S/P craniotomy and resection    *  Recent Seizure  On Keprra: 500 mg bid --> 1000 mg bid (adjusted at [facility])    *  Diabetes  Steroid-induced  BS: 166-278  On NPH: 7 units bid --> 12 units bid ( at evening) --> will increase to 15 units bid ()  Note: diet changed from regular to  diabetic diet (starting 2/12)  Cont to monitor    *  S/P Occasional Tachycardia  HR ok recently  Cont to monitor    *  Hyponatremia  Na: 132  Monitor for now    *  Hyperlipidemia  *  Hx Bell's Palsy       Labs reviewed and Medications reviewed

## 2017-02-12 NOTE — PROGRESS NOTES
Per report, pt to take medications crushed in puree, this confirmed by pt family members. Keppra and Colace ordered in pill form. Dr. Barth ordered liquid Keppra and PRN Colace at this time. S/W SLP during lunch, attempted medication floated in puree. Pt successful in taking one pill whole in applesauce. No coughing or other problems noted. Per SLP, can continue to trial this method with medication administration.

## 2017-02-12 NOTE — THERAPY
Speech Therapy Initial Plan of Care Note    1) Assessment:  Patient is 56 y.o. male with a diagnosis of left temporofrontal tumor s/p partial resection with resulting CVA at surgery site and recent seizure activity.  Additional factors influencing patient status / progress (ie: cognitive factors, co-morbidities, social support, etc): [place] speaking only with severe-profound expressive and receptive language deficits, strong family support.  Long term and short term goals have been discussed with patient and family and they are in agreement.  2) Plan:  Recommend Speech Therapy  minutes per day 5-7 days per week for 2-3  weeks for the following treatments:  SLP Aphasia Evaluation, SLP Speech Language Treatment, SLP Swallowing Dysfunction Treatment, SLP Oral Pharyngeal Evaluation, SLP Video Swallow Evaluation, SLP Self Care / ADL Training  and SLP Cognitive Skill Development.  3) Goals:  Please refer to care plan for goals.

## 2017-02-12 NOTE — CARE PLAN
Problem: Safety  Goal: Will remain free from injury  Outcome: PROGRESSING AS EXPECTED  Pt. Encouraged to use call light within reach, bed alarm on for safety and hourly rounding done. Pt. Remain with confusion and with aphasia noted.    Problem: IP BLADDER/VOIDING  Goal: LTG - patient will complete bladder elimination  Outcome: PROGRESSING AS EXPECTED  Pt. Noted incontinent of bladder at night but when time void pt.is continent. Pt. Is not on bladder medication at this time.    Problem: Skin Integrity  Goal: Risk for impaired skin integrity will decrease  Outcome: PROGRESSING AS EXPECTED  Pt. Surgical incision in the head open to air no s/s of infection noted. Continue monitor skin.

## 2017-02-12 NOTE — PROGRESS NOTES
"Rehab Progress Note     Interval Events (Subjective)  The patient notes no complaints, although aphasia noted, Estonian-speaking, working with speech therapy. The patient tolerating food/liquids. Hospitalist following, reviewed records.    Objective:  VITAL SIGNS: /68 mmHg  Pulse 61  Temp(Src) 36.5 °C (97.7 °F)  Resp 16  Ht 1.6 m (5' 2.99\")  Wt 88.9 kg (195 lb 15.8 oz)  BMI 34.73 kg/m2  SpO2 96%  Constitutional: Awake alert, no acute distress   HEENT: Healing cranial scar, no signs of infection, Normocephalic atraumatic, neck supple, no JVD noted, no masses noted, no meningeal signs noted  Lymphadenopathy: no cervical or supraclavicular lymphadenopathy noted  Cardiovascular: Intact distal pulses, including at wrists and ankles, no significant limb swelling noted, negative Homans  Pulmonary: Lungs clear to auscultation, No tachypnea noted, no accessory muscle use noted, no dyspnea noted  Abdominal: Soft, nontender, bowel sounds present, exhibits no distension, no peritoneal signs, no HSM  Musculoskeletal:   Neurological: Aphasia noted   Skin: no rashes or lesions noted    Recent Results (from the past 72 hour(s))   ACCU-CHEK GLUCOSE    Collection Time: 02/09/17 10:54 AM   Result Value Ref Range    Glucose - Accu-Ck 210 (H) 65 - 99 mg/dL   ACCU-CHEK GLUCOSE    Collection Time: 02/09/17  5:05 PM   Result Value Ref Range    Glucose - Accu-Ck 215 (H) 65 - 99 mg/dL   CBC WITH DIFFERENTIAL    Collection Time: 02/10/17  1:51 AM   Result Value Ref Range    WBC 10.0 4.8 - 10.8 K/uL    RBC 4.32 (L) 4.70 - 6.10 M/uL    Hemoglobin 13.0 (L) 14.0 - 18.0 g/dL    Hematocrit 38.9 (L) 42.0 - 52.0 %    MCV 90.0 81.4 - 97.8 fL    MCH 30.1 27.0 - 33.0 pg    MCHC 33.4 (L) 33.7 - 35.3 g/dL    RDW 44.8 35.9 - 50.0 fL    Platelet Count 208 164 - 446 K/uL    MPV 8.9 (L) 9.0 - 12.9 fL    Nucleated RBC 0.50 /100 WBC    NRBC (Absolute) 0.05 K/uL    Neutrophils-Polys 82.50 (H) 44.00 - 72.00 %    Lymphocytes 8.80 (L) 22.00 - 41.00 %    " Monocytes 1.70 0.00 - 13.40 %    Eosinophils 0.00 0.00 - 6.90 %    Basophils 0.00 0.00 - 1.80 %    Neutrophils (Absolute) 8.60 (H) 1.82 - 7.42 K/uL    Lymphs (Absolute) 0.88 (L) 1.00 - 4.80 K/uL    Monos (Absolute) 0.17 0.00 - 0.85 K/uL    Eos (Absolute) 0.00 0.00 - 0.51 K/uL    Baso (Absolute) 0.00 0.00 - 0.12 K/uL   COMP METABOLIC PANEL    Collection Time: 02/10/17  1:51 AM   Result Value Ref Range    Sodium 132 (L) 135 - 145 mmol/L    Potassium 3.9 3.6 - 5.5 mmol/L    Chloride 98 96 - 112 mmol/L    Co2 26 20 - 33 mmol/L    Anion Gap 8.0 0.0 - 11.9    Glucose 224 (H) 65 - 99 mg/dL    Bun 15 8 - 22 mg/dL    Creatinine 0.52 0.50 - 1.40 mg/dL    Calcium 8.7 8.5 - 10.5 mg/dL    AST(SGOT) 15 12 - 45 U/L    ALT(SGPT) 56 (H) 2 - 50 U/L    Alkaline Phosphatase 43 30 - 99 U/L    Total Bilirubin 0.5 0.1 - 1.5 mg/dL    Albumin 2.9 (L) 3.2 - 4.9 g/dL    Total Protein 5.4 (L) 6.0 - 8.2 g/dL    Globulin 2.5 1.9 - 3.5 g/dL    A-G Ratio 1.2 g/dL   MAGNESIUM    Collection Time: 02/10/17  1:51 AM   Result Value Ref Range    Magnesium 2.2 1.5 - 2.5 mg/dL   ESTIMATED GFR    Collection Time: 02/10/17  1:51 AM   Result Value Ref Range    GFR If African American >60 >60 mL/min/1.73 m 2    GFR If Non African American >60 >60 mL/min/1.73 m 2   DIFFERENTIAL MANUAL    Collection Time: 02/10/17  1:51 AM   Result Value Ref Range    Bands-Stabs 3.50 0.00 - 10.00 %    Metamyelocytes 0.90 %    Myelocytes 2.60 %    Manual Diff Status PERFORMED    PERIPHERAL SMEAR REVIEW    Collection Time: 02/10/17  1:51 AM   Result Value Ref Range    Peripheral Smear Review see below    PLATELET ESTIMATE    Collection Time: 02/10/17  1:51 AM   Result Value Ref Range    Plt Estimation Normal    MORPHOLOGY    Collection Time: 02/10/17  1:51 AM   Result Value Ref Range    RBC Morphology Normal    ACCU-CHEK GLUCOSE    Collection Time: 02/10/17 11:18 AM   Result Value Ref Range    Glucose - Accu-Ck 323 (H) 65 - 99 mg/dL   URINALYSIS    Collection Time: 02/10/17  4:08  PM   Result Value Ref Range    Color Colorless     Character Clear     Specific Gravity 1.015 <1.035    Ph 5.5 5.0-8.0    Glucose >1000 (A) Negative mg/dL    Ketones Negative Negative mg/dL    Protein Negative Negative mg/dL    Bilirubin Negative Negative    Nitrite Negative Negative    Leukocyte Esterase Negative Negative    Occult Blood Negative Negative    Micro Urine Req see below    ACCU-CHEK GLUCOSE    Collection Time: 02/10/17  5:16 PM   Result Value Ref Range    Glucose - Accu-Ck 262 (H) 65 - 99 mg/dL   ACCU-CHEK GLUCOSE    Collection Time: 02/10/17  9:05 PM   Result Value Ref Range    Glucose - Accu-Ck 271 (H) 65 - 99 mg/dL   CBC WITH DIFFERENTIAL    Collection Time: 02/11/17  5:30 AM   Result Value Ref Range    WBC 10.4 4.8 - 10.8 K/uL    RBC 4.51 (L) 4.70 - 6.10 M/uL    Hemoglobin 13.6 (L) 14.0 - 18.0 g/dL    Hematocrit 40.1 (L) 42.0 - 52.0 %    MCV 88.9 81.4 - 97.8 fL    MCH 30.2 27.0 - 33.0 pg    MCHC 33.9 33.7 - 35.3 g/dL    RDW 43.9 35.9 - 50.0 fL    Platelet Count 202 164 - 446 K/uL    MPV 8.9 (L) 9.0 - 12.9 fL    Nucleated RBC 0.60 /100 WBC    NRBC (Absolute) 0.06 K/uL    Neutrophils-Polys 76.60 (H) 44.00 - 72.00 %    Lymphocytes 13.10 (L) 22.00 - 41.00 %    Monocytes 3.80 0.00 - 13.40 %    Eosinophils 0.00 0.00 - 6.90 %    Basophils 0.00 0.00 - 1.80 %    Neutrophils (Absolute) 8.46 (H) 1.82 - 7.42 K/uL    Lymphs (Absolute) 1.36 1.00 - 4.80 K/uL    Monos (Absolute) 0.40 0.00 - 0.85 K/uL    Eos (Absolute) 0.00 0.00 - 0.51 K/uL    Baso (Absolute) 0.00 0.00 - 0.12 K/uL   COMP METABOLIC PANEL    Collection Time: 02/11/17  5:30 AM   Result Value Ref Range    Sodium 132 (L) 135 - 145 mmol/L    Potassium 4.0 3.6 - 5.5 mmol/L    Chloride 95 (L) 96 - 112 mmol/L    Co2 28 20 - 33 mmol/L    Anion Gap 9.0 0.0 - 11.9    Glucose 201 (H) 65 - 99 mg/dL    Bun 15 8 - 22 mg/dL    Creatinine 0.50 0.50 - 1.40 mg/dL    Calcium 9.1 8.5 - 10.5 mg/dL    AST(SGOT) 18 12 - 45 U/L    ALT(SGPT) 62 (H) 2 - 50 U/L    Alkaline  Phosphatase 45 30 - 99 U/L    Total Bilirubin 0.6 0.1 - 1.5 mg/dL    Albumin 3.1 (L) 3.2 - 4.9 g/dL    Total Protein 5.7 (L) 6.0 - 8.2 g/dL    Globulin 2.6 1.9 - 3.5 g/dL    A-G Ratio 1.2 g/dL   PREALBUMIN    Collection Time: 02/11/17  5:30 AM   Result Value Ref Range    Pre-Albumin 34.0 18.0 - 38.0 mg/dL   ESTIMATED GFR    Collection Time: 02/11/17  5:30 AM   Result Value Ref Range    GFR If African American >60 >60 mL/min/1.73 m 2    GFR If Non African American >60 >60 mL/min/1.73 m 2   DIFFERENTIAL MANUAL    Collection Time: 02/11/17  5:30 AM   Result Value Ref Range    Bands-Stabs 4.70 0.00 - 10.00 %    Metamyelocytes 0.90 %    Myelocytes 0.90 %    Manual Diff Status PERFORMED    PERIPHERAL SMEAR REVIEW    Collection Time: 02/11/17  5:30 AM   Result Value Ref Range    Peripheral Smear Review see below    PLATELET ESTIMATE    Collection Time: 02/11/17  5:30 AM   Result Value Ref Range    Plt Estimation Normal    MORPHOLOGY    Collection Time: 02/11/17  5:30 AM   Result Value Ref Range    RBC Morphology Normal    ACCU-CHEK GLUCOSE    Collection Time: 02/11/17  7:17 AM   Result Value Ref Range    Glucose - Accu-Ck 178 (H) 65 - 99 mg/dL   ACCU-CHEK GLUCOSE    Collection Time: 02/11/17 11:23 AM   Result Value Ref Range    Glucose - Accu-Ck 262 (H) 65 - 99 mg/dL   ACCU-CHEK GLUCOSE    Collection Time: 02/11/17  5:08 PM   Result Value Ref Range    Glucose - Accu-Ck 185 (H) 65 - 99 mg/dL   ACCU-CHEK GLUCOSE    Collection Time: 02/11/17  8:51 PM   Result Value Ref Range    Glucose - Accu-Ck 239 (H) 65 - 99 mg/dL       Current Facility-Administered Medications   Medication Frequency   • levetiracetam (KEPPRA) 100 MG/ML solution 1,000 mg Q12HRS   • docusate sodium (COLACE) capsule 100 mg QDAY PRN    And   • senna-docusate (PERICOLACE or SENOKOT S) 8.6-50 MG per tablet 1 Tab Nightly    And   • senna-docusate (PERICOLACE or SENOKOT S) 8.6-50 MG per tablet 1 Tab Q24HRS PRN    And   • lactulose 20 GM/30ML solution 30 mL Q24HRS  PRN    And   • bisacodyl (DULCOLAX) suppository 10 mg Q24HRS PRN    And   • fleet enema 133 mL Once PRN   • insulin NPH (HUMULIN,NOVOLIN) injection 12 Units BID INSULIN   • Respiratory Care per Protocol Continuous RT   • dexamethasone (DECADRON) tablet 4 mg Q6HRS   • temazepam (RESTORIL) capsule 15 mg HS PRN - MR X 1   • ondansetron (ZOFRAN ODT) dispertab 4 mg Q4HRS PRN   • famotidine (PEPCID) tablet 20 mg BID   • acetaminophen (TYLENOL) tablet 650 mg Q6HRS PRN   • insulin lispro (HUMALOG) injection 1-6 Units 4X/DAY ACHS       Orders Placed This Encounter   Procedures   • DIET ORDER     Standing Status: Standing      Number of Occurrences: 1      Standing Expiration Date:      Order Specific Question:  Diet:     Answer:  Diabetic [3]     Order Specific Question:  Texture/Fiber modifications:     Answer:  Dysphagia 2(Pureed/Chopped)specify fluid consistency(question 6) [2]     Order Specific Question:  Consistency/Fluid modifications:     Answer:  [place] Thick [2]       Assessment:  Active Hospital Problems    Diagnosis   • Brain tumor (CMS-HCC)     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 S/P sterotactic left frontotemporal craniotomy      Infarct posterior to the resection of the cavity in the left temporal occipital lobe, small focus of GRE hypointensity in left frontoparietal regional likely represents small hemorrhage or blood products from surgery    Anaplastic Grade III astrocytoma, oncology consulted    Aphasia    Seizure: 1000 mg keppra bid    Hyperlipidemia    Mild elevation of ALT    Medical Decision Making and Plan:    Continue medical management, monitoring, hospitalist following    Continue with multi modality therapy, including physical therapy, occupational therapy, speech therapy, rehabilitation nursing    Preventative measures      Rhett Powell M.D.       Please note that this dictation was created using voice recognition software. I have made every reasonable  attempt to correct obvious errors but there may be errors of grammar and content that I may have overlooked prior to finalization of this note.

## 2017-02-12 NOTE — CONSULTS
DATE OF SERVICE:  02/11/2017    REFERRING PHYSICIAN:  Dr. Lisette Vanegas.    REASON FOR CONSULTATION:  Uncontrolled steroid-induced diabetes and   tachycardia.    HISTORY OF PRESENT ILLNESS:  This is a 56-year-old male who recently had a   glioblastoma and was sent to [facility] for craniotomy and resection.  The   patient did have the resection done and he tolerated the procedure well.  The   patient was transferred over to the [facility] on February 4th.    However, at the hospital, the patient although being on Keppra 500 mg b.i.d.,   did have a small seizure and then he had 2 subsequent ones.  At that time, a   rapid response was called and the patient was eventually sent over to [facility].  He was admitted and did have workup done.  The patient's   Keppra was increased to 1 g b.i.d. and his steroids were increased from 4 mg   Decadron b.i.d. to IV q. 6 hours.  Subsequently, this has been decreased   again.  The patient did well and he was transferred back to the [facility]ility on 02/10/2017.    At the rehab facility, it was noted with the patient's history of   steroid-induced diabetes, his blood sugars were elevated, ranging between 178   and 323.  The patient was also noted to have some on and off tachycardia.    Because of these issues, internal medicine was consulted to help evaluate and   manage with the patient.  Currently, the patient denies any fever, chills,   nausea, vomiting, headaches, blurry vision, or chest pain.    REVIEW OF SYSTEMS:  All review of systems are negative per AMA and CMS   criteria, except for that stated in the HPI.    PAST MEDICAL HISTORY:  1.  Recent glioblastoma, status post craniotomy and resection.  2.  Recent seizure with Keppra dose increased.  3.  Diabetes, steroid-induced.  4.  Hyperlipidemia.  5.  History of Bell's palsy.    PAST SURGICAL HISTORY:  Craniotomy for glioblastoma.    ALLERGIES:  No known drug allergies.    MEDICATIONS:  The  patient is currently on the following medications at the   rehab facility.  1.  Tylenol 650 mg p.o. q. 6 hours p.r.n.  2.  Decadron 4 mg p.o. q. 6 hours.  3.  Colace 100 mg p.o. daily.  4.  Senokot 1 tablet p.o. nightly.  5.  Lactulose 30 mL p.o. p.r.n.  6.  Dulcolax 10 mg suppository p.r.n.  7.  Pepcid 20 mg p.o. b.i.d.  8.  NPH 7 units subQ b.i.d.  9.  Keppra 1000 mg p.o. b.i.d.  10.  Zofran 4 mg q. 4 hours p.r.n.  11.  Restoril 15 mg p.o. nightly p.r.n.    SOCIAL HISTORY:  The patient was a 1 pack per day smoker for 30 years and quit   about 15 years ago.  He denies any alcohol use.    FAMILY HISTORY:  Reviewed and is not pertinent to this consultation.    PHYSICAL EXAMINATION:  VITAL SIGNS:  Temperature 97, blood pressure 120/86, heart rate has ranged   between 80 and 113, respiratory rate 18.  GENERAL APPEARANCE:  The patient is lying in bed, in no acute distress.  HEENT:  Head examination was normocephalic and atraumatic.  Pupils were equal   and reactive to light and accommodation.  Oral mucosa is pink and moist.  NECK:  Soft and supple.  There is no JVD.  There is no carotid bruits noted.  LUNGS:  Clear to auscultation bilaterally.  There are no wheezes, rales, or   rhonchi.  HEART:  Regular rate and rhythm.  Normal S1, S2.  There are no murmurs, rubs,   or gallops appreciated.  ABDOMEN:  Soft, nontender, nondistended.  Bowel sounds are present in all 4   quadrants.  EXTREMITIES:  There is no clubbing, cyanosis or edema.  Pulses were intact.  NEUROLOGIC:  Cranial nerves II through XII were grossly intact.    OTHER SYSTEMS:  All other systems are unremarkable and negative.    LABORATORY DATA:  WBC 10.4, hemoglobin 13.6.  Sodium 132, potassium 4.0,   bicarb 28, BUN 15, creatinine 0.50, AST 18, ALT 62.  Blood sugars have ranged   between 178 and 323.  There is no documented hemoglobin A1c in EPIC.    ASSESSMENT:  1.  Uncontrolled steroid-induced diabetes.  2.  Occasional tachycardia.  3.  Hyponatremia with a  sodium of 132.  4.  For the other assessments, please see the past medical history above.    PLAN:  At this time, the patient has been admitted to the [facility] and is undergoing their comprehensive rehab therapy program.    For the patient's uncontrolled diabetes, this is secondary to steroid use.    The patient is currently on NPH 7 units b.i.d.  It was noted that the patient   was placed on a regular diet upon arrival at [facility] and I will   go ahead and changed this to a diabetic diet and this certainly will help his   blood sugars, which have been ranging between 178 and 323.  I will also   increase his NPH to 12 units b.i.d.  We will continue to monitor the patient's   blood sugars while in the hospital and adjust his medications accordingly.  I   will also get a current hemoglobin A1c.    For the patient's occasional tachycardia, he does not have any prior history   of hypertension or tachycardia and is on no medications.  At this time, in   review of his statistics, it does seem that he is generally controlled, but on   occasion, he does rise up.  For now, I think it is prudent to just observe   him for the next couple of days with his blood pressure as well as his heart   rate.    This case was discussed with the physiatrist.    Thank you for the consultation.  We will follow the patient with you.       ____________________________________     MD BRYN GORDON / HARRIET    DD:  02/11/2017 17:06:44  DT:  02/11/2017 17:28:11    D#:  [id number]  Job#:  [id number]

## 2017-02-12 NOTE — CARE PLAN
Problem: NUTRITION  Goal: Adequate Food and Fluid Intake  Outcome: PROGRESSING AS EXPECTED  Pt eating 100% of meals this shift in [place]. Tolerating dysph II diet with nectar thick liquids. Will continue to monitor.

## 2017-02-13 PROBLEM — C71.9 ASTROCYTOMA BRAIN TUMOR (HCC): Status: ACTIVE | Noted: 2017-01-01

## 2017-02-13 PROBLEM — C71.9: Status: RESOLVED | Noted: 2017-01-01 | Resolved: 2017-01-01

## 2017-02-13 NOTE — CARE PLAN
Problem: Safety  Goal: Will remain free from injury  Outcome: PROGRESSING AS EXPECTED  Pt. Encouraged to use call light within reach, remain with aphasia plus language barrier speaks only Swedish but able to communicate via sign language. Tab monitor on when in the w/c and bed alarm on when in bed. Hourly rounding continue for safety, had one episode of impulsiveness.    Problem: Skin Integrity  Goal: Risk for impaired skin integrity will decrease  Outcome: PROGRESSING AS EXPECTED  Pt. Skin intact except the surgical incision in the head with staples open to air healing and well approximated, no s/s of infection noted.

## 2017-02-13 NOTE — IDT DISCHARGE PLANNING
CASE MANAGEMENT INITIAL ASSESSMENT    Admit Date:  2/10/2017     Patient is a  56 y.o. male transferred from [facility].  Patient is currently not in his room and no family present.  I have reviewed medical records.  Patient is know to case management from prior admission before return to hospital.  Patient's admitting physician for rehab is Dr. Vanegas.    Diagnosis: 02.1 Non-Traumatic  Brain tumor (CMS-HCC)    Co-morbidities:   Patient Active Problem List    Diagnosis Date Noted   • Astrocytoma brain tumor (CMS-HCC) 02/04/2017     Priority: High   • New onset seizure (CMS-HCC) 01/05/2017     Priority: High   • Obesity (BMI 30-39.9) 05/15/2015     Priority: Low   • Glioblastoma (CMS-HCC) 02/06/2017   • Cerebral edema (CMS-HCC) 01/05/2017   • Chronic pain of right knee 05/15/2015     Prior Living Situation:  Housing / Facility: 2 Story Apartment / Condo (second level apartment with a flight of stairs.)  Lives with - Patient's Self Care Capacity: Alone and Able to Care For Self    Prior Level of Function:  Patient was working full time at [employer] prior and independent with all self care activities.    Support Systems:  Primary : Daughter Desiree Gill at [phone] or Priscilla Chowdhury at [phone]  Other support systems:      Previous Services Utilized:   Equipment Owned: None  Prior Services: None, Home-Independent    Other Information:  Occupation (Pre-Hospital Vocational): Employed Full Time  Pharmacy: [facility]  Primary Payor Source: [employer]  Primary Care Practitioner : Lisette ANDERSON  Other MDs: Dr. Presley for neurosurgery    Additional Case Management Questions:  Have you ever received case management services for yourself or a family member?  Patient was with rehab  prior.    Do you feel you have an an understanding of of what services  provide? Unable to determine    Do you have any additional questions regarding case management?    Unable to  determine    Patient / Family Goal:  Patient / Family Goal: Patient will be able to stay with his daughter Priscilla at discharge.  She is not currently working.  Family has requested a lower level apartment.  Patient currently has no dme and case management will assist with this.  He will likely need home health and also family training.    Plan:  1. Continue to follow patient through hospitalization and provide discharge planning in collaboration with patient, family, physicians and ancillary services.     2. Utilize community resources to ensure a safe discharge.

## 2017-02-13 NOTE — REHAB-PHARMACY IDT TEAM NOTE
Pharmacy  Pharmacy  Antibiotics/Antifungals/Antivirals:  Medication:      Active Orders     None        Route:        NA  Stop Date:  NA  Reason:      NA  Antihypertensives/Cardiac:  Medication:    Orders     None        Patient Vitals for the past 24 hrs:   BP Pulse   02/13/17 0700 120/76 mmHg 66   02/12/17 1900 113/76 mmHg (!) 110   02/12/17 1400 112/74 mmHg 98       Anticoagulation:  Medication: None    Other key medications: A review of the medication list reveals no issues at this time.    Section completed by: J [facility]

## 2017-02-13 NOTE — PROGRESS NOTES
"Rehab Progress Note     Interval Events (Subjective)  Patient seen and examined today. Patient in no apparent distress.  ROS: last bm 2/13/2017    Objective:  VITAL SIGNS: /76 mmHg  Pulse 66  Temp(Src) 36.4 °C (97.5 °F)  Resp 16  Ht 1.6 m (5' 2.99\")  Wt 88.9 kg (195 lb 15.8 oz)  BMI 34.73 kg/m2  SpO2 96%  Heart regular rate and rhythm  Lungs clear to auscultation  Abdomen bowel sounds x 4    Recent Results (from the past 72 hour(s))   ACCU-CHEK GLUCOSE    Collection Time: 02/10/17 11:18 AM   Result Value Ref Range    Glucose - Accu-Ck 323 (H) 65 - 99 mg/dL   URINALYSIS    Collection Time: 02/10/17  4:08 PM   Result Value Ref Range    Color Colorless     Character Clear     Specific Gravity 1.015 <1.035    Ph 5.5 5.0-8.0    Glucose >1000 (A) Negative mg/dL    Ketones Negative Negative mg/dL    Protein Negative Negative mg/dL    Bilirubin Negative Negative    Nitrite Negative Negative    Leukocyte Esterase Negative Negative    Occult Blood Negative Negative    Micro Urine Req see below    ACCU-CHEK GLUCOSE    Collection Time: 02/10/17  5:16 PM   Result Value Ref Range    Glucose - Accu-Ck 262 (H) 65 - 99 mg/dL   ACCU-CHEK GLUCOSE    Collection Time: 02/10/17  9:05 PM   Result Value Ref Range    Glucose - Accu-Ck 271 (H) 65 - 99 mg/dL   CBC WITH DIFFERENTIAL    Collection Time: 02/11/17  5:30 AM   Result Value Ref Range    WBC 10.4 4.8 - 10.8 K/uL    RBC 4.51 (L) 4.70 - 6.10 M/uL    Hemoglobin 13.6 (L) 14.0 - 18.0 g/dL    Hematocrit 40.1 (L) 42.0 - 52.0 %    MCV 88.9 81.4 - 97.8 fL    MCH 30.2 27.0 - 33.0 pg    MCHC 33.9 33.7 - 35.3 g/dL    RDW 43.9 35.9 - 50.0 fL    Platelet Count 202 164 - 446 K/uL    MPV 8.9 (L) 9.0 - 12.9 fL    Nucleated RBC 0.60 /100 WBC    NRBC (Absolute) 0.06 K/uL    Neutrophils-Polys 76.60 (H) 44.00 - 72.00 %    Lymphocytes 13.10 (L) 22.00 - 41.00 %    Monocytes 3.80 0.00 - 13.40 %    Eosinophils 0.00 0.00 - 6.90 %    Basophils 0.00 0.00 - 1.80 %    Neutrophils (Absolute) 8.46 (H) " 1.82 - 7.42 K/uL    Lymphs (Absolute) 1.36 1.00 - 4.80 K/uL    Monos (Absolute) 0.40 0.00 - 0.85 K/uL    Eos (Absolute) 0.00 0.00 - 0.51 K/uL    Baso (Absolute) 0.00 0.00 - 0.12 K/uL   COMP METABOLIC PANEL    Collection Time: 02/11/17  5:30 AM   Result Value Ref Range    Sodium 132 (L) 135 - 145 mmol/L    Potassium 4.0 3.6 - 5.5 mmol/L    Chloride 95 (L) 96 - 112 mmol/L    Co2 28 20 - 33 mmol/L    Anion Gap 9.0 0.0 - 11.9    Glucose 201 (H) 65 - 99 mg/dL    Bun 15 8 - 22 mg/dL    Creatinine 0.50 0.50 - 1.40 mg/dL    Calcium 9.1 8.5 - 10.5 mg/dL    AST(SGOT) 18 12 - 45 U/L    ALT(SGPT) 62 (H) 2 - 50 U/L    Alkaline Phosphatase 45 30 - 99 U/L    Total Bilirubin 0.6 0.1 - 1.5 mg/dL    Albumin 3.1 (L) 3.2 - 4.9 g/dL    Total Protein 5.7 (L) 6.0 - 8.2 g/dL    Globulin 2.6 1.9 - 3.5 g/dL    A-G Ratio 1.2 g/dL   PREALBUMIN    Collection Time: 02/11/17  5:30 AM   Result Value Ref Range    Pre-Albumin 34.0 18.0 - 38.0 mg/dL   ESTIMATED GFR    Collection Time: 02/11/17  5:30 AM   Result Value Ref Range    GFR If African American >60 >60 mL/min/1.73 m 2    GFR If Non African American >60 >60 mL/min/1.73 m 2   DIFFERENTIAL MANUAL    Collection Time: 02/11/17  5:30 AM   Result Value Ref Range    Bands-Stabs 4.70 0.00 - 10.00 %    Metamyelocytes 0.90 %    Myelocytes 0.90 %    Manual Diff Status PERFORMED    PERIPHERAL SMEAR REVIEW    Collection Time: 02/11/17  5:30 AM   Result Value Ref Range    Peripheral Smear Review see below    PLATELET ESTIMATE    Collection Time: 02/11/17  5:30 AM   Result Value Ref Range    Plt Estimation Normal    MORPHOLOGY    Collection Time: 02/11/17  5:30 AM   Result Value Ref Range    RBC Morphology Normal    ACCU-CHEK GLUCOSE    Collection Time: 02/11/17  7:17 AM   Result Value Ref Range    Glucose - Accu-Ck 178 (H) 65 - 99 mg/dL   ACCU-CHEK GLUCOSE    Collection Time: 02/11/17 11:23 AM   Result Value Ref Range    Glucose - Accu-Ck 262 (H) 65 - 99 mg/dL   ACCU-CHEK GLUCOSE    Collection Time:  02/11/17  5:08 PM   Result Value Ref Range    Glucose - Accu-Ck 185 (H) 65 - 99 mg/dL   ACCU-CHEK GLUCOSE    Collection Time: 02/11/17  8:51 PM   Result Value Ref Range    Glucose - Accu-Ck 239 (H) 65 - 99 mg/dL   ACCU-CHEK GLUCOSE    Collection Time: 02/12/17  7:27 AM   Result Value Ref Range    Glucose - Accu-Ck 166 (H) 65 - 99 mg/dL   ACCU-CHEK GLUCOSE    Collection Time: 02/12/17 10:58 AM   Result Value Ref Range    Glucose - Accu-Ck 278 (H) 65 - 99 mg/dL   ACCU-CHEK GLUCOSE    Collection Time: 02/12/17  6:01 PM   Result Value Ref Range    Glucose - Accu-Ck 158 (H) 65 - 99 mg/dL   ACCU-CHEK GLUCOSE    Collection Time: 02/12/17  8:32 PM   Result Value Ref Range    Glucose - Accu-Ck 281 (H) 65 - 99 mg/dL   ACCU-CHEK GLUCOSE    Collection Time: 02/13/17  7:07 AM   Result Value Ref Range    Glucose - Accu-Ck 166 (H) 65 - 99 mg/dL       Current Facility-Administered Medications   Medication Frequency   • insulin NPH (HUMULIN,NOVOLIN) injection 18 Units BID INSULIN   • levetiracetam (KEPPRA) 100 MG/ML solution 1,000 mg Q12HRS   • docusate sodium (COLACE) capsule 100 mg QDAY PRN    And   • senna-docusate (PERICOLACE or SENOKOT S) 8.6-50 MG per tablet 1 Tab Nightly    And   • senna-docusate (PERICOLACE or SENOKOT S) 8.6-50 MG per tablet 1 Tab Q24HRS PRN    And   • lactulose 20 GM/30ML solution 30 mL Q24HRS PRN    And   • bisacodyl (DULCOLAX) suppository 10 mg Q24HRS PRN    And   • fleet enema 133 mL Once PRN   • Respiratory Care per Protocol Continuous RT   • dexamethasone (DECADRON) tablet 4 mg Q6HRS   • temazepam (RESTORIL) capsule 15 mg HS PRN - MR X 1   • ondansetron (ZOFRAN ODT) dispertab 4 mg Q4HRS PRN   • famotidine (PEPCID) tablet 20 mg BID   • acetaminophen (TYLENOL) tablet 650 mg Q6HRS PRN   • insulin lispro (HUMALOG) injection 1-6 Units 4X/DAY ACHS       Orders Placed This Encounter   Procedures   • DIET ORDER     Standing Status: Standing      Number of Occurrences: 1      Standing Expiration Date:       Order Specific Question:  Diet:     Answer:  Diabetic [3]     Order Specific Question:  Texture/Fiber modifications:     Answer:  Dysphagia 2(Pureed/Chopped)specify fluid consistency(question 6) [2]     Order Specific Question:  Consistency/Fluid modifications:     Answer:  Dolores Thick [2]       Assessment:  Active Hospital Problems    Diagnosis   • Astrocytoma brain tumor (CMS-HCC)   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 S/0 sterotactic left frontotemporal craniotomy with microscopic computer assisted partial resection of the left temporal low grade glioma using intraoperative functional cortical mapping with awake patient.     new acute infarct posterior to the resection of the cavity in the left temporal occipital lobe. And new small focus of GRE hypointensity in left frontoparietal regional likely represents small hemorrhage or blood products from surgery  Anaplastic Grade III astrocytoma. He has an appointment to see  in the next several weeks, thus he would not be undergoing radiation near his recent resection anyway's. The patient has and apppointment for outpatient oncology with Olivier Osman MD    Aphasia  Decadron taper as tolererated: continue 4 mg q 6 hours for now  Seizure: 1000 mg keppra bid  Initiate bladder program  Initiate bowel program  Check labs in am  IMPAIRMENTS:    Mobility  Self-care  IADLs  Cognitive  Speech      PLAN:    Discussion and Recommendations:    1. The patient requires an acute inpatient rehabilitation program with a coordinated program of care at an intensity and frequency not available at a lower level of care. This recommendation is substantiated by the patient's medical physicians who recommend that the patient's intervention and  assessment of medical issues needs to be done at an acute level of care for patient's safety and maximum outcome.    2. A coordinated program of care will be supplied by an interdisciplinary team of physical therapy, occupational therapy, rehab physician, rehab nursing, and, if needed, speech therapy and rehab psychology. Rehab team presents a patient-specific rehabilitation and education program concentrating on prevention of future problems related to accessibility, mobility, skin, bowel, bladder, sexuality, and psychosocial and medical/surgical problems.    3. Need for Rehabilitation Physician: The rehab physician will be evaluating the patient on a multi-weekly basis to help coordinate the program of care. The rehab physician communicates between medical physicians, therapists, and nurses to maximize the patient's potential outcome. Specific areas in which the rehab physician will be providing daily assessment include the following:    A. Assessing the patient's heart rate and blood pressure response (vitals monitoring) to activity and making adjustments in medications or conservative measures as needed.    B. The rehab physician will be assessing the frequency at which the program can be increased to allow the patient to reach optimal functional outcome.    C. The rehab physician will also provide assessments in daily skin care, especially in light of patient's impairments in mobility.    D. The rehab physician will provide special expertise in understanding how to work with functional impairment and recommend appropriate interventions, compensatory techniques, and education that will facilitate the patient's outcome.    4. Rehab R.N.    The rehab RN will be working with patient to carry over in room mobility and activities of daily living when the patient is not in 3 hours of skilled therapy. Rehab nursing will be working in conjunction with rehab physician to address all the medical issues above and continue  to assess laboratory work and discuss abnormalities with the treating physicians, assess vitals, and response to activity, and discuss and report abnormalities with the rehab physician. Rehab RN will also continue daily skin care, supervise bladder/bowel program, instruct in medication administration, and ensure patient safety.     MEDICAL DECISION MAKING and INTERDISCIPLINARY PLAN OF CARE:    REHABILITATION ISSUES/ADVERSE POTENTIAL::  New CVA (Cerebrovascular Accident):Patient demonstrates functional deficits in strength, balance, coordination, and ADL's. Patient is admitted to [facility] for comprehensive rehabilitation therapy as described below.   Rehabilitation nursing monitors bowel and bladder control, educates on medication administration, co-morbidities and monitors patient s    NonTBI (Traumatic Brain Injury): Patient demonstrates functional deficits in cognition, behavior, strength, balance, coordination, and ADL's. The patient requires therapy to correct these deficits prior to discharge. Patient is admitted to [facility] for comprehensive rehabilitation therapy, including physical, occupational and speech therapy.     Rehabilitation nursing monitors bowel and bladder control, educates on medication administration, co-morbidities and monitors patient safety.    Therapies to treat at intensity and frequency of (may change after completion of evaluation by all therapeutic disciplines):       PT:  Physical therapy to address mobility, transfer, gait training and evaluation for adaptive equipment needs 1hour/day at least 5 days/week for the duration of the ELOS (see below)       OT:  Occupational therapy to address ADLs, self-care, home management training, functional mobility/transfers and assistive device evaluation, and community re-intergration 1hour/day at least 5 days/week for the duration of the ELOS (see below).         ST/Dysphagia:  Speech therapy to address  speech, language,and cognitive deficits as well as swallowing difficulties with retraining/dysphagia management and community re-integration with comprehension, expression, cognitive training 1hour/day at least 5 days/week for the duration of the ELOS (see below).     2.  Neurostimulants: None at this time but continue to assess daily for need to initiate should status change.    3.  DVT prophylaxis:  Patient is on not due to small hemmrhage in brain post op for anticoagulation upon transfer. Encourage OOB. Monitor daily for signs and symptoms of DVT including but not limited to swelling and pain to prevent the development of DVT that may interfere with therapies.    4.  GI prophylaxis:  On prilosec to prevent gastritis/dyspepsia which may interfere with therapies.    5.  Pain: No issues with pain currently     6.  Nutrition/Dysphagia: Dietician monitors nutrient intake, recommend supplements prn and provide nutrition education to pt/family to promote optimal nutrition for wound healing/recovery.     7.  Bladder/bowel:  Start bowel and bladder program as described below, to prevent constipation, urinary retention (which may lead to UTI), and urinary incontinence (which will impact upon pt's functional independence).    - TV Q3h while awake with post void bladder scans, I&O cath for PVRs >400  - up to commode after meal     8.  Skin/dermal ulcer prophylaxis: Monitor for new skin conditions with q.2 h. turns as required to prevent the development of skin breakdown.     9.  Cognition/Behavior:  Psychologist Dr. Kingsley provides adjustment counseling to illness and psychosocial barriers that may be potential barriers to rehabilitation.     10. Respiratory therapy: RT performs O2 management prn, breathing retraining, pulmonary hygeine and bronchospasm management prn to optimize participation in therapies.      MEDICAL CO-MORBIDITIES/ADVERSE POTENTIAL AFFECTING FUNCTION:        GOALS/EXPECTED LEVEL OF FUNCTION BASED ON  CURRENT MEDICAL AND FUNCTIONAL STATUS (may change based on patient's medical status and rate of impairment recovery):     Transfers: sup to mod I     Mobility/Gait:sup to mod I     ADL's:sup to mod I     Cognition:sup    DISPOSITION: home with assistance     ELOS: 2 weeks              Medical Decision Making and Plan:  Having trouble in PT following directions. Continue comprehensive rehab.     Total time:  >35 minutes.  I spent greater than 50% of the time for patient care and coordination on this date, including unit/floor time, and face-to-face time with the patient as per assessment and plan above.    Lisette Vanegas D.O.

## 2017-02-14 NOTE — CARE PLAN
"Problem: Safety  Goal: Will remain free from injury  Outcome: PROGRESSING AS EXPECTED  Pt. Remain impulsive and with confusion noted, bed alarm on for safety and close monitoring via TV monitor, hourly rounding continue and standby assist to the toilet for safety.    Problem: Pain Management  Goal: Pain level will decrease to patient’s comfort goal  Outcome: PROGRESSING AS EXPECTED  Pt. Resting and sleeping comfortably at this time, no s/s of discomfort or acute pain noted. Asked earlier with daughter interpreting if he is in pain and he answered \"no\"        "

## 2017-02-14 NOTE — REHAB-CM IDT TEAM NOTE
Case Management   DC Planning  DC destination/dispostion:  Pt has an appt with Dr. Olivier Sorensen (Oncology) today  2/14 at 3:40 pm; plan is for pt to discharge to one of his daughter's home in [place]; there are no stairs to negotiate; dtr does not work outside of the home; Palliative care consult for advanced planning completed @ [facility]    Referrals: None at this time.      DC Needs:  Family training; dme; home health;     Barriers to discharge: Dx; functional status; confusion; unable to follow directions;        Strengths: Supportive family - 2 dtrs reside locally.     Section completed by:  RACQUEL Patel, CCM

## 2017-02-14 NOTE — PROGRESS NOTES
"Rehab Progress Note     Interval Events (Subjective)  Patient seen and examined today. Patient in no apparent distress.  ROS: last bm 2/13/2017    Objective:  VITAL SIGNS: /80 mmHg  Pulse 62  Temp(Src) 36.6 °C (97.8 °F)  Resp 20  Ht 1.6 m (5' 2.99\")  Wt 88.9 kg (195 lb 15.8 oz)  BMI 34.73 kg/m2  SpO2 95%  Heart regular rate and rhythm  Lungs clear to auscultation  Abdomen bowel sounds x 4    Recent Results (from the past 72 hour(s))   ACCU-CHEK GLUCOSE    Collection Time: 02/11/17 11:23 AM   Result Value Ref Range    Glucose - Accu-Ck 262 (H) 65 - 99 mg/dL   ACCU-CHEK GLUCOSE    Collection Time: 02/11/17  5:08 PM   Result Value Ref Range    Glucose - Accu-Ck 185 (H) 65 - 99 mg/dL   ACCU-CHEK GLUCOSE    Collection Time: 02/11/17  8:51 PM   Result Value Ref Range    Glucose - Accu-Ck 239 (H) 65 - 99 mg/dL   ACCU-CHEK GLUCOSE    Collection Time: 02/12/17  7:27 AM   Result Value Ref Range    Glucose - Accu-Ck 166 (H) 65 - 99 mg/dL   ACCU-CHEK GLUCOSE    Collection Time: 02/12/17 10:58 AM   Result Value Ref Range    Glucose - Accu-Ck 278 (H) 65 - 99 mg/dL   ACCU-CHEK GLUCOSE    Collection Time: 02/12/17  6:01 PM   Result Value Ref Range    Glucose - Accu-Ck 158 (H) 65 - 99 mg/dL   ACCU-CHEK GLUCOSE    Collection Time: 02/12/17  8:32 PM   Result Value Ref Range    Glucose - Accu-Ck 281 (H) 65 - 99 mg/dL   ACCU-CHEK GLUCOSE    Collection Time: 02/13/17  7:07 AM   Result Value Ref Range    Glucose - Accu-Ck 166 (H) 65 - 99 mg/dL   ACCU-CHEK GLUCOSE    Collection Time: 02/13/17 11:26 AM   Result Value Ref Range    Glucose - Accu-Ck 204 (H) 65 - 99 mg/dL   ACCU-CHEK GLUCOSE    Collection Time: 02/13/17  5:15 PM   Result Value Ref Range    Glucose - Accu-Ck 191 (H) 65 - 99 mg/dL   ACCU-CHEK GLUCOSE    Collection Time: 02/13/17  8:04 PM   Result Value Ref Range    Glucose - Accu-Ck 291 (H) 65 - 99 mg/dL   ACCU-CHEK GLUCOSE    Collection Time: 02/14/17  7:28 AM   Result Value Ref Range    Glucose - Accu-Ck 154 (H) " 65 - 99 mg/dL       Current Facility-Administered Medications   Medication Frequency   • insulin NPH (HUMULIN,NOVOLIN) injection 18 Units BID INSULIN   • levetiracetam (KEPPRA) 100 MG/ML solution 1,000 mg Q12HRS   • docusate sodium (COLACE) capsule 100 mg QDAY PRN    And   • senna-docusate (PERICOLACE or SENOKOT S) 8.6-50 MG per tablet 1 Tab Nightly    And   • senna-docusate (PERICOLACE or SENOKOT S) 8.6-50 MG per tablet 1 Tab Q24HRS PRN    And   • lactulose 20 GM/30ML solution 30 mL Q24HRS PRN    And   • bisacodyl (DULCOLAX) suppository 10 mg Q24HRS PRN    And   • fleet enema 133 mL Once PRN   • Respiratory Care per Protocol Continuous RT   • dexamethasone (DECADRON) tablet 4 mg Q6HRS   • temazepam (RESTORIL) capsule 15 mg HS PRN - MR X 1   • ondansetron (ZOFRAN ODT) dispertab 4 mg Q4HRS PRN   • famotidine (PEPCID) tablet 20 mg BID   • acetaminophen (TYLENOL) tablet 650 mg Q6HRS PRN   • insulin lispro (HUMALOG) injection 1-6 Units 4X/DAY ACHS       Orders Placed This Encounter   Procedures   • DIET ORDER     Standing Status: Standing      Number of Occurrences: 1      Standing Expiration Date:      Order Specific Question:  Diet:     Answer:  Diabetic [3]     Order Specific Question:  Texture/Fiber modifications:     Answer:  Dysphagia 2(Pureed/Chopped)specify fluid consistency(question 6) [2]     Order Specific Question:  Consistency/Fluid modifications:     Answer:  [place] Thick [2]       Assessment:  Active Hospital Problems    Diagnosis   • Astrocytoma brain tumor (CMS-HCC)   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 S/0 sterotactic left frontotemporal craniotomy with microscopic computer assisted partial resection of the left temporal low grade glioma using intraoperative functional  cortical mapping with awake patient.     new acute infarct posterior to the resection of the cavity in the left temporal occipital lobe. And new small focus of GRE hypointensity in left frontoparietal regional likely represents small hemorrhage or blood products from surgery  Anaplastic Grade III astrocytoma. He has an appointment to see  in the next several weeks, thus he would not be undergoing radiation near his recent resection anyway's. The patient has and apppointment for outpatient oncology with Olivier Osman MD    Aphasia  Decadron taper as tolererated: continue 4 mg q 6 hours for now  Seizure: 1000 mg keppra bid  Initiate bladder program  Initiate bowel program  Check labs in am  IMPAIRMENTS:    Mobility  Self-care  IADLs  Cognitive  Speech      PLAN:    Discussion and Recommendations:    1. The patient requires an acute inpatient rehabilitation program with a coordinated program of care at an intensity and frequency not available at a lower level of care. This recommendation is substantiated by the patient's medical physicians who recommend that the patient's intervention and assessment of medical issues needs to be done at an acute level of care for patient's safety and maximum outcome.    2. A coordinated program of care will be supplied by an interdisciplinary team of physical therapy, occupational therapy, rehab physician, rehab nursing, and, if needed, speech therapy and rehab psychology. Rehab team presents a patient-specific rehabilitation and education program concentrating on prevention of future problems related to accessibility, mobility, skin, bowel, bladder, sexuality, and psychosocial and medical/surgical problems.    3. Need for Rehabilitation Physician: The rehab physician will be evaluating the patient on a multi-weekly basis to help coordinate the program of care. The rehab physician communicates between medical physicians, therapists, and nurses to maximize the patient's  potential outcome. Specific areas in which the rehab physician will be providing daily assessment include the following:    A. Assessing the patient's heart rate and blood pressure response (vitals monitoring) to activity and making adjustments in medications or conservative measures as needed.    B. The rehab physician will be assessing the frequency at which the program can be increased to allow the patient to reach optimal functional outcome.    C. The rehab physician will also provide assessments in daily skin care, especially in light of patient's impairments in mobility.    D. The rehab physician will provide special expertise in understanding how to work with functional impairment and recommend appropriate interventions, compensatory techniques, and education that will facilitate the patient's outcome.    4. Rehab R.N.    The rehab RN will be working with patient to carry over in room mobility and activities of daily living when the patient is not in 3 hours of skilled therapy. Rehab nursing will be working in conjunction with rehab physician to address all the medical issues above and continue to assess laboratory work and discuss abnormalities with the treating physicians, assess vitals, and response to activity, and discuss and report abnormalities with the rehab physician. Rehab RN will also continue daily skin care, supervise bladder/bowel program, instruct in medication administration, and ensure patient safety.     MEDICAL DECISION MAKING and INTERDISCIPLINARY PLAN OF CARE:    REHABILITATION ISSUES/ADVERSE POTENTIAL::  New CVA (Cerebrovascular Accident):Patient demonstrates functional deficits in strength, balance, coordination, and ADL's. Patient is admitted to [facility] for comprehensive rehabilitation therapy as described below.   Rehabilitation nursing monitors bowel and bladder control, educates on medication administration, co-morbidities and monitors patient s    NonTBI  (Traumatic Brain Injury): Patient demonstrates functional deficits in cognition, behavior, strength, balance, coordination, and ADL's. The patient requires therapy to correct these deficits prior to discharge. Patient is admitted to [facility] for comprehensive rehabilitation therapy, including physical, occupational and speech therapy.     Rehabilitation nursing monitors bowel and bladder control, educates on medication administration, co-morbidities and monitors patient safety.    Therapies to treat at intensity and frequency of (may change after completion of evaluation by all therapeutic disciplines):       PT:  Physical therapy to address mobility, transfer, gait training and evaluation for adaptive equipment needs 1hour/day at least 5 days/week for the duration of the ELOS (see below)       OT:  Occupational therapy to address ADLs, self-care, home management training, functional mobility/transfers and assistive device evaluation, and community re-intergration 1hour/day at least 5 days/week for the duration of the ELOS (see below).         ST/Dysphagia:  Speech therapy to address speech, language,and cognitive deficits as well as swallowing difficulties with retraining/dysphagia management and community re-integration with comprehension, expression, cognitive training 1hour/day at least 5 days/week for the duration of the ELOS (see below).     2.  Neurostimulants: None at this time but continue to assess daily for need to initiate should status change.    3.  DVT prophylaxis:  Patient is on not due to small hemmrhage in brain post op for anticoagulation upon transfer. Encourage OOB. Monitor daily for signs and symptoms of DVT including but not limited to swelling and pain to prevent the development of DVT that may interfere with therapies.    4.  GI prophylaxis:  On prilosec to prevent gastritis/dyspepsia which may interfere with therapies.    5.  Pain: No issues with pain currently     6.   Nutrition/Dysphagia: Dietician monitors nutrient intake, recommend supplements prn and provide nutrition education to pt/family to promote optimal nutrition for wound healing/recovery.     7.  Bladder/bowel:  Start bowel and bladder program as described below, to prevent constipation, urinary retention (which may lead to UTI), and urinary incontinence (which will impact upon pt's functional independence).    - TV Q3h while awake with post void bladder scans, I&O cath for PVRs >400  - up to commode after meal     8.  Skin/dermal ulcer prophylaxis: Monitor for new skin conditions with q.2 h. turns as required to prevent the development of skin breakdown.     9.  Cognition/Behavior:  Psychologist Dr. Kingsley provides adjustment counseling to illness and psychosocial barriers that may be potential barriers to rehabilitation.     10. Respiratory therapy: RT performs O2 management prn, breathing retraining, pulmonary hygeine and bronchospasm management prn to optimize participation in therapies.      MEDICAL CO-MORBIDITIES/ADVERSE POTENTIAL AFFECTING FUNCTION:        GOALS/EXPECTED LEVEL OF FUNCTION BASED ON CURRENT MEDICAL AND FUNCTIONAL STATUS (may change based on patient's medical status and rate of impairment recovery):     Transfers: sup to mod I     Mobility/Gait:sup to mod I     ADL's:sup to mod I     Cognition:sup    DISPOSITION: home with assistance     ELOS: 2 weeks              Medical Decision Making and Plan:    I attended and led team conference 2-  Nursing:diabetic dys 2 ntl, pills whole in apple sauce, eating 100%, fluids 1400 cc, continent of bowel and bladder, blood sugars ac and hs  PT: barrier comprehension, cognitive issues, language barrier, transfers sba, gait 250 ft pain free no device, today right knee pain, 125 ft, add lidoderm  OT: sba sup adls, min lower body dressing and toileting  ST: total to max comprehension, answers simple questions , perseverated on cheese,max expression, no  naming, strengths reading single word and match with field of 2 pictures, no single directions needs hand over hand, count 1-30, social interaction min, memory and problem solving, dys 2 with ntl, cannot follow strategies but goes slow, tolerated 50 % thins  Case management:lives with have appointment with oncology to day, go to daughters home for discharge, family training  Discharge date: 2/28/2017    Total time:  >40 minutes.  I spent greater than 50% of the time for patient care and coordination on this date, including unit/floor time, and face-to-face time with the patient as per assessment and plan above.    Lisette Vanegas D.O.

## 2017-02-14 NOTE — Clinical Note
2017        Dale Dorado  : 1960        To Whom It May Concern:     Mr. Dorado was seen in my office today. He is diagnosed with anoplastic brain tumor.     If you have any questions please feel free to contact my office.                             ___________________________________  Olivier Osman, Hematologist/Oncologist

## 2017-02-14 NOTE — DIETARY
Nutrition Care/ Consult For Food Prefs     Assessment:    Admitting Diagnosis: NTBI s/t brain tumor s/p left frontotemporal craniotomy   Pertinent PMH: Bell's Palsy, Hyperlipidemia, CA (Glioma), DM s/t steroids, Seizures    Additional Information: Notes reviewed.  Patient [place] speaking only, aphasic. S/p multiple seizures while in [facility].  Food prefs consult deferred to nutrition rep.     Hospitalist following s/t tachycardia and uncontrolled DM on steroids.     Appropriate for Education? NO s/t aphasia  Education in Past? n/a  Appetite: Excellent   Diet: Dysphagia 2/ NTL/ DM  Average PO intake x 3 days: 100% of meals / adding HS snack per DM protocol     Labs: accuchecks 166-291mg/dL x 48 hours, Na+ 132, Cl 95, Serum Glucose 201, ALT 62, Albumin 3.1, T.P. 5.7,PAB 34 note , CHOL 341,   Medications: Decadron, Pepcid, NPH 18 Units BID, SSI QID 1-6 Units, Senna   PRN Medications: noted  IVF: n/a     Height: 160 cm  Weight: admission weight 91 kig via stand up scale  BMI: 35.4 ( obese class 2)     Skin: sx wound to head  GI: WNL  Vitals: reviewed/ tachycardia noted  I/Os: n/a no output recorded           Diagnosis: No nutrition diagnosis. PO is adequate.  Nutrition rep to see regarding food prefs.  Consider adding medication to manage CHOL.  HS snack added per protocol.     Intervention/ Recommendations/POC:  1. Continue current nutritional POC.  Diet upgrades per SLP.   2.  Continue adequate PO/fluid intake.  3. Nutrition rep to see regarding food prefs/ honor within dietary restrictions (if indicated)  4. Monitor PO intake, weight, labs, medication adjustments, skin integrity, GI function, vitals, I/Os, and overall hydration status.  Adjust nutritional POC pending clinical outcomes.     Monitor/Evaluation: RD following PRN.  Goal: Maintain adequate nutrient/fluid intake to promote nutrition optimization/ healing.

## 2017-02-14 NOTE — REHAB-PT IDT TEAM NOTE
Physical Therapy  Mobility  Bed mobility: Standby assist supine to/from sit  Bed /Chair/Wheelchair Transfer:  5 - Standby Prompting/Supervision  Bed/Chair/Wheelchair Transfer Description:   SBA stand pivot transfer bed to/from chair  Walk:  4 -  ft to 250 ft  Walk Description:   (200ft no AD CGA for balance; constant one-step cues for directions)  Wheelchair:  2 - Max Assistance  Wheelchair Description:   (propels 100ft with min assist for steering (bumped into right wall 2x))  Stairs 4 - Minimal Assistance  Stairs Description (up/down 12 4in stairs using 2 rails SBA; step-over step pattern)  Patient/Family Training/Education: In progress  DME/DC Recommendations:  TBD  Strengths:  Independent PLOF, Making steady progress towards goals, Pleasant and cooperative, Supportive family and Willingly participates in therapeutic activities  Barriers:   Aphasia expressive, Aphasia receptive, Unable to follow instructions, Confused, Language barrier, non-fluent English and Other: impaired tolerance for activity, fall risk, impaired comprehension, impaired balance  # of short term goals set= 4  # of short term goals met=3        Physical Therapy Problems           Problem: Mobility     Dates: Start: 02/11/17       Goal: STG-Within one week, patient will ascend and descend four to six stairs     Dates: Start: 02/11/17      Description: 1) Individualized goal:  Using step-to pattern with SBA (slow down with improved sequencing)    2) Interventions: PT Gait Training, PT Self Care/Home Eval, PT Therapeutic Exercises, PT Neuro Re-Ed/Balance and PT Therapeutic Activity        Note: Goal Note filed on 02/14/17 1218 by Richard Ramirez, M.S.P.T.    Goal: STG-Within one week, patient will ascend and descend four to six   stairs  Outcome: NOT MET  Contact-guard, cues for safety          Goal: STG-Within one week, patient will     Dates: Start: 02/14/17      Description: 1) Individualized goal: Gait no device, sba, 300 feet, one  week    2) Interventions:  PT Gait Training, PT Therapeutic Exercises, PT Neuro Re-Ed/Balance and PT Therapeutic Activity              Problem: Mobility Transfers     Dates: Start: 02/11/17       Goal: STG-Within one week, patient will transfer bed to chair     Dates: Start: 02/14/17      Description: 1) Individualized goal: Transfer bed to/from chair supervision one week    2) Interventions:  PT Gait Training, PT Therapeutic Exercises, PT Neuro Re-Ed/Balance and PT Therapeutic Activity              Problem: PT-Long Term Goals     Dates: Start: 02/11/17       Goal: LTG-By discharge, patient will ambulate     Dates: Start: 02/11/17      Description: 1) Individualized goal:  200ft x 4 with no AD SBA    2) Interventions: PT Gait Training, PT Self Care/Home Eval, PT Therapeutic Exercises, PT Neuro Re-Ed/Balance and PT Therapeutic Activity            Goal: LTG-By discharge, patient will transfer one surface to another     Dates: Start: 02/11/17      Description: 1) Individualized goal:  SPV SPT safely and consistently    2) Interventions: PT Gait Training, PT Self Care/Home Eval, PT Therapeutic Exercises, PT Neuro Re-Ed/Balance and PT Therapeutic Activity             Goal: LTG-By discharge, patient will perform home exercise program     Dates: Start: 02/11/17      Description: 1) Individualized goal:  In standing 3x/day for 10min using SPV    2) Interventions: PT Gait Training, PT Self Care/Home Eval, PT Therapeutic Exercises, PT Neuro Re-Ed/Balance and PT Therapeutic Activity              Goal: LTG-By discharge, patient will ambulate up/down flight of stairs     Dates: Start: 02/11/17      Description: 1) Individualized goal:  With one rail SPV    2) Interventions:   PT Gait Training, PT Self Care/Home Eval, PT Therapeutic Exercises, PT Neuro Re-Ed/Balance and PT Therapeutic Activity                    Section completed by:  SHERIN CarrS.P.TAdria

## 2017-02-14 NOTE — REHAB-COLLABORATIVE ONGOING IDT TEAM NOTE
Weekly Interdisciplinary Team Conference Note    Weekly Interdisciplinary Team Conference # 1  Date:  2/14/2017    Clinicians present and reporting at team conference include the following:   MD: Lisette Vanegas DO   RN:  Evie Soliman RN   PT:   Richard Ramirez, PT, MPT, MEd  OT:  Oriana Harp MOT, OTR/L   ST:  Moises Flower, MS, CCC-SLP  CM:  Lisette Ozuna, LSW, LATASHA  REC:  Not Applicable  RT:  Not Applicable  RPh:  [facility]  Other:   None  All reporting clinicians have a working knowledge of this patient's plan of care.    Targeted DC Date:  2/28/2017.      Medical    Patient Active Problem List    Diagnosis Date Noted   • Astrocytoma brain tumor (CMS-HCC) 02/04/2017     Priority: High   • New onset seizure (CMS-HCC) 01/05/2017     Priority: High   • Obesity (BMI 30-39.9) 05/15/2015     Priority: Low   • Glioblastoma (CMS-HCC) 02/06/2017   • Cerebral edema (CMS-HCC) 01/05/2017   • Chronic pain of right knee 05/15/2015     Results     Procedure Component Value Ref Range Date/Time    ACCU-CHEK GLUCOSE [[id number]]  (Abnormal) Collected:  02/14/17 1133    Order Status:  Completed Lab Status:  Final result Updated:  02/14/17 1154     Glucose - Accu-Ck 187 (H) 65 - 99 mg/dL     ACCU-CHEK GLUCOSE [[id number]]  (Abnormal) Collected:  02/14/17 0728    Order Status:  Completed Lab Status:  Final result Updated:  02/14/17 0801     Glucose - Accu-Ck 154 (H) 65 - 99 mg/dL     ACCU-CHEK GLUCOSE [[id number]]  (Abnormal) Collected:  02/13/17 2004    Order Status:  Completed Lab Status:  Final result Updated:  02/13/17 2103     Glucose - Accu-Ck 291 (H) 65 - 99 mg/dL     ACCU-CHEK GLUCOSE [[id number]]  (Abnormal) Collected:  02/13/17 1715    Order Status:  Completed Lab Status:  Final result Updated:  02/13/17 1827     Glucose - Accu-Ck 191 (H) 65 - 99 mg/dL      Comment: Followed Dr orders  Coverage given             Nursing  Diet/Nutrition:  Diabetic, Dysphagia II and Nectar Thick Liquids  Medication Administration:  Float  Whole with Puree  % consumed at meals in last 24 hours:  .  Breakfast:  100%   Lunch:  100%  Dinner:  100%   Snack schedule:  None  Appetite:  Excellent  Fluid Intake/Output in past 24 hours: In: 1014 ml  Admit Weight:  Weight: 96 kg (211 lb 10.3 oz)  Weight Last 7 Days   [88.9 kg (195 lb 15.8 oz)-96 kg (211 lb 10.3 oz)] 88.9 kg (195 lb 15.8 oz) (02/12 0700)    Pain Issues:    Location:  Knee (02/13 1601)  Right;Left (02/13 1601)         Severity:  Denies   Scheduled pain medications:  None     PRN pain medications used in last 24 hours:  None     Bowel:    Admission Bowel Assist Score:  6 - Modified Independent  Bowel Assist Score:  3 - Moderate Assistance  Bowel Accidents in last 7 days:     Last bowel movement: 02/13/17  Stool: Medium     Usual bowel pattern:  daily  Scheduled bowel medications:  senna-docusate (PERICOLACE or SENOKOT S)   PRN bowel medications used in last 24 hours:  None  Nursing Interventions:  Increased time, Scheduled medication  Effectiveness of bowel program:   good=regular, predictable, controlled emptying of bowel  Bladder:    Admission Bladder Assist Score:  4 - Minimal Assistance  Bladder Assist Score:  4 - Minimal Assistance  Bladder Accidents in last 7 days:  1  Medications affecting bladder:  None    Time void schedule/voiding pattern:  Time void every 3 hours during the day and every 4 hours at night  Interventions:  Increased time, Brief  Effectiveness of bladder training:  Good=regular, predictable, emptying of bladder, patient initiates time voiding    Diabetes:  Blood Sugar Frequency:  Before meals and at bedtime    Range of BS for last 48 hours:     Recent Labs      02/12/17   1801  02/12/17   2032  02/13/17   0707  02/13/17   1126  02/13/17   1715  02/13/17   2004  02/14/17   0728  02/14/17   1133   POCGLUCOSE  158*  281*  166*  204*  191*  291*  154*  187*     Scheduled diabetic medications:  None  Sliding scale usage in past 24 hours:  Yes  Total Short acting insulin in the  past 24 hours:  Humalog 7 units  Total Long acting insulin in the past 24 hours:  Other Humulin 18units    Wound:         Patient Lines/Drains/Airways Status    Active Current Wounds     Name: Placement date: Placement time: Site: Days:    Surgical Incision  Incision Left Posterior Head 02/04/17 1956    9                   Interventions:  With staples; remains ZURI; monitoring for s/sx of infection  Effectiveness of intervention:  wound is improving     Sleep/wake cycle:   Average hours slept:  Sleeps 4-6 hours without waking  Sleep medication usage:  temazepam (RESTORIL) capsule 15 mg nightly    Patient/Family Training/Education:  Aspiration Precautions, Diabetes Management, Diet/Nutrition, Fall Prevention, General Self Care, Safe Transfers, Safety and Wound Care  Discharge Supply Recommendations:  Glucometer and Strips  Strengths: Pleasant and cooperative and Effective communication skills   Barriers:   Aspiration risk, Confused, Generalized weakness and Language barrier, non-fluent English            Nursing Problems           Problem: Bowel/Gastric:     Goal: Normal bowel function is maintained or improved         Goal: Will not experience complications related to bowel motility           Problem: Communication     Goal: The ability to communicate needs accurately and effectively will improve           Problem: Discharge Barriers/Planning     Goal: Patient's continuum of care needs will be met           Problem: IP BLADDER/VOIDING     Goal: LTG - patient will complete bladder elimination         Goal: STG - Patient demonstrates no accidents         Goal: STG - PATIENT WILL REMAIN CONTINENT.           Problem: Infection     Goal: Will remain free from infection           Problem: Knowledge Deficit     Goal: Knowledge of disease process/condition, treatment plan, diagnostic tests, and medications will improve         Goal: Knowledge of the prescribed therapeutic regimen will improve           Problem: Mobility      Goal: Risk for activity intolerance will decrease           Problem: NUTRITION     Goal: Adequate Food and Fluid Intake     Flowsheet: Taken at 02/12/17 1228    Percentage Eaten (Lunch) 100 by Heather C Cogan, C.N.A.    Taken at 02/12/17 0900    Percentage Eaten (Breakfast) 100 by Heather C Cogan, C.N.A.    Taken at 02/11/17 1900    Percentage Eaten (Dinner) 100 by Faby Brooks         Problem: Pain Management     Goal: Pain level will decrease to patient's comfort goal           Problem: Psychosocial Needs:     Goal: Level of anxiety will decrease           Problem: Respiratory:     Goal: Respiratory status will improve           Problem: Safety     Goal: Will remain free from injury         Goal: Will remain free from falls           Problem: Skin Integrity     Goal: Risk for impaired skin integrity will decrease           Problem: Urinary Elimination:     Goal: Ability to reestablish a normal urinary elimination pattern will improve           Problem: Venous Thromboembolism (VTW)/Deep Vein Thrombosis (DVT) Prevention:     Goal: Patient will participate in Venous Thrombosis (VTE)/Deep Vein Thrombosis (DVT)Prevention Measures                Long Term Goals:   At discharge patient will be able to function safely at home and in the community with support.    Section completed by:  Evie Soliman R.N.           Mobility  Bed mobility: Standby assist supine to/from sit  Bed /Chair/Wheelchair Transfer:  5 - Standby Prompting/Supervision  Bed/Chair/Wheelchair Transfer Description:   SBA stand pivot transfer bed to/from chair  Walk:  4 -  ft to 250 ft  Walk Description:   (200ft no AD CGA for balance; constant one-step cues for directions)  Wheelchair:  2 - Max Assistance  Wheelchair Description:   (propels 100ft with min assist for steering (bumped into right wall 2x))  Stairs 4 - Minimal Assistance  Stairs Description (up/down 12 4in stairs using 2 rails SBA; step-over step pattern)  Patient/Family  Training/Education: In progress  DME/DC Recommendations:  TBD  Strengths:  Independent PLOF, Making steady progress towards goals, Pleasant and cooperative, Supportive family and Willingly participates in therapeutic activities  Barriers:   Aphasia expressive, Aphasia receptive, Unable to follow instructions, Confused, Language barrier, non-fluent English and Other: impaired tolerance for activity, fall risk, impaired comprehension, impaired balance  # of short term goals set= 4  # of short term goals met=3        Physical Therapy Problems           Problem: Mobility     Dates: Start: 02/11/17       Goal: STG-Within one week, patient will ascend and descend four to six stairs     Dates: Start: 02/11/17      Description: 1) Individualized goal:  Using step-to pattern with SBA (slow down with improved sequencing)    2) Interventions: PT Gait Training, PT Self Care/Home Eval, PT Therapeutic Exercises, PT Neuro Re-Ed/Balance and PT Therapeutic Activity        Note: Goal Note filed on 02/14/17 1218 by Richard Ramirez M.S.P.T.    Goal: STG-Within one week, patient will ascend and descend four to six   stairs  Outcome: NOT MET  Contact-guard, cues for safety          Goal: STG-Within one week, patient will     Dates: Start: 02/14/17      Description: 1) Individualized goal: Gait no device, sba, 300 feet, one week    2) Interventions:  PT Gait Training, PT Therapeutic Exercises, PT Neuro Re-Ed/Balance and PT Therapeutic Activity              Problem: Mobility Transfers     Dates: Start: 02/11/17       Goal: STG-Within one week, patient will transfer bed to chair     Dates: Start: 02/14/17      Description: 1) Individualized goal: Transfer bed to/from chair supervision one week    2) Interventions:  PT Gait Training, PT Therapeutic Exercises, PT Neuro Re-Ed/Balance and PT Therapeutic Activity              Problem: PT-Long Term Goals     Dates: Start: 02/11/17       Goal: LTG-By discharge, patient will ambulate     Dates:  Start: 17      Description: 1) Individualized goal:  200ft x 4 with no AD SBA    2) Interventions: PT Gait Training, PT Self Care/Home Eval, PT Therapeutic Exercises, PT Neuro Re-Ed/Balance and PT Therapeutic Activity            Goal: LTG-By discharge, patient will transfer one surface to another     Dates: Start: 17      Description: 1) Individualized goal:  SPV SPT safely and consistently    2) Interventions: PT Gait Training, PT Self Care/Home Eval, PT Therapeutic Exercises, PT Neuro Re-Ed/Balance and PT Therapeutic Activity             Goal: LTG-By discharge, patient will perform home exercise program     Dates: Start: 17      Description: 1) Individualized goal:  In standing 3x/day for 10min using SPV    2) Interventions: PT Gait Training, PT Self Care/Home Eval, PT Therapeutic Exercises, PT Neuro Re-Ed/Balance and PT Therapeutic Activity              Goal: LTG-By discharge, patient will ambulate up/down flight of stairs     Dates: Start: 17      Description: 1) Individualized goal:  With one rail SPV    2) Interventions:   PT Gait Training, PT Self Care/Home Eval, PT Therapeutic Exercises, PT Neuro Re-Ed/Balance and PT Therapeutic Activity                    Section completed by:  VIKA Carr.S.P.T.    Activities of Daily Living  Eatin - Standby Prompting/Supervision or Set-up  Eating Description:  Modified diet, Supervision for safety, Verbal cueing  Groomin - Standby Prompting/Supervision or Set-up  Grooming Description:  Set-up of equipment, Supervision for safety (seated at sink)  Bathin - Standby Prompting/Supervision or Set-up  Bathing Description:  Supervision for safety, Set-up of equipment, Verbal cueing  Upper Body Dressin - Standby Prompting/Supervision or Set-up  Upper Body Dressing Description:  Set-up of equipment, Supervision for safety  Lower Body Dressin - Minimal Assistance  Lower Body Dressing Description:  4 - Minimal  Assistance  Toiletin - Minimal Assistance   Toileting Description:  Grab bar, Increased time, Supervision for safety  Toilet Transfer:  5 - Standby Prompting/Supervision or Set-up  Toilet Transfer Description:  5 - Standby Prompting/Supervision or Set-up  Tub / Shower Transfer:  5 - Standby Prompting/Supervision or Set-up  Tub / Shower Transfer Description:  Grab bar, Increased time, Supervision for safety, Verbal cueing  IADL:  N/A  Family Training/Education:  Phone call and discussion with daughter regarding attending therapy sessions to initiate family training  DME/DC Recommendations:  Will follow-up     Strengths:  Alert and oriented, Independent PLOF, Making steady progress towards goals, Motivated for self care and independence, Pleasant and cooperative, Supportive family and Willingly participates in therapeutic activities  Barriers:  Aphasia expressive, Aphasia receptive, Confused, Decreased endurance, Generalized weakness, Language barrier, non-fluent English, Poor activity tolerance     # of short term goals set= 4  # of short term goals met= 2          Occupational Therapy Goals           Problem: Bathing     Dates: Start: 17       Goal: STG-Within one week, patient will bathe     Dates: Start: 17      Description: 1) Individualized Goal:  With supervision for safety seated in shower    2) Interventions:  OT Self Care/ADL, OT Cognitive Skill Dev, OT Community Reintegration, OT Neuro Re-Ed/Balance, OT Therapeutic Activity, OT Evaluation and OT Therapeutic Exercise        Note: Goal Note filed on 17 6901 by Selena Goins MS,OTR/L    Goal: STG-Within one week, patient will bathe  Outcome: NOT MET  Poor safety            Problem: Dressing     Dates: Start: 17       Goal: STG-Within one week, patient will dress LB     Dates: Start: 17      Description: 1) Individualized Goal:  With set up only AE prn    2) Interventions:  OT Self Care/ADL, OT Cognitive Skill Dev, OT  Community Reintegration, OT Neuro Re-Ed/Balance, OT Therapeutic Activity, OT Evaluation and OT Therapeutic Exercise    Note: Goal Note filed on 02/13/17 1756 by Selena Goins MS,OTR/L    Goal: STG-Within one week, patient will dress LB  Outcome: NOT MET  Inconsistent, Min A            Problem: Functional Cognition     Dates: Start: 02/13/17       Goal: STG-Within one week, patient will     Dates: Start: 02/13/17      Description: 1) Individualized Goal:  require Min A for sequencing and memory when retrieving clothes from closet    2) Interventions: OT Self Care/ADL, OT Cognitive Skill Dev, OT Community Reintegration, OT Neuro Re-Ed/Balance, OT Therapeutic Activity, OT Evaluation and OT Therapeutic Exercise             Goal: STG-Within one week, patient will     Dates: Start: 02/13/17      Description: 1) Individualized Goal:  Require Mod A for sequencing, memory, and initiation when preparing simple meal    2) Interventions:  OT Self Care/ADL, OT Cognitive Skill Dev, OT Community Reintegration, OT Neuro Re-Ed/Balance, OT Therapeutic Activity, OT Evaluation and OT Therapeutic Exercise           Problem: OT Long Term Goals     Dates: Start: 02/11/17       Goal: LTG-By discharge, patient will complete basic self care tasks     Dates: Start: 02/11/17      Description: 1) Individualized Goal:  Mod i fww prn    2) Interventions:  OT Self Care/ADL, OT Cognitive Skill Dev, OT Community Reintegration, OT Neuro Re-Ed/Balance, OT Therapeutic Activity, OT Evaluation and OT Therapeutic Exercise        Goal: LTG-By discharge, patient will perform bathroom transfers     Dates: Start: 02/11/17      Description: 1) Individualized Goal:  Mod I grab bar prn    2) Interventions:  OT Self Care/ADL, OT Cognitive Skill Dev, OT Community Reintegration, OT Neuro Re-Ed/Balance, OT Therapeutic Activity, OT Evaluation and OT Therapeutic Exercise              Section completed by:  Selena Goins MS,OTR/L    Jean Muhammad  Functions  Comprehension:  1 - Total Assistance  Comprehension Description:  Verbal cues  Expression:  2 - Max Assistance  Expression Description:  Verbal cueing  Social Interaction:  4 - Minimal Assistance  Social Interaction Description:  Verbal cues  Problem Solvin - Total Assistance  Problem Solving Description:  Verbal cueing, Bed/chair alarm, Increased time, Therapy schedule  Memory:  1 - Total Assistance  Memory Description:  Verbal cueing, Bed/chair alarm, Seat belt, Therapy schedule  Executive Functioning / Organization:   Unable to assess at this time   Swallowing  Swallowing Status:  Dysphagia 2 textures and thin liquids, pt requires mod-max cues to follow strategies.  Trials of dysphagia 3 and thin liquids to begin this week    Orders Placed This Encounter   Procedures   • DIET ORDER     Standing Status: Standing      Number of Occurrences: 1      Standing Expiration Date:      Order Specific Question:  Diet:     Answer:  Diabetic [3]     Order Specific Question:  Texture/Fiber modifications:     Answer:  Dysphagia 2(Pureed/Chopped)specify fluid consistency(question 6) [2]     Order Specific Question:  Consistency/Fluid modifications:     Answer:  Nectar Thick [2]     Behavior Modification Plan  Keep instructions simple/concrete, Use nonverbal cues (model/demonstrate or gesture), Give clear feedback, Be calm, Provide reasonable choices, Decrease the chance of failure by offering activities that are within the patient's abilities and Analyze tasks (break down into smaller steps)  Assistive Technology  Other: n/a  Family Training/Education:  To be completed   DC Recommendations:   TBD  Strengths:  Pleasant and cooperative and Willingly participates in therapeutic activities  Barriers:  Aphasia expressive, Aphasia receptive, Aspiration risk, Unable to follow instructions, Confused and Language barrier, non-fluent English  # of short term goals set=4  # of short term goals met=1        Speech Therapy  Problems           Problem: Comprehension STGs     Dates: Start: 02/05/17       Goal: STG-Within one week, patient will     Dates: Start: 02/05/17      Description: 1) Individualized goal:  Follow one-step directives with visual and verbal cues with 50% accuracy and MOD A.     2) Interventions:  SLP Aphasia Evaluation, SLP Speech Language Treatment and SLP Cognitive Skill Development         Note: Goal Note filed on 02/13/17 1647 by Moises Flower MS,CCC-SLP    Goal: STG-Within one week, patient will  Outcome: NOT MET  Max A required to follow 1 step directions             Problem: Expression STGs     Dates: Start: 02/05/17       Goal: STG-Within one week, patient will     Dates: Start: 02/05/17      Description: 1) Individualized goal:  Provide verbal or gesture response to basic/personal yes/no questions with 50% accuracy and MOD A.     2) Interventions:  SLP Aphasia Evaluation, SLP Speech Language Treatment and SLP Cognitive Skill Development         Note: Goal Note filed on 02/13/17 1647 by Moises Flower MS,CCC-SLP    Goal: STG-Within one week, patient will  Outcome: NOT MET  Max A required to answer yes/no questions           Goal: STG-Within one week, patient will     Dates: Start: 02/05/17      Description: 1) Individualized goal:  Name physical objects with 50% accuracy with MOD A.     2) Interventions:  SLP Aphasia Evaluation, SLP Speech Language Treatment, SLP Self Care / ADL Training  and SLP Cognitive Skill Development        Note: Goal Note filed on 02/13/17 1647 by Moises Flower MS,CCC-SLP    Goal: STG-Within one week, patient will  Outcome: NOT MET  Pt unable to name any objects at this time despite max cues             Problem: Speech/Swallowing LTGs     Dates: Start: 02/05/17       Goal: LTG-By discharge, patient will safely swallow     Dates: Start: 02/05/17      Description: 1) Individualized goal:  Regular textures and thin liquids w/ no overt s/sx of aspiration and implement safe swallow  strategies in 90% of opportunities     2) Interventions:  SLP Swallowing Dysfunction Treatment, SLP Oral Pharyngeal Evaluation, SLP Video Swallow Evaluation, SLP Self Care / ADL Training  and SLP Cognitive Skill Development            Goal: LTG-By discharge, patient will comprehend     Dates: Start: 02/05/17      Description: 1) Individualized goal:  New information related to medical/therapeutic care for a safe D/C home with 80% accuracy and min A.     2) Interventions:  SLP Aphasia Evaluation, SLP Speech Language Treatment, SLP Self Care / ADL Training  and SLP Cognitive Skill Development             Goal: LTG-By discharge, patient will express     Dates: Start: 02/05/17      Description: 1) Individualized goal:  Self using verbal or alternative communication (e.g., communication board/device) with 80% accuracy and minimal-moderate assistance.     2) Interventions:  SLP Aphasia Evaluation, SLP Speech Language Treatment, SLP Self Care / ADL Training  and SLP Cognitive Skill Development               Problem: Swallowing STGs     Dates: Start: 02/05/17       Goal: STG-Within one week, patient will     Dates: Start: 02/13/17      Description: 1) Individualized goal:  Dysphagia 3 textures and nectar thick liquids with no overt s/sx of aspiration and implementing swallow strategies in 80% of opportunities with minimal-moderate assistance.     2) Interventions:  SLP Swallowing Dysfunction Treatment, SLP Video Swallow Evaluation, SLP Self Care / ADL Training  and SLP Group Treatment                   Section completed by:  Moises Folwer MS,[facility]SLP       Nutrition       Dietary Problems           Problem: Other Problem (see comments)     Description: Diagnosis: No nutrition diagnosis.      Goal: Other Goal (Resolved)     Description: Goal:Maintain adequate nutrient/ fluid intake to promote nutrition optimization/healing.     Monitoring/ Evaluation: RD Following PRN.             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  Patient [place] speaking only, aphasic. S/p multiple seizures while in [facility].  Food prefs consult deferred to nutrition rep.     Hospitalist following s/t tachycardia and uncontrolled DM on steroids.     Appropriate for Education? NO s/t aphasia  Education in Past? n/a  Appetite: Excellent        Diet: Dysphagia 2/ NTL/ DM  Average PO intake x 3 days: 100% of meals / adding HS snack per DM protocol     Labs: accuchecks 166-291mg/dL x 48 hours, Na+ 132, Cl 95, Serum Glucose 201, ALT 62, Albumin 3.1, T.P. 5.7,PAB 34 note , CHOL 341,   Medications: Decadron, Pepcid, NPH 18 Units BID, SSI QID 1-6 Units, Senna    PRN Medications: noted  IVF: n/a     Height: 160 cm  Weight: admission weight 91 kig via stand up scale  BMI: 35.4 ( obese class 2)     Skin: sx wound to head  GI: WNL  Vitals: reviewed/ tachycardia noted  I/Os: n/a no output recorded           Diagnosis: No nutrition diagnosis. PO is adequate.  Nutrition rep to see regarding food prefs.  Consider adding medication to manage CHOL.  HS snack added per protocol.     Intervention/ Recommendations/POC:  1. Continue current nutritional POC.  Diet upgrades per SLP.    2.  Continue adequate PO/fluid intake.  3. Nutrition rep to see regarding food prefs/ honor within dietary restrictions (if indicated)  4. Monitor PO intake, weight, labs, medication adjustments, skin integrity, GI function, vitals, I/Os, and overall hydration status.  Adjust nutritional POC pending clinical outcomes.     Monitor/Evaluation: RD following PRN.  Goal: Maintain adequate nutrient/fluid intake to promote nutrition optimization/ healing.                                Section completed by:  Nimisha Cortes RD    REHAB-Pharmacy IDT Team Note by J Carlos  ARSENIO Escalante at 2/13/2017  1:51 PM  Version 1 of 1    Author:  J Carlos Escalante RPH Service:  (none) Author Type:  Pharmacist    Filed:  2/13/2017  1:52 PM Note Time:  2/13/2017  1:51 PM Status:  Signed    :  J Carlos Escalante RPH (Pharmacist)           Pharmacy  Pharmacy  Antibiotics/Antifungals/Antivirals:  Medication:      Active Orders     None        Route:        NA  Stop Date:  NA  Reason:      NA  Antihypertensives/Cardiac:  Medication:    Orders     None        Patient Vitals for the past 24 hrs:   BP Pulse   02/13/17 0700 120/76 mmHg 66   02/12/17 1900 113/76 mmHg (!) 110   02/12/17 1400 112/74 mmHg 98       Anticoagulation:  Medication: None    Other key medications: A review of the medication list reveals no issues at this time.    Section completed by: J Carlos Escalante [facility]        DC Planning  DC destination/dispostion:  Pt has an appt with Dr. Olivier Sorensen (Oncology) today  2/14 at 3:40 pm; plan is for pt to discharge to one of his daughter's home in [place]; there are no stairs to negotiate; dtr does not work outside of the home; Palliative care consult for advanced planning completed @ [facility] ([place])    Referrals: None at this time.      DC Needs:  Family training; dme; home health;     Barriers to discharge: Dx; functional status; confusion; unable to follow directions;        Strengths: Supportive family - 2 dtrs reside locally.     Section completed by:  RACQUEL Patel, CCM   Summary:  Family training; orders written for staples to be dc'd; dc date 2/28; out patient Speech Therapy; MBS to be done this week     Physician Summary  Lisette Vanegas DO participated and led team conference discussion.

## 2017-02-14 NOTE — REHAB-OT IDT TEAM NOTE
Occupational Therapy  Activities of Daily Living  Eatin - Standby Prompting/Supervision or Set-up  Eating Description:  Modified diet, Supervision for safety, Verbal cueing  Groomin - Standby Prompting/Supervision or Set-up  Grooming Description:  Set-up of equipment, Supervision for safety (seated at sink)  Bathin - Standby Prompting/Supervision or Set-up  Bathing Description:  Supervision for safety, Set-up of equipment, Verbal cueing  Upper Body Dressin - Standby Prompting/Supervision or Set-up  Upper Body Dressing Description:  Set-up of equipment, Supervision for safety  Lower Body Dressin - Minimal Assistance  Lower Body Dressing Description:  4 - Minimal Assistance  Toiletin - Minimal Assistance   Toileting Description:  Grab bar, Increased time, Supervision for safety  Toilet Transfer:  5 - Standby Prompting/Supervision or Set-up  Toilet Transfer Description:  5 - Standby Prompting/Supervision or Set-up  Tub / Shower Transfer:  5 - Standby Prompting/Supervision or Set-up  Tub / Shower Transfer Description:  Grab bar, Increased time, Supervision for safety, Verbal cueing  IADL:  N/A  Family Training/Education:  Phone call and discussion with daughter regarding attending therapy sessions to initiate family training  DME/DC Recommendations:  Will follow-up     Strengths:  Alert and oriented, Independent PLOF, Making steady progress towards goals, Motivated for self care and independence, Pleasant and cooperative, Supportive family and Willingly participates in therapeutic activities  Barriers:  Aphasia expressive, Aphasia receptive, Confused, Decreased endurance, Generalized weakness, Language barrier, non-fluent English, Poor activity tolerance     # of short term goals set= 4  # of short term goals met= 2          Occupational Therapy Goals           Problem: Bathing     Dates: Start: 17       Goal: STG-Within one week, patient will bathe     Dates: Start: 17       Description: 1) Individualized Goal:  With supervision for safety seated in shower    2) Interventions:  OT Self Care/ADL, OT Cognitive Skill Dev, OT Community Reintegration, OT Neuro Re-Ed/Balance, OT Therapeutic Activity, OT Evaluation and OT Therapeutic Exercise        Note: Goal Note filed on 02/13/17 1756 by Selena Goins, MS,OTR/L    Goal: STG-Within one week, patient will bathe  Outcome: NOT MET  Poor safety            Problem: Dressing     Dates: Start: 02/11/17       Goal: STG-Within one week, patient will dress LB     Dates: Start: 02/11/17      Description: 1) Individualized Goal:  With set up only AE prn    2) Interventions:  OT Self Care/ADL, OT Cognitive Skill Dev, OT Community Reintegration, OT Neuro Re-Ed/Balance, OT Therapeutic Activity, OT Evaluation and OT Therapeutic Exercise    Note: Goal Note filed on 02/13/17 1756 by Selena Goins, MS,OTR/L    Goal: STG-Within one week, patient will dress LB  Outcome: NOT MET  Inconsistent, Min A            Problem: Functional Cognition     Dates: Start: 02/13/17       Goal: STG-Within one week, patient will     Dates: Start: 02/13/17      Description: 1) Individualized Goal:  require Min A for sequencing and memory when retrieving clothes from closet    2) Interventions: OT Self Care/ADL, OT Cognitive Skill Dev, OT Community Reintegration, OT Neuro Re-Ed/Balance, OT Therapeutic Activity, OT Evaluation and OT Therapeutic Exercise             Goal: STG-Within one week, patient will     Dates: Start: 02/13/17      Description: 1) Individualized Goal:  Require Mod A for sequencing, memory, and initiation when preparing simple meal    2) Interventions:  OT Self Care/ADL, OT Cognitive Skill Dev, OT Community Reintegration, OT Neuro Re-Ed/Balance, OT Therapeutic Activity, OT Evaluation and OT Therapeutic Exercise           Problem: OT Long Term Goals     Dates: Start: 02/11/17       Goal: LTG-By discharge, patient will complete basic self care tasks      Dates: Start: 02/11/17      Description: 1) Individualized Goal:  Mod i fww prn    2) Interventions:  OT Self Care/ADL, OT Cognitive Skill Dev, OT Community Reintegration, OT Neuro Re-Ed/Balance, OT Therapeutic Activity, OT Evaluation and OT Therapeutic Exercise        Goal: LTG-By discharge, patient will perform bathroom transfers     Dates: Start: 02/11/17      Description: 1) Individualized Goal:  Mod I grab bar prn    2) Interventions:  OT Self Care/ADL, OT Cognitive Skill Dev, OT Community Reintegration, OT Neuro Re-Ed/Balance, OT Therapeutic Activity, OT Evaluation and OT Therapeutic Exercise              Section completed by:  Selena Goins MS,OTR/L

## 2017-02-14 NOTE — CARE PLAN
Problem: Balance  Goal: STG-Within one week, patient will maintain static standing  1) Individualized goal: 5min at TT with SPV  2) Interventions: PT Gait Training, PT Self Care/Home Eval, PT Therapeutic Exercises, PT Neuro Re-Ed/Balance and PT Therapeutic Activity  Outcome: MET Date Met:  02/14/17    Problem: Mobility  Goal: STG-Within one week, patient will ambulate community distances  1) Individualized goal: 200ft x 4 with no AD SBA  2) Interventions: PT Gait Training, PT Self Care/Home Eval, PT Therapeutic Exercises, PT Neuro Re-Ed/Balance and PT Therapeutic Activity   Outcome: MET Date Met:  02/14/17  250 feet no device standby assist  Goal: STG-Within one week, patient will ascend and descend four to six stairs  1) Individualized goal: Using step-to pattern with SBA (slow down with improved sequencing)  2) Interventions: PT Gait Training, PT Self Care/Home Eval, PT Therapeutic Exercises, PT Neuro Re-Ed/Balance and PT Therapeutic Activity  Outcome: NOT MET  Contact-guard, cues for safety    Problem: Mobility Transfers  Goal: STG-Within one week, patient will transfer bed to chair  1) Individualized goal: SBA SPT (5 out of 5 trials)  2) Interventions: PT Gait Training, PT Self Care/Home Eval, PT Therapeutic Exercises, PT Neuro Re-Ed/Balance and PT Therapeutic Activity  Outcome: MET Date Met:  02/14/17

## 2017-02-14 NOTE — PROGRESS NOTES
Consult Note: Oncology    Date of consultation: 2/14/2017 3:44 PM    Referring provider: Jayne Washington M.D.    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 As you know he  is a 56 y.o. year old  male who was otherwise in good health who noticed a left facial deviation in mid December 2016 and  some component of aphasia. An MRI done at [facility] showed abnormal increased signal throughout the left temporal and posterior inferior frontal lobes, possibly representing a low-grade tumor. He was seen by Dr. Presley who recommended awake cranitomy with motor/language mapping to facilitate safe resection at [facility]. He was admitted few days after that with a seizure causing syncope and was started on Keppra.    MRI done at [facility] showed extensive infiltrating predominantly nonenhancing left temperofrontal tumor including involvement of the left insula , frontal lobe and a large portion of the anterior and mid temporal lobe . According to the operative note from 1/31/2017, language mapping was done. He had tumor removal approximately 2 cm anterior to the temporal eloquent speech area extending up to the anterior temporal tip. His speech was not comprehensible at that point  and it was decided not to resect tumor posterior or inferior to the critical language area. According to the note, he has high risk of having residual speech problems. He recovered well from the surgery and is currently at a rehabilitation facility.  He continues to have speech problems and word finding difficulty. A CT of the head done on 2/6/2017 here showed-  1.  There has been interval left-sided craniotomy with a frontal temporal defect. There is associated pneumocephalus.  2.  There is been interval development of hypoattenuation in the  left posterior frontal parietal junction, anterior left frontal region and left temporal lobe consistent with areas of encephalomalacia.  3.  There is one remaining area of increased density in the left posterior frontal lobe medially which could be residual mass or focal ill-defined area of hemorrhage.  4.  There is a small amount of left sided holohemispheric extra-axial blood adjacent to the craniotomy site.  5.  There is estimated 9 mm of left-to-right midline shift.  6.  There is mass effect in the left lateral ventricle with slight dilatation of the left temporal horn.    He was accompanied by his daughter today. He is scheduled to see radiation oncology in the next few weeks. He does not have any motor or sensory deficiencies at this point.          Past Medical History:    Past Medical History   Diagnosis Date   • Hyperlipidemia 2010     no meds   • New onset seizure (CMS-HCC) 1/5/2017   • Brain cancer (CMS-HCC)      Glioma   • Bell palsy 12/2016   • Cancer (CMS-HCC) 1/31/2017     Glioma   • Diabetes (CMS-HCC)      R/t steroid use per dtr   • Bell's palsy 1/2017       Past surgical history:    Past Surgical History   Procedure Laterality Date   • Craniotomy tumor  1/31/2017     Lt frontotemporal       Allergies:  Review of patient's allergies indicates no known allergies.    Medications:    No current facility-administered medications for this visit.     No current outpatient prescriptions on file.     Facility-Administered Medications Ordered in Other Visits   Medication Dose Route Frequency Provider Last Rate Last Dose   • lidocaine (LIDODERM) 5 % 1 Patch  1 Patch Transdermal DAILY Lisette Vanegas D.O.   1 Patch at 02/14/17 1442   • insulin NPH (HUMULIN,NOVOLIN) injection 18 Units  18 Units Subcutaneous BID INSULIN Yanick Barth M.D.   18 Units at 02/14/17 0757   • levetiracetam (KEPPRA) 100 MG/ML solution 1,000 mg  1,000 mg Oral Q12HRS Yanick Barth M.D.   1,000 mg at 02/14/17 0752   • docusate sodium  (COLACE) capsule 100 mg  100 mg Oral QDAY PRN Yanick Barth M.D.        And   • senna-docusate (PERICOLACE or SENOKOT S) 8.6-50 MG per tablet 1 Tab  1 Tab Oral Nightly Yanick Barth M.D.   1 Tab at 02/13/17 2000    And   • senna-docusate (PERICOLACE or SENOKOT S) 8.6-50 MG per tablet 1 Tab  1 Tab Oral Q24HRS PRN Yanick Barth M.D.        And   • lactulose 20 GM/30ML solution 30 mL  30 mL Oral Q24HRS PRN Yanick Barth M.D.        And   • bisacodyl (DULCOLAX) suppository 10 mg  10 mg Rectal Q24HRS PRN Yanick Barth M.D.        And   • fleet enema 133 mL  1 Each Rectal Once PRN Yanick Barth M.D.       • Respiratory Care per Protocol   Nebulization Continuous RT Lisette Vanegas D.O.       • dexamethasone (DECADRON) tablet 4 mg  4 mg Oral Q6HRS Lisette Vanegas, D.O.   4 mg at 02/14/17 1138   • temazepam (RESTORIL) capsule 15 mg  15 mg Oral HS PRN - MR X 1 Lisette Vanegas D.O.   15 mg at 02/12/17 2000   • ondansetron (ZOFRAN ODT) dispertab 4 mg  4 mg Oral Q4HRS PRN iLsette Vanegas, D.O.       • famotidine (PEPCID) tablet 20 mg  20 mg Oral BID Lisette Vanegas, D.O.   20 mg at 02/14/17 0752   • acetaminophen (TYLENOL) tablet 650 mg  650 mg Oral Q6HRS PRN Lisette Vanegas, D.O.       • insulin lispro (HUMALOG) injection 1-6 Units  1-6 Units Subcutaneous 4X/DAY ACHS Lisette Vanegas, D.O.   1 Units at 02/14/17 1135       Social History:     Social History     Social History   • Marital Status: Single     Spouse Name: N/A   • Number of Children: N/A   • Years of Education: N/A     Occupational History   • Not on file.     Social History Main Topics   • Smoking status: Former Smoker -- 1.00 packs/day for 30 years     Types: Cigarettes   • Smokeless tobacco: Never Used   • Alcohol Use: No   • Drug Use: No   • Sexual Activity: No      Comment:      Other Topics Concern   • Not on file     Social History Narrative       Family History:     Family History   Problem Relation Age of Onset   • Diabetes Neg Hx    •  "Seizures Daughter        Review of Systems:  Could not be obtained. According to the caregiver, he has not had any further seizures. He continues to speech, problems as above.    Physical Exam:  Vitals:   /78 mmHg  Pulse 91  Temp(Src) 36.5 °C (97.7 °F)  Resp 16  Ht 1.605 m (5' 3.19\")  Wt 88.95 kg (196 lb 1.6 oz)  BMI 34.53 kg/m2  SpO2 96%    General: Not in acute distress, alert   HEENT: No pallor, icterus. Oropharynx clear. Mild left facial deviation. Left temporal scar with the staples intact  Neck: Supple, no palpable masses.  Lymph nodes: No palpable cervical, supraclavicular, axillary or inguinal lymphadenopathy.    CVS: regular rate and rhythm, no rubs or gallops  RESP: Clear to auscultate bilaterally, no wheezing or crackles.   ABD: Soft, non tender, non distended, positive bowel sounds, no palpable organomegaly  EXT: No edema or cyanosis  CNS: , No focal motor  deficits.  Skin- No rash      Labs:   No results for input(s): RBC, HEMOGLOBIN, HEMATOCRIT, PLATELETCT, PROTHROMBTM, APTT, INR, IRON, FERRITIN, TOTIRONBC in the last 72 hours.  Lab Results   Component Value Date/Time    SODIUM 132* 02/11/2017 05:30 AM    POTASSIUM 4.0 02/11/2017 05:30 AM    CHLORIDE 95* 02/11/2017 05:30 AM    CO2 28 02/11/2017 05:30 AM    GLUCOSE 201* 02/11/2017 05:30 AM    BUN 15 02/11/2017 05:30 AM    CREATININE 0.50 02/11/2017 05:30 AM     Pathology- anaplastic astrocytoma , negative for 1p/19q deletion, IDH,MGMT promoter methylation pending.    Assessment and Plan:  Anaplastic astrocytoma ( grade III), negative for 1p/19q deletion, IDH,MGMT promoter methylation pending.    He had diffuse involvement of the frontotemporal lobe. He had debulking done from the temporal lobe anterior to the eloquent language area. Resection was not done posterior and inferiorly due to concern for affecting his speech. He still has word finding difficulty and mixed aphasia. According to his daughter, this has not worsened and she thinks " that overall this is improved after the surgery. Most recent CT scan shows midline shift and postoperative changes. He is on Decadron, which he will continue.    I had a long discussion with the patient's daughter for more than 70 minutes. I informed her that he has a large tumor involving his temporal lobe and frontal lobe and only partial resection was done. Prognosis of anaplastic astrocytoma depends mainly on IDH mutation positivity are not . IDH wild type may behave more like glioblastoma multiforme,  while the IDH mutated ones have much longer survival rate, extending up to 10 years. We will get updated pathology report from [place] in the next few weeks. The standard of care is concurrent chemoradiation employing Temodar followed by adjuvant Temodar monthly. The updated CATNON trial has shown benefit with 12 cycles of adjuvant Temodar therapy.   I will make arrangements for him to get Temodar approved. Hopefully, he will have IDH mutation and MGMT promoter methylated genotype.     I will see him back in the clinic in around 2 weeks for further follow-up.    Complex patient. Recurring complex decision-making.   He agreed and verbalized his agreement and understanding with the current plan.  I answered all questions and concerns he has at this time.              Thank you for allowing me to participate in his care.    Please note that this dictation was created using voice recognition software. I have made every reasonable attempt to correct obvious errors, but I expect that there are errors of grammar and possibly content that I did not discover before finalizing the note.      SIGNATURES:  Olivier Osman    CC:  JAGDEEP Kinsey Movses, M.D.

## 2017-02-14 NOTE — DISCHARGE PLANNING
CASE MANAGEMENT / I attempted to met with pt's dtr, but not in room.  They are with pt at his oncology appt.  Updated dc board-with dc date of 2/28/2017.  Recommendations made for home health.     Plan:  Contact pt's family tomorrow re: IDT update.

## 2017-02-14 NOTE — CARE PLAN
Problem: Bathing  Goal: STG-Within one week, patient will bathe  1) Individualized Goal: With supervision for safety seated in shower  2) Interventions: OT Self Care/ADL, OT Cognitive Skill Dev, OT Community Reintegration, OT Neuro Re-Ed/Balance, OT Therapeutic Activity, OT Evaluation and OT Therapeutic Exercise  Outcome: NOT MET  Poor safety    Problem: Dressing  Goal: STG-Within one week, patient will dress LB  1) Individualized Goal: With set up only AE prn  2) Interventions: OT Self Care/ADL, OT Cognitive Skill Dev, OT Community Reintegration, OT Neuro Re-Ed/Balance, OT Therapeutic Activity, OT Evaluation and OT Therapeutic Exercise   Outcome: NOT MET  Inconsistent, Min A  Goal: STG-Within one week, patient will retrieve clothing  1) Individualized Goal: SBA with fww prn  2) Interventions: OT Self Care/ADL, OT Cognitive Skill Dev, OT Community Reintegration, OT Neuro Re-Ed/Balance, OT Therapeutic Activity, OT Evaluation and OT Therapeutic Exercise   Outcome: MET Date Met:  02/13/17

## 2017-02-14 NOTE — CARE PLAN
Problem: Safety  Goal: Will remain free from injury  No impulsivity noted this shift, encouraged to used call light when in need of assistance.    Problem: Infection  Goal: Will remain free from infection  Pt's head surgical incision with staples ZURI no s/s of infection noted.

## 2017-02-14 NOTE — CARE PLAN
Problem: Swallowing STGs  Goal: STG-Within one week, patient will safely swallow  1) Individualized goal: Dysphagia II textures and nectar thick liquids with no overt s/sx of aspiration and implementing swallow strategies in 80% of opportunities with minimal-moderate assistance.   2) Interventions: SLP Swallowing Dysfunction Treatment, SLP Oral Pharyngeal Evaluation, SLP Video Swallow Evaluation, SLP Self Care / ADL Training and SLP Cognitive Skill Development    Outcome: MET Date Met:  02/13/17  Pt is able to tolerate dysphagia 2 textures with NTL's with no overt s/sx of aspiration/penetration   Goal: STG-Within one week, patient will    Outcome: DISCHARGED-GOAL NOT MET Date Met:  02/13/17    Problem: Comprehension STGs  Goal: STG-Within one week, patient will  1) Individualized goal: Follow one-step directives with visual and verbal cues with 50% accuracy and MOD A.   2) Interventions: SLP Aphasia Evaluation, SLP Speech Language Treatment and SLP Cognitive Skill Development      Outcome: NOT MET  Max A required to follow 1 step directions     Problem: Expression STGs  Goal: STG-Within one week, patient will  1) Individualized goal: Provide verbal or gesture response to basic/personal yes/no questions with 50% accuracy and MOD A.   2) Interventions: SLP Aphasia Evaluation, SLP Speech Language Treatment and SLP Cognitive Skill Development     Outcome: NOT MET  Max A required to answer yes/no questions   Goal: STG-Within one week, patient will  1) Individualized goal: Name physical objects with 50% accuracy with MOD A.   2) Interventions: SLP Aphasia Evaluation, SLP Speech Language Treatment, SLP Self Care / ADL Training and SLP Cognitive Skill Development    Outcome: NOT MET  Pt unable to name any objects at this time despite max cues

## 2017-02-14 NOTE — REHAB-NURSING IDT TEAM NOTE
Nursing  Nursing  Diet/Nutrition:  Diabetic, Dysphagia II and Nectar Thick Liquids  Medication Administration:  Float Whole with Puree  % consumed at meals in last 24 hours:  .  Breakfast:  100%   Lunch:  100%  Dinner:  100%   Snack schedule:  None  Appetite:  Excellent  Fluid Intake/Output in past 24 hours: In: 1014 ml  Admit Weight:  Weight: 96 kg (211 lb 10.3 oz)  Weight Last 7 Days   [88.9 kg (195 lb 15.8 oz)-96 kg (211 lb 10.3 oz)] 88.9 kg (195 lb 15.8 oz) (02/12 0700)    Pain Issues:    Location:  Knee (02/13 1601)  Right;Left (02/13 1601)         Severity:  Denies   Scheduled pain medications:  None     PRN pain medications used in last 24 hours:  None     Bowel:    Admission Bowel Assist Score:  6 - Modified Independent  Bowel Assist Score:  3 - Moderate Assistance  Bowel Accidents in last 7 days:     Last bowel movement: 02/13/17  Stool: Medium     Usual bowel pattern:  daily  Scheduled bowel medications:  senna-docusate (PERICOLACE or SENOKOT S)   PRN bowel medications used in last 24 hours:  None  Nursing Interventions:  Increased time, Scheduled medication  Effectiveness of bowel program:   good=regular, predictable, controlled emptying of bowel  Bladder:    Admission Bladder Assist Score:  4 - Minimal Assistance  Bladder Assist Score:  4 - Minimal Assistance  Bladder Accidents in last 7 days:  1  Medications affecting bladder:  None    Time void schedule/voiding pattern:  Time void every 3 hours during the day and every 4 hours at night  Interventions:  Increased time, Brief  Effectiveness of bladder training:  Good=regular, predictable, emptying of bladder, patient initiates time voiding    Diabetes:  Blood Sugar Frequency:  Before meals and at bedtime    Range of BS for last 48 hours:     Recent Labs      02/12/17   1801  02/12/17   2032  02/13/17   0707  02/13/17   1126  02/13/17   1715  02/13/17   2004  02/14/17   0728  02/14/17   1133   POCGLUCOSE  158*  281*  166*  204*  191*  291*  154*  187*      Scheduled diabetic medications:  None  Sliding scale usage in past 24 hours:  Yes  Total Short acting insulin in the past 24 hours:  Humalog 7 units  Total Long acting insulin in the past 24 hours:  Other Humulin 18units    Wound:         Patient Lines/Drains/Airways Status    Active Current Wounds     Name: Placement date: Placement time: Site: Days:    Surgical Incision  Incision Left Posterior Head 02/04/17 1956    9                   Interventions:  With staples; remains ZURI; monitoring for s/sx of infection  Effectiveness of intervention:  wound is improving     Sleep/wake cycle:   Average hours slept:  Sleeps 4-6 hours without waking  Sleep medication usage:  temazepam (RESTORIL) capsule 15 mg nightly    Patient/Family Training/Education:  Aspiration Precautions, Diabetes Management, Diet/Nutrition, Fall Prevention, General Self Care, Safe Transfers, Safety and Wound Care  Discharge Supply Recommendations:  Glucometer and Strips  Strengths: Pleasant and cooperative and Effective communication skills   Barriers:   Aspiration risk, Confused, Generalized weakness and Language barrier, non-fluent English            Nursing Problems           Problem: Bowel/Gastric:     Goal: Normal bowel function is maintained or improved         Goal: Will not experience complications related to bowel motility           Problem: Communication     Goal: The ability to communicate needs accurately and effectively will improve           Problem: Discharge Barriers/Planning     Goal: Patient's continuum of care needs will be met           Problem: IP BLADDER/VOIDING     Goal: LTG - patient will complete bladder elimination         Goal: STG - Patient demonstrates no accidents         Goal: STG - PATIENT WILL REMAIN CONTINENT.           Problem: Infection     Goal: Will remain free from infection           Problem: Knowledge Deficit     Goal: Knowledge of disease process/condition, treatment plan, diagnostic tests, and  medications will improve         Goal: Knowledge of the prescribed therapeutic regimen will improve           Problem: Mobility     Goal: Risk for activity intolerance will decrease           Problem: NUTRITION     Goal: Adequate Food and Fluid Intake     Flowsheet: Taken at 02/12/17 1228    Percentage Eaten (Lunch) 100 by Heather C Cogan, C.N.A.    Taken at 02/12/17 0900    Percentage Eaten (Breakfast) 100 by Heather C Cogan, C.N.A.    Taken at 02/11/17 1900    Percentage Eaten (Dinner) 100 by Faby Brooks         Problem: Pain Management     Goal: Pain level will decrease to patient's comfort goal           Problem: Psychosocial Needs:     Goal: Level of anxiety will decrease           Problem: Respiratory:     Goal: Respiratory status will improve           Problem: Safety     Goal: Will remain free from injury         Goal: Will remain free from falls           Problem: Skin Integrity     Goal: Risk for impaired skin integrity will decrease           Problem: Urinary Elimination:     Goal: Ability to reestablish a normal urinary elimination pattern will improve           Problem: Venous Thromboembolism (VTW)/Deep Vein Thrombosis (DVT) Prevention:     Goal: Patient will participate in Venous Thrombosis (VTE)/Deep Vein Thrombosis (DVT)Prevention Measures                Long Term Goals:   At discharge patient will be able to function safely at home and in the community with support.    Section completed by:  Evie Soliman R.N.

## 2017-02-14 NOTE — REHAB-SLP IDT TEAM NOTE
Speech Therapy  Cognitive Linquistic Functions  Comprehension:  1 - Total Assistance  Comprehension Description:  Verbal cues  Expression:  2 - Max Assistance  Expression Description:  Verbal cueing  Social Interaction:  4 - Minimal Assistance  Social Interaction Description:  Verbal cues  Problem Solvin - Total Assistance  Problem Solving Description:  Verbal cueing, Bed/chair alarm, Increased time, Therapy schedule  Memory:  1 - Total Assistance  Memory Description:  Verbal cueing, Bed/chair alarm, Seat belt, Therapy schedule  Executive Functioning / Organization:   Unable to assess at this time   Swallowing  Swallowing Status:  Dysphagia 2 textures and NT liquids, pt requires mod-max cues to follow strategies.  Trials of dysphagia 3 and thin liquids to begin this week    Orders Placed This Encounter   Procedures   • DIET ORDER     Standing Status: Standing      Number of Occurrences: 1      Standing Expiration Date:      Order Specific Question:  Diet:     Answer:  Diabetic [3]     Order Specific Question:  Texture/Fiber modifications:     Answer:  Dysphagia 2(Pureed/Chopped)specify fluid consistency(question 6) [2]     Order Specific Question:  Consistency/Fluid modifications:     Answer:  Nectar Thick [2]     Behavior Modification Plan  Keep instructions simple/concrete, Use nonverbal cues (model/demonstrate or gesture), Give clear feedback, Be calm, Provide reasonable choices, Decrease the chance of failure by offering activities that are within the patient's abilities and Analyze tasks (break down into smaller steps)  Assistive Technology  Other: n/a  Family Training/Education:  To be completed   DC Recommendations:   TBD  Strengths:  Pleasant and cooperative and Willingly participates in therapeutic activities  Barriers:  Aphasia expressive, Aphasia receptive, Aspiration risk, Unable to follow instructions, Confused and Language barrier, non-fluent English  # of short term goals set=4  # of short term  goals met=1        Speech Therapy Problems           Problem: Comprehension STGs     Dates: Start: 02/05/17       Goal: STG-Within one week, patient will     Dates: Start: 02/05/17      Description: 1) Individualized goal:  Follow one-step directives with visual and verbal cues with 50% accuracy and MOD A.     2) Interventions:  SLP Aphasia Evaluation, SLP Speech Language Treatment and SLP Cognitive Skill Development         Note: Goal Note filed on 02/13/17 1647 by Moises Flower MS,CCC-SLP    Goal: STG-Within one week, patient will  Outcome: NOT MET  Max A required to follow 1 step directions             Problem: Expression STGs     Dates: Start: 02/05/17       Goal: STG-Within one week, patient will     Dates: Start: 02/05/17      Description: 1) Individualized goal:  Provide verbal or gesture response to basic/personal yes/no questions with 50% accuracy and MOD A.     2) Interventions:  SLP Aphasia Evaluation, SLP Speech Language Treatment and SLP Cognitive Skill Development         Note: Goal Note filed on 02/13/17 1647 by Moises Flower MS,CCC-SLP    Goal: STG-Within one week, patient will  Outcome: NOT MET  Max A required to answer yes/no questions           Goal: STG-Within one week, patient will     Dates: Start: 02/05/17      Description: 1) Individualized goal:  Name physical objects with 50% accuracy with MOD A.     2) Interventions:  SLP Aphasia Evaluation, SLP Speech Language Treatment, SLP Self Care / ADL Training  and SLP Cognitive Skill Development        Note: Goal Note filed on 02/13/17 1647 by Moises Flower MS,CCC-SLP    Goal: STG-Within one week, patient will  Outcome: NOT MET  Pt unable to name any objects at this time despite max cues             Problem: Speech/Swallowing LTGs     Dates: Start: 02/05/17       Goal: LTG-By discharge, patient will safely swallow     Dates: Start: 02/05/17      Description: 1) Individualized goal:  Regular textures and thin liquids w/ no overt s/sx of aspiration  and implement safe swallow strategies in 90% of opportunities     2) Interventions:  SLP Swallowing Dysfunction Treatment, SLP Oral Pharyngeal Evaluation, SLP Video Swallow Evaluation, SLP Self Care / ADL Training  and SLP Cognitive Skill Development            Goal: LTG-By discharge, patient will comprehend     Dates: Start: 02/05/17      Description: 1) Individualized goal:  New information related to medical/therapeutic care for a safe D/C home with 80% accuracy and min A.     2) Interventions:  SLP Aphasia Evaluation, SLP Speech Language Treatment, SLP Self Care / ADL Training  and SLP Cognitive Skill Development             Goal: LTG-By discharge, patient will express     Dates: Start: 02/05/17      Description: 1) Individualized goal:  Self using verbal or alternative communication (e.g., communication board/device) with 80% accuracy and minimal-moderate assistance.     2) Interventions:  SLP Aphasia Evaluation, SLP Speech Language Treatment, SLP Self Care / ADL Training  and SLP Cognitive Skill Development               Problem: Swallowing STGs     Dates: Start: 02/05/17       Goal: STG-Within one week, patient will     Dates: Start: 02/13/17      Description: 1) Individualized goal:  Dysphagia 3 textures and nectar thick liquids with no overt s/sx of aspiration and implementing swallow strategies in 80% of opportunities with minimal-moderate assistance.     2) Interventions:  SLP Swallowing Dysfunction Treatment, SLP Video Swallow Evaluation, SLP Self Care / ADL Training  and SLP Group Treatment                   Section completed by:  Moises Flower MS,CCC-SLP

## 2017-02-14 NOTE — CARE PLAN
Problem: Other Problem (see comments)  Goal: Other Goal  Goal:Maintain adequate nutrient/ fluid intake to promote nutrition optimization/healing.   Monitoring/ Evaluation: RD Following PRN.   Outcome: MET Date Met:  02/14/17

## 2017-02-14 NOTE — CARE PLAN
Problem: Safety  Goal: Will remain free from injury  Outcome: PROGRESSING AS EXPECTED  Pt not impulsive this shift but does not use call light. Bed and chair alarms engaged, room close to nurse's station.    Problem: Infection  Goal: Will remain free from infection  Outcome: PROGRESSING AS EXPECTED  Head incision stapled and ZURI. Approximated, no s/s infection noted.

## 2017-02-14 NOTE — REHAB-DIETARY IDT TEAM NOTE
Dietary  Nutrition       Dietary Problems           Problem: Other Problem (see comments)     Description: Diagnosis: No nutrition diagnosis.      Goal: Other Goal (Resolved)     Description: Goal:Maintain adequate nutrient/ fluid intake to promote nutrition optimization/healing.     Monitoring/ Evaluation: RD Following PRN.             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  Patient Puerto Rican speaking only, aphasic. S/p multiple seizures while in [facility].  Food prefs consult deferred to nutrition rep.     Hospitalist following s/t tachycardia and uncontrolled DM on steroids.     Appropriate for Education? NO s/t aphasia  Education in Past? n/a  Appetite: Excellent        Diet: Dysphagia 2/ NTL/ DM  Average PO intake x 3 days: 100% of meals / adding HS snack per DM protocol     Labs: accuchecks 166-291mg/dL x 48 hours, Na+ 132, Cl 95, Serum Glucose 201, ALT 62, Albumin 3.1, T.P. 5.7,PAB 34 note , CHOL 341,   Medications: Decadron, Pepcid, NPH 18 Units BID, SSI QID 1-6 Units, Senna    PRN Medications: noted  IVF: n/a     Height: 160 cm  Weight: admission weight 91 kig via stand up scale  BMI: 35.4 ( obese class 2)     Skin: sx wound to head  GI: WNL  Vitals: reviewed/ tachycardia noted  I/Os: n/a no output recorded           Diagnosis: No nutrition diagnosis. PO is adequate.  Nutrition rep to see regarding food prefs.  Consider adding medication to manage CHOL.  HS snack added per protocol.     Intervention/ Recommendations/POC:  1. Continue current nutritional POC.  Diet upgrades per SLP.    2.  Continue adequate PO/fluid intake.  3. Nutrition rep to see regarding food prefs/ honor within dietary restrictions (if indicated)  4. Monitor PO intake, weight, labs, medication  adjustments, skin integrity, GI function, vitals, I/Os, and overall hydration status.  Adjust nutritional POC pending clinical outcomes.     Monitor/Evaluation: RD following PRN.  Goal: Maintain adequate nutrient/fluid intake to promote nutrition optimization/ healing.                                Section completed by:  Nimisha Cortes RD

## 2017-02-14 NOTE — MR AVS SNAPSHOT
"        [address]   2017 3:40 PM   Office Visit   MRN: [id number]    Department:  [facility]   Dept Phone:  [phone]    Description:  Male : 1960   Provider:  Olivier Osman M.D.           Reason for Visit     New Patient anaplastic glioma      Allergies as of 2017     No Known Allergies      Vital Signs     Blood Pressure Pulse Temperature Respirations Height Weight    110/78 mmHg 91 36.5 °C (97.7 °F) 16 1.605 m (5' 3.19\") 88.95 kg (196 lb 1.6 oz)    Body Mass Index Oxygen Saturation Smoking Status             34.53 kg/m2 96% Former Smoker         Basic Information     Date Of Birth Sex Race Ethnicity Preferred Language    1960 Male White  Origin (Syrian,Croatian,Indonesian,Junior, etc) English      Your appointments     Mar 06, 2017  1:00 PM   ONCOLOGY EST PATIENT 30 MIN with Olivier Osman M.D.   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      Below and/or attached are the medications your provider expects you to take. Review all of your home medications and newly ordered medications with your provider and/or pharmacist. Follow medication instructions as directed by your provider and/or pharmacist. Please keep your medication list " with you and share with your provider. Update the information when medications are discontinued, doses are changed, or new medications (including over-the-counter products) are added; and carry medication information at all times in the event of emergency situations     Allergies:  No Known Allergies          Medications  Valid as of: February 14, 2017 -  5:11 PM    Generic Name Brand Name Tablet Size Instructions for use    Acetaminophen (Tab) PAIN RELIEVER 325 MG TK 2 T PO Q 6 H PRN        Acetaminophen (Suppos) TYLENOL 650 MG Insert 650 mg in rectum every four hours as needed.        Bisacodyl (Suppos) DULCOLAX 10 MG Insert 10 mg in rectum every day.        Dexamethasone (Tab) DECADRON 4 MG Take 1 Tab by mouth 3 times a day. DO NOT STOP THIS MEDICATION WITHOUT DISCUSSING WITH YOUR DOCTOR        Docusate Sodium (Cap) COLACE 100 MG Take 100 mg by mouth 2 times a day.        Famotidine (Tab) PEPCID 20 MG Take 1 Tab by mouth 2 Times a Day.        Insulin Lispro (Solution) HUMALOG 100 UNIT/ML Inject 1-6 Units as instructed 4 Times a Day,Before Meals and at Bedtime.        Insulin NPH Human (Isophane) (Suspension) HUMULIN,NOVOLIN 100 UNIT/ML Inject 10 Units as instructed 2 Times a Day.        Labetalol HCl (Solution) NORMODYNE,TRANDATE 5 MG/ML 2 mL by Intravenous route every four hours as needed (see admin instructions for parameters).        Lactulose (Solution) lactulose 20 GM/30ML Take 30 mL by mouth 3 times a day.        LevETIRAcetam (Tab) KEPPRA 1000 MG TK  1 T PO  BID        LORazepam (Solution) ATIVAN 2 MG/ML 1 mL by Intravenous route every 1 hour as needed (May repeat x 1 if ineffective).        Ondansetron HCl (Tab) ZOFRAN 4 MG Take 4 mg by mouth every four hours as needed for Nausea/Vomiting.        Sennosides-Docusate Sodium (Tab) PERICOLACE or SENOKOT S 8.6-50 MG Take 1 Tab by mouth every day.        Temazepam (Cap) RESTORIL 15 MG Take 15 mg by mouth at bedtime as needed for Sleep.        .                    Medicines prescribed today were sent to:     [id number] - [place], [place] - [address] AT [facility] [address]    [address] [place] [address]    Phone: [phone] Fax: [phone]    Open 24 Hours?: No    [id number] - [place], [place] - [address] AT [facility]    [address] [facility] [place] [address]    Phone: [phone] Fax: [phone]    Open 24 Hours?: Yes      Medication refill instructions:       If your prescription bottle indicates you have medication refills left, it is not necessary to call your provider’s office. Please contact your pharmacy and they will refill your medication.    If your prescription bottle indicates you do not have any refills left, you may request refills at any time through one of the following ways: The online [email or web address] system (except Urgent Care), by calling your provider’s office, or by asking your pharmacy to contact your provider’s office with a refill request. Medication refills are processed only during regular business hours and may not be available until the next business day. Your provider may request additional information or to have a follow-up visit with you prior to refilling your medication.   *Please Note: Medication refills are assigned a new Rx number when refilled electronically. Your pharmacy may indicate that no refills were authorized even though a new prescription for the same medication is available at the pharmacy. Please request the medicine by name with the pharmacy before contacting your provider for a refill.           MyChart Status: Patient Declined

## 2017-02-15 PROBLEM — C71.9 GLIOBLASTOMA (HCC): Status: RESOLVED | Noted: 2017-01-01 | Resolved: 2017-01-01

## 2017-02-15 NOTE — PROGRESS NOTES
Hospital Medicine Progress Note, Adult, Complex               Author: BETTY YOUNG MD Date & Time created: 2/15/2017  1:30 PM     Interval History:  PATIENT AWAKE.  SPEAKS Khmer BUT DAUGHTER AND FRIEND HERE TO TRANSLATE.      I BRIEFLY SAW THIS MAN LAST WEEK AFTER SEVERAL SEIZURE EPISODEDS.  HE WAS SENT TO ER.  I REVIEWED PATH FROM [place].   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   PROBABLY MUCH BETTER PROGNOSIS THAN   GLIOBLASTOMA.   REVIEWED FILMS WITH DAUGHTER.   IT IS A SIZABLE LEFT HEMISPHERIC MASS.    2/15/16--PATIENT VISITED IN [facility].   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      VITALS LOOK FINE BUT THE GLUCOSES REMAIN POOR DUE TO HIGH DOSE STEROIDS.   WE WILL NOT BE ABLE TO MICRO MANAGE THIS  UNTIL STEROIDS TAPERED DOWN.  MILD PSEUDO HYPONATREMIA FROM HIGH GLUCOSE.   OVERALL,   DOING FAIRLY WELL WITH PT OT.      RECHECK SOME LABS IN AM.        Review of Systems:  Review of Systems   Constitutional: Negative for fever.   Eyes: Negative for blurred vision.   Respiratory: Negative for shortness of breath.    Cardiovascular: Negative for palpitations.   Gastrointestinal: Negative for nausea and vomiting.   Neurological: Negative for dizziness and headaches.   Psychiatric/Behavioral: Negative for hallucinations.       Physical Exam:  Physical Exam   Constitutional: He is oriented to person, place, and time.   HENT:   Mouth/Throat: Oropharynx is clear and moist.   Eyes: No scleral icterus.   Cardiovascular: Normal rate, regular rhythm, S1 normal and S2 normal.    No murmur heard.  Pulmonary/Chest: Effort normal. No stridor. He has no wheezes. He has no rhonchi. He has no rales.   Abdominal: Soft. He exhibits no distension. There is no tenderness. Hernia confirmed negative in the right inguinal area and confirmed negative in the left inguinal area.   Musculoskeletal: He exhibits no edema.   Neurological: He is alert and oriented to person, place, and time. No sensory  deficit.   Skin: Skin is warm and dry. No rash noted. He is not diaphoretic. No cyanosis.   Psychiatric: He has a normal mood and affect. His behavior is normal.   Nursing note and vitals reviewed.      Labs:        Invalid input(s): [id number]      No results for input(s): SODIUM, POTASSIUM, CHLORIDE, CO2, BUN, CREATININE, MAGNESIUM, PHOSPHORUS, CALCIUM in the last 72 hours.  No results for input(s): ALTSGPT, ASTSGOT, ALKPHOSPHAT, TBILIRUBIN, DBILIRUBIN, GAMMAGT, AMYLASE, LIPASE, ALB, PREALBUMIN, GLUCOSE in the last 72 hours.  No results for input(s): RBC, HEMOGLOBIN, HEMATOCRIT, PLATELETCT, PROTHROMBTM, APTT, INR, IRON, FERRITIN, TOTIRONBC in the last 72 hours.            Hemodynamics:  Temp (24hrs), Av.6 °C (97.8 °F), Min:36.5 °C (97.7 °F), Max:36.6 °C (97.9 °F)  Temperature: 36.6 °C (97.8 °F)  Pulse  Av.7  Min: 56  Max: 113   Blood Pressure: 105/74 mmHg     Respiratory:    Respiration: 18, Pulse Oximetry: 96 %        RUL Breath Sounds: Clear;Diminished, RML Breath Sounds: Clear;Diminished, RLL Breath Sounds: Clear;Diminished, LAYA Breath Sounds: Clear, LLL Breath Sounds: Clear;Diminished  Fluids:    Intake/Output Summary (Last 24 hours) at 02/15/17 1330  Last data filed at 02/15/17 1301   Gross per 24 hour   Intake   1135 ml   Output      0 ml   Net   1135 ml        GI/Nutrition:  Orders Placed This Encounter   Procedures   • DIET ORDER     Standing Status: Standing      Number of Occurrences: 1      Standing Expiration Date:      Order Specific Question:  Diet:     Answer:  Diabetic [3]     Order Specific Question:  Texture/Fiber modifications:     Answer:  Dysphagia 2(Pureed/Chopped)specify fluid consistency(question 6) [2]     Order Specific Question:  Consistency/Fluid modifications:     Answer:  Nectar Thick [2]     Medical Decision Making, by Problem:  Active Hospital Problems    Diagnosis   • Brain tumor (CMS-HCC) [D49.6]     *  Recent Glioblastoma  S/P craniotomy and resection    *  Recent  Seizure  On Keprra: 500 mg bid --> 1000 mg bid (adjusted at [facility])    *  Diabetes  Steroid-induced  BS: 166-281  On NPH: 7 units bid --> 12 units bid (2/11 at evening) --> 15 units bid (2/12) --> will increase to 18 units bid (2/13)  Note: diet changed from regular to diabetic diet (starting 2/12)  Cont to monitor    *  Hyponatremia  Na: 132  Monitor for now    *  Hyperlipidemia  *  Hx Bell's Palsy       Labs reviewed and Medications reviewed

## 2017-02-15 NOTE — CARE PLAN
Problem: Safety  Goal: Will remain free from falls  Outcome: PROGRESSING AS EXPECTED  Safety precautions are in place to avoid accidental injury including call light and personal possessions within easy reach, hourly rounding and assessing need for toileting and pain. Patient is in agreement for safety measures and verbalizes understanding of indication. Will continue to monitor for accidental injury and prevent by continuing safety precautions.

## 2017-02-15 NOTE — PROGRESS NOTES
Hospital Medicine Progress Note, Adult, Complex               Author: BETTY YOUNG MD Date & Time created: 2/14/2017  5:28 PM     Interval History:  PATIENT AWAKE.  SPEAKS Frisian BUT DAUGHTER AND FRIEND HERE TO TRANSLATE.      I BRIEFLY SAW THIS MAN LAST WEEK AFTER SEVERAL SEIZURE EPISODEDS.  HE WAS SENT TO ER.  I REVIEWED PATH FROM [place].   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   PROBABLY MUCH BETTER PROGNOSIS THAN   GLIOBLASTOMA.   REVIEWED FILMS WITH DAUGHTER.   IT IS A SIZABLE LEFT HEMISPHERIC MASS.      Review of Systems:  Review of Systems   Constitutional: Negative for fever.   Eyes: Negative for blurred vision.   Respiratory: Negative for shortness of breath.    Cardiovascular: Negative for palpitations.   Gastrointestinal: Negative for nausea and vomiting.   Neurological: Negative for dizziness and headaches.   Psychiatric/Behavioral: Negative for hallucinations.       Physical Exam:  Physical Exam   Constitutional: He is oriented to person, place, and time.   HENT:   Mouth/Throat: Oropharynx is clear and moist.   Eyes: No scleral icterus.   Cardiovascular: Normal rate, regular rhythm, S1 normal and S2 normal.    No murmur heard.  Pulmonary/Chest: Effort normal. No stridor. He has no wheezes. He has no rhonchi. He has no rales.   Abdominal: Soft. He exhibits no distension. There is no tenderness. Hernia confirmed negative in the right inguinal area and confirmed negative in the left inguinal area.   Musculoskeletal: He exhibits no edema.   Neurological: He is alert and oriented to person, place, and time. No sensory deficit.   Skin: Skin is warm and dry. No rash noted. He is not diaphoretic. No cyanosis.   Psychiatric: He has a normal mood and affect. His behavior is normal.   Nursing note and vitals reviewed.      Labs:        Invalid input(s): [id number]      No results for input(s): SODIUM, POTASSIUM, CHLORIDE, CO2, BUN, CREATININE, MAGNESIUM, PHOSPHORUS, CALCIUM in the last 72 hours.  No  results for input(s): ALTSGPT, ASTSGOT, ALKPHOSPHAT, TBILIRUBIN, DBILIRUBIN, GAMMAGT, AMYLASE, LIPASE, ALB, PREALBUMIN, GLUCOSE in the last 72 hours.  No results for input(s): RBC, HEMOGLOBIN, HEMATOCRIT, PLATELETCT, PROTHROMBTM, APTT, INR, IRON, FERRITIN, TOTIRONBC in the last 72 hours.            Hemodynamics:  Temp (24hrs), Av.4 °C (97.6 °F), Min:36.3 °C (97.4 °F), Max:36.6 °C (97.8 °F)  Temperature: 36.3 °C (97.4 °F)  Pulse  Av.2  Min: 56  Max: 113   Blood Pressure: 112/76 mmHg     Respiratory:    Respiration: 20, Pulse Oximetry: 94 %        RUL Breath Sounds: Clear;Diminished, RML Breath Sounds: Clear;Diminished, RLL Breath Sounds: Clear;Diminished, LAYA Breath Sounds: Clear, LLL Breath Sounds: Clear;Diminished  Fluids:    Intake/Output Summary (Last 24 hours) at 17 1728  Last data filed at 17 1241   Gross per 24 hour   Intake   1250 ml   Output      0 ml   Net   1250 ml        GI/Nutrition:  Orders Placed This Encounter   Procedures   • DIET ORDER     Standing Status: Standing      Number of Occurrences: 1      Standing Expiration Date:      Order Specific Question:  Diet:     Answer:  Diabetic [3]     Order Specific Question:  Texture/Fiber modifications:     Answer:  Dysphagia 2(Pureed/Chopped)specify fluid consistency(question 6) [2]     Order Specific Question:  Consistency/Fluid modifications:     Answer:  Nectar Thick [2]     Medical Decision Making, by Problem:  Active Hospital Problems    Diagnosis   • Brain tumor (CMS-HCC) [D49.6]     *  Recent Glioblastoma  S/P craniotomy and resection    *  Recent Seizure  On Keprra: 500 mg bid --> 1000 mg bid (adjusted at [facility])    *  Diabetes  Steroid-induced  BS: 166-281  On NPH: 7 units bid --> 12 units bid ( at evening) --> 15 units bid () --> will increase to 18 units bid ()  Note: diet changed from regular to diabetic diet (starting )  Cont to monitor    *  Hyponatremia  Na: 132  Monitor for now    *  Hyperlipidemia  *  Hx  Bell's Palsy       Labs reviewed and Medications reviewed

## 2017-02-15 NOTE — PROGRESS NOTES
Received shift report and assumed care of patient. Patient sleeping, calm and stable, currently positioned in bed for comfort and safety; call light within reach and bed alarm in place. Denies pain or discomfort at this time.

## 2017-02-15 NOTE — PROGRESS NOTES
"Rehab Progress Note     Interval Events (Subjective)  Patient seen and examined today. Patient in no apparent distress.  ROS: last bm 2/14/2017    Objective:  VITAL SIGNS: /80 mmHg  Pulse 64  Temp(Src) 36.5 °C (97.7 °F)  Resp 20  Ht 1.6 m (5' 2.99\")  Wt 88.9 kg (195 lb 15.8 oz)  BMI 34.73 kg/m2  SpO2 95%  Heart regular rate and rhythm  Lungs clear to auscultation  Abdomen bowel sounds x 4    Recent Results (from the past 72 hour(s))   ACCU-CHEK GLUCOSE    Collection Time: 02/12/17 10:58 AM   Result Value Ref Range    Glucose - Accu-Ck 278 (H) 65 - 99 mg/dL   ACCU-CHEK GLUCOSE    Collection Time: 02/12/17  6:01 PM   Result Value Ref Range    Glucose - Accu-Ck 158 (H) 65 - 99 mg/dL   ACCU-CHEK GLUCOSE    Collection Time: 02/12/17  8:32 PM   Result Value Ref Range    Glucose - Accu-Ck 281 (H) 65 - 99 mg/dL   ACCU-CHEK GLUCOSE    Collection Time: 02/13/17  7:07 AM   Result Value Ref Range    Glucose - Accu-Ck 166 (H) 65 - 99 mg/dL   ACCU-CHEK GLUCOSE    Collection Time: 02/13/17 11:26 AM   Result Value Ref Range    Glucose - Accu-Ck 204 (H) 65 - 99 mg/dL   ACCU-CHEK GLUCOSE    Collection Time: 02/13/17  5:15 PM   Result Value Ref Range    Glucose - Accu-Ck 191 (H) 65 - 99 mg/dL   ACCU-CHEK GLUCOSE    Collection Time: 02/13/17  8:04 PM   Result Value Ref Range    Glucose - Accu-Ck 291 (H) 65 - 99 mg/dL   ACCU-CHEK GLUCOSE    Collection Time: 02/14/17  7:28 AM   Result Value Ref Range    Glucose - Accu-Ck 154 (H) 65 - 99 mg/dL   ACCU-CHEK GLUCOSE    Collection Time: 02/14/17 11:33 AM   Result Value Ref Range    Glucose - Accu-Ck 187 (H) 65 - 99 mg/dL   ACCU-CHEK GLUCOSE    Collection Time: 02/14/17  5:12 PM   Result Value Ref Range    Glucose - Accu-Ck 180 (H) 65 - 99 mg/dL   ACCU-CHEK GLUCOSE    Collection Time: 02/14/17  9:06 PM   Result Value Ref Range    Glucose - Accu-Ck 266 (H) 65 - 99 mg/dL   ACCU-CHEK GLUCOSE    Collection Time: 02/15/17  7:26 AM   Result Value Ref Range    Glucose - Accu-Ck 163 (H) " 65 - 99 mg/dL       Current Facility-Administered Medications   Medication Frequency   • lidocaine (LIDODERM) 5 % 1 Patch DAILY   • insulin NPH (HUMULIN,NOVOLIN) injection 18 Units BID INSULIN   • levetiracetam (KEPPRA) 100 MG/ML solution 1,000 mg Q12HRS   • docusate sodium (COLACE) capsule 100 mg QDAY PRN    And   • senna-docusate (PERICOLACE or SENOKOT S) 8.6-50 MG per tablet 1 Tab Nightly    And   • senna-docusate (PERICOLACE or SENOKOT S) 8.6-50 MG per tablet 1 Tab Q24HRS PRN    And   • lactulose 20 GM/30ML solution 30 mL Q24HRS PRN    And   • bisacodyl (DULCOLAX) suppository 10 mg Q24HRS PRN    And   • fleet enema 133 mL Once PRN   • Respiratory Care per Protocol Continuous RT   • dexamethasone (DECADRON) tablet 4 mg Q6HRS   • temazepam (RESTORIL) capsule 15 mg HS PRN - MR X 1   • ondansetron (ZOFRAN ODT) dispertab 4 mg Q4HRS PRN   • famotidine (PEPCID) tablet 20 mg BID   • acetaminophen (TYLENOL) tablet 650 mg Q6HRS PRN   • insulin lispro (HUMALOG) injection 1-6 Units 4X/DAY ACHS       Orders Placed This Encounter   Procedures   • DIET ORDER     Standing Status: Standing      Number of Occurrences: 1      Standing Expiration Date:      Order Specific Question:  Diet:     Answer:  Diabetic [3]     Order Specific Question:  Texture/Fiber modifications:     Answer:  Dysphagia 2(Pureed/Chopped)specify fluid consistency(question 6) [2]     Order Specific Question:  Consistency/Fluid modifications:     Answer:  [place] Thick [2]       Assessment:  Active Hospital Problems    Diagnosis   • Astrocytoma brain tumor (CMS-HCC)   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 S/0 sterotactic left frontotemporal craniotomy with microscopic computer assisted partial resection of the left temporal low grade  glioma using intraoperative functional cortical mapping with awake patient.     new acute infarct posterior to the resection of the cavity in the left temporal occipital lobe. And new small focus of GRE hypointensity in left frontoparietal regional likely represents small hemorrhage or blood products from surgery  Anaplastic Grade III astrocytoma. He has an appointment to see  in the next several weeks, thus he would not be undergoing radiation near his recent resection anyway's. The patient has and apppointment for outpatient oncology with Olivier Osman MD    Aphasia  Decadron taper as tolererated: continue 4 mg q 6 hours for now  Seizure: 1000 mg keppra bid  Initiate bladder program  Initiate bowel program  Check labs in am  IMPAIRMENTS:    Mobility  Self-care  IADLs  Cognitive  Speech      PLAN:    Discussion and Recommendations:    1. The patient requires an acute inpatient rehabilitation program with a coordinated program of care at an intensity and frequency not available at a lower level of care. This recommendation is substantiated by the patient's medical physicians who recommend that the patient's intervention and assessment of medical issues needs to be done at an acute level of care for patient's safety and maximum outcome.    2. A coordinated program of care will be supplied by an interdisciplinary team of physical therapy, occupational therapy, rehab physician, rehab nursing, and, if needed, speech therapy and rehab psychology. Rehab team presents a patient-specific rehabilitation and education program concentrating on prevention of future problems related to accessibility, mobility, skin, bowel, bladder, sexuality, and psychosocial and medical/surgical problems.    3. Need for Rehabilitation Physician: The rehab physician will be evaluating the patient on a multi-weekly basis to help coordinate the program of care. The rehab physician communicates between medical physicians, therapists,  and nurses to maximize the patient's potential outcome. Specific areas in which the rehab physician will be providing daily assessment include the following:    A. Assessing the patient's heart rate and blood pressure response (vitals monitoring) to activity and making adjustments in medications or conservative measures as needed.    B. The rehab physician will be assessing the frequency at which the program can be increased to allow the patient to reach optimal functional outcome.    C. The rehab physician will also provide assessments in daily skin care, especially in light of patient's impairments in mobility.    D. The rehab physician will provide special expertise in understanding how to work with functional impairment and recommend appropriate interventions, compensatory techniques, and education that will facilitate the patient's outcome.    4. Rehab R.N.    The rehab RN will be working with patient to carry over in room mobility and activities of daily living when the patient is not in 3 hours of skilled therapy. Rehab nursing will be working in conjunction with rehab physician to address all the medical issues above and continue to assess laboratory work and discuss abnormalities with the treating physicians, assess vitals, and response to activity, and discuss and report abnormalities with the rehab physician. Rehab RN will also continue daily skin care, supervise bladder/bowel program, instruct in medication administration, and ensure patient safety.     MEDICAL DECISION MAKING and INTERDISCIPLINARY PLAN OF CARE:    REHABILITATION ISSUES/ADVERSE POTENTIAL::  New CVA (Cerebrovascular Accident):Patient demonstrates functional deficits in strength, balance, coordination, and ADL's. Patient is admitted to [facility] for comprehensive rehabilitation therapy as described below.   Rehabilitation nursing monitors bowel and bladder control, educates on medication administration, co-morbidities  and monitors patient s    NonTBI (Traumatic Brain Injury): Patient demonstrates functional deficits in cognition, behavior, strength, balance, coordination, and ADL's. The patient requires therapy to correct these deficits prior to discharge. Patient is admitted to [facility] for comprehensive rehabilitation therapy, including physical, occupational and speech therapy.     Rehabilitation nursing monitors bowel and bladder control, educates on medication administration, co-morbidities and monitors patient safety.    Therapies to treat at intensity and frequency of (may change after completion of evaluation by all therapeutic disciplines):       PT:  Physical therapy to address mobility, transfer, gait training and evaluation for adaptive equipment needs 1hour/day at least 5 days/week for the duration of the ELOS (see below)       OT:  Occupational therapy to address ADLs, self-care, home management training, functional mobility/transfers and assistive device evaluation, and community re-intergration 1hour/day at least 5 days/week for the duration of the ELOS (see below).         ST/Dysphagia:  Speech therapy to address speech, language,and cognitive deficits as well as swallowing difficulties with retraining/dysphagia management and community re-integration with comprehension, expression, cognitive training 1hour/day at least 5 days/week for the duration of the ELOS (see below).     2.  Neurostimulants: None at this time but continue to assess daily for need to initiate should status change.    3.  DVT prophylaxis:  Patient is on not due to small hemmrhage in brain post op for anticoagulation upon transfer. Encourage OOB. Monitor daily for signs and symptoms of DVT including but not limited to swelling and pain to prevent the development of DVT that may interfere with therapies.    4.  GI prophylaxis:  On prilosec to prevent gastritis/dyspepsia which may interfere with therapies.    5.  Pain: No  issues with pain currently     6.  Nutrition/Dysphagia: Dietician monitors nutrient intake, recommend supplements prn and provide nutrition education to pt/family to promote optimal nutrition for wound healing/recovery.     7.  Bladder/bowel:  Start bowel and bladder program as described below, to prevent constipation, urinary retention (which may lead to UTI), and urinary incontinence (which will impact upon pt's functional independence).    - TV Q3h while awake with post void bladder scans, I&O cath for PVRs >400  - up to commode after meal     8.  Skin/dermal ulcer prophylaxis: Monitor for new skin conditions with q.2 h. turns as required to prevent the development of skin breakdown.     9.  Cognition/Behavior:  Psychologist Dr. Kingsley provides adjustment counseling to illness and psychosocial barriers that may be potential barriers to rehabilitation.     10. Respiratory therapy: RT performs O2 management prn, breathing retraining, pulmonary hygeine and bronchospasm management prn to optimize participation in therapies.      MEDICAL CO-MORBIDITIES/ADVERSE POTENTIAL AFFECTING FUNCTION:        GOALS/EXPECTED LEVEL OF FUNCTION BASED ON CURRENT MEDICAL AND FUNCTIONAL STATUS (may change based on patient's medical status and rate of impairment recovery):     Transfers: sup to mod I     Mobility/Gait:sup to mod I     ADL's:sup to mod I     Cognition:sup    DISPOSITION: home with assistance     ELOS: 2 weeks              Medical Decision Making and Plan:    I attended and led team conference 2-  Nursing:diabetic dys 2 ntl, pills whole in apple sauce, eating 100%, fluids 1400 cc, continent of bowel and bladder, blood sugars ac and hs  PT: barrier comprehension, cognitive issues, language barrier, transfers sba, gait 250 ft pain free no device, today right knee pain, 125 ft, add lidoderm  OT: sba sup adls, min lower body dressing and toileting  ST: total to max comprehension, answers simple questions , perseverated  on cheese,max expression, no naming, strengths reading single word and match with field of 2 pictures, no single directions needs hand over hand, count 1-30, social interaction min, memory and problem solving, dys 2 with ntl, cannot follow strategies but goes slow, tolerated 50 % thins  Case management:lives with have appointment with oncology to day, go to daughters home for discharge, family training  Discharge date: 2/28/2017    Continue comprehensive rehab,   Total time:  >35 minutes.  I spent greater than 50% of the time for patient care and coordination on this date, including unit/floor time, and face-to-face time with the patient as per assessment and plan above.    Lisette Vanegas D.O.

## 2017-02-15 NOTE — CARE PLAN
Problem: GLYCEMIA IMBALANCE  Goal: Clinical indication of glycemia balance is achieved  Intervention: MONITOR BLOOD GLUCOSE LEVELS AS ORDERED  HS FSBS 266. 3 units of Humalog given per sliding scale. Pt provided HS snack of which pt ate 100% while this RN was at bedside. No s/s hypo/hyperglycemia noted thus far.

## 2017-02-15 NOTE — PROGRESS NOTES
Zach VALENZUELA found money on the floor in this pt's room, counted it with Luis Serrano and this RN with a total amount of $142.00, we put it in a plastic bag and put it back in the pt's bag, Aniceto GOODEN notified.    1230 Pt's daughter Sia came in, informed her about the money that was found on the floor, she check his bag and found more money with a total amount of $ 461.00. Handed her a total amount of $603.00 and she took it home.

## 2017-02-16 NOTE — CARE PLAN
Problem: Safety  Goal: Will remain free from injury  No impulsivity noted this shift, bed alarm and chair alarm in used.    Problem: Bowel/Gastric:  Goal: Normal bowel function is maintained or improved  Intervention: Educate patient and significant other/support system about signs and symptoms of constipation and interventions to implement  Patient having regular bowel movements; last BM 02/16.  Denies s/s constipation; bowel meds available if needed.  Will continue to monitor.

## 2017-02-16 NOTE — CARE PLAN
Problem: Skin Integrity  Goal: Risk for impaired skin integrity will decrease  Removed every other staples on the head incision, incision intact no s/s of bleeding noted. Pt tolerated well.

## 2017-02-16 NOTE — CARE PLAN
Problem: Safety  Goal: Will remain free from falls  Outcome: PROGRESSING AS EXPECTED  Pt has not been impulsive thus far. Hourly rounding in place to ensure all pt needs are met. Bed alarm in place and pt in room close to nurses station.     Problem: Skin Integrity  Goal: Risk for impaired skin integrity will decrease  Outcome: PROGRESSING AS EXPECTED  Surgical incision to left head clean, dry, and approximated with aleida. Day RN removed every other staple today. No wound dehiscence noted this shift.

## 2017-02-16 NOTE — PROGRESS NOTES
Hospital Medicine Progress Note, Adult, Complex               Author: BETTY YOUNG MD Date & Time created: 2/16/2017  3:18 PM     Interval History:  PATIENT AWAKE.  SPEAKS Danish BUT DAUGHTER AND FRIEND HERE TO TRANSLATE.      I BRIEFLY SAW THIS MAN LAST WEEK AFTER SEVERAL SEIZURE EPISODEDS.  HE WAS SENT TO ER.  I REVIEWED PATH FROM [place].   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   PROBABLY MUCH BETTER PROGNOSIS THAN   GLIOBLASTOMA.   REVIEWED FILMS WITH DAUGHTER.   IT IS A SIZABLE LEFT HEMISPHERIC MASS.    2/15/16--PATIENT VISITED IN [facility].   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      VITALS LOOK FINE BUT THE GLUCOSES REMAIN POOR DUE TO HIGH DOSE STEROIDS.   WE WILL NOT BE ABLE TO MICRO MANAGE THIS  UNTIL STEROIDS TAPERED DOWN.  MILD PSEUDO HYPONATREMIA FROM HIGH GLUCOSE.   OVERALL,   DOING FAIRLY WELL WITH PT OT.      RECHECK SOME LABS IN AM.    2/16/17--PATIENT AWAKE AND ALERT NO NEW CHANGES.   FOR SOME REASON HE THINKS HE IS LEAVING TODAY.   REMOVING THE REMAINDER OF STAPLES TODAY.  THERE HAS BEEN NO FURTHER SEIZURE ACTIVITIY.   CHECK LEVELS.   I AM OK WITH HIS VITALS.   HIS PROGNOSIS WITH ASTROCYTOMA IS BETTER THAN THAT OF A GLIOBLASTOMA.          Review of Systems:  Review of Systems   Constitutional: Negative for fever.   Eyes: Negative for blurred vision.   Respiratory: Negative for shortness of breath.    Cardiovascular: Negative for palpitations.   Gastrointestinal: Negative for nausea and vomiting.   Neurological: Negative for dizziness and headaches.   Psychiatric/Behavioral: Negative for hallucinations.       Physical Exam:  Physical Exam   Constitutional: He is oriented to person, place, and time.   HENT:   Mouth/Throat: Oropharynx is clear and moist.   Eyes: No scleral icterus.   Cardiovascular: Normal rate, regular rhythm, S1 normal and S2 normal.    No murmur heard.  Pulmonary/Chest: Effort normal. No stridor. He has no wheezes. He has no rhonchi. He  has no rales.   Abdominal: Soft. He exhibits no distension. There is no tenderness. Hernia confirmed negative in the right inguinal area and confirmed negative in the left inguinal area.   Musculoskeletal: He exhibits no edema.   Neurological: He is alert and oriented to person, place, and time. No sensory deficit.   Skin: Skin is warm and dry. No rash noted. He is not diaphoretic. No cyanosis.   Psychiatric: He has a normal mood and affect. His behavior is normal.   Nursing note and vitals reviewed.      Labs:        Invalid input(s): [id number]      Recent Labs      17   0533   SODIUM  135   POTASSIUM  4.4   CHLORIDE  97   CO2  30   BUN  17   CREATININE  0.53   CALCIUM  8.8     Recent Labs      17   0533   ALTSGPT  74*   ASTSGOT  21   ALKPHOSPHAT  38   TBILIRUBIN  0.7   GLUCOSE  149*     Recent Labs      17   0533   RBC  4.67*   HEMOGLOBIN  14.0   HEMATOCRIT  42.1   PLATELETCT  155*     Recent Labs      17   0533   WBC  9.3   NEUTSPOLYS  80.10*   LYMPHOCYTES  7.10*   MONOCYTES  6.50   EOSINOPHILS  0.00   BASOPHILS  0.30   ASTSGOT  21   ALTSGPT  74*   ALKPHOSPHAT  38   TBILIRUBIN  0.7           Hemodynamics:  Temp (24hrs), Av.6 °C (97.8 °F), Min:36.4 °C (97.5 °F), Max:36.7 °C (98.1 °F)  Temperature: 36.4 °C (97.5 °F)  Pulse  Av  Min: 56  Max: 113   Blood Pressure: 116/78 mmHg     Respiratory:    Respiration: 18, Pulse Oximetry: 94 %        RUL Breath Sounds: Clear;Diminished, RML Breath Sounds: Clear;Diminished, RLL Breath Sounds: Clear;Diminished, LAYA Breath Sounds: Clear, LLL Breath Sounds: Clear;Diminished  Fluids:    Intake/Output Summary (Last 24 hours) at 17 1518  Last data filed at 17 0904   Gross per 24 hour   Intake    856 ml   Output      0 ml   Net    856 ml        GI/Nutrition:  Orders Placed This Encounter   Procedures   • DIET ORDER     Standing Status: Standing      Number of Occurrences: 1      Standing Expiration Date:      Order Specific Question:   Diet:     Answer:  Diabetic [3]     Order Specific Question:  Texture/Fiber modifications:     Answer:  Dysphagia 3(Mechanical Soft)specify fluid consistency(question 6) [3]     Order Specific Question:  Consistency/Fluid modifications:     Answer:  Nectar Thick [2]     Medical Decision Making, by Problem:  Active Hospital Problems    Diagnosis   • Brain tumor (CMS-HCC) [D49.6]     *  Recent Glioblastoma  S/P craniotomy and resection    *  Recent Seizure  On Keprra: 500 mg bid --> 1000 mg bid (adjusted at [facility])    *  Diabetes  Steroid-induced  BS: 166-281  On NPH: 7 units bid --> 12 units bid (2/11 at evening) --> 15 units bid (2/12) --> will increase to 18 units bid (2/13)  Note: diet changed from regular to diabetic diet (starting 2/12)  Cont to monitor    *  Hyponatremia  Na: 132  Monitor for now    *  Hyperlipidemia  *  Hx Bell's Palsy       Labs reviewed and Medications reviewed

## 2017-02-17 NOTE — PROGRESS NOTES
"Rehab Progress Note     Interval Events (Subjective)  Patient seen and examined today. Patient in no apparent distress.  ROS: last bm 2/16/2017, global aphasia    Objective:  VITAL SIGNS: /68 mmHg  Pulse 65  Temp(Src) 36.4 °C (97.6 °F)  Resp 18  Ht 1.6 m (5' 2.99\")  Wt 88.9 kg (195 lb 15.8 oz)  BMI 34.73 kg/m2  SpO2 94%  Heart regular rate and rhythm  Lungs clear to auscultation  Abdomen bowel sounds x 4    Recent Results (from the past 72 hour(s))   ACCU-CHEK GLUCOSE    Collection Time: 02/14/17  5:12 PM   Result Value Ref Range    Glucose - Accu-Ck 180 (H) 65 - 99 mg/dL   ACCU-CHEK GLUCOSE    Collection Time: 02/14/17  9:06 PM   Result Value Ref Range    Glucose - Accu-Ck 266 (H) 65 - 99 mg/dL   ACCU-CHEK GLUCOSE    Collection Time: 02/15/17  7:26 AM   Result Value Ref Range    Glucose - Accu-Ck 163 (H) 65 - 99 mg/dL   ACCU-CHEK GLUCOSE    Collection Time: 02/15/17 11:31 AM   Result Value Ref Range    Glucose - Accu-Ck 268 (H) 65 - 99 mg/dL   ACCU-CHEK GLUCOSE    Collection Time: 02/15/17  4:56 PM   Result Value Ref Range    Glucose - Accu-Ck 160 (H) 65 - 99 mg/dL   ACCU-CHEK GLUCOSE    Collection Time: 02/15/17  7:56 PM   Result Value Ref Range    Glucose - Accu-Ck 281 (H) 65 - 99 mg/dL   COMP METABOLIC PANEL    Collection Time: 02/16/17  5:33 AM   Result Value Ref Range    Sodium 135 135 - 145 mmol/L    Potassium 4.4 3.6 - 5.5 mmol/L    Chloride 97 96 - 112 mmol/L    Co2 30 20 - 33 mmol/L    Anion Gap 8.0 0.0 - 11.9    Glucose 149 (H) 65 - 99 mg/dL    Bun 17 8 - 22 mg/dL    Creatinine 0.53 0.50 - 1.40 mg/dL    Calcium 8.8 8.5 - 10.5 mg/dL    AST(SGOT) 21 12 - 45 U/L    ALT(SGPT) 74 (H) 2 - 50 U/L    Alkaline Phosphatase 38 30 - 99 U/L    Total Bilirubin 0.7 0.1 - 1.5 mg/dL    Albumin 3.1 (L) 3.2 - 4.9 g/dL    Total Protein 5.5 (L) 6.0 - 8.2 g/dL    Globulin 2.4 1.9 - 3.5 g/dL    A-G Ratio 1.3 g/dL   CBC WITH DIFFERENTIAL    Collection Time: 02/16/17  5:33 AM   Result Value Ref Range    WBC 9.3 4.8 - " 10.8 K/uL    RBC 4.67 (L) 4.70 - 6.10 M/uL    Hemoglobin 14.0 14.0 - 18.0 g/dL    Hematocrit 42.1 42.0 - 52.0 %    MCV 90.1 81.4 - 97.8 fL    MCH 30.0 27.0 - 33.0 pg    MCHC 33.3 (L) 33.7 - 35.3 g/dL    RDW 45.0 35.9 - 50.0 fL    Platelet Count 155 (L) 164 - 446 K/uL    MPV 9.1 9.0 - 12.9 fL    Nucleated RBC 0.20 /100 WBC    NRBC (Absolute) 0.02 K/uL    Neutrophils-Polys 80.10 (H) 44.00 - 72.00 %    Lymphocytes 7.10 (L) 22.00 - 41.00 %    Monocytes 6.50 0.00 - 13.40 %    Eosinophils 0.00 0.00 - 6.90 %    Basophils 0.30 0.00 - 1.80 %    Immature Granulocytes 6.00 (H) 0.00 - 0.90 %    Neutrophils (Absolute) 7.42 1.82 - 7.42 K/uL    Lymphs (Absolute) 0.66 (L) 1.00 - 4.80 K/uL    Monos (Absolute) 0.60 0.00 - 0.85 K/uL    Eos (Absolute) 0.00 0.00 - 0.51 K/uL    Baso (Absolute) 0.03 0.00 - 0.12 K/uL    Immature Granulocytes (abs) 0.56 (H) 0.00 - 0.11 K/uL   LIPID PROFILE    Collection Time: 02/16/17  5:33 AM   Result Value Ref Range    Cholesterol,Tot 341 (H) 100 - 199 mg/dL    Triglycerides 185 (H) 0 - 149 mg/dL    HDL 59 >=40 mg/dL     (H) <100 mg/dL   FREE THYROXINE    Collection Time: 02/16/17  5:33 AM   Result Value Ref Range    Free T-4 0.63 0.53 - 1.43 ng/dL   ESTIMATED GFR    Collection Time: 02/16/17  5:33 AM   Result Value Ref Range    GFR If African American >60 >60 mL/min/1.73 m 2    GFR If Non African American >60 >60 mL/min/1.73 m 2   PERIPHERAL SMEAR REVIEW    Collection Time: 02/16/17  5:33 AM   Result Value Ref Range    Peripheral Smear Review see below    DIFFERENTIAL COMMENT    Collection Time: 02/16/17  5:33 AM   Result Value Ref Range    Comments-Diff see below    ACCU-CHEK GLUCOSE    Collection Time: 02/16/17  7:44 AM   Result Value Ref Range    Glucose - Accu-Ck 129 (H) 65 - 99 mg/dL   ACCU-CHEK GLUCOSE    Collection Time: 02/16/17 11:29 AM   Result Value Ref Range    Glucose - Accu-Ck 187 (H) 65 - 99 mg/dL   ACCU-CHEK GLUCOSE    Collection Time: 02/16/17  5:13 PM   Result Value Ref Range     Glucose - Accu-Ck 228 (H) 65 - 99 mg/dL   ACCU-CHEK GLUCOSE    Collection Time: 02/16/17  8:06 PM   Result Value Ref Range    Glucose - Accu-Ck 313 (H) 65 - 99 mg/dL   COMP METABOLIC PANEL    Collection Time: 02/17/17  5:33 AM   Result Value Ref Range    Sodium 131 (L) 135 - 145 mmol/L    Potassium 4.1 3.6 - 5.5 mmol/L    Chloride 96 96 - 112 mmol/L    Co2 29 20 - 33 mmol/L    Anion Gap 6.0 0.0 - 11.9    Glucose 184 (H) 65 - 99 mg/dL    Bun 18 8 - 22 mg/dL    Creatinine 0.55 0.50 - 1.40 mg/dL    Calcium 8.6 8.5 - 10.5 mg/dL    AST(SGOT) 23 12 - 45 U/L    ALT(SGPT) 72 (H) 2 - 50 U/L    Alkaline Phosphatase 42 30 - 99 U/L    Total Bilirubin 0.7 0.1 - 1.5 mg/dL    Albumin 3.0 (L) 3.2 - 4.9 g/dL    Total Protein 5.3 (L) 6.0 - 8.2 g/dL    Globulin 2.3 1.9 - 3.5 g/dL    A-G Ratio 1.3 g/dL   CBC WITH DIFFERENTIAL    Collection Time: 02/17/17  5:33 AM   Result Value Ref Range    WBC 9.0 4.8 - 10.8 K/uL    RBC 4.62 (L) 4.70 - 6.10 M/uL    Hemoglobin 14.1 14.0 - 18.0 g/dL    Hematocrit 41.0 (L) 42.0 - 52.0 %    MCV 88.7 81.4 - 97.8 fL    MCH 30.5 27.0 - 33.0 pg    MCHC 34.4 33.7 - 35.3 g/dL    RDW 45.0 35.9 - 50.0 fL    Platelet Count 138 (L) 164 - 446 K/uL    MPV 8.9 (L) 9.0 - 12.9 fL    Nucleated RBC 0.30 /100 WBC    NRBC (Absolute) 0.03 K/uL    Neutrophils-Polys 81.20 (H) 44.00 - 72.00 %    Lymphocytes 6.40 (L) 22.00 - 41.00 %    Monocytes 6.80 0.00 - 13.40 %    Eosinophils 0.00 0.00 - 6.90 %    Basophils 0.20 0.00 - 1.80 %    Immature Granulocytes 5.40 (H) 0.00 - 0.90 %    Neutrophils (Absolute) 7.33 1.82 - 7.42 K/uL    Lymphs (Absolute) 0.58 (L) 1.00 - 4.80 K/uL    Monos (Absolute) 0.61 0.00 - 0.85 K/uL    Eos (Absolute) 0.00 0.00 - 0.51 K/uL    Baso (Absolute) 0.02 0.00 - 0.12 K/uL    Immature Granulocytes (abs) 0.49 (H) 0.00 - 0.11 K/uL   ESTIMATED GFR    Collection Time: 02/17/17  5:33 AM   Result Value Ref Range    GFR If African American >60 >60 mL/min/1.73 m 2    GFR If Non African American >60 >60 mL/min/1.73 m 2    PERIPHERAL SMEAR REVIEW    Collection Time: 02/17/17  5:33 AM   Result Value Ref Range    Peripheral Smear Review see below    DIFFERENTIAL COMMENT    Collection Time: 02/17/17  5:33 AM   Result Value Ref Range    Comments-Diff see below    ACCU-CHEK GLUCOSE    Collection Time: 02/17/17  7:02 AM   Result Value Ref Range    Glucose - Accu-Ck 168 (H) 65 - 99 mg/dL   ACCU-CHEK GLUCOSE    Collection Time: 02/17/17 11:33 AM   Result Value Ref Range    Glucose - Accu-Ck 175 (H) 65 - 99 mg/dL       Current Facility-Administered Medications   Medication Frequency   • neomycin-bacitracin-polymyxin (NEOSPORIN) 400-5-5000 ointment BID   • lidocaine (LIDODERM) 5 % 1 Patch DAILY   • insulin NPH (HUMULIN,NOVOLIN) injection 18 Units BID INSULIN   • levetiracetam (KEPPRA) 100 MG/ML solution 1,000 mg Q12HRS   • docusate sodium (COLACE) capsule 100 mg QDAY PRN    And   • senna-docusate (PERICOLACE or SENOKOT S) 8.6-50 MG per tablet 1 Tab Nightly    And   • senna-docusate (PERICOLACE or SENOKOT S) 8.6-50 MG per tablet 1 Tab Q24HRS PRN    And   • lactulose 20 GM/30ML solution 30 mL Q24HRS PRN    And   • bisacodyl (DULCOLAX) suppository 10 mg Q24HRS PRN    And   • fleet enema 133 mL Once PRN   • Respiratory Care per Protocol Continuous RT   • dexamethasone (DECADRON) tablet 4 mg Q6HRS   • temazepam (RESTORIL) capsule 15 mg HS PRN - MR X 1   • ondansetron (ZOFRAN ODT) dispertab 4 mg Q4HRS PRN   • famotidine (PEPCID) tablet 20 mg BID   • acetaminophen (TYLENOL) tablet 650 mg Q6HRS PRN   • insulin lispro (HUMALOG) injection 1-6 Units 4X/DAY ACHS       Orders Placed This Encounter   Procedures   • DIET ORDER     Standing Status: Standing      Number of Occurrences: 1      Standing Expiration Date:      Order Specific Question:  Diet:     Answer:  Diabetic [3]     Order Specific Question:  Texture/Fiber modifications:     Answer:  Dysphagia 3(Mechanical Soft)specify fluid consistency(question 6) [3]     Order Specific Question:   Consistency/Fluid modifications:     Answer:  Phyllis Thick [2]       Assessment:  Active Hospital Problems    Diagnosis   • Astrocytoma brain tumor (CMS-HCC)   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 S/0 sterotactic left frontotemporal craniotomy with microscopic computer assisted partial resection of the left temporal low grade glioma using intraoperative functional cortical mapping with awake patient.     new acute infarct posterior to the resection of the cavity in the left temporal occipital lobe. And new small focus of GRE hypointensity in left frontoparietal regional likely represents small hemorrhage or blood products from surgery  Anaplastic Grade III astrocytoma. He has an appointment to see  in the next several weeks, thus he would not be undergoing radiation near his recent resection anyway's. The patient has and apppointment for outpatient oncology with Olivier Osman MD    Aphasia  Decadron taper as tolererated: continue 4 mg q 6 hours for now  Seizure: 1000 mg keppra bid  bladder program  bowel program  IMPAIRMENTS:    Mobility  Self-care  IADLs  Cognitive  Speech      PLAN:    Discussion and Recommendations:    1. The patient requires an acute inpatient rehabilitation program with a coordinated program of care at an intensity and frequency not available at a lower level of care. This recommendation is substantiated by the patient's medical physicians who recommend that the patient's intervention and assessment of medical issues needs to be done at an acute level of care for patient's safety and maximum outcome.    2. A coordinated program of care will be supplied by an interdisciplinary team of physical therapy, occupational therapy, rehab physician, rehab  nursing, and, if needed, speech therapy and rehab psychology. Rehab team presents a patient-specific rehabilitation and education program concentrating on prevention of future problems related to accessibility, mobility, skin, bowel, bladder, sexuality, and psychosocial and medical/surgical problems.    3. Need for Rehabilitation Physician: The rehab physician will be evaluating the patient on a multi-weekly basis to help coordinate the program of care. The rehab physician communicates between medical physicians, therapists, and nurses to maximize the patient's potential outcome. Specific areas in which the rehab physician will be providing daily assessment include the following:    A. Assessing the patient's heart rate and blood pressure response (vitals monitoring) to activity and making adjustments in medications or conservative measures as needed.    B. The rehab physician will be assessing the frequency at which the program can be increased to allow the patient to reach optimal functional outcome.    C. The rehab physician will also provide assessments in daily skin care, especially in light of patient's impairments in mobility.    D. The rehab physician will provide special expertise in understanding how to work with functional impairment and recommend appropriate interventions, compensatory techniques, and education that will facilitate the patient's outcome.    4. Rehab R.N.    The rehab RN will be working with patient to carry over in room mobility and activities of daily living when the patient is not in 3 hours of skilled therapy. Rehab nursing will be working in conjunction with rehab physician to address all the medical issues above and continue to assess laboratory work and discuss abnormalities with the treating physicians, assess vitals, and response to activity, and discuss and report abnormalities with the rehab physician. Rehab RN will also continue daily skin care, supervise bladder/bowel  program, instruct in medication administration, and ensure patient safety.     MEDICAL DECISION MAKING and INTERDISCIPLINARY PLAN OF CARE:    REHABILITATION ISSUES/ADVERSE POTENTIAL::  New CVA (Cerebrovascular Accident):Patient demonstrates functional deficits in strength, balance, coordination, and ADL's. Patient is admitted to [facility] for comprehensive rehabilitation therapy as described below.   Rehabilitation nursing monitors bowel and bladder control, educates on medication administration, co-morbidities and monitors patient s    NonTBI (Traumatic Brain Injury): Patient demonstrates functional deficits in cognition, behavior, strength, balance, coordination, and ADL's. The patient requires therapy to correct these deficits prior to discharge. Patient is admitted to [facility] for comprehensive rehabilitation therapy, including physical, occupational and speech therapy.     Rehabilitation nursing monitors bowel and bladder control, educates on medication administration, co-morbidities and monitors patient safety.    Therapies to treat at intensity and frequency of (may change after completion of evaluation by all therapeutic disciplines):       PT:  Physical therapy to address mobility, transfer, gait training and evaluation for adaptive equipment needs 1hour/day at least 5 days/week for the duration of the ELOS (see below)       OT:  Occupational therapy to address ADLs, self-care, home management training, functional mobility/transfers and assistive device evaluation, and community re-intergration 1hour/day at least 5 days/week for the duration of the ELOS (see below).         ST/Dysphagia:  Speech therapy to address speech, language,and cognitive deficits as well as swallowing difficulties with retraining/dysphagia management and community re-integration with comprehension, expression, cognitive training 1hour/day at least 5 days/week for the duration of the ELOS  (see below).     2.  Neurostimulants: None at this time but continue to assess daily for need to initiate should status change.    3.  DVT prophylaxis:  Patient is on not due to small hemmrhage in brain post op for anticoagulation upon transfer. Encourage OOB. Monitor daily for signs and symptoms of DVT including but not limited to swelling and pain to prevent the development of DVT that may interfere with therapies.    4.  GI prophylaxis:  On prilosec to prevent gastritis/dyspepsia which may interfere with therapies.    5.  Pain: No issues with pain currently     6.  Nutrition/Dysphagia: Dietician monitors nutrient intake, recommend supplements prn and provide nutrition education to pt/family to promote optimal nutrition for wound healing/recovery.     7.  Bladder/bowel:  Start bowel and bladder program as described below, to prevent constipation, urinary retention (which may lead to UTI), and urinary incontinence (which will impact upon pt's functional independence).    - TV Q3h while awake with post void bladder scans, I&O cath for PVRs >400  - up to commode after meal     8.  Skin/dermal ulcer prophylaxis: Monitor for new skin conditions with q.2 h. turns as required to prevent the development of skin breakdown.     9.  Cognition/Behavior:  Psychologist Dr. Kingsley provides adjustment counseling to illness and psychosocial barriers that may be potential barriers to rehabilitation.     10. Respiratory therapy: RT performs O2 management prn, breathing retraining, pulmonary hygeine and bronchospasm management prn to optimize participation in therapies.      MEDICAL CO-MORBIDITIES/ADVERSE POTENTIAL AFFECTING FUNCTION:        GOALS/EXPECTED LEVEL OF FUNCTION BASED ON CURRENT MEDICAL AND FUNCTIONAL STATUS (may change based on patient's medical status and rate of impairment recovery):     Transfers: sup to mod I     Mobility/Gait:sup to mod I     ADL's:sup to mod I     Cognition:sup    DISPOSITION: home with assistance      ELOS: 2 weeks              Medical Decision Making and Plan:    I attended and led team conference 2-  Nursing:diabetic dys 2 ntl, pills whole in apple sauce, eating 100%, fluids 1400 cc, continent of bowel and bladder, blood sugars ac and hs  PT: barrier comprehension, cognitive issues, language barrier, transfers sba, gait 250 ft pain free no device, today right knee pain, 125 ft, add lidoderm  OT: sba sup adls, min lower body dressing and toileting  ST: total to max comprehension, answers simple questions , perseverated on cheese,max expression, no naming, strengths reading single word and match with field of 2 pictures, no single directions needs hand over hand, count 1-30, social interaction min, memory and problem solving, dys 2 with ntl, cannot follow strategies but goes slow, tolerated 50 % thins  Case management:lives with have appointment with oncology to day, go to [facility] home for discharge, family training  Discharge date: 2/28/2017    Continue comprehensive rehab,   Total time:  >25 minutes.  I spent greater than 50% of the time for patient care and coordination on this date, including unit/floor time, and face-to-face time with the patient as per assessment and plan above.    Lisette Vanegas D.O.

## 2017-02-17 NOTE — CARE PLAN
Problem: Skin Integrity  Goal: Risk for impaired skin integrity will decrease  Removed remaining staples on the head incsion per Dr. Quevedo, no sign of bleeding noted.

## 2017-02-17 NOTE — PROGRESS NOTES
Hospital Medicine Progress Note, Adult, Complex               Author: BETTY YOUNG MD Date & Time created: 2/17/2017  1:09 PM     Interval History:  PATIENT AWAKE.  SPEAKS Persian BUT DAUGHTER AND FRIEND HERE TO TRANSLATE.      I BRIEFLY SAW THIS MAN LAST WEEK AFTER SEVERAL SEIZURE EPISODEDS.  HE WAS SENT TO ER.  I REVIEWED PATH FROM [place].   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   PROBABLY MUCH BETTER PROGNOSIS THAN   GLIOBLASTOMA.   REVIEWED FILMS WITH DAUGHTER.   IT IS A SIZABLE LEFT HEMISPHERIC MASS.    2/15/16--PATIENT VISITED IN [facility].   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      VITALS LOOK FINE BUT THE GLUCOSES REMAIN POOR DUE TO HIGH DOSE STEROIDS.   WE WILL NOT BE ABLE TO MICRO MANAGE THIS  UNTIL STEROIDS TAPERED DOWN.  MILD PSEUDO HYPONATREMIA FROM HIGH GLUCOSE.   OVERALL,   DOING FAIRLY WELL WITH PT OT.      RECHECK SOME LABS IN AM.    2/16/17--PATIENT AWAKE AND ALERT NO NEW CHANGES.   FOR SOME REASON HE THINKS HE IS LEAVING TODAY.   REMOVING THE REMAINDER OF STAPLES TODAY.  THERE HAS BEEN NO FURTHER SEIZURE ACTIVITIY.   CHECK LEVELS.   I AM OK WITH HIS VITALS.   HIS PROGNOSIS WITH ASTROCYTOMA IS BETTER THAN THAT OF A GLIOBLASTOMA.      2/17/17---PATIENT AWAKE AND ALERT.   NO NEW PROBLEMS.   STILL HAVING ISSUES WITH APHASIA.   PROBLEM IS HE ONLY SPEAKS Persian.    HIS PROGNOSIS IS   FAIR.   LIFE EXPECTANCY IS ABOUT 18 MONTHS WITH A GRADE 3 ASTROCYTOMA.      Review of Systems:  Review of Systems   Constitutional: Negative for fever.   Eyes: Negative for blurred vision.   Respiratory: Negative for shortness of breath.    Cardiovascular: Negative for palpitations.   Gastrointestinal: Negative for nausea and vomiting.   Neurological: Negative for dizziness and headaches.   Psychiatric/Behavioral: Negative for hallucinations.       Physical Exam:  Physical Exam   Constitutional: He is oriented to person, place, and time.   HENT:   Mouth/Throat: Oropharynx is  clear and moist.   Eyes: No scleral icterus.   Cardiovascular: Normal rate, regular rhythm, S1 normal and S2 normal.    No murmur heard.  Pulmonary/Chest: Effort normal. No stridor. He has no wheezes. He has no rhonchi. He has no rales.   Abdominal: Soft. He exhibits no distension. There is no tenderness. Hernia confirmed negative in the right inguinal area and confirmed negative in the left inguinal area.   Musculoskeletal: He exhibits no edema.   Neurological: He is alert and oriented to person, place, and time. No sensory deficit.   Skin: Skin is warm and dry. No rash noted. He is not diaphoretic. No cyanosis.   Psychiatric: He has a normal mood and affect. His behavior is normal.   Nursing note and vitals reviewed.      Labs:        Invalid input(s): [id number]      Recent Labs      17   SODIUM  135  131*   POTASSIUM  4.4  4.1   CHLORIDE  97  96   CO2  30  29   BUN  17  18   CREATININE  0.53  0.55   CALCIUM  8.8  8.6     Recent Labs      17   05   ALTSGPT  74*  72*   ASTSGOT  21  23   ALKPHOSPHAT  38  42   TBILIRUBIN  0.7  0.7   GLUCOSE  149*  184*     Recent Labs      17   05   RBC  4.67*  4.62*   HEMOGLOBIN  14.0  14.1   HEMATOCRIT  42.1  41.0*   PLATELETCT  155*  138*     Recent Labs      17   0533   WBC  9.3  9.0   NEUTSPOLYS  80.10*  81.20*   LYMPHOCYTES  7.10*  6.40*   MONOCYTES  6.50  6.80   EOSINOPHILS  0.00  0.00   BASOPHILS  0.30  0.20   ASTSGOT  21  23   ALTSGPT  74*  72*   ALKPHOSPHAT  38  42   TBILIRUBIN  0.7  0.7           Hemodynamics:  Temp (24hrs), Av.6 °C (97.8 °F), Min:36.4 °C (97.5 °F), Max:36.8 °C (98.2 °F)  Temperature: 36.4 °C (97.6 °F)  Pulse  Av.7  Min: 56  Max: 113   Blood Pressure: 102/68 mmHg     Respiratory:    Respiration: 18, Pulse Oximetry: 94 %        RUL Breath Sounds: Clear, RML Breath Sounds: Clear, RLL Breath Sounds: Diminished, LAYA Breath Sounds: Clear, LLL  Breath Sounds: Diminished  Fluids:    Intake/Output Summary (Last 24 hours) at 02/17/17 1309  Last data filed at 02/17/17 1230   Gross per 24 hour   Intake   1492 ml   Output      0 ml   Net   1492 ml        GI/Nutrition:  Orders Placed This Encounter   Procedures   • DIET ORDER     Standing Status: Standing      Number of Occurrences: 1      Standing Expiration Date:      Order Specific Question:  Diet:     Answer:  Diabetic [3]     Order Specific Question:  Texture/Fiber modifications:     Answer:  Dysphagia 3(Mechanical Soft)specify fluid consistency(question 6) [3]     Order Specific Question:  Consistency/Fluid modifications:     Answer:  Nectar Thick [2]     Medical Decision Making, by Problem:  Active Hospital Problems    Diagnosis   • Brain tumor (CMS-HCC) [D49.6]     *  Recent Glioblastoma  S/P craniotomy and resection    *  Recent Seizure  On Keprra: 500 mg bid --> 1000 mg bid (adjusted at [facility])    *  Diabetes  Steroid-induced  BS: 166-281  On NPH: 7 units bid --> 12 units bid (2/11 at evening) --> 15 units bid (2/12) --> will increase to 18 units bid (2/13)  Note: diet changed from regular to diabetic diet (starting 2/12)  Cont to monitor    *  Hyponatremia  Na: 132  Monitor for now    *  Hyperlipidemia  *  Hx Bell's Palsy       Labs reviewed and Medications reviewed

## 2017-02-17 NOTE — DISCHARGE PLANNING
As confirmed by Susan Behl, RN with [facility], pt has a 30 combined skilled/rehab benefit.

## 2017-02-17 NOTE — CARE PLAN
Problem: Skin Integrity  Goal: Risk for impaired skin integrity will decrease  Outcome: PROGRESSING AS EXPECTED  Remaining staples removed by day RN. New order for Neosporin ointment to be applied BID. No signs of bleeding or wound dehiscence noted.     Problem: GLYCEMIA IMBALANCE  Goal: Clinical indication of glycemia balance is achieved  Intervention: MONITOR BLOOD GLUCOSE LEVELS AS ORDERED  HS FSBS 313. Pt medicated with 4 units of Humalog per sliding scale. HS snack provided and 100% consumed while this RN at bedside. No s/s hypo/hyperglycemia noted at this time.

## 2017-02-17 NOTE — PROGRESS NOTES
Pt saw Dr Osman on the 14th with f/u on March 3rd.  Appointment note indicates pt has radiation appointment same day.  Contacted radiation therapy MA who reported that he does not have appointment on March 3rd and Dr Barrios waiting for additional pathology results for plan of care.  Request to clarify Dr Barrios is aware of previous consult and f/u with Jacqui on March 3rd.

## 2017-02-18 NOTE — PROGRESS NOTES
Has a blister on rt side of tongue which family reports is very painful. Tongue is red with white patch on rt side. Reported to Dr Medeiros with orders received.

## 2017-02-18 NOTE — CARE PLAN
Problem: Communication  Goal: The ability to communicate needs accurately and effectively will improve  Outcome: PROGRESSING AS EXPECTED  Pt. unable to communicate needs due to aphasia. Pt. is Panamanian speaking only and this RN was speaking in Panamanian with patient and he is unable to properly keep a coherent conversation.     Problem: GLYCEMIA IMBALANCE  Goal: Clinical indication of glycemia balance is achieved  Outcome: PROGRESSING SLOWER THAN EXPECTED  Pt. blood sugar checked at hs-283 mg/dL. Sliding scale insulin coverage 3 units Humalog given. No s/s of hypo or Hyperglicemia noted.

## 2017-02-18 NOTE — CARE PLAN
Problem: Safety  Goal: Will remain free from injury  Outcome: PROGRESSING AS EXPECTED  Patient was not impulsive during shift. Bed alarm and chair alarm in place. Remains free from injury.     Problem: Bowel/Gastric:  Goal: Normal bowel function is maintained or improved  Outcome: PROGRESSING AS EXPECTED  Patient is continent of bowel. Use bathroom for elimination. Last BM was today 2/17/17. No s/s of constipation.

## 2017-02-18 NOTE — PROGRESS NOTES
Bedside report received from day shift RN, POC discussed, pt awake in bed, denies pain, questions, or concerns at this time, no s/s distress noted, call light w/in reach, monitoring in progress.

## 2017-02-18 NOTE — PROGRESS NOTES
Hospital Medicine Progress Note, Adult, Complex               Author: BETTY YOUNG MD Date & Time created: 2/18/2017  12:40 PM     Interval History:  PATIENT AWAKE.  SPEAKS Icelandic BUT DAUGHTER AND FRIEND HERE TO TRANSLATE.      I BRIEFLY SAW THIS MAN LAST WEEK AFTER SEVERAL SEIZURE EPISODEDS.  HE WAS SENT TO ER.  I REVIEWED PATH FROM [place].   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   PROBABLY MUCH BETTER PROGNOSIS THAN   GLIOBLASTOMA.   REVIEWED FILMS WITH DAUGHTER.   IT IS A SIZABLE LEFT HEMISPHERIC MASS.    2/15/16--PATIENT VISITED IN [facility].   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      VITALS LOOK FINE BUT THE GLUCOSES REMAIN POOR DUE TO HIGH DOSE STEROIDS.   WE WILL NOT BE ABLE TO MICRO MANAGE THIS  UNTIL STEROIDS TAPERED DOWN.  MILD PSEUDO HYPONATREMIA FROM HIGH GLUCOSE.   OVERALL,   DOING FAIRLY WELL WITH PT OT.      RECHECK SOME LABS IN AM.    2/16/17--PATIENT AWAKE AND ALERT NO NEW CHANGES.   FOR SOME REASON HE THINKS HE IS LEAVING TODAY.   REMOVING THE REMAINDER OF STAPLES TODAY.  THERE HAS BEEN NO FURTHER SEIZURE ACTIVITIY.   CHECK LEVELS.   I AM OK WITH HIS VITALS.   HIS PROGNOSIS WITH ASTROCYTOMA IS BETTER THAN THAT OF A GLIOBLASTOMA.      2/17/17---PATIENT AWAKE AND ALERT.   NO NEW PROBLEMS.   STILL HAVING ISSUES WITH APHASIA.   PROBLEM IS HE ONLY SPEAKS Icelandic.    HIS PROGNOSIS IS   FAIR.   LIFE EXPECTANCY IS ABOUT 18 MONTHS WITH A GRADE 3 ASTROCYTOMA.      Review of Systems:  Review of Systems   Constitutional: Negative for fever.   Eyes: Negative for blurred vision.   Respiratory: Negative for shortness of breath.    Cardiovascular: Negative for palpitations.   Gastrointestinal: Negative for nausea and vomiting.   Neurological: Negative for dizziness and headaches.   Psychiatric/Behavioral: Negative for hallucinations.       Physical Exam:  Physical Exam   Constitutional: He is oriented to person, place, and time.   HENT:   Mouth/Throat: Oropharynx is  clear and moist.   Eyes: No scleral icterus.   Cardiovascular: Normal rate, regular rhythm, S1 normal and S2 normal.    No murmur heard.  Pulmonary/Chest: Effort normal. No stridor. He has no wheezes. He has no rhonchi. He has no rales.   Abdominal: Soft. He exhibits no distension. There is no tenderness. Hernia confirmed negative in the right inguinal area and confirmed negative in the left inguinal area.   Musculoskeletal: He exhibits no edema.   Neurological: He is alert and oriented to person, place, and time. No sensory deficit.   Skin: Skin is warm and dry. No rash noted. He is not diaphoretic. No cyanosis.   Psychiatric: He has a normal mood and affect. His behavior is normal.   Nursing note and vitals reviewed.    Hospital Medicine Progress Note, Adult, Complex               Author: BETTY YOUNG MD Date & Time created: 2/18/2017  12:40 PM     Interval History:  PATIENT AWAKE.  SPEAKS Latvian BUT DAUGHTER AND FRIEND HERE TO TRANSLATE.      I BRIEFLY SAW THIS MAN LAST WEEK AFTER SEVERAL SEIZURE EPISODEDS.  HE WAS SENT TO ER.  I REVIEWED PATH FROM [place].   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   PROBABLY MUCH BETTER PROGNOSIS THAN   GLIOBLASTOMA.   REVIEWED FILMS WITH DAUGHTER.   IT IS A SIZABLE LEFT HEMISPHERIC MASS.    2/15/16--PATIENT VISITED IN [facility].   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      VITALS LOOK FINE BUT THE GLUCOSES REMAIN POOR DUE TO HIGH DOSE STEROIDS.   WE WILL NOT BE ABLE TO MICRO MANAGE THIS  UNTIL STEROIDS TAPERED DOWN.  MILD PSEUDO HYPONATREMIA FROM HIGH GLUCOSE.   OVERALL,   DOING FAIRLY WELL WITH PT OT.      RECHECK SOME LABS IN AM.    2/16/17--PATIENT AWAKE AND ALERT NO NEW CHANGES.   FOR SOME REASON HE THINKS HE IS LEAVING TODAY.   REMOVING THE REMAINDER OF STAPLES TODAY.  THERE HAS BEEN NO FURTHER SEIZURE ACTIVITIY.   CHECK LEVELS.   I AM OK WITH HIS VITALS.   HIS PROGNOSIS WITH ASTROCYTOMA IS BETTER THAN THAT OF A GLIOBLASTOMA.       2/17/17---PATIENT AWAKE AND ALERT.   NO NEW PROBLEMS.   STILL HAVING ISSUES WITH APHASIA.   PROBLEM IS HE ONLY SPEAKS Upper sorbian.    HIS PROGNOSIS IS   FAIR.   LIFE EXPECTANCY IS ABOUT 18 MONTHS WITH A GRADE 3 ASTROCYTOMA.       2/18/17--DOING WELL WITH PT/OT.   VITALS OK.  TOLERATING A REASONABLE DIET AT THIS TIME.   GOOD STRENGTH IN ALL 4 EXTREMITIES.    OVERALL IMPROVING.        Review of Systems:  Review of Systems   Constitutional: Negative for fever.   Eyes: Negative for blurred vision.   Respiratory: Negative for shortness of breath.    Cardiovascular: Negative for palpitations.   Gastrointestinal: Negative for nausea and vomiting.   Neurological: Negative for dizziness and headaches.   Psychiatric/Behavioral: Negative for hallucinations.       Physical Exam:  Physical Exam   Constitutional: He is oriented to person, place, and time.   HENT:   Mouth/Throat: Oropharynx is clear and moist.   Eyes: No scleral icterus.   Cardiovascular: Normal rate, regular rhythm, S1 normal and S2 normal.    No murmur heard.  Pulmonary/Chest: Effort normal. No stridor. He has no wheezes. He has no rhonchi. He has no rales.   Abdominal: Soft. He exhibits no distension. There is no tenderness. Hernia confirmed negative in the right inguinal area and confirmed negative in the left inguinal area.   Musculoskeletal: He exhibits no edema.   Neurological: He is alert and oriented to person, place, and time. No sensory deficit.   Skin: Skin is warm and dry. No rash noted. He is not diaphoretic. No cyanosis.   Psychiatric: He has a normal mood and affect. His behavior is normal.   Nursing note and vitals reviewed.      Labs:        Invalid input(s): [id number]      Recent Labs      02/16/17 0533  02/17/17   0533   SODIUM  135  131*   POTASSIUM  4.4  4.1   CHLORIDE  97  96   CO2  30  29   BUN  17  18   CREATININE  0.53  0.55   CALCIUM  8.8  8.6     Recent Labs      02/16/17 0533  02/17/17   0533   ALTSGPT  74*  72*   ASTSGOT  21   23   ALKPHOSPHAT  38  42   TBILIRUBIN  0.7  0.7   GLUCOSE  149*  184*     Recent Labs      17   0533  17   0533   RBC  4.67*  4.62*   HEMOGLOBIN  14.0  14.1   HEMATOCRIT  42.1  41.0*   PLATELETCT  155*  138*     Recent Labs      17   0533  17   0533   WBC  9.3  9.0   NEUTSPOLYS  80.10*  81.20*   LYMPHOCYTES  7.10*  6.40*   MONOCYTES  6.50  6.80   EOSINOPHILS  0.00  0.00   BASOPHILS  0.30  0.20   ASTSGOT  21  23   ALTSGPT  74*  72*   ALKPHOSPHAT  38  42   TBILIRUBIN  0.7  0.7           Hemodynamics:  Temp (24hrs), Av.6 °C (97.8 °F), Min:36.4 °C (97.6 °F), Max:36.6 °C (97.9 °F)  Temperature: 36.6 °C (97.8 °F)  Pulse  Av.3  Min: 56  Max: 113   Blood Pressure: 132/88 mmHg     Respiratory:    Respiration: 18, Pulse Oximetry: 94 %        RUL Breath Sounds: Clear, RML Breath Sounds: Clear, RLL Breath Sounds: Diminished, LAYA Breath Sounds: Clear, LLL Breath Sounds: Diminished  Fluids:    Intake/Output Summary (Last 24 hours) at 17 1240  Last data filed at 17 1229   Gross per 24 hour   Intake    356 ml   Output      0 ml   Net    356 ml        GI/Nutrition:  Orders Placed This Encounter   Procedures   • DIET ORDER     Standing Status: Standing      Number of Occurrences: 1      Standing Expiration Date:      Order Specific Question:  Diet:     Answer:  Diabetic [3]     Order Specific Question:  Texture/Fiber modifications:     Answer:  Dysphagia 3(Mechanical Soft)specify fluid consistency(question 6) [3]     Order Specific Question:  Consistency/Fluid modifications:     Answer:  Nectar Thick [2]     Medical Decision Making, by Problem:  Active Hospital Problems    Diagnosis   • Brain tumor (CMS-HCC) [D49.6]     *  Recent Glioblastoma  S/P craniotomy and resection    *  Recent Seizure  On Keprra: 500 mg bid --> 1000 mg bid (adjusted at [facility])    *  Diabetes  Steroid-induced  BS: 166-281  On NPH: 7 units bid --> 12 units bid ( at evening) --> 15 units bid () --> will increase  to 18 units bid ()  Note: diet changed from regular to diabetic diet (starting )  Cont to monitor    *  Hyponatremia  Na: 132  Monitor for now    *  Hyperlipidemia  *  Hx Bell's Palsy       Labs reviewed and Medications reviewed                      Labs:        Invalid input(s): [id number]      Recent Labs      17   0533  17   0533   SODIUM  135  131*   POTASSIUM  4.4  4.1   CHLORIDE  97  96   CO2  30  29   BUN  17  18   CREATININE  0.53  0.55   CALCIUM  8.8  8.6     Recent Labs      17   0533  17   0533   ALTSGPT  74*  72*   ASTSGOT  21  23   ALKPHOSPHAT  38  42   TBILIRUBIN  0.7  0.7   GLUCOSE  149*  184*     Recent Labs      17   0533  17   0533   RBC  4.67*  4.62*   HEMOGLOBIN  14.0  14.1   HEMATOCRIT  42.1  41.0*   PLATELETCT  155*  138*     Recent Labs      17   0533  17   0533   WBC  9.3  9.0   NEUTSPOLYS  80.10*  81.20*   LYMPHOCYTES  7.10*  6.40*   MONOCYTES  6.50  6.80   EOSINOPHILS  0.00  0.00   BASOPHILS  0.30  0.20   ASTSGOT  21  23   ALTSGPT  74*  72*   ALKPHOSPHAT  38  42   TBILIRUBIN  0.7  0.7           Hemodynamics:  Temp (24hrs), Av.6 °C (97.8 °F), Min:36.4 °C (97.6 °F), Max:36.6 °C (97.9 °F)  Temperature: 36.6 °C (97.8 °F)  Pulse  Av.3  Min: 56  Max: 113   Blood Pressure: 132/88 mmHg     Respiratory:    Respiration: 18, Pulse Oximetry: 94 %        RUL Breath Sounds: Clear, RML Breath Sounds: Clear, RLL Breath Sounds: Diminished, LAYA Breath Sounds: Clear, LLL Breath Sounds: Diminished  Fluids:    Intake/Output Summary (Last 24 hours) at 17 1240  Last data filed at 17 1229   Gross per 24 hour   Intake    356 ml   Output      0 ml   Net    356 ml        GI/Nutrition:  Orders Placed This Encounter   Procedures   • DIET ORDER     Standing Status: Standing      Number of Occurrences: 1      Standing Expiration Date:      Order Specific Question:  Diet:     Answer:  Diabetic [3]     Order Specific Question:  Texture/Fiber  modifications:     Answer:  Dysphagia 3(Mechanical Soft)specify fluid consistency(question 6) [3]     Order Specific Question:  Consistency/Fluid modifications:     Answer:  Nectar Thick [2]     Medical Decision Making, by Problem:  Active Hospital Problems    Diagnosis   • Brain tumor (CMS-HCC) [D49.6]     *  Recent ASTROCYTOMA  S/P craniotomy and resection    *  Recent Seizure  On Keprra: 500 mg bid --> 1000 mg bid (adjusted at [facility])    *  Diabetes  Steroid-induced  BS: 166-281  On NPH: 7 units bid --> 12 units bid (2/11 at evening) --> 15 units bid (2/12) --> will increase to 18 units bid (2/13)  Note: diet changed from regular to diabetic diet (starting 2/12)  Cont to monitor    *  Hyponatremia  Na: 132  Monitor for now    *  Hyperlipidemia  *  Hx Bell's Palsy       Labs reviewed and Medications reviewed

## 2017-02-18 NOTE — CARE PLAN
"Problem: Communication  Goal: The ability to communicate needs accurately and effectively will improve  Outcome: PROGRESSING SLOWER THAN EXPECTED  Pt is Mauritanian speaking with aphasia. However, pt could understand commands and answer \"yes\" or \"no\" to questions. Pt very cooperative and pleasant.    Problem: Safety  Goal: Will remain free from injury  Outcome: PROGRESSING AS EXPECTED  Pt has not been impulsive on this shift. Pt does have 2:1 sitter to monitor for safety.         "

## 2017-02-19 NOTE — PROGRESS NOTES
Hospital Medicine Progress Note, Adult, Complex               Author: BETTY YOUNG MD Date & Time created: 2/19/2017  1:06 PM     Interval History:  PATIENT AWAKE.  SPEAKS Jordanian BUT DAUGHTER AND FRIEND HERE TO TRANSLATE.      I BRIEFLY SAW THIS MAN LAST WEEK AFTER SEVERAL SEIZURE EPISODEDS.  HE WAS SENT TO ER.  I REVIEWED PATH FROM [place].   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   PROBABLY MUCH BETTER PROGNOSIS THAN   GLIOBLASTOMA.   REVIEWED FILMS WITH DAUGHTER.   IT IS A SIZABLE LEFT HEMISPHERIC MASS.    2/15/16--PATIENT VISITED IN [facility].   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      VITALS LOOK FINE BUT THE GLUCOSES REMAIN POOR DUE TO HIGH DOSE STEROIDS.   WE WILL NOT BE ABLE TO MICRO MANAGE THIS  UNTIL STEROIDS TAPERED DOWN.  MILD PSEUDO HYPONATREMIA FROM HIGH GLUCOSE.   OVERALL,   DOING FAIRLY WELL WITH PT OT.      RECHECK SOME LABS IN AM.    2/16/17--PATIENT AWAKE AND ALERT NO NEW CHANGES.   FOR SOME REASON HE THINKS HE IS LEAVING TODAY.   REMOVING THE REMAINDER OF STAPLES TODAY.  THERE HAS BEEN NO FURTHER SEIZURE ACTIVITY.   CHECK LEVELS.   I AM OK WITH HIS VITALS.   HIS PROGNOSIS WITH ASTROCYTOMA IS BETTER THAN THAT OF A GLIOBLASTOMA.      2/17/17---PATIENT AWAKE AND ALERT.   NO NEW PROBLEMS.   STILL HAVING ISSUES WITH APHASIA.   PROBLEM IS HE ONLY SPEAKS Jordanian.    HIS PROGNOSIS IS   FAIR.   LIFE EXPECTANCY IS ABOUT 18 MONTHS WITH A GRADE 3 ASTROCYTOMA.      Review of Systems:  Review of Systems   Constitutional: Negative for fever.   Eyes: Negative for blurred vision.   Respiratory: Negative for shortness of breath.    Cardiovascular: Negative for palpitations.   Gastrointestinal: Negative for nausea and vomiting.   Neurological: Negative for dizziness and headaches.   Psychiatric/Behavioral: Negative for hallucinations.       Physical Exam:  Physical Exam   Constitutional: He is oriented to person, place, and time.   HENT:   Mouth/Throat: Oropharynx is clear  and moist.   Eyes: No scleral icterus.   Cardiovascular: Normal rate, regular rhythm, S1 normal and S2 normal.    No murmur heard.  Pulmonary/Chest: Effort normal. No stridor. He has no wheezes. He has no rhonchi. He has no rales.   Abdominal: Soft. He exhibits no distension. There is no tenderness. Hernia confirmed negative in the right inguinal area and confirmed negative in the left inguinal area.   Musculoskeletal: He exhibits no edema.   Neurological: He is alert and oriented to person, place, and time. No sensory deficit.   Skin: Skin is warm and dry. No rash noted. He is not diaphoretic. No cyanosis.   Psychiatric: He has a normal mood and affect. His behavior is normal.   Nursing note and vitals reviewed.    Hospital Medicine Progress Note, Adult, Complex               Author: BETTY YOUNG MD Date & Time created: 2/19/2017  1:06 PM     Interval History:  PATIENT AWAKE.  SPEAKS Setswana BUT DAUGHTER AND FRIEND HERE TO TRANSLATE.      I BRIEFLY SAW THIS MAN LAST WEEK AFTER SEVERAL SEIZURE EPISODEDS.  HE WAS SENT TO ER.  I REVIEWED PATH FROM [place].   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   PROBABLY MUCH BETTER PROGNOSIS THAN   GLIOBLASTOMA.   REVIEWED FILMS WITH DAUGHTER.   IT IS A SIZABLE LEFT HEMISPHERIC MASS.    2/15/16--PATIENT VISITED IN [facility].   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      VITALS LOOK FINE BUT THE GLUCOSES REMAIN POOR DUE TO HIGH DOSE STEROIDS.   WE WILL NOT BE ABLE TO MICRO MANAGE THIS  UNTIL STEROIDS TAPERED DOWN.  MILD PSEUDO HYPONATREMIA FROM HIGH GLUCOSE.   OVERALL,   DOING FAIRLY WELL WITH PT OT.      RECHECK SOME LABS IN AM.    2/16/17--PATIENT AWAKE AND ALERT NO NEW CHANGES.   FOR SOME REASON HE THINKS HE IS LEAVING TODAY.   REMOVING THE REMAINDER OF STAPLES TODAY.  THERE HAS BEEN NO FURTHER SEIZURE ACTIVITY.   CHECK LEVELS.   I AM OK WITH HIS VITALS.   HIS PROGNOSIS WITH ASTROCYTOMA IS BETTER THAN THAT OF A GLIOBLASTOMA.       2/17/17---PATIENT AWAKE AND ALERT.   NO NEW PROBLEMS.   STILL HAVING ISSUES WITH APHASIA.   PROBLEM IS HE ONLY SPEAKS Khmer.    HIS PROGNOSIS IS   FAIR.   LIFE EXPECTANCY IS ABOUT 18 MONTHS WITH A GRADE 3 ASTROCYTOMA.       2/18/17--DOING WELL WITH PT/OT.   VITALS OK.  TOLERATING A REASONABLE DIET AT THIS TIME.   GOOD STRENGTH IN ALL 4 EXTREMITIES.    OVERALL IMPROVING.    2/19/17---PATIENT REMAINS AWAKE AND ALERT.  GOOD STRENGTH IN ALL 4 EXTREMITIES. TOLERATING PO QUITE WELL IN [place].   THE PLAN AS I BELIEVE TO BE IS THAT ABOUT 6 WEEKS AFTER THE RESECTION HE HAS XRT AND CHEMO ARRANGED DOWN AT [facility].    HE HAS HAD SEIZURE ACTIVITY HERE A FEW WEEKS BACK.  KEPPRA LEVEL REMAINS LOW.   I AM BUMPING HIS DOSE UP TO PREVENT A SECOND GRAND MAL SEIZURE.    LIPIDS LOOK ATROCIOUS BUT GIVEN HIS LIFE EXPECTANCY I AM NOT GOING TO MICRO-MANAGE LIPID MEDS.        Review of Systems:  Review of Systems   Constitutional: Negative for fever.   Eyes: Negative for blurred vision.   Respiratory: Negative for shortness of breath.    Cardiovascular: Negative for palpitations.   Gastrointestinal: Negative for nausea and vomiting.   Neurological: Negative for dizziness and headaches.   Psychiatric/Behavioral: Negative for hallucinations.       Physical Exam:  Physical Exam   Constitutional: He is oriented to person, place, and time.   HENT:   Mouth/Throat: Oropharynx is clear and moist.   Eyes: No scleral icterus.   Cardiovascular: Normal rate, regular rhythm, S1 normal and S2 normal.    No murmur heard.  Pulmonary/Chest: Effort normal. No stridor. He has no wheezes. He has no rhonchi. He has no rales.   Abdominal: Soft. He exhibits no distension. There is no tenderness. Hernia confirmed negative in the right inguinal area and confirmed negative in the left inguinal area.   Musculoskeletal: He exhibits no edema.   Neurological: He is alert and oriented to person, place, and time. No sensory deficit.   Skin: Skin is warm and dry. No  rash noted. He is not diaphoretic. No cyanosis.   Psychiatric: He has a normal mood and affect. His behavior is normal.   Nursing note and vitals reviewed.      Labs:        Invalid input(s): [id number]      Recent Labs      17   0533   SODIUM  131*   POTASSIUM  4.1   CHLORIDE  96   CO2  29   BUN  18   CREATININE  0.55   CALCIUM  8.6     Recent Labs      17   0533   ALTSGPT  72*   ASTSGOT  23   ALKPHOSPHAT  42   TBILIRUBIN  0.7   GLUCOSE  184*     Recent Labs      17   0533   RBC  4.62*   HEMOGLOBIN  14.1   HEMATOCRIT  41.0*   PLATELETCT  138*     Recent Labs      17   0533   WBC  9.0   NEUTSPOLYS  81.20*   LYMPHOCYTES  6.40*   MONOCYTES  6.80   EOSINOPHILS  0.00   BASOPHILS  0.20   ASTSGOT  23   ALTSGPT  72*   ALKPHOSPHAT  42   TBILIRUBIN  0.7           Hemodynamics:  Temp (24hrs), Av.6 °C (97.8 °F), Min:36.6 °C (97.8 °F), Max:36.6 °C (97.9 °F)  Temperature: 36.6 °C (97.8 °F)  Pulse  Av.8  Min: 56  Max: 113   Blood Pressure: 106/76 mmHg     Respiratory:    Respiration: 18, Pulse Oximetry: 94 %        RUL Breath Sounds: Clear, RML Breath Sounds: Clear, RLL Breath Sounds: Diminished, LAYA Breath Sounds: Clear, LLL Breath Sounds: Diminished  Fluids:    Intake/Output Summary (Last 24 hours) at 17 1306  Last data filed at 17 0900   Gross per 24 hour   Intake    600 ml   Output      0 ml   Net    600 ml        GI/Nutrition:  Orders Placed This Encounter   Procedures   • DIET ORDER     Standing Status: Standing      Number of Occurrences: 1      Standing Expiration Date:      Order Specific Question:  Diet:     Answer:  Diabetic [3]     Order Specific Question:  Texture/Fiber modifications:     Answer:  Dysphagia 3(Mechanical Soft)specify fluid consistency(question 6) [3]     Order Specific Question:  Consistency/Fluid modifications:     Answer:  Nectar Thick [2]     Medical Decision Making, by Problem:  Active Hospital Problems    Diagnosis   • Brain tumor (CMS-HCC) [D49.6]      *  Recent Glioblastoma  S/P craniotomy and resection    *  Recent Seizure  On Keprra: 500 mg bid --> 1000 mg bid (adjusted at [facility])    *  Diabetes  Steroid-induced  BS: 166-281  On NPH: 7 units bid --> 12 units bid ( at evening) --> 15 units bid () --> will increase to 18 units bid ()  Note: diet changed from regular to diabetic diet (starting )  Cont to monitor    *  Hyponatremia  Na: 132  Monitor for now    *  Hyperlipidemia  *  Hx Bell's Palsy       Labs reviewed and Medications reviewed                      Labs:        Invalid input(s): [id number]      Recent Labs      17   0533   SODIUM  131*   POTASSIUM  4.1   CHLORIDE  96   CO2  29   BUN  18   CREATININE  0.55   CALCIUM  8.6     Recent Labs      17   0533   ALTSGPT  72*   ASTSGOT  23   ALKPHOSPHAT  42   TBILIRUBIN  0.7   GLUCOSE  184*     Recent Labs      17   0533   RBC  4.62*   HEMOGLOBIN  14.1   HEMATOCRIT  41.0*   PLATELETCT  138*     Recent Labs      17   0533   WBC  9.0   NEUTSPOLYS  81.20*   LYMPHOCYTES  6.40*   MONOCYTES  6.80   EOSINOPHILS  0.00   BASOPHILS  0.20   ASTSGOT  23   ALTSGPT  72*   ALKPHOSPHAT  42   TBILIRUBIN  0.7           Hemodynamics:  Temp (24hrs), Av.6 °C (97.8 °F), Min:36.6 °C (97.8 °F), Max:36.6 °C (97.9 °F)  Temperature: 36.6 °C (97.8 °F)  Pulse  Av.8  Min: 56  Max: 113   Blood Pressure: 106/76 mmHg     Respiratory:    Respiration: 18, Pulse Oximetry: 94 %        RUL Breath Sounds: Clear, RML Breath Sounds: Clear, RLL Breath Sounds: Diminished, LAYA Breath Sounds: Clear, LLL Breath Sounds: Diminished  Fluids:    Intake/Output Summary (Last 24 hours) at 17 1306  Last data filed at 17 0900   Gross per 24 hour   Intake    600 ml   Output      0 ml   Net    600 ml        GI/Nutrition:  Orders Placed This Encounter   Procedures   • DIET ORDER     Standing Status: Standing      Number of Occurrences: 1      Standing Expiration Date:      Order Specific Question:  Diet:      Answer:  Diabetic [3]     Order Specific Question:  Texture/Fiber modifications:     Answer:  Dysphagia 3(Mechanical Soft)specify fluid consistency(question 6) [3]     Order Specific Question:  Consistency/Fluid modifications:     Answer:  Nectar Thick [2]     Medical Decision Making, by Problem:  Active Hospital Problems    Diagnosis   • Brain tumor (CMS-HCC) [D49.6]     *  Recent ASTROCYTOMA  S/P craniotomy and resection    *  Recent Seizure  On Keprra: 500 mg bid --> 1000 mg bid (adjusted at [facility])    *  Diabetes  Steroid-induced  BS: 166-281  On NPH: 7 units bid --> 12 units bid (2/11 at evening) --> 15 units bid (2/12) --> will increase to 18 units bid (2/13)  Note: diet changed from regular to diabetic diet (starting 2/12)  Cont to monitor    *  Hyponatremia  Na: 132  Monitor for now    *  Hyperlipidemia  *  Hx Bell's Palsy       Labs reviewed and Medications reviewed

## 2017-02-19 NOTE — CARE PLAN
Problem: Psychosocial Needs:  Goal: Level of anxiety will decrease  Patient doesn't appear to be anxious during this shift. Family member (daughter) at bedside and patient appears calm and relaxed    Problem: GLYCEMIA IMBALANCE  Goal: Clinical indication of glycemia balance is achieved  Outcome: PROGRESSING SLOWER THAN EXPECTED  Pt. Blood sugar checked at hs-291 mg/dL. Sliding scale insulin coverage 3 units Humalog given. No s/s of hypo or Hyperglicemia noted Bedtime snack provided and patient consumed.

## 2017-02-20 NOTE — PROGRESS NOTES
"Rehab Progress Note     Interval Events (Subjective)  Patient seen and examined today. Patient in no apparent distress. Able to respond with more single words automatically.   ROS: last bm 2/17/2017, global aphasia    Objective:  VITAL SIGNS: /72 mmHg  Pulse 63  Temp(Src) 37.1 °C (98.8 °F)  Resp 18  Ht 1.6 m (5' 2.99\")  Wt 88.9 kg (195 lb 15.8 oz)  BMI 34.73 kg/m2  SpO2 93%  Heart regular rate and rhythm  Lungs clear to auscultation  Abdomen bowel sounds x 4    Recent Results (from the past 72 hour(s))   ACCU-CHEK GLUCOSE    Collection Time: 02/17/17 11:33 AM   Result Value Ref Range    Glucose - Accu-Ck 175 (H) 65 - 99 mg/dL   ACCU-CHEK GLUCOSE    Collection Time: 02/17/17  5:09 PM   Result Value Ref Range    Glucose - Accu-Ck 193 (H) 65 - 99 mg/dL   ACCU-CHEK GLUCOSE    Collection Time: 02/17/17  8:16 PM   Result Value Ref Range    Glucose - Accu-Ck 283 (H) 65 - 99 mg/dL   ACCU-CHEK GLUCOSE    Collection Time: 02/18/17  7:34 AM   Result Value Ref Range    Glucose - Accu-Ck 143 (H) 65 - 99 mg/dL   ACCU-CHEK GLUCOSE    Collection Time: 02/18/17 11:11 AM   Result Value Ref Range    Glucose - Accu-Ck 206 (H) 65 - 99 mg/dL   ACCU-CHEK GLUCOSE    Collection Time: 02/18/17  5:14 PM   Result Value Ref Range    Glucose - Accu-Ck 137 (H) 65 - 99 mg/dL   ACCU-CHEK GLUCOSE    Collection Time: 02/18/17  8:19 PM   Result Value Ref Range    Glucose - Accu-Ck 291 (H) 65 - 99 mg/dL   ACCU-CHEK GLUCOSE    Collection Time: 02/19/17  7:25 AM   Result Value Ref Range    Glucose - Accu-Ck 153 (H) 65 - 99 mg/dL   ACCU-CHEK GLUCOSE    Collection Time: 02/19/17 11:22 AM   Result Value Ref Range    Glucose - Accu-Ck 194 (H) 65 - 99 mg/dL   ACCU-CHEK GLUCOSE    Collection Time: 02/19/17  5:19 PM   Result Value Ref Range    Glucose - Accu-Ck 284 (H) 65 - 99 mg/dL   ACCU-CHEK GLUCOSE    Collection Time: 02/19/17  8:32 PM   Result Value Ref Range    Glucose - Accu-Ck 317 (H) 65 - 99 mg/dL   ACCU-CHEK GLUCOSE    Collection Time: " 02/20/17  7:32 AM   Result Value Ref Range    Glucose - Accu-Ck 154 (H) 65 - 99 mg/dL       Current Facility-Administered Medications   Medication Frequency   • levetiracetam (KEPPRA) 100 MG/ML solution 1,500 mg Q12HRS   • atorvastatin (LIPITOR) tablet 20 mg QHS   • nystatin (MYCOSTATIN) 779801 UNIT/ML suspension 500,000 Units 4X/DAY   • benzocaine (ANBESOL/ORAJEL) 20 % gel PRN   • neomycin-bacitracin-polymyxin (NEOSPORIN) 400-5-5000 ointment BID   • lidocaine (LIDODERM) 5 % 1 Patch DAILY   • insulin NPH (HUMULIN,NOVOLIN) injection 18 Units BID INSULIN   • docusate sodium (COLACE) capsule 100 mg QDAY PRN    And   • senna-docusate (PERICOLACE or SENOKOT S) 8.6-50 MG per tablet 1 Tab Nightly    And   • senna-docusate (PERICOLACE or SENOKOT S) 8.6-50 MG per tablet 1 Tab Q24HRS PRN    And   • lactulose 20 GM/30ML solution 30 mL Q24HRS PRN    And   • bisacodyl (DULCOLAX) suppository 10 mg Q24HRS PRN    And   • fleet enema 133 mL Once PRN   • Respiratory Care per Protocol Continuous RT   • dexamethasone (DECADRON) tablet 4 mg Q6HRS   • temazepam (RESTORIL) capsule 15 mg HS PRN - MR X 1   • ondansetron (ZOFRAN ODT) dispertab 4 mg Q4HRS PRN   • famotidine (PEPCID) tablet 20 mg BID   • acetaminophen (TYLENOL) tablet 650 mg Q6HRS PRN   • insulin lispro (HUMALOG) injection 1-6 Units 4X/DAY ACHS       Orders Placed This Encounter   Procedures   • DIET ORDER     Standing Status: Standing      Number of Occurrences: 1      Standing Expiration Date:      Order Specific Question:  Diet:     Answer:  Diabetic [3]     Order Specific Question:  Texture/Fiber modifications:     Answer:  Dysphagia 3(Mechanical Soft)specify fluid consistency(question 6) [3]     Order Specific Question:  Consistency/Fluid modifications:     Answer:  Sims Thick [2]       Assessment:  Active Hospital Problems    Diagnosis   • Astrocytoma brain tumor (CMS-HCC)   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 S/0 sterotactic left frontotemporal craniotomy with microscopic computer assisted partial resection of the left temporal low grade glioma using intraoperative functional cortical mapping with awake patient.     new acute infarct posterior to the resection of the cavity in the left temporal occipital lobe. And new small focus of GRE hypointensity in left frontoparietal regional likely represents small hemorrhage or blood products from surgery  Anaplastic Grade III astrocytoma. He has an appointment to see  in the next several weeks, thus he would not be undergoing radiation near his recent resection anyway's. The patient has and apppointment for outpatient oncology with Olivier Osman MD    Aphasia  Decadron taper as tolererated: continue 4 mg q 6 hours for now  Seizure: 1000 mg keppra bid  bladder program  bowel program  IMPAIRMENTS:    Mobility  Self-care  IADLs  Cognitive  Speech      PLAN:    Discussion and Recommendations:    1. The patient requires an acute inpatient rehabilitation program with a coordinated program of care at an intensity and frequency not available at a lower level of care. This recommendation is substantiated by the patient's medical physicians who recommend that the patient's intervention and assessment of medical issues needs to be done at an acute level of care for patient's safety and maximum outcome.    2. A coordinated program of care will be supplied by an interdisciplinary team of physical therapy, occupational therapy, rehab physician, rehab nursing, and, if needed, speech therapy and rehab psychology. Rehab team presents a patient-specific rehabilitation and education program concentrating on prevention of future problems related to accessibility, mobility, skin, bowel, bladder, sexuality,  and psychosocial and medical/surgical problems.    3. Need for Rehabilitation Physician: The rehab physician will be evaluating the patient on a multi-weekly basis to help coordinate the program of care. The rehab physician communicates between medical physicians, therapists, and nurses to maximize the patient's potential outcome. Specific areas in which the rehab physician will be providing daily assessment include the following:    A. Assessing the patient's heart rate and blood pressure response (vitals monitoring) to activity and making adjustments in medications or conservative measures as needed.    B. The rehab physician will be assessing the frequency at which the program can be increased to allow the patient to reach optimal functional outcome.    C. The rehab physician will also provide assessments in daily skin care, especially in light of patient's impairments in mobility.    D. The rehab physician will provide special expertise in understanding how to work with functional impairment and recommend appropriate interventions, compensatory techniques, and education that will facilitate the patient's outcome.    4. Rehab R.N.    The rehab RN will be working with patient to carry over in room mobility and activities of daily living when the patient is not in 3 hours of skilled therapy. Rehab nursing will be working in conjunction with rehab physician to address all the medical issues above and continue to assess laboratory work and discuss abnormalities with the treating physicians, assess vitals, and response to activity, and discuss and report abnormalities with the rehab physician. Rehab RN will also continue daily skin care, supervise bladder/bowel program, instruct in medication administration, and ensure patient safety.     MEDICAL DECISION MAKING and INTERDISCIPLINARY PLAN OF CARE:    REHABILITATION ISSUES/ADVERSE POTENTIAL::  New CVA (Cerebrovascular Accident):Patient demonstrates functional deficits  in strength, balance, coordination, and ADL's. Patient is admitted to [facility] for comprehensive rehabilitation therapy as described below.   Rehabilitation nursing monitors bowel and bladder control, educates on medication administration, co-morbidities and monitors patient s    NonTBI (Traumatic Brain Injury): Patient demonstrates functional deficits in cognition, behavior, strength, balance, coordination, and ADL's. The patient requires therapy to correct these deficits prior to discharge. Patient is admitted to [facility] for comprehensive rehabilitation therapy, including physical, occupational and speech therapy.     Rehabilitation nursing monitors bowel and bladder control, educates on medication administration, co-morbidities and monitors patient safety.    Therapies to treat at intensity and frequency of (may change after completion of evaluation by all therapeutic disciplines):       PT:  Physical therapy to address mobility, transfer, gait training and evaluation for adaptive equipment needs 1hour/day at least 5 days/week for the duration of the ELOS (see below)       OT:  Occupational therapy to address ADLs, self-care, home management training, functional mobility/transfers and assistive device evaluation, and community re-intergration 1hour/day at least 5 days/week for the duration of the ELOS (see below).         ST/Dysphagia:  Speech therapy to address speech, language,and cognitive deficits as well as swallowing difficulties with retraining/dysphagia management and community re-integration with comprehension, expression, cognitive training 1hour/day at least 5 days/week for the duration of the ELOS (see below).     2.  Neurostimulants: None at this time but continue to assess daily for need to initiate should status change.    3.  DVT prophylaxis:  Patient is on not due to small hemmrhage in brain post op for anticoagulation upon transfer. Encourage OOB.  Monitor daily for signs and symptoms of DVT including but not limited to swelling and pain to prevent the development of DVT that may interfere with therapies.    4.  GI prophylaxis:  On prilosec to prevent gastritis/dyspepsia which may interfere with therapies.    5.  Pain: No issues with pain currently     6.  Nutrition/Dysphagia: Dietician monitors nutrient intake, recommend supplements prn and provide nutrition education to pt/family to promote optimal nutrition for wound healing/recovery.     7.  Bladder/bowel:  Start bowel and bladder program as described below, to prevent constipation, urinary retention (which may lead to UTI), and urinary incontinence (which will impact upon pt's functional independence).    - TV Q3h while awake with post void bladder scans, I&O cath for PVRs >400  - up to commode after meal     8.  Skin/dermal ulcer prophylaxis: Monitor for new skin conditions with q.2 h. turns as required to prevent the development of skin breakdown.     9.  Cognition/Behavior:  Psychologist Dr. Kingsley provides adjustment counseling to illness and psychosocial barriers that may be potential barriers to rehabilitation.     10. Respiratory therapy: RT performs O2 management prn, breathing retraining, pulmonary hygeine and bronchospasm management prn to optimize participation in therapies.      MEDICAL CO-MORBIDITIES/ADVERSE POTENTIAL AFFECTING FUNCTION:        GOALS/EXPECTED LEVEL OF FUNCTION BASED ON CURRENT MEDICAL AND FUNCTIONAL STATUS (may change based on patient's medical status and rate of impairment recovery):     Transfers: sup to mod I     Mobility/Gait:sup to mod I     ADL's:sup to mod I     Cognition:sup    DISPOSITION: home with assistance     ELOS: 2 weeks              Medical Decision Making and Plan:    I attended and led team conference 2-  Nursing:diabetic dys 2 ntl, pills whole in apple sauce, eating 100%, fluids 1400 cc, continent of bowel and bladder, blood sugars ac and hs  PT:  barrier comprehension, cognitive issues, language barrier, transfers sba, gait 250 ft pain free no device, today right knee pain, 125 ft, add lidoderm  OT: sba sup adls, min lower body dressing and toileting  ST: total to max comprehension, answers simple questions , perseverated on cheese,max expression, no naming, strengths reading single word and match with field of 2 pictures, no single directions needs hand over hand, count 1-30, social interaction min, memory and problem solving, dys 2 with ntl, cannot follow strategies but goes slow, tolerated 50 % thins  Case management:lives with have appointment with oncology to day, go to [facility] home for discharge, family training  Discharge date: 2/28/2017    Continue comprehensive rehab,   Total time:  >25 minutes.  I spent greater than 50% of the time for patient care and coordination on this date, including unit/floor time, and face-to-face time with the patient as per assessment and plan above.    Lisette Vanegas D.O.

## 2017-02-20 NOTE — CARE PLAN
Problem: Bathing  Goal: STG-Within one week, patient will bathe  1) Individualized Goal: With supervision for safety seated in shower  2) Interventions: OT Self Care/ADL, OT Cognitive Skill Dev, OT Community Reintegration, OT Neuro Re-Ed/Balance, OT Therapeutic Activity, OT Evaluation and OT Therapeutic Exercise  Outcome: MET Date Met:  02/20/17  SPV seated with v/cs with poor carryover    Problem: Dressing  Goal: STG-Within one week, patient will dress LB  1) Individualized Goal: With set up only AE prn  2) Interventions: OT Self Care/ADL, OT Cognitive Skill Dev, OT Community Reintegration, OT Neuro Re-Ed/Balance, OT Therapeutic Activity, OT Evaluation and OT Therapeutic Exercise   Outcome: NOT MET  Pt req min A to don R sock    Problem: Functional Cognition  Goal: STG-Within one week, patient will  1) Individualized Goal: require Min A for sequencing and memory when retrieving clothes from closet  2) Interventions: OT Self Care/ADL, OT Cognitive Skill Dev, OT Community Reintegration, OT Neuro Re-Ed/Balance, OT Therapeutic Activity, OT Evaluation and OT Therapeutic Exercise   Outcome: MET Date Met:  02/20/17  Pt req set-up for clothing retrieval   Goal: STG-Within one week, patient will  1) Individualized Goal: Require Mod A for sequencing, memory, and initiation when preparing simple meal  2) Interventions: OT Self Care/ADL, OT Cognitive Skill Dev, OT Community Reintegration, OT Neuro Re-Ed/Balance, OT Therapeutic Activity, OT Evaluation and OT Therapeutic Exercise   Outcome: NOT MET  Not addressed pt not safe for meals, unable to seq simple tasks.

## 2017-02-20 NOTE — REHAB-SLP IDT TEAM NOTE
Speech Therapy  Cognitive Linquistic Functions  Comprehension:  2 - Max Assistance  Comprehension Description:  Verbal cues  Expression:  2 - Max Assistance  Expression Description:  Verbal cueing, Communication board  Social Interaction:  4 - Minimal Assistance  Social Interaction Description:  Increased time  Problem Solvin - Max Assistance  Problem Solving Description:  Verbal cueing, Increased time  Memory:  2 - Max Assistance  Memory Description:  Verbal cueing  Executive Functioning / Organization:   Dependant   Swallowing  Swallowing Status:  Pt is tolerating dysphagia 3 textures and NTL's, MBSS to be completed on  to assess pt's toleration of thin liquids.  Regular texture trials to begin on    Orders Placed This Encounter   Procedures   • DIET ORDER     Standing Status: Standing      Number of Occurrences: 1      Standing Expiration Date:      Order Specific Question:  Diet:     Answer:  Diabetic [3]     Order Specific Question:  Texture/Fiber modifications:     Answer:  Dysphagia 3(Mechanical Soft)specify fluid consistency(question 6) [3]     Order Specific Question:  Consistency/Fluid modifications:     Answer:  Nectar Thick [2]     Behavior Modification Plan  Keep instructions simple/concrete, Use nonverbal cues (model/demonstrate or gesture), Set clear goals, Provide reasonable choices, Decrease the chance of failure by offering activities that are within the patient's abilities and Analyze tasks (break down into smaller steps)  Assistive Technology  Communication Board (low tech)  Family Training/Education:  To be completed   DC Recommendations:   24 hour spv   Strengths:  Making steady progress towards goals, Motivated for self care and independence, Pleasant and cooperative, Supportive family and Willingly participates in therapeutic activities  Barriers:  Aphasia expressive, Aphasia receptive, Aspiration risk and Unable to follow instructions  # of short term goals set=4  # of short  term goals met=1        Speech Therapy Problems           Problem: Comprehension STGs     Dates: Start: 02/05/17       Goal: STG-Within one week, patient will     Dates: Start: 02/05/17      Description: 1) Individualized goal:  Follow one-step directives with visual and verbal cues with 50% accuracy and MOD A.     2) Interventions:  SLP Aphasia Evaluation, SLP Speech Language Treatment and SLP Cognitive Skill Development         Note: Goal Note filed on 02/20/17 1451 by Moises Flower MS,CCC-SLP    Goal: STG-Within one week, patient will  Outcome: NOT MET  Max cues required for pt to follow 1 step directions             Problem: Expression STGs     Dates: Start: 02/05/17       Goal: STG-Within one week, patient will     Dates: Start: 02/05/17      Description: 1) Individualized goal:  Provide verbal or gesture response to basic/personal yes/no questions with 50% accuracy and MOD A.     2) Interventions:  SLP Aphasia Evaluation, SLP Speech Language Treatment and SLP Cognitive Skill Development         Note: Goal Note filed on 02/20/17 1451 by Moises Flower MS,CCC-SLP    Goal: STG-Within one week, patient will  Outcome: NOT MET  Mod-max A required for pt to answer simple yes/no questions           Goal: STG-Within one week, patient will     Dates: Start: 02/05/17      Description: 1) Individualized goal:  Name physical objects with 50% accuracy with MOD A.     2) Interventions:  SLP Aphasia Evaluation, SLP Speech Language Treatment, SLP Self Care / ADL Training  and SLP Cognitive Skill Development        Note: Goal Note filed on 02/20/17 1451 by Moises Flower MS,CCC-SLP    Goal: STG-Within one week, patient will  Outcome: NOT MET  Max A for pt to complete object naming tasks             Problem: Speech/Swallowing LTGs     Dates: Start: 02/05/17       Goal: LTG-By discharge, patient will safely swallow     Dates: Start: 02/05/17      Description: 1) Individualized goal:  Regular textures and thin liquids w/ no overt  s/sx of aspiration and implement safe swallow strategies in 90% of opportunities     2) Interventions:  SLP Swallowing Dysfunction Treatment, SLP Oral Pharyngeal Evaluation, SLP Video Swallow Evaluation, SLP Self Care / ADL Training  and SLP Cognitive Skill Development            Goal: LTG-By discharge, patient will comprehend     Dates: Start: 02/05/17      Description: 1) Individualized goal:  New information related to medical/therapeutic care for a safe D/C home with 80% accuracy and min A.     2) Interventions:  SLP Aphasia Evaluation, SLP Speech Language Treatment, SLP Self Care / ADL Training  and SLP Cognitive Skill Development             Goal: LTG-By discharge, patient will express     Dates: Start: 02/05/17      Description: 1) Individualized goal:  Self using verbal or alternative communication (e.g., communication board/device) with 80% accuracy and minimal-moderate assistance.     2) Interventions:  SLP Aphasia Evaluation, SLP Speech Language Treatment, SLP Self Care / ADL Training  and SLP Cognitive Skill Development               Problem: Swallowing STGs     Dates: Start: 02/05/17       Goal: STG-Within one week, patient will     Dates: Start: 02/20/17      Description: 1) Individualized goal:  Regular textures and thin liquids with no overt s/sx of aspiration and implementing swallow strategies in 80% of opportunities with minimal-moderate assistance    2) Interventions:  SLP Swallowing Dysfunction Treatment and SLP Self Care / ADL Training                    Section completed by:  Moises Flower MS,CCC-SLP

## 2017-02-20 NOTE — REHAB-CM IDT TEAM NOTE
Case Management   DC Planning  DC destination/dispostion:  Discharge to his daughter's home in [place]; there are no stairs to negotiate; dtr does not work outside of the home.     Referrals: Oncology consulted completed 2/14;   Out Patient Speech Therapy    DC Needs:  Family training to include managing/adminstrating insulin ; follow up with PCP; has follow up appt.  with  for radiation therapy.     Barriers to discharge:   Expressive and receptive aphasia; delayed responses; impaired initiation, learning, attention, & sequencing.      Strengths:  Supportive family who will provide 24 hour care to patient; pt is pleasant/cooperaitve     Section completed by:  RACQUEL Patel, CCM

## 2017-02-20 NOTE — CARE PLAN
Problem: Safety  Goal: Will remain free from falls  Outcome: PROGRESSING AS EXPECTED  Patient reported to be impulsive previous shift. Bed alarm in place, bed is in low position, non-skid footwear is in place. No attempts to self transfer made this shift.     Problem: Skin Integrity  Goal: Risk for impaired skin integrity will decrease  Outcome: PROGRESSING AS EXPECTED  Antibiotic ointment applied to head incision. No drainage noted. No new skin issues observed this shift.

## 2017-02-20 NOTE — REHAB-OT IDT TEAM NOTE
Occupational Therapy  Activities of Daily Living  Eatin - Standby Prompting/Supervision or Set-up  Eating Description:  Modified diet, Increased time, Verbal cueing  Groomin - Standby Prompting/Supervision or Set-up  Grooming Description:   (performed oral care   declined to shave. )  Bathin - Standby Prompting/Supervision or Set-up  Bathing Description:  Grab bar, Tub bench ( supevision  cues    prior to shower given specific cues to not wash head due to stapled incision.   however patient proceed wash head with shampoo / body wash with quick movement  and unable to intervene with translation  )  Upper Body Dressin - Standby Prompting/Supervision or Set-up  Upper Body Dressing Description:   (don / doff  pull over shirt   no need for retrieval this session )  Lower Body Dressin - Minimal Assistance  Lower Body Dressing Description:  4 - Minimal Assistance  Toiletin - Standby Prompting/Supervision or Set-up   Toileting Description:  Grab bar, Increased time, Supervision for safety, Verbal cueing  Toilet Transfer:  5 - Standby Prompting/Supervision or Set-up  Toilet Transfer Description:  5 - Standby Prompting/Supervision or Set-up  Tub / Shower Transfer:  5 - Standby Prompting/Supervision or Set-up  Tub / Shower Transfer Description:  Grab bar  IADL:  TBD   Family Training/Education:  TBD   DME/DC Recommendations:  Home with 24 hr SPV v. SNF 2/2 poor prognosis, home health, shower seat     Strengths:  Independent PLOF, Pleasant and cooperative and Willingly participates in therapeutic activities  Barriers:  Aphasia expressive, Aphasia receptive, Unable to follow instructions, Language barrier, non-fluent English and Other: poor safety awareness     # of short term goals set= 4    # of short term goals met= 2          Occupational Therapy Goals           Problem: Dressing     Dates: Start: 17       Goal: STG-Within one week, patient will dress LB     Dates: Start: 17       Description: 1) Individualized Goal:  With set up only AE prn    2) Interventions:  OT Self Care/ADL, OT Cognitive Skill Dev, OT Community Reintegration, OT Neuro Re-Ed/Balance, OT Therapeutic Activity, OT Evaluation and OT Therapeutic Exercise    Note: Goal Note filed on 02/20/17 1533 by Wendy Sandhu MS,OTR/L    Goal: STG-Within one week, patient will dress LB  Outcome: NOT MET  Pt req min A to don R sock            Problem: Functional Cognition     Dates: Start: 02/13/17       Goal: STG-Within one week, patient will     Dates: Start: 02/13/17      Description: 1) Individualized Goal:  Require Mod A for sequencing, memory, and initiation when preparing simple meal    2) Interventions:  OT Self Care/ADL, OT Cognitive Skill Dev, OT Community Reintegration, OT Neuro Re-Ed/Balance, OT Therapeutic Activity, OT Evaluation and OT Therapeutic Exercise     Note: Goal Note filed on 02/20/17 1533 by Wendy Sandhu MS,OTR/L    Goal: STG-Within one week, patient will  Outcome: NOT MET  Not addressed pt not safe for meals, unable to seq simple tasks.             Problem: OT Long Term Goals     Dates: Start: 02/11/17       Goal: LTG-By discharge, patient will complete basic self care tasks     Dates: Start: 02/11/17      Description: 1) Individualized Goal:  Mod i fww prn    2) Interventions:  OT Self Care/ADL, OT Cognitive Skill Dev, OT Community Reintegration, OT Neuro Re-Ed/Balance, OT Therapeutic Activity, OT Evaluation and OT Therapeutic Exercise        Goal: LTG-By discharge, patient will perform bathroom transfers     Dates: Start: 02/11/17      Description: 1) Individualized Goal:  Mod I grab bar prn    2) Interventions:  OT Self Care/ADL, OT Cognitive Skill Dev, OT Community Reintegration, OT Neuro Re-Ed/Balance, OT Therapeutic Activity, OT Evaluation and OT Therapeutic Exercise              Section completed by:  Wendy Sandhu MS,OTR/L

## 2017-02-20 NOTE — PROGRESS NOTES
Hospital Medicine Progress Note, Adult, Complex               Author: BETTY YOUNG MD Date & Time created: 2/20/2017  2:20 PM     Interval History:  PATIENT AWAKE.  SPEAKS Bangladeshi BUT DAUGHTER AND FRIEND HERE TO TRANSLATE.      I BRIEFLY SAW THIS MAN LAST WEEK AFTER SEVERAL SEIZURE EPISODEDS.  HE WAS SENT TO ER.  I REVIEWED PATH FROM [place].   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   PROBABLY MUCH BETTER PROGNOSIS THAN   GLIOBLASTOMA.   REVIEWED FILMS WITH DAUGHTER.   IT IS A SIZABLE LEFT HEMISPHERIC MASS.    2/15/16--PATIENT VISITED IN [facility].   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      VITALS LOOK FINE BUT THE GLUCOSES REMAIN POOR DUE TO HIGH DOSE STEROIDS.   WE WILL NOT BE ABLE TO MICRO MANAGE THIS  UNTIL STEROIDS TAPERED DOWN.  MILD PSEUDO HYPONATREMIA FROM HIGH GLUCOSE.   OVERALL,   DOING FAIRLY WELL WITH PT OT.      RECHECK SOME LABS IN AM.    2/16/17--PATIENT AWAKE AND ALERT NO NEW CHANGES.   FOR SOME REASON HE THINKS HE IS LEAVING TODAY.   REMOVING THE REMAINDER OF STAPLES TODAY.  THERE HAS BEEN NO FURTHER SEIZURE ACTIVITY.   CHECK LEVELS.   I AM OK WITH HIS VITALS.   HIS PROGNOSIS WITH ASTROCYTOMA IS BETTER THAN THAT OF A GLIOBLASTOMA.      2/17/17---PATIENT AWAKE AND ALERT.   NO NEW PROBLEMS.   STILL HAVING ISSUES WITH APHASIA.   PROBLEM IS HE ONLY SPEAKS Bangladeshi.    HIS PROGNOSIS IS   FAIR.   LIFE EXPECTANCY IS ABOUT 18 MONTHS WITH A GRADE 3 ASTROCYTOMA.      Review of Systems:  Review of Systems   Constitutional: Negative for fever.   Eyes: Negative for blurred vision.   Respiratory: Negative for shortness of breath.    Cardiovascular: Negative for palpitations.   Gastrointestinal: Negative for nausea and vomiting.   Neurological: Negative for dizziness and headaches.   Psychiatric/Behavioral: Negative for hallucinations.       Physical Exam:  Physical Exam   Constitutional: He is oriented to person, place, and time.   HENT:   Mouth/Throat: Oropharynx is clear  and moist.   Eyes: No scleral icterus.   Cardiovascular: Normal rate, regular rhythm, S1 normal and S2 normal.    No murmur heard.  Pulmonary/Chest: Effort normal. No stridor. He has no wheezes. He has no rhonchi. He has no rales.   Abdominal: Soft. He exhibits no distension. There is no tenderness. Hernia confirmed negative in the right inguinal area and confirmed negative in the left inguinal area.   Musculoskeletal: He exhibits no edema.   Neurological: He is alert and oriented to person, place, and time. No sensory deficit.   Skin: Skin is warm and dry. No rash noted. He is not diaphoretic. No cyanosis.   Psychiatric: He has a normal mood and affect. His behavior is normal.   Nursing note and vitals reviewed.    Hospital Medicine Progress Note, Adult, Complex               Author: BETTY YOUNG MD Date & Time created: 2/20/2017  2:20 PM     Interval History:  PATIENT AWAKE.  SPEAKS Maori BUT DAUGHTER AND FRIEND HERE TO TRANSLATE.      I BRIEFLY SAW THIS MAN LAST WEEK AFTER SEVERAL SEIZURE EPISODEDS.  HE WAS SENT TO ER.  I REVIEWED PATH FROM [place].   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   PROBABLY MUCH BETTER PROGNOSIS THAN   GLIOBLASTOMA.   REVIEWED FILMS WITH DAUGHTER.   IT IS A SIZABLE LEFT HEMISPHERIC MASS.    2/15/16--PATIENT VISITED IN [facility].   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      VITALS LOOK FINE BUT THE GLUCOSES REMAIN POOR DUE TO HIGH DOSE STEROIDS.   WE WILL NOT BE ABLE TO MICRO MANAGE THIS  UNTIL STEROIDS TAPERED DOWN.  MILD PSEUDO HYPONATREMIA FROM HIGH GLUCOSE.   OVERALL,   DOING FAIRLY WELL WITH PT OT.      RECHECK SOME LABS IN AM.    2/16/17--PATIENT AWAKE AND ALERT NO NEW CHANGES.   FOR SOME REASON HE THINKS HE IS LEAVING TODAY.   REMOVING THE REMAINDER OF STAPLES TODAY.  THERE HAS BEEN NO FURTHER SEIZURE ACTIVITY.   CHECK LEVELS.   I AM OK WITH HIS VITALS.   HIS PROGNOSIS WITH ASTROCYTOMA IS BETTER THAN THAT OF A GLIOBLASTOMA.       2/17/17---PATIENT AWAKE AND ALERT.   NO NEW PROBLEMS.   STILL HAVING ISSUES WITH APHASIA.   PROBLEM IS HE ONLY SPEAKS Chinese.    HIS PROGNOSIS IS   FAIR.   LIFE EXPECTANCY IS ABOUT 18 MONTHS WITH A GRADE 3 ASTROCYTOMA.       2/18/17--DOING WELL WITH PT/OT.   VITALS OK.  TOLERATING A REASONABLE DIET AT THIS TIME.   GOOD STRENGTH IN ALL 4 EXTREMITIES.    OVERALL IMPROVING.    2/19/17---PATIENT REMAINS AWAKE AND ALERT.  GOOD STRENGTH IN ALL 4 EXTREMITIES. TOLERATING PO QUITE WELL IN [place].   THE PLAN AS I BELIEVE TO BE IS THAT ABOUT 6 WEEKS AFTER THE RESECTION HE HAS XRT AND CHEMO ARRANGED DOWN AT [facility].    HE HAS HAD SEIZURE ACTIVITY HERE A FEW WEEKS BACK.  KEPPRA LEVEL REMAINS LOW.   I AM BUMPING HIS DOSE UP TO PREVENT A SECOND GRAND MAL SEIZURE.    LIPIDS LOOK ATROCIOUS BUT GIVEN HIS LIFE EXPECTANCY I AM NOT GOING TO MICRO-MANAGE LIPID MEDS.    2/20/17--PATIENT AWAKE AND ALERT.  NO NEW PROBLEMS TO REPORT.   TOLERATING PO FAIRLY WELL.  STILL HAVING TROUBLE WITH RECEPTIVE APHASIA IT SEEMS  THOUGH HARD TO TELL HOW MUCH DUE TO LANGUAGE BARRIER.   VITALS AND LABS OK.   GLUCOSES AND LIPIDS ARE MEDIOCRE DUE TO STEROIDS.   I STILL WOULD CONTINUE THIS THROUGH HIS F/U WITH XRT WITH [facility].   INCHING FORWARD.        Review of Systems:  Review of Systems   Constitutional: Negative for fever.   Eyes: Negative for blurred vision.   Respiratory: Negative for shortness of breath.    Cardiovascular: Negative for palpitations.   Gastrointestinal: Negative for nausea and vomiting.   Neurological: Negative for dizziness and headaches.   Psychiatric/Behavioral: Negative for hallucinations.       Physical Exam:  Physical Exam   Constitutional: He is oriented to person, place, and time.   HENT:   Mouth/Throat: Oropharynx is clear and moist.   Eyes: No scleral icterus.   Cardiovascular: Normal rate, regular rhythm, S1 normal and S2 normal.    No murmur heard.  Pulmonary/Chest: Effort normal. No stridor. He has no wheezes. He has  no rhonchi. He has no rales.   Abdominal: Soft. He exhibits no distension. There is no tenderness. Hernia confirmed negative in the right inguinal area and confirmed negative in the left inguinal area.   Musculoskeletal: He exhibits no edema.   Neurological: He is alert and oriented to person, place, and time. No sensory deficit.   Skin: Skin is warm and dry. No rash noted. He is not diaphoretic. No cyanosis.   Psychiatric: He has a normal mood and affect. His behavior is normal.   Nursing note and vitals reviewed.      Labs:        Invalid input(s): [id number]      No results for input(s): SODIUM, POTASSIUM, CHLORIDE, CO2, BUN, CREATININE, MAGNESIUM, PHOSPHORUS, CALCIUM in the last 72 hours.  No results for input(s): ALTSGPT, ASTSGOT, ALKPHOSPHAT, TBILIRUBIN, DBILIRUBIN, GAMMAGT, AMYLASE, LIPASE, ALB, PREALBUMIN, GLUCOSE in the last 72 hours.  No results for input(s): RBC, HEMOGLOBIN, HEMATOCRIT, PLATELETCT, PROTHROMBTM, APTT, INR, IRON, FERRITIN, TOTIRONBC in the last 72 hours.            Hemodynamics:  Temp (24hrs), Av.9 °C (98.5 °F), Min:36.7 °C (98.1 °F), Max:37.1 °C (98.8 °F)  Temperature: 37.1 °C (98.8 °F)  Pulse  Av.1  Min: 56  Max: 113   Blood Pressure: 112/72 mmHg     Respiratory:    Respiration: 18, Pulse Oximetry: 93 %        RUL Breath Sounds: Clear, RML Breath Sounds: Diminished, RLL Breath Sounds: Diminished, LAYA Breath Sounds: Clear, LLL Breath Sounds: Diminished  Fluids:    Intake/Output Summary (Last 24 hours) at 17 1420  Last data filed at 17 1227   Gross per 24 hour   Intake    714 ml   Output      0 ml   Net    714 ml        GI/Nutrition:  Orders Placed This Encounter   Procedures   • DIET ORDER     Standing Status: Standing      Number of Occurrences: 1      Standing Expiration Date:      Order Specific Question:  Diet:     Answer:  Diabetic [3]     Order Specific Question:  Texture/Fiber modifications:     Answer:  Dysphagia 3(Mechanical Soft)specify fluid  consistency(question 6) [3]     Order Specific Question:  Consistency/Fluid modifications:     Answer:  Nirajar Thick [2]     Medical Decision Making, by Problem:  Active Hospital Problems    Diagnosis   • Brain tumor (CMS-HCC) [D49.6]     *  Recent Glioblastoma  S/P craniotomy and resection    *  Recent Seizure  On Keprra: 500 mg bid --> 1000 mg bid (adjusted at [facility])    *  Diabetes  Steroid-induced  BS: 166-281  On NPH: 7 units bid --> 12 units bid ( at evening) --> 15 units bid () --> will increase to 18 units bid ()  Note: diet changed from regular to diabetic diet (starting )  Cont to monitor    *  Hyponatremia  Na: 132  Monitor for now    *  Hyperlipidemia  *  Hx Bell's Palsy       Labs reviewed and Medications reviewed                      Labs:        Invalid input(s): [id number]      No results for input(s): SODIUM, POTASSIUM, CHLORIDE, CO2, BUN, CREATININE, MAGNESIUM, PHOSPHORUS, CALCIUM in the last 72 hours.  No results for input(s): ALTSGPT, ASTSGOT, ALKPHOSPHAT, TBILIRUBIN, DBILIRUBIN, GAMMAGT, AMYLASE, LIPASE, ALB, PREALBUMIN, GLUCOSE in the last 72 hours.  No results for input(s): RBC, HEMOGLOBIN, HEMATOCRIT, PLATELETCT, PROTHROMBTM, APTT, INR, IRON, FERRITIN, TOTIRONBC in the last 72 hours.            Hemodynamics:  Temp (24hrs), Av.9 °C (98.5 °F), Min:36.7 °C (98.1 °F), Max:37.1 °C (98.8 °F)  Temperature: 37.1 °C (98.8 °F)  Pulse  Av.1  Min: 56  Max: 113   Blood Pressure: 112/72 mmHg     Respiratory:    Respiration: 18, Pulse Oximetry: 93 %        RUL Breath Sounds: Clear, RML Breath Sounds: Diminished, RLL Breath Sounds: Diminished, LAYA Breath Sounds: Clear, LLL Breath Sounds: Diminished  Fluids:    Intake/Output Summary (Last 24 hours) at 17 1420  Last data filed at 17 1227   Gross per 24 hour   Intake    714 ml   Output      0 ml   Net    714 ml        GI/Nutrition:  Orders Placed This Encounter   Procedures   • DIET ORDER     Standing Status: Standing       Number of Occurrences: 1      Standing Expiration Date:      Order Specific Question:  Diet:     Answer:  Diabetic [3]     Order Specific Question:  Texture/Fiber modifications:     Answer:  Dysphagia 3(Mechanical Soft)specify fluid consistency(question 6) [3]     Order Specific Question:  Consistency/Fluid modifications:     Answer:  Nectar Thick [2]     Medical Decision Making, by Problem:  Active Hospital Problems    Diagnosis   • Brain tumor (CMS-HCC) [D49.6]     *  Recent ASTROCYTOMA  S/P craniotomy and resection    *  Recent Seizure  On Keprra: 500 mg bid --> 1000 mg bid (adjusted at [facility])    *  Diabetes  Steroid-induced  BS: 166-281  On NPH: 7 units bid --> 12 units bid (2/11 at evening) --> 15 units bid (2/12) --> will increase to 18 units bid (2/13)  Note: diet changed from regular to diabetic diet (starting 2/12)  Cont to monitor    *  Hyponatremia  Na: 132  Monitor for now    *  Hyperlipidemia  *  Hx Bell's Palsy       Labs reviewed and Medications reviewed

## 2017-02-20 NOTE — CARE PLAN
Problem: Swallowing STGs  Goal: STG-Within one week, patient will  1) Individualized goal: Dysphagia 3 textures and nectar thick liquids with no overt s/sx of aspiration and implementing swallow strategies in 80% of opportunities with minimal-moderate assistance.   2) Interventions: SLP Swallowing Dysfunction Treatment, SLP Video Swallow Evaluation, SLP Self Care / ADL Training and SLP Group Treatment    Outcome: MET Date Met:  02/20/17  Pt is currently tolerating dysphagia 3 textures with NTL's     Problem: Comprehension STGs  Goal: STG-Within one week, patient will  1) Individualized goal: Follow one-step directives with visual and verbal cues with 50% accuracy and MOD A.   2) Interventions: SLP Aphasia Evaluation, SLP Speech Language Treatment and SLP Cognitive Skill Development      Outcome: NOT MET  Max cues required for pt to follow 1 step directions     Problem: Expression STGs  Goal: STG-Within one week, patient will  1) Individualized goal: Provide verbal or gesture response to basic/personal yes/no questions with 50% accuracy and MOD A.   2) Interventions: SLP Aphasia Evaluation, SLP Speech Language Treatment and SLP Cognitive Skill Development     Outcome: NOT MET  Mod-max A required for pt to answer simple yes/no questions   Goal: STG-Within one week, patient will  1) Individualized goal: Name physical objects with 50% accuracy with MOD A.   2) Interventions: SLP Aphasia Evaluation, SLP Speech Language Treatment, SLP Self Care / ADL Training and SLP Cognitive Skill Development    Outcome: NOT MET  Max A for pt to complete object naming tasks

## 2017-02-20 NOTE — CARE PLAN
Problem: NUTRITION  Goal: Adequate Food and Fluid Intake  Pt has excellent appetite, consistently eats 100% of his meals.     Problem: Skin Integrity  Goal: Risk for impaired skin integrity will decrease  Pt's head surgical incision healing appropriately with no s/s of infection.

## 2017-02-21 NOTE — REHAB-COLLABORATIVE ONGOING IDT TEAM NOTE
Weekly Interdisciplinary Team Conference Note    Weekly Interdisciplinary Team Conference # 2  Date:  2/21/2017    Clinicians present and reporting at team conference include the following:   MD: Toyin Medeiros MD   RN:  Lisa Correa RN   PT:   Richard Ramirez, PT, MPT, MEd  OT:  Yaneli Bella, MS OTR/L   ST:  Moises Flower, MS, CCC-SLP  CM:  Lisette Ozuna, LSW, LATASHA  REC:  Not Applicable  RT:  Not Applicable  RPh:  Ting Fritz [facility]  Other:   None  All reporting clinicians have a working knowledge of this patient's plan of care.    Targeted DC Date:  2/28/217.      Medical    Patient Active Problem List    Diagnosis Date Noted   • Astrocytoma brain tumor (CMS-HCC) 02/04/2017     Priority: High   • New onset seizure (CMS-HCC) 01/05/2017     Priority: High   • Obesity (BMI 30-39.9) 05/15/2015     Priority: Low   • Cerebral edema (CMS-HCC) 01/05/2017   • Chronic pain of right knee 05/15/2015     Results     Procedure Component Value Ref Range Date/Time    ACCU-CHEK GLUCOSE [[id number]]  (Abnormal) Collected:  02/21/17 1106    Order Status:  Completed Lab Status:  Final result Updated:  02/21/17 1130     Glucose - Accu-Ck 232 (H) 65 - 99 mg/dL      Comment: Followed Dr orders  Coverage given         ACCU-CHEK GLUCOSE [[id number]]  (Abnormal) Collected:  02/21/17 0718    Order Status:  Completed Lab Status:  Final result Updated:  02/21/17 0753     Glucose - Accu-Ck 157 (H) 65 - 99 mg/dL      Comment: Followed Dr orders       ACCU-CHEK GLUCOSE [[id number]]  (Abnormal) Collected:  02/20/17 2034    Order Status:  Completed Lab Status:  Final result Updated:  02/20/17 2144     Glucose - Accu-Ck 301 (H) 65 - 99 mg/dL     ACCU-CHEK GLUCOSE [[id number]]  (Abnormal) Collected:  02/20/17 1711    Order Status:  Completed Lab Status:  Final result Updated:  02/20/17 1816     Glucose - Accu-Ck 139 (H) 65 - 99 mg/dL         Nursing  Diet/Nutrition:  Diabetic, Dysphagia III-Mechanical Soft and Nectar Thick Liquids  Medication  Administration:  Crush all Medications in Puree  % consumed at meals in last 24 hours:  Consumed 100-100% of meals per documentation.  Breakfast:  100%   Lunch:  100%  Dinner:  100%   Snack schedule:  Mid-morning, HS and Other: +1400  Appetite:  Excellent  Admit Weight:  Weight: 96 kg (211 lb 10.3 oz)  Weight Last 7 Days       Pain Issues:    Location:  Head (02/20 2030)  Left (02/20 2030)         Severity:  Mild   Scheduled pain medications:  None     PRN pain medications used in last 24 hours:  acetaminophen (TYLENOL)    Non Pharmacologic Interventions:  distraction, emotional support, environmental changes, relaxation, repositioned and rest  Effectiveness of pain management plan:  good=patient states acceptable comfort after interventions    Bowel:    Admission Bowel Assist Score:  6 - Modified Independent  Bowel Assist Score:  5 - Standby Prompting/Supervision or Set-up  Bowel Accidents in last 7 days:  0  Last bowel movement: 02/20/17  Stool: Medium, Soft     Usual bowel pattern:  daily  Scheduled bowel medications:  senna-docusate (PERICOLACE or SENOKOT S)   PRN bowel medications used in last 24 hours:  None  Nursing Interventions:  Increased time, Scheduled medication, Supervision, Verbal cueing, Set-up  Effectiveness of bowel program:   good=regular, predictable, controlled emptying of bowel     Bladder:    Admission Bladder Assist Score:  4 - Minimal Assistance  Bladder Assist Score:  5 - Standby Prompting/Supervision or Set-up  Bladder Accidents in last 7 days:  0  Medications affecting bladder:  None    Time void schedule/voiding pattern:  Voiding every 2-4 hours  Interventions:  Increased time, Supervision, Verbal cueing  Effectiveness of bladder training:  Good=regular, predictable, emptying of bladder, patient initiates time voiding    Diabetes:  Blood Sugar Frequency:  Before meals and at bedtime    Range of BS for last 48 hours:     Recent Labs      02/19/17   0725  02/19/17   1122  02/19/17   1586   02/19/17 2032  02/20/17   0732  02/20/17   1106  02/20/17   1711  02/20/17 2034   POCGLUCOSE  153*  194*  284*  317*  154*  228*  139*  301*     Scheduled diabetic medications:  Other Humulin  Sliding scale usage in past 24 hours:  Yes  Total Short acting insulin in the past 24 hours:  Humalog 7 units and Humulin 36 units  Total Long acting insulin in the past 24 hours:  None    Wound:         Patient Lines/Drains/Airways Status    Active Current Wounds     Name: Placement date: Placement time: Site: Days:    Surgical Incision  Incision Left Posterior Head 02/04/17 1956 16                   Interventions:  Antibiotic ointment BID  Effectiveness of intervention:  wound is improving     Sleep/wake cycle:   Average hours slept:  Sleeps 4-6 hours without waking  Sleep medication usage:  None    Patient/Family Training/Education:  Aspiration Precautions, Diabetes Management, Diet/Nutrition, Fall Prevention, General Self Care, Medication Management, Pain Management, Safe Transfers, Safety and Skin Care  Discharge Supply Recommendations:  Glucometer and Strips and Blood Pressure Monitor  Strengths: Willingly participates in therapeutic activities, Able to follow instructions, Supportive family, Pleasant and cooperative and Manages pain appropriately   Barriers:   Aphasia expressive, Aphasia receptive, Aspiration risk, Confused, Generalized weakness, Impulsive and Language barrier, non-fluent English            Nursing Problems           Problem: Bowel/Gastric:     Goal: Normal bowel function is maintained or improved         Goal: Will not experience complications related to bowel motility           Problem: Communication     Goal: The ability to communicate needs accurately and effectively will improve           Problem: Discharge Barriers/Planning     Goal: Patient's continuum of care needs will be met           Problem: GLYCEMIA IMBALANCE     Goal: Clinical indication of glycemia balance is achieved            Problem: IP BLADDER/VOIDING     Goal: LTG - patient will complete bladder elimination         Goal: STG - Patient demonstrates no accidents         Goal: STG - PATIENT WILL REMAIN CONTINENT.           Problem: Infection     Goal: Will remain free from infection           Problem: Knowledge Deficit     Goal: Knowledge of disease process/condition, treatment plan, diagnostic tests, and medications will improve         Goal: Knowledge of the prescribed therapeutic regimen will improve           Problem: Mobility     Goal: Risk for activity intolerance will decrease           Problem: NUTRITION     Goal: Adequate Food and Fluid Intake     Flowsheet: Taken at 02/19/17 1300    Percentage Eaten (Lunch) 100 by Heather C Cogan, C.N.A.    Taken at 02/19/17 0900    Percentage Eaten (Breakfast) 100 by Heather C Cogan, C.N.A.    Taken at 02/12/17 1228    Percentage Eaten (Lunch) 100 by Heather C Cogan, C.N.A.    Taken at 02/12/17 0900    Percentage Eaten (Breakfast) 100 by Heather C Cogan, C.N.A.    Taken at 02/11/17 1900    Percentage Eaten (Dinner) 100 by Faby Brooks         Problem: Pain Management     Goal: Pain level will decrease to patient's comfort goal           Problem: Psychosocial Needs:     Goal: Level of anxiety will decrease           Problem: Respiratory:     Goal: Respiratory status will improve           Problem: Safety     Goal: Will remain free from injury         Goal: Will remain free from falls           Problem: Skin Integrity     Goal: Risk for impaired skin integrity will decrease           Problem: Urinary Elimination:     Goal: Ability to reestablish a normal urinary elimination pattern will improve           Problem: Venous Thromboembolism (VTW)/Deep Vein Thrombosis (DVT) Prevention:     Goal: Patient will participate in Venous Thrombosis (VTE)/Deep Vein Thrombosis (DVT)Prevention Measures                Long Term Goals:   At discharge patient will be able to function safely at home and in  the community with support.    Section completed by:  Marek Barragan R.N.           Mobility  Bed mobility: Supervision supine to/from sit  Bed /Chair/Wheelchair Transfer:  5 -Supervision and set up  Bed/Chair/Wheelchair Transfer Description:  Supervision for safety, Verbal cueing, Set-up of equipment  Walk:  4 - Minimal Assistance contact guard assist  Walk Description:  Contact guard assist no device 300 to 375 feet  Wheelchair:  Minimal assistance  Wheelchair Description:   (propels 100ft with min assist for steering (bumped into right wall 2x))  Stairs 4 - Minimal Assistance  Stairs Description (up/down 12 4in stairs using 2 rails SBA; step-over step pattern)  Patient/Family Training/Education: In progress with patient  DME/DC Recommendations:  TBD  Strengths:  Independent PLOF, Making steady progress towards goals, Motivated for self care and independence, Pleasant and cooperative, Supportive family and Willingly participates in therapeutic activities  Barriers:   Aphasia expressive, Aphasia receptive, Confused, Impulsive, Language barrier, non-fluent English and Other: impaired standing balance, impaired tolerance for activity, impulsive fall risk, refuses to use an assistive device for walking  # of short term goals set= 3  # of short term goals met=1        Physical Therapy Problems           Problem: Mobility     Dates: Start: 02/11/17       Goal: STG-Within one week, patient will ascend and descend four to six stairs     Dates: Start: 02/11/17      Description: 1) Individualized goal:  Using step-to pattern with SBA (slow down with improved sequencing)    2) Interventions: PT Gait Training, PT Self Care/Home Eval, PT Therapeutic Exercises, PT Neuro Re-Ed/Balance and PT Therapeutic Activity        Note: Goal Note filed on 02/21/17 1208 by Richard Ramirez M.S.P.T.    Goal: STG-Within one week, patient will ascend and descend four to six   stairs  Outcome: NOT MET  Not tested due to safety concerns and  knee pain          Goal: STG-Within one week, patient will     Dates: Start: 02/14/17      Description: 1) Individualized goal: Gait no device, sba, 300 feet, one week    2) Interventions:  PT Gait Training, PT Therapeutic Exercises, PT Neuro Re-Ed/Balance and PT Therapeutic Activity        Note: Goal Note filed on 02/21/17 1208 by Richard Ramirez M.S.P.T.    Goal: STG-Within one week, patient will  Outcome: NOT MET  300 feet to 375 feet contact guard assist and attention to safety            Problem: Mobility Transfers     Dates: Start: 02/11/17       Goal: STG-Within one week, patient will transfer bed to chair     Dates: Start: 02/21/17      Description: 1) Individualized goal: Transfer bed to/from chair with UE support modified independent one week    2) Interventions:  PT Gait Training, PT Therapeutic Exercises, PT Neuro Re-Ed/Balance and PT Therapeutic Activity              Problem: PT-Long Term Goals     Dates: Start: 02/11/17       Goal: LTG-By discharge, patient will ambulate     Dates: Start: 02/11/17      Description: 1) Individualized goal:  200ft x 4 with no AD SBA    2) Interventions: PT Gait Training, PT Self Care/Home Eval, PT Therapeutic Exercises, PT Neuro Re-Ed/Balance and PT Therapeutic Activity            Goal: LTG-By discharge, patient will transfer one surface to another     Dates: Start: 02/11/17      Description: 1) Individualized goal:  SPV SPT safely and consistently    2) Interventions: PT Gait Training, PT Self Care/Home Eval, PT Therapeutic Exercises, PT Neuro Re-Ed/Balance and PT Therapeutic Activity             Goal: LTG-By discharge, patient will perform home exercise program     Dates: Start: 02/11/17      Description: 1) Individualized goal:  In standing 3x/day for 10min using SPV    2) Interventions: PT Gait Training, PT Self Care/Home Eval, PT Therapeutic Exercises, PT Neuro Re-Ed/Balance and PT Therapeutic Activity              Goal: LTG-By discharge, patient will ambulate  up/down flight of stairs     Dates: Start: 17      Description: 1) Individualized goal:  With one rail SPV    2) Interventions:   PT Gait Training, PT Self Care/Home Eval, PT Therapeutic Exercises, PT Neuro Re-Ed/Balance and PT Therapeutic Activity                    Section completed by:  Richard Ramirez M.S.P.TAdria    Activities of Daily Living  Eatin - Standby Prompting/Supervision or Set-up  Eating Description:  Modified diet, Increased time, Verbal cueing  Groomin - Standby Prompting/Supervision or Set-up  Grooming Description:   (performed oral care   declined to shave. )  Bathin - Standby Prompting/Supervision or Set-up  Bathing Description:  Grab bar, Tub bench ( supevision  cues    prior to shower given specific cues to not wash head due to stapled incision.   however patient proceed wash head with shampoo / body wash with quick movement  and unable to intervene with translation  )  Upper Body Dressin - Standby Prompting/Supervision or Set-up  Upper Body Dressing Description:   (don / doff  pull over shirt   no need for retrieval this session )  Lower Body Dressin - Minimal Assistance  Lower Body Dressing Description:  4 - Minimal Assistance  Toiletin - Standby Prompting/Supervision or Set-up   Toileting Description:  Grab bar, Increased time, Supervision for safety, Verbal cueing  Toilet Transfer:  5 - Standby Prompting/Supervision or Set-up  Toilet Transfer Description:  5 - Standby Prompting/Supervision or Set-up  Tub / Shower Transfer:  5 - Standby Prompting/Supervision or Set-up  Tub / Shower Transfer Description:  Grab bar  IADL:  TBD   Family Training/Education:  TBD   DME/DC Recommendations:  Home with 24 hr SPV v. SNF 2/2 poor prognosis, home health, shower seat     Strengths:  Independent PLOF, Pleasant and cooperative and Willingly participates in therapeutic activities  Barriers:  Aphasia expressive, Aphasia receptive, Unable to follow instructions, Language  barrier, non-fluent English and Other: poor safety awareness     # of short term goals set= 4    # of short term goals met= 2          Occupational Therapy Goals           Problem: Dressing     Dates: Start: 02/11/17       Goal: STG-Within one week, patient will dress LB     Dates: Start: 02/11/17      Description: 1) Individualized Goal:  With set up only AE prn    2) Interventions:  OT Self Care/ADL, OT Cognitive Skill Dev, OT Community Reintegration, OT Neuro Re-Ed/Balance, OT Therapeutic Activity, OT Evaluation and OT Therapeutic Exercise    Note: Goal Note filed on 02/20/17 1533 by Wendy Sandhu, MS,OTR/L    Goal: STG-Within one week, patient will dress LB  Outcome: NOT MET  Pt req min A to don R sock            Problem: Functional Cognition     Dates: Start: 02/13/17       Goal: STG-Within one week, patient will     Dates: Start: 02/13/17      Description: 1) Individualized Goal:  Require Mod A for sequencing, memory, and initiation when preparing simple meal    2) Interventions:  OT Self Care/ADL, OT Cognitive Skill Dev, OT Community Reintegration, OT Neuro Re-Ed/Balance, OT Therapeutic Activity, OT Evaluation and OT Therapeutic Exercise     Note: Goal Note filed on 02/20/17 1533 by Wendy Sandhu, MS,OTR/L    Goal: STG-Within one week, patient will  Outcome: NOT MET  Not addressed pt not safe for meals, unable to seq simple tasks.             Problem: OT Long Term Goals     Dates: Start: 02/11/17       Goal: LTG-By discharge, patient will complete basic self care tasks     Dates: Start: 02/11/17      Description: 1) Individualized Goal:  Mod i fww prn    2) Interventions:  OT Self Care/ADL, OT Cognitive Skill Dev, OT Community Reintegration, OT Neuro Re-Ed/Balance, OT Therapeutic Activity, OT Evaluation and OT Therapeutic Exercise        Goal: LTG-By discharge, patient will perform bathroom transfers     Dates: Start: 02/11/17      Description: 1) Individualized Goal:  Mod I grab bar prn    2)  Interventions:  OT Self Care/ADL, OT Cognitive Skill Dev, OT Community Reintegration, OT Neuro Re-Ed/Balance, OT Therapeutic Activity, OT Evaluation and OT Therapeutic Exercise              Section completed by:  Wendy Sandhu MS,OTR/L    Cognitive Linquistic Functions  Comprehension:  2 - Max Assistance  Comprehension Description:  Verbal cues  Expression:  2 - Max Assistance  Expression Description:  Verbal cueing, Communication board  Social Interaction:  4 - Minimal Assistance  Social Interaction Description:  Increased time  Problem Solvin - Max Assistance  Problem Solving Description:  Verbal cueing, Increased time  Memory:  2 - Max Assistance  Memory Description:  Verbal cueing  Executive Functioning / Organization:   Dependant   Swallowing  Swallowing Status:  Pt is tolerating dysphagia 3 textures and NTL's, MBSS to be completed on  to assess pt's toleration of thin liquids.  Regular texture trials to begin on    Orders Placed This Encounter   Procedures   • DIET ORDER     Standing Status: Standing      Number of Occurrences: 1      Standing Expiration Date:      Order Specific Question:  Diet:     Answer:  Diabetic [3]     Order Specific Question:  Texture/Fiber modifications:     Answer:  Dysphagia 3(Mechanical Soft)specify fluid consistency(question 6) [3]     Order Specific Question:  Consistency/Fluid modifications:     Answer:  Nectar Thick [2]     Behavior Modification Plan  Keep instructions simple/concrete, Use nonverbal cues (model/demonstrate or gesture), Set clear goals, Provide reasonable choices, Decrease the chance of failure by offering activities that are within the patient's abilities and Analyze tasks (break down into smaller steps)  Assistive Technology  Communication Board (low tech)  Family Training/Education:  To be completed   DC Recommendations:   24 hour spv   Strengths:  Making steady progress towards goals, Motivated for self care and independence, Pleasant and  cooperative, Supportive family and Willingly participates in therapeutic activities  Barriers:  Aphasia expressive, Aphasia receptive, Aspiration risk and Unable to follow instructions  # of short term goals set=4  # of short term goals met=1        Speech Therapy Problems           Problem: Comprehension STGs     Dates: Start: 02/05/17       Goal: STG-Within one week, patient will     Dates: Start: 02/05/17      Description: 1) Individualized goal:  Follow one-step directives with visual and verbal cues with 50% accuracy and MOD A.     2) Interventions:  SLP Aphasia Evaluation, SLP Speech Language Treatment and SLP Cognitive Skill Development         Note: Goal Note filed on 02/20/17 1451 by Moises Flower MS,CCC-SLP    Goal: STG-Within one week, patient will  Outcome: NOT MET  Max cues required for pt to follow 1 step directions             Problem: Expression STGs     Dates: Start: 02/05/17       Goal: STG-Within one week, patient will     Dates: Start: 02/05/17      Description: 1) Individualized goal:  Provide verbal or gesture response to basic/personal yes/no questions with 50% accuracy and MOD A.     2) Interventions:  SLP Aphasia Evaluation, SLP Speech Language Treatment and SLP Cognitive Skill Development         Note: Goal Note filed on 02/20/17 1451 by Moises Flower MS,CCC-SLP    Goal: STG-Within one week, patient will  Outcome: NOT MET  Mod-max A required for pt to answer simple yes/no questions           Goal: STG-Within one week, patient will     Dates: Start: 02/05/17      Description: 1) Individualized goal:  Name physical objects with 50% accuracy with MOD A.     2) Interventions:  SLP Aphasia Evaluation, SLP Speech Language Treatment, SLP Self Care / ADL Training  and SLP Cognitive Skill Development        Note: Goal Note filed on 02/20/17 1451 by Moises Flower MS,CCC-SLP    Goal: STG-Within one week, patient will  Outcome: NOT MET  Max A for pt to complete object naming tasks             Problem:  Speech/Swallowing LTGs     Dates: Start: 02/05/17       Goal: LTG-By discharge, patient will safely swallow     Dates: Start: 02/05/17      Description: 1) Individualized goal:  Regular textures and thin liquids w/ no overt s/sx of aspiration and implement safe swallow strategies in 90% of opportunities     2) Interventions:  SLP Swallowing Dysfunction Treatment, SLP Oral Pharyngeal Evaluation, SLP Video Swallow Evaluation, SLP Self Care / ADL Training  and SLP Cognitive Skill Development            Goal: LTG-By discharge, patient will comprehend     Dates: Start: 02/05/17      Description: 1) Individualized goal:  New information related to medical/therapeutic care for a safe D/C home with 80% accuracy and min A.     2) Interventions:  SLP Aphasia Evaluation, SLP Speech Language Treatment, SLP Self Care / ADL Training  and SLP Cognitive Skill Development             Goal: LTG-By discharge, patient will express     Dates: Start: 02/05/17      Description: 1) Individualized goal:  Self using verbal or alternative communication (e.g., communication board/device) with 80% accuracy and minimal-moderate assistance.     2) Interventions:  SLP Aphasia Evaluation, SLP Speech Language Treatment, SLP Self Care / ADL Training  and SLP Cognitive Skill Development               Problem: Swallowing STGs     Dates: Start: 02/05/17       Goal: STG-Within one week, patient will     Dates: Start: 02/20/17      Description: 1) Individualized goal:  Regular textures and thin liquids with no overt s/sx of aspiration and implementing swallow strategies in 80% of opportunities with minimal-moderate assistance    2) Interventions:  SLP Swallowing Dysfunction Treatment and SLP Self Care / ADL Training                    Section completed by:  Moises Flower MS,[facility]-SLP       Nutrition       Dietary Problems           Problem: Other Problem (see comments)     Description: Diagnosis: No nutrition diagnosis.      Goal: Other Goal (Resolved)      Description: Goal:Maintain adequate nutrient/ fluid intake to promote nutrition optimization/healing.     Monitoring/ Evaluation: RD Following PRN.             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  Patient [place] speaking only, aphasic. S/p multiple seizures while in [facility].  Food prefs consult deferred to nutrition rep.     Hospitalist following s/t tachycardia and uncontrolled DM on steroids.     Appropriate for Education? NO s/t aphasia  Education in Past? n/a  Appetite: Excellent        Diet: Dysphagia 2/ NTL/ DM  Average PO intake x 3 days: 100% of meals / adding HS snack per DM protocol     Labs: accuchecks 166-291mg/dL x 48 hours, Na+ 132, Cl 95, Serum Glucose 201, ALT 62, Albumin 3.1, T.P. 5.7,PAB 34 note , CHOL 341,   Medications: Decadron, Pepcid, NPH 18 Units BID, SSI QID 1-6 Units, Senna    PRN Medications: noted  IVF: n/a     Height: 160 cm  Weight: admission weight 91 kig via stand up scale  BMI: 35.4 ( obese class 2)     Skin: sx wound to head  GI: WNL  Vitals: reviewed/ tachycardia noted  I/Os: n/a no output recorded           Diagnosis: No nutrition diagnosis. PO is adequate.  Nutrition rep to see regarding food prefs.  Consider adding medication to manage CHOL.  HS snack added per protocol.     Intervention/ Recommendations/POC:  1. Continue current nutritional POC.  Diet upgrades per SLP.    2.  Continue adequate PO/fluid intake.  3. Nutrition rep to see regarding food prefs/ honor within dietary restrictions (if indicated)  4. Monitor PO intake, weight, labs, medication adjustments, skin integrity, GI function, vitals, I/Os, and overall hydration status.  Adjust nutritional POC pending clinical outcomes.     Monitor/Evaluation: RD following PRN.  Goal:  Maintain adequate nutrient/fluid intake to promote nutrition optimization/ healing.                                Section completed by:  Nimisha Cortes RD    REHAB-Pharmacy IDT Team Note by J Carlos Escalante RPH at 2/20/2017  4:13 PM  Version 1 of 1    Author:  J Carlos Escalante RPH Service:  (none) Author Type:  Pharmacist    Filed:  2/20/2017  4:14 PM Note Time:  2/20/2017  4:13 PM Status:  Signed    :  J Carlos Escalante RPH (Pharmacist)           Pharmacy  Pharmacy  Antibiotics/Antifungals/Antivirals:  Medication:      Active Orders     None        Route:        NA  Stop Date:  NA  Reason:      NA   Antihypertensives/Cardiac:  Medication:    Orders (72h ago through future)    Start     Ordered    02/19/17 2100  atorvastatin (LIPITOR) tablet 20 mg   EVERY BEDTIME      02/19/17 1313        Patient Vitals for the past 24 hrs:   BP Pulse   02/20/17 1400 112/84 mmHg 85   02/20/17 0700 112/72 mmHg 63   02/19/17 1845 107/68 mmHg 98       Anticoagulation:  Medication: None    Other key medications: A review of the medication list reveals no issues at this time. Patient is currently on diabetic medication(s) and/or Insulin(s). Recommend home blood glucose monitoring/recording if these regimen(s) continue.    Section completed by: J Carlos Escalante RPh        DC Planning  DC destination/dispostion:  Discharge to his daughter's home in [place]; there are no stairs to negotiate; dtr does not work outside of the home.     Referrals: Oncology consulted completed 2/14;   Out Patient Speech Therapy    DC Needs:  Family training to include managing/adminstrating insulin ; follow up with PCP; has follow up appt.  with  for radiation therapy.     Barriers to discharge:   Expressive and receptive aphasia; delayed responses; impaired initiation, learning, attention, & sequencing.      Strengths:  Supportive family who will provide 24 hour care to patient; pt is pleasant/cooperaitve     Section completed by:  RACQUEL Patel, [facility]      Summary: reg diet with thin liquids; making progress w/ pt; xrays of bilateral knees due to pain; out patient therapy  pt/ot/slpout pt speech therapy with family as intrepeter  as pt does not respond to  w/ ipad; family training on Monday 2/27/2017.       Physician Summary  Toyin Medeiros MD participated and led team conference discussion.

## 2017-02-21 NOTE — REHAB-PHARMACY IDT TEAM NOTE
Pharmacy  Pharmacy  Antibiotics/Antifungals/Antivirals:  Medication:      Active Orders     None        Route:        NA  Stop Date:  NA  Reason:      NA   Antihypertensives/Cardiac:  Medication:    Orders (72h ago through future)    Start     Ordered    02/19/17 2100  atorvastatin (LIPITOR) tablet 20 mg   EVERY BEDTIME      02/19/17 1313        Patient Vitals for the past 24 hrs:   BP Pulse   02/20/17 1400 112/84 mmHg 85   02/20/17 0700 112/72 mmHg 63   02/19/17 1845 107/68 mmHg 98       Anticoagulation:  Medication: None    Other key medications: A review of the medication list reveals no issues at this time. Patient is currently on diabetic medication(s) and/or Insulin(s). Recommend home blood glucose monitoring/recording if these regimen(s) continue.    Section completed by: J Carlos Escalante [facility]

## 2017-02-21 NOTE — CARE PLAN
Problem: Safety  Goal: Will remain free from injury  Outcome: PROGRESSING AS EXPECTED  Pt free from fall or injury this shift. Have not witnessed this pt use the call light today. However, he has not been impulsive. Has not attempted to get out of bed or w/c without assistance. Has 1-on-1 aide. Will continue to closely monitor pt.    Problem: Pain Management  Goal: Pain level will decrease to patient’s comfort goal  Outcome: PROGRESSING AS EXPECTED  Pt has not c/o of pain this shift. Has been able to participate in all therapies and complete ADLs.

## 2017-02-21 NOTE — CARE PLAN
Problem: Communication  Goal: The ability to communicate needs accurately and effectively will improve  Outcome: PROGRESSING SLOWER THAN EXPECTED  Pt is primarily Micron[place] speaking and is aphasic. Pt is able to answer yes and no and also says thank you. I don't think pt is able to effectively express his needs to staff.     Problem: Safety  Goal: Will remain free from falls  Outcome: PROGRESSING SLOWER THAN EXPECTED  Pt is impulsive and was found taking himself to the restroom this evening with alarm going off. Pt reminded to use call light for staff assistance with transfers. Alarms in place and pt in monitored room close to nursing station for safety.     Problem: Infection  Goal: Will remain free from infection  Outcome: PROGRESSING AS EXPECTED  No s/s of infection present    Problem: Bowel/Gastric:  Goal: Normal bowel function is maintained or improved  Outcome: PROGRESSING AS EXPECTED  Pt is continent of bowel with last BM 2/20/17    Problem: IP BLADDER/VOIDING  Goal: STG - PATIENT WILL REMAIN CONTINENT.  Outcome: PROGRESSING AS EXPECTED  Pt has been continent of bladder this shift    Problem: Pain Management  Goal: Pain level will decrease to patient’s comfort goal  Outcome: PROGRESSING AS EXPECTED  PRN Tylenol at HS for headache pain. Pt asleep with reassessment.

## 2017-02-21 NOTE — PROGRESS NOTES
Hospital Medicine Progress Note, Adult, Complex               Author: Yanick Barth Date & Time created: 2/21/2017  1:51 PM     Interval History:  No significant events or changes since last visit  Patient denies SOB, cough, or diarrhea  Patient slept ok last night  Continue managing diabetes      Review of Systems:  Review of Systems   Constitutional: Negative for fever.   Eyes: Negative for blurred vision.   Respiratory: Negative for cough.    Cardiovascular: Negative for chest pain.   Gastrointestinal: Negative for diarrhea.   Musculoskeletal: Negative for joint pain.   Neurological: Negative for dizziness.   Psychiatric/Behavioral: The patient is not nervous/anxious.        Physical Exam:  Physical Exam   Constitutional: He is oriented to person, place, and time. No distress.   HENT:   Mouth/Throat: No oropharyngeal exudate.   Eyes: EOM are normal.   Neck: No JVD present. No tracheal deviation present.   Cardiovascular: Normal rate, regular rhythm, S1 normal and S2 normal.    No murmur heard.  Pulmonary/Chest: Effort normal. He has no wheezes. He has no rhonchi. He has no rales.   Abdominal: Soft. He exhibits no distension. There is no tenderness. Hernia confirmed negative in the right inguinal area and confirmed negative in the left inguinal area.   Musculoskeletal: He exhibits no edema.   Neurological: He is alert and oriented to person, place, and time. No sensory deficit.   Skin: Skin is warm and dry. No rash noted. No cyanosis.   Psychiatric: He has a normal mood and affect. His behavior is normal.   Nursing note and vitals reviewed.      Labs:        Invalid input(s): [id number]      No results for input(s): SODIUM, POTASSIUM, CHLORIDE, CO2, BUN, CREATININE, MAGNESIUM, PHOSPHORUS, CALCIUM in the last 72 hours.  No results for input(s): ALTSGPT, ASTSGOT, ALKPHOSPHAT, TBILIRUBIN, DBILIRUBIN, GAMMAGT, AMYLASE, LIPASE, ALB, PREALBUMIN, GLUCOSE in the last 72 hours.  No results for input(s): RBC, HEMOGLOBIN,  HEMATOCRIT, PLATELETCT, PROTHROMBTM, APTT, INR, IRON, FERRITIN, TOTIRONBC in the last 72 hours.            Hemodynamics:  Temp (24hrs), Av.7 °C (98 °F), Min:36.3 °C (97.3 °F), Max:36.9 °C (98.5 °F)  Temperature: 36.3 °C (97.3 °F)  Pulse  Av.5  Min: 60  Max: 113   Blood Pressure: 126/76 mmHg     Respiratory:    Respiration: 20, Pulse Oximetry: 94 %        RUL Breath Sounds: Clear, RML Breath Sounds: Diminished, RLL Breath Sounds: Diminished, LAYA Breath Sounds: Clear, LLL Breath Sounds: Diminished  Fluids:    Intake/Output Summary (Last 24 hours) at 17 1351  Last data filed at 17 1222   Gross per 24 hour   Intake   1138 ml   Output      0 ml   Net   1138 ml        GI/Nutrition:  Orders Placed This Encounter   Procedures   • DIET ORDER     Standing Status: Standing      Number of Occurrences: 1      Standing Expiration Date:      Order Specific Question:  Diet:     Answer:  Diabetic [3]     Order Specific Question:  Consistency/Fluid modifications:     Answer:  Thin Liquids [3]     Medical Decision Making, by Problem:  Active Hospital Problems    Diagnosis   • Brain tumor (CMS-HCC) [D49.6]     *  Recent Glioblastoma  S/P craniotomy and resection    *  Recent Seizure  On Keprra: 500 mg bid --> 1000 mg bid (adjusted at [facility])    *  Diabetes  Steroid-induced  BS: 166-281  BS rise up at night  On NPH: 15 units bid () --> 18 units bid () --> will changed to:                  18 units in 18 and 22 units pm ()  Cont to monitor    *  Hyponatremia  Na: 132  Monitor for now    *  Hyperlipidemia  *  Hx Bell's Palsy       Labs reviewed and Medications reviewed

## 2017-02-21 NOTE — CARE PLAN
"Problem: Communication  Goal: The ability to communicate needs accurately and effectively will improve  Outcome: PROGRESSING SLOWER THAN EXPECTED  Pt continues to be aphasic and is [place] speaking. However, he responded appropriately with \"yes\" \"no\" and \"thank you\" when prompted    Problem: Infection  Goal: Will remain free from infection  Head incision healing with no s/s of infection, antibiotic ointment applied to incision.         "

## 2017-02-21 NOTE — CARE PLAN
Problem: Mobility  Goal: STG-Within one week, patient will ascend and descend four to six stairs  1) Individualized goal: Using step-to pattern with SBA (slow down with improved sequencing)  2) Interventions: PT Gait Training, PT Self Care/Home Eval, PT Therapeutic Exercises, PT Neuro Re-Ed/Balance and PT Therapeutic Activity  Outcome: NOT MET  Not tested due to safety concerns and knee pain  Goal: STG-Within one week, patient will  1) Individualized goal: Gait no device, sba, 300 feet, one week  2) Interventions: PT Gait Training, PT Therapeutic Exercises, PT Neuro Re-Ed/Balance and PT Therapeutic Activity  Outcome: NOT MET  300 feet to 375 feet contact guard assist and attention to safety    Problem: Mobility Transfers  Goal: STG-Within one week, patient will transfer bed to chair  1) Individualized goal: Transfer bed to/from chair supervision one week  2) Interventions: PT Gait Training, PT Therapeutic Exercises, PT Neuro Re-Ed/Balance and PT Therapeutic Activity  Outcome: MET Date Met:  02/21/17  Supervision for bed mobility and transfers

## 2017-02-21 NOTE — DISCHARGE PLANNING
[facility]  has a [place] speaking speech therapist, but she is only accepting referrals for adults as she is covering for the full time speech therapist .  [facility] and [facility] only provides services to children.   Therefore, referral will be made to [facility] -choice form signed by family.

## 2017-02-21 NOTE — REHAB-PT IDT TEAM NOTE
Physical Therapy  Mobility  Bed mobility: Supervision supine to/from sit  Bed /Chair/Wheelchair Transfer:  5 -Supervision and set up  Bed/Chair/Wheelchair Transfer Description:  Supervision for safety, Verbal cueing, Set-up of equipment  Walk:  4 - Minimal Assistance contact guard assist  Walk Description:  Contact guard assist no device 300 to 375 feet  Wheelchair:  Minimal assistance  Wheelchair Description:   (propels 100ft with min assist for steering (bumped into right wall 2x))  Stairs 4 - Minimal Assistance  Stairs Description (up/down 12 4in stairs using 2 rails SBA; step-over step pattern)  Patient/Family Training/Education: In progress with patient  DME/DC Recommendations:  TBD  Strengths:  Independent PLOF, Making steady progress towards goals, Motivated for self care and independence, Pleasant and cooperative, Supportive family and Willingly participates in therapeutic activities  Barriers:   Aphasia expressive, Aphasia receptive, Confused, Impulsive, Language barrier, non-fluent English and Other: impaired standing balance, impaired tolerance for activity, impulsive fall risk, refuses to use an assistive device for walking  # of short term goals set= 3  # of short term goals met=1        Physical Therapy Problems           Problem: Mobility     Dates: Start: 02/11/17       Goal: STG-Within one week, patient will ascend and descend four to six stairs     Dates: Start: 02/11/17      Description: 1) Individualized goal:  Using step-to pattern with SBA (slow down with improved sequencing)    2) Interventions: PT Gait Training, PT Self Care/Home Eval, PT Therapeutic Exercises, PT Neuro Re-Ed/Balance and PT Therapeutic Activity        Note: Goal Note filed on 02/21/17 1208 by Richard Ramirez M.S.P.T.    Goal: STG-Within one week, patient will ascend and descend four to six   stairs  Outcome: NOT MET  Not tested due to safety concerns and knee pain          Goal: STG-Within one week, patient will     Dates:  Start: 02/14/17      Description: 1) Individualized goal: Gait no device, sba, 300 feet, one week    2) Interventions:  PT Gait Training, PT Therapeutic Exercises, PT Neuro Re-Ed/Balance and PT Therapeutic Activity        Note: Goal Note filed on 02/21/17 1208 by VIKA Carr.S.P.T.    Goal: STG-Within one week, patient will  Outcome: NOT MET  300 feet to 375 feet contact guard assist and attention to safety            Problem: Mobility Transfers     Dates: Start: 02/11/17       Goal: STG-Within one week, patient will transfer bed to chair     Dates: Start: 02/21/17      Description: 1) Individualized goal: Transfer bed to/from chair with UE support modified independent one week    2) Interventions:  PT Gait Training, PT Therapeutic Exercises, PT Neuro Re-Ed/Balance and PT Therapeutic Activity              Problem: PT-Long Term Goals     Dates: Start: 02/11/17       Goal: LTG-By discharge, patient will ambulate     Dates: Start: 02/11/17      Description: 1) Individualized goal:  200ft x 4 with no AD SBA    2) Interventions: PT Gait Training, PT Self Care/Home Eval, PT Therapeutic Exercises, PT Neuro Re-Ed/Balance and PT Therapeutic Activity            Goal: LTG-By discharge, patient will transfer one surface to another     Dates: Start: 02/11/17      Description: 1) Individualized goal:  SPV SPT safely and consistently    2) Interventions: PT Gait Training, PT Self Care/Home Eval, PT Therapeutic Exercises, PT Neuro Re-Ed/Balance and PT Therapeutic Activity             Goal: LTG-By discharge, patient will perform home exercise program     Dates: Start: 02/11/17      Description: 1) Individualized goal:  In standing 3x/day for 10min using SPV    2) Interventions: PT Gait Training, PT Self Care/Home Eval, PT Therapeutic Exercises, PT Neuro Re-Ed/Balance and PT Therapeutic Activity              Goal: LTG-By discharge, patient will ambulate up/down flight of stairs     Dates: Start: 02/11/17      Description: 1)  Individualized goal:  With one rail SPCORINE    2) Interventions:   PT Gait Training, PT Self Care/Home Eval, PT Therapeutic Exercises, PT Neuro Re-Ed/Balance and PT Therapeutic Activity                    Section completed by:  Richard Ramirez M.S.PAdriaT.

## 2017-02-21 NOTE — REHAB-NURSING IDT TEAM NOTE
Nursing  Nursing  Diet/Nutrition:  Diabetic, Dysphagia III-Mechanical Soft and Nectar Thick Liquids  Medication Administration:  Crush all Medications in Puree  % consumed at meals in last 24 hours:  Consumed 100-100% of meals per documentation.  Breakfast:  100%   Lunch:  100%  Dinner:  100%   Snack schedule:  Mid-morning, HS and Other: +1400  Appetite:  Excellent  Admit Weight:  Weight: 96 kg (211 lb 10.3 oz)  Weight Last 7 Days       Pain Issues:    Location:  Head (02/20 2030)  Left (02/20 2030)         Severity:  Mild   Scheduled pain medications:  None     PRN pain medications used in last 24 hours:  acetaminophen (TYLENOL)    Non Pharmacologic Interventions:  distraction, emotional support, environmental changes, relaxation, repositioned and rest  Effectiveness of pain management plan:  good=patient states acceptable comfort after interventions    Bowel:    Admission Bowel Assist Score:  6 - Modified Independent  Bowel Assist Score:  5 - Standby Prompting/Supervision or Set-up  Bowel Accidents in last 7 days:  0  Last bowel movement: 02/20/17  Stool: Medium, Soft     Usual bowel pattern:  daily  Scheduled bowel medications:  senna-docusate (PERICOLACE or SENOKOT S)   PRN bowel medications used in last 24 hours:  None  Nursing Interventions:  Increased time, Scheduled medication, Supervision, Verbal cueing, Set-up  Effectiveness of bowel program:   good=regular, predictable, controlled emptying of bowel     Bladder:    Admission Bladder Assist Score:  4 - Minimal Assistance  Bladder Assist Score:  5 - Standby Prompting/Supervision or Set-up  Bladder Accidents in last 7 days:  0  Medications affecting bladder:  None    Time void schedule/voiding pattern:  Voiding every 2-4 hours  Interventions:  Increased time, Supervision, Verbal cueing  Effectiveness of bladder training:  Good=regular, predictable, emptying of bladder, patient initiates time voiding    Diabetes:  Blood Sugar Frequency:  Before meals and at  bedtime    Range of BS for last 48 hours:     Recent Labs      02/19/17   0725  02/19/17   1122  02/19/17   1719  02/19/17   2032  02/20/17   0732  02/20/17   1106  02/20/17   1711  02/20/17 2034   POCGLUCOSE  153*  194*  284*  317*  154*  228*  139*  301*     Scheduled diabetic medications:  Other Humulin  Sliding scale usage in past 24 hours:  Yes  Total Short acting insulin in the past 24 hours:  Humalog 7 units and Humulin 36 units  Total Long acting insulin in the past 24 hours:  None    Wound:         Patient Lines/Drains/Airways Status    Active Current Wounds     Name: Placement date: Placement time: Site: Days:    Surgical Incision  Incision Left Posterior Head 02/04/17 1956 16                   Interventions:  Antibiotic ointment BID  Effectiveness of intervention:  wound is improving     Sleep/wake cycle:   Average hours slept:  Sleeps 4-6 hours without waking  Sleep medication usage:  None    Patient/Family Training/Education:  Aspiration Precautions, Diabetes Management, Diet/Nutrition, Fall Prevention, General Self Care, Medication Management, Pain Management, Safe Transfers, Safety and Skin Care  Discharge Supply Recommendations:  Glucometer and Strips and Blood Pressure Monitor  Strengths: Willingly participates in therapeutic activities, Able to follow instructions, Supportive family, Pleasant and cooperative and Manages pain appropriately   Barriers:   Aphasia expressive, Aphasia receptive, Aspiration risk, Confused, Generalized weakness, Impulsive and Language barrier, non-fluent English            Nursing Problems           Problem: Bowel/Gastric:     Goal: Normal bowel function is maintained or improved         Goal: Will not experience complications related to bowel motility           Problem: Communication     Goal: The ability to communicate needs accurately and effectively will improve           Problem: Discharge Barriers/Planning     Goal: Patient's continuum of care needs will be  met           Problem: GLYCEMIA IMBALANCE     Goal: Clinical indication of glycemia balance is achieved           Problem: IP BLADDER/VOIDING     Goal: LTG - patient will complete bladder elimination         Goal: STG - Patient demonstrates no accidents         Goal: STG - PATIENT WILL REMAIN CONTINENT.           Problem: Infection     Goal: Will remain free from infection           Problem: Knowledge Deficit     Goal: Knowledge of disease process/condition, treatment plan, diagnostic tests, and medications will improve         Goal: Knowledge of the prescribed therapeutic regimen will improve           Problem: Mobility     Goal: Risk for activity intolerance will decrease           Problem: NUTRITION     Goal: Adequate Food and Fluid Intake     Flowsheet: Taken at 02/19/17 1300    Percentage Eaten (Lunch) 100 by Heather C Cogan, C.N.A.    Taken at 02/19/17 0900    Percentage Eaten (Breakfast) 100 by Heather C Cogan, C.N.A.    Taken at 02/12/17 1228    Percentage Eaten (Lunch) 100 by Heather C Cogan, C.N.A.    Taken at 02/12/17 0900    Percentage Eaten (Breakfast) 100 by Heather C Cogan, C.N.A.    Taken at 02/11/17 1900    Percentage Eaten (Dinner) 100 by Faby Brooks         Problem: Pain Management     Goal: Pain level will decrease to patient's comfort goal           Problem: Psychosocial Needs:     Goal: Level of anxiety will decrease           Problem: Respiratory:     Goal: Respiratory status will improve           Problem: Safety     Goal: Will remain free from injury         Goal: Will remain free from falls           Problem: Skin Integrity     Goal: Risk for impaired skin integrity will decrease           Problem: Urinary Elimination:     Goal: Ability to reestablish a normal urinary elimination pattern will improve           Problem: Venous Thromboembolism (VTW)/Deep Vein Thrombosis (DVT) Prevention:     Goal: Patient will participate in Venous Thrombosis (VTE)/Deep Vein Thrombosis (DVT)Prevention  Measures                Long Term Goals:   At discharge patient will be able to function safely at home and in the community with support.    Section completed by:  Marek Barragan R.N.

## 2017-02-21 NOTE — PROGRESS NOTES
"Rehab Progress Note     Chief complaint: Unable to assess due to aphasia, follow-up brain tumor  Date of Service: 2/21/2017    Interval Events (Subjective)  Patient seen and examined. Unable to do review of systems as patient is aphasic. Appears comfortable.      Objective:  VITAL SIGNS: /78 mmHg  Pulse 93  Temp(Src) 36.7 °C (98 °F)  Resp 18  Ht 1.6 m (5' 2.99\")  Wt 88.9 kg (195 lb 15.8 oz)  BMI 34.73 kg/m2  SpO2 95%  Gen: alert, no apparent distress  CV: regular rate and rhythm, no murmurs, no peripheral edema  Resp: clear to ascultation bilaterally, normal respiratory effort  GI: soft, non-tender abdomen, bowel sounds present    Recent Results (from the past 72 hour(s))   ACCU-CHEK GLUCOSE    Collection Time: 02/18/17 11:11 AM   Result Value Ref Range    Glucose - Accu-Ck 206 (H) 65 - 99 mg/dL   ACCU-CHEK GLUCOSE    Collection Time: 02/18/17  5:14 PM   Result Value Ref Range    Glucose - Accu-Ck 137 (H) 65 - 99 mg/dL   ACCU-CHEK GLUCOSE    Collection Time: 02/18/17  8:19 PM   Result Value Ref Range    Glucose - Accu-Ck 291 (H) 65 - 99 mg/dL   ACCU-CHEK GLUCOSE    Collection Time: 02/19/17  7:25 AM   Result Value Ref Range    Glucose - Accu-Ck 153 (H) 65 - 99 mg/dL   ACCU-CHEK GLUCOSE    Collection Time: 02/19/17 11:22 AM   Result Value Ref Range    Glucose - Accu-Ck 194 (H) 65 - 99 mg/dL   ACCU-CHEK GLUCOSE    Collection Time: 02/19/17  5:19 PM   Result Value Ref Range    Glucose - Accu-Ck 284 (H) 65 - 99 mg/dL   ACCU-CHEK GLUCOSE    Collection Time: 02/19/17  8:32 PM   Result Value Ref Range    Glucose - Accu-Ck 317 (H) 65 - 99 mg/dL   ACCU-CHEK GLUCOSE    Collection Time: 02/20/17  7:32 AM   Result Value Ref Range    Glucose - Accu-Ck 154 (H) 65 - 99 mg/dL   ACCU-CHEK GLUCOSE    Collection Time: 02/20/17 11:06 AM   Result Value Ref Range    Glucose - Accu-Ck 228 (H) 65 - 99 mg/dL   ACCU-CHEK GLUCOSE    Collection Time: 02/20/17  5:11 PM   Result Value Ref Range    Glucose - Accu-Ck 139 (H) 65 - 99 " mg/dL   ACCU-CHEK GLUCOSE    Collection Time: 02/20/17  8:34 PM   Result Value Ref Range    Glucose - Accu-Ck 301 (H) 65 - 99 mg/dL   ACCU-CHEK GLUCOSE    Collection Time: 02/21/17  7:18 AM   Result Value Ref Range    Glucose - Accu-Ck 157 (H) 65 - 99 mg/dL       Current Facility-Administered Medications   Medication Frequency   • levetiracetam (KEPPRA) 100 MG/ML solution 1,500 mg Q12HRS   • atorvastatin (LIPITOR) tablet 20 mg QHS   • nystatin (MYCOSTATIN) 523641 UNIT/ML suspension 500,000 Units 4X/DAY   • benzocaine (ANBESOL/ORAJEL) 20 % gel PRN   • neomycin-bacitracin-polymyxin (NEOSPORIN) 400-5-5000 ointment BID   • lidocaine (LIDODERM) 5 % 1 Patch DAILY   • insulin NPH (HUMULIN,NOVOLIN) injection 18 Units BID INSULIN   • docusate sodium (COLACE) capsule 100 mg QDAY PRN    And   • senna-docusate (PERICOLACE or SENOKOT S) 8.6-50 MG per tablet 1 Tab Nightly    And   • senna-docusate (PERICOLACE or SENOKOT S) 8.6-50 MG per tablet 1 Tab Q24HRS PRN    And   • lactulose 20 GM/30ML solution 30 mL Q24HRS PRN    And   • bisacodyl (DULCOLAX) suppository 10 mg Q24HRS PRN    And   • fleet enema 133 mL Once PRN   • Respiratory Care per Protocol Continuous RT   • dexamethasone (DECADRON) tablet 4 mg Q6HRS   • temazepam (RESTORIL) capsule 15 mg HS PRN - MR X 1   • ondansetron (ZOFRAN ODT) dispertab 4 mg Q4HRS PRN   • famotidine (PEPCID) tablet 20 mg BID   • acetaminophen (TYLENOL) tablet 650 mg Q6HRS PRN   • insulin lispro (HUMALOG) injection 1-6 Units 4X/DAY ACHS       Orders Placed This Encounter   Procedures   • DIET ORDER     Standing Status: Standing      Number of Occurrences: 1      Standing Expiration Date:      Order Specific Question:  Diet:     Answer:  Diabetic [3]     Order Specific Question:  Consistency/Fluid modifications:     Answer:  Thin Liquids [3]       Assessment:  Active Hospital Problems    Diagnosis   • Astrocytoma brain tumor (CMS-HCC)       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 S/P sterotactic left frontotemporal craniotomy with microscopic computer assisted partial resection of the left temporal low grade glioma using intraoperative functional cortical mapping with awake patient.     new acute infarct posterior to the resection of the cavity in the left temporal occipital lobe. And new small focus of GRE hypointensity in left frontoparietal regional likely represents small hemorrhage or blood products from surgery  Anaplastic Grade III astrocytoma. He has an appointment to see  in the next several weeks, thus he would not be undergoing radiation near his recent resection anyway's. The patient has and apppointment for outpatient oncology with Olivier Osman MD    Aphasia - continue speech therapy.    Decadron taper as tolererated: continue 4 mg q 6 hours for now    Hyperglycemia - due to steroids. Continue long-acting and short-acting insulin as needed.    Seizure: 1000 mg keppra bid    Hyperlipidemia - continue statin.    bladder program    bowel program      IMPAIRMENTS:    Mobility  Self-care  IADLs  Cognitive  Speech      PLAN:    Discussion and Recommendations:    1. The patient requires an acute inpatient rehabilitation program with a coordinated program of care at an intensity and frequency not available at a lower level of care. This recommendation is substantiated by the patient's medical physicians who recommend that the patient's intervention and assessment of medical issues needs to be done at an acute level of care for patient's safety and maximum outcome.    2. A coordinated program of care will be supplied by an interdisciplinary team of physical therapy, occupational therapy, rehab physician, rehab nursing, and, if needed, speech therapy and rehab  psychology. Rehab team presents a patient-specific rehabilitation and education program concentrating on prevention of future problems related to accessibility, mobility, skin, bowel, bladder, sexuality, and psychosocial and medical/surgical problems.    3. Need for Rehabilitation Physician: The rehab physician will be evaluating the patient on a multi-weekly basis to help coordinate the program of care. The rehab physician communicates between medical physicians, therapists, and nurses to maximize the patient's potential outcome. Specific areas in which the rehab physician will be providing daily assessment include the following:    A. Assessing the patient's heart rate and blood pressure response (vitals monitoring) to activity and making adjustments in medications or conservative measures as needed.    B. The rehab physician will be assessing the frequency at which the program can be increased to allow the patient to reach optimal functional outcome.    C. The rehab physician will also provide assessments in daily skin care, especially in light of patient's impairments in mobility.    D. The rehab physician will provide special expertise in understanding how to work with functional impairment and recommend appropriate interventions, compensatory techniques, and education that will facilitate the patient's outcome.    4. Rehab R.N.    The rehab RN will be working with patient to carry over in room mobility and activities of daily living when the patient is not in 3 hours of skilled therapy. Rehab nursing will be working in conjunction with rehab physician to address all the medical issues above and continue to assess laboratory work and discuss abnormalities with the treating physicians, assess vitals, and response to activity, and discuss and report abnormalities with the rehab physician. Rehab RN will also continue daily skin care, supervise bladder/bowel program, instruct in medication administration, and  ensure patient safety.     MEDICAL DECISION MAKING and INTERDISCIPLINARY PLAN OF CARE:    REHABILITATION ISSUES/ADVERSE POTENTIAL::  New CVA (Cerebrovascular Accident):Patient demonstrates functional deficits in strength, balance, coordination, and ADL's. Patient is admitted to [facility] for comprehensive rehabilitation therapy as described below.   Rehabilitation nursing monitors bowel and bladder control, educates on medication administration, co-morbidities and monitors patient s    NonTBI (Traumatic Brain Injury): Patient demonstrates functional deficits in cognition, behavior, strength, balance, coordination, and ADL's. The patient requires therapy to correct these deficits prior to discharge. Patient is admitted to [facility] for comprehensive rehabilitation therapy, including physical, occupational and speech therapy.     Rehabilitation nursing monitors bowel and bladder control, educates on medication administration, co-morbidities and monitors patient safety.    Therapies to treat at intensity and frequency of (may change after completion of evaluation by all therapeutic disciplines):       PT:  Physical therapy to address mobility, transfer, gait training and evaluation for adaptive equipment needs 1hour/day at least 5 days/week for the duration of the ELOS (see below)       OT:  Occupational therapy to address ADLs, self-care, home management training, functional mobility/transfers and assistive device evaluation, and community re-intergration 1hour/day at least 5 days/week for the duration of the ELOS (see below).         ST/Dysphagia:  Speech therapy to address speech, language,and cognitive deficits as well as swallowing difficulties with retraining/dysphagia management and community re-integration with comprehension, expression, cognitive training 1hour/day at least 5 days/week for the duration of the ELOS (see below).     2.  Neurostimulants: None at this time  but continue to assess daily for need to initiate should status change.    3.  DVT prophylaxis:  Patient is on not due to small hemmrhage in brain post op for anticoagulation upon transfer. Encourage OOB. Monitor daily for signs and symptoms of DVT including but not limited to swelling and pain to prevent the development of DVT that may interfere with therapies.    4.  GI prophylaxis:  On prilosec to prevent gastritis/dyspepsia which may interfere with therapies.    5.  Pain: No issues with pain currently     6.  Nutrition/Dysphagia: Dietician monitors nutrient intake, recommend supplements prn and provide nutrition education to pt/family to promote optimal nutrition for wound healing/recovery.     7.  Bladder/bowel:  Start bowel and bladder program as described below, to prevent constipation, urinary retention (which may lead to UTI), and urinary incontinence (which will impact upon pt's functional independence).    - TV Q3h while awake with post void bladder scans, I&O cath for PVRs >400  - up to commode after meal     8.  Skin/dermal ulcer prophylaxis: Monitor for new skin conditions with q.2 h. turns as required to prevent the development of skin breakdown.     9.  Cognition/Behavior:  Psychologist Dr. Kingsley provides adjustment counseling to illness and psychosocial barriers that may be potential barriers to rehabilitation.     10. Respiratory therapy: RT performs O2 management prn, breathing retraining, pulmonary hygeine and bronchospasm management prn to optimize participation in therapies.      MEDICAL CO-MORBIDITIES/ADVERSE POTENTIAL AFFECTING FUNCTION:        GOALS/EXPECTED LEVEL OF FUNCTION BASED ON CURRENT MEDICAL AND FUNCTIONAL STATUS (may change based on patient's medical status and rate of impairment recovery):     Transfers: sup to mod I     Mobility/Gait:sup to mod I     ADL's:sup to mod I     Cognition:sup    DISPOSITION: home with assistance     ELOS: 2 weeks      Medical Decision Making and  Plan:    Date of conference: 2/21/2017    RN issues: none, swallowing meds in applesauce, elevated sugars due to decadron, sometimes impulsive, re-directable, confused even with     PT: barriers: has made nice progress, more alert, more interactive, has good friend visiting daily that helps with interpretation, confused still, supervision for transfers - SBA due to impulsiveness, almost tripped this am over chair leg, walking 300 to 375 ft without AD, CGA to SBA, will need gait belt at discharge, limited by bilateral knee pains, right more than left    OT: barriers: unable to follow instruction, poor safety awareness, needs 24/6, supervision for all ADLs, except LBD min A    SLP: made some progress this week, still max A for comprehension and expression, automatic speech in English, primarily Kyrgyz speaking, very perseverative in Tong[place], unable to assess other cog due to aphasia, MBS today, deep penetration on thins with only 1/5 trials, regular with thins    CM/social support: was living independetly prior, for discharge to daughter's home, no stairs, she's available 24/7    Anticipated DC date: 2/28    Home health: outpatient SLP/PT/OT    Equip: none    Follow up: radiology oncology, PCP    New issue: Knee pain - xrays to check for OA      Total time:  >35 minutes.  I spent greater than 50% of the time for patient care and coordination on this date, including unit/floor time, and face-to-face time with the patient as per assessment and plan above.    Toyin Medeiros M.D.

## 2017-02-22 NOTE — CARE PLAN
Problem: Infection  Goal: Will remain free from infection  Outcome: PROGRESSING AS EXPECTED  No s/s of infection present    Problem: Bowel/Gastric:  Goal: Normal bowel function is maintained or improved  Outcome: PROGRESSING AS EXPECTED  Pt is continent of bowel with last BM 2/20/17    Problem: IP BLADDER/VOIDING  Goal: STG - Patient demonstrates no accidents  Outcome: MET Date Met:  02/22/17  Pt has been continent of bladder     Problem: Respiratory:  Goal: Respiratory status will improve  Outcome: PROGRESSING AS EXPECTED  Pt is on room air and respirations are WNL.

## 2017-02-22 NOTE — PROGRESS NOTES
Hospital Medicine Progress Note, Adult, Complex               Author: Yanick Barth Date & Time created: 2017  10:13 AM     Interval History:  No significant events or changes since last visit  Patient denies SOB, cough, or diarrhea  Patient slept ok last night  Continue managing diabetes      Review of Systems:  Review of Systems   Unable to perform ROS: medical condition       Physical Exam:  Physical Exam   Constitutional: He is oriented to person, place, and time. He appears well-nourished.   HENT:   Head: Atraumatic.   Eyes: Conjunctivae are normal. Pupils are equal, round, and reactive to light.   Neck: Normal range of motion. Neck supple.   Cardiovascular: Normal rate, regular rhythm, S1 normal and S2 normal.    No murmur heard.  Pulmonary/Chest: Effort normal and breath sounds normal. He has no rhonchi.   Abdominal: Soft. He exhibits no distension. There is no tenderness. Hernia confirmed negative in the right inguinal area and confirmed negative in the left inguinal area.   Musculoskeletal: He exhibits no edema.   Neurological: He is alert and oriented to person, place, and time. No sensory deficit.   Skin: Skin is warm and dry. No rash noted. No cyanosis.   Psychiatric: He has a normal mood and affect. His behavior is normal.   Nursing note and vitals reviewed.      Labs:        Invalid input(s): [id number]      No results for input(s): SODIUM, POTASSIUM, CHLORIDE, CO2, BUN, CREATININE, MAGNESIUM, PHOSPHORUS, CALCIUM in the last 72 hours.  No results for input(s): ALTSGPT, ASTSGOT, ALKPHOSPHAT, TBILIRUBIN, DBILIRUBIN, GAMMAGT, AMYLASE, LIPASE, ALB, PREALBUMIN, GLUCOSE in the last 72 hours.  No results for input(s): RBC, HEMOGLOBIN, HEMATOCRIT, PLATELETCT, PROTHROMBTM, APTT, INR, IRON, FERRITIN, TOTIRONBC in the last 72 hours.            Hemodynamics:  Temp (24hrs), Av.5 °C (97.7 °F), Min:36.3 °C (97.3 °F), Max:36.7 °C (98.1 °F)  Temperature: 36.5 °C (97.7 °F)  Pulse  Av.4  Min: 60  Max:  113   Blood Pressure: 111/80 mmHg     Respiratory:    Respiration: 20, Pulse Oximetry: 96 %        RUL Breath Sounds: Clear, RML Breath Sounds: Diminished, RLL Breath Sounds: Diminished, LAYA Breath Sounds: Clear, LLL Breath Sounds: Diminished  Fluids:    Intake/Output Summary (Last 24 hours) at 02/22/17 1013  Last data filed at 02/22/17 0912   Gross per 24 hour   Intake   1558 ml   Output      0 ml   Net   1558 ml        GI/Nutrition:  Orders Placed This Encounter   Procedures   • DIET ORDER     Standing Status: Standing      Number of Occurrences: 1      Standing Expiration Date:      Order Specific Question:  Diet:     Answer:  Diabetic [3]     Order Specific Question:  Consistency/Fluid modifications:     Answer:  Thin Liquids [3]     Medical Decision Making, by Problem:  Active Hospital Problems    Diagnosis   • Brain tumor (CMS-HCC) [D49.6]     Note (02/22):  Will get am labs for further evaluation    *  Recent Glioblastoma  S/P craniotomy and resection    *  Recent Seizure  On Keprra: 500 mg bid --> 1000 mg bid (adjusted at [facility])    *  Diabetes  Steroid-induced  BS: 166-281  BS rise up at night  On NPH: 15 units bid (2/12) --> 18 units bid (2/13) -->                   18 units in am and 22 units pm (2/21)     -->                   22 units in am and 25 units pm (2/22)   Cont to monitor    *  Hyponatremia  Na: 132  Monitor for now    *  Hyperlipidemia  *  Hx Bell's Palsy       Labs reviewed and Medications reviewed

## 2017-02-23 NOTE — CARE PLAN
Problem: Communication  Goal: The ability to communicate needs accurately and effectively will improve  Outcome: PROGRESSING SLOWER THAN EXPECTED  Pt is primarily Mauritanian speaking and is aphasic. Pt is able to answer yes and no and also says thank you. I don't think pt is able to effectively express his needs to staff.         Problem: Safety  Goal: Will remain free from falls  Outcome: PROGRESSING SLOWER THAN EXPECTED  Alarms in place and pt in monitored room close to nursing station for safety. Pt is impulsive and does not remember to use call light.     Problem: Infection  Goal: Will remain free from infection  Outcome: PROGRESSING AS EXPECTED  No s/s of infection present    Problem: Bowel/Gastric:  Goal: Normal bowel function is maintained or improved  Outcome: PROGRESSING AS EXPECTED  Pt is continent of bowel and had a BM this shift.    Problem: Respiratory:  Goal: Respiratory status will improve  Outcome: MET Date Met:  02/23/17  Pt is on room air and respirations are WNL.

## 2017-02-23 NOTE — DISCHARGE PLANNING
Referral sent to [facility] per choice form.  Per Leigh Ann, referral received and accepted.

## 2017-02-23 NOTE — CARE PLAN
Problem: Skin Integrity  Goal: Risk for impaired skin integrity will decrease  Right side of pts tongue looks like he bit it. 2 large open areas.

## 2017-02-23 NOTE — PROGRESS NOTES
Hospital Medicine Progress Note, Adult, Complex               Author: Yanick Barth Date & Time created: 2017  10:47 AM     Interval History:  No significant events or changes since last visit  Patient denies SOB, cough, or diarrhea  Patient slept ok last night  Continue managing diabetes      Review of Systems:  Review of Systems   Unable to perform ROS: medical condition       Physical Exam:  Physical Exam   Constitutional: He is oriented to person, place, and time.   HENT:   Mouth/Throat: Oropharynx is clear and moist.   Eyes: No scleral icterus.   Cardiovascular: Normal rate, regular rhythm, S1 normal and S2 normal.    No murmur heard.  Pulmonary/Chest: Effort normal and breath sounds normal. No stridor. He has no rhonchi.   Abdominal: Soft. Bowel sounds are normal. Hernia confirmed negative in the right inguinal area and confirmed negative in the left inguinal area.   Musculoskeletal: He exhibits no edema.   Neurological: He is alert and oriented to person, place, and time. No sensory deficit.   Skin: Skin is warm and dry. No rash noted. He is not diaphoretic. No cyanosis.   Psychiatric: He has a normal mood and affect. His behavior is normal.   Nursing note and vitals reviewed.      Labs:        Invalid input(s): [id number]      Recent Labs      17   0540   SODIUM  130*   POTASSIUM  4.1   CHLORIDE  96   CO2  25   BUN  15   CREATININE  0.43*   MAGNESIUM  2.1   PHOSPHORUS  3.8   CALCIUM  8.8     Recent Labs      17   0540   ALTSGPT  75*   ASTSGOT  22   ALKPHOSPHAT  39   TBILIRUBIN  0.7   GLUCOSE  165*     Recent Labs      17   0540   RBC  4.81   HEMOGLOBIN  14.9   HEMATOCRIT  43.2   PLATELETCT  102*     Recent Labs      17   0540   WBC  10.0   ASTSGOT  22   ALTSGPT  75*   ALKPHOSPHAT  39   TBILIRUBIN  0.7           Hemodynamics:  Temp (24hrs), Av.6 °C (97.8 °F), Min:36.3 °C (97.4 °F), Max:36.7 °C (98.1 °F)  Temperature: 36.7 °C (98.1 °F)  Pulse  Av.3  Min: 60  Max: 113    Blood Pressure: 124/92 mmHg     Respiratory:    Respiration: 18, Pulse Oximetry: 94 %        RUL Breath Sounds: Clear, RML Breath Sounds: Diminished, RLL Breath Sounds: Diminished, LAYA Breath Sounds: Clear, LLL Breath Sounds: Diminished  Fluids:    Intake/Output Summary (Last 24 hours) at 02/23/17 1047  Last data filed at 02/23/17 0840   Gross per 24 hour   Intake    960 ml   Output      0 ml   Net    960 ml        GI/Nutrition:  Orders Placed This Encounter   Procedures   • DIET ORDER     Standing Status: Standing      Number of Occurrences: 1      Standing Expiration Date:      Order Specific Question:  Diet:     Answer:  Diabetic [3]     Order Specific Question:  Consistency/Fluid modifications:     Answer:  Thin Liquids [3]     Medical Decision Making, by Problem:  Active Hospital Problems    Diagnosis   • Brain tumor (CMS-HCC) [D49.6]     *  Recent Glioblastoma  S/P craniotomy and resection    *  Recent Seizure  On Keprra: 500 mg bid --> 1000 mg bid (adjusted at [facility])    *  Diabetes  Steroid-induced  BS a little better recently: 164-211  BS rise up at night  On NPH: 15 units bid (2/12) --> 18 units bid (2/13) -->                   18 units in am and 22 units pm (2/21)     -->                   22 units in am and 25 units pm (2/22)   Monitor another day since meds were recently adjusted (2/33)    *  Hyponatremia  Na: 132 --> 130 (2/23)  Monitor for now    *  Elevated ALT  ALT: 75   Has been stable for a while  ? 2nd to Pepcid and/or Lipitor --> will d/c (2/23)  Monitor    *  Thrombocytopenia  Platelets: 208 --> 155 --> 138 --> 102 (2/23)  ? 2nd to Pepcid and/or Lipitor --> will d/c (2/23)    *  Hyperlipidemia  *  Hx Bell's Palsy       Labs reviewed and Medications reviewed

## 2017-02-23 NOTE — PROGRESS NOTES
"Rehab Progress Note     Chief complaint: Unable to assess due to aphasia, follow-up brain tumor  Date of Service: 2/22/2017    Interval Events (Subjective)  Patient seen and examined. Unable to do review of systems as patient is aphasic. Per staff even with the use of an  he is not understandable even in his native tongue of [place]. Appears comfortable. Participating well with therapy.      Objective:  VITAL SIGNS: /76 mmHg  Pulse 72  Temp(Src) 36.3 °C (97.4 °F)  Resp 20  Ht 1.6 m (5' 2.99\")  Wt 88.9 kg (195 lb 15.8 oz)  BMI 34.73 kg/m2  SpO2 95%  Gen: alert, no apparent distress  CV: regular rate and rhythm, no murmurs, no peripheral edema  Resp: clear to ascultation bilaterally, normal respiratory effort  GI: soft, non-tender abdomen, bowel sounds present    Recent Results (from the past 72 hour(s))   ACCU-CHEK GLUCOSE    Collection Time: 02/19/17  8:32 PM   Result Value Ref Range    Glucose - Accu-Ck 317 (H) 65 - 99 mg/dL   ACCU-CHEK GLUCOSE    Collection Time: 02/20/17  7:32 AM   Result Value Ref Range    Glucose - Accu-Ck 154 (H) 65 - 99 mg/dL   ACCU-CHEK GLUCOSE    Collection Time: 02/20/17 11:06 AM   Result Value Ref Range    Glucose - Accu-Ck 228 (H) 65 - 99 mg/dL   ACCU-CHEK GLUCOSE    Collection Time: 02/20/17  5:11 PM   Result Value Ref Range    Glucose - Accu-Ck 139 (H) 65 - 99 mg/dL   ACCU-CHEK GLUCOSE    Collection Time: 02/20/17  8:34 PM   Result Value Ref Range    Glucose - Accu-Ck 301 (H) 65 - 99 mg/dL   ACCU-CHEK GLUCOSE    Collection Time: 02/21/17  7:18 AM   Result Value Ref Range    Glucose - Accu-Ck 157 (H) 65 - 99 mg/dL   ACCU-CHEK GLUCOSE    Collection Time: 02/21/17 11:06 AM   Result Value Ref Range    Glucose - Accu-Ck 232 (H) 65 - 99 mg/dL   ACCU-CHEK GLUCOSE    Collection Time: 02/21/17  5:19 PM   Result Value Ref Range    Glucose - Accu-Ck 193 (H) 65 - 99 mg/dL   ACCU-CHEK GLUCOSE    Collection Time: 02/21/17  8:52 PM   Result Value Ref Range    Glucose - Accu-Ck " 279 (H) 65 - 99 mg/dL   ACCU-CHEK GLUCOSE    Collection Time: 02/22/17  7:47 AM   Result Value Ref Range    Glucose - Accu-Ck 176 (H) 65 - 99 mg/dL   ACCU-CHEK GLUCOSE    Collection Time: 02/22/17 11:14 AM   Result Value Ref Range    Glucose - Accu-Ck 213 (H) 65 - 99 mg/dL       Current Facility-Administered Medications   Medication Frequency   • [START ON 2/23/2017] insulin NPH (HUMULIN,NOVOLIN) injection 22 Units QAM INSULIN   • insulin NPH (HUMULIN,NOVOLIN) injection 25 Units PM INSULIN   • levetiracetam (KEPPRA) 100 MG/ML solution 1,500 mg Q12HRS   • atorvastatin (LIPITOR) tablet 20 mg QHS   • nystatin (MYCOSTATIN) 442196 UNIT/ML suspension 500,000 Units 4X/DAY   • benzocaine (ANBESOL/ORAJEL) 20 % gel PRN   • neomycin-bacitracin-polymyxin (NEOSPORIN) 400-5-5000 ointment BID   • lidocaine (LIDODERM) 5 % 1 Patch DAILY   • docusate sodium (COLACE) capsule 100 mg QDAY PRN    And   • senna-docusate (PERICOLACE or SENOKOT S) 8.6-50 MG per tablet 1 Tab Nightly    And   • senna-docusate (PERICOLACE or SENOKOT S) 8.6-50 MG per tablet 1 Tab Q24HRS PRN    And   • lactulose 20 GM/30ML solution 30 mL Q24HRS PRN    And   • bisacodyl (DULCOLAX) suppository 10 mg Q24HRS PRN    And   • fleet enema 133 mL Once PRN   • Respiratory Care per Protocol Continuous RT   • dexamethasone (DECADRON) tablet 4 mg Q6HRS   • temazepam (RESTORIL) capsule 15 mg HS PRN - MR X 1   • ondansetron (ZOFRAN ODT) dispertab 4 mg Q4HRS PRN   • famotidine (PEPCID) tablet 20 mg BID   • acetaminophen (TYLENOL) tablet 650 mg Q6HRS PRN   • insulin lispro (HUMALOG) injection 1-6 Units 4X/DAY ACHS       Orders Placed This Encounter   Procedures   • DIET ORDER     Standing Status: Standing      Number of Occurrences: 1      Standing Expiration Date:      Order Specific Question:  Diet:     Answer:  Diabetic [3]     Order Specific Question:  Consistency/Fluid modifications:     Answer:  Thin Liquids [3]       Assessment:  Active Hospital Problems    Diagnosis   •  Astrocytoma brain tumor (CMS-HCC)     Date of conference: 2/21/2017    RN issues: none, swallowing meds in applesauce, elevated sugars due to decadron, sometimes impulsive, re-directable, confused even with     PT: barriers: has made nice progress, more alert, more interactive, has good friend visiting daily that helps with interpretation, confused still, supervision for transfers - SBA due to impulsiveness, almost tripped this am over chair leg, walking 300 to 375 ft without AD, CGA to SBA, will need gait belt at discharge, limited by bilateral knee pains, right more than left    OT: barriers: unable to follow instruction, poor safety awareness, needs 24/6, supervision for all ADLs, except LBD min A    SLP: made some progress this week, still max A for comprehension and expression, automatic speech in English, primarily German speaking, very perseverative in Irish, unable to assess other cog due to aphasia, MBS today, deep penetration on thins with only 1/5 trials, regular with thins    CM/social support: was living independetly prior, for discharge to daughter's home, no stairs, she's available 24/7    Anticipated DC date: 2/28    Home health: outpatient SLP/PT/OT    Equip: none    Follow up: radiology oncology, PCP      Medical Decision Making and Plan:    New issue: Knee pain - xrays to check for OA - right x-ray with severe tricompartmental degenerative changes but no definite effusion. Left x-ray with very marginal spurs but no significant arthritis. Would likely benefit from an intra-articular steroid injection to the right knee which we can do tomorrow.    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 S/P sterotactic left frontotemporal craniotomy with microscopic  computer assisted partial resection of the left temporal low grade glioma using intraoperative functional cortical mapping with awake patient.     new acute infarct posterior to the resection of the cavity in the left temporal occipital lobe. And new small focus of GRE hypointensity in left frontoparietal regional likely represents small hemorrhage or blood products from surgery  Anaplastic Grade III astrocytoma. He has an appointment to see  in the next several weeks, thus he would not be undergoing radiation near his recent resection anyway's. The patient has and apppointment for outpatient oncology with Olivier Osman MD    Aphasia - continue speech therapy.    Decadron taper as tolererated: continue 4 mg q 6 hours for now until follow-up with surgery and oncology.    Hyperglycemia - due to steroids. Continue long-acting and short-acting insulin as needed.    Seizure: Continue 1000 mg keppra bid    Hyperlipidemia - continue statin.    GI prophylaxis - Pepcid.      Total time:  >25 minutes.  I spent greater than 50% of the time for patient care and coordination on this date, including unit/floor time, and face-to-face time with the patient as per assessment and plan above.    Toyin Medeiros M.D.

## 2017-02-23 NOTE — CARE PLAN
Problem: Safety  Goal: Will remain free from injury  Patients skin is clean,dry and intact with no s/s of skin breakdown or infection.  Goal: Will remain free from falls  Pt is on 2:1 CNA.  Cooperative    Problem: NUTRITION  Goal: Adequate Food and Fluid Intake  Pt has an appetite and intake is sufficient of food and fluids.

## 2017-02-24 NOTE — TELEPHONE ENCOUNTER
Desiree returned call yesterday afternoon and left navigator message.  Call placed to Desiree this am to let her know that navigator available for additional support and resources, primarily after discharge.  Can meet with them after medical oncology appointment if needed.

## 2017-02-24 NOTE — PROGRESS NOTES
Reported by PT Alexx, Patient was tripped and fell on the floor when ambulating with him. The incident was witnessed by Alexx and patient did not hit his head. Patient was assessed, no injury noted. Denied any pain. Old bruise noted on right hand. VS checked /80 T 97.4 . Dr Medeiros and patient's daughter were notified. Neuro checked started. Will continue to monitor.

## 2017-02-24 NOTE — CARE PLAN
Problem: Safety  Goal: Will remain free from injury  Patient up x 1 so far this shift without calling for assistance. Call light within reach. Alarms on. Non skid footwear in place. Bed locked and in lowest position. Hourly rounding in place. Patient in monitored room across from nursing station.     Problem: GLYCEMIA IMBALANCE  Goal: Clinical indication of glycemia balance is achieved  Outcome: PROGRESSING SLOWER THAN EXPECTED  Patient's HS FSBS 321. 4 units sliding scale Humalog administered per MAR. HS snack given and consumed. No s/s of hypo/hyperglycemia noted.

## 2017-02-24 NOTE — DISCHARGE PLANNING
I met with pt 's dtr shana in pt's room.  She indicates she has attempted to assist w/ applying / accessing info about t's short term disability through his work, but they are unable to release any information since pt does not have a POA.     She also states they tried to apply for Social Security Disability, but unable to move forward as they do not have a POA.  An appointment was scheduled for March 16 with pt needing to be present.      It appears family may need to apply for guardianship for pt.  Will discuss with MD.     Provided them with information on [facility] [address].      RTC application will be completed to assist w/ transportation.    Plan:   Family needs letter confirming pt's disability.  Training/education re: to checking fs/administering insulin-alissa Ramírez receptive to training.  Requested order.

## 2017-02-24 NOTE — PROGRESS NOTES
"Rehab Progress Note     Chief complaint: Unable to assess due to aphasia, follow-up brain tumor  Date of Service: 2/24/2017    Interval Events (Subjective)  Patient seen and examined. Unable to do review of systems due to patient's aphasia. His daughter is at bedside today. Daughter explains to the patient that he has arthritis in his knees which is causing his right more than left knee pain. Patient appears comfortable, is participating with therapy.    Objective:  VITAL SIGNS: /62 mmHg  Pulse 62  Temp(Src) 36.5 °C (97.7 °F)  Resp 18  Ht 1.6 m (5' 2.99\")  Wt 88.9 kg (195 lb 15.8 oz)  BMI 34.73 kg/m2  SpO2 95%  Gen: alert, no apparent distress  HEENT: Right lateral tongue lesions  CV: regular rate and rhythm, no murmurs, no peripheral edema  Resp: clear to ascultation bilaterally, normal respiratory effort  GI: soft, non-tender abdomen, bowel sounds present  Neuro: global aphasia    Recent Results (from the past 72 hour(s))   ACCU-CHEK GLUCOSE    Collection Time: 02/21/17  5:19 PM   Result Value Ref Range    Glucose - Accu-Ck 193 (H) 65 - 99 mg/dL   ACCU-CHEK GLUCOSE    Collection Time: 02/21/17  8:52 PM   Result Value Ref Range    Glucose - Accu-Ck 279 (H) 65 - 99 mg/dL   ACCU-CHEK GLUCOSE    Collection Time: 02/22/17  7:47 AM   Result Value Ref Range    Glucose - Accu-Ck 176 (H) 65 - 99 mg/dL   ACCU-CHEK GLUCOSE    Collection Time: 02/22/17 11:14 AM   Result Value Ref Range    Glucose - Accu-Ck 213 (H) 65 - 99 mg/dL   ACCU-CHEK GLUCOSE    Collection Time: 02/22/17  5:22 PM   Result Value Ref Range    Glucose - Accu-Ck 153 (H) 65 - 99 mg/dL   ACCU-CHEK GLUCOSE    Collection Time: 02/22/17  9:49 PM   Result Value Ref Range    Glucose - Accu-Ck 211 (H) 65 - 99 mg/dL   COMP METABOLIC PANEL    Collection Time: 02/23/17  5:40 AM   Result Value Ref Range    Sodium 130 (L) 135 - 145 mmol/L    Potassium 4.1 3.6 - 5.5 mmol/L    Chloride 96 96 - 112 mmol/L    Co2 25 20 - 33 mmol/L    Anion Gap 9.0 0.0 - 11.9    " Glucose 165 (H) 65 - 99 mg/dL    Bun 15 8 - 22 mg/dL    Creatinine 0.43 (L) 0.50 - 1.40 mg/dL    Calcium 8.8 8.5 - 10.5 mg/dL    AST(SGOT) 22 12 - 45 U/L    ALT(SGPT) 75 (H) 2 - 50 U/L    Alkaline Phosphatase 39 30 - 99 U/L    Total Bilirubin 0.7 0.1 - 1.5 mg/dL    Albumin 3.1 (L) 3.2 - 4.9 g/dL    Total Protein 5.7 (L) 6.0 - 8.2 g/dL    Globulin 2.6 1.9 - 3.5 g/dL    A-G Ratio 1.2 g/dL   CBC WITHOUT DIFFERENTIAL    Collection Time: 02/23/17  5:40 AM   Result Value Ref Range    WBC 10.0 4.8 - 10.8 K/uL    RBC 4.81 4.70 - 6.10 M/uL    Hemoglobin 14.9 14.0 - 18.0 g/dL    Hematocrit 43.2 42.0 - 52.0 %    MCV 89.8 81.4 - 97.8 fL    MCH 31.0 27.0 - 33.0 pg    MCHC 34.5 33.7 - 35.3 g/dL    RDW 46.7 35.9 - 50.0 fL    Platelet Count 102 (L) 164 - 446 K/uL    MPV 8.4 (L) 9.0 - 12.9 fL   MAGNESIUM    Collection Time: 02/23/17  5:40 AM   Result Value Ref Range    Magnesium 2.1 1.5 - 2.5 mg/dL   PHOSPHORUS    Collection Time: 02/23/17  5:40 AM   Result Value Ref Range    Phosphorus 3.8 2.5 - 4.5 mg/dL   ESTIMATED GFR    Collection Time: 02/23/17  5:40 AM   Result Value Ref Range    GFR If African American >60 >60 mL/min/1.73 m 2    GFR If Non African American >60 >60 mL/min/1.73 m 2   ACCU-CHEK GLUCOSE    Collection Time: 02/23/17  7:34 AM   Result Value Ref Range    Glucose - Accu-Ck 164 (H) 65 - 99 mg/dL   ACCU-CHEK GLUCOSE    Collection Time: 02/23/17 11:18 AM   Result Value Ref Range    Glucose - Accu-Ck 191 (H) 65 - 99 mg/dL   ACCU-CHEK GLUCOSE    Collection Time: 02/23/17  5:09 PM   Result Value Ref Range    Glucose - Accu-Ck 241 (H) 65 - 99 mg/dL   ACCU-CHEK GLUCOSE    Collection Time: 02/23/17  8:49 PM   Result Value Ref Range    Glucose - Accu-Ck 321 (H) 65 - 99 mg/dL   ACCU-CHEK GLUCOSE    Collection Time: 02/24/17  7:16 AM   Result Value Ref Range    Glucose - Accu-Ck 174 (H) 65 - 99 mg/dL   ACCU-CHEK GLUCOSE    Collection Time: 02/24/17 11:19 AM   Result Value Ref Range    Glucose - Accu-Ck 233 (H) 65 - 99 mg/dL        Current Facility-Administered Medications   Medication Frequency   • [START ON 2/25/2017] insulin NPH (HUMULIN,NOVOLIN) injection 25 Units QAM INSULIN   • insulin NPH (HUMULIN,NOVOLIN) injection 27 Units PM INSULIN   • metoprolol (LOPRESSOR) tablet 12.5 mg TWICE DAILY   • levetiracetam (KEPPRA) 100 MG/ML solution 1,500 mg Q12HRS   • nystatin (MYCOSTATIN) 320429 UNIT/ML suspension 500,000 Units 4X/DAY   • benzocaine (ANBESOL/ORAJEL) 20 % gel PRN   • neomycin-bacitracin-polymyxin (NEOSPORIN) 400-5-5000 ointment BID   • lidocaine (LIDODERM) 5 % 1 Patch DAILY   • docusate sodium (COLACE) capsule 100 mg QDAY PRN    And   • senna-docusate (PERICOLACE or SENOKOT S) 8.6-50 MG per tablet 1 Tab Nightly    And   • senna-docusate (PERICOLACE or SENOKOT S) 8.6-50 MG per tablet 1 Tab Q24HRS PRN    And   • lactulose 20 GM/30ML solution 30 mL Q24HRS PRN    And   • bisacodyl (DULCOLAX) suppository 10 mg Q24HRS PRN    And   • fleet enema 133 mL Once PRN   • Respiratory Care per Protocol Continuous RT   • dexamethasone (DECADRON) tablet 4 mg Q6HRS   • temazepam (RESTORIL) capsule 15 mg HS PRN - MR X 1   • ondansetron (ZOFRAN ODT) dispertab 4 mg Q4HRS PRN   • acetaminophen (TYLENOL) tablet 650 mg Q6HRS PRN   • insulin lispro (HUMALOG) injection 1-6 Units 4X/DAY ACHS       Orders Placed This Encounter   Procedures   • DIET ORDER     Standing Status: Standing      Number of Occurrences: 1      Standing Expiration Date:      Order Specific Question:  Diet:     Answer:  Diabetic [3]     Order Specific Question:  Consistency/Fluid modifications:     Answer:  Thin Liquids [3]     Order Specific Question:  Consistency/Fluid modifications:     Answer:  2000 ml Fluid Restriction [11]       Assessment:  Active Hospital Problems    Diagnosis   • Astrocytoma brain tumor (CMS-HCC)     Date of conference: 2/21/2017    RN issues: none, swallowing meds in applesauce, elevated sugars due to decadron, sometimes impulsive, re-directable, confused  even with     PT: barriers: has made nice progress, more alert, more interactive, has good friend visiting daily that helps with interpretation, confused still, supervision for transfers - SBA due to impulsiveness, almost tripped this am over chair leg, walking 300 to 375 ft without AD, CGA to SBA, will need gait belt at discharge, limited by bilateral knee pains, right more than left    OT: barriers: unable to follow instruction, poor safety awareness, needs 24/6, supervision for all ADLs, except LBD min A    SLP: made some progress this week, still max A for comprehension and expression, automatic speech in English, primarily Faroese speaking, very perseverative in [place]ian, unable to assess other cog due to aphasia, MBS today, deep penetration on thins with only 1/5 trials, regular with thins    CM/social support: was living independetly prior, for discharge to daughter's home, no stairs, she's available 24/7    Anticipated DC date: 2/28    Home health: outpatient SLP/PT/OT    Equip: none    Follow up: radiology oncology, PCP      Medical Decision Making and Plan:    Labs reviewed. Medications reviewed.    New-onset hyponatremia - likely SIADH from brain swelling. We'll do fluid restrictions 2 L per day. Recheck labs on Monday. If this doesn't improve he may need salt tablets.    Knee pain - x-rays with osteoarthritis, tricompartmental moderate on the right, mild on the left. Patient reports pain is better. He can have outpatient follow-up with orthopedics or PCP for steroid injection.    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 S/P sterotactic left frontotemporal craniotomy with microscopic computer assisted partial resection of the left temporal low grade glioma using  intraoperative functional cortical mapping with awake patient.     new acute infarct posterior to the resection of the cavity in the left temporal occipital lobe. And new small focus of GRE hypointensity in left frontoparietal regional likely represents small hemorrhage or blood products from surgery  Anaplastic Grade III astrocytoma. He has an appointment to see  in the next several weeks, thus he would not be undergoing radiation near his recent resection anyway's. The patient has and apppointment for outpatient oncology with Olivier Osman MD    Global Aphasia - continue speech therapy.    Decadron taper as tolererated: continue 4 mg q 6 hours for now until follow-up with surgery and oncology.    Hyperglycemia - due to steroids. Continue long-acting and short-acting insulin as needed.    Seizure: Continue 1000 mg keppra bid    Hyperlipidemia - continue statin.    GI prophylaxis - Pepcid. Continue while on steroids.      Total time:  >25 minutes.  I spent greater than 50% of the time for patient care and coordination on this date, including unit/floor time, and face-to-face time with the patient as per assessment and plan above.    Toyin Medeiros M.D.

## 2017-02-24 NOTE — PROGRESS NOTES
Received shift report and assumed care of patient.  Patient awake, calm and stable, currently positioned in wheelchair for comfort and safety; call light within reach.  Denies pain or discomfort at this time.  Will continue to monitor.

## 2017-02-24 NOTE — PROGRESS NOTES
"Rehab Progress Note     Chief complaint: Unable to assess due to aphasia, follow-up brain tumor  Date of Service: 2/23/2017    Interval Events (Subjective)  Patient seen and examined. Unable to do review of systems as patient is aphasic.  used online today, unable to understand patient. However he does seem to deny any more knee pain. He was advised he has some arthritis per the x-rays. No new complaints.      Objective:  VITAL SIGNS: /80 mmHg  Pulse 88  Temp(Src) 36.8 °C (98.2 °F)  Resp 18  Ht 1.6 m (5' 2.99\")  Wt 88.9 kg (195 lb 15.8 oz)  BMI 34.73 kg/m2  SpO2 94%  Gen: alert, no apparent distress  CV: regular rate and rhythm, no murmurs, no peripheral edema  Resp: clear to ascultation bilaterally, normal respiratory effort  GI: soft, non-tender abdomen, bowel sounds present    Recent Results (from the past 72 hour(s))   ACCU-CHEK GLUCOSE    Collection Time: 02/20/17  8:34 PM   Result Value Ref Range    Glucose - Accu-Ck 301 (H) 65 - 99 mg/dL   ACCU-CHEK GLUCOSE    Collection Time: 02/21/17  7:18 AM   Result Value Ref Range    Glucose - Accu-Ck 157 (H) 65 - 99 mg/dL   ACCU-CHEK GLUCOSE    Collection Time: 02/21/17 11:06 AM   Result Value Ref Range    Glucose - Accu-Ck 232 (H) 65 - 99 mg/dL   ACCU-CHEK GLUCOSE    Collection Time: 02/21/17  5:19 PM   Result Value Ref Range    Glucose - Accu-Ck 193 (H) 65 - 99 mg/dL   ACCU-CHEK GLUCOSE    Collection Time: 02/21/17  8:52 PM   Result Value Ref Range    Glucose - Accu-Ck 279 (H) 65 - 99 mg/dL   ACCU-CHEK GLUCOSE    Collection Time: 02/22/17  7:47 AM   Result Value Ref Range    Glucose - Accu-Ck 176 (H) 65 - 99 mg/dL   ACCU-CHEK GLUCOSE    Collection Time: 02/22/17 11:14 AM   Result Value Ref Range    Glucose - Accu-Ck 213 (H) 65 - 99 mg/dL   ACCU-CHEK GLUCOSE    Collection Time: 02/22/17  5:22 PM   Result Value Ref Range    Glucose - Accu-Ck 153 (H) 65 - 99 mg/dL   ACCU-CHEK GLUCOSE    Collection Time: 02/22/17  9:49 PM   Result Value Ref " Range    Glucose - Accu-Ck 211 (H) 65 - 99 mg/dL   COMP METABOLIC PANEL    Collection Time: 02/23/17  5:40 AM   Result Value Ref Range    Sodium 130 (L) 135 - 145 mmol/L    Potassium 4.1 3.6 - 5.5 mmol/L    Chloride 96 96 - 112 mmol/L    Co2 25 20 - 33 mmol/L    Anion Gap 9.0 0.0 - 11.9    Glucose 165 (H) 65 - 99 mg/dL    Bun 15 8 - 22 mg/dL    Creatinine 0.43 (L) 0.50 - 1.40 mg/dL    Calcium 8.8 8.5 - 10.5 mg/dL    AST(SGOT) 22 12 - 45 U/L    ALT(SGPT) 75 (H) 2 - 50 U/L    Alkaline Phosphatase 39 30 - 99 U/L    Total Bilirubin 0.7 0.1 - 1.5 mg/dL    Albumin 3.1 (L) 3.2 - 4.9 g/dL    Total Protein 5.7 (L) 6.0 - 8.2 g/dL    Globulin 2.6 1.9 - 3.5 g/dL    A-G Ratio 1.2 g/dL   CBC WITHOUT DIFFERENTIAL    Collection Time: 02/23/17  5:40 AM   Result Value Ref Range    WBC 10.0 4.8 - 10.8 K/uL    RBC 4.81 4.70 - 6.10 M/uL    Hemoglobin 14.9 14.0 - 18.0 g/dL    Hematocrit 43.2 42.0 - 52.0 %    MCV 89.8 81.4 - 97.8 fL    MCH 31.0 27.0 - 33.0 pg    MCHC 34.5 33.7 - 35.3 g/dL    RDW 46.7 35.9 - 50.0 fL    Platelet Count 102 (L) 164 - 446 K/uL    MPV 8.4 (L) 9.0 - 12.9 fL   MAGNESIUM    Collection Time: 02/23/17  5:40 AM   Result Value Ref Range    Magnesium 2.1 1.5 - 2.5 mg/dL   PHOSPHORUS    Collection Time: 02/23/17  5:40 AM   Result Value Ref Range    Phosphorus 3.8 2.5 - 4.5 mg/dL   ESTIMATED GFR    Collection Time: 02/23/17  5:40 AM   Result Value Ref Range    GFR If African American >60 >60 mL/min/1.73 m 2    GFR If Non African American >60 >60 mL/min/1.73 m 2   ACCU-CHEK GLUCOSE    Collection Time: 02/23/17  7:34 AM   Result Value Ref Range    Glucose - Accu-Ck 164 (H) 65 - 99 mg/dL   ACCU-CHEK GLUCOSE    Collection Time: 02/23/17 11:18 AM   Result Value Ref Range    Glucose - Accu-Ck 191 (H) 65 - 99 mg/dL       Current Facility-Administered Medications   Medication Frequency   • insulin NPH (HUMULIN,NOVOLIN) injection 22 Units QAM INSULIN   • insulin NPH (HUMULIN,NOVOLIN) injection 25 Units PM INSULIN   •  levetiracetam (KEPPRA) 100 MG/ML solution 1,500 mg Q12HRS   • nystatin (MYCOSTATIN) 831862 UNIT/ML suspension 500,000 Units 4X/DAY   • benzocaine (ANBESOL/ORAJEL) 20 % gel PRN   • neomycin-bacitracin-polymyxin (NEOSPORIN) 400-5-5000 ointment BID   • lidocaine (LIDODERM) 5 % 1 Patch DAILY   • docusate sodium (COLACE) capsule 100 mg QDAY PRN    And   • senna-docusate (PERICOLACE or SENOKOT S) 8.6-50 MG per tablet 1 Tab Nightly    And   • senna-docusate (PERICOLACE or SENOKOT S) 8.6-50 MG per tablet 1 Tab Q24HRS PRN    And   • lactulose 20 GM/30ML solution 30 mL Q24HRS PRN    And   • bisacodyl (DULCOLAX) suppository 10 mg Q24HRS PRN    And   • fleet enema 133 mL Once PRN   • Respiratory Care per Protocol Continuous RT   • dexamethasone (DECADRON) tablet 4 mg Q6HRS   • temazepam (RESTORIL) capsule 15 mg HS PRN - MR X 1   • ondansetron (ZOFRAN ODT) dispertab 4 mg Q4HRS PRN   • acetaminophen (TYLENOL) tablet 650 mg Q6HRS PRN   • insulin lispro (HUMALOG) injection 1-6 Units 4X/DAY ACHS       Orders Placed This Encounter   Procedures   • DIET ORDER     Standing Status: Standing      Number of Occurrences: 1      Standing Expiration Date:      Order Specific Question:  Diet:     Answer:  Diabetic [3]     Order Specific Question:  Consistency/Fluid modifications:     Answer:  Thin Liquids [3]       Assessment:  Active Hospital Problems    Diagnosis   • Astrocytoma brain tumor (CMS-HCC)     Date of conference: 2/21/2017    RN issues: none, swallowing meds in applesauce, elevated sugars due to decadron, sometimes impulsive, re-directable, confused even with     PT: barriers: has made nice progress, more alert, more interactive, has good friend visiting daily that helps with interpretation, confused still, supervision for transfers - SBA due to impulsiveness, almost tripped this am over chair leg, walking 300 to 375 ft without AD, CGA to SBA, will need gait belt at discharge, limited by bilateral knee pains, right  more than left    OT: barriers: unable to follow instruction, poor safety awareness, needs 24/6, supervision for all ADLs, except LBD min A    SLP: made some progress this week, still max A for comprehension and expression, automatic speech in English, primarily Indonesian speaking, very perseverative in Irish, unable to assess other cog due to aphasia, MBS today, deep penetration on thins with only 1/5 trials, regular with thins    CM/social support: was living independetly prior, for discharge to daughter's home, no stairs, she's available 24/7    Anticipated DC date: 2/28    Home health: outpatient SLP/PT/OT    Equip: none    Follow up: radiology oncology, PCP      Medical Decision Making and Plan:    Knee pain - x-rays with osteoarthritis, tricompartmental moderate on the right, mild on the left. Patient reports pain is better today. He can have outpatient follow-up with orthopedics or PCP for steroid injection.    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 S/P sterotactic left frontotemporal craniotomy with microscopic computer assisted partial resection of the left temporal low grade glioma using intraoperative functional cortical mapping with awake patient.     new acute infarct posterior to the resection of the cavity in the left temporal occipital lobe. And new small focus of GRE hypointensity in left frontoparietal regional likely represents small hemorrhage or blood products from surgery  Anaplastic Grade III astrocytoma. He has an appointment to see  in the next several weeks, thus he would not be undergoing radiation near his recent resection anyway's. The patient has and apppointment for outpatient oncology with Olivier Osman MD    Aphasia - continue speech  therapy.    Decadron taper as tolererated: continue 4 mg q 6 hours for now until follow-up with surgery and oncology.    Hyperglycemia - due to steroids. Continue long-acting and short-acting insulin as needed.    Seizure: Continue 1000 mg keppra bid    Hyperlipidemia - continue statin.    GI prophylaxis - Pepcid.      Total time:  >25 minutes.  I spent greater than 50% of the time for patient care and coordination on this date, including unit/floor time, and face-to-face time with the patient as per assessment and plan above.    Toyin Medeiros M.D.

## 2017-02-24 NOTE — PROGRESS NOTES
Hospital Medicine Progress Note, Adult, Complex               Author: Yanick Barth Date & Time created: 2017  12:03 PM     Interval History:  No significant events or changes since last visit  Patient denies SOB, cough, or diarrhea  Patient slept ok last night  Continue managing diabetes      Review of Systems:  Review of Systems   Unable to perform ROS: medical condition       Physical Exam:  Physical Exam   Constitutional: He is oriented to person, place, and time. No distress.   HENT:   Mouth/Throat: No oropharyngeal exudate.   Eyes: EOM are normal.   Neck: No JVD present. No tracheal deviation present.   Cardiovascular: Normal rate, regular rhythm, S1 normal and S2 normal.    Pulmonary/Chest: Effort normal and breath sounds normal. He has no rhonchi.   Abdominal: Soft. Bowel sounds are normal. Hernia confirmed negative in the right inguinal area and confirmed negative in the left inguinal area.   Neurological: He is alert and oriented to person, place, and time. No sensory deficit.   Skin: Skin is warm and dry. No cyanosis.   Psychiatric: He has a normal mood and affect. His behavior is normal.   Nursing note and vitals reviewed.      Labs:        Invalid input(s): [id number]      Recent Labs      17   0540   SODIUM  130*   POTASSIUM  4.1   CHLORIDE  96   CO2  25   BUN  15   CREATININE  0.43*   MAGNESIUM  2.1   PHOSPHORUS  3.8   CALCIUM  8.8     Recent Labs      17   0540   ALTSGPT  75*   ASTSGOT  22   ALKPHOSPHAT  39   TBILIRUBIN  0.7   GLUCOSE  165*     Recent Labs      17   0540   RBC  4.81   HEMOGLOBIN  14.9   HEMATOCRIT  43.2   PLATELETCT  102*     Recent Labs      17   0540   WBC  10.0   ASTSGOT  22   ALTSGPT  75*   ALKPHOSPHAT  39   TBILIRUBIN  0.7           Hemodynamics:  Temp (24hrs), Av.5 °C (97.7 °F), Min:36.3 °C (97.4 °F), Max:36.8 °C (98.2 °F)  Temperature: 36.5 °C (97.7 °F)  Pulse  Av.6  Min: 60  Max: 120   Blood Pressure: 124/62 mmHg     Respiratory:     Respiration: 18, Pulse Oximetry: 95 %        RML Breath Sounds: Diminished, RLL Breath Sounds: Diminished, LLL Breath Sounds: Diminished  Fluids:    Intake/Output Summary (Last 24 hours) at 02/24/17 1203  Last data filed at 02/24/17 0900   Gross per 24 hour   Intake    720 ml   Output      0 ml   Net    720 ml        GI/Nutrition:  Orders Placed This Encounter   Procedures   • DIET ORDER     Standing Status: Standing      Number of Occurrences: 1      Standing Expiration Date:      Order Specific Question:  Diet:     Answer:  Diabetic [3]     Order Specific Question:  Consistency/Fluid modifications:     Answer:  Thin Liquids [3]     Medical Decision Making, by Problem:  Active Hospital Problems    Diagnosis   • Brain tumor (CMS-HCC) [D49.6]     *  Recent Glioblastoma  S/P craniotomy and resection    *  Recent Seizure  On Keprra: 500 mg bid --> 1000 mg bid (adjusted at [facility])    *  Tachycardia  HR rises up on occ  Appears to have a hx recently of occ tachy  Will check US LE to r/o DVT (if neg, would make PE less likely)  WiIl start Lopressor: 12.5 mg bid (2/24)    *  Diabetes  Steroid-induced  BS a little better recently: 164-211  BS rise up at night  On NPH: AM: 18 units  --> 22 units --> 25 units (starting 2/25)                  PM: 22 units --> 25 units --> 27 units (2/24)  Cont to monitor    *  Hyponatremia  Na: 132 --> 130 (2/23)  Monitor for now    *  Elevated ALT  ALT: 75   Has been stable for a while  ? 2nd to Pepcid and/or Lipitor --> d/c'd both (2/23)  Monitor    *  Thrombocytopenia  Platelets: 208 --> 155 --> 138 --> 102 (2/23)  ? 2nd to Pepcid and/or Lipitor --> d/c'd both (2/23)    *  Hyperlipidemia  *  Hx Bell's Palsy       Labs reviewed and Medications reviewed

## 2017-02-24 NOTE — DISCHARGE PLANNING
Case Management.   Returned tc to pt's dtr Desiree. [phone]  I confirmed with her dc date is planned for 2/28 Wed with recommendations for out pt therapy for PT/OT/SLP.   She prefers dc @ 2:30pm due to her work schedule.   Dtr is in agreement.    PT will be staying with his other dtr, Priscilla where 24 hour supervision will be provided.   Desiree requested I meet with her today-will meet her @ 230pm in her dad's room.

## 2017-02-25 NOTE — PROGRESS NOTES
Hospital Medicine Progress Note, Adult, Complex               Author: Yanick Barth Date & Time created: 2017  11:12 AM     Interval History:  No significant events or changes since last visit  Patient denies SOB, cough, or diarrhea  Patient slept ok last night  Continue managing diabetes      Review of Systems:  Review of Systems   Unable to perform ROS: medical condition       Physical Exam:  Physical Exam   Constitutional: He is oriented to person, place, and time. He appears well-nourished.   HENT:   Head: Atraumatic.   Eyes: Conjunctivae and EOM are normal. Pupils are equal, round, and reactive to light.   Neck: Normal range of motion. Neck supple.   Cardiovascular: Normal rate, regular rhythm, S1 normal and S2 normal.    Pulmonary/Chest: Effort normal. He has no wheezes. He has no rhonchi. He has no rales.   Abdominal: Soft. Bowel sounds are normal. Hernia confirmed negative in the right inguinal area and confirmed negative in the left inguinal area.   Neurological: He is alert and oriented to person, place, and time. No sensory deficit.   Skin: Skin is warm and dry. No cyanosis.   Psychiatric: He has a normal mood and affect. His behavior is normal.   Nursing note and vitals reviewed.      Labs:        Invalid input(s): [id number]      Recent Labs      17   0540   SODIUM  130*   POTASSIUM  4.1   CHLORIDE  96   CO2  25   BUN  15   CREATININE  0.43*   MAGNESIUM  2.1   PHOSPHORUS  3.8   CALCIUM  8.8     Recent Labs      17   0540   ALTSGPT  75*   ASTSGOT  22   ALKPHOSPHAT  39   TBILIRUBIN  0.7   GLUCOSE  165*     Recent Labs      17   0540   RBC  4.81   HEMOGLOBIN  14.9   HEMATOCRIT  43.2   PLATELETCT  102*     Recent Labs      17   0540   WBC  10.0   ASTSGOT  22   ALTSGPT  75*   ALKPHOSPHAT  39   TBILIRUBIN  0.7           Hemodynamics:  Temp (24hrs), Av.5 °C (97.7 °F), Min:36.4 °C (97.5 °F), Max:36.6 °C (97.9 °F)  Temperature: 36.6 °C (97.9 °F)  Pulse  Av.4  Min: 60   Max: 120   Blood Pressure: 118/80 mmHg     Respiratory:    Respiration: 18, Pulse Oximetry: 93 %        RML Breath Sounds: Diminished, RLL Breath Sounds: Diminished, LLL Breath Sounds: Diminished  Fluids:    Intake/Output Summary (Last 24 hours) at 02/25/17 1112  Last data filed at 02/25/17 0800   Gross per 24 hour   Intake    800 ml   Output      0 ml   Net    800 ml        GI/Nutrition:  Orders Placed This Encounter   Procedures   • DIET ORDER     Standing Status: Standing      Number of Occurrences: 1      Standing Expiration Date:      Order Specific Question:  Diet:     Answer:  Diabetic [3]     Order Specific Question:  Consistency/Fluid modifications:     Answer:  Thin Liquids [3]     Order Specific Question:  Consistency/Fluid modifications:     Answer:  2000 ml Fluid Restriction [11]     Medical Decision Making, by Problem:  Active Hospital Problems    Diagnosis   • Brain tumor (CMS-HCC) [D49.6]     Note (02/25):  Will get am labs for further evaluation    *  Recent Glioblastoma  S/P craniotomy and resection    *  Recent Seizure  On Keprra: 500 mg bid --> 1000 mg bid (adjusted at [facility])    *  Tachycardia  HR recently better: 60-93  Appears to have a hx recently of occ tachy  F/U US LE to r/o DVT (if neg, would make PE less likely)  On Lopressor: 12.5 mg bid (2/24)    *  Diabetes  Steroid-induced  BS a little better recently: 169-285  BS rise up at night  On NPH: AM: 18 units  --> 22 units --> 25 units (2/25) --> will increase to 30 (2/25)                  PM: 22 units --> 25 units --> 27 units (2/24) --> will increase to 30 (2/25)  Cont to monitor    *  Hyponatremia  Na: 132 --> 130 (2/23)  Monitor for now    *  Elevated ALT  ALT: 75   Has been stable for a while  ? 2nd to Pepcid and/or Lipitor --> d/c'd both (2/23)  Monitor    *  Thrombocytopenia  Platelets: 208 --> 155 --> 138 --> 102 (2/23)  ? 2nd to Pepcid and/or Lipitor --> d/c'd both (2/23)    *  Hyperlipidemia  *  Hx Bell's Palsy       Labs reviewed  and Medications reviewed

## 2017-02-25 NOTE — CARE PLAN
Problem: Bowel/Gastric:  Goal: Normal bowel function is maintained or improved  Outcome: PROGRESSING AS EXPECTED  Patient is continent of bowel and had BM on 2/23/17.     Problem: Pain Management  Goal: Pain level will decrease to patient’s comfort goal  Outcome: PROGRESSING AS EXPECTED  Patient denied any pain or discomfort during shift.

## 2017-02-25 NOTE — CARE PLAN
Problem: Safety  Goal: Will remain free from injury  Outcome: PROGRESSING AS EXPECTED  Patient has sitter and room assigned close to nursing station for safety. No impulsivity noted during shift. Cooperative with care. Remains free from injury.     Problem: Bowel/Gastric:  Goal: Normal bowel function is maintained or improved  Outcome: PROGRESSING AS EXPECTED  Patient has regular BM. Remains continent of bowel and use bathroom for elimination. No s/s of constipation.

## 2017-02-26 NOTE — PROGRESS NOTES
Hospital Medicine Progress Note, Adult, Complex               Author: Yanick Barth Date & Time created: 2017  11:11 AM     Interval History:  No significant events or changes since last visit  Patient denies SOB, cough, or diarrhea  Patient slept ok last night  Continue managing diabetes      Review of Systems:  Review of Systems   Unable to perform ROS: medical condition       Physical Exam:  Physical Exam   Constitutional: He is oriented to person, place, and time.   HENT:   Mouth/Throat: Oropharynx is clear and moist.   Eyes: EOM are normal. No scleral icterus.   Cardiovascular: Normal rate, regular rhythm, S1 normal and S2 normal.    Pulmonary/Chest: Effort normal. No stridor. He has no wheezes. He has no rhonchi. He has no rales.   Abdominal: Soft. He exhibits no distension. There is no tenderness. Hernia confirmed negative in the right inguinal area and confirmed negative in the left inguinal area.   Neurological: He is alert and oriented to person, place, and time. No sensory deficit.   Skin: Skin is warm and dry. He is not diaphoretic. No cyanosis.   Psychiatric: He has a normal mood and affect. His behavior is normal.   Nursing note and vitals reviewed.      Labs:        Invalid input(s): [id number]      Recent Labs      17   0601   SODIUM  134*   POTASSIUM  4.0   CHLORIDE  100   CO2  25   BUN  19   CREATININE  0.48*   MAGNESIUM  2.0   PHOSPHORUS  3.8   CALCIUM  8.7     Recent Labs      17   0601   ALTSGPT  68*   ASTSGOT  17   ALKPHOSPHAT  39   TBILIRUBIN  0.6   GLUCOSE  184*     Recent Labs      17   0601   RBC  4.44*   HEMOGLOBIN  13.8*   HEMATOCRIT  39.6*   PLATELETCT  90*     Recent Labs      17   0601   WBC  8.0   ASTSGOT  17   ALTSGPT  68*   ALKPHOSPHAT  39   TBILIRUBIN  0.6           Hemodynamics:  Temp (24hrs), Av.6 °C (97.8 °F), Min:36.4 °C (97.6 °F), Max:36.7 °C (98 °F)  Temperature: 36.7 °C (98 °F)  Pulse  Av.8  Min: 60  Max: 120   Blood Pressure: 106/64  mmHg     Respiratory:    Respiration: 18, Pulse Oximetry: 96 %        RML Breath Sounds: Clear;Diminished, RLL Breath Sounds: Clear;Diminished, LAYA Breath Sounds: Clear, LLL Breath Sounds: Clear;Diminished  Fluids:    Intake/Output Summary (Last 24 hours) at 02/26/17 1111  Last data filed at 02/26/17 0900   Gross per 24 hour   Intake    900 ml   Output      0 ml   Net    900 ml     Weight: 90.2 kg (198 lb 13.7 oz)  GI/Nutrition:  Orders Placed This Encounter   Procedures   • DIET ORDER     Standing Status: Standing      Number of Occurrences: 1      Standing Expiration Date:      Order Specific Question:  Diet:     Answer:  Diabetic [3]     Order Specific Question:  Consistency/Fluid modifications:     Answer:  Thin Liquids [3]     Order Specific Question:  Consistency/Fluid modifications:     Answer:  2000 ml Fluid Restriction [11]     Medical Decision Making, by Problem:  Active Hospital Problems    Diagnosis   • Brain tumor (CMS-HCC) [D49.6]     *  Recent Glioblastoma  S/P craniotomy and resection    *  Recent Seizure  On Keprra: 500 mg bid --> 1000 mg bid (adjusted at [facility])    *  Tachycardia  HR recently better but occ rises up  Appears to have a hx recently of occ tachy  F/U US LE to r/o DVT (if neg, would make PE less likely)  On Lopressor: 12.5 mg bid (2/24) --> will increase to 18.75 mg bid (2/26)    *  Diabetes  Steroid-induced   this am (2/26)  On NPH: AM: 25 units (2/25) --> 30 (2/25) --> will increase to 35 units (3/26)                 PM: 27 units (2/24) --> 30 (2/25) --> will increase to 35 units (3/26)  Cont to monitor    *  Hyponatremia  Na: 132 --> 130 (2/23) --> 134 (2/26)  Monitor for now    *  Elevated ALT  ALT: 75 --> 68 (2/26)  Slowly improving  ? 2nd to Pepcid and/or Lipitor --> d/c'd both (2/23)  Monitor    *  Thrombocytopenia  Platelets: 208 --> 155 --> 138 --> 102 (2/23) --> 90 (2/26)  ? 2nd to Pepcid and/or Lipitor --> d/c'd both (2/23)  ? 2nd to Keppra (has a low SE)  If platelets  cont to drop lower, will consider changing Keppra to something else  Monitor    *  Hyperlipidemia  *  Hx Bell's Palsy       Labs reviewed and Medications reviewed

## 2017-02-26 NOTE — CARE PLAN
Problem: Infection  Goal: Will remain free from infection  Outcome: PROGRESSING AS EXPECTED  Weekly wound photo on the left side of patient's scalp/ head was taken.   Wound is intact, open to air with frank wound pink in appearance. Prescribed ointment applied as ordered.             Problem: GLYCEMIA IMBALANCE  Goal: Clinical indication of glycemia balance is achieved  Intervention: MONITOR BLOOD GLUCOSE LEVELS AS ORDERED  Patient on FSBS AC HS. HS blood sugar was 300 .Humolog coverage given as prescribed. Quick health reminder on patient's diet was relayed about measures to promote good blood sugar levels.

## 2017-02-27 NOTE — DISCHARGE PLANNING
Case Management Discharge Instructions for Dale Dorado  Discharge Date: Tuesday 2/28/2017    Discharge Location   Home with family and  Outpatient Services     [facility] [phone] [address] [place] [address], [place]    Please call to schedule appointment for  Physical Therapy, Occupational Therapy, and Speech Therapy.   Durable Medical Equipment    None needed     Other Application for RTC Access ()  has been completed & copy of application enclosed.  Information on obtaining guardianship through [facility] Link  [phone] for insurance coverage if needed.      Follow-up With Why Contact Info   MONICA Faulkner  Primary Care  3/13/2017 Monday @ 11:40am with a 11:30am check in time.  [address]  [facility] [address]  [phone]     Dr. Olivier Osman,   Oncologist 3/6/2017 Monday @ 1:00pm.  [address], [address]  [place] [address]  [phone]

## 2017-02-27 NOTE — PROGRESS NOTES
Hospital Medicine Progress Note, Adult, Complex               Author: Yanick Barth Date & Time created: 2/27/2017  10:49 AM     Interval History:  No significant events or changes since last visit  Patient denies SOB, cough, or diarrhea  Patient slept ok last night  Continue managing diabetes      Review of Systems:  Review of Systems   Unable to perform ROS: medical condition       Physical Exam:  Physical Exam   Constitutional: He is oriented to person, place, and time. No distress.   HENT:   Mouth/Throat: No oropharyngeal exudate.   Neck: No JVD present. No tracheal deviation present.   Cardiovascular: Normal rate, regular rhythm, S1 normal and S2 normal.    No murmur heard.  Pulmonary/Chest: Effort normal. He has no wheezes. He has no rhonchi. He has no rales.   Abdominal: Soft. He exhibits no distension. There is no tenderness. Hernia confirmed negative in the right inguinal area and confirmed negative in the left inguinal area.   Musculoskeletal: He exhibits no edema.   Neurological: He is alert and oriented to person, place, and time. No sensory deficit.   Skin: Skin is warm and dry. No rash noted. No cyanosis.   Psychiatric: He has a normal mood and affect. His behavior is normal.   Nursing note and vitals reviewed.      Labs:        Invalid input(s): [id number]      Recent Labs      02/26/17   0601  02/27/17   0527   SODIUM  134*  135   POTASSIUM  4.0  4.2   CHLORIDE  100  98   CO2  25  29   BUN  19  18   CREATININE  0.48*  0.48*   MAGNESIUM  2.0   --    PHOSPHORUS  3.8   --    CALCIUM  8.7  8.7     Recent Labs      02/26/17   0601  02/27/17   0527   ALTSGPT  68*   --    ASTSGOT  17   --    ALKPHOSPHAT  39   --    TBILIRUBIN  0.6   --    GLUCOSE  184*  169*     Recent Labs      02/26/17   0601   RBC  4.44*   HEMOGLOBIN  13.8*   HEMATOCRIT  39.6*   PLATELETCT  90*     Recent Labs      02/26/17   0601   WBC  8.0   ASTSGOT  17   ALTSGPT  68*   ALKPHOSPHAT  39   TBILIRUBIN  0.6           Hemodynamics:  Temp  (24hrs), Av.6 °C (97.9 °F), Min:36.6 °C (97.9 °F), Max:36.7 °C (98 °F)  Temperature: 36.6 °C (97.9 °F)  Pulse  Av.2  Min: 60  Max: 120   Blood Pressure: 116/72 mmHg     Respiratory:    Respiration: 18, Pulse Oximetry: 95 %        RUL Breath Sounds: Clear, RML Breath Sounds: Clear;Diminished, RLL Breath Sounds: Clear;Diminished, LAYA Breath Sounds: Clear, LLL Breath Sounds: Clear;Diminished  Fluids:    Intake/Output Summary (Last 24 hours) at 17 1049  Last data filed at 17 0924   Gross per 24 hour   Intake   1200 ml   Output      0 ml   Net   1200 ml        GI/Nutrition:  Orders Placed This Encounter   Procedures   • DIET ORDER     Standing Status: Standing      Number of Occurrences: 1      Standing Expiration Date:      Order Specific Question:  Diet:     Answer:  Diabetic [3]     Order Specific Question:  Consistency/Fluid modifications:     Answer:  Thin Liquids [3]     Order Specific Question:  Consistency/Fluid modifications:     Answer:  2000 ml Fluid Restriction [11]     Medical Decision Making, by Problem:  Active Hospital Problems    Diagnosis   • Brain tumor (CMS-HCC) [D49.6]     Note ():  Will get am labs for further evaluation    *  Recent Glioblastoma  S/P craniotomy and resection    *  Recent Seizure  On Keprra: 500 mg bid --> 1000 mg bid (adjusted at [facility])    *  B/L LE DVT  On Xarelto ()    *  Tachycardia  HR recently better but occ rises up  Appears to have a hx recently of occ tachy  US LE to r/o DVT: B/L LE DVT -- ? had PE  On Lopressor: 12.5 mg bid () --> 18.75 mg bid ()    *  Diabetes  Steroid-induced   this am ()  On NPH: AM: 25 units () --> 30 () --> 35 units (3/26)                 PM: 27 units () --> 30 () --> 35 units (3/26)  Monitor another day since meds were recently adjusted ()    *  Hyponatremia  Na: 132 --> 130 () --> 134 ()  Monitor for now    *  Elevated ALT  ALT: 75 --> 68 ()  Slowly improving  ? 2nd to  Pepcid and/or Lipitor --> d/c'd both (2/23)  Monitor    *  Thrombocytopenia  Platelets: 208 --> 155 --> 138 --> 102 (2/23) --> 90 (2/26)  ? 2nd to Pepcid and/or Lipitor --> d/c'd both (2/23)  ? 2nd to Keppra (has a low SE)  If platelets cont to drop lower, will consider changing Keppra to something else  Monitor    *  Hyperlipidemia  *  Hx Bell's Palsy       Labs reviewed and Medications reviewed

## 2017-02-27 NOTE — CARE PLAN
Problem: Safety  Goal: Will remain free from injury  Outcome: PROGRESSING SLOWER THAN EXPECTED  Patient can be impulsive at times due to brain tumor and swelling pressure. Patient safety precautions in place; non skid foot ware, call light within reach, bed/chair alarms in use and 2:1 CNA working with this patient. This RN also doing q hour rounding.    Problem: Venous Thromboembolism (VTW)/Deep Vein Thrombosis (DVT) Prevention:  Goal: Patient will participate in Venous Thrombosis (VTE)/Deep Vein Thrombosis (DVT)Prevention Measures  Outcome: PROGRESSING SLOWER THAN EXPECTED  US shows bilateral DVT on both Left and Right side lower legs. MD aware and watching, xarelto started yesterday and given as scheduled this AM. No worsening condition at this time, will continue to monitor and assess. Patient states no pain.

## 2017-02-27 NOTE — PROGRESS NOTES
"Rehab Progress Note       Interval Events (Subjective)  Patient seen and examined. His daughter is at bedside today. Patient wrote down he is going home tomorrow and daughter understood.   Objective:  VITAL SIGNS: /72 mmHg  Pulse 62  Temp(Src) 36.6 °C (97.9 °F)  Resp 18  Ht 1.6 m (5' 2.99\")  Wt 90.2 kg (198 lb 13.7 oz)  BMI 35.23 kg/m2  SpO2 95%  Gen: alert, no apparent distress  HEENT: Right lateral tongue lesions  CV: regular rate and rhythm, no murmurs, no peripheral edema  Resp: clear to ascultation bilaterally, normal respiratory effort  GI: soft, non-tender abdomen, bowel sounds present  Neuro: global aphasia    Recent Results (from the past 72 hour(s))   ACCU-CHEK GLUCOSE    Collection Time: 02/24/17 11:19 AM   Result Value Ref Range    Glucose - Accu-Ck 233 (H) 65 - 99 mg/dL   ACCU-CHEK GLUCOSE    Collection Time: 02/24/17  5:09 PM   Result Value Ref Range    Glucose - Accu-Ck 177 (H) 65 - 99 mg/dL   ACCU-CHEK GLUCOSE    Collection Time: 02/24/17  8:21 PM   Result Value Ref Range    Glucose - Accu-Ck 285 (H) 65 - 99 mg/dL   ACCU-CHEK GLUCOSE    Collection Time: 02/25/17  7:15 AM   Result Value Ref Range    Glucose - Accu-Ck 169 (H) 65 - 99 mg/dL   ACCU-CHEK GLUCOSE    Collection Time: 02/25/17 11:06 AM   Result Value Ref Range    Glucose - Accu-Ck 229 (H) 65 - 99 mg/dL   ACCU-CHEK GLUCOSE    Collection Time: 02/25/17  5:08 PM   Result Value Ref Range    Glucose - Accu-Ck 240 (H) 65 - 99 mg/dL   ACCU-CHEK GLUCOSE    Collection Time: 02/25/17  9:26 PM   Result Value Ref Range    Glucose - Accu-Ck 300 (H) 65 - 99 mg/dL   COMP METABOLIC PANEL    Collection Time: 02/26/17  6:01 AM   Result Value Ref Range    Sodium 134 (L) 135 - 145 mmol/L    Potassium 4.0 3.6 - 5.5 mmol/L    Chloride 100 96 - 112 mmol/L    Co2 25 20 - 33 mmol/L    Anion Gap 9.0 0.0 - 11.9    Glucose 184 (H) 65 - 99 mg/dL    Bun 19 8 - 22 mg/dL    Creatinine 0.48 (L) 0.50 - 1.40 mg/dL    Calcium 8.7 8.5 - 10.5 mg/dL    AST(SGOT) 17 " 12 - 45 U/L    ALT(SGPT) 68 (H) 2 - 50 U/L    Alkaline Phosphatase 39 30 - 99 U/L    Total Bilirubin 0.6 0.1 - 1.5 mg/dL    Albumin 2.9 (L) 3.2 - 4.9 g/dL    Total Protein 5.1 (L) 6.0 - 8.2 g/dL    Globulin 2.2 1.9 - 3.5 g/dL    A-G Ratio 1.3 g/dL   MAGNESIUM    Collection Time: 02/26/17  6:01 AM   Result Value Ref Range    Magnesium 2.0 1.5 - 2.5 mg/dL   PHOSPHORUS    Collection Time: 02/26/17  6:01 AM   Result Value Ref Range    Phosphorus 3.8 2.5 - 4.5 mg/dL   CBC WITHOUT DIFFERENTIAL    Collection Time: 02/26/17  6:01 AM   Result Value Ref Range    WBC 8.0 4.8 - 10.8 K/uL    RBC 4.44 (L) 4.70 - 6.10 M/uL    Hemoglobin 13.8 (L) 14.0 - 18.0 g/dL    Hematocrit 39.6 (L) 42.0 - 52.0 %    MCV 89.2 81.4 - 97.8 fL    MCH 31.1 27.0 - 33.0 pg    MCHC 34.8 33.7 - 35.3 g/dL    RDW 47.1 35.9 - 50.0 fL    Platelet Count 90 (L) 164 - 446 K/uL    MPV 8.9 (L) 9.0 - 12.9 fL   ESTIMATED GFR    Collection Time: 02/26/17  6:01 AM   Result Value Ref Range    GFR If African American >60 >60 mL/min/1.73 m 2    GFR If Non African American >60 >60 mL/min/1.73 m 2   ACCU-CHEK GLUCOSE    Collection Time: 02/26/17  7:08 AM   Result Value Ref Range    Glucose - Accu-Ck 160 (H) 65 - 99 mg/dL   ACCU-CHEK GLUCOSE    Collection Time: 02/26/17 11:21 AM   Result Value Ref Range    Glucose - Accu-Ck 242 (H) 65 - 99 mg/dL   ACCU-CHEK GLUCOSE    Collection Time: 02/26/17  5:12 PM   Result Value Ref Range    Glucose - Accu-Ck 168 (H) 65 - 99 mg/dL   ACCU-CHEK GLUCOSE    Collection Time: 02/26/17  8:08 PM   Result Value Ref Range    Glucose - Accu-Ck 259 (H) 65 - 99 mg/dL   BASIC METABOLIC PANEL    Collection Time: 02/27/17  5:27 AM   Result Value Ref Range    Sodium 135 135 - 145 mmol/L    Potassium 4.2 3.6 - 5.5 mmol/L    Chloride 98 96 - 112 mmol/L    Co2 29 20 - 33 mmol/L    Glucose 169 (H) 65 - 99 mg/dL    Bun 18 8 - 22 mg/dL    Creatinine 0.48 (L) 0.50 - 1.40 mg/dL    Calcium 8.7 8.5 - 10.5 mg/dL    Anion Gap 8.0 0.0 - 11.9   ESTIMATED GFR     Collection Time: 02/27/17  5:27 AM   Result Value Ref Range    GFR If African American >60 >60 mL/min/1.73 m 2    GFR If Non African American >60 >60 mL/min/1.73 m 2   ACCU-CHEK GLUCOSE    Collection Time: 02/27/17  7:29 AM   Result Value Ref Range    Glucose - Accu-Ck 142 (H) 65 - 99 mg/dL       Current Facility-Administered Medications   Medication Frequency   • insulin NPH (HUMULIN,NOVOLIN) injection 35 Units QAM INSULIN   • insulin NPH (HUMULIN,NOVOLIN) injection 35 Units PM INSULIN   • metoprolol (LOPRESSOR) tablet 18.75 mg TWICE DAILY   • rivaroxaban (XARELTO) tablet 15 mg BID   • levetiracetam (KEPPRA) 100 MG/ML solution 1,500 mg Q12HRS   • benzocaine (ANBESOL/ORAJEL) 20 % gel PRN   • neomycin-bacitracin-polymyxin (NEOSPORIN) 400-5-5000 ointment BID   • lidocaine (LIDODERM) 5 % 1 Patch DAILY   • docusate sodium (COLACE) capsule 100 mg QDAY PRN    And   • senna-docusate (PERICOLACE or SENOKOT S) 8.6-50 MG per tablet 1 Tab Nightly    And   • senna-docusate (PERICOLACE or SENOKOT S) 8.6-50 MG per tablet 1 Tab Q24HRS PRN    And   • lactulose 20 GM/30ML solution 30 mL Q24HRS PRN    And   • bisacodyl (DULCOLAX) suppository 10 mg Q24HRS PRN    And   • fleet enema 133 mL Once PRN   • Respiratory Care per Protocol Continuous RT   • dexamethasone (DECADRON) tablet 4 mg Q6HRS   • temazepam (RESTORIL) capsule 15 mg HS PRN - MR X 1   • ondansetron (ZOFRAN ODT) dispertab 4 mg Q4HRS PRN   • acetaminophen (TYLENOL) tablet 650 mg Q6HRS PRN   • insulin lispro (HUMALOG) injection 1-6 Units 4X/DAY ACHS       Orders Placed This Encounter   Procedures   • DIET ORDER     Standing Status: Standing      Number of Occurrences: 1      Standing Expiration Date:      Order Specific Question:  Diet:     Answer:  Diabetic [3]     Order Specific Question:  Consistency/Fluid modifications:     Answer:  Thin Liquids [3]     Order Specific Question:  Consistency/Fluid modifications:     Answer:  2000 ml Fluid Restriction [11]        Assessment:  Active Hospital Problems    Diagnosis   • Astrocytoma brain tumor (CMS-HCC)     Date of conference: 2/21/2017    RN issues: none, swallowing meds in applesauce, elevated sugars due to decadron, sometimes impulsive, re-directable, confused even with     PT: barriers: has made nice progress, more alert, more interactive, has good friend visiting daily that helps with interpretation, confused still, supervision for transfers - SBA due to impulsiveness, almost tripped this am over chair leg, walking 300 to 375 ft without AD, CGA to SBA, will need gait belt at discharge, limited by bilateral knee pains, right more than left    OT: barriers: unable to follow instruction, poor safety awareness, needs 24/6, supervision for all ADLs, except LBD min A    SLP: made some progress this week, still max A for comprehension and expression, automatic speech in English, primarily Nepali speaking, very perseverative in [place], unable to assess other cog due to aphasia, MBS today, deep penetration on thins with only 1/5 trials, regular with thins    CM/social support: was living independetly prior, for discharge to daughter's home, no stairs, she's available 24/7    Anticipated DC date: 2/28    Home health: outpatient SLP/PT/OT    Equip: none    Follow up: radiology oncology, PCP      Medical Decision Making and Plan:    Labs reviewed. Medications reviewed.    New-onset hyponatremia - likely SIADH from brain swelling. We'll do fluid restrictions 2 L per day. Recheck labs on Monday. If this doesn't improve he may need salt tablets.    Knee pain - x-rays with osteoarthritis, tricompartmental moderate on the right, mild on the left. Patient reports pain is better. He can have outpatient follow-up with orthopedics or PCP for steroid injection.    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 S/P sterotactic left frontotemporal craniotomy with microscopic computer assisted partial resection of the left temporal low grade glioma using intraoperative functional cortical mapping with awake patient.     new acute infarct posterior to the resection of the cavity in the left temporal occipital lobe. And new small focus of GRE hypointensity in left frontoparietal regional likely represents small hemorrhage or blood products from surgery  Anaplastic Grade III astrocytoma. He has an appointment to see  in the next several weeks, thus he would not be undergoing radiation near his recent resection anyway's. The patient has and apppointment for outpatient oncology with Olivier Osman MD    Global Aphasia - continue speech therapy.    Decadron taper as tolererated: continue 4 mg q 6 hours for now until follow-up with surgery and oncology.    Hyperglycemia - due to steroids. Continue long-acting and short-acting insulin as needed.    Seizure: Continue 1000 mg keppra bid    Hyperlipidemia - continue statin.    GI prophylaxis - Pepcid. Continue while on steroids.    Medications prepared for discharge and discussed with daughter. Daughter to learn fingersticks.   Total time:  >25 minutes.  I spent greater than 50% of the time for patient care and coordination on this date, including unit/floor time, and face-to-face time with the patient as per assessment and plan above.    Lisette Vanegas D.O.

## 2017-02-27 NOTE — DISCHARGE PLANNING
Case Management/ Information/ copies provided to family:     Adult Guardianship    Adult Guardianships are handled in Department 12, Family Division, [address], [address]rd Floor, [address] [place] [address], [place] [address]    The [address] Court cannot give legal advice or assist the public in drafting petitions. Individuals who are proceeding without the assistance of an , may contact the [facility] to obtain guardianship packets that may assist in initiating or responding to a guardianship case. Self-represented litigants are responsible to familiarize themselves with law that is applicable to their case. The law library is available for public use during the following hours: 8:00am to 4:30pm; the [facility] is located at [address] [place] [address], [facility], [place], [place] [address].     Administrative Order 2015-08 regarding mandatory electronic filing beginning January 1, 2016.  Please go to [email or web address] to sign up for an account.    [address] awarded barbara to help adults with disabilities   Guardianships shall be heard every Tuesday in Courtroom 6 of the Family Division.

## 2017-02-27 NOTE — CARE PLAN
Problem: Safety  Goal: Will remain free from injury  Has poor safety awareness. Does not use call light. Reinforced to him the importance of using it. But pt does not seem to understand. Bed alarm, room closed to nurses station and hourly rounds in place for safety precautions.

## 2017-02-27 NOTE — DISCHARGE PLANNING
As requested by pt's dtr, Priscilla-letter written to [employer] confining pt's need for 24 hour supervision at home.  This was faxed to [employer];copies and original to be given to dtr.   Other letter for disability also written and given to dtr.     I gave nurse dc instruction paperwork to RN to give to dtr Priscilla who plans on coming in later today for training.   I will reivew with dtr Desiree tomorrow when she picks her dad up.     Therapy did not recommend a wheelchair for patient at [facility]--family was inquiring.

## 2017-02-27 NOTE — CARE PLAN
Problem: Skin Integrity  Goal: Risk for impaired skin integrity will decrease  Head incision healing well--it's open to air, well approximated and CDI. Neosporin ointment applied to incision per MAR.    Problem: GLYCEMIA IMBALANCE  Goal: Clinical indication of glycemia balance is achieved  Intervention: MONITOR BLOOD GLUCOSE LEVELS AS ORDERED  HS inhyctizorh=[id number]. Humalog 3units given. HS snack provided and consumed. Will continue to monitor.

## 2017-02-27 NOTE — PROGRESS NOTES
Assumed care of patient, received report from Night Shift RN. Assessment completed. A/O x2. Patient denies pain. Call light within reach, educated about fall and safety precautions, bed/chair alarm is on and in use. No complaints at this time. Hourly rounding in place. All needs met. Patient refused shower with OT but wanted to go to dinning room now for breakfast.

## 2017-02-27 NOTE — CARE PLAN
Problem: Dressing  Goal: STG-Within one week, patient will dress LB  1) Individualized Goal: With set up only AE prn  2) Interventions: OT Self Care/ADL, OT Cognitive Skill Dev, OT Community Reintegration, OT Neuro Re-Ed/Balance, OT Therapeutic Activity, OT Evaluation and OT Therapeutic Exercise   Outcome: MET Date Met:  02/27/17  SPV and comp start for LB dressing    Problem: OT Long Term Goals  Goal: LTG-By discharge, patient will complete basic self care tasks  1) Individualized Goal: Mod i fww prn  2) Interventions: OT Self Care/ADL, OT Cognitive Skill Dev, OT Community Reintegration, OT Neuro Re-Ed/Balance, OT Therapeutic Activity, OT Evaluation and OT Therapeutic Exercise   Outcome: NOT MET  Pt req SPV for safety 2/2 impaired cognition  Goal: LTG-By discharge, patient will perform bathroom transfers  1) Individualized Goal: Mod I grab bar prn  2) Interventions: OT Self Care/ADL, OT Cognitive Skill Dev, OT Community Reintegration, OT Neuro Re-Ed/Balance, OT Therapeutic Activity, OT Evaluation and OT Therapeutic Exercise   Outcome: NOT MET  Pt req SPV 2/2 impaired safety     Problem: Functional Cognition  Goal: STG-Within one week, patient will  1) Individualized Goal: Require Mod A for sequencing, memory, and initiation when preparing simple meal  2) Interventions: OT Self Care/ADL, OT Cognitive Skill Dev, OT Community Reintegration, OT Neuro Re-Ed/Balance, OT Therapeutic Activity, OT Evaluation and OT Therapeutic Exercise   Outcome: NOT MET  Not addressed 2/2 impaired cognition

## 2017-02-27 NOTE — PROGRESS NOTES
Dr Barth notified about + DVT on BLE, ordered received, xarelto 15mg BID; Daughter Priscilla notified.

## 2017-02-28 NOTE — DISCHARGE INSTRUCTIONS
[facility] NURSING DISCHARGE INSTRUCTIONS    Blood Pressure: 111/71 mmHg  Weight: 90.2 kg (198 lb 13.7 oz)  Nursing recommendations for Dale Dorado at time of discharge are as follows:  Client and Family Member verbalized understanding of all discharge instructions and prescriptions.     Review all your home medications and newly ordered medications with your doctor and/or pharmacist. Follow medication instructions as directed by your doctor and/or pharmacist.    Pain Management:   Discharge Pain Medication Instructions:  Comfort Goal: Comfort at Rest, Comfort with Movement, Sleep Comfortably  Notify your primary care provider if pain is unrelieved with these measures, if the pain is new, or increased in intensity.    Discharge Skin Characteristics: Warm, Dry  Discharge Skin Exam: Clear  Surgical Incision  Incision Left Posterior Head (Active)   Wound Bed Pale;Pink 2/27/2017  8:23 PM   Drainage  None 2/27/2017  8:23 PM   Periwound Skin Pink 2/27/2017  8:23 PM   Daily - Wound Closure Approximated;Open to Air 2/27/2017  8:23 PM   Dressing Options Open to Air 2/27/2017  8:23 PM   Dressing Status / Change Not Applicable 2/27/2017  8:23 PM   Weekly Photo (Inpatient Only) 02/25/17 2/25/2017  7:30 PM     Skin / Wound Care Instructions: Please contact your primary care physician for any change in skin integrity.     If You Have Surgical Incisions / Wounds:  Monitor surgical site(s) for signs of increased swelling, redness or symptoms of drainage from the site or fever as this could indicate signs and symptoms of infection. If these symptoms are noted, notifiy your primary care provider.      Discharge Safety Instructions: Should Not Be Left Alone In The House     Discharge Safety Concerns: Balance Problems (Dizziness, Light Headedness), Impaired Judgement, History Of Falls, Unsteady Gait  The interdisciplinary team has made recommendation that you should not be left alone  in the  "house due to balance problem, impaired judgment, history of falls and unsteady gait  Anti-embolic stockings are not required to increase circulation to the lower extremities.    Discharge Diet: Diabetic     Discharge Liquids: Thin liquids  Discharge Bowel Function: Continent  Please contact your primary care physician for any changes in bowel habits.  Discharge Bowel Program:    Discharge Bladder Function: Continent  Discharge Urinary Devices:        Nursing Discharge Plan:   Influenza Vaccine Indication: Patient Refuses    Case Management Discharge Instructions:   Discharge Location: Home with Outpatient Services  Agency Name/Address/Phone: [facility] for Physical Therapy, Occupational Therapy, and Speech Therapy. [phone].  Address-[address] [place] [address].  Please call to schedule follow up appointments.    Home Health:    Outpatient Services:    DME Provider/Phone: None  Medical Equipment Ordered:    Prescription Faxed to:        Discharge Medication Instructions:  Below are the medications your physician expects you to take upon discharge: See attached.    Tumor cerebral  (Brain Tumor)  Un tumor cerebral es maria antonia enfermedad en la cual las células cancerosas (malignas) comienzan a desarrollarse en los tejidos del cerebro. Representan entre el 85% y el 90% de todos los tumores primarios del sistema nervioso central (SNC). Un tumor cerebral \"primario\" es el que comienza en el cerebro, a diferencia del que se ha diseminado desde maria antonia dafne diferente del cuerpo.  El cerebro controla la memoria y el aprendizaje, los sentidos (la audición, la vista, el olftato, el gusto y el tacto) y las emociones. También controla otras partes del cuerpo, incluyendo músculos, órganos y vasos sanguíneos. Amalia folleto se refiere a los tumores que comienzan en el cerebro (tumores cerebrales primarios).   La mayoría comienzan en personas de 45 años o más. Un yolanda de tumores raros ocurre jay exclusivamente en niños. "   Los tumores cerebrales primarios son mucho menos frecuentes que los secundarios. Un tumor cerebral secundario comienza en maria antonia parte diferente del cuerpo y se disemina al cerebro.  CAUSAS  La mayoría de los tumores cerebrales primarios comienzan cuando las células normales desarrollan errores en ramirez genética. En muchos casos no se conoce el modo en que comienza ramu proceso. Estos errores hacen que las células se desarrollen y se dividan a mayor velocidad y continúen ramirez ciclo mientras las células sanas mueren. El resultado es un yolanda de células anormales que chuy un tumor.   Hay diferentes tipos de neuronas. En muchos casos, un tipo de neurona desarrolla un error que conduce a la formación del tumor. El tumor recibe ramirez nombre según el tipo de célula en la que se ha desarrollado.  Los factores ambientales y las infecciones que pueden causar un tumor cerebral son:  1. Exposición a sustancias químicas y solventes. La exposición puede ocurrir en el trabajo o realizando un hobby. Por ejemplo, contacto con las siguientes sustancias:   2. Cloruro de vinilo   3. Pesticidas   4. Maria Antonia variedad de químicos industriales.   Se están llevando a cabo trabajos de investigación debido a que no se sabe si existe maria antonia relación cierta entre estas sustancias químicas y el desarrollo de tumores cerebrales primarios.  1. La exposición de la fredis a la radiación, humaira dez un accidente nuclear.   2. Infección por virus de Larry-Barr puede causar un tumor cerebral poco frecuente.   3. Los transplantados y pacientes con síndrome de inmunodeficiencia adquirida.   SÍNTOMAS  Los síntomas de tumor cerebral se relacionan con la dafne del cerebro que afectan. Por ejemplo, un tumor cerebral que afecta la dafne del cerebro que controla la visión puede afectar ramu sentido.  Los signos y síntomas generales son:   · Dolor de fredis. Cambio en los patrones, más frecuentes o más graves.   · Problemas estomacales o intestinales humaira:   · Náuseas.    · Pérdida del apetito.   · Vómitos   · Cambios en:   · La personalidad.   · El estado de ánimo   · La capacidad mental   · La concentración   · Trastornos del equilibrio   · Confusión en las actividades cotidianas   · Trastornos del habla   · Convulsiones en alguien que nunca las tuvo. Emmet puede ocurrir en el 20% de los pacientes que tienen el tumor localizado en la dafne superior del cerebro. Las convulsiones pueden aparecer dez meses antes de tener un diagnóstico confirmado.   DIAGNÓSTICO  El diagnóstico se basa en:  · La historia clínica   · El examen neurológico   · Procedimientos diagnósticos. Los procedimientos diagnósticos son:   · Tomografía computarizada (TC) y resonancia magnética (IMR).   · Después del tratamiento, puede ser de utilidad que le soliciten un SPECT (tomografía por emisión de fotón único) y un PET (tomografía por emisión de positrones) para observar si existe un nuevo desarrollo tumoral. Ambas pruebas consisten en formas muy especiales de obtener imágenes del cerebro. Estas pruebas sólo están disponibles en los grandes hospitales.   Maria Antonia vez que el tumor cerebral es hallado, se realizarán nuevos estudios para determinar el tipo de tumor. El médico también necesitará saber si las células cerebrales son diferentes de las células de los tejidos circundantes. Emmet se denomina lisa histológico del tumor. Para elaborar un plan de tratamiento, es importante conocer el tipo y lisa del tumor cerebral.  TRATAMIENTO  Se dispone de cuatro tipos de tratamiento: Los que valdez de elegirse dependen del tipo y ubicación del tumor.   · Cirugía: es el tratamiento más frecuente. Para extirpar el tumor, el cirujano seccionará maria antonia dafne del hueso del cráneo para llegar hasta el cerebro. Esta operación se denomina craneotomía. Luego de extirpar el tumor, el hueso se coloca nuevamente en ramirez lugar, o se utiliza maria antonia pieza metálica o algún tejido para cubrir el orificio del cráneo.   · Terapia de radiación y  radiocirugía: Se utilizan donald x para destruir las células cancerosas y achicar el tumor desde el exterior. La terapia de radiación también puede utilizarse colocando materiales que producen radiación (radioisótopos) a través de delgados tubos plásticos dentro del tumor para destruir las células desde el interior.   · Quimioterapia: se utilizan drogas para destruir las células cancerosas. La quimioterápia se denomina tratamiento sistémico debido a que la droga ingresa en el torrente sanguíneo, a trasvés del organismo, y destruye las células cancerosas de todo el cuerpo. La quimioterapia puede ser:   · Ugo medicamentos.   · Colocar maria antonia aguja en maria antonia vena o músculo.   · La terapia biológica utiliza el sistema inmunológico del organismo para luchar contra el cáncer. Se están realizando ensayos clínicos para usar en el tratamiento del cáncer cerebral. Se utilizan productos del organismo o fabricados en laboratorio para ayudar a que las defensas naturales del cuerpo luchen contra la enfermedad.   La rehabilitación después del tratamiento  Debido a que los tumores cerebrales pueden desarrollarse en zonas del cerebro que controlan las habilidades, el habla, la visión y el pensamiento, la rehabilitación puede ser maria antonia parte necesaria de la recuperación. El cerebro en algunos casos puede curarse solo después de un traumatismo o tratamiento de un tumor cerebral, kristy puede llevar tiempo y paciencia.   · Rehabilitación cognitiva: ayuda a enfrentar los problemas de pensamiento y conciencia.   · Terapia física: ayuda a recuperar las capacidades motoras o la fuerza muscular perdidas.   · Terapia vocacional ayuda a volver al trabajo luego de sufrir esta enfermedad.   · Especialistas en dificultades del habla (patólogos del habla) son sólo melita de los muchos tipos de terapeutas que pueden ayudarlo a recuperarse.   Los niños en edad escolar que sufren tumores cerebrales pueden beneficiarse especialmente de tutores humaira parte de un  plan de tratamiento integral. Un tumor cerebral puede causar modificaciones en el cerebro que afectan el pensamiento y el aprendizaje. Cuanto antes se identifiquen estos problemas, más rápidamente podrán tratarse con estrategias que proporcionan mayores beneficios para el bettye.   SOLICITE ANTENCIÓN MÉDICA SI:  · Siente efectos adversos por los medicamentos prescriptos.   · Siente que los analgésicos no son efectivos.   · Desarrolla nuevos síntomas.   SOLICITE ATENCIÓN MÉDICA DE INMEDIATO SI:  · Le sube la fiebre, tiene rigidez en el moody o está confundido.   · Sufre un dolor de fredis que se vuelve más intenso, o que no responde a los medicamentos para aliviar el dolor.   · Tiene un episodio de desmayo.   · Sufre convulsiones.   · Aparece maria antonia erupción cutánea.   · Comienza a vomitar de manera preocupante o no puede tolerar los alimentos o los líquidos.         Prevención de caídas en el hogar   (Fall Prevention in the Home)  Las caídas pueden causar lesiones. Las personas de todas las edades pueden sufrir caídas. Hay muchas cosas que puede hacer para que ramirez casa sea un lugar seguro y para ayudar a prevenir las caídas.   ¿QUÉ PUEDO HACER EN EL EXTERIOR DE MI CASA?  5. Repare habitualmente los bordes de las aceras y las calzadas, así humaira las grietas.  6. Retire las cosas que pueden hacerlo tropezar cuando cruce maria antonia maurizio, por ejemplo, un escalón o un umbral elevados.  7. Pode los arbustos o los árboles que se encuentran en el abhijeet a ramirez casa.  8. Use maria antonia iluminación brillante en el exterior.  9. Retire los objetos que estén en el abhijeet y que pueden hacer que maria antonia persona se tropiece, humaira piedras o herramientas.  10. Verifique con frecuencia que los pasamanos no estén flojos ni rotos. Asegúrese de que haya pasamanos a ambos lados de los escalones.  11. Las lilly y las galerías elevadas deben tener barandillas de protección en los bordes.  12. Limpie regularmente las hojas, la halima y el hielo.  13. Utilice  arena o sal en los caminos dez el invierno.  14. Limpie de inmediato los derrames en el garaje. Inchelium incluye los derrames de aceite o grasa.  ¿QUÉ PUEDO HACER EN EL BAÑO?   4. Use luces nocturnas.  5. Instale barras de apoyo en el inodoro, en la bañera y en la ducha. No use los toalleros humaira barras de apoyo.  6. Utilice alfombras o calcomanías antideslizantes en la bañera o ducha.  7. Si necesita sentarse mientras está debajo de la ducha, use un banco plástico antideslizante.  8. Mantenga el piso seco. Seque de inmediato cualquier derrame de agua en el piso.  9. Elimine con frecuencia la acumulación de jabón en la bañera o la ducha.  10. Asegure las alfombras del baño con maria antonia cinta antideslizante doble tracy para alfombras.  11. No tenga alfombras ni otros objetos en el piso que puedan hacerlo tropezar.  ¿QUÉ PUEDO HACER EN LA HABITACIÓN?  · Use luces nocturnas.  · Asegúrese de tener maria antonia ernesto junto a la cama que sea fácil de alcanzar.  · No use sábanas ni mantas que modesto demasiado grandes para la cama. No deben colgar sobre el piso.  · Tenga maria antonia silla firme con apoyabrazos. Puede usarla para apoyarse cuando se viste.  · No tenga alfombras ni otros objetos en el piso que puedan hacerlo tropezar.  ¿QUÉ PUEDO HACER EN LA COCINA?  · Limpie de inmediato cualquier derrame.  · Evite caminar sobre pisos mojados.  · Mantenga los objetos que usa con frecuencia en lugares de fácil acceso.  · Si necesita alcanzar algo que esté elevado, use maria antonia mariann firme con maria antonia javier de apoyo.  · Mantenga los cables eléctricos fuera del abhijeet.  · No use un pulidor o cera para pisos que dejen los pisos resbaladizos. Si tiene que usar cera, utilice cera antideslizante.  · No tenga alfombras ni otros objetos en el piso que puedan hacerlo tropezar.  ¿QUÉ PUEDO HACER CON LAS ESCALERAS?  · No deje ningún objeto en las escaleras.  · Asegúrese de que haya pasamanos en ambos lados de las escaleras y úselos. Repare los pasamanos que estén flojos  o rotos. Asegúrese de que los pasamanos tengan la misma longitud que la mariann.  · Verifique que las alfombras estén libertad adheridas a las escaleras. Arregle las alfombras flojas o gastadas.  · No coloque alfombras en la parte superior o inferior de las escaleras. Si tiene alfombras, péguelas al piso con cinta adhesiva para alfombras.  · Asegúrese de tener un interruptor de ernesto en la parte superior e inferior de las escaleras. Si no lo tiene, pídale a alguien que se lo instale.  ¿QUÉ MÁS PUEDO HACER PARA AYUDAR A PREVENIR LAS CAÍDAS?  · Use calzado con estas características:  ¨ No tienen tacos altos.  ¨ Tienen suela de goma.  ¨ Son cómodos y le quedan libertad.  ¨ Son cerrados. No use sandalias.  · Si usa maria antonia mariann de mano:  ¨ Asegúrese de que esté abierta por completo. No suba a maria antonia mariann de mano cerrada.  ¨ Verifique que ambos lados de la mariann de mano estén asegurados.  ¨ Pídale a maria antonia persona que la sostenga, si es posible.  · Ubique claramente y asegúrese de que puede rosanna lo siguiente:  ¨ Las barras de apoyo o los pasamanos.  ¨ El primer y el último escalón.  ¨ Dónde está el borde de cada escalón.  · Use herramientas que lo ayuden a desplazarse (dispositivos de ayuda para la movilidad), si son necesarias. Estas incluyen las siguientes:  ¨ Bastones.  ¨ Andadores.  ¨ Patinetes con soporte para el pie.  ¨ Muletas.  · Encienda las luces cuando ingrese a maria antonia dafne oscura. Reemplace las bombillas de ernesto tovar pronto humaira se quemen.  · Organice los muebles de modo de que el abhijeet esté despejado. No cambie los muebles de lugar.  · Si los pisos están desparejos, repárelos.  · Si hay mascotas cerca, fíjese dónde están.  · Revise los medicamentos con el médico. Algunos medicamentos pueden hacer que se sienta mareado. McLeod puede aumentar el riesgo de caerse.  Pregúntele al médico qué otras cosas puede hacer para ayudar a prevenir las caídas.     Esta información no tiene humaira fin reemplazar el consejo del médico.  Asegúrese de hacerle al médico cualquier pregunta que tenga.         Depresión / Riesgo al Suicidio    Cuando se le da de hussein de alguna entidad de [facility], es importante aprender a mantener a arsenio de hacerse daño a sí mismo.    Reconocer los signos de advertencia:  · Cambios bruscos en la peronalidad, positiva o negativa, incluyendo aumento de la energia  · Regalar posesiones  · Cambios en patrones de comer - significativos cambios de peso - positivos o negativos  · Cambio de patrones para dormir - no poder dormir o dormir todo el tiempo  · Falta de voluntad o incapacidad de comunicarse  · Depresión  · Radha, desánimo y tristeza inusual  · Habla de querer morir  · Descuido del aspecto personal  · Rebeldía - comportamiento imprudente  · Retiro de personas y actividades que les gusta  · Confusión-incapacidad para concentrarse    Si usted o un ser querido observa cualquiera de estos comportamientos o tiene preocupaciones de hacerse daño a si mismo, aquí le damos lo que usted puede hacer:  · Hablar de ti - tus sentimientos y razones para hacerse santo a si mismo  · Retire cualquier medio que se podría utilizar para lastimarse (ejemplos: píldoras, cuerdas, cordones de extensión, arma de tino)  · Obtenga ayuda profesional de la comunidad (luz mental, abuso de sustancias, orientación psicológica)  · No estar solo: llamar a un contacto seguro - maria antonia persona que confía en que estará allí por usted  · Llamada local L´INEA de CRISIS [phone] y [phone]  · Llamada local [employer] [facility] [place] [phone] or [email or web address] Political [email or web address]  · Llamada gratuita nacional, líneas de prevención del suicidio  · Preventión del Suicidio Nacional [facility] [phone] ([phone])  · Línea Nacional de la [place] 800-SUICIDE ([phone])          Occupational Therapy Discharge Instructions for Dale Dorado    2/27/2017    Level of Assist Required for Eating: Requires  Supervision with Eating  Level of Assist Required for Grooming: Requires Supervision with Grooming  Level of Assist Required for Dressing: Requires Supervision with Dressing  Equipment for Dressing: Other  (Stool to elevated feet to assist with donning socks/shoes)  Level of Assist Required for Toileting: Requires Supervision with Toileting  Level of Assist Required for Toilet Transfer: Requires Supervision with Toilet Transfer  Level of Assist Required for Bathing: Requires Supervision with Bathing  Equipment for Bathing: Shower Chair  Level of Assist Required for Shower Transfer: Requires Supervision with Shower Transfer  Equipment for Shower Transfer: Shower Chair  Driving: May not Drive, Please Contact Physician for Further Information  Home Exercise Program: None Issued    Kvng Hunter on your continued recovery!   chao Alvarado father needs 24 hours supervision for safety.  He would benefit from a shower chair to assist with bathing when washing his lower body.  Please remind him to sit down when completing lower body dressing.  He benefited from a small foot stool to elevate his feet to assist with putting on and taking off his socks and shoes.  If you have any questions or concerns, please do not hesitate to ask!  Best wishes,  Wendy Sandhu OTR/L          Physical Therapy Discharge Instructions for Dale MckeonMathew  2/28/2017  Level of Assist Required for Ambulation: Assist for Balance on Flat Surfaces, Physical Assist on Stairs, Should Not Attempt Curbs at This Time  Distance Patient May Ambulate: 250 feet  Device Recommended for Ambulation:  (Gait Belt)  Level of Assist Required for Transfers: Supervision  Device Recommended for Transfers:  (bed rail as needed only)  Home Exercise Program: None Issued  Prosthesis / Orthosis Recommendation / Location: No Prosthesis  or Orthosis Recommended    Terapia Física Instrucciones de hussein para Dale McintyrePing  2/28/2017  Nivel de  Asistencia Requerido para la Ambulación: Asistencia para el Balance en Superficies Silvina, Asistencia Física en Escaleras, No Debe Tentar Bordillos en ramu Momento   Distancia Paciente Puede Ambular: 250 pies   Dispositivo recomendado para ambulación: (Cinturón de marcha)   Nivel de asistencia requerida para las transferencias: Supervisión   Dispositivo recomendado para transferencias: (carril de cama según sea necesario)   Programa de ejercicios para el hogar: [place]   Prótesis / Orthosis Recomendación / localización: [place] prótesis o orthosis recomendada    Speech Therapy Discharge Instructions for Dale Azaelligia Calzada    2/28/2017    Diet: Regular - all foods allowed, Thin Liquids - any liquid like water, coffee, tea, juices, or clear fluids like Gatorade, etc.  Swallow Strategies: n/a  Other Diet Instructions: n/a  Supervision: 24 hour supervision is recommended for safety      Aphasia is a communication impairment usually acquired as a result of a stroke or other brain injury. It affects both the ability to express oneself through speech, gesture, and writing, and to understand the speech, gesture, and writing of others. Aphasia thus changes the way in which we communicate with those people most important to us: family, friends, and co-workers. Make sure you have the person's attention before communicating.   1. During conversation, minimize or eliminate background noise (such as television, radio, other people) as much as possible.   2. Keep communication simple but adult. Simplify your own sentence structure and reduce your own rate of speech. You don't need to speak louder than normal but do emphasize key words. Don't talk down to the person with aphasia.   3. Encourage and use other modes of communication (writing, drawing, yes/no responses, choices, gestures, eye contact, facial expressions) in addition to speech. Use what seems to work consistently.  4. Give them time to talk and let them  have a reasonable amount of time to respond. Avoid speaking for the person with aphasia except when necessary and ask permission before doing so.   5. Praise all attempts to speak; make speaking a pleasant experience and provide stimulating conversation. Downplay errors and avoid frequent criticisms/corrections. Avoid insisting that each word be produced perfectly.   6. Augment speech with gesture and visual aids whenever possible. Repeat a statement when necessary.   7. Encourage them to be as independent as possible. Avoid being overprotective.   Whenever possible continue normal activities (such as dinner with family, company, going out). Do not shield people with aphasia from family or friends or ignore them in a group conversation. Rather, try to involve them in family decision-making as much as possible. Keep them informed of events but avoid burdening them with day to day details.     Dale is able to better understand language when gestures or pictures are used along with speech (For example, if asking about using the bathroom, point to the bathroom so he is able to understand the topic you are discussing).  Speak in short sentences, one at a time so he is not overwhelmed with information.  He is also able to understand some singel written words.   Moises Flower MS, CCC-SLP

## 2017-02-28 NOTE — DISCHARGE PLANNING
Case Management/ Reviewed case management discharge instructions with patient present and dtr Desiree to include recommendations for out pt therapy and  follow up appointments. Dtrs plan to apply for Medicaid for their dad as of April 1, he will not have any insurance coverage through his employer.  Discussed pursuing guardianship. Copy of RTC application provided to dtr.  Gait belt also provided.  Dtrs are able to administer insulin to pt and have a glucometer at home. Dtr Desiree to transport home.  Pt was in very good spirits as hew as excited to d home. No further intervention.

## 2017-02-28 NOTE — DISCHARGE SUMMARY
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 S/0 sterotactic left frontotemporal craniotomy with microscopic computer assisted partial resection of the left temporal low grade glioma using intraoperative functional cortical mapping with awake patient.     new acute infarct posterior to the resection of the cavity in the left temporal occipital lobe. And new small focus of GRE hypointensity in left frontoparietal regional likely represents small hemorrhage or blood products from surgery  Anaplastic Grade III astrocytoma. He has an appointment to see  in the next several weeks, thus he would not be undergoing radiation near his recent resection anyway's. The patient has and apppointment for outpatient oncology with Olivier Osman MD    Aphasia  Decadron taper as tolererated  Seizure: 1000 mg keppra bid  Discharge Diagnosis:  Active Hospital Problems    Diagnosis   • Astrocytoma brain tumor (CMS-HCC)   New-onset hyponatremia - resolved    Knee pain - x-rays with osteoarthritis, tricompartmental moderate on the right, mild on the left.    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 S/P sterotactic left frontotemporal craniotomy with microscopic computer assisted partial resection of the left temporal low grade glioma using intraoperative functional cortical mapping with awake patient.     new acute infarct posterior to the resection of  the cavity in the left temporal occipital lobe. And new small focus of GRE hypointensity in left frontoparietal regional likely represents small hemorrhage or blood products from surgery  Anaplastic Grade III astrocytoma. He has an appointment to see  in the next several weeks, thus he would not be undergoing radiation near his recent resection anyway's. The patient has and apppointment for outpatient oncology with Olivier Osman MD    Global Aphasia     Decadron taper as tolererated: continue 4 mg q 6 hours for now until follow-up with surgery and oncology.    Hyperglycemia - due to steroids. Continue long-acting and short-acting insulin as needed.    Seizure: Continue 1000 mg keppra bid    Hyperlipidemia - continue statin.    GI prophylaxis - Pepcid. Continue while on steroids.    Hospital Course  The patient is a 56 year old male who presented for medical care with  Left Bell's Palsy in mid December of 2016. Evaluation include an MR head scan revealed a left temporofrontal primarily nonenhancing tumor. [facility] Decadron. He was hospitalized at [facility] in [place]. He was referred to [place] and the MRI of the head from 1-23-17 revealed extensive infiltrating predominantly nonenhancing left temporofrontal tumor including involvement of the left insula and deep frontal lobe as well as a large portion of the antierior and mid temporal lobe with minimal punctate enhancement in the left temoporal region. The patient underwent a sterotactic left frontotemporal craniotomy with microscopic computer assisted partial resection of the left temporal low grade glioma using intraoperative functional cortical mapping with awake patient. The patient had difficulty with speech. The discharge summary stated that the patient had a new acute infarct posterior to the resection of the cavity in the left temporal occipital lobe. In addition, new small focus of GRE hypointensity in  left frontoparietal regional likely represents small hemorrhage or blood products from surgery  Patient was evaluated by Rehab Medicine physician and therapists and determined to be appropriate for acute inpatient rehab and was transferred to [facility] on 2017. The patient had a few second seizure the second evening of his admission to rehab but then had 2 more and was subsequently evaluated in rapid response by hospitalist and physiatrist and the decision to send the patient to the ER for further evaluation was made. The patient was admitted to the neuro floor. Patient was  on keppra 500mg BID, however had a break through seizure. As a result patient was placed on Keppra 1g BID, and his steroids were increased from 4mg decadron bid to IV q6, currently decreased to TID 4mg. In addition we received a pathology from [place] which showed a Anaplastic Grade III astrocytoma. He has an appointment to see  in the next several weeks, thus he would not be undergoing radiation near his recent resection anyway's. The patient has and apppointment for outpatient oncology with Olivier Osman MD    Patient was evaluated by Rehab Medicine physician and therapists and determined to be appropriate for acute inpatient rehab and was transferred to [facility] on 2-. The patient progressed throughout his rehabilitation stay. His biggest barrier was aphasia. He is able to make basic wants and needs known. The daughter participated in family training and the patient will need 24 hour supervision and assist. I discussed Decadron taper with Dr Quevedo, hospitalist and we both decided to keep patient on Decadron for a short while longer and oncologist can reassess when it needs further tapering. The patient is discharging home today, 2017.    Functional Status at Discharge  Eatin - Standby Prompting/Supervision or Set-up  Eating Description:  Supervision for safety,  Verbal cueing  Groomin - Standby Prompting/Supervision or Set-up  Grooming Description:  Supervision for safety, Verbal cueing  Bathin - Standby Prompting/Supervision or Set-up  Bathing Description:  Supervision for safety, Set-up of equipment  Upper Body Dressin - Standby Prompting/Supervision or Set-up  Upper Body Dressing Description:  Supervision for safety  Lower Body Dressin - Standby Prompting/Supervsion or Set-up  Lower Body Dressing Description:  5 - Standby Prompting/Supervsion or Set-up  Discharge Location : Relative / Friend's Home  Patient Discharging with Assist of: Family   Level of Supervision Required: 24 Hour Supervision  Recommended Equipment for Discharge: Shower Chair (stool to assist with lower body dressing)  Recommended Services Upon Discharge: Outpatient Occupational Therapy  Long Term Goals Met: 0  Long Term Goals Not Met: 2  Reason(s) for Goals Not Met: Pt req SPV for safety 2/2 impaired cognition  Criteria for Termination of Services: Maximum Function Achieved for Inpatient Rehabilitation  Walk:  4 - Minimal Assistance  Distance Walked:  Walks a minimum of 150 feet  Walk Description:  Requires incidental assist, Extra time, Supervision for safety, Verbal cueing, Safety concerns (Trenelenberg, no UE support today but CGA for balance; cueing for safety and way finding; 280 feet indoors; mod SOB)  Wheelchair:  5 - Standby Prompting/Supervision or Set-up  Distance Propelled:  Propels a minimum of 150 feet   Wheelchair Description:  Supervision for safety, Verbal cueing, Extra time (300 feet using BUE propulsion, SPV, occasional cueing for brakes, cueing for way finding)  Stairs 4 - Minimal Assistance  Stairs Description (Min A, B railings, 12 stairs, mod SOB, step-to pattern, min cueing for task attention)  Discharge Location: Home  Patient Discharging with Assist of: Family;Spouse / Significant Other  Level of Supervision Required Upon Discharge: Intermittent  Supervision  Recommended Equipment for Discharge: Other (See Comments) (gait belt)  Recommeded Services Upon Discharge: Home Health Physical Therapy;Outpatient Physical Therapy  Long Term Goals Met: 0  Long Term Goals Not Met: 4  Reason(s) for Goals Not Met: Patient requires additional assistance for safety and balance.  Criteria for Termination of Services: Maximum Function Achieved for Inpatient Rehabilitation  Comprehension Mode:  Both  Comprehension:  2 - Max Assistance  Comprehension Description:  Verbal cues  Expression Mode:  Vocal  Expression:  2 - Max Assistance  Expression Description:  Verbal cueing  Social Interaction:  5 - Stand-by Prompting/Supervision or Set-up  Social Interaction Description:  Increased time, Verbal cues  Problem Solving:  3 - Moderate Assistance  Problem Solving Description:  Verbal cueing  Memory:  2 - Max Assistance  Memory Description:  Verbal cueing, Therapy schedule, Bed/chair alarm       Discharge Medication:     Medication List      START taking these medications       Instructions    levetiracetam 100 MG/ML Soln   Last time this was given:  1,500 mg on 2/28/2017  8:11 AM   Commonly known as:  KEPPRA   Replaces:  levetiracetam 1000 MG tablet    Take 15 mL by mouth every 12 hours.   Dose:  1500 mg       lidocaine 5 % Ptch   Last time this was given:  1 Patch on 2/28/2017  8:11 AM   Commonly known as:  LIDODERM    Apply 1 Patch to skin as directed every day.   Dose:  1 Patch       metoprolol 25 MG Tabs   Last time this was given:  18.75 mg on 2/28/2017  6:04 AM   Commonly known as:  LOPRESSOR    Take 0.75 Tabs by mouth 2 Times a Day.   Dose:  18.75 mg       rivaroxaban 15 MG Tabs tablet   Last time this was given:  15 mg on 2/28/2017  8:12 AM   Commonly known as:  XARELTO    Take 1 Tab by mouth 2 Times a Day.   Dose:  15 mg         CHANGE how you take these medications       Instructions    dexamethasone 4 MG Tabs   What changed:    - how much to take  - how to take this  -  when to take this  - additional instructions   Last time this was given:  4 mg on 2/28/2017  6:04 AM   Commonly known as:  DECADRON    Doctor's comments:  Take 4 mg q 6 hours. Patient has oncologist appointment on 3-6-2017 and oncologist to determine if decadron is to continue or taper.   Take 4 mg q 6 hours. Patient has oncologist appointment on       * insulin  UNIT/ML Susp   What changed:    - how much to take  - when to take this   Last time this was given:  35 Units on 2/28/2017  8:13 AM   Commonly known as:  HUMULIN,NOVOLIN    Inject 35 Units as instructed every morning.   Dose:  35 Units       * insulin  UNIT/ML Susp   What changed:  You were already taking a medication with the same name, and this prescription was added. Make sure you understand how and when to take each.   Last time this was given:  35 Units on 2/28/2017  8:13 AM   Commonly known as:  HUMULIN,NOVOLIN    Inject 35 Units as instructed 1/2 hour before dinner.   Dose:  35 Units       * Notice:  This list has 2 medication(s) that are the same as other medications prescribed for you. Read the directions carefully, and ask your doctor or other care provider to review them with you.      STOP taking these medications          acetaminophen 650 MG Supp   Commonly known as:  TYLENOL       bisacodyl 10 MG Supp   Commonly known as:  DULCOLAX       docusate sodium 100 MG Caps   Commonly known as:  COLACE       famotidine 20 MG Tabs   Commonly known as:  PEPCID       insulin lispro 100 UNIT/ML Soln   Commonly known as:  HUMALOG       labetalol 5 MG/ML Soln   Commonly known as:  NORMODYNE,TRANDATE       lactulose 20 GM/30ML Soln       levetiracetam 1000 MG tablet   Commonly known as:  KEPPRA   Replaced by:  levetiracetam 100 MG/ML Soln       lorazepam 2 MG/ML Soln   Commonly known as:  ATIVAN       PAIN RELIEVER 325 MG Tabs   Generic drug:  acetaminophen       senna-docusate 8.6-50 MG Tabs   Commonly known as:  PERICOLACE or SENOKOT S        temazepam 15 MG Caps   Commonly known as:  RESTORIL       ZOFRAN 4 MG Tabs tablet   Generic drug:  ondansetron               Follow-up:  Case Management Discharge Instructions for Dale Dorado  Discharge Date: Tuesday 2/28/2017     Discharge Location    Home with family and  Outpatient Services      Out Patient Therapy  [facility]   [address], [place], [place]     Please call to schedule appointment for  Physical Therapy, Occupational Therapy, and Speech Therapy.    Durable Medical Equipment      None needed      Comments  Application for RTC Access ()  has been completed. Additional information provided.        Follow-up With  Why  Contact Info    MONICA Faulkner  Primary Care   3/13/2017 Monday @ 11:40am with a 11:30am check in time.   [address]  [place] [address]  [phone]      Dr. Olivier Osman,   Oncologist  3/6/2017 Monday @ 1:00pm.   [address], [address]  [place] [id number] [phone]                Condition on Discharge:  Stable. Patient  agreeable with plan of care. Total time spent was 40 minutes and greater than 50 % of the time was regarding patient care and coordination on this date, including floor time and time communicating with patient as stated above in assessment and plan.

## 2017-02-28 NOTE — PROGRESS NOTES
Assumed care of patient, received report from Night Shift RN. Assessment completed. A/O to at least one, hard to assess. Patient denies any pain. Call light within reach, educated about fall and safety precautions, bed alarm is on and in use. No complaints at this time. Hourly rounding in place. All needs met. Patient going home today.

## 2017-02-28 NOTE — CARE PLAN
Problem: Safety  Goal: Will remain free from injury  Outcome: PROGRESSING AS EXPECTED  Patient has poor safety awareness and is weak when getting out of bed this morning. CNA at bedside, alarms in use and non skid foot ware in use. Hourly rounding in place and call light within reach.    Problem: Knowledge Deficit  Goal: Knowledge of disease process/condition, treatment plan, diagnostic tests, and medications will improve  Outcome: PROGRESSING AS EXPECTED  Patient updated about plan of care for the day and discharge later today. Daughters educated last night about insulin and checking sugars.

## 2017-02-28 NOTE — PROGRESS NOTES
Patient discharged to home per order.  Reviewed all discharge instructions, appointments, discharge medications, and wound care instructions with patient and daughter; they verbalize understanding.  Education provided in discharge instructions about medications, insulin administration, checking blood sugars, hypoglycemia prevention, suicide prevention, brain tumors and Home Safety and Fall Prevention. Discharge paperwork completed; signed copies in chart.  Patient has education binder and all belongings; signed copy in chart.  Pt alert, calm, stable; no change in status from morning assessment.  Patient left facility at 1510 via car accompanied by daughter; escorted to car by staff.  Have enjoyed working with this pleasant patient.

## 2017-02-28 NOTE — CARE PLAN
Problem: Speech/Swallowing LTGs  Goal: LTG-By discharge, patient will safely swallow  1) Individualized goal: Regular textures and thin liquids w/ no overt s/sx of aspiration and implement safe swallow strategies in 90% of opportunities   2) Interventions: SLP Swallowing Dysfunction Treatment, SLP Oral Pharyngeal Evaluation, SLP Video Swallow Evaluation, SLP Self Care / ADL Training and SLP Cognitive Skill Development    Outcome: MET Date Met:  02/28/17  Goal: LTG-By discharge, patient will comprehend  1) Individualized goal: New information related to medical/therapeutic care for a safe D/C home with 80% accuracy and min A.   2) Interventions: SLP Aphasia Evaluation, SLP Speech Language Treatment, SLP Self Care / ADL Training and SLP Cognitive Skill Development      Outcome: DISCHARGED-GOAL NOT MET Date Met:  02/28/17  Goal: LTG-By discharge, patient will express  1) Individualized goal: Self using verbal or alternative communication (e.g., communication board/device) with 80% accuracy and minimal-moderate assistance.   2) Interventions: SLP Aphasia Evaluation, SLP Speech Language Treatment, SLP Self Care / ADL Training and SLP Cognitive Skill Development      Outcome: DISCHARGED-GOAL NOT MET Date Met:  02/28/17    Problem: Swallowing STGs  Goal: STG-Within one week, patient will  1) Individualized goal: Regular textures and thin liquids with no overt s/sx of aspiration and implementing swallow strategies in 80% of opportunities with minimal-moderate assistance  2) Interventions: SLP Swallowing Dysfunction Treatment and SLP Self Care / ADL Training     Outcome: MET Date Met:  02/28/17    Problem: Comprehension STGs  Goal: STG-Within one week, patient will  1) Individualized goal: Follow one-step directives with visual and verbal cues with 50% accuracy and MOD A.   2) Interventions: SLP Aphasia Evaluation, SLP Speech Language Treatment and SLP Cognitive Skill Development      Outcome: DISCHARGED-GOAL NOT MET Date  Met:  02/28/17    Problem: Expression STGs  Goal: STG-Within one week, patient will  1) Individualized goal: Provide verbal or gesture response to basic/personal yes/no questions with 50% accuracy and MOD A.   2) Interventions: SLP Aphasia Evaluation, SLP Speech Language Treatment and SLP Cognitive Skill Development     Outcome: DISCHARGED-GOAL NOT MET Date Met:  02/28/17  Goal: STG-Within one week, patient will  1) Individualized goal: Name physical objects with 50% accuracy with MOD A.   2) Interventions: SLP Aphasia Evaluation, SLP Speech Language Treatment, SLP Self Care / ADL Training and SLP Cognitive Skill Development    Outcome: DISCHARGED-GOAL NOT MET Date Met:  02/28/17

## 2017-02-28 NOTE — CARE PLAN
Problem: Mobility  Goal: STG-Within one week, patient will ascend and descend four to six stairs  1) Individualized goal: Using step-to pattern with SBA (slow down with improved sequencing)  2) Interventions: PT Gait Training, PT Self Care/Home Eval, PT Therapeutic Exercises, PT Neuro Re-Ed/Balance and PT Therapeutic Activity  Outcome: DISCHARGED-GOAL NOT MET Date Met:  02/28/17  Goal: STG-Within one week, patient will  1) Individualized goal: Gait no device, sba, 300 feet, one week  2) Interventions: PT Gait Training, PT Therapeutic Exercises, PT Neuro Re-Ed/Balance and PT Therapeutic Activity  Outcome: DISCHARGED-GOAL NOT MET Date Met:  02/28/17    Problem: Mobility Transfers  Goal: STG-Within one week, patient will transfer bed to chair  1) Individualized goal: Transfer bed to/from chair with UE support modified independent one week  2) Interventions: PT Gait Training, PT Therapeutic Exercises, PT Neuro Re-Ed/Balance and PT Therapeutic Activity  Outcome: DISCHARGED-GOAL NOT MET Date Met:  02/28/17    Problem: PT-Long Term Goals  Goal: LTG-By discharge, patient will ambulate  1) Individualized goal: 200ft x 4 with no AD SBA  2) Interventions: PT Gait Training, PT Self Care/Home Eval, PT Therapeutic Exercises, PT Neuro Re-Ed/Balance and PT Therapeutic Activity  Outcome: DISCHARGED-GOAL NOT MET Date Met:  02/28/17  Goal: LTG-By discharge, patient will transfer one surface to another  1) Individualized goal: SPV SPT safely and consistently  2) Interventions: PT Gait Training, PT Self Care/Home Eval, PT Therapeutic Exercises, PT Neuro Re-Ed/Balance and PT Therapeutic Activity   Outcome: DISCHARGED-GOAL NOT MET Date Met:  02/28/17  Goal: LTG-By discharge, patient will perform home exercise program  1) Individualized goal: In standing 3x/day for 10min using SPV  2) Interventions: PT Gait Training, PT Self Care/Home Eval, PT Therapeutic Exercises, PT Neuro Re-Ed/Balance and PT Therapeutic Activity   Outcome:  DISCHARGED-GOAL NOT MET Date Met:  02/28/17  Goal: LTG-By discharge, patient will ambulate up/down flight of stairs  1) Individualized goal: With one rail SPV  2) Interventions: PT Gait Training, PT Self Care/Home Eval, PT Therapeutic Exercises, PT Neuro Re-Ed/Balance and PT Therapeutic Activity    Outcome: DISCHARGED-GOAL NOT MET Date Met:  02/28/17

## 2017-02-28 NOTE — CARE PLAN
Problem: Safety  Goal: Will remain free from injury  Patient continues to be impulsive tonight. Does not use call light before getting OOB. Bed alarm and hourly rounds in place for safety precautions.    Problem: IP BLADDER/VOIDING  Goal: LTG - patient will complete bladder elimination  Patient continent of bladder. Voiding adequately in the BR. Denies dysuria.

## 2017-02-28 NOTE — PROGRESS NOTES
Educated and demonstrated how to use glucometer to both daughters. Also explained and demonstrated administering insulins.

## 2017-03-02 NOTE — DISCHARGE PLANNING
Case management.   Received tc from pt's dtr requesting script for lancets/ syringes  Script written/signed by MD, and faxed to [facility] as requested by dtr shana.  Bear saldana - left message on her voice mail advising her of above.

## 2017-03-03 PROBLEM — C71.2: Status: ACTIVE | Noted: 2017-01-01

## 2017-03-03 NOTE — PROGRESS NOTES
RADIATION ONCOLOGY FOLLOW-UP    DATE OF SERVICE: 3/3/2017    IDENTIFICATION:   A 56 y.o. 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 He is status post left temporal craniotomy at [facility] February 1, 2017.    HISTORY OF PRESENT ILLNESS:   Returns today for follow-up post subtotal resection of grade 3 anaplastic astrocytoma. Pending results include IDH, and MGMT. Portion of tumor adjacent to the speech center was not resected.    Today he is confused as to place and time. He denies headaches nausea or vomiting. He is on 4 mg of dexamethasone every 6 hours.    CURRENT MEDICATIONS:  Current Outpatient Prescriptions   Medication Sig Dispense Refill   • rivaroxaban (XARELTO) 15 MG Tab tablet Take 1 Tab by mouth 2 Times a Day. 60 Tab 0   • levetiracetam (KEPPRA) 100 MG/ML Solution Take 15 mL by mouth every 12 hours. 240 mL 0   • insulin NPH (HUMULIN,NOVOLIN) 100 UNIT/ML Suspension Inject 35 Units as instructed every morning. 10 mL 0   • insulin NPH (HUMULIN,NOVOLIN) 100 UNIT/ML Suspension Inject 35 Units as instructed 1/2 hour before dinner. 10 mL 0   • metoprolol (LOPRESSOR) 25 MG Tab Take 0.75 Tabs by mouth 2 Times a Day. 60 Tab 0   • dexamethasone (DECADRON) 4 MG Tab Take 4 mg q 6 hours. Patient has oncologist appointment on (Patient taking differently: 4 mg 4 times a day. Take 4 mg q 6 hours. Patient has oncologist appointment on) 30 Tab 0   • lidocaine (LIDODERM) 5 % Patch Apply 1 Patch to skin as directed every day. 10 Patch 0     No current facility-administered medications for this encounter.       ALLERGIES:  Review of patient's allergies indicates no known allergies.    PHYSICAL EXAM:   /89 mmHg  Pulse 111  Temp(Src) 36.8 °C (98.2 °F)  Wt 92.534 kg (204 lb)  SpO2 95%  GENERAL: Alert not oriented to place or time no acute distress. Appears cushingoid.  HEENT:  Left temporal craniotomy scar well healed. Pupils are equal, round, and reactive to light.  Extraocular muscles    are intact. Sclerae nonicteric.  Conjunctivae pink.  Oral cavity, tongue   protrudes midline.   NECK:  Supple without evidence of thyromegaly.  NODES:  No peripheral adenopathy of the neck, supraclavicular fossa or axillae   bilaterally.  LUNGS:  Clear to ascultation and resonant to percussion.  HEART:  Regular rate and rhythm.  No murmur appreciated  ABDOMEN:  Soft. No evidence of hepatosplenomegaly.  Positive bowel sounds.  EXTREMITIES:  Without Edema.  NEUROLOGIC:  Cranial nerves II through XII were intact.  Strength is 5/5 in   lower extremities bilaterally. Unable to perform tandem gait.     Pain Scale: 0-10  Pain Assessement: Initial  Pain Location, Orientation and Scale: rt knee pain (pt unable to give a number on a pain scale)    LABORATORY DATA:   Lab Results   Component Value Date/Time    SODIUM 135 02/28/2017 06:01 AM    POTASSIUM 4.0 02/28/2017 06:01 AM    CHLORIDE 98 02/28/2017 06:01 AM    CO2 28 02/28/2017 06:01 AM    GLUCOSE 143* 02/28/2017 06:01 AM    BUN 16 02/28/2017 06:01 AM    CREATININE 0.43* 02/28/2017 06:01 AM     Lab Results   Component Value Date/Time    ALKALINE PHOSPHATASE 38 02/28/2017 06:01 AM    AST(SGOT) 21 02/28/2017 06:01 AM    ALT(SGPT) 76* 02/28/2017 06:01 AM    TOTAL BILIRUBIN 0.7 02/28/2017 06:01 AM      Lab Results   Component Value Date/Time    WBC 7.2 02/28/2017 06:01 AM    RBC 4.55* 02/28/2017 06:01 AM    HEMOGLOBIN 14.1 02/28/2017 06:01 AM    HEMATOCRIT 40.5* 02/28/2017 06:01 AM    MCV 89.0 02/28/2017 06:01 AM    MCH 31.0 02/28/2017 06:01 AM    MCHC 34.8 02/28/2017 06:01 AM    MPV 9.0 02/28/2017 06:01 AM    NEUTROPHILS-POLYS 81.20* 02/17/2017 05:33 AM    LYMPHOCYTES 6.40* 02/17/2017 05:33 AM    MONOCYTES 6.80 02/17/2017 05:33 AM    EOSINOPHILS 0.00 02/17/2017 05:33 AM    BASOPHILS 0.20 02/17/2017 05:33 AM    ANISOCYTOSIS 1+ 02/06/2017 03:05 PM        RADIOLOGY DATA:  CT-HEAD WITHOUT  2/6/2017  1.  There has been interval left-sided craniotomy with a frontal temporal  defect. There is associated pneumocephalus. 2.  There is been interval development of hypoattenuation in the left posterior frontal parietal junction, anterior left frontal region and left temporal lobe consistent with areas of encephalomalacia. 3.  There is one remaining area of increased density in the left posterior frontal lobe medially which could be residual mass or focal ill-defined area of hemorrhage. 4.  There is a small amount of left sided holohemispheric extra-axial blood adjacent to the craniotomy site. 5.  There is estimated 9 mm of left-to-right midline shift. 6.  There is mass effect in the left lateral ventricle with slight dilatation of the left temporal horn. Findings were discussed with HILLARY MACIAS on 2017 5:27 PM.    Le Venous Duplex  2017   Vascular Laboratory  CONCLUSIONS  Evidence of deep venous thrombosis involving the left popliteal, soleal  sinus, gastrocnemius, and peroneal veins.  Evidence of deep venous thrombosis involving the right popliteal and  peroneal veins.  MICHAEL GONZALEZ  Exam Date:     2017 15:11  Room #:     Inpatient  Priority:     Stat  Ht (in):     62      Wt (lb):     198  Ordering Physician:        SCARLET HARRISON  Referring Physician:  Sonographer:               Gretel Vallejo RDCS, RVT  Study Type:                Complete Bilateral  Technical Quality:         Adequate  Age:    56    Gender:     M  MRN:    [id number]  :    1960      BSA:    1.9  Indications:     Dyspnea  CPT Codes:       63893  ICD Codes:       786.09  History:         Shortness of breath  Limitations:  PROCEDURES:  Bilateral lower extremity venous duplex imaging.  The following venous structures were evaluated: common femoral, profunda  femoral, greater saphenous, femoral, popliteal , peroneal and posterior  tibial veins.  Serial compression, augmentation maneuvers,  color and spectral Doppler  flow evaluations were performed.   FINDINGS:  Right lower extremity -.  there is DVT of one of popliteal vein branch extending to proximal one of  the branches of right peroneal vein.  No other evidence of DVT on the right side.  Left lower extremity -.  there is DVT of left popliteal vein extended to one of left peroneal vein  proximally as well as gastrocutaneous and soleal veins.  no other DVT seen on the left side.  Luiz Goodrich  (Electronically Signed)  Final Date:      27 February 2017                   12:20      IMPRESSION:    A 56 y.o. with subtotal resection anaplastic astrocytoma left temporal lobe. Pending IDH and MGMT.    RECOMMENDATIONS:   Reviewed adjuvant therapy for anaplastic astrocytoma. This would involve use of concurrent chemoradiotherapy with chemotherapy consisting of Temodar and radiotherapy consisting of IMRT-based treatments delivering 60 rose mary in 30 fractions over 6 weeks. Technical aspects benefits risks associated very therapy were reviewed with family and they would like to proceed. I've also recommended decreasing dexamethasone to 12 mg from 16 mg. He'll return for planning on March 7. We'll arrange for post op MRI brain for radiotherapy planning. Tentative start date is February 15 will coordinate with Dr. Osman.    25 minutes was spent face-to-face with patient in the office and more than half of that time was spent counseling patient or coordinating care as described above.    Thank you for the opportunity to participate in his care.  If any questions or comments, please do not hesitate in calling.    Eugenia POOL M.D.  Electronically signed by: Eugenia Barrios V, 3/3/2017 4:20 PM  [phone]

## 2017-03-04 NOTE — ADDENDUM NOTE
Encounter addended by: Eugenia POOL M.D. on: 3/3/2017  4:23 PM<BR>     Documentation filed: [employer], Orders

## 2017-03-07 NOTE — PROGRESS NOTES
Date of visit: 3/6/2017  5:33 PM      Reason for consultation: Anaplastic astrocytoma grade 3-status post partial resection at [facility]    History of presenting illness:      Dale Dorado   is a 56 y.o. year old  male who was otherwise in good health who noticed a left facial deviation in mid December 2016 and  some component of aphasia. An MRI done at [facility] showed abnormal increased signal throughout the left temporal and posterior inferior frontal lobes, possibly representing a low-grade tumor. He was seen by Dr. Presley who recommended awake cranitomy with motor/language mapping to facilitate safe resection at [facility]. He was admitted few days after that with a seizure causing syncope and was started on Keppra.    MRI done at [facility] showed extensive infiltrating predominantly nonenhancing left temperofrontal tumor including involvement of the left insula , frontal lobe and a large portion of the anterior and mid temporal lobe . According to the operative note from 1/31/2017, language mapping was done. He had tumor removal approximately 2 cm anterior to the temporal eloquent speech area extending up to the anterior temporal tip. His speech was not comprehensible at that point  and it was decided not to resect tumor posterior or inferior to the critical language area. According to the note, he has high risk of having residual speech problems. He recovered well from the surgery and is currently at a rehabilitation facility.  He continues to have speech problems and word finding difficulty. A CT of the head done on 2/6/2017 here showed significant left shift and postoperative changes.    He was subsequently diagnosed with bilateral DVT and he is on Xarelto. He still has some expressive aphasia and memory issues. His steroid has been dose reduced to 4 mg 3 times a day. He has had significant weight gain.    Past Medical History:      Past Medical History   Diagnosis Date   •  Hyperlipidemia 2010     no meds   • New onset seizure (CMS-HCC) 1/5/2017   • Brain cancer (CMS-HCC)      Glioma   • Bell palsy 12/2016   • Cancer (CMS-HCC) 1/31/2017     Glioma   • Diabetes (CMS-HCC)      R/t steroid use per dtr   • Bell's palsy 1/2017       Past surgical history:       Past Surgical History   Procedure Laterality Date   • Craniotomy tumor  1/31/2017     Lt frontotemporal       Allergies:       Review of patient's allergies indicates no known allergies.    Medications:         Current Outpatient Prescriptions   Medication Sig Dispense Refill   • rivaroxaban (XARELTO) 15 MG Tab tablet Take 1 Tab by mouth 2 Times a Day. 60 Tab 0   • levetiracetam (KEPPRA) 100 MG/ML Solution Take 15 mL by mouth every 12 hours. 240 mL 0   • insulin NPH (HUMULIN,NOVOLIN) 100 UNIT/ML Suspension Inject 35 Units as instructed every morning. 10 mL 0   • insulin NPH (HUMULIN,NOVOLIN) 100 UNIT/ML Suspension Inject 35 Units as instructed 1/2 hour before dinner. 10 mL 0   • metoprolol (LOPRESSOR) 25 MG Tab Take 0.75 Tabs by mouth 2 Times a Day. 60 Tab 0   • dexamethasone (DECADRON) 4 MG Tab Take 4 mg q 6 hours. Patient has oncologist appointment on (Patient taking differently: 4 mg 4 times a day. Take 4 mg q 6 hours. Patient has oncologist appointment on) 30 Tab 0   • lidocaine (LIDODERM) 5 % Patch Apply 1 Patch to skin as directed every day. 10 Patch 0     No current facility-administered medications for this visit.         Social History:     Social History     Social History   • Marital Status: Single     Spouse Name: N/A   • Number of Children: N/A   • Years of Education: N/A     Occupational History   • Not on file.     Social History Main Topics   • Smoking status: Former Smoker -- 1.00 packs/day for 30 years     Types: Cigarettes   • Smokeless tobacco: Never Used   • Alcohol Use: No   • Drug Use: No   • Sexual Activity: No      Comment:      Other Topics Concern   • Not on file     Social History Narrative  "      Family History:      Family History   Problem Relation Age of Onset   • Diabetes Neg Hx    • Seizures Daughter        Review of Systems:  Difficult due to expressive aphasia. He complains of some pain involving his thigh area. This is more when he walks. His family reports him having memory issues. No fevers, chills, or other cardiovascular symptoms. He has tendency to bruise  Physical Exam:  Vitals: /82 mmHg  Pulse 119  Temp(Src) 37.1 °C (98.7 °F)  Resp 16  Ht 1.58 m (5' 2.19\")  Wt 93.713 kg (206 lb 9.6 oz)  BMI 37.54 kg/m2  SpO2 96%    General: Not in acute distress, alert    HEENT: No pallor, icterus. Oropharynx clear. Mild left facial deviation. Left temporal scar with some bruising Neck: Supple, no palpable masses.  Lymph nodes: No palpable cervical, supraclavicular, axillary or inguinal lymphadenopathy.     CVS: regular rate and rhythm, no rubs or gallops  RESP: Clear to auscultate bilaterally, no wheezing or crackles.    ABD: Soft, non tender, non distended, positive bowel sounds, no palpable organomegaly  EXT: No edema or cyanosis. No significant calf tenderness or erythema  CNS: , No focal motor  deficits. As above  Skin- No rash      Labs:   Admission on 02/10/2017, Discharged on 02/28/2017   No results displayed because visit has over 200 results.      ]          Assessment and Plan:  Anaplastic astrocytoma ( grade III), negative for 1p/19q deletion, IDH,MGMT promoter methylation pending-He had diffuse involvement of the frontotemporal lobe. He had debulking done from the temporal lobe anterior to the eloquent language area. Resection was not done posterior and inferiorly due to concern for affecting his speech.     I will request  [facility] for the final pathology report , especially to know the IDH mutation status, which is prognostic in these situation.   Radiation is planned to start in the next 1-2 weeks. I have asked for Temodar approval, which is still pending.    He is currently " on steroid taper at 4mg tid.   He was diagnosed with bilateral DVT. He complains of some leg pain and I have ordered repeat ultrasound. He is on xarelto.    I will see him back in about 2 weeks with repeat labs.  Problem 30 minutes spent today with the patient and his family, mainly answering questions about his treatment plan      He agreed and verbalized his agreement and understanding with the current plan.  I answered all questions and concerns he has at this time .The standard of care is concurrent chemoradiation employing Temodar followed by adjuvant Temodar monthly. The updated CATNON trial has shown benefit with 12 cycles of adjuvant Temodar therapy.        Please note that this dictation was created using voice recognition software. I have made every reasonable attempt to correct obvious errors, but I expect that there are errors of grammar and possibly content that I did not discover before finalizing the note.      SIGNATURES:  Olivier Osman    CC:  Lisette Mendoza A.P.R.NAdria  No ref. provider found

## 2017-03-08 NOTE — PROGRESS NOTES
Met with daughters earlier today.  Introduced self and provided contact information. They reported RTC access application had been filled out and are following up with.  Provided information on [facility] as well.  Questions regarding Medicaid for patient and Priscilla talking about pt living with her and was given information that she needed to add him to her medicaid.  Pt will also be getting radiation and oral chemo.  Referral will be placed to Financial Resource Advocate to assess.  Both also indicated financial concerns.  Desiree is working but Priscilla and patient have no income source at this time.  [facility] application provided.  Discussed will set up meeting with navigator and  to do full assessment for needs and resources.  Desiree reporting they will be at [facility] most of the day next Monday.  Will work on coordinating a time for assessment.  Both grateful for the assistance.

## 2017-03-08 NOTE — TELEPHONE ENCOUNTER
Left message to have patient and daughters meet with oncology social worker and nurse navigator Monday 13th after 2:15 meeting at [facility].

## 2017-03-09 NOTE — TELEPHONE ENCOUNTER
Was the patient seen in the last year in this department? Yes     Does patient have an active prescription for medications requested? No     Received Request Via: Patient's daughter

## 2017-03-10 NOTE — TELEPHONE ENCOUNTER
Pt is almost out of it  Was the patient seen in the last year in this department? Yes     Does patient have an active prescription for medications requested? No     Received Request Via: Patient's daughter

## 2017-03-13 PROBLEM — E09.9 STEROID-INDUCED DIABETES MELLITUS (HCC): Status: ACTIVE | Noted: 2017-01-01

## 2017-03-13 PROBLEM — C71.9 MALIGNANT NEOPLASM OF BRAIN (HCC): Status: ACTIVE | Noted: 2017-01-01

## 2017-03-13 PROBLEM — T38.0X5A STEROID-INDUCED DIABETES MELLITUS (HCC): Status: ACTIVE | Noted: 2017-01-01

## 2017-03-13 PROBLEM — I82.403 ACUTE DEEP VEIN THROMBOSIS (DVT) OF BOTH LOWER EXTREMITIES (HCC): Status: ACTIVE | Noted: 2017-01-01

## 2017-03-13 NOTE — TELEPHONE ENCOUNTER
Rescheduled assessment for nurse navigation and oncology social worker to March 15th at 3 pm check in with [facility] [address] to notify navigator.

## 2017-03-13 NOTE — PROGRESS NOTES
Oncology social worker and nurse navigator had scheduled meeting with patient and daughters after chemotherapy education.  Physician requesting ultrasound and scheduled for 3:45 today so unable to assess.  They did complete [facility] application.  Pt initially questioning signing application.  He did not seem to understand what he was signing for.  Navigator met with patient and daughters briefly to explain application and need for signature.  Daughter needing to explain and patient did sign part of information needed for application.  Will contact them to reschedule assessment with navigator and  for resources and assistance.  Request for after 2 pm since once daughter works.

## 2017-03-13 NOTE — MR AVS SNAPSHOT
Dale Azaelligia Calzada   3/13/2017 1:40 PM   Appointment   MRN: [id number]    Department:  [facility]   Dept Phone:  [phone]    Description:  Male : 1960   Provider:  Olivier Osman M.D.           Allergies as of 3/13/2017     No Known Allergies      Vital Signs     Smoking Status                   Former Smoker           Basic Information     Date Of Birth Sex Race Ethnicity Preferred Language    1960 Male White  Origin (Vietnamese,Thai,North Korean,Junior, etc) English      Your appointments     Mar 13, 2017  4:00 PM   US EXTREMITY (60) with VISTA [facility]   [facility]AGING [facility]ISTA ([place])    [address] [facility] [address] [phone]            Mar 27, 2017  2:20 PM   ONCOLOGY EST PATIENT 30 MIN with Olivier Osman M.D.   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, T38.0X5A   3/13/2017 - Present    Acute deep vein thrombosis (DVT) of both lower extremities (CMS-HCC) I82.403   3/13/2017 - Present      Health Maintenance        Date Due Completion Dates    IMM HEP B VACCINE (1 of 3 - Primary Series) 1960 ---    A1C SCREENING 1959 ---    DIABETES MONOFILAMENT / LE EXAM 1959 ---    RETINAL SCREENING 1976 ---    URINE ACR / MICROALBUMIN 1976 ---    IMM DTaP/Tdap/Td Vaccine (1 - Tdap) 1977 ---    IMM INFLUENZA (1) 2016 ---    FASTING LIPID PROFILE 2018, 2017      SERUM CREATININE 2/28/2018 2/28/2017, 2/27/2017, 2/26/2017, 2/23/2017, 2/17/2017, 2/16/2017, 2/11/2017, 2/10/2017, 2/9/2017, 2/8/2017, 2/7/2017, 2/6/2017, 2/5/2017            Current Immunizations     No immunizations on file.      Below and/or attached are the medications your provider expects you to take. Review all of your home medications and newly ordered medications with your provider and/or pharmacist. Follow medication instructions as directed by your provider and/or pharmacist. Please keep your medication list with you and share with your provider. Update the information when medications are discontinued, doses are changed, or new medications (including over-the-counter products) are added; and carry medication information at all times in the event of emergency situations     Allergies:  No Known Allergies          Medications  Valid as of: March 13, 2017 -  3:33 PM    Generic Name Brand Name Tablet Size Instructions for use    Dexamethasone (Tab) DECADRON 4 MG Take 4 mg q 6 hours. Patient has oncologist appointment on        Famotidine (Tab) PEPCID 20 MG Take 20 mg by mouth.        Insulin NPH Human (Isophane) (Suspension) HUMULIN,NOVOLIN 100 UNIT/ML Inject 35 Units as instructed 1/2 hour before dinner.        Insulin NPH Human (Isophane) (Suspension) HUMULIN,NOVOLIN 100 UNIT/ML Inject 35 Units as instructed every morning.        Insulin Regular Human (Solution) HUMULIN R 100 Unit/mL Inject -249: 3 units 250-299: 5 units 300-349: 7 units Over 350: 8 units        Insulin Syringe/Needle U-500 (Misc) Insulin Syringe/Needle U-500 31G X 6MM 0.5 ML 1 Units by Does not apply route as needed.        LevETIRAcetam (Solution) KEPPRA 100 MG/ML Take 15 mL by mouth every 12 hours.        Lidocaine (Patch) LIDODERM 5 % Apply 1 Patch to skin as directed every day.        Metoprolol Tartrate (Tab) LOPRESSOR 25 MG Take 0.75 Tabs by mouth 2 Times a Day.        Rivaroxaban (Tab) XARELTO 20 MG Take 1 Tab by mouth  with dinner.        .                 Medicines prescribed today were sent to:     [id number] - [place], [place] - [address]    [address] [place] [address]    Phone: [phone] Fax: [phone]    Open 24 Hours?: No      Medication refill instructions:       If your prescription bottle indicates you have medication refills left, it is not necessary to call your provider’s office. Please contact your pharmacy and they will refill your medication.    If your prescription bottle indicates you do not have any refills left, you may request refills at any time through one of the following ways: The online [employer] system (except Urgent Care), by calling your provider’s office, or by asking your pharmacy to contact your provider’s office with a refill request. Medication refills are processed only during regular business hours and may not be available until the next business day. Your provider may request additional information or to have a follow-up visit with you prior to refilling your medication.   *Please Note: Medication refills are assigned a new Rx number when refilled electronically. Your pharmacy may indicate that no refills were authorized even though a new prescription for the same medication is available at the pharmacy. Please request the medicine by name with the pharmacy before contacting your provider for a refill.           [place] Status: Patient Declined

## 2017-03-13 NOTE — PROGRESS NOTES
Patient is established with Dr. Osman for Astrocytoma.  He will be starting on Temodar chemotherapy, and is here today for a pre-chemotherapy teaching appointment. Patient is accompanied by his two daughter who speak English.    Educated patient on chemotherapy including but not limited to: Infusion Policies and procedures, cycling of chemotherapy, length of treatment, alopecia, myelosuppression, infection prevention, fatigue, GI distress, use of specific nausea medications, avoidance of alcoholic beverages and supplement medications, use of sunscreen, chemotherapy precautions at home, and invasive procedures. Patient was provided with drug specific handouts, [facility] chemotherapy and you book, [facility] eating hints book, [facility] side effects sheet.     Pt will being concurrent chemo/rdx on 3/20.  Authorization is unfortunately still in process.  He will also need to  Bactrim before starting treatment, they are aware.      A total of 60 minutes was spent with this patient for chemotherapy education.

## 2017-03-13 NOTE — MR AVS SNAPSHOT
Dale Calzada   3/13/2017 2:15 PM   Appointment   MRN: [id number]    Department:  [facility]   Dept Phone:  [phone]    Description:  Male : 1960   Provider:  ONC RN 1           Allergies as of 3/13/2017     No Known Allergies      Vital Signs     Smoking Status                   Former Smoker           Basic Information     Date Of Birth Sex Race Ethnicity Preferred Language    1960 Male White  Origin (Greenlandic,Nauruan,Somali,Nauruan, etc) English      Your appointments     Mar 13, 2017  4:00 PM   US EXTREMITY (60) with VISTA US 1   [facility] ([place])    [address]  [facility] [address] [phone]            Mar 27, 2017  2:20 PM   ONCOLOGY EST PATIENT 30 MIN with Olivier Osman M.D.   [facility] (--)    [address], [address]  [facility] [address] [phone]              Problem List              ICD-10-CM Priority Class Noted - Resolved    Obesity (BMI 30-39.9) E66.9 Low  5/15/2015 - Present    Chronic pain of right knee M25.561, G89.29   5/15/2015 - Present    Cerebral edema (CMS-HCC) G93.6   2017 - Present    New onset seizure (CMS-HCC) R56.9 High  2017 - Present    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 High  2017 - Present    Malignant neoplasm of brain (CMS-HCC) C71.9   2017 - Present    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, T38.0X5A   3/13/2017 - Present    Acute deep vein thrombosis (DVT) of both lower extremities (CMS-HCC) I82.403   3/13/2017 - Present      Health Maintenance        Date Due Completion Dates    IMM HEP B VACCINE (1 of 3 - Primary Series) 1958 ---    A1C SCREENING 1959 ---    DIABETES MONOFILAMENT / LE EXAM 1959 ---    RETINAL SCREENING 1976 ---    URINE ACR / MICROALBUMIN 1976 ---    IMM DTaP/Tdap/Td Vaccine (1 - Tdap) 1977 ---    IMM INFLUENZA (1) 2016 ---    FASTING LIPID PROFILE 2018, 2017    SERUM  CREATININE 2/28/2018 2/28/2017, 2/27/2017, 2/26/2017, 2/23/2017, 2/17/2017, 2/16/2017, 2/11/2017, 2/10/2017, 2/9/2017, 2/8/2017, 2/7/2017, 2/6/2017, 2/5/2017            Current Immunizations     No immunizations on file.      Below and/or attached are the medications your provider expects you to take. Review all of your home medications and newly ordered medications with your provider and/or pharmacist. Follow medication instructions as directed by your provider and/or pharmacist. Please keep your medication list with you and share with your provider. Update the information when medications are discontinued, doses are changed, or new medications (including over-the-counter products) are added; and carry medication information at all times in the event of emergency situations     Allergies:  No Known Allergies          Medications  Valid as of: March 13, 2017 -  3:33 PM    Generic Name Brand Name Tablet Size Instructions for use    Dexamethasone (Tab) DECADRON 4 MG Take 4 mg q 6 hours. Patient has oncologist appointment on        Famotidine (Tab) PEPCID 20 MG Take 20 mg by mouth.        Insulin NPH Human (Isophane) (Suspension) HUMULIN,NOVOLIN 100 UNIT/ML Inject 35 Units as instructed 1/2 hour before dinner.        Insulin NPH Human (Isophane) (Suspension) HUMULIN,NOVOLIN 100 UNIT/ML Inject 35 Units as instructed every morning.        Insulin Regular Human (Solution) HUMULIN R 100 Unit/mL Inject -249: 3 units 250-299: 5 units 300-349: 7 units Over 350: 8 units        Insulin Syringe/Needle U-500 (Misc) Insulin Syringe/Needle U-500 31G X 6MM 0.5 ML 1 Units by Does not apply route as needed.        LevETIRAcetam (Solution) KEPPRA 100 MG/ML Take 15 mL by mouth every 12 hours.        Lidocaine (Patch) LIDODERM 5 % Apply 1 Patch to skin as directed every day.        Metoprolol Tartrate (Tab) LOPRESSOR 25 MG Take 0.75 Tabs by mouth 2 Times a Day.        Rivaroxaban (Tab) XARELTO 20 MG Take 1 Tab by mouth with dinner.         .                 Medicines prescribed today were sent to:     [facility]ACY #[id number] - [place], [place] - [address]    [address] [place] [address]    Phone: [phone] Fax: [phone]    Open 24 Hours?: No      Medication refill instructions:       If your prescription bottle indicates you have medication refills left, it is not necessary to call your provider’s office. Please contact your pharmacy and they will refill your medication.    If your prescription bottle indicates you do not have any refills left, you may request refills at any time through one of the following ways: The online [employer] system (except Urgent Care), by calling your provider’s office, or by asking your pharmacy to contact your provider’s office with a refill request. Medication refills are processed only during regular business hours and may not be available until the next business day. Your provider may request additional information or to have a follow-up visit with you prior to refilling your medication.   *Please Note: Medication refills are assigned a new Rx number when refilled electronically. Your pharmacy may indicate that no refills were authorized even though a new prescription for the same medication is available at the pharmacy. Please request the medicine by name with the pharmacy before contacting your provider for a refill.           [employer] Status: Patient Declined

## 2017-03-13 NOTE — PROGRESS NOTES
CC: Hospital Follow-up        HPI:     Dale presents today for the followin. Steroid-induced diabetes mellitus (CMS-HCC)  Started on insulin in skilled nursing. Per the daughters are using 35 units of NPH 3 times a day before his meals. They stay for the most part his blood sugars are around 200s to 250 with an occasional 300. On one occasion his blood sugar was 500. He was using Humalog in the hospital however this was not sent home with them.  He has not had any episodes of hypoglycemia. The lowest O2 sat in the morning was 75 and he felt normal.      2. Cerebral edema (CMS-HCC)Anaplastic astrocytoma of temporal lobe (CMS-HCC)  Status post hospital discharge. He has since had a partial resection at [facility]. Per daughters he is on an oral chemotherapy agent. He is set to start radiation in a few weeks. He is to oncologist with before meals regularly. He does have some speech issues from a resection. He also has some issues with memory and communication.    3. New onset seizure (CMS-[facility])  Currently on 1500 milligrams of Keppra twice a day. Take the liquid dose. He has not had a seizure since he left skilled nursing. They need refills of this.    4. Acute deep vein thrombosis (DVT) of other specified vein of both lower extremities (CMS-[facility])  Suffered bilateral lower leg clots. Unknown however likely related to recent surgery with prolonged recovery.  Started on Xarelto 50 mg twice a day. Needs refills. Does have coexisting bilateral lower leg swelling    6. Obesity (BMI 35.0-39.9 without comorbidity) ([facility])  Above ideal weight for height. He has had weight gain, likely related to chronic steroid use    7. Primary osteoarthritis of right knee/Chronic pain of right knee  Complaints of chronic right-sided knee pain with an x-ray showing tricompartmental degenerative changes. No acute injury or trauma. The requesting referral to orthopedics to get an injection would help him with his knee pain. He currently has a  walker but refuses to use it. No reports of falls. He is staying with one of his daughters    Current Outpatient Prescriptions   Medication Sig Dispense Refill   • famotidine (PEPCID) 20 MG Tab Take 20 mg by mouth.     • lidocaine (LIDODERM) 5 % Patch Apply 1 Patch to skin as directed every day. 10 Patch 11   • metoprolol (LOPRESSOR) 25 MG Tab Take 0.75 Tabs by mouth 2 Times a Day. 60 Tab 11   • rivaroxaban (XARELTO) 20 MG Tab tablet Take 1 Tab by mouth with dinner. 30 Tab 5   • insulin regular (HUMULIN R) 100 Unit/mL Solution Inject -249: 3 units 250-299: 5 units 300-349: 7 units Over 350: 8 units 10 mL 11   • Insulin Syringe/Needle U-500 (BD INSULIN SYRINGE U-500) 31G X 6MM 0.5 ML Misc 1 Units by Does not apply route as needed. 100 Each 11   • levetiracetam (KEPPRA) 100 MG/ML Solution Take 15 mL by mouth every 12 hours. 240 mL 11   • insulin NPH (HUMULIN,NOVOLIN) 100 UNIT/ML Suspension Inject 35 Units as instructed every morning. 10 mL 11   • insulin NPH (HUMULIN,NOVOLIN) 100 UNIT/ML Suspension Inject 35 Units as instructed 1/2 hour before dinner. 10 mL 0   • dexamethasone (DECADRON) 4 MG Tab Take 4 mg q 6 hours. Patient has oncologist appointment on (Patient taking differently: 4 mg 4 times a day. Take 4 mg q 6 hours. Patient has oncologist appointment on) 30 Tab 0     No current facility-administered medications for this visit.     Social History   Substance Use Topics   • Smoking status: Former Smoker -- 1.00 packs/day for 30 years     Types: Cigarettes   • Smokeless tobacco: Never Used   • Alcohol Use: No     I reviewed patients allergies, problem list and medications today in [facility].    ROS: Any/all pertinent positives listed in the HPI, otherwise all others reviewed are negative today.      /80 mmHg  Pulse 70  Temp(Src) 37.2 °C (99 °F)  Resp 16  Wt 94.802 kg (209 lb)  SpO2 97%    Exam:    Gen: Alert and oriented, No apparent distress. WDWN  Psych: A+Ox3, normal affect and mood  Skin: Warm, dry  and intact. Good turgor   No rashes in visible areas.  Eye: Conjunctiva clear, lids normal  ENMT: Lips without lesions, good dentition  Lungs: Clear to auscultation bilaterally, no rales or rhonchi   Unlabored respiratory effort.   CV: Regular rate and rhythm, S1, S2. No murmurs.   No Edema  Ext: No clubbing, cyanosis,   2+ edema.   Point-of-care A1c 8.3    Assessment and Plan.   56 y.o. male with the following issues.    1. Steroid-induced diabetes mellitus (CMS-HCC)  Stable. Will reduce NPH to 2 times a day 45 units the morning, 40 in the evening. Discussed we may need to increase this to cover for the amount of NPH they have been giving over 24-hour period, can increase slowly by 2 units if needed. Discussed at this point would just like to keep his blood sugar between 150-200 if able Discussed to only use NPH twice a day, will give a sliding scale for Humalog as well.  - POCT  A1C  - insulin regular (HUMULIN R) 100 Unit/mL Solution; Inject -249: 3 units 250-299: 5 units 300-349: 7 units Over 350: 8 units  Dispense: 10 mL; Refill: 11  - Insulin Syringe/Needle U-500 (BD INSULIN SYRINGE U-500) 31G X 6MM 0.5 ML Misc; 1 Units by Does not apply route as needed.  Dispense: 100 Each; Refill: 11  - REFERRAL TO DIABETIC EDUCATION Diabetes Self Management Education / Training (DSME/T) and Medical Nutrition Therapy (MNT): Initial Group DSME/MNT as authorized by payor, Follow-Up DSME/MNT as authorized by payor; DSME/T Content: Monitoring Diabetes,     2. Cerebral edema (CMS-HCC)/ New onset seizure (CMS-HCC)/Anaplastic astrocytoma of temporal lobe (CMS-HCC)  Stable. Currently oncology is managing his Decadron dosing. They're trying to taper down. Will defer this to their office. Planning on starting radiation and chemotherapy.  We're planning on starting outside physical therapy but are trying to work on RTC access-aortic adenopathy in place    5. Acute deep vein thrombosis (DVT) of other specified vein of both lower  extremities (CMS-HCC)  Stable. Change to the 20 mg daily dose. Will follow with oncology in terms of length of treatment however at this time we will maintain for 6 months. Did review signs or symptoms of abnormal bleeding with his daughters  - rivaroxaban (XARELTO) 20 MG Tab tablet; Take 1 Tab by mouth with dinner.  Dispense: 30 Tab; Refill: 5    6. Obesity (BMI 35.0-39.9 without comorbidity) (HCC)  Stable. Weaning off steroids is becoming slightly more active day by day    7. Primary osteoarthritis of right knee/ Chronic pain of right knee  Stable. Overflow placed orthopedics for an injection  - REFERRAL TO ORTHOPEDICS

## 2017-03-13 NOTE — MR AVS SNAPSHOT
Dale Calzada   3/13/2017 11:40 AM   Office Visit   MRN: [id number]    Department:  [facility]   Dept Phone:  [phone]    Description:  Male : 1960   Provider:  JAGDEEP Kinsey           Reason for Visit     Hospital Follow-up Seizures      Allergies as of 3/13/2017     No Known Allergies      You were diagnosed with     Steroid-induced diabetes mellitus (CMS-HCC)   [409697]       Cerebral edema (CMS-HCC)   [348.5.ICD-9-CM]       New onset seizure (CMS-HCC)   [231948]       Anaplastic astrocytoma of temporal lobe (CMS-HCC)   [842468]       Acute deep vein thrombosis (DVT) of other specified vein of both lower extremities (CMS-HCC)   [9126265]       Obesity (BMI 35.0-39.9 without comorbidity) (HCC)   [819732]       Primary osteoarthritis of right knee   [251209]       Chronic pain of right knee   [2850850]         Vital Signs     Blood Pressure Pulse Temperature Respirations Weight Oxygen Saturation    110/80 mmHg 70 37.2 °C (99 °F) 16 94.802 kg (209 lb) 97%    Smoking Status                   Former Smoker           Basic Information     Date Of Birth Sex Race Ethnicity Preferred Language    1960 Male White  Origin (Fijian,Portuguese,Panamanian,Solomon Islander, etc) English      Your appointments     Mar 13, 2017  1:40 PM   ONCOLOGY EST PATIENT 30 MIN with Olivier Osman M.D.   [facility] (--)    [address], [facility] [address]  [facility] [id number] [phone]            Mar 13, 2017  2:15 PM   Non Provider 1 with ONC RN 1   [facility] (--)    [address], [address] [facility] [address]  [facility] [id number] [phone]           You will be receiving a confirmation call a few days before your appointment from our automated call confirmation system.              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 High  2/4/2017 - Present    Malignant neoplasm of brain (CMS-HCC) C71.9   1/31/2017 - Present    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, T38.0X5A   3/13/2017 - Present    Acute deep vein thrombosis (DVT) of both lower extremities (CMS-HCC) I82.403   3/13/2017 - Present      Health Maintenance        Date Due Completion Dates    IMM HEP B VACCINE (1 of 3 - Primary Series) 1960 ---    A1C SCREENING 5/8/1959 ---    DIABETES MONOFILAMENT / LE EXAM 5/8/1959 ---    RETINAL SCREENING 11/8/1976 ---    URINE ACR / MICROALBUMIN 11/8/1976 ---    IMM DTaP/Tdap/Td Vaccine (1 - Tdap) 11/8/1977 ---    IMM INFLUENZA (1) 9/1/2016 ---    FASTING LIPID PROFILE 2/16/2018 2/16/2017, 2/7/2017    SERUM CREATININE 2/28/2018 2/28/2017, 2/27/2017, 2/26/2017, 2/23/2017, 2/17/2017, 2/16/2017, 2/11/2017, 2/10/2017, 2/9/2017, 2/8/2017, 2/7/2017, 2/6/2017, 2/5/2017            Results     POCT  A1C      Component    Glycohemoglobin    8.3    Internal Control Negative    Negative    Internal Control Positive    Positive                        Current Immunizations     No immunizations on file.      Below and/or attached are the medications your provider expects you to take. Review all of your home medications and newly ordered medications with your provider and/or pharmacist. Follow medication instructions as directed by your provider and/or pharmacist. Please keep your medication list with you and share with your provider. Update the information when medications are discontinued, doses are changed, or new medications (including over-the-counter products) are added; and carry medication information at all times in the event of emergency situations     Allergies:  No Known Allergies          Medications  Valid as of: March 13, 2017 -  1:12 PM    Generic Name Brand  Name Tablet Size Instructions for use    Dexamethasone (Tab) DECADRON 4 MG Take 4 mg q 6 hours. Patient has oncologist appointment on        Famotidine (Tab) PEPCID 20 MG Take 20 mg by mouth.        Insulin NPH Human (Isophane) (Suspension) HUMULIN,NOVOLIN 100 UNIT/ML Inject 35 Units as instructed 1/2 hour before dinner.        Insulin NPH Human (Isophane) (Suspension) HUMULIN,NOVOLIN 100 UNIT/ML Inject 35 Units as instructed every morning.        Insulin Regular Human (Solution) HUMULIN R 100 Unit/mL Inject -249: 3 units 250-299: 5 units 300-349: 7 units Over 350: 8 units        Insulin Syringe/Needle U-500 (Misc) Insulin Syringe/Needle U-500 31G X 6MM 0.5 ML 1 Units by Does not apply route as needed.        LevETIRAcetam (Solution) KEPPRA 100 MG/ML Take 15 mL by mouth every 12 hours.        Lidocaine (Patch) LIDODERM 5 % Apply 1 Patch to skin as directed every day.        Metoprolol Tartrate (Tab) LOPRESSOR 25 MG Take 0.75 Tabs by mouth 2 Times a Day.        Rivaroxaban (Tab) XARELTO 20 MG Take 1 Tab by mouth with dinner.        .                 Medicines prescribed today were sent to:     [facility]/PHARMACY #[id number] - [place], [place] - [address] [address]    Phone: [phone] Fax: [phone]    Open 24 Hours?: No      Medication refill instructions:       If your prescription bottle indicates you have medication refills left, it is not necessary to call your provider’s office. Please contact your pharmacy and they will refill your medication.    If your prescription bottle indicates you do not have any refills left, you may request refills at any time through one of the following ways: The online [employer] system (except Urgent Care), by calling your provider’s office, or by asking your pharmacy to contact your provider’s office with a refill request. Medication refills are processed only during regular business hours and may not be available until the next business day. Your  provider may request additional information or to have a follow-up visit with you prior to refilling your medication.   *Please Note: Medication refills are assigned a new Rx number when refilled electronically. Your pharmacy may indicate that no refills were authorized even though a new prescription for the same medication is available at the pharmacy. Please request the medicine by name with the pharmacy before contacting your provider for a refill.        Referral     A referral request has been sent to our patient care coordination department. Please allow 3-5 business days for us to process this request and contact you either by phone or mail. If you do not hear from us by the 5th business day, please call us at [phone].           MyChart Status: Patient Declined

## 2017-03-14 NOTE — PROGRESS NOTES
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  1p, 19q non deleted, IDH-1 wild type ATRX wild type, p53- no overexpression Ki-67 5%.    History of presenting illness:      Dale Dorado   is a 56 y.o. year old  male who was otherwise in good health who noticed a left facial deviation in mid December 2016 and  some component of aphasia. An MRI done at [facility] showed abnormal increased signal throughout the left temporal and posterior inferior frontal lobes, possibly representing a low-grade tumor. He was seen by Dr. Presley who recommended awake cranitomy with motor/language mapping to facilitate safe resection at [facility]. He was admitted few days after that with a seizure causing syncope and was started on Keppra.    MRI done at [facility] showed extensive infiltrating predominantly nonenhancing left temperofrontal tumor including involvement of the left insula , frontal lobe and a large portion of the anterior and mid temporal lobe . According to the operative note from 1/31/2017, language mapping was done. He had tumor removal approximately 2 cm anterior to the temporal eloquent speech area extending up to the anterior temporal tip. His speech was not comprehensible at that point  and it was decided not to resect tumor posterior or inferior to the critical language area. According to the note, he has high risk of having residual speech problems. He recovered well from the surgery and is currently at a rehabilitation facility.  He continues to have speech problems and word finding difficulty. A CT of the head done on 2/6/2017 here showed significant left shift and postoperative changes.    He was subsequently diagnosed with bilateral DVT and he is on Xarelto. He still has some expressive aphasia and memory issues. His steroid has been dose reduced to 4 mg 3 times a day. He has had  significant weight gain. He is supposed to start chemoradiation in 2 weeks.    MRI of the brain from 3/10/2017-  .  Interval left temporal craniotomy with resection of the underlying temporal lobe neoplasm. New left frontoparietal subdural fluid collection which measures 9.5 mm in greatest thickness likely representing subacute to chronic blood products.  2.  Thin irregular peripheral rim of enhancement about the resection cavity which extends posteriorly into the sylvian fissure and parasylvian region. Finding could be postsurgical or secondary to residual neoplasm.  3.  New serpiginous irregular enhancement tracking posteriorly from the resection cavity into the posterior temporal and parieto-occipital regions. Differential considerations would include a subacute infarct versus neoplasm.      Past Medical History:      Past Medical History   Diagnosis Date   • Hyperlipidemia 2010     no meds   • New onset seizure (CMS-HCC) 1/5/2017   • Brain cancer (CMS-HCC)      Glioma   • Bell palsy 12/2016   • Cancer (CMS-HCC) 1/31/2017     Glioma   • Diabetes (CMS-HCC)      R/t steroid use per dtr   • Bell's palsy 1/2017       Past surgical history:       Past Surgical History   Procedure Laterality Date   • Craniotomy tumor  1/31/2017     Lt frontotemporal       Allergies:       Review of patient's allergies indicates no known allergies.    Medications:         Current Outpatient Prescriptions   Medication Sig Dispense Refill   • famotidine (PEPCID) 20 MG Tab Take 20 mg by mouth.     • lidocaine (LIDODERM) 5 % Patch Apply 1 Patch to skin as directed every day. 10 Patch 11   • metoprolol (LOPRESSOR) 25 MG Tab Take 0.75 Tabs by mouth 2 Times a Day. 60 Tab 11   • rivaroxaban (XARELTO) 20 MG Tab tablet Take 1 Tab by mouth with dinner. 30 Tab 5   • insulin regular (HUMULIN R) 100 Unit/mL Solution Inject -249: 3 units 250-299: 5 units 300-349: 7 units Over 350: 8 units 10 mL 11   • Insulin Syringe/Needle U-500 (BD INSULIN  SYRINGE U-500) 31G X 6MM 0.5 ML Misc 1 Units by Does not apply route as needed. 100 Each 11   • sulfamethoxazole-trimethoprim (BACTRIM DS) 800-160 MG tablet Take 1 Tab by mouth every Monday, Wednesday, and Friday. 18 Tab 0   • levetiracetam (KEPPRA) 100 MG/ML Solution Take 15 mL by mouth every 12 hours. 240 mL 11   • insulin NPH (HUMULIN,NOVOLIN) 100 UNIT/ML Suspension Inject 35 Units as instructed every morning. 10 mL 11   • insulin NPH (HUMULIN,NOVOLIN) 100 UNIT/ML Suspension Inject 35 Units as instructed 1/2 hour before dinner. 10 mL 0   • dexamethasone (DECADRON) 4 MG Tab Take 4 mg q 6 hours. Patient has oncologist appointment on (Patient taking differently: 4 mg 4 times a day. Take 4 mg q 6 hours. Patient has oncologist appointment on) 30 Tab 0     No current facility-administered medications for this visit.         Social History:     Social History     Social History   • Marital Status: Single     Spouse Name: N/A   • Number of Children: N/A   • Years of Education: N/A     Occupational History   • Not on file.     Social History Main Topics   • Smoking status: Former Smoker -- 1.00 packs/day for 30 years     Types: Cigarettes   • Smokeless tobacco: Never Used   • Alcohol Use: No   • Drug Use: No   • Sexual Activity: No      Comment:      Other Topics Concern   • Not on file     Social History Narrative       Family History:      Family History   Problem Relation Age of Onset   • Diabetes Neg Hx    • Seizures Daughter        Review of Systems:  All other review of systems are negative except what was mentioned above in the HPI.    Difficult due to expressive aphasia. He complains of some pain involving his thigh area. This is more when he walks. His family reports him having memory issues. No fevers, chills, or other cardiovascular symptoms. He has tendency to bruise    Physical Exam:  Vitals: There were no vitals taken for this visit.    General: Not in acute distress, alert    HEENT: No pallor,  icterus. Oropharynx clear. Mild left facial deviation. Left temporal scar with some bruising Neck: Supple, no palpable masses.  Lymph nodes: No palpable cervical, supraclavicular, axillary or inguinal lymphadenopathy.     CVS: regular rate and rhythm, no rubs or gallops  RESP: Clear to auscultate bilaterally, no wheezing or crackles.    ABD: Soft, non tender, non distended, positive bowel sounds, no palpable organomegaly  EXT: No edema or cyanosis. No significant calf tenderness or erythema  CNS: , No focal motor  deficits. As above  Skin- No rash      Labs:   Office Visit on 03/13/2017   Component Date Value Ref Range Status   • Glycohemoglobin 03/13/2017 8.3   Final   • Internal Control Negative 03/13/2017 Negative   Final   • Internal Control Positive 03/13/2017 Positive   Final     Assessment and Plan:    1p, 19q non deleted, IDH-1 wild type anaplastic astrocytoma-grade 3 , status post partial resection. Recent MRI shows postoperative changes. He also has a possible postoperative subdural hematoma. I discussed this with Dr. Barrios. A short-term follow-up MRI in 6-8 weeks to keep an eye on it  will be ordered. He still has some midline shift. However, this appears to have improved compared to the recent CT scan. Symptom wise, he is neurologically stable. He knows to start Temodar next week. Chemotherapy education was given to the family. 1p, 19q non deleted, IDH-1 wild type indicates unfavorable prognosis compared to co-deleted, IDH . We will follow-up on the MGMT promoter methylation results from [place].     Thigh weakness and pain- repeat ultrasound shows onlyFocal occlusive clot in the LEFT gastrocnemius vein compared to the bilateral DVT. Unclear whether his thigh weakness is related to his CNS symptoms/steroid use. Will monitor for now. I will leave it up to radiation oncology to do the steroid tapering appropriately.  I will see him in 2 weeks    He agreed and verbalized his agreement and understanding  with the current plan.  I answered all questions and concerns he has at this time         Please note that this dictation was created using voice recognition software. I have made every reasonable attempt to correct obvious errors, but I expect that there are errors of grammar and possibly content that I did not discover before finalizing the note.      SIGNATURES:  Olivier Osamn    CC:  JAGDEEP Kinsey

## 2017-03-16 NOTE — DISCHARGE SUMMARY
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 S/0 sterotactic left frontotemporal craniotomy with microscopic computer assisted partial resection of the left temporal low grade glioma using intraoperative functional cortical mapping with awake patient.     new acute infarct posterior to the resection of the cavity in the left temporal occipital lobe. 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 S/0 sterotactic left frontotemporal craniotomy with microscopic computer assisted partial resection of the left temporal low grade glioma using intraoperative functional cortical mapping with awake patient.     new acute infarct posterior to the resection of the cavity in the left temporal occipital lobe. And new small focus of GRE hypointensity in left frontoparietal regional likely represents small hemorrhage or blood products from surgery  Aphasia  Decadron taper  Increase bun  Seizure: send to [facility] for further evaluation and treatment    Hospital  Course  The patient is a 56 year old male who presented for medical care with  Left Bell's Palsy in mid 2016. Evaluation include an MR head scan revealed a left temporofrontal primarily nonenhancing tumor. [facility] Decadron. He was hospitalized at [facility] in [place]. He was referred to [place] and the MRI of the head from 17 revealed extensive infiltrating predominantly nonenhancing left temporofrontal tumor including involvemtn of the left insula and deep frontal lobe as well as a large portion of the antierior and mid temporal lobe with minimal punctate enhancement in the left temoporal region. The patient underwent a sterotactic left frontotemporal craniotomy with microscopic computer assisted partial resection of the left temporal low grade glioma using intraoperative functional cortical mapping with awake patient. The patient had difficulty with speech. The discharge summary stated that the patient had a new acute infarct posterior to the resection of the cavity in the left temporal occipital lobe. In addition, new small focus of GRE hypointensity in left frontoparietal regional likely represents small hemorrhage or blood products from surgery  Patient was evaluated by Rehab Medicine physician and therapists and determined to be appropriate for acute inpatient rehab and was transferred to [facility] on 2017  The patient had a seizure and the hospitalists were consulted. The patient then on 3-6-2017 a rapid response was called and  Dr Quevedo also responded to rapid response. Patient had seizure and was alert. Still aphasic. Send patient to ER for CT scan stat(CT was scheduled for 5 pm today) not appropriate to wait that long for scan    Functional Status at Discharge  Eatin - Standby Prompting/Supervision or Set-up  Eating Description:  Increased time, Modified diet, Supervision for safety, Verbal cueing, Checking for  "pocketing of food  Groomin - Standby Prompting/Supervision or Set-up  Grooming Description:  Adaptive equipment (Seated/ SPV to wash hands)  Bathin - Standby Prompting/Supervision or Set-up  Bathing Description:  Tub bench, Grab bar, Hand held shower, Supervision for safety, Set-up of equipment  Upper Body Dressin - Standby Prompting/Supervision or Set-up  Upper Body Dressing Description:  Supervision for safety, Set-up of equipment  Lower Body Dressin - Minimal Assistance  Lower Body Dressing Description:  4 - Minimal Assistance     Walk:  4 - Minimal Assistance  Distance Walked:  Walks a minimum of 150 feet  Walk Description:  Extra time, Safety concerns, Verbal cueing (CGA indoors 500 ft with no AD, with gait belt)  Wheelchair:  5 - Standby Prompting/Supervision or Set-up  Distance Propelled:  Propels a minimum of 150 feet   Wheelchair Description:  Adaptive equipment, Supervision for safety (SPV >300 ft indoors )  Stairs 4 - Minimal Assistance  Stairs DescriptionAssist device/equipment, Hand rails, Extra time, Safety concerns, Verbal cueing, Ascends/descends 12 to 14 steps (CGA up/down 12 6\" stairs with shivani rails)     Comprehension Mode:  Both  Comprehension:  2 - Max Assistance  Comprehension Description:  Visual devices, Verbal cues  Expression Mode:  Both  Expression:  2 - Max Assistance  Expression Description:  Verbal cueing  Social Interaction:  2 - Max Assistance  Social Interaction Description:  Increased time, Verbal cues  Problem Solvin - Total Assistance  Problem Solving Description:  Verbal cueing, Increased time, Bed/chair alarm  Memory:  1 - Total Assistance  Memory Description:  Verbal cueing, Supervision, Bed/chair alarm       Discharge Medication:  See mar and progress note from 3-6-2017      Condition on Discharge:  Unstable, had another seizure and sent emergently to [facility] for further evaluation and treatment.  Patient  agreeable with plan of care. Total time " spent was 40 minutes and greater than 50 % of the time was regarding patient care and coordination on this date, including floor time and time communicating with patient as stated above in assessment and plan.

## 2017-03-17 NOTE — TELEPHONE ENCOUNTER
Sanjay called was informed of the message below, Sanjay reschedule for Monday with Sheree and if worsening of her father's symptoms she will take him to UC for the legs and cough sanjay will tell her sister the plan. Sanjay verbalized understanding.

## 2017-03-17 NOTE — TELEPHONE ENCOUNTER
1. Caller Name: Sanjay DAUGHTER                       Call Back Number: [phone]    2. Message: sanjay called stating Victors knees are very swollen want to give him potasium because she read is good to help release water retention wants to know how much can he take too.     3. Patient approves office to leave a detailed voicemail/MyChart message: yes

## 2017-03-17 NOTE — TELEPHONE ENCOUNTER
No potassium  Only his knees-or worse in his ankles and lower legs too?  Is he using the compression stockings we had talked about for his chronic lower leg swelling    Probably best that he is seen to see if this is arthritis or true water retention and what is the best therapy.  AUBREY or an appt with Che[facility]ne

## 2017-03-20 NOTE — Clinical Note
March 20, 2017    To Whom It May Concern:         This is confirmation that Dale Calzada attended his scheduled appointment with JAGDEEP Calero on 3/20/17. Patient needs to be off from work in the next 3-5 days. As for his disability, patient will see PCP to get proper documentation.          If you have any questions please do not hesitate to call me at the phone number listed below.    Sincerely,          THIAGO CaleroRJESUS.  [phone]

## 2017-03-20 NOTE — PROGRESS NOTES
Chief Complaint   Patient presents with   • URI     productive cough x 1 weeks/    • Leg Swelling     over a month        HISTORY OF PRESENT ILLNESS: Patient is a 56 y.o. male established patient who presents today due to sob and leg swelling. Pt is here with her two daughters who help on translation. 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 Pt appears weak but in no acute distress. Pt's daughter reports that pt can still sleep flat, no orthopnea but he appears to be sob on exertion. He also has intermittent coughing but no fever, chills, no sore throat, no wheezing. However, he does feel a little bit chest congestion. Pt's regular BW was about ~190 and according to medical record, he is noted to increase BW (2/26 198 --> 3/6 206 --> 3/13 209 --> today 215 lbs). Daughter also reports that he starts feeling poor appetite and almost eat nothing today.     Pt's daughter also reports that pt needs letter/documentation to support disability for work. They don't have form with them.       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    Current Outpatient Prescriptions   Medication Sig Dispense Refill   • temozolomide (TEMODAR) 100 MG Cap Take 100 mg by mouth every day. Indications: 140 mg per record     • famotidine (PEPCID) 20 MG Tab Take 20 mg by mouth.     •  lidocaine (LIDODERM) 5 % Patch Apply 1 Patch to skin as directed every day. 10 Patch 11   • metoprolol (LOPRESSOR) 25 MG Tab Take 0.75 Tabs by mouth 2 Times a Day. 60 Tab 11   • rivaroxaban (XARELTO) 20 MG Tab tablet Take 1 Tab by mouth with dinner. 30 Tab 5   • insulin regular (HUMULIN R) 100 Unit/mL Solution Inject -249: 3 units 250-299: 5 units 300-349: 7 units Over 350: 8 units 10 mL 11   • Insulin Syringe/Needle U-500 (BD INSULIN SYRINGE U-500) 31G X 6MM 0.5 ML Misc 1 Units by Does not apply route as needed. 100 Each 11   • levetiracetam (KEPPRA) 100 MG/ML Solution Take 15 mL by mouth every 12 hours. 240 mL 11   • insulin NPH (HUMULIN,NOVOLIN) 100 UNIT/ML Suspension Inject 35 Units as instructed 1/2 hour before dinner. 10 mL 0   • dexamethasone (DECADRON) 4 MG Tab Take 4 mg q 6 hours. Patient has oncologist appointment on (Patient taking differently: 4 mg 4 times a day. Take 4 mg q 6 hours. Patient has oncologist appointment on) 30 Tab 0   • insulin regular (HUMULIN R) 100 Unit/mL Solution Inject  as instructed 3 times a day before meals.     • sulfamethoxazole-trimethoprim (BACTRIM DS) 800-160 MG tablet Take 1 Tab by mouth every Monday, Wednesday, and Friday. 18 Tab 0   • insulin NPH (HUMULIN,NOVOLIN) 100 UNIT/ML Suspension Inject 35 Units as instructed every morning. 10 mL 11     No current facility-administered medications for this visit.       Social History   Substance Use Topics   • Smoking status: Former Smoker -- 1.00 packs/day for 30 years     Types: Cigarettes   • Smokeless tobacco: Never Used   • Alcohol Use: No       Family Status   Relation Status Death Age   • Mother     • Father     • Sister Alive    • Brother Alive    • Maternal Grandmother     • Maternal Grandfather     • Paternal Grandmother     • Paternal Grandfather     • Daughter Alive      Family History   Problem Relation Age of Onset   • Diabetes Neg Hx    • Seizures Daughter   "        ROS:  Review of Systems   Constitutional: Negative for fever, chills and weight loss ( weight gain today is 215, 3/13 ).   HENT: Positive for congestion. Negative for sore throat.    Respiratory: Positive for cough, sputum production (A LITTLE BIT) and shortness of breath. Negative for wheezing.    Cardiovascular: Positive for leg swelling. Negative for chest pain, palpitations and orthopnea.   Gastrointestinal: Negative for nausea, vomiting, abdominal pain and diarrhea.   Neurological: Positive for headaches.        Objective:     Blood pressure 136/64, pulse 108, temperature 35.8 °C (96.4 °F), resp. rate 20, height 1.58 m (5' 2.19\"), weight 97.523 kg (215 lb), SpO2 92 %.     Physical Exam:  Physical Exam   Constitutional: He is oriented to person, place, and time and well-developed, well-nourished, and in no distress.   HENT:   Head: Normocephalic.   Right Ear: External ear normal.   Left Ear: External ear normal.   Nose: Nose normal.   Mouth/Throat: Oropharynx is clear and moist. No oropharyngeal exudate.   Eyes: Conjunctivae are normal.   Neck: Neck supple. No JVD present.   Cardiovascular: Normal rate.    No murmur heard.  Pulmonary/Chest: No respiratory distress. He has no wheezes.   Diminished lower field > upper field   Abdominal: Soft. He exhibits distension ( a little bit distended but still consider soft). There is no tenderness. There is no rebound.   Musculoskeletal: He exhibits edema.   Limited rom due to leg swelling. Pitting edema1+   Neurological: He is alert and oriented to person, place, and time.   Skin: Skin is warm. No erythema.   Vitals reviewed.        Assessment/Plan:  DDx: acute heart failure vs. atelectasis vs. Pneumonia vs. Pulmonary edema    Pt's daughter reports that pt has cardiac echo done back to December 2016 which is negative at [facility]. We will obtain record from [facility] but will also check bnp to see if repeated echo is needed. Will have pt start on lasix 80 mg x " 1 today then 20 mg for 5 days or until test result back indicating otherwise. Lab and test ordered as following. Letter for work to be off next week is given to pt. Daughter is instructed to fax the proper form to be filled by PCP, either FMLA from company or long term disability from social security. Pt's daughter will contact company to figure out today. Instruct pt's daughter to bring pt to ER if worsening condition. They verbalized understanding. Pt does not appear in acute distress at this time in the clinic.     1. Edema, unspecified type  - CBC WITH DIFFERENTIAL; Future  - COMP METABOLIC PANEL; Future  - B TYPE NATRIURETIC  - Obtain Results:: Other (see comment); Obtain Results From:: [facility] - DX-CHEST-2 VIEWS; Future  - furosemide (LASIX) 80 MG Tab; Take 1 Tab by mouth Once for 1 dose.  Dispense: 1 Tab; Refill: 0  - furosemide (LASIX) 20 MG Tab; Take 1 Tab by mouth 2 times a day for 5 days.  Dispense: 10 Tab; Refill: 0  - potassium chloride SA (KDUR) 20 MEQ Tab CR; Take 1 Tab by mouth every day for 5 days.  Dispense: 5 Tab; Refill: 0  - potassium chloride SA (KDUR) 20 MEQ Tab CR; Take 1 Tab by mouth 2 times a day for 1 day.  Dispense: 2 Tab; Refill: 0    2. SOB (shortness of breath)  - CBC WITH DIFFERENTIAL; Future  - COMP METABOLIC PANEL; Future  - B TYPE NATRIURETIC  - Obtain Results:: Other (see comment); Obtain Results From:: [facility] - DX-CHEST-2 VIEWS; Future  - furosemide (LASIX) 80 MG Tab; Take 1 Tab by mouth Once for 1 dose.  Dispense: 1 Tab; Refill: 0  - furosemide (LASIX) 20 MG Tab; Take 1 Tab by mouth 2 times a day for 5 days.  Dispense: 10 Tab; Refill: 0  - potassium chloride SA (KDUR) 20 MEQ Tab CR; Take 1 Tab by mouth every day for 5 days.  Dispense: 5 Tab; Refill: 0  - potassium chloride SA (KDUR) 20 MEQ Tab CR; Take 1 Tab by mouth 2 times a day for 1 day.  Dispense: 2 Tab; Refill: 0

## 2017-03-20 NOTE — MR AVS SNAPSHOT
"        Dale Calzada   3/20/2017 11:40 AM   Office Visit   MRN: [id number]    Department:  [facility]   Dept Phone:  [phone]    Description:  Male : 1960   Provider:  JAGDEEP Calero           Reason for Visit     URI productive cough x 1 weeks/     Leg Swelling over a month       Allergies as of 3/20/2017     No Known Allergies      You were diagnosed with     Edema, unspecified type   [7053425]       SOB (shortness of breath)   [285035]         Vital Signs     Blood Pressure Pulse Temperature Respirations Height Weight    136/64 mmHg 108 35.8 °C (96.4 °F) 20 1.58 m (5' 2.19\") 97.523 kg (215 lb)    Body Mass Index Oxygen Saturation Smoking Status             39.07 kg/m2 92% Former Smoker         Basic Information     Date Of Birth Sex Race Ethnicity Preferred Language    1960 Male White  Origin ([place],Mal[place]ian,[place] Islander,Junior, etc) English      Your appointments     Mar 27, 2017  2:20 PM   ONCOLOGY EST PATIENT 30 MIN with Olivier Osman M.D.   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, T38.0X5A   3/13/2017 - Present    Acute deep vein thrombosis (DVT) of both lower extremities (CMS-HCC) I82.403   3/13/2017 - Present      Health Maintenance        Date Due Completion Dates    IMM HEP B VACCINE (1 of 3 - Primary Series) 1960 ---    DIABETES MONOFILAMENT / LE EXAM 1959 ---   " RETINAL SCREENING 11/8/1976 ---    URINE ACR / MICROALBUMIN 11/8/1976 ---    IMM DTaP/Tdap/Td Vaccine (1 - Tdap) 11/8/1977 ---    IMM INFLUENZA (1) 9/1/2016 ---    A1C SCREENING 9/13/2017 3/13/2017    FASTING LIPID PROFILE 2/16/2018 2/16/2017, 2/7/2017    SERUM CREATININE 2/28/2018 2/28/2017, 2/27/2017, 2/26/2017, 2/23/2017, 2/17/2017, 2/16/2017, 2/11/2017, 2/10/2017, 2/9/2017, 2/8/2017, 2/7/2017, 2/6/2017, 2/5/2017            Current Immunizations     No immunizations on file.      Below and/or attached are the medications your provider expects you to take. Review all of your home medications and newly ordered medications with your provider and/or pharmacist. Follow medication instructions as directed by your provider and/or pharmacist. Please keep your medication list with you and share with your provider. Update the information when medications are discontinued, doses are changed, or new medications (including over-the-counter products) are added; and carry medication information at all times in the event of emergency situations     Allergies:  No Known Allergies          Medications  Valid as of: March 20, 2017 -  1:37 PM    Generic Name Brand Name Tablet Size Instructions for use    Dexamethasone (Tab) DECADRON 4 MG Take 4 mg q 6 hours. Patient has oncologist appointment on        Famotidine (Tab) PEPCID 20 MG Take 20 mg by mouth.        Furosemide (Tab) LASIX 80 MG Take 1 Tab by mouth Once for 1 dose.        Furosemide (Tab) LASIX 20 MG Take 1 Tab by mouth 2 times a day for 5 days.        Insulin NPH Human (Isophane) (Suspension) HUMULIN,NOVOLIN 100 UNIT/ML Inject 35 Units as instructed 1/2 hour before dinner.        Insulin NPH Human (Isophane) (Suspension) HUMULIN,NOVOLIN 100 UNIT/ML Inject 35 Units as instructed every morning.        Insulin Regular Human (Solution) HUMULIN R 100 Unit/mL Inject -249: 3 units 250-299: 5 units 300-349: 7 units Over 350: 8 units        Insulin Regular Human (Solution)  HUMULIN R 100 Unit/mL Inject  as instructed 3 times a day before meals.        Insulin Syringe/Needle U-500 (Misc) Insulin Syringe/Needle U-500 31G X 6MM 0.5 ML 1 Units by Does not apply route as needed.        LevETIRAcetam (Solution) KEPPRA 100 MG/ML Take 15 mL by mouth every 12 hours.        Lidocaine (Patch) LIDODERM 5 % Apply 1 Patch to skin as directed every day.        Metoprolol Tartrate (Tab) LOPRESSOR 25 MG Take 0.75 Tabs by mouth 2 Times a Day.        Potassium Chloride Chelo CR (Tab CR) Kdur 20 MEQ Take 1 Tab by mouth every day for 5 days.        Potassium Chloride Chelo CR (Tab CR) Kdur 20 MEQ Take 1 Tab by mouth 2 times a day for 1 day.        Rivaroxaban (Tab) XARELTO 20 MG Take 1 Tab by mouth with dinner.        Sulfamethoxazole-Trimethoprim (Tab) BACTRIM -160 MG Take 1 Tab by mouth every Monday, Wednesday, and Friday.        Temozolomide (Cap) TEMODAR 100 MG Take 100 mg by mouth every day. Indications: 140 mg per record        .                 Medicines prescribed today were sent to:     [id number] - [place], [place] - [address]    [address] [place] [address]    Phone: [phone] Fax: [phone]    Open 24 Hours?: No      Medication refill instructions:       If your prescription bottle indicates you have medication refills left, it is not necessary to call your provider’s office. Please contact your pharmacy and they will refill your medication.    If your prescription bottle indicates you do not have any refills left, you may request refills at any time through one of the following ways: The online [employer] system (except Urgent Care), by calling your provider’s office, or by asking your pharmacy to contact your provider’s office with a refill request. Medication refills are processed only during regular business hours and may not be available until the next business day. Your provider may request additional information or to have a follow-up visit with you prior to  refilling your medication.   *Please Note: Medication refills are assigned a new Rx number when refilled electronically. Your pharmacy may indicate that no refills were authorized even though a new prescription for the same medication is available at the pharmacy. Please request the medicine by name with the pharmacy before contacting your provider for a refill.        Your To Do List     Future Labs/Procedures Complete By Expires    CBC WITH DIFFERENTIAL  As directed 3/20/2018    COMP METABOLIC PANEL  As directed 3/20/2018    DX-CHEST-2 VIEWS  As directed 3/20/2018      Instructions    1. Start taking lasix and potassium as directed (lasix 80 mg once and potassium 20 mg two times per day then lasix 20 mg and potassium 20 mg daily for 5 days)  2. Check body weight every morning and start doing BW log  3. Call us or ER visit if worsening symptoms  4. Will call you for blood work and cxr result  5. We will also obtain echo result from [facility] Status: Patient Declined

## 2017-03-20 NOTE — PATIENT INSTRUCTIONS
1. Start taking lasix and potassium as directed (lasix 80 mg once and potassium 20 mg two times per day then lasix 20 mg and potassium 20 mg daily for 5 days)  2. Check body weight every morning and start doing BW log  3. Call us or ER visit if worsening symptoms  4. Will call you for blood work and cxr result  5. We will also obtain echo result from [facility]

## 2017-03-21 NOTE — TELEPHONE ENCOUNTER
I spoke to patient's daughter regarding patient vomiting.  Patient has had 2 episodes of vomiting since yesterday and is not able to tolerate po intake.  Patient did not have any anti-nausea medications on hand.  I sent in a prescription for Zofran 4 mg every 4 to 6 hours.  I advised patient's daughter to take medication 30 minutes before eating and monitor for constipation.  Patient's daughter denied patient experiencing diarrhea at this time which was reported by NIKI Borjas NP

## 2017-03-22 NOTE — TELEPHONE ENCOUNTER
RD attempted to call patient via telephone to set up nutrition consult.  Patient is currently unavailable.  RD left voicemail with contact information.  Please have patient call dietitian at [phone].    -RD left voicemail on pt's daughter's phone (Priscilla), as pt is primarily Estonian-speaking and requires translation.

## 2017-03-22 NOTE — TELEPHONE ENCOUNTER
I believe the correct call back # for Sia is: [phone], as listed in demo's. However, I've tried calling twice now in 10 minutes and the line is busy.

## 2017-03-22 NOTE — TELEPHONE ENCOUNTER
1. Caller Name: Sia daughter                       Call Back Number: [phone]    2. Message: pt's daughter called stating pt woke up this morning and he lost a tooth also still has a white tongue does she has to worry about it?    3. Patient approves office to leave a detailed voicemail/MyChart message: yes

## 2017-03-22 NOTE — TELEPHONE ENCOUNTER
Attempted to call Sia at number in note, [phone] but it states it is disconnected. Looked at pt's appt's & he has an appt tomorrow with Jannet for 8:20am re: JUAN paperwork. Will search through Media to try and find a better contact for Sia/darien.

## 2017-03-22 NOTE — TELEPHONE ENCOUNTER
----- Message from JAGDEEP Calero sent at 3/21/2017  9:55 AM PDT -----  Result reviewed. Please call patient to let him know about the test results and also follow up his symptoms after lasix.   (1) Patient's blood work does not show the indication for further cardiac echo. We are waiting for previous echo result from [facility].   (2) Pt's cxr showed atelectasis. Patient can do deep breath exercise 5-10 times per hour. If they want I can write prescription for incentive spirometer. Given pt's clinical condition, I will also treat him with antibiotic empirically (given his recent rehab hospitalization, omnicef will sent to pharmacy)  (3) pt's liver enzyme is mildly elevated. If he continues to have poor appetite after treatment for URI symptoms, then will consider to have a abdominal US. Otherwise, he will just need to follow up with PCP to repeat lab to monitor his liver enzyme since he only has less than 2x elevation to the upper limit of ALT and other liver function test are mostly unremarkable.    Thanks.  JACKY Wood), MARCELO

## 2017-03-23 PROBLEM — J18.9 PNEUMONIA: Status: ACTIVE | Noted: 2017-01-01

## 2017-03-23 PROBLEM — J96.01 ACUTE RESPIRATORY FAILURE WITH HYPOXIA (HCC): Status: ACTIVE | Noted: 2017-01-01

## 2017-03-23 NOTE — IP AVS SNAPSHOT
aCommerce Access Code: Activation code not generated  Current aCommerce Status: Patient Declined    Your email address is not on file at [facility].  Email Addresses are required for you to sign up for aCommerce, please contact [phone] to verify your personal information and to provide your email address prior to attempting to register for aCommerce.    Dale Calzada  [address] [address]    Passpackt  A secure, online tool to manage your health information     [facility]’s aCommerce® is a secure, online tool that connects you to your personalized health information from the privacy of your home -- day or night - making it very easy for you to manage your healthcare. Once the activation process is completed, you can even access your medical information using the aCommerce radha, which is available for free in the Apple Radha store or Google Play store.     To learn more about aCommerce, visit [email or web address]    There are two levels of access available (as shown below):   My Chart Features  [facility] Primary Care Doctor [facility]  Specialists [facility]  Urgent  Care Non-[facility] Primary Care Doctor   Email your healthcare team securely and privately 24/7 X X X    Manage appointments: schedule your next appointment; view details of past/upcoming appointments X      Request prescription refills. X      View recent personal medical records, including lab and immunizations X X X X   View health record, including health history, allergies, medications X X X X   Read reports about your outpatient visits, procedures, consult and ER notes X X X X   See your discharge summary, which is a recap of your hospital and/or ER visit that includes your diagnosis, lab results, and care plan X X  X     How to register for Passpackt:  Once your e-mail address has been verified, follow the following steps to sign up for Passpackt.     1. Go to  [email or web address]  2. Click on the Sign Up Now box, which takes you  to the New Member Sign Up page. You will need to provide the following information:  a. Enter your Fio Access Code exactly as it appears at the top of this page. (You will not need to use this code after you’ve completed the sign-up process. If you do not sign up before the expiration date, you must request a new code.)   b. Enter your date of birth.   c. Enter your home email address.   d. Click Submit, and follow the next screen’s instructions.  3. Create a Fio ID. This will be your Fio login ID and cannot be changed, so think of one that is secure and easy to remember.  4. Create a Fio password. You can change your password at any time.  5. Enter your Password Reset Question and Answer. This can be used at a later time if you forget your password.   6. Enter your e-mail address. This allows you to receive e-mail notifications when new information is available in Fio.  7. Click Sign Up. You can now view your health information.    For assistance activating your Fio account, call [phone]

## 2017-03-23 NOTE — PROGRESS NOTES
CC: Letter for School/Work        HPI:     Dale presents today for the followin. Cough/Fever, unspecified fever cause  Your daughter Doris to follow-up on cough. Was seen less than a week ago as a same day and placed on Omnicef for cough. X-ray was fairly clear at that time. Associated tachypnea. He was afebrile at that time. Provider at that time and suggested incentive spirometer but for some reason they were maybe were not interested or got lost in translation. Daughter tells me they called Jan yesterday related to some of the shortness breath complaints. Rims is suggested incentive spirometer. They're asking for it today.        3. Steroid-induced diabetes mellitus (CMS-HCC)  Stable with 50 units of NPH in the morning and 45 in the evening.  They need refills of the NPH. No reports of hypoglycemia.    4. Thrush  Complaining of a white color to his tongue for the last several days. They believe it's painful  He is not wanting to eat or drink very much.    5. At risk for fall due to comorbid condition  Bought a wheelchair at [employer] however does not have any foot pedals to support his feet. He does have a risk for falls. He is currently not able to ambulate well related to history of seizures and a new diagnosis of brain cancer with subsequent brain surgery. He needs his equipment to help with mobility related activities of daily living. He is unable to do a cane or crutches related to weakness/poor coordination from the above-stated issues.    Current Outpatient Prescriptions   Medication Sig Dispense Refill   • Misc. Devices Misc 1 Units by Does not apply route every day. 1 Units 0   • insulin NPH (HUMULIN,NOVOLIN) 100 UNIT/ML Suspension inject 50 units SQ in AM and 45 units units SQ in PM 3 Vial 11   • nystatin (MYCOSTATIN) 618995 UNIT/ML Suspension Take 5 mL by mouth 4 times a day. SWISH AND SPIT 500 mL 0   • Misc. Devices Misc 1 Units by Does not apply route every day. 1 Units 0   • dexamethasone  (DECADRON) 4 MG Tab Take 1 Tab by mouth 3 times a day. Patient has oncologist appointment FOR FURTHER ADJUSTMENTS 90 Tab 1   • azithromycin (ZITHROMAX) 250 MG Tab Per packet 1 Quantity Sufficient 0   • cefdinir (OMNICEF) 300 MG Cap Take 1 Cap by mouth 2 times a day for 7 days. 14 Cap 0   • ondansetron (ZOFRAN) 4 MG Tab tablet Take 1 Tab by mouth every four hours as needed for Nausea/Vomiting. 30 Tab 6   • insulin regular (HUMULIN R) 100 Unit/mL Solution Inject  as instructed 3 times a day before meals.     • temozolomide (TEMODAR) 100 MG Cap Take 100 mg by mouth every day. Indications: 140 mg per record     • furosemide (LASIX) 20 MG Tab Take 1 Tab by mouth 2 times a day for 5 days. 10 Tab 0   • potassium chloride SA (KDUR) 20 MEQ Tab CR Take 1 Tab by mouth every day for 5 days. 5 Tab 0   • famotidine (PEPCID) 20 MG Tab Take 20 mg by mouth.     • lidocaine (LIDODERM) 5 % Patch Apply 1 Patch to skin as directed every day. 10 Patch 11   • metoprolol (LOPRESSOR) 25 MG Tab Take 0.75 Tabs by mouth 2 Times a Day. 60 Tab 11   • rivaroxaban (XARELTO) 20 MG Tab tablet Take 1 Tab by mouth with dinner. 30 Tab 5   • insulin regular (HUMULIN R) 100 Unit/mL Solution Inject -249: 3 units 250-299: 5 units 300-349: 7 units Over 350: 8 units 10 mL 11   • Insulin Syringe/Needle U-500 (BD INSULIN SYRINGE U-500) 31G X 6MM 0.5 ML Misc 1 Units by Does not apply route as needed. 100 Each 11   • levetiracetam (KEPPRA) 100 MG/ML Solution Take 15 mL by mouth every 12 hours. 240 mL 11   • insulin NPH (HUMULIN,NOVOLIN) 100 UNIT/ML Suspension Inject 35 Units as instructed 1/2 hour before dinner. 10 mL 0     No current facility-administered medications for this visit.     Social History   Substance Use Topics   • Smoking status: Former Smoker -- 1.00 packs/day for 30 years     Types: Cigarettes   • Smokeless tobacco: Never Used   • Alcohol Use: No     I reviewed patients allergies, problem list and medications today in EPIC.    ROS: Any/all  "pertinent positives listed in the HPI, otherwise all others reviewed are negative today.      /78 mmHg  Pulse 131  Temp(Src) 38.2 °C (100.8 °F)  Resp 20  Ht 1.58 m (5' 2.21\")  Wt 96.616 kg (213 lb)  BMI 38.70 kg/m2  SpO2 92%    Exam:  Gen: Alert and oriented, No apparent distress. WDWN  Psych: A+Ox3, normal affect and mood  Skin: Warm, dry and intact. Good turgor   No rashes in visible areas.  Eye: Conjunctiva clear, lids normal  ENMT: Lips without lesions, good dentition   Oropharynx clear.   Neck: No Lymphadenopathy, Thyromegaly, Bruits.   Trachea midline, no masses  Lungs: Clear to auscultation bilaterally, no rales or rhonchi   Unlabored respiratory effort.   CV: Regular rate and rhythm, S1, S2. No murmurs.   No Edema  Abd: Soft non tender, non distended. Normal active bowel sounds.    No Hepatosplenomegaly, No pulsatile masses.   Ext: No clubbing, cyanosis, edema.       Assessment and Plan.   56 y.o. male with the following issues.    1. Cough/Fever, unspecified fever cause  Stable. Low-grade fever today. Chest x-ray suggestive for pneumonia today. Add Z-José. Rx for incentive spirometer. The left kidney know if they don't get it at the pharmacy. The patient about importance of eating and drinking. Referral placed from outside case management.  - DX-CHEST-2 VIEWS; Future  - Misc. Devices Misc; 1 Units by Does not apply route every day.  Dispense: 1 Units; Refill: 0  - azithromycin (ZITHROMAX) 250 MG Tab; Per packet  Dispense: 1 Quantity Sufficient; Refill: 0    3. Steroid-induced diabetes mellitus (CMS-HCC)  Stable. Refills of insulin  - insulin NPH (HUMULIN,NOVOLIN) 100 UNIT/ML Suspension; inject 50 units SQ in AM and 45 units units SQ in PM  Dispense: 3 Vial; Refill: 11  - REFERRAL TO COMPLEX CARE MANAGEMENT Services Requested:: Care Manager to Evaluate and Recommend    4. Thrush  Stable. Concern for thrush. Discussed for the swish and spit.  - nystatin (MYCOSTATIN) 259397 UNIT/ML Suspension; Take " 5 mL by mouth 4 times a day. SWISH AND SPIT  Dispense: 500 mL; Refill: 0    5. At risk for fall due to comorbid condition  Wheelchair ordered with foot rest. Patient can use it safely. His daughters will push this for him. And he will benefit from this  - Misc. Devices Misc; 1 Units by Does not apply route every day.  Dispense: 1 Units; Refill: 0

## 2017-03-23 NOTE — MR AVS SNAPSHOT
"        Dale DonahueMarciaCarmona   3/23/2017 8:20 AM   Office Visit   MRN: [id number]    Department:  [facility]   Dept Phone:  [phone]    Description:  Male : 1960   Provider:  JAGDEEP Kinsey           Reason for Visit     Letter for School/Work FMLA       Allergies as of 3/23/2017     No Known Allergies      You were diagnosed with     Cough   [786.2.ICD-9-CM]       Fever, unspecified fever cause   [7626229]       Steroid-induced diabetes mellitus (CMS-HCC)   [263745]       Thrush   [051549]       At risk for fall due to comorbid condition   [0215722]         Vital Signs     Blood Pressure Pulse Temperature Respirations Height Weight    130/78 mmHg 131 38.2 °C (100.8 °F) 20 1.58 m (5' 2.21\") 96.616 kg (213 lb)    Body Mass Index Oxygen Saturation Smoking Status             38.70 kg/m2 92% Former Smoker         Basic Information     Date Of Birth Sex Race Ethnicity Preferred Language    1960 Male White  Origin (Slovenian,Georgian,Bulgarian,Slovenian, etc) English      Your appointments     Mar 27, 2017  2:20 PM   ONCOLOGY EST PATIENT 30 MIN with Olivier Osman M.D.   [facility] (--)    [address] [place] [address] [address]  [facility] [id number] [phone]            Mar 28, 2017  3:40 PM   Established Patient with JAGDEEP Kinsey   [address]  [facility] [id number] [phone]           You will be receiving a confirmation call a few days before your appointment from our automated call confirmation system.            2017  9:00 AM   Type II Class Day 1 with Macy Juárez R.N.   [facility]    [address] [facility] [address]  [facility] [id number] [phone]           All visits are required to successfully complete the program.  It is the patient's responsibility to check with your Insurance for benefit coverage for visit / visits.  Arrive 15 minutes before your scheduled appt time "  Please eat before arriving.  24 hours notice is required for all appointment changes or cancellation.  All visits are required to successfully complete the program  Please bring the following with you: 1) Picture Id 2) Insurance card 3) New patient forms including the Comprehensive         Patient History Form 4) All medication (including insulin) 5) Blood glucose monitor and strips 6) Blood glucose logs (if you have them) 7) Morning/afternoon snack if you generally eat one            Apr 28, 2017  9:00 AM   Type II Class Day 2 with Darrell Elias RD   [address] [address]   [phone]           It is the patient's responsibility to check with your Insurance for benefit coverage for visit / visits.  Arrive 15 minutes before your scheduled appt time  Please eat before arriving.  24 hours notice is required for all appointment changes or cancellation.  All visits are required to successfully complete the program  Please bring the following with you: 1) Picture Id 2) Insurance card 3) New patient forms including the Comprehensive      Patient History Form 4) All medication (including insulin) 5) Blood glucose monitor and strips 6) Blood glucose logs (if you have them) 7) Morning/afternoon snack if you generally eat one              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 High  2/4/2017 - Present    Malignant neoplasm of brain (CMS-HCC) C71.9   1/31/2017 - Present    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, T38.0X5A   3/13/2017 - Present    Acute deep vein thrombosis (DVT) of both lower extremities (CMS-HCC) I82.403    3/13/2017 - Present      Health Maintenance        Date Due Completion Dates    IMM HEP B VACCINE (1 of 3 - Primary Series) 1960 ---    DIABETES MONOFILAMENT / LE EXAM 5/8/1959 ---    RETINAL SCREENING 11/8/1976 ---    URINE ACR / MICROALBUMIN 11/8/1976 ---    IMM DTaP/Tdap/Td Vaccine (1 - Tdap) 11/8/1977 ---    IMM INFLUENZA (1) 9/1/2016 ---    A1C SCREENING 9/13/2017 3/13/2017    FASTING LIPID PROFILE 2/16/2018 2/16/2017, 2/7/2017    SERUM CREATININE 3/20/2018 3/20/2017, 2/28/2017, 2/27/2017, 2/26/2017, 2/23/2017, 2/17/2017, 2/16/2017, 2/11/2017, 2/10/2017, 2/9/2017, 2/8/2017, 2/7/2017, 2/6/2017, 2/5/2017            Current Immunizations     No immunizations on file.      Below and/or attached are the medications your provider expects you to take. Review all of your home medications and newly ordered medications with your provider and/or pharmacist. Follow medication instructions as directed by your provider and/or pharmacist. Please keep your medication list with you and share with your provider. Update the information when medications are discontinued, doses are changed, or new medications (including over-the-counter products) are added; and carry medication information at all times in the event of emergency situations     Allergies:  No Known Allergies          Medications  Valid as of: March 23, 2017 -  9:48 AM    Generic Name Brand Name Tablet Size Instructions for use    Cefdinir (Cap) OMNICEF 300 MG Take 1 Cap by mouth 2 times a day for 7 days.        Dexamethasone (Tab) DECADRON 4 MG Take 1 Tab by mouth 3 times a day. Patient has oncologist appointment FOR FURTHER ADJUSTMENTS        Famotidine (Tab) PEPCID 20 MG Take 20 mg by mouth.        Furosemide (Tab) LASIX 20 MG Take 1 Tab by mouth 2 times a day for 5 days.        Insulin NPH Human (Isophane) (Suspension) HUMULIN,NOVOLIN 100 UNIT/ML Inject 35 Units as instructed 1/2 hour before dinner.        Insulin NPH Human (Isophane) (Suspension)  HUMULIN,NOVOLIN 100 UNIT/ML inject 50 units SQ in AM and 45 units units SQ in PM        Insulin Regular Human (Solution) HUMULIN R 100 Unit/mL Inject -249: 3 units 250-299: 5 units 300-349: 7 units Over 350: 8 units        Insulin Regular Human (Solution) HUMULIN R 100 Unit/mL Inject  as instructed 3 times a day before meals.        Insulin Syringe/Needle U-500 (Misc) Insulin Syringe/Needle U-500 31G X 6MM 0.5 ML 1 Units by Does not apply route as needed.        LevETIRAcetam (Solution) KEPPRA 100 MG/ML Take 15 mL by mouth every 12 hours.        Lidocaine (Patch) LIDODERM 5 % Apply 1 Patch to skin as directed every day.        Metoprolol Tartrate (Tab) LOPRESSOR 25 MG Take 0.75 Tabs by mouth 2 Times a Day.        Misc. Devices (Misc) Misc. Devices  1 Units by Does not apply route every day.        Misc. Devices (Misc) Misc. Devices  1 Units by Does not apply route every day.        Nystatin (Suspension) MYCOSTATIN 338312 UNIT/ML Take 5 mL by mouth 4 times a day. SWISH AND SPIT        Ondansetron HCl (Tab) ZOFRAN 4 MG Take 1 Tab by mouth every four hours as needed for Nausea/Vomiting.        Potassium Chloride Chelo CR (Tab CR) Kdur 20 MEQ Take 1 Tab by mouth every day for 5 days.        Rivaroxaban (Tab) XARELTO 20 MG Take 1 Tab by mouth with dinner.        Temozolomide (Cap) TEMODAR 100 MG Take 100 mg by mouth every day. Indications: 140 mg per record        .                 Medicines prescribed today were sent to:     [facility] #[id number] - [place], [place] - [address]    [address] [place] [address]    Phone: [phone] Fax: [phone]    Open 24 Hours?: No      Medication refill instructions:       If your prescription bottle indicates you have medication refills left, it is not necessary to call your provider’s office. Please contact your pharmacy and they will refill your medication.    If your prescription bottle indicates you do not have any refills left, you may request refills at any time  through one of the following ways: The online [facility] system (except Urgent Care), by calling your provider’s office, or by asking your pharmacy to contact your provider’s office with a refill request. Medication refills are processed only during regular business hours and may not be available until the next business day. Your provider may request additional information or to have a follow-up visit with you prior to refilling your medication.   *Please Note: Medication refills are assigned a new Rx number when refilled electronically. Your pharmacy may indicate that no refills were authorized even though a new prescription for the same medication is available at the pharmacy. Please request the medicine by name with the pharmacy before contacting your provider for a refill.        Your To Do List     Future Labs/Procedures Complete By Expires    DX-CHEST-2 VIEWS  As directed 3/23/2018         [facility] Status: Patient Declined

## 2017-03-23 NOTE — IP AVS SNAPSHOT
" <p align=\"LEFT\"><IMG SRC=\"//[email or web address]\" alt=\"Image\" WIDTH=\"50%\" HEIGHT=\"200\" BORDER=\"\"></p>                   Name:Dale Calzada  Medical Record Number:[id number]  CSN: [id number]    YOB: 1960   Age: 56 y.o.  Sex: male  HT:1.575 m (5' 2\") WT: 99.4 kg (219 lb 2.2 oz)          Admit Date: 3/23/2017     Discharge Date:   Today's Date: 4/28/2017  Attending Doctor:  Jammie Haas D.O.                  Allergies:  Review of patient's allergies indicates no known allergies.          Follow-up Information     1. Follow up with JAGDEEP Kinsey. Schedule an appointment as soon as possible for a visit in 2 weeks.    Specialty:  Family Medicine    Why:  Follow Up    Contact information    [address] [place] [address] [address]  [address]  [facility] [address]  [phone]           Medication List      Take these Medications        Instructions    dexamethasone 2 MG tablet   What changed:    - medication strength  - how much to take  - when to take this  - additional instructions   Commonly known as:  DECADRON    Take 1 Tab by mouth every day.   Dose:  2 mg       famotidine 20 MG Tabs   Commonly known as:  PEPCID    Take 20 mg by mouth. Instructed to take for 7 days.   Dose:  20 mg       insulin  UNIT/ML Susp   Commonly known as:  HUMULIN,NOVOLIN    inject 50 units SQ in AM and 45 units units SQ in PM       insulin regular 100 Unit/mL Soln   Commonly known as:  HUMULIN R    Inject -249: 3 units 250-299: 5 units 300-349: 7 units Over 350: 8 units       * levetiracetam 100 MG/ML Soln   What changed:  Another medication with the same name was added. Make sure you understand how and when to take each.   Commonly known as:  KEPPRA    Take 15 mL by mouth every 12 hours.   Dose:  1500 mg       * levetiracetam 100 MG/ML Soln   What changed:  You were already taking a medication with the same name, and this prescription was added. Make sure you understand how and when to take each.   Commonly " known as:  KEPPRA    Take 15 mL by mouth every 12 hours for 30 days.   Dose:  1500 mg       ondansetron 4 MG Tabs tablet   Commonly known as:  ZOFRAN    Take 1 Tab by mouth every four hours as needed for Nausea/Vomiting.   Dose:  4 mg       rivaroxaban 20 MG Tabs tablet   Commonly known as:  XARELTO    Take 1 Tab by mouth with dinner.   Dose:  20 mg       * Notice:  This list has 2 medication(s) that are the same as other medications prescribed for you. Read the directions carefully, and ask your doctor or other care provider to review them with you.

## 2017-03-23 NOTE — PROGRESS NOTES
Pt referred to  by PCP (Reji) regarding pt's and family's concerns that he will be losing his insurance soon. Pt is currently receiving active oncology treatment and has been out on FMLA. Met with pt and daughter's in office.     They explained that pt will likely be losing his insurance soon as he has not been able to return to work and will likely be unable. They reported they have gone into Social Security and applied for disability. Given the pt's diagnosis SSA reported to family it would likely be a quicker turn around on a decision. Daughter's reported they were told 30 days and pt would be enrolled in Medicare if approved for disability.     Informed family that under some conditions/diagnosis if approved for disability individuals will automatically get Medicare as well (LSW is unclear which diagnosis/medical conditions qualify for immediate Medicare.) However this is not norm. Explained that under standard guidelines individuals must be on disability for 24 months prior to obtaining Medicare. In which case they can apply for Medicaid. Daughter's reported they were told this would happen automatically when pt was approved disability. Encouraged daughters to have pt apply for Medicaid separately as it is possibly he may qualify currently for Medicaid as it is means-based program and he currently has no income. They voiced understanding. Provided daughters with Medicaid application and informed them they could go down to [facility] office to apply.     Lastly daughter's reported they needed feet holders for pt's wheelchair. Daughter obtained wheelchair from [employer] but reported there were no feet holders. Discussed with daughter that it maybe difficult to get foot holders for the specific wheelchair and discussed having PCP place order for a new wheelchair through pt's insurance. Daughter's voiced understanding. PCP notified and requested order be placed. Also provided family with [employer]  application and explained it is possible pt maybe eligible to obtain a wheelchair through [employer]. Daughters reported they have utilized agency prior and were familiar with it.     After meeting with pt and family, LSW reviewed chart. Pt is connected with oncology nurse navigator and oncology social worker. Outreach call to oncology nurse navigator (Joe) to give update. Nurse navigator indicated they have also been working with family on same concerns regarding insurance and have a plan in place. Indicated she would also notify/update oncology social worker. Indicated they would follow up with pt as needed on issues.     Plan:  · At this time LSW will not actively follow pt.   · Pt to continue to follow with oncology nurse navigator and .

## 2017-03-23 NOTE — IP AVS SNAPSHOT
" Home Care Instructions                                                                                                                  Name:Dale Calzada  Medical Record Number:[id number]  CSN: [id number]    YOB: 1960   Age: 56 y.o.  Sex: male  HT:1.575 m (5' 2\") WT: 99.4 kg (219 lb 2.2 oz)          Admit Date: 3/23/2017     Discharge Date:   Today's Date: 4/28/2017  Attending Doctor:  Jammie Haas D.O.                  Allergies:  Review of patient's allergies indicates no known allergies.            Discharge Instructions       Discharge Instructions  MRI 5/25 as outpatient and f/u with Dr Osman after for results   Do not resume taking Temodar until told to do so by Dr Osman    Discharged to home by car with relative. Discharged via wheelchair, hospital escort: Yes.  Special equipment needed: Not Applicable    Be sure to schedule a follow-up appointment with your primary care doctor or any specialists as instructed.     Discharge Plan:   Diet Plan: Discussed  Activity Level: Discussed  Confirmed Follow up Appointment: Appointment Scheduled  Confirmed Symptoms Management: Discussed  Medication Reconciliation Updated: Yes  Influenza Vaccine Indication: Patient Refuses    I understand that a diet low in cholesterol, fat, and sodium is recommended for good health. Unless I have been given specific instructions below for another diet, I accept this instruction as my diet prescription.   Other diet: Dysphagia II (soft foods)    Special Instructions: None    · Is patient discharged on Warfarin / Coumadin?   No     · Is patient Post Blood Transfusion?  No    Depression / Suicide Risk    As you are discharged from this [facility] facility, it is important to learn how to keep safe from harming yourself.    Recognize the warning signs:  · Abrupt changes in personality, positive or negative- including increase in energy   · Giving away possessions  · Change in eating patterns- significant " weight changes-  positive or negative  · Change in sleeping patterns- unable to sleep or sleeping all the time   · Unwillingness or inability to communicate  · Depression  · Unusual sadness, discouragement and loneliness  · Talk of wanting to die  · Neglect of personal appearance   · Rebelliousness- reckless behavior  · Withdrawal from people/activities they love  · Confusion- inability to concentrate     If you or a loved one observes any of these behaviors or has concerns about self-harm, here's what you can do:  · Talk about it- your feelings and reasons for harming yourself  · Remove any means that you might use to hurt yourself (examples: pills, rope, extension cords, firearm)  · Get professional help from the community (Mental Health, Substance Abuse, psychological counseling)  · Do not be alone:Call your Safe Contact- someone whom you trust who will be there for you.  · Call your local CRISIS HOTLINE 128-2495 or 151-060-1201  · Call your local Children's Mobile Crisis Response Team [place] (129) 319-0460 or www.[employer]  · Call the toll free National Suicide Prevention Hotlines   · National Suicide Prevention Lifeline 449-436-YOAR (1250)  · National Hope Line Network 800-SUICIDE (916-1273)        Follow-up Information     1. Follow up with JAGDEEP Kinsey. Schedule an appointment as soon as possible for a visit in 2 weeks.    Specialty:  Family Medicine    Why:  Follow Up    Contact information    [address] [place] [address] [address]  [address]  [place] [address] [phone]           Discharge Medication Instructions:    Below are the medications your physician expects you to take upon discharge:    Review all your home medications and newly ordered medications with your doctor and/or pharmacist. Follow medication instructions as directed by your doctor and/or pharmacist.    Please keep your medication list with you and share with your physician.               Medication List      CHANGE how you take  these medications        Instructions    Morning Afternoon Evening Bedtime    dexamethasone 2 MG tablet   What changed:    - medication strength  - how much to take  - when to take this  - additional instructions   Last time this was given:  2 mg on 4/28/2017  8:48 AM   Commonly known as:  DECADRON        Take 1 Tab by mouth every day.   Dose:  2 mg                        * levetiracetam 100 MG/ML Soln   What changed:  Another medication with the same name was added. Make sure you understand how and when to take each.   Last time this was given:  1,500 mg on 4/28/2017  8:48 AM   Commonly known as:  KEPPRA        Take 15 mL by mouth every 12 hours.   Dose:  1500 mg                        * levetiracetam 100 MG/ML Soln   What changed:  You were already taking a medication with the same name, and this prescription was added. Make sure you understand how and when to take each.   Last time this was given:  1,500 mg on 4/28/2017  8:48 AM   Commonly known as:  KEPPRA        Take 15 mL by mouth every 12 hours for 30 days.   Dose:  1500 mg                        * Notice:  This list has 2 medication(s) that are the same as other medications prescribed for you. Read the directions carefully, and ask your doctor or other care provider to review them with you.      CONTINUE taking these medications        Instructions    Morning Afternoon Evening Bedtime    famotidine 20 MG Tabs   Last time this was given:  20 mg on 4/28/2017  8:48 AM   Commonly known as:  PEPCID        Take 20 mg by mouth. Instructed to take for 7 days.   Dose:  20 mg                        insulin  UNIT/ML Susp   Last time this was given:  35 Units on 4/26/2017  6:02 PM   Commonly known as:  HUMULIN,NOVOLIN        inject 50 units SQ in AM and 45 units units SQ in PM                        insulin regular 100 Unit/mL Soln   Commonly known as:  HUMULIN R        Inject -249: 3 units 250-299: 5 units 300-349: 7 units Over 350: 8 units                         ondansetron 4 MG Tabs tablet   Commonly known as:  ZOFRAN        Take 1 Tab by mouth every four hours as needed for Nausea/Vomiting.   Dose:  4 mg                        rivaroxaban 20 MG Tabs tablet   Last time this was given:  20 mg on 4/27/2017  5:32 PM   Commonly known as:  XARELTO        Take 1 Tab by mouth with dinner.   Dose:  20 mg                          STOP taking these medications     azithromycin 250 MG Tabs   Commonly known as:  ZITHROMAX               cefdinir 300 MG Caps   Commonly known as:  OMNICEF               furosemide 20 MG Tabs   Commonly known as:  LASIX               lidocaine 5 % Ptch   Commonly known as:  LIDODERM               metoprolol 25 MG Tabs   Commonly known as:  LOPRESSOR               nystatin 990815 UNIT/ML Susp   Commonly known as:  MYCOSTATIN               potassium chloride SA 20 MEQ Tbcr   Commonly known as:  Kdur               TEMODAR 140 MG Caps   Generic drug:  Temozolomide                    Where to Get Your Medications      Information about where to get these medications is not yet available     ! Ask your nurse or doctor about these medications    - dexamethasone 2 MG tablet  - levetiracetam 100 MG/ML Soln  - rivaroxaban 20 MG Tabs tablet            Orders for after discharge     REFERRAL TO SKILLED NURSING FACILITY    Complete by:  As directed              Instructions           Diet / Nutrition:    Follow any diet instructions given to you by your doctor or the dietician, including how much salt (sodium) you are allowed each day.    If you are overweight, talk to your doctor about a weight reduction plan.    Activity:    Remain physically active following your doctor's instructions about exercise and activity.    Rest often.     Any time you become even a little tired or short of breath, SIT DOWN and rest.    Worsening Symptoms:    Report any of the following signs and symptoms to the doctor's office immediately:    *Pain of jaw, arm, or neck  *Chest pain  not relieved by medication                               *Dizziness or loss of consciousness  *Difficulty breathing even when at rest   *More tired than usual                                       *Bleeding drainage or swelling of surgical site  *Swelling of feet, ankles, legs or stomach                 *Fever (>100ºF)  *Pink or blood tinged sputum  *Weight gain (3lbs/day or 5lbs /week)           *Shock from internal defibrillator (if applicable)  *Palpitations or irregular heartbeats                *Cool and/or numb extremities    Stroke Awareness    Common Risk Factors for Stroke include:    Age  Atrial Fibrillation  Carotid Artery Stenosis  Diabetes Mellitus  Excessive alcohol consumption  High blood pressure  Overweight   Physical inactivity  Smoking    Warning signs and symptoms of a stroke include:    *Sudden numbness or weakness of the face, arm or leg (especially on one side of the body).  *Sudden confusion, trouble speaking or understanding.  *Sudden trouble seeing in one or both eyes.  *Sudden trouble walking, dizziness, loss of balance or coordination.Sudden severe headache with no known cause.    It is very important to get treatment quickly when a stroke occurs. If you experience any of the above warning signs, call 911 immediately.                   Disclaimer         Quit Smoking / Tobacco Use:    I understand the use of any tobacco products increases my chance of suffering from future heart disease or stroke and could cause other illnesses which may shorten my life. Quitting the use of tobacco products is the single most important thing I can do to improve my health. For further information on smoking / tobacco cessation call a Toll Free Quit Line at 1-371.525.3737 (*National Cancer [place]) or 1-645.878.7738 (American Lung Association) or you can access the web based program at www.lungusa.org.    [place] Tobacco Users Help Line:  (559) 936-6116       Toll Free: 1-195.764.4137  Quit Tobacco Program  [facility] (738)999-5227    Crisis Hotline:    [place] Crisis Hotline:  6-305-HWXOWPR or 1-262.278.8571    [place] Crisis Hotline:    1-375.345.7300 or 264-798-7649    Discharge Survey:   Thank you for choosing [facility]. We hope we did everything we could to make your hospital stay a pleasant one. You may be receiving a phone survey and we would appreciate your time and participation in answering the questions. Your input is very valuable to us in our efforts to improve our service to our patients and their families.        My signature on this form indicates that:    1. I have reviewed and understand the above information.  2. My questions regarding this information have been answered to my satisfaction.  3. I have formulated a plan with my discharge nurse to obtain my prescribed medications for home.                  Disclaimer         __________________________________                     __________       ________                       Patient Signature                                                 Date                    Time

## 2017-03-23 NOTE — IP AVS SNAPSHOT
4/28/2017    Dale Calzada  [address] [place] [address]    Dear Dale:    [facility] wants to ensure your discharge home is safe and you or your loved ones have had all of your questions answered regarding your care after you leave the hospital.    Below is a list of resources and contact information should you have any questions regarding your hospital stay, follow-up instructions, or active medical symptoms.    Questions or Concerns Regarding… Contact   Medical Questions Related to Your Discharge  (7 days a week, 8am-5pm) Contact a Nurse Care Coordinator   [phone]   Medical Questions Not Related to Your Discharge  (24 hours a day / 7 days a week)  Contact the Nurse Health Line   [phone]    Medications or Discharge Instructions Refer to your discharge packet   or contact your [facility] Primary Care Provider   [phone]   Follow-up Appointment(s) Schedule your appointment via [employer]   or contact Scheduling [phone]   Billing Review your statement via [employer]  or contact Billing [phone]   Medical Records Review your records via [employer]   or contact Medical Records [phone]     You may receive a telephone call within two days of discharge. This call is to make certain you understand your discharge instructions and have the opportunity to have any questions answered. You can also easily access your medical information, test results and upcoming appointments via the [employer] free online health management tool. You can learn more and sign up at [employer]. For assistance setting up your [employer] account, please call [phone].    Once again, we want to ensure your discharge home is safe and that you have a clear understanding of any next steps in your care. If you have any questions or concerns, please do not hesitate to contact us, we are here for you. Thank you for choosing [facility] for your healthcare needs.    Sincerely,    Your [facility] Healthcare Team

## 2017-03-24 PROBLEM — B37.0 THRUSH, ORAL: Status: ACTIVE | Noted: 2017-01-01

## 2017-03-24 PROBLEM — C71.9 ASTROCYTOMA (HCC): Status: ACTIVE | Noted: 2017-01-01

## 2017-03-24 PROBLEM — G40.909 SEIZURE DISORDER (HCC): Status: ACTIVE | Noted: 2017-01-01

## 2017-03-24 PROBLEM — E87.1 HYPONATREMIA: Status: ACTIVE | Noted: 2017-01-01

## 2017-03-24 PROBLEM — D64.9 NORMOCYTIC ANEMIA: Status: ACTIVE | Noted: 2017-01-01

## 2017-03-24 NOTE — H&P
REASON FOR ADMISSION:  Pneumonia, respiratory distress.    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  It is not completely   resected due to involvement of critical language area.  He then was admitted   at [facility] where he was treated both for bilateral DVTs, on   Xarelto.  He had had seizures at that facility.  He was placed on Keppra after   re-admission at [facility].  He had significant expressive aphasia at time of   discharge.  He began chemoradiation both with Temodar starting 1 week ago and   x-ray therapy under the care of Dr. Yaneli Bailey several days ago.  He   presents today to see Lisette Mendoza, nurse practitioner, complaining of   fever, cough, and weakness.  He was then placed on Omnicef 1 week ago without   improvement.  X-ray was negative at that time.  Today, x-ray demonstrates   bilateral pneumonia.    He has also been treated for thrush.  He has been on high-dose Decadron for   which he is requiring high-dose insulin.  The patient himself is a very poor   historian, Montserratian speaking only, but clearly has expressive aphasia all that   appears to be quite wordy.  However, he does acknowledge significant cough,   shortness of breath and pleuritic chest pain with this coughing.  I am   otherwise unable to review systems due to his aphasia.    PAST MEDICAL HISTORY:  1.  Anaplastic astrocytoma, incompletely resected as described.  2.  Steroid-induced diabetes mellitus.  3.  Seizure disorder post-surgery.  4.  Hypertension.  5.  Recent bout of thrush.  6.  Cushing syndrome related to high-dose steroids with recent significant   weight gain.    PAST SURGICAL HISTORY:  Incomplete resection of the astrocytoma as described   at [facility].    SOCIAL HISTORY:  He is a former smoker.  He is not currently a drinker.  He is    followed by Lisette vanegas, nurse practitioner.    FAMILY HISTORY:  Reviewed, but noncontributory.    CURRENT MEDICATIONS:  1.  Recent treatment with azithromycin and Omnicef.  2.  Decadron 4 mg 3 times daily.  3.  Pepcid 20 mg daily.  4.  Lasix 10 mg twice daily.  5.  NPH insulin, unclear dose, 50 units in the morning and 45 at night.  6.  Sliding scale insulin with regular.  7.  Keppra 1500 mg twice daily.  8.  Metoprolol 25 mg, 3/4 of a tablet twice daily.  9.  Nystatin swish and swallow.  10.  Xarelto 20 mg daily.  11.  Temodar 100 mg daily.    ALLERGIES:  None.    REVIEW OF SYSTEMS:  Unable to significantly obtain due to _____.    PHYSICAL EXAMINATION:  VITAL SIGNS:  Temperature is 100.0, pulse is in the high 90s, respiratory rate   is in the high 20s.  Initial blood pressure 90/74, currently 115/68.  GENERAL:  He is very wordy, but difficult to understand.  He is tachypneic.    He is very cushingoid.  HEENT:  Pupils are equal, round and reactive.  Extraocular movements are   intact.  Mucous membranes are moist.  NECK:  Plump, but supple.  CARDIOVASCULAR:  He is tachycardic, but regular.  LUNGS:  Has very faint inspiratory crackles at both bases.  ABDOMEN:  Obese, soft, nontender, nondistended.  Bowel sounds are present.    There is no palpable organomegaly.  BACK:  No CVA tenderness.  EXTREMITIES:  There is trace edema.  NEUROLOGIC:  He has what appears to be a Wernicke's type aphasia.  He is   poorly participatory with the exam.  He does move all 4 extremities.    LABORATORY DATA:  White count 7, hemoglobin 11, hematocrit 33, mean cell   volume 92, platelets 204, neutrophils 81%, lymphocytes 9%, and bands 4%.    Sodium 132, potassium 3.9, chloride 98, bicarb 26, glucose 86, BUN 11,   creatinine 0.5, calcium 8.1, AST 34, ALT 76, alkaline phosphatase is 56, total   bilirubin 0.7, albumin 2.8, total protein is 5.8.  Urinalysis is negative.    TSH 1.3.    STUDIES:  Chest x-ray notes bilateral pneumonia, more  extensive on the left.    IMPRESSION:  1.  Pneumonia.  2.  Acute respiratory failure, currently requiring 5 liters.  3.  Pending CT angiogram.  4.  Ongoing chemotherapy and radiation for anaplastic astrocytoma,   incompletely resected in January 2017 at [facility].  5.  Wernicke's type aphasia.  6.  Diabetes mellitus, poorly controlled, exacerbated by steroid.  7.  Cushing syndrome related to high-dose steroid.  8.  Thrush, improving.  9.  Seizures during rehab 1 month ago, none documented.  10.  Hypotension, metoprolol to be discontinued.  11.  Recent deep venous thrombosis, on Xarelto.    PLAN:  1.  Pneumonia.  This does appear to represent a nosocomial pneumonia given his   hospital exposure.  Hence, I will cover with cefepime, doxycycline and   vancomycin pending sputum cultures.  Provide aerosolized bronchodilators and   supplemental oxygen.  2.  Rule out pulmonary embolism.  CT angiogram is pending.  3.  Diabetes mellitus.  We will provide NPH and sliding scale coverage.  4.  Seizure disorder.  Continue Keppra.  5.  Astrocytoma.  Temodar will be held currently.  We will have oncology   follow during the hospital stay.  We will continue Decadron.  6.  Thrush.  We will continue nystatin.  7.  Deep venous thrombosis.  We will continue Xarelto.  8.  Prophylaxis:  Xarelto, Pepcid, laxatives.       ____________________________________     MD JOSHUA CACERES / HARRIET    DD:  03/24/2017 01:10:12  DT:  03/24/2017 02:01:47    D#:  [id number]  Job#:  [id number]    cc: SAUL BLUE MD, Olivier Osman MD

## 2017-03-24 NOTE — RESPIRATORY CARE
COPD EDUCATION by COPD CLINICAL EDUCATOR  3/24/2017 at 6:37 AM by Breann Velásquez     Patient reviewed by COPD education team. Patient does not qualify for COPD program.

## 2017-03-24 NOTE — PROGRESS NOTES
Med rec updated and complete per pt daughter.  NKDA  Pt started a Z-ELIZABETH ABX on 03/23/17  Started Cefdinir ABX 300mg twice daily for 7 days on 03/20/27

## 2017-03-24 NOTE — PROGRESS NOTES
2 RN skin check completed with Wilbur RN:  Surgical scar noted left parietal region approximated, small abrasion anterior left ankle, lower extremity bruising, ears red but blanching foam protectors applied.

## 2017-03-24 NOTE — CARE PLAN
Problem: Communication  Goal: The ability to communicate needs accurately and effectively will improve  Intervention: Use communication aids and/or /Language Line as appropriate  White board updated with POC and care team information upon pt arrival to unit. Family at bedside for translation and pt comfort      Problem: Safety  Goal: Will remain free from falls  Fall precautions in place. Bed in lowest position. Non-skid socks in place. Personal possessions within reach. Mobility sign on door. Bed-alarm on. Call light within reach. Pt educated regarding fall prevention . Reenforcement needed due to pt confusion level.

## 2017-03-24 NOTE — ED NOTES
"Dale Calzada  56 y.o.  Chief Complaint   Patient presents with   • Fever     Per pt's family, pt. has a fever of 100.2F. Upon arrival pt's temperature was 99.9F. Pt. has brain cancer and has been receiving chemotherapy and radiation 5 times a week per family. Pt. had brain surgery Jan 30 to remove brain tumor. Since then pt. has only been ANO to self.   • Foot Pain     Per family pt. keeps complaining of bilateral foot pain. Pt. has 3+ pitting edema bilaterally.     /64 mmHg  Pulse 102  Temp(Src) 37.7 °C (99.9 °F)  Resp 22  Ht 1.575 m (5' 2\")  Wt 96.616 kg (213 lb)  BMI 38.95 kg/m2  SpO2 93%  20G IV started in pt's right wrist PTA. BS per EMS 98  "

## 2017-03-24 NOTE — PROGRESS NOTES
Patient was transported to our department for radiation therapy treatment number 5 of 30 to his brain. We plan on transporting him again Monday.

## 2017-03-24 NOTE — PROGRESS NOTES
Pt arrived on unit via gurney. Pt able to scoot to bed with handheld assist. Appropriate functioning of tele monitor confirmed with monitor room, rate and rhythm verified. White board updated. Pt updated on POC with help of daughter Sia at bedside. Pt speaks very little english, primarily Occitan only and oriented only to seld. VSS. All needs met at this time.

## 2017-03-24 NOTE — PROGRESS NOTES
"Assumed care at 0700, bedside report received from NOC shift RN. Patient is AOx2- knows self. He is Pashto speaking, and mumbles. Daughter at bedside to translate but states \"I can't always understand him\" Initial assessment completed, orders reviewed, and hourly rounding in place. POC addressed with patient and family, no additional questions at this time. Pt. To radiation.   "

## 2017-03-24 NOTE — DIETARY
Nutrition: Day 1 of admit.  57 yo male admitted with acute respiratory failure and hypoxia. He is noted on admit screen to have had poor po intake PTA.  BMI 38.95 and no recent weight change.  He is currently on a Diabetic diet taking % of meals.    Recommendations:  1) encourage intake of meals   2) document intake of all meals and supplements as % taken in ADL's to provide interdisciplinary communication across all shifts   3) monitor daily weights

## 2017-03-24 NOTE — PROGRESS NOTES
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    Had a very long discussion with the patient's family regarding his condition. Per them he became expressive aphasia, confused since after his brain surgery on 1/31/17.    Review of Systems:  Review of Systems   Unable to perform ROS: mental acuity       Physical Exam:  Physical Exam   Constitutional: No distress.   HENT:   Head: Normocephalic and atraumatic.   Mouth/Throat: Oropharynx is clear and moist.   Surgical scar over left side of the head   Eyes: Conjunctivae and EOM are normal. Pupils are equal, round, and reactive to light.   Neck: Normal range of motion. Neck supple. No tracheal deviation present. No thyromegaly present.   Cardiovascular: Normal rate and regular rhythm.    No murmur heard.  Pulmonary/Chest: Effort normal and breath sounds normal. No respiratory distress. He has no wheezes.   Abdominal: Soft. Bowel sounds are normal. He exhibits no distension. There is no tenderness.   Musculoskeletal: He exhibits no edema or tenderness.   Neurological: He is alert.   Skin: Skin is warm and dry. He is not diaphoretic. No erythema.   Psychiatric:   Cannot be performed       Labs:        Invalid input(s): [id number]      Recent Labs      03/24/17   0002   SODIUM  132*   POTASSIUM  3.9   CHLORIDE  98   CO2  26   BUN  11   CREATININE  0.52   CALCIUM  8.1*     Recent Labs      03/24/17   0002   ALTSGPT  76*   ASTSGOT  34   ALKPHOSPHAT  56   TBILIRUBIN  0.7   GLUCOSE  86     Recent Labs      03/23/17 2200 03/24/17   0002   RBC  3.58*  3.51*   HEMOGLOBIN  11.4*  11.2*   HEMATOCRIT  32.8*  32.5*   PLATELETCT  204  151*     Recent Labs      03/23/17 2200 03/24/17   0002   WBC  6.9   7.0   NEUTSPOLYS  80.70*   --    LYMPHOCYTES  8.80*   --    MONOCYTES  0.90   --    EOSINOPHILS  0.90   --    BASOPHILS  0.90   --    ASTSGOT   --   34   ALTSGPT   --   76*   ALKPHOSPHAT   --   56   TBILIRUBIN   --   0.7           Hemodynamics:  Temp (24hrs), Av.7 °C (98 °F), Min:36.1 °C (96.9 °F), Max:37.7 °C (99.9 °F)  Temperature: 36.1 °C (96.9 °F)  Pulse  Av.1  Min: 62  Max: 102Heart Rate (Monitored): 96  Blood Pressure: 107/65 mmHg, NIBP: 115/68 mmHg     Respiratory:    Respiration: 20, Pulse Oximetry: 91 %, O2 Daily Delivery Respiratory : Silicone Nasal Cannula     PEP/CPT Method: Positive Airway Pressure Device, Work Of Breathing / Effort: Mild  RUL Breath Sounds: Coarse Crackles, RML Breath Sounds: Coarse Crackles, RLL Breath Sounds: Diminished, LYAA Breath Sounds: Coarse Crackles, LLL Breath Sounds: Diminished  Fluids:    Intake/Output Summary (Last 24 hours) at 17 0818  Last data filed at 17 0407   Gross per 24 hour   Intake      0 ml   Output    800 ml   Net   -800 ml     Weight: 96.616 kg (213 lb)  GI/Nutrition:  Orders Placed This Encounter   Procedures   • Diet Order     Standing Status: Standing      Number of Occurrences: 1      Standing Expiration Date:      Order Specific Question:  Diet:     Answer:  Diabetic [3]     Medical Decision Making, by Problem:  Active Hospital Problems    Diagnosis   • 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]  - on chemo and radiation therapy  - continue radiation therapy  - will hold chemo therapy for now  - paging Dr. Osman  - PATIENT DO NOT HAVE CAPACITY TO MAKE MEDICAL DECISION     • Seizure disorder (CMS-HCC) [G40.909]  - aspiration/seizure/fall precaution  - on keppra     • Hyponatremia [E87.1]  - on IVF  - follow cmp in am     • Normocytic anemia [D64.9]  - stable     • Thrush, oral [B37.0]  - on nystatin     • Pneumonia [J18.9]  - HCAP  - on IV Vanco, cefepime, doxycycline  - follow culture and de-escalate as needed     • Acute respiratory failure with  hypoxia (CMS-HCC) [J96.01]  - from above  - breathing treatment, RT protocol     • Cerebral edema (CMS-HCC) [G93.6]  - on dexamethasone       *  History of DVT: on Xarelto    Patient is critically ill with CAP  The vital organ system that is affected is pulmonary  If untreated there is a high chance of deterioration into pulmonary failure and eventually death.   The critical care that I am providing today is IV abx, breathing treatment  The critical that has been undertaken is medically complex.   There has been no overlap in critical care time.   Critical Care Time not including procedures: 40 minutes    Labs reviewed, Medications reviewed and Radiology images reviewed        DVT: xarelto.      Antibiotics: Treating active infection/contamination beyond 24 hours perioperative coverage

## 2017-03-25 NOTE — CARE PLAN
Problem: Respiratory:  Goal: Respiratory status will improve  Outcome: PROGRESSING AS EXPECTED  Educated patient about coughing and deep breathing. Patient demonstrated use of incentive spirometry.     Problem: Knowledge Deficit  Goal: Knowledge of disease process/condition, treatment plan, diagnostic tests, and medications will improve  Outcome: PROGRESSING AS EXPECTED  Educated patient with family at bedside about disease process, condition and treatment plan. MD also updated family. Patient and family states understanding and satisfied.

## 2017-03-25 NOTE — PROGRESS NOTES
Received report from day RN assumed care at 1915. Pt A&Ox to self only . Pt states 0 /10 pain at this time. Plan of care discussed with Pt, verbalized understanding.  family present at bedside. Assessment completed. All Pt needs met at this time. Call light within reach, bed alarm on , bed locked and in low position. Will continue to monitor.

## 2017-03-25 NOTE — CARE PLAN
Problem: Communication  Goal: The ability to communicate needs accurately and effectively will improve  Outcome: PROGRESSING AS EXPECTED  Pt is alert and oriented x 1, to self only . Pt has been reoriented to the environment and call light. I have discussed with the pt and family the plan of care, treatments and medications and they verbalize agreement and understanding. The pt has been able to communicate his needs effectively and has been given the opportunity to ask any questions. All pt needs have been met and questions have been answered at this time. Hourly rounding in place.         Problem: Safety  Goal: Will remain free from injury  Outcome: PROGRESSING AS EXPECTED  Pt assessed at beginning of shift. Pt determined to be 2x assist . Fall precautions in place. Call light and personal possessions in reach, bed alarm on . Hourly rounding in place.

## 2017-03-25 NOTE — CARE PLAN
Problem: Safety  Goal: Will remain free from injury  Outcome: PROGRESSING AS EXPECTED  Bed alarm is on patient and he uses his call light. Pt remains free from injury at this time and is frequently checked on for safety precautions    Problem: Pain Management  Goal: Pain level will decrease to patient’s comfort goal  Outcome: PROGRESSING SLOWER THAN EXPECTED  There is difficulty with his pain communication. He appeared calm and comfortable throughout the day, and was able to sleep, but mentioned pain in his feet this morning. Daughters translated and couldn't understand him. Andrade mir scale may be best

## 2017-03-25 NOTE — PROGRESS NOTES
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    No acute issue overnight. Seems to be doing well.    Review of Systems:  Review of Systems   Unable to perform ROS: mental acuity       Physical Exam:  Physical Exam   Constitutional: No distress.   HENT:   Head: Normocephalic and atraumatic.   Mouth/Throat: Oropharynx is clear and moist.   Surgical scar over left side of the head   Eyes: Conjunctivae and EOM are normal. Pupils are equal, round, and reactive to light.   Neck: Normal range of motion. Neck supple. No tracheal deviation present. No thyromegaly present.   Cardiovascular: Normal rate and regular rhythm.    No murmur heard.  Pulmonary/Chest: Effort normal and breath sounds normal. No respiratory distress. He has no wheezes.   Abdominal: Soft. Bowel sounds are normal. He exhibits no distension.   Musculoskeletal: He exhibits no edema or tenderness.   Neurological: He is alert.   Skin: Skin is warm and dry. He is not diaphoretic. No erythema.   Psychiatric:   Cannot be performed       Labs:        Invalid input(s): [id number]  Recent Labs      03/24/17   0951   BNPBTYPENAT  25     Recent Labs      03/24/17   0002  03/25/17   0146   SODIUM  132*  135   POTASSIUM  3.9  3.6   CHLORIDE  98  101   CO2  26  28   BUN  11  15   CREATININE  0.52  0.52   CALCIUM  8.1*  8.3*     Recent Labs      03/24/17   0002  03/25/17   0146   ALTSGPT  76*  63*   ASTSGOT  34  26   ALKPHOSPHAT  56  51   TBILIRUBIN  0.7  0.5   GLUCOSE  86  163*     Recent Labs      03/23/17 2200  03/24/17   0002   RBC  3.58*  3.51*   HEMOGLOBIN  11.4*  11.2*   HEMATOCRIT  32.8*  32.5*   PLATELETCT  204  151*     Recent Labs      03/23/17 2200 03/24/17   0002  03/25/17    0146   WBC  6.9  7.0   --    NEUTSPOLYS  80.70*   --    --    LYMPHOCYTES  8.80*   --    --    MONOCYTES  0.90   --    --    EOSINOPHILS  0.90   --    --    BASOPHILS  0.90   --    --    ASTSGOT   --   34  26   ALTSGPT   --   76*  63*   ALKPHOSPHAT   --   56  51   TBILIRUBIN   --   0.7  0.5           Hemodynamics:  Temp (24hrs), Av.3 °C (97.3 °F), Min:36.1 °C (97 °F), Max:36.5 °C (97.7 °F)  Temperature: 36.1 °C (97 °F)  Pulse  Av.8  Min: 56  Max: 115   Blood Pressure: (!) 99/68 mmHg     Respiratory:    Respiration: 17, Pulse Oximetry: 95 %, O2 Daily Delivery Respiratory : Silicone Nasal Cannula     PEP/CPT Method: Positive Airway Pressure Device, Work Of Breathing / Effort: Mild  RUL Breath Sounds: Clear, RML Breath Sounds: Clear, RLL Breath Sounds: Diminished, LAYA Breath Sounds: Clear, LLL Breath Sounds: Diminished  Fluids:  No intake or output data in the 24 hours ending 17 0800  Weight: 98.7 kg (217 lb 9.5 oz)  GI/Nutrition:  Orders Placed This Encounter   Procedures   • Diet Order     Standing Status: Standing      Number of Occurrences: 1      Standing Expiration Date:      Order Specific Question:  Diet:     Answer:  Diabetic [3]     Medical Decision Making, by Problem:  Active Hospital Problems    Diagnosis   • 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]  - on chemo and radiation therapy  - continue radiation therapy  - will hold chemo therapy for now  - discussed with Dr. Espino and to hold chemo therapy until his CAP resolved  - PATIENT DO NOT HAVE CAPACITY TO MAKE MEDICAL DECISION     • Seizure disorder (CMS-HCC) [G40.909]  - aspiration/seizure/fall precaution  - on keppra     • Hyponatremia [E87.1]  - on IVF  - follow cmp in am     • Normocytic anemia [D64.9]  - stable     • Thrush, oral [B37.0]  - on nystatin     • Pneumonia [J18.9]  - HCAP  - on IV cefepime, doxycycline, DC vancomycin today  - follow culture and de-escalate as needed     • Acute respiratory failure with hypoxia (CMS-HCC) [J96.01]  -  from above  - breathing treatment, RT protocol     • Cerebral edema (CMS-HCC) [G93.6]  - on dexamethasone       *  History of DVT: on Xarelto        Labs reviewed, Medications reviewed and Radiology images reviewed        DVT: xarelto.      Antibiotics: Treating active infection/contamination beyond 24 hours perioperative coverage

## 2017-03-25 NOTE — PROGRESS NOTES
"Pharmacy Kinetics 56 y.o. male on vancomycin day #2 3/24/2017    urrently on Vancomycin 2400 mg iv load followed by Vancomycin 1600mg q12 hours    Indication for Treatment: Pneumonia    Pertinent history per medical record: Admitted on 3/23/2017 for acute respiratory failure.  Patient was recently started on chemotherapy.  Patient arrives complaining of fever, cough, and weakness.  He recently was started on Omnicef without improvement.  Xray on admission shows bilateral pneumonia.  Empiric antibiotic initiated. MD's worried about HAP.    Other antibiotics: Cefepime 2g q12 hours, Doxycycline 100mg IV q12 hours    Allergies: Review of patient's allergies indicates no known allergies.     List concerns for renal function: BMI 39    Pertinent cultures to date:      MRSA nares swab -- in process    Resp culture -- in process    (3/23) Blood culture -- NGTD    (3/23) Urine culture -- NEG    Recent Labs      17   2200  17   0002   WBC  6.9  7.0   NEUTSPOLYS  80.70*   --    BANDSSTABS  3.50   --      Recent Labs      17   0002   BUN  11   CREATININE  0.52   ALBUMIN  2.6*     Intake/Output Summary (Last 24 hours) at 17 1700  Last data filed at 17 0407   Gross per 24 hour   Intake      0 ml   Output    800 ml   Net   -800 ml      Blood pressure 94/61, pulse 106, temperature 36.3 °C (97.3 °F), resp. rate 18, height 1.575 m (5' 2\"), weight 96.616 kg (213 lb), SpO2 93 %. Temp (24hrs), Av.5 °C (97.7 °F), Min:36.1 °C (96.9 °F), Max:37.7 °C (99.9 °F)      A/P   1. Next vancomycin level: 3/25/17 at 1430 (ordered)  2. Goal trough: 60-20 mcg/mL  3. Comments:  a. MRSA nares in process. Once resulted, recommend narrowing of therapy. Patient on duplicate therapy with doxycyline and vancomycin. Attending physician aware. Will order a trough for tomorrow prior to the 4th total dose. Some concern for accumulation as patient is ~100 kg. Pharmacy will continue to monitor and adjust dosing as clinically " appropriate.    Naima Clarke, PharmD

## 2017-03-26 NOTE — PROGRESS NOTES
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    No acute issue overnight. Seems to be doing well. Hopefully discharge soon.    Review of Systems:  Review of Systems   Unable to perform ROS: mental acuity       Physical Exam:  Physical Exam   Constitutional: No distress.   HENT:   Head: Normocephalic and atraumatic.   Mouth/Throat: Oropharynx is clear and moist.   Surgical scar over left side of the head   Eyes: Conjunctivae and EOM are normal. Pupils are equal, round, and reactive to light.   Neck: Normal range of motion. Neck supple. No tracheal deviation present. No thyromegaly present.   Cardiovascular: Normal rate and regular rhythm.    No murmur heard.  Pulmonary/Chest: Effort normal and breath sounds normal. No respiratory distress. He has no wheezes. He has no rales.   Abdominal: Soft. Bowel sounds are normal. He exhibits no distension.   Musculoskeletal: He exhibits no edema or tenderness.   Neurological: He is alert.   Skin: Skin is warm and dry. He is not diaphoretic. No erythema.   Psychiatric:   Cannot be performed       Labs:        Invalid input(s): [id number]  Recent Labs      03/24/17   0951   BNPBTYPENAT  25     Recent Labs      03/24/17   0002  03/25/17   0146   SODIUM  132*  135   POTASSIUM  3.9  3.6   CHLORIDE  98  101   CO2  26  28   BUN  11  15   CREATININE  0.52  0.52   CALCIUM  8.1*  8.3*     Recent Labs      03/24/17   0002  03/25/17   0146   ALTSGPT  76*  63*   ASTSGOT  34  26   ALKPHOSPHAT  56  51   TBILIRUBIN  0.7  0.5   GLUCOSE  86  163*     Recent Labs      03/23/17   2200  03/24/17   0002  03/25/17   0146   RBC  3.58*  3.51*  3.10*   HEMOGLOBIN  11.4*  11.2*  9.8*   HEMATOCRIT  32.8*  32.5*  29.4*    PLATELETCT  204  151*  172     Recent Labs      17   2200  17   0002  17   0146   WBC  6.9  7.0  7.2   NEUTSPOLYS  80.70*   --   80.90*   LYMPHOCYTES  8.80*   --   4.30*   MONOCYTES  0.90   --   1.70   EOSINOPHILS  0.90   --   0.00   BASOPHILS  0.90   --   0.00   ASTSGOT   --   34  26   ALTSGPT   --   76*  63*   ALKPHOSPHAT   --   56  51   TBILIRUBIN   --   0.7  0.5           Hemodynamics:  Temp (24hrs), Av.3 °C (97.3 °F), Min:35.8 °C (96.5 °F), Max:37.1 °C (98.7 °F)  Temperature: 36 °C (96.8 °F)  Pulse  Av  Min: 56  Max: 115   Blood Pressure: 112/76 mmHg     Respiratory:    Respiration: 18, Pulse Oximetry: 92 %, O2 Daily Delivery Respiratory : Silicone Nasal Cannula     PEP/CPT Method: Positive Airway Pressure Device, Work Of Breathing / Effort: Mild  RUL Breath Sounds: Clear, RML Breath Sounds: Clear, RLL Breath Sounds: Diminished, LAYA Breath Sounds: Clear, LLL Breath Sounds: Diminished  Fluids:  No intake or output data in the 24 hours ending 17 0757  Weight: 99.8 kg (220 lb 0.3 oz)  GI/Nutrition:  Orders Placed This Encounter   Procedures   • Diet Order     Standing Status: Standing      Number of Occurrences: 1      Standing Expiration Date:      Order Specific Question:  Diet:     Answer:  Diabetic [3]     Medical Decision Making, by Problem:  Active Hospital Problems    Diagnosis   • 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]  - on chemo and radiation therapy  - continue radiation therapy  - will hold chemo therapy for now  - discussed with Dr. Espino and to hold chemo therapy until his CAP resolved  - PATIENT DO NOT HAVE CAPACITY TO MAKE MEDICAL DECISION     • Seizure disorder (CMS-HCC) [G40.909]  - aspiration/seizure/fall precaution  - on keppra     • Hyponatremia [E87.1]  - on IVF  - follow cmp in am     • Normocytic anemia [D64.9]  - stable     • Thrush, oral [B37.0]  - on nystatin     • Pneumonia [J18.9]  - HCAP  - on IV cefepime, doxycycline, DC vancomycin   - follow culture and  de-escalate as needed     • Acute respiratory failure with hypoxia (CMS-[facility]) [J96.01]  - from above  - breathing treatment, RT protocol     • Cerebral edema (CMS-[facility]) [G93.6]  - on dexamethasone       *  History of DVT: on Xarelto        Labs reviewed, Medications reviewed and Radiology images reviewed        DVT: xarelto.      Antibiotics: Treating active infection/contamination beyond 24 hours perioperative coverage

## 2017-03-26 NOTE — PROGRESS NOTES
Received report from day RN assumed care at 1915. Pt A&Ox 1 . Pt states 0 /10 pain at this time. Plan of care discussed with Pt, verbalized understanding. No family present at bedside. Assessment completed. All Pt needs met at this time. Call light within reach, bed alarm on , bed locked and in low position. Will continue to monitor.

## 2017-03-26 NOTE — CARE PLAN
Problem: Communication  Goal: The ability to communicate needs accurately and effectively will improve  Outcome: PROGRESSING SLOWER THAN EXPECTED  Pt is confused and only oriented to self. Pt has been reoriented to place and event and time, reoriented to the room and call light. Pt has been given the opportunity to ask any questions. Pt seems to communicate needs effectively, however the pt is [place] speaking. i have discussed the plan of care with the pt and the pt agrees.     Problem: Safety  Goal: Will remain free from injury  Outcome: PROGRESSING AS EXPECTED  Pt assessed at beginning of shift. Pt determined to be 1x assist . Fall precautions in place. Call light and personal possessions in reach, bed alarm on . Hourly rounding in place.

## 2017-03-26 NOTE — PROGRESS NOTES
Assumed care @ 0715. Bedside report from ANÍBAL Martin. Pt sleeping and in no distress. Bed in low position, call light w/in reach.

## 2017-03-27 NOTE — PROGRESS NOTES
Patient was transported to our department for radiation therapy treatment number 6 of 30 to his brain. We plan on transporting him tomorrow for another fraction.

## 2017-03-27 NOTE — PROGRESS NOTES
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    Looks a lot better. No acute issue overnight.    Review of Systems:  Review of Systems   Unable to perform ROS: mental acuity       Physical Exam:  Physical Exam   Constitutional: No distress.   HENT:   Head: Normocephalic and atraumatic.   Mouth/Throat: Oropharynx is clear and moist.   Surgical scar over left side of the head   Eyes: Conjunctivae and EOM are normal. Pupils are equal, round, and reactive to light.   Neck: Normal range of motion. Neck supple. No thyromegaly present.   Cardiovascular: Normal rate and regular rhythm.    No murmur heard.  Pulmonary/Chest: Effort normal and breath sounds normal. No respiratory distress. He has no wheezes.   Abdominal: Soft. Bowel sounds are normal. He exhibits no distension.   Musculoskeletal: He exhibits no edema or tenderness.   Neurological: He is alert.   Skin: Skin is warm and dry. He is not diaphoretic. No erythema.   Psychiatric:   Cannot be performed       Labs:        Invalid input(s): [id number]  Recent Labs      03/24/17   0951   BNPBTYPENAT  25     Recent Labs      03/25/17   0146   SODIUM  135   POTASSIUM  3.6   CHLORIDE  101   CO2  28   BUN  15   CREATININE  0.52   CALCIUM  8.3*     Recent Labs      03/25/17   0146   ALTSGPT  63*   ASTSGOT  26   ALKPHOSPHAT  51   TBILIRUBIN  0.5   GLUCOSE  163*     Recent Labs      03/25/17   0146   RBC  3.10*   HEMOGLOBIN  9.8*   HEMATOCRIT  29.4*   PLATELETCT  172     Recent Labs      03/25/17   0146   WBC  7.2   NEUTSPOLYS  80.90*   LYMPHOCYTES  4.30*   MONOCYTES  1.70   EOSINOPHILS  0.00   BASOPHILS  0.00   ASTSGOT  26   ALTSGPT  63*   ALKPHOSPHAT  51   TBILIRUBIN  0.5            Hemodynamics:  Temp (24hrs), Av.4 °C (97.6 °F), Min:35.8 °C (96.4 °F), Max:37 °C (98.6 °F)  Temperature: (!) 35.8 °C (96.4 °F)  Pulse  Av  Min: 56  Max: 115   Blood Pressure: 105/49 mmHg     Respiratory:    Respiration: 18, Pulse Oximetry: 95 %, O2 Daily Delivery Respiratory : Silicone Nasal Cannula     PEP/CPT Method: Positive Airway Pressure Device, Work Of Breathing / Effort: Mild  RUL Breath Sounds: Clear, RML Breath Sounds: Clear, RLL Breath Sounds: Diminished, LAYA Breath Sounds: Clear, LLL Breath Sounds: Fine Crackles  Fluids:  No intake or output data in the 24 hours ending 17 0745  Weight: 101.1 kg (222 lb 14.2 oz)  GI/Nutrition:  Orders Placed This Encounter   Procedures   • Diet Order     Standing Status: Standing      Number of Occurrences: 1      Standing Expiration Date:      Order Specific Question:  Diet:     Answer:  Diabetic [3]     Medical Decision Making, by Problem:  Active Hospital Problems    Diagnosis   • 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]  - on chemo and radiation therapy  - continue radiation therapy  - will hold chemo therapy for now  - discussed with Dr. Espino and to hold chemo therapy until his CAP resolved  - PATIENT DO NOT HAVE CAPACITY TO MAKE MEDICAL DECISION     • Seizure disorder (CMS-HCC) [G40.909]  - aspiration/seizure/fall precaution  - on keppra     • Hyponatremia [E87.1]  - follow cmp in am     • Normocytic anemia [D64.9]  - stable     • Thrush, oral [B37.0]  - on nystatin     • Pneumonia [J18.9]  - HCAP  - on IV cefepime, doxycycline(total 7 days), DC vancomycin   - consider switching to levofloxacin if planning to dc   - follow culture and de-escalate as needed     • Acute respiratory failure with hypoxia (CMS-HCC) [J96.01]  - from above  - breathing treatment, RT protocol     • Cerebral edema (CMS-HCC) [G93.6]  - on dexamethasone       *  History of DVT: on Xarelto        Labs reviewed, Medications reviewed and Radiology images reviewed        DVT:  xarelto.      Antibiotics: Treating active infection/contamination beyond 24 hours perioperative coverage

## 2017-03-27 NOTE — CARE PLAN
Problem: Communication  Goal: The ability to communicate needs accurately and effectively will improve  Outcome: PROGRESSING AS EXPECTED  Pt is alert and oriented x 1 . Pt is oriented to the environment and call light. I have discussed with the pt the plan of care, treatments and medications and the pt verbalizes agreement and understanding. The pt has been able to communicate his needs effectively and has been given the opportunity to ask any questions. All pt needs have been met and questions have been answered at this time. Hourly rounding in place.         Problem: Safety  Goal: Will remain free from injury  Outcome: PROGRESSING AS EXPECTED  Pt assessed at beginning of shift. Pt determined to be 1x assist . Fall precautions in place. Call light and personal possessions in reach, bed alarm on . Hourly rounding in place.

## 2017-03-27 NOTE — PROGRESS NOTES
"Assumed care of patient at 0715, received bedside report from night shift RN. Bed is locked and in lowest position with call light within reach. Treaded socks in place. Patient updated on plan of care, no complaints or pain at this time. White board updated. Assessment completed. Pt A&Ox1- oriented to self only. Pt keeps repeating \"today is the day.\" pt re-oriented to time place and situation. Tele monitor in place and cardiac rhythm being monitored. Strip alarm on. All needs met at this time.  "

## 2017-03-27 NOTE — CARE PLAN
Problem: Hyperinflation:  Goal: Prevent or improve atelectasis  Outcome: PROGRESSING AS EXPECTED  Absence of or improvement in signs of atelectasis    Improved Vital capacity      Improved inspiratory muscle performance.       Pt encouraged to perform I.S. 10 times per hr while awake  Intervention: Instruct incentive spirometry usage  I.S. 1000

## 2017-03-28 NOTE — CARE PLAN
Problem: Safety  Goal: Will remain free from falls  Outcome: PROGRESSING AS EXPECTED  Pt does not call before getting up. Bed alarm and treaded slipper socks on.    Problem: Venous Thromboembolism (VTW)/Deep Vein Thrombosis (DVT) Prevention:  Goal: Patient will participate in Venous Thrombosis (VTE)/Deep Vein Thrombosis (DVT)Prevention Measures  Outcome: PROGRESSING AS EXPECTED  Pt receiving xarelto for VTE.

## 2017-03-28 NOTE — PROGRESS NOTES
Patient was transported to our department for radiation therapy treatment number 7 of 13. We plan on treating him again tomorrow.

## 2017-03-28 NOTE — PROGRESS NOTES
Assumed pt care at 1900. Received bedside report from RN. PM assessment completed. AAOx1. Pt denies SOB; c/o pain of 0 on 0 to 10 pain scale (Will administer medication PRN - see MAR). Provided pt with RN and CNA extension numbers on white board and encouraged pt to call when needed. Discussed plan of care for the night, pt verbalizes understanding. VSS. Denies any additional needs at this time. Call light, belongings, and phone within reach. Hourly rounding in effect.

## 2017-03-28 NOTE — CARE PLAN
Problem: Safety  Goal: Will remain free from falls  Intervention: Implement fall precautions  Bed alarm on, call light in reach.       Problem: Mobility  Goal: Risk for activity intolerance will decrease  Intervention: Encourage patient to increase activity level in collaboration with Interdisciplinary Team  Pt will get OOB for all meals.

## 2017-03-28 NOTE — PROGRESS NOTES
Pt is agitated, security had to be called to remove the pt from the bathroom. Pt through his breakfast. Pt is confused and not making cohesive sentences. No change from Epic assessment. Bed alarm on, call light in reach. 12 hour chart check done.

## 2017-03-29 NOTE — PROGRESS NOTES
Hospital Medicine Progress Note, Adult, Complex               Author: Nicko Jade    Chief Complaint Today: 56 year old male admitted with shortness of breath Date & Time created: 3/28/2017  5:34 PM     Interval History:  Mr Calzada is a 56 year old male with astrocytoma, he is very confused and aphasic today, continued with pain management and chemotherapy and radiotherapy as needed. He may transfer to oncology when bed available. Medically complex, guarded prognosis, discussed with nursing.     Review of Systems:  Review of Systems   Unable to perform ROS: acuity of condition       Physical Exam:  Physical Exam   Constitutional: He appears well-developed and well-nourished. He is cooperative. No distress. He is not intubated. Nasal cannula in place.   HENT:   Head: Normocephalic and atraumatic.   Right Ear: External ear normal.   Left Ear: External ear normal.   Nose: Nose normal.   Mouth/Throat: Oropharynx is clear and moist.   Eyes: Conjunctivae and EOM are normal. Pupils are equal, round, and reactive to light.   Neck: Normal range of motion. Neck supple. No JVD present. No thyromegaly present.   Cardiovascular: Normal rate and regular rhythm.    Murmur heard.  Pulmonary/Chest: Effort normal. No accessory muscle usage. No apnea, no tachypnea and no bradypnea. He is not intubated. No respiratory distress. He has decreased breath sounds in the right lower field and the left lower field. He exhibits no tenderness.   Abdominal: He exhibits no distension and no mass. There is no tenderness. There is no rebound and no guarding.   Genitourinary:   Mesa catheter   Musculoskeletal: Normal range of motion. He exhibits edema.   Lymphadenopathy:     He has no cervical adenopathy.   Neurological: He is alert. He has normal reflexes. He is disoriented. No cranial nerve deficit. GCS eye subscore is 4. GCS verbal subscore is 3. GCS motor subscore is 6.   Skin: Skin is warm and dry. No rash noted. No erythema. No  pallor.   Psychiatric: His mood appears anxious. His affect is angry. His speech is slurred. Cognition and memory are impaired. He expresses impulsivity.   Nursing note and vitals reviewed.      Labs:        Invalid input(s): [id number]      No results for input(s): SODIUM, POTASSIUM, CHLORIDE, CO2, BUN, CREATININE, MAGNESIUM, PHOSPHORUS, CALCIUM in the last 72 hours.  No results for input(s): ALTSGPT, ASTSGOT, ALKPHOSPHAT, TBILIRUBIN, DBILIRUBIN, GAMMAGT, AMYLASE, LIPASE, ALB, PREALBUMIN, GLUCOSE in the last 72 hours.  No results for input(s): RBC, HEMOGLOBIN, HEMATOCRIT, PLATELETCT, PROTHROMBTM, APTT, INR, IRON, FERRITIN, TOTIRONBC in the last 72 hours.            Hemodynamics:  Temp (24hrs), Av.2 °C (97.1 °F), Min:35.9 °C (96.7 °F), Max:36.4 °C (97.6 °F)  Temperature: 35.9 °C (96.7 °F)  Pulse  Av.4  Min: 56  Max: 115   Blood Pressure: 113/81 mmHg     Respiratory:    Respiration: 18, Pulse Oximetry: 92 %     Work Of Breathing / Effort: Mild  RUL Breath Sounds: Diminished, RML Breath Sounds: Diminished, RLL Breath Sounds: Diminished, LAYA Breath Sounds: Diminished, LLL Breath Sounds: Diminished  Fluids:    Intake/Output Summary (Last 24 hours) at 17 1734  Last data filed at 17 1300   Gross per 24 hour   Intake    300 ml   Output    300 ml   Net      0 ml     Weight: 100.8 kg (222 lb 3.6 oz)  GI/Nutrition:  Orders Placed This Encounter   Procedures   • Diet Order     Standing Status: Standing      Number of Occurrences: 1      Standing Expiration Date:      Order Specific Question:  Diet:     Answer:  Diabetic [3]     Medical Decision Making, by Problem:  Active Hospital Problems    Diagnosis   • 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]  -radiotherapy   • Seizure disorder (CMS-HCC) [G40.909]  -medical management of sezires   • Hyponatremia [E87.1]  -135 today and doing well   • Normocytic anemia [D64.9]  -monitor and transfuse if unstable or below 7/21   • Thrush, oral [B37.0]  -nystatin S&S   •  Pneumonia [J18.9]  antibiotics day 6 with cefepime and doxycycline   • Acute respiratory failure with hypoxia (CMS-[facility]) [J96.01]  -oxygen as needed   • Cerebral edema (CMS-[facility]) [G93.6]  -chronic and needs steroids       EKG reviewed, Radiology images reviewed, Labs reviewed and Medications reviewed  Mesa catheter: No Mesa      DVT: xarelto.      Antibiotics: Treating active infection/contamination beyond 24 hours perioperative coverage  Assessed for rehab: Patient was assess for and/or received rehabilitation services during this hospitalization

## 2017-03-29 NOTE — PROGRESS NOTES
Hospital Medicine Progress Note, Adult, Complex               Author: Nicko Jade    Chief Complaint Today: 56 year old male admitted with shortness of breath Date & Time created: 3/29/2017  2:49 PM     Interval History:  Mr Calzada is a 56 year old male with astrocytoma,. Still remains very confused, but he says he fell today and hurt himself. He is with pain in the belly and chest and the neck. He seems more fluid overloaded and thus will try and diurese him slightly. He is continued with pain management. Medically complex, guarded prognosis, discussed with nursing.     Review of Systems:  Review of Systems   Unable to perform ROS: acuity of condition       Physical Exam:  Physical Exam   Constitutional: He appears well-developed and well-nourished. No distress. He is not intubated. Nasal cannula in place.   HENT:   Head: Normocephalic and atraumatic.   Right Ear: External ear normal.   Left Ear: External ear normal.   Nose: Nose normal.   Mouth/Throat: Oropharynx is clear and moist.   Eyes: Conjunctivae and EOM are normal. Pupils are equal, round, and reactive to light.   Neck: Normal range of motion. Neck supple. No tracheal deviation present.   Cardiovascular: Normal rate and regular rhythm.    Murmur heard.  Pulmonary/Chest: Effort normal. No accessory muscle usage or stridor. No apnea, no tachypnea and no bradypnea. He is not intubated. He has decreased breath sounds in the right lower field and the left lower field. He has no wheezes. He has no rales.   Abdominal: He exhibits no distension and no mass.   Genitourinary:   Mesa catheter   Musculoskeletal: Normal range of motion. He exhibits edema.   Neurological: He has normal reflexes. He is disoriented. He displays normal reflexes. No cranial nerve deficit. Coordination normal. GCS eye subscore is 4. GCS verbal subscore is 3. GCS motor subscore is 6.   Skin: Skin is warm and dry. He is not diaphoretic. No erythema.   Psychiatric: His mood appears  anxious. His affect is angry. His speech is slurred. He is slowed. Cognition and memory are impaired. He expresses impulsivity.   Nursing note and vitals reviewed.      Labs:        Invalid input(s): [id number]      No results for input(s): SODIUM, POTASSIUM, CHLORIDE, CO2, BUN, CREATININE, MAGNESIUM, PHOSPHORUS, CALCIUM in the last 72 hours.  No results for input(s): ALTSGPT, ASTSGOT, ALKPHOSPHAT, TBILIRUBIN, DBILIRUBIN, GAMMAGT, AMYLASE, LIPASE, ALB, PREALBUMIN, GLUCOSE in the last 72 hours.  No results for input(s): RBC, HEMOGLOBIN, HEMATOCRIT, PLATELETCT, PROTHROMBTM, APTT, INR, IRON, FERRITIN, TOTIRONBC in the last 72 hours.            Hemodynamics:  Temp (24hrs), Av.7 °C (96.3 °F), Min:35.3 °C (95.6 °F), Max:35.9 °C (96.7 °F)  Temperature: (!) 35.8 °C (96.4 °F)  Pulse  Av.9  Min: 56  Max: 115   Blood Pressure: 126/82 mmHg     Respiratory:    Respiration: 18, Pulse Oximetry: 92 %     Work Of Breathing / Effort: Mild  RUL Breath Sounds: Diminished, RML Breath Sounds: Diminished, RLL Breath Sounds: Diminished, LAYA Breath Sounds: Diminished, LLL Breath Sounds: Diminished  Fluids:  No intake or output data in the 24 hours ending 17 1449  Weight: 100.9 kg (222 lb 7.1 oz)  GI/Nutrition:  Orders Placed This Encounter   Procedures   • Diet Order     Standing Status: Standing      Number of Occurrences: 1      Standing Expiration Date:      Order Specific Question:  Diet:     Answer:  Diabetic [3]     Medical Decision Making, by Problem:  Active Hospital Problems    Diagnosis   • 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]  -continued with whole brain radiotherapy   • Seizure disorder (CMS-HCC) [G40.909]  -controlled. Remains on Keppra   • Hyponatremia [E87.1]  -135 most recently   • Normocytic anemia [D64.9]  -monitor and transfuse if unstable or below 7/   • Thrush, oral [B37.0]  -nystatin S&S continued as toelrated   • Pneumonia [J18.9]  antibiotics day 7 with cefepime and doxycycline   • Acute respiratory  failure with hypoxia (CMS-[facility]) [J96.01]  -RT and O2 protocol   • Cerebral edema (CMS-HCC) [G93.6]  -remains on decadron 4 mg every 6 hrs       EKG reviewed, Radiology images reviewed, Labs reviewed and Medications reviewed  Mesa catheter: No Mesa      DVT: xarelto.      Antibiotics: Treating active infection/contamination beyond 24 hours perioperative coverage  Assessed for rehab: Patient was assess for and/or received rehabilitation services during this hospitalization

## 2017-03-29 NOTE — CARE PLAN
Problem: Safety  Goal: Will remain free from falls  Fall precautions in place. Bed in lowest position. Non-skid socks in place. Personal possessions within reach. Mobility sign on door. Bed-alarm on. Call light within reach. Pt educated regarding fall prevention and states understanding.     Problem: Pain Management  Goal: Pain level will decrease to patient’s comfort goal  Outcome: PROGRESSING AS EXPECTED  Pt assessed for pain regularly and medicated PRN per MAR.

## 2017-03-29 NOTE — PROGRESS NOTES
Lap belt applied to pt w/ restraint orders at 0130.  At 0345, pt has figured out how to remove lap belt.

## 2017-03-29 NOTE — PROGRESS NOTES
Bedside report received.  Pt sleeping in bed, no signs of pain or distress  POC discussed with family at bedside  Bed in lowest position, call light in reach, lap belt in place per order  All questions answered and all needs met at this time

## 2017-03-29 NOTE — PROGRESS NOTES
Pt fell while attempting to get out of bed at 2345.  Fall was observed while entering the room in response to bed alarm.  Pt fell to his right side.  He did not appear to hit his head nor did he complain of any acute pain following the fall.  Pt neuro status was assessed to be his baseline.  Pt did not sustain any visible bruising or lacerations. Charge notified. Hospitalist on call was paged, no new orders rec'd.  Pharmacist called for paperwork.

## 2017-03-29 NOTE — PROGRESS NOTES
Patient was transported to our department for radiation therapy treatment number 8 of 13. We plan on treating him again tomorrow.

## 2017-03-29 NOTE — PROGRESS NOTES
Assumed care at 1900. Bedside report received from Jenise. Patient's chart and MAR reviewed. Pt is confused, agitated and expressive aphasicat this time.  Pt medicated per dayshift nurse. Bed alarm on, discussed keeping close eye on pt with CNA for the evening.   Call light, phone and personal belongings within reach.

## 2017-03-30 NOTE — PROGRESS NOTES
Hospital Medicine Progress Note, Adult, Complex               Author: Nicko Jade    Chief Complaint Today: 56 year old male admitted with shortness of breath Date & Time created: 3/30/2017  4:54 PM     Interval History:  Mr Calzada is a 56 year old male with astrocytoma,. Continues to have problems with speech and communication, tries very hard to speak, gets english and [place] mixed up and than he is also slurring the words, he knows what he wants to say but not coming out right. Remains with brain radiotherapy. Medically complex, guarded prognosis, discussed with nursing.     Review of Systems:  Review of Systems   Unable to perform ROS: acuity of condition       Physical Exam:  Physical Exam   Constitutional: He appears well-developed and well-nourished. He is cooperative. He does not appear ill. He is not intubated. Nasal cannula in place.   HENT:   Head: Normocephalic and atraumatic.   Right Ear: External ear normal.   Left Ear: External ear normal.   Eyes: Conjunctivae and EOM are normal. Pupils are equal, round, and reactive to light. Right eye exhibits no discharge. Left eye exhibits no discharge.   Neck: Normal range of motion. Neck supple. No JVD present. No thyromegaly present.   Cardiovascular: Normal rate and regular rhythm.  Exam reveals no gallop and no friction rub.    Murmur heard.  Pulmonary/Chest: Effort normal. No accessory muscle usage. No apnea, no tachypnea and no bradypnea. He is not intubated. No respiratory distress. He has decreased breath sounds in the right lower field and the left lower field. He exhibits no tenderness.   Abdominal: Soft. Bowel sounds are normal. He exhibits no distension. There is no tenderness. There is no rebound.   Genitourinary:   Mesa catheter   Musculoskeletal: Normal range of motion. He exhibits edema.   Lymphadenopathy:     He has no cervical adenopathy.   Neurological: He is alert. He has normal reflexes. He is disoriented. No cranial nerve  deficit. Coordination normal. GCS eye subscore is 4. GCS verbal subscore is 3. GCS motor subscore is 6.   Skin: Skin is warm and dry. No erythema.   Psychiatric: His affect is labile. His speech is slurred. He is slowed. Cognition and memory are impaired. He expresses impulsivity.   Nursing note and vitals reviewed.      Labs:        Invalid input(s): [id number]      No results for input(s): SODIUM, POTASSIUM, CHLORIDE, CO2, BUN, CREATININE, MAGNESIUM, PHOSPHORUS, CALCIUM in the last 72 hours.  No results for input(s): ALTSGPT, ASTSGOT, ALKPHOSPHAT, TBILIRUBIN, DBILIRUBIN, GAMMAGT, AMYLASE, LIPASE, ALB, PREALBUMIN, GLUCOSE in the last 72 hours.  No results for input(s): RBC, HEMOGLOBIN, HEMATOCRIT, PLATELETCT, PROTHROMBTM, APTT, INR, IRON, FERRITIN, TOTIRONBC in the last 72 hours.            Hemodynamics:  Temp (24hrs), Av.5 °C (97.7 °F), Min:35.8 °C (96.4 °F), Max:36.9 °C (98.4 °F)  Temperature: 36.6 °C (97.8 °F)  Pulse  Av.7  Min: 56  Max: 115   Blood Pressure: 100/76 mmHg     Respiratory:    Respiration: 16, Pulse Oximetry: 94 %     Work Of Breathing / Effort: Mild  RUL Breath Sounds: Diminished, RML Breath Sounds: Diminished, RLL Breath Sounds: Diminished, LAYA Breath Sounds: Diminished, LLL Breath Sounds: Diminished  Fluids:  No intake or output data in the 24 hours ending 17 1654  Weight: 99.3 kg (218 lb 14.7 oz)  GI/Nutrition:  Orders Placed This Encounter   Procedures   • Diet Order     Standing Status: Standing      Number of Occurrences: 1      Standing Expiration Date:      Order Specific Question:  Diet:     Answer:  Diabetic [3]     Medical Decision Making, by Problem:  Active Hospital Problems    Diagnosis   • 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]  -continues to have aphasia and word salad   • Seizure disorder (CMS-HCC) [G40.909]  -continued with Keppra   • Hyponatremia [E87.1]  -135 most recently  -follow levels   • Normocytic anemia [D64.9]  -monitor and transfuse if unstable or below 7    • Thrush, oral [B37.0]  -improved and will remains on nystatin   • Pneumonia [J18.9]  antibiotics day 8 with cefepime and doxycycline   • Acute respiratory failure with hypoxia (CMS-[facility]) [J96.01]  -RT and O2 protocol continued   • Cerebral edema (CMS-[facility]) [G93.6]  -remains on decadron 4 mg every 6 hrs  -remains with radiotherapy       EKG reviewed, Radiology images reviewed, Labs reviewed and Medications reviewed  Mesa catheter: No Mesa      DVT: xarelto.      Antibiotics: Treating active infection/contamination beyond 24 hours perioperative coverage  Assessed for rehab: Patient was assess for and/or received rehabilitation services during this hospitalization

## 2017-03-30 NOTE — CARE PLAN
Problem: Respiratory:  Goal: Respiratory status will improve  Outcome: PROGRESSING AS EXPECTED    Problem: Communication  Goal: The ability to communicate needs accurately and effectively will improve  Outcome: PROGRESSING AS EXPECTED  Patient educated about medication and POC.     Problem: Safety  Goal: Will remain free from injury  Outcome: PROGRESSING AS EXPECTED  Patient educated about safety and to call for assistance. Safety measures in place. Patient's call light is close, bed is in the lowest position, bedside table is close and bed alarm on. Patient has a lap belt on that he is able to take on and off.

## 2017-03-30 NOTE — CARE PLAN
Problem: Respiratory:  Goal: Respiratory status will improve  Outcome: PROGRESSING AS EXPECTED  Assessed patient's respiratory status. Patient is on 3L NC.     Problem: Safety  Goal: Will remain free from injury  Outcome: PROGRESSING AS EXPECTED  Educated patient on calling for assistance. Bed alarm is on, patient has a Lap belt that he is able to get out of, call light is within reach, bed side table is close.

## 2017-03-30 NOTE — CARE PLAN
Problem: Safety  Goal: Will remain free from injury  Fall precautions in place. Bed in lowest position. Non-skid socks in place. Personal possessions within reach. Mobility sign on door. Bed-alarm on. Call light within reach. Lap belt in place, pt able to demonstrate self-removal. Pt educated regarding fall prevention.      Problem: Knowledge Deficit  Goal: Knowledge of the prescribed therapeutic regimen will improve  Outcome: PROGRESSING AS EXPECTED  Pt educated regarding plan of care and medications. All questions answered.

## 2017-03-30 NOTE — PROGRESS NOTES
Assumed care of patient bedside report received from night shift RN and was updated on POC. Patient's call light within reach and fall precautions in place. Bed locked and in lowest position. Patient is sleeping in bed.

## 2017-03-30 NOTE — PROGRESS NOTES
Pt back from radiation treatment. Pt sitting up in chair eating lunch, chair alarm in place and on.

## 2017-03-30 NOTE — PROGRESS NOTES
Assumed care at 1900. Bedside report received from Jim. Patient's chart and MAR reviewed. Pt denies pain at this time. Pt is A & O 3 at times with intermittent episodes of confusion. Patient was updated on plan of care for the day. Questions answered and concerns addressed.  Pt denies any additional needs at this time. White board updated. Call light, phone and personal belongings within reach.

## 2017-03-31 NOTE — CARE PLAN
Problem: Communication  Goal: The ability to communicate needs accurately and effectively will improve  Outcome: PROGRESSING AS EXPECTED  Patient educated to call for assistance and about medications.     Problem: Safety  Goal: Will remain free from injury  Outcome: PROGRESSING AS EXPECTED  Patient educated to call for help before getting out of bed. Safety measures are in place. Call light is within reach, bed is locked and in the lowest position and bedside table is close.

## 2017-03-31 NOTE — PROGRESS NOTES
Assumed care at 1900. Bedside report received from Yoly. Patient's chart and MAR reviewed. Pt denies pain at this time. Pt is A & O 1. Patient was updated on plan of care for the day. Questions answered and concerns addressed.  Pt denies any additional needs at this time. White board updated. Call light, phone and personal belongings within reach.

## 2017-03-31 NOTE — PROGRESS NOTES
Pt arrived to unit. Alert but oriented only to self. Pt primarily Chinese speaking but expressive aphasia noted. [place]ian speaking charge nurse at the bedside and assisting with assessment. Pt denies pain. Lap belt in place due to confusion and report of impulsivity. BA on and sounding. Room near nurse's station. Pt resting comfortably at this time. Call light within reach. Hourly rounding in place.

## 2017-03-31 NOTE — PROGRESS NOTES
Transport took patient and all of patient's belonging to radiology and will be transferred to [facility]307 after treatment. Called to give report to RN for R307.

## 2017-03-31 NOTE — CARE PLAN
Problem: Communication  Goal: The ability to communicate needs accurately and effectively will improve  Outcome: PROGRESSING SLOWER THAN EXPECTED  Pt is able to communicate needs appropriately. Call light within reach.        Problem: Safety  Goal: Will remain free from injury  Outcome: PROGRESSING AS EXPECTED  Fall precautions in place. Bed in lowest position. Non-skid socks in place. Personal possessions within reach. Mobility sign on door. Bed-alarm on. Call light within reach. Pt educated regarding fall prevention.

## 2017-03-31 NOTE — PROGRESS NOTES
Received report from night shift RN. Patient is resting in bed. Fall precautions are in place. Bed is locked and in the lowest position. Call light is within reach. Bed side table is close

## 2017-03-31 NOTE — PROGRESS NOTES
Hospital Medicine Progress Note, Adult, Complex               Author: Nicko Jade    Chief Complaint Today: 56 year old male admitted with shortness of breath Date & Time created: 3/31/2017  1:39 PM     Interval History:  Mr Calzada is a 56 year old male with astrocytoma,. Today in conversation he reports no pain, he is sitting at bed side and eating a good meal. He follows all directions, he is mixing up verbally German and english and when he can't say a word he fills it in with something he makes up, in the end we have word salad. But he is cooperative and followed commands and was cooperative with exam. He remains with ongoing radiotherapy. He will transfer to the oncology floor later today, he is going to discharge home when ready. Medically complex, guarded prognosis, discussed with nursing.     Review of Systems:  Review of Systems   Unable to perform ROS: acuity of condition       Physical Exam:  Physical Exam   Constitutional: He appears well-developed and well-nourished. He is cooperative. He does not have a sickly appearance. He is not intubated. Nasal cannula in place.   HENT:   Head: Normocephalic and atraumatic.   Mouth/Throat: No oropharyngeal exudate.   Eyes: Conjunctivae and EOM are normal. Pupils are equal, round, and reactive to light.   Neck: Normal range of motion. Neck supple. No JVD present.   Cardiovascular: Normal rate and regular rhythm.  Exam reveals no gallop.    Murmur heard.  Pulmonary/Chest: Effort normal. No accessory muscle usage or stridor. No apnea, no tachypnea and no bradypnea. He is not intubated. No respiratory distress. He has decreased breath sounds in the right lower field and the left lower field. He has no wheezes.   Abdominal: Soft. Bowel sounds are normal. He exhibits no distension and no mass. There is no tenderness. There is no guarding.   Genitourinary:   Mesa catheter   Musculoskeletal: Normal range of motion. He exhibits edema.   Lymphadenopathy:     He  has no cervical adenopathy.   Neurological: He is alert. He has normal reflexes. He is disoriented. He displays normal reflexes. He exhibits normal muscle tone. GCS eye subscore is 4. GCS verbal subscore is 3. GCS motor subscore is 6.   Skin: Skin is warm and dry. No rash noted. No pallor.   Psychiatric: His affect is labile. His speech is slurred. He is slowed. Cognition and memory are impaired. He expresses impulsivity.   Nursing note and vitals reviewed.      Labs:        Invalid input(s): [id number]      No results for input(s): SODIUM, POTASSIUM, CHLORIDE, CO2, BUN, CREATININE, MAGNESIUM, PHOSPHORUS, CALCIUM in the last 72 hours.  No results for input(s): ALTSGPT, ASTSGOT, ALKPHOSPHAT, TBILIRUBIN, DBILIRUBIN, GAMMAGT, AMYLASE, LIPASE, ALB, PREALBUMIN, GLUCOSE in the last 72 hours.  No results for input(s): RBC, HEMOGLOBIN, HEMATOCRIT, PLATELETCT, PROTHROMBTM, APTT, INR, IRON, FERRITIN, TOTIRONBC in the last 72 hours.            Hemodynamics:  Temp (24hrs), Av.4 °C (97.5 °F), Min:36.1 °C (97 °F), Max:36.6 °C (97.8 °F)  Temperature: 36.6 °C (97.8 °F)  Pulse  Av.6  Min: 56  Max: 115   Blood Pressure: 103/75 mmHg     Respiratory:    Respiration: 16, Pulse Oximetry: 93 %     Work Of Breathing / Effort: Mild  RUL Breath Sounds: Diminished, RML Breath Sounds: Diminished, RLL Breath Sounds: Diminished, LAYA Breath Sounds: Diminished, LLL Breath Sounds: Diminished  Fluids:    Intake/Output Summary (Last 24 hours) at 17 1339  Last data filed at 17 0400   Gross per 24 hour   Intake      0 ml   Output   1400 ml   Net  -1400 ml     Weight: 99.4 kg (219 lb 2.2 oz)  GI/Nutrition:  Orders Placed This Encounter   Procedures   • Diet Order     Standing Status: Standing      Number of Occurrences: 1      Standing Expiration Date:      Order Specific Question:  Diet:     Answer:  Diabetic [3]     Medical Decision Making, by Problem:  Active Hospital Problems    Diagnosis   • Astrocytoma (CMS-HCC)  [C71.9]  -on continued radiotherapy  -remains with expressive aphasia   • Seizure disorder (CMS-[facility]) [G40.909]  -stable on Keppra  -continued to be monitored   • Hyponatremia [E87.1]  -135 most recently  -follow levels   • Normocytic anemia [D64.9]  -monitor and transfuse if unstable or below 7/21   • Thrush, oral [B37.0]  -continued with nystatin   • Pneumonia [J18.9]  antibiotics day 9 with cefepime and doxycycline   • Acute respiratory failure with hypoxia (CMS-[facility]) [J96.01]  -continued with oxygen protocol   • Cerebral edema (CMS-HCC) [G93.6]  -remains on decadron 4 mg every 6 hrs and for now continued  -remains on radiotherapy       EKG reviewed, Radiology images reviewed, Labs reviewed and Medications reviewed  Mesa catheter: No Mesa      DVT: xarelto.      Antibiotics: Treating active infection/contamination beyond 24 hours perioperative coverage  Assessed for rehab: Patient was assess for and/or received rehabilitation services during this hospitalization

## 2017-03-31 NOTE — DISCHARGE PLANNING
"Care Transition Team Assessment  Pt is alert but only oriented to self.  He is Ivorian speaking only with expressive aphasia.  SW called pt's daughter, Desiree (ph#: [phone]), to complete assessment as she is listed as pt's primary emergency contact.  Desiree reports that pt was at [facility], was d/d'c home for a little bit and then was readmitted to the hospital.  Pt's daughter states that pt was living in an apartment alone, but they are in the process of moving pt out of his apartment as he will be staying with his daughter Priscilla.  Desiree states that Priscilla does not work and is able to provide pt with the care that he needs.  Desiree informed this SW that pt has had difficulty walking because \"his legs are so swollen and weak\" and that pt usually holds onto the walls to walk around.   They do have a w/c at home for pt, but no other DME reported.  Desiree informed this SW that she needs to  some paperwork from pt's employer to have they physician fill out (sounds like FMLA or disability paperwork).  Desiree also reports that she is in the process of obtaining temporary guardianship over pt so that she can assist with taking care of pt's affairs--they have a court date scheduled for April 4th.  Jordana stated that they would like for pt to d/c home with Priscilla and family support once medically cleared she did not report any anticipated needs for d/c.  It is possible that pt may need home health for d/c.  SS will remain available to provide support and assist with d/c planning as needed.    Information Source  Orientation : Disoriented to Event, Disoriented to Place, Disoriented to Time  Information Given By:  (Daughter)  Informant's Name:  (Desiree Gill)  Who is responsible for making decisions for patient? : Legal next of kin  Name(s) of Primary Decision Maker:  (Desiree and Priscilla Chowdhury)    Readmission Evaluation  Is this a readmission?: Yes - unplanned readmission  Why do you think " "you were readmitted?:  (fever)  Did you understand your discharge instructions?: Yes  Did you have enough support after your last discharge?: Yes    Elopement Risk  Legal Hold: No  Ambulatory or Self Mobile in Wheelchair: Yes  Disoriented: Place-At Risk for Elopement, Time-At Risk for Elopement, Situation-At Risk for Elopement  Elopement Risk: Not at Risk for Elopement    Interdisciplinary Discharge Planning  Does Admitting Nurse Feel This Could be a Complex Discharge?: No  Primary Care Physician: Lisette Mendoza  Lives with - Patient's Self Care Capacity: Adult Children (Priscilla)  Support Systems: Family Member(s)  Housing / Facility: 1 Story Apartment / Condo  Do You Take your Prescribed Medications Regularly: Yes  Able to Return to Previous ADL's: Yes  Mobility Issues: Yes  Prior Services: Continuous (24 Hour) Care Giving Family  Patient Expects to be Discharged to:: home  Assistance Needed: No  Durable Medical Equipment:  (has a w/c at home)    Discharge Preparedness  What is your plan after discharge?: Home with help  What are your discharge supports?: Child  Prior Functional Level: Needs Assist with Activities of Daily Living  Difficulity with ADLs: Walking  Difficulty with ADLs Comment:  (\"holds onto the walls to walk\")  Difficulity with IADLs: Cooking, Driving, Keeping track of finances, Laundry, Managing medication, Shopping    Functional Assesment  Prior Functional Level: Needs Assist with Activities of Daily Living    Finances  Financial Barriers to Discharge:  (pt has not been able to work for some time.)  Prescription Coverage: Yes    Vision / Hearing Impairment  Vision Impairment : No  Hearing Impairment : Yes  Hearing Impairment:  (daughter denies)  Does Pt Need Special Equipment for the Hearing Impaired?: No    Values / Beliefs / Concerns  Values / Beliefs Concerns : No    Advance Directive  Advance Directive?:  (dtr reports pt completed one, SW asked her to bring copy)    Domestic Abuse  Have you " ever been the victim of abuse or violence?: No    Psychological Assessment  Non-compliant with Treatment: No  Newly Diagnosed Illness: No    Discharge Risks or Barriers  Discharge risks or barriers?: Complex medical needs  Patient risk factors: Complex medical needs, Readmission    Anticipated Discharge Information  Anticipated discharge disposition: Home

## 2017-04-01 NOTE — PROGRESS NOTES
Pt calm and relaxed, family at bedside, pt primarily Afghan speaking with some english, claims no pain or nausea, tolerating diet and medications well. Pt is impulsive so Lap belt in place and pt instructed on use, bed alarm on for safety, call light in reach and pt calls appropriately, bed in low position, 2 side rails up, treaded socks on.

## 2017-04-01 NOTE — PROGRESS NOTES
Assumed care of pt at 0650. Assessment completed. Pt awake, alert and oriented x1. Oriented to self only. Pt is a one person assist. Denies pain. Pt has lap belt in place as is impulsive. Understands plan of care. All needs met, bed in lowest locked position, call light within reach. Bed alarm on.

## 2017-04-01 NOTE — PROGRESS NOTES
Hospital Medicine Progress Note, Adult, Complex               Author: Jammie Haas Date & Time created: 2017  4:45 PM     Interval History:  Patient admitted for respiratory distress and pneumonia, he is breathing better and transferred from tele to oncology floor.  Currently communication is difficult as he is speaking with word salad in both english and Mohawk.  No acute changes/complaints.      Review of Systems:  Review of Systems   Unable to perform ROS: mental acuity       Physical Exam:  Physical Exam   Constitutional: He appears well-developed and well-nourished. No distress.   HENT:   Head: Normocephalic and atraumatic.   Healed scalp incision     Eyes: Conjunctivae are normal. No scleral icterus.   Neck: Neck supple. No JVD present.   Cardiovascular: Normal rate and regular rhythm.  Exam reveals no friction rub.    No murmur heard.  Pulmonary/Chest: Effort normal and breath sounds normal. No respiratory distress. He exhibits no tenderness.   Abdominal: Soft. Bowel sounds are normal. He exhibits no distension and no mass. There is no tenderness.   Musculoskeletal: He exhibits no edema or tenderness.   Neurological: He is alert. No cranial nerve deficit.   Skin: Skin is warm and dry. He is not diaphoretic. No erythema. No pallor.   Psychiatric: He has a normal mood and affect.   Word salad in english and Mohawk.     Nursing note and vitals reviewed.      Labs:        Invalid input(s): [id number]      No results for input(s): SODIUM, POTASSIUM, CHLORIDE, CO2, BUN, CREATININE, MAGNESIUM, PHOSPHORUS, CALCIUM in the last 72 hours.  No results for input(s): ALTSGPT, ASTSGOT, ALKPHOSPHAT, TBILIRUBIN, DBILIRUBIN, GAMMAGT, AMYLASE, LIPASE, ALB, PREALBUMIN, GLUCOSE in the last 72 hours.  Recent Labs      17   2349   RBC  3.84*   HEMOGLOBIN  12.3*   HEMATOCRIT  36.6*   PLATELETCT  276     Recent Labs      17   2349   WBC  9.2           Hemodynamics:  Temp (24hrs), Av.4 °C (97.5 °F), Min:36.1  °C (97 °F), Max:36.5 °C (97.7 °F)  Temperature: 36.1 °C (97 °F)  Pulse  Av  Min: 56  Max: 115   Blood Pressure: 101/65 mmHg     Respiratory:    Respiration: 18, Pulse Oximetry: 94 %     Work Of Breathing / Effort: Mild  RUL Breath Sounds: Diminished, RML Breath Sounds: Diminished, RLL Breath Sounds: Diminished, LAYA Breath Sounds: Diminished, LLL Breath Sounds: Diminished  Fluids:    Intake/Output Summary (Last 24 hours) at 17 1645  Last data filed at 17 1600   Gross per 24 hour   Intake   2642 ml   Output   1150 ml   Net   1492 ml        GI/Nutrition:  Orders Placed This Encounter   Procedures   • Diet Order     Standing Status: Standing      Number of Occurrences: 1      Standing Expiration Date:      Order Specific Question:  Diet:     Answer:  Diabetic [3]     Medical Decision Making, by Problem:  Active Hospital Problems    Diagnosis   • 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]subtotal resection, radiation with Dr Bailey     • Seizure disorder (CMS-HCC) [G40.909]keppra     • Hyponatremia [E87.1]resolved, check bmp tomorrow     • Normocytic anemia [D64.9]mild, stable     • Thrush, oral [B37.0]nystatin     • Pneumonia [J18.9]completed abx     • Acute respiratory failure with hypoxia (CMS-HCC) [J96.01]resolved     • Cerebral edema (CMS-HCC) [G93.6]decadron         Labs reviewed and Radiology images reviewed  Mesa catheter: No Mesa      DVT: xarelto.        Assessed for rehab: Patient was assess for and/or received rehabilitation services during this hospitalization

## 2017-04-02 NOTE — PROGRESS NOTES
Assumed care of pt at 0700. Assessment completed. Pt awake, alert and oriented x2. Oriented to self and place. Pt is demonstrating word salad. Lap belt in place, pt demonstrates ability to remove, and repeat back education regarding need for belt. Denies pain. Understands plan of care. All needs met, bed in lowest locked position, call light within reach. Bed alarm on. Hourly rounding in place.

## 2017-04-02 NOTE — PROGRESS NOTES
Hospital Medicine Progress Note, Adult, Complex               Author: Jammie Haas Date & Time created: 4/2/2017  2:59 PM     Interval History:  4/1 Patient admitted for respiratory distress and pneumonia, he is breathing better and transferred from tele to oncology floor.  Currently communication is difficult as he is speaking with word salad in both english and Vietnamese.  No acute changes/complaints.  4/2 Patient doing about the same, still with word salad in both languages.  From infection standpoint he is doing well, has completed his antibiotics and is not requiring oxygen.  Daughter is in process of obtaining temporary guardianship for patient with hearing on Tuesday.  In his current state, patient does not have capacity to make decisions for himself.    Review of Systems:  Review of Systems   Unable to perform ROS: mental acuity       Physical Exam:  Physical Exam   Constitutional: He appears well-developed and well-nourished. No distress.   HENT:   Head: Normocephalic and atraumatic.   Healed scalp incision     Eyes: Conjunctivae are normal. No scleral icterus.   Neck: Neck supple. No JVD present.   Cardiovascular: Normal rate and regular rhythm.  Exam reveals no friction rub.    No murmur heard.  Pulmonary/Chest: Effort normal and breath sounds normal. No respiratory distress. He exhibits no tenderness.   Abdominal: Soft. Bowel sounds are normal. He exhibits no distension and no mass. There is no tenderness.   Musculoskeletal: He exhibits no edema or tenderness.   Neurological: He is alert. No cranial nerve deficit.   Skin: Skin is warm and dry. He is not diaphoretic. No erythema. No pallor.   Psychiatric: He has a normal mood and affect.   Word salad in english and Vietnamese.     Nursing note and vitals reviewed.      Labs:        Invalid input(s): [id number]      Recent Labs      04/01/17   2351   SODIUM  135   POTASSIUM  4.1   CHLORIDE  103   CO2  25   BUN  20   CREATININE  0.53   CALCIUM  8.7      Recent Labs      17   2351   GLUCOSE  172*     Recent Labs      17   2349  17   2351   RBC  3.84*  3.82*   HEMOGLOBIN  12.3*  12.1*   HEMATOCRIT  36.6*  37.1*   PLATELETCT  276  257     Recent Labs      17   2349  17   2351   WBC  9.2  8.4   NEUTSPOLYS   --   87.80*   LYMPHOCYTES   --   3.50*   MONOCYTES   --   2.60   EOSINOPHILS   --   0.00   BASOPHILS   --   0.00           Hemodynamics:  Temp (24hrs), Av.3 °C (97.4 °F), Min:36.1 °C (97 °F), Max:36.6 °C (97.8 °F)  Temperature: 36.6 °C (97.8 °F)  Pulse  Av.4  Min: 56  Max: 115   Blood Pressure: 115/77 mmHg     Respiratory:    Respiration: 18, Pulse Oximetry: 92 %     Work Of Breathing / Effort: Mild  RUL Breath Sounds: Diminished, RML Breath Sounds: Diminished, RLL Breath Sounds: Diminished, LAYA Breath Sounds: Diminished, LLL Breath Sounds: Diminished  Fluids:    Intake/Output Summary (Last 24 hours) at 17 1459  Last data filed at 17 1402   Gross per 24 hour   Intake      0 ml   Output   2400 ml   Net  -2400 ml        GI/Nutrition:  Orders Placed This Encounter   Procedures   • Diet Order     Standing Status: Standing      Number of Occurrences: 1      Standing Expiration Date:      Order Specific Question:  Diet:     Answer:  Diabetic [3]     Medical Decision Making, by Problem:  Active Hospital Problems    Diagnosis   • Astrocytoma (CMS-[facility]) [C71.9]subtotal resection, radiation with Dr Bailey     • Seizure disorder (CMS-[facility]) [G40.909]keppra     • Hyponatremia [E87.1]resolved, check bmp tomorrow     • Normocytic anemia [D64.9]mild, stable     • Thrush, oral [B37.0]nystatin     • Pneumonia [J18.9]completed abx     • Acute respiratory failure with hypoxia (CMS-[facility]) [J96.01]resolved     • Cerebral edema (CMS-[facility]) [G93.6]decadron         Labs reviewed and Radiology images reviewed  Mesa catheter: No Mesa      DVT: xarelto.        Assessed for rehab: Patient was assess for and/or received rehabilitation services  during this hospitalization

## 2017-04-02 NOTE — PROGRESS NOTES
"Bedside report taken and assumed care of Mr Carmona at 1900. Patient is awake, pleasant oriented to self and place. Is experiencing word salad in a mixture of mostly Occitan, but some English. Answers to his name and \"hospital\" when asked where he is. Explained that we were continue to keep the lap belt in place because of impulsivity. Has no complaints of pain, no further questions. Call light is within reach and hourly rounding is in place.  "

## 2017-04-02 NOTE — PROGRESS NOTES
Daughter Priscilla called for update. Updated her on fathers disorientation and blood glucose level. Reports she will be visiting in the morning.

## 2017-04-03 NOTE — PROGRESS NOTES
Bedside report received and assumed care of Mr Carmona at 1900. Patient is A&Ox2, oriented to self and place. Patient demonstrates word salad, family is present and able to ask questions to help assess his orientation and they confirm that his words don't make sense, but he demonstrates an irritation with them here. Lap belt is in place and the bed alarm is on. Call light in reach, hourly rounding in place.

## 2017-04-03 NOTE — PROGRESS NOTES
Hospital Medicine Progress Note, Adult, Complex               Author: Jammie Haas Date & Time created: 4/3/2017  2:30 PM     Interval History:  4/1 Patient admitted for respiratory distress and pneumonia, he is breathing better and transferred from tele to oncology floor.  Currently communication is difficult as he is speaking with word salad in both english and Portuguese.  No acute changes/complaints.  4/2 Patient doing about the same, still with word salad in both languages.  From infection standpoint he is doing well, has completed his antibiotics and is not requiring oxygen.  Daughter is in process of obtaining temporary guardianship for patient with hearing on Tuesday.  In his current state, patient does not have capacity to make decisions for himself.  4/3 Patient doing about the same, language mostly confused words.  He does answer some questions appropriately.  He was irritated during bed bath earlier and was resisting cleaning but with constant re-orientating in Malagasy, he calmed down and bed bath completed.  Continue with daily radiation 11/30 done today.    Review of Systems:  Review of Systems   Unable to perform ROS: mental acuity       Physical Exam:  Physical Exam   Constitutional: He appears well-developed and well-nourished. No distress.   HENT:   Head: Normocephalic and atraumatic.   Healed scalp incision     Eyes: Conjunctivae are normal. No scleral icterus.   Neck: Neck supple. No JVD present.   Cardiovascular: Normal rate and regular rhythm.  Exam reveals no friction rub.    No murmur heard.  Pulmonary/Chest: Effort normal and breath sounds normal. No respiratory distress. He exhibits no tenderness.   Abdominal: Soft. Bowel sounds are normal. He exhibits no distension and no mass. There is no tenderness.   Musculoskeletal: He exhibits no edema or tenderness.   Neurological: He is alert. No cranial nerve deficit.   Skin: Skin is warm and dry. He is not diaphoretic. No erythema. No pallor.    Psychiatric: He has a normal mood and affect.   Word salad in english and Guamanian.     Nursing note and vitals reviewed.      Labs:        Invalid input(s): [id number]      Recent Labs      17   2351   SODIUM  135   POTASSIUM  4.1   CHLORIDE  103   CO2  25   BUN  20   CREATININE  0.53   CALCIUM  8.7     Recent Labs      17   2351   GLUCOSE  172*     Recent Labs      17   2349  17   2351   RBC  3.84*  3.82*   HEMOGLOBIN  12.3*  12.1*   HEMATOCRIT  36.6*  37.1*   PLATELETCT  276  257     Recent Labs      17   23417   2351   WBC  9.2  8.4   NEUTSPOLYS   --   87.80*   LYMPHOCYTES   --   3.50*   MONOCYTES   --   2.60   EOSINOPHILS   --   0.00   BASOPHILS   --   0.00           Hemodynamics:  Temp (24hrs), Av.3 °C (97.3 °F), Min:35.9 °C (96.6 °F), Max:36.6 °C (97.9 °F)  Temperature: 36.4 °C (97.5 °F)  Pulse  Av.5  Min: 56  Max: 115   Blood Pressure: 118/74 mmHg     Respiratory:    Respiration: 18, Pulse Oximetry: 98 %     Work Of Breathing / Effort: Mild  RUL Breath Sounds: Diminished, RML Breath Sounds: Diminished, RLL Breath Sounds: Diminished, LAYA Breath Sounds: Diminished, LLL Breath Sounds: Diminished  Fluids:    Intake/Output Summary (Last 24 hours) at 17 1430  Last data filed at 17 0700   Gross per 24 hour   Intake      0 ml   Output   1925 ml   Net  -1925 ml        GI/Nutrition:  Orders Placed This Encounter   Procedures   • Diet Order     Standing Status: Standing      Number of Occurrences: 1      Standing Expiration Date:      Order Specific Question:  Diet:     Answer:  Diabetic [3]     Medical Decision Making, by Problem:  Active Hospital Problems    Diagnosis   • 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]subtotal resection, radiation with Dr Bailey     • Seizure disorder (CMS-HCC) [G40.909]keppra     • Hyponatremia [E87.1]resolved, check bmp tomorrow     • Normocytic anemia [D64.9]mild, stable     • Thrush, oral [B37.0]nystatin     • Pneumonia  [J18.9]completed abx     • Acute respiratory failure with hypoxia (CMS-HCC) [J96.01]resolved     • Cerebral edema (CMS-HCC) [G93.6]decadron         Labs reviewed and Radiology images reviewed  Mesa catheter: No Mesa      DVT: xarelto.        Assessed for rehab: Patient was assess for and/or received rehabilitation services during this hospitalization

## 2017-04-03 NOTE — PROGRESS NOTES
Patient has a new MEWS score of 4, HR slightly elevated since last reading. Looking over the trend, during this hospitalization can see his heart rate has a large range. Likely Afib. Will monitor.

## 2017-04-03 NOTE — PROGRESS NOTES
Assumed care of patient this am.  He is sitting up in bed and ate about 25% of breakfast.  He denies when asked about pain (in Egyptian).  Transport just came to pick him up and take him to radiation.  I will continue to assess and monitor.

## 2017-04-04 NOTE — CARE PLAN
Problem: Venous Thromboembolism (VTE)/Deep Vein Thrombosis (DVT) Prevention  Goal: Patient will participate in Venous Thrombosis (VTE)/Deep Vein Thrombosis (DVT)Prevention Measures  Outcome: PROGRESSING AS EXPECTED  Interventions   Intervention: Assess and monitor for anticoagulation complications-  Hx DVT- ON Xarelto 20 mg po daily     Intervention: Encourage patient to perform ankle flex, foot rotation, and knee flex exercises in addition to other prophylatic measures every hour while awake      Intervention:Encourage ambulation/mobilization at level directed by Physical Therapy in collaboration with Interdisciplinary Team        Problem:Pain Management   Goal: Pain level will decrease to patient’s comfort goal  Outcome: PROGRESSING AS EXPECTED    Denies pain as of this time    Intervention:   Follow pain managment plan developed in collaboration with patient and Interdisciplinary Team             Intervention: Educate and implement non-pharmacologic comfort measures. Examples: relaxation, distration, play therapy, activity therapy, massage, etc.

## 2017-04-04 NOTE — PROGRESS NOTES
Patient was transported to our department for radiation therapy treatment number 12 of 30 to his brain. We will transport him again tomorrow.

## 2017-04-04 NOTE — CARE PLAN
Problem: Safety  Goal: Will remain free from injury  Outcome: PROGRESSING AS EXPECTED  Pt assessed for fall risks. Educated on use of call light, encouraged pt to call for assistance. Bed alarm in place.     Problem: Pain Management  Goal: Pain level will decrease to patient’s comfort goal  Outcome: PROGRESSING AS EXPECTED  Pt with adequate pain control. Encouraged pt to use non pharmacological interventions as well. Will continue to assess through out shift.

## 2017-04-04 NOTE — PROGRESS NOTES
Hospital Medicine Progress Note, Adult, Complex               Author: Kalen Smith Date & Time created: 2017  10:09 AM     Interval History:  Still quite wordy, Slovak speaking only. No fevers seen, tolerating po's, less apparent pain.  Guardianship paperwork underway.    Review of Systems:  Review of Systems   Unable to perform ROS: language       Physical Exam:  Physical Exam  A&Ox? Wordy, obese  PERRL EOMI mmm scars  Supple no jvd bruit or ed  RRR no mrg  CTAB JAYDA  Soft abd ntnd bs+ obese  No edema    Labs:        Invalid input(s): [id number]      Recent Labs      17   2351   SODIUM  135   POTASSIUM  4.1   CHLORIDE  103   CO2  25   BUN  20   CREATININE  0.53   CALCIUM  8.7     Recent Labs      17   2351   GLUCOSE  172*     Recent Labs      17   2351   RBC  3.82*   HEMOGLOBIN  12.1*   HEMATOCRIT  37.1*   PLATELETCT  257     Recent Labs      17   2351   WBC  8.4   NEUTSPOLYS  87.80*   LYMPHOCYTES  3.50*   MONOCYTES  2.60   EOSINOPHILS  0.00   BASOPHILS  0.00           Hemodynamics:  Temp (24hrs), Av.2 °C (97.2 °F), Min:36.1 °C (97 °F), Max:36.6 °C (97.8 °F)  Temperature: 36.6 °C (97.8 °F)  Pulse  Av  Min: 56  Max: 122   Blood Pressure: 108/78 mmHg     Respiratory:    Respiration: 18, Pulse Oximetry: 97 %     Work Of Breathing / Effort: Mild  RUL Breath Sounds: Clear, RML Breath Sounds: Diminished, RLL Breath Sounds: Diminished, LAYA Breath Sounds: Clear, LLL Breath Sounds: Diminished  Fluids:    Intake/Output Summary (Last 24 hours) at 17 1009  Last data filed at 17 0600   Gross per 24 hour   Intake    250 ml   Output   1270 ml   Net  -1020 ml        GI/Nutrition:  Orders Placed This Encounter   Procedures   • Diet Order     Standing Status: Standing      Number of Occurrences: 1      Standing Expiration Date:      Order Specific Question:  Diet:     Answer:  Diabetic [3]     Medical Decision Making, by Problem:  Active Hospital Problems    Diagnosis   •  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

## 2017-04-04 NOTE — PROGRESS NOTES
Pt is  alert but oriented to self only. Pt primarily speaks Azeri but mixed with English. + expressive aphasia. He does answer some of the questions appropriately. Impulsive at times. Ativan 0.5 mg IV given with help. Denies pain as of this time.  IV Saline Locked. Bed alarm on. On Fall precautions. Hourly rounding in place. Call light within reach.

## 2017-04-04 NOTE — PROGRESS NOTES
Received report from night shift RN, assumed care of patient at 0700. Pt disoriented to self, time, place, and situation. Pt speaks in a mix of Dutch and english, with word salad.  x2 assist. Impulsively trying to get out of bed, medicated Ativan 0.5 mg IV. Bed alarm in place. Denies pain or nausea. 2L O2 via nasal cannula. Call light within reach, bed in low and locked position. No other needs at this time.

## 2017-04-04 NOTE — DISCHARGE PLANNING
Medical Social Work    SW received VM from pt's daughter, Priscilla, requesting a return phone call (ph#:[phone]).  SW called Priscilla back and spoke with her.  Priscilla informed this SW that they have court today to see if she and her sister, Desiree, can obtain temporary guardianship over pt.  Priscilla informed that physician has completed all documents needed for court.  Pt is unable to attend court today as he is in the hospital and due to his medical condition.  Priscilla also informed this SW that she has the documents from pt's employer that need to be completed (sounds like either FMLA or disability paperwork) and will be at the hospital on Friday morning to provide to SW to complete.    Plan:  SS will remain available to provide support and assist as needed.  Pt is anticipated to d/c home with his daughter, Priscilla, once medically cleared.

## 2017-04-05 NOTE — THERAPY
"Speech Language Therapy Clinical Swallow Evaluation completed.  Functional Status: Per RN, patient has been on a regular diet since admit, but has newly developed pneumonia and was noted to be coughing during meal and with medication administration. Patient awake, alert, and intermittently agitated throughout evaluation. Patient noted to have severe expressive and receptive language deficits, with verbal output lacking content and characterized by neologisms, paraphasias, and nonsensical utterances. Patient unable to follow simple directives for oral motor examination and appeared agitated, orally aversive at times, and with apraxia noted in oral motor movements and with incorrect object use of objects, evidenced by use of straw to move food around plate and wiping face with pudding instead of washcloth. Patient was presented with PO trials of single ice chips, but stuck his tongue out, ice chips fell off, and patient reported \"There's nothing there\" in [place]uran. This happened in 3/3 attempts with ice chips. Patient consumed cup sips of NTL with patient initially reporting same on cup sip of NTL, but when given cup to consume himself, he triggered timely swallow with no overt s/sx of aspiration. Patient refused PO trials of purees and pudding. PO trials of solids resulted in impulsive bite and sip sizes, oral residue on higher level consistencies, and pocketing of food intermittently on both consistencies which cleared with NTL wash and cues to reduce bite/sip size. Laryngeal elevation palpated as weak and initiation of swallow trigger was timely. Patient required direct assistance for swallow precautions and to reduce impulsivity and intermittently required assistance with self-feeding d/t spilling of bolus down front of himself. At this time, recommend patient downgrade to Dys2/NTL diet with direct supervision and strict use of swallow precautions. RN aware. SLP to follow closely. Thank you for the consult. " "    Recommendations - Diet: Diet / Liquid Recommendation: Dysphagia II, Nectar Thick Liquid                          Strategies: Direct supervision during meals, Assistance needed for meal tray set-up, No Straws and Head of Bed at 90 Degrees                          Medication Administration: Medication Administration : Float Whole with Puree  Plan of Care: Will benefit from Speech Therapy 3 times per week  Post-Acute Therapy: Discharge to a transitional care facility for continued skilled therapy services    See \"Rehab Therapy-Acute\" Patient Summary Report for complete documentation.   "

## 2017-04-05 NOTE — PROGRESS NOTES
Patient continues to place pudding on his face. Patient has an obvious disconnect between cognitive thought and motor behavior. Patient explained we needed to clean him up, patient refusing and shoving RN away. Removed food from tray.

## 2017-04-05 NOTE — PROGRESS NOTES
Hospital Medicine Progress Note, Adult, Complex               Author: Kalen Smith Date & Time created: 2017  10:58 AM     Interval History:  Less verbal, still confused.  Seems more appropriate. Guardianship paperwork underway.  Will taper decadron.   Will help with sugars.    Review of Systems:  Review of Systems   Unable to perform ROS: language       Physical Exam:  Physical Exam  A&Ox? Wordy, obese  PERRL EOMI mmm scars  Supple no jvd bruit or ed  RRR no mrg  CTAB JAYDA  Soft abd ntnd bs+ obese  No edema    Labs:        Invalid input(s): [id number]      No results for input(s): SODIUM, POTASSIUM, CHLORIDE, CO2, BUN, CREATININE, MAGNESIUM, PHOSPHORUS, CALCIUM in the last 72 hours.  No results for input(s): ALTSGPT, ASTSGOT, ALKPHOSPHAT, TBILIRUBIN, DBILIRUBIN, GAMMAGT, AMYLASE, LIPASE, ALB, PREALBUMIN, GLUCOSE in the last 72 hours.  No results for input(s): RBC, HEMOGLOBIN, HEMATOCRIT, PLATELETCT, PROTHROMBTM, APTT, INR, IRON, FERRITIN, TOTIRONBC in the last 72 hours.            Hemodynamics:  Temp (24hrs), Av.6 °C (97.9 °F), Min:36.4 °C (97.5 °F), Max:36.9 °C (98.4 °F)  Temperature: 36.4 °C (97.5 °F)  Pulse  Av.8  Min: 56  Max: 122   Blood Pressure: 110/76 mmHg     Respiratory:    Respiration: 18, Pulse Oximetry: 98 %     Work Of Breathing / Effort: Mild  RUL Breath Sounds: Clear, RML Breath Sounds: Diminished, RLL Breath Sounds: Diminished, LAYA Breath Sounds: Clear, LLL Breath Sounds: Diminished  Fluids:    Intake/Output Summary (Last 24 hours) at 17 1058  Last data filed at 17 1600   Gross per 24 hour   Intake      0 ml   Output    900 ml   Net   -900 ml        GI/Nutrition:  Orders Placed This Encounter   Procedures   • Diet Order     Standing Status: Standing      Number of Occurrences: 1      Standing Expiration Date:      Order Specific Question:  Diet:     Answer:  Diabetic [3]     Medical Decision Making, by Problem:  Active Hospital Problems    Diagnosis   • Astrocytoma  (CMS-HCC) [C71.9] - decadron, temodar and xrt per onc/rad-onc   • Seizure disorder (CMS-HCC) [G40.909] - keppra   • Hyponatremia [E87.1] - follow   • Normocytic anemia [D64.9] - follow   • Thrush, oral [B37.0] - nystatin s&s   • Pneumonia [J18.9] - s/p abx, close watch for aspiratoin   • Acute respiratory failure with hypoxia (CMS-HCC) [J96.01] - RT/O2   • Cerebral edema (CMS-HCC) [G93.6] - decadron taper   Poorly controlled DM - decadron exacerbating, NPH, ISS  DVT - xarelto      EKG reviewed, Labs reviewed, Medications reviewed and Radiology images reviewed  Mesa catheter: No Mesa          Ulcer prophylaxis: Yes

## 2017-04-05 NOTE — PROGRESS NOTES
Received report from night shift RN, assumed care of patient at 0700. Pt confused, disoriented to time, place, self, and situation. x2 assist. Denies pain or nausea. Pt speaking in word salad with a mix of Belarusian and english. On 2L via nasal cannula. Call light within reach, bed in low and locked position. No other needs at this time.

## 2017-04-05 NOTE — PROGRESS NOTES
Assumed care of patient at 1900, resting in bed comfortably. [place] on and audible. Family assisted in translating POC, safety protocols and all questions answered. Call light within reach. Will continue to monitor.

## 2017-04-05 NOTE — PROGRESS NOTES
Patient brought down to Radiation Therapy and received treatment 13 out of 30. Patient tolerated procedure well. Will continue treatment as planned.

## 2017-04-05 NOTE — CARE PLAN
Problem: Safety  Goal: Will remain free from injury  Outcome: PROGRESSING AS EXPECTED  Pt assessed for fall risks. Educated on use of call light, encouraged pt to call for assistance.     Problem: Pain Management  Goal: Pain level will decrease to patient’s comfort goal  Outcome: PROGRESSING AS EXPECTED  Pt with adequate pain control. Encouraged pt to use non pharmacological interventions as well. Will continue to assess through out shift.

## 2017-04-06 NOTE — PROGRESS NOTES
Patient demonstrates increased confusion and an increased issue with following conversations and communications. Patient did not demonstrate impulsitivity overnight. Patient states he did not eat anything but dinner tray was 100% eaten at shift change. Denies pain, SOB throughout shift. Patient slept throughout majority of shift. Patient is still reporting congestion, coughing during the day. Call light within reach, will continue to monitor. [place] on.

## 2017-04-06 NOTE — PROGRESS NOTES
Received report from night shift RN, assumed care of patient at 0700. Pt disoriented to time, place, situation, and self. x2 assist. Denies pain or nausea. Pt calm and resting in bed at this time. IV SL. 2L O2. Call light within reach, bed in low and locked position. No other needs at this time.

## 2017-04-06 NOTE — PROGRESS NOTES
Hospital Medicine Progress Note, Adult, Complex               Author: Kalen Smith Date & Time created: 2017  11:34 AM     Interval History:  Poor insight, but wordy.  Wants to go home. Watching closely with decadron taper.   Will help with sugars.    Review of Systems:  Review of Systems   Unable to perform ROS: language       Physical Exam:  Physical Exam  A&Ox? Wordy, obese  PERRL EOMI mmm scars  Supple no jvd bruit or ed  RRR no mrg  CTAB JAYDA  Soft abd ntnd bs+ obese  No edema    Labs:        Invalid input(s): [id number]      Recent Labs      17   2349   SODIUM  135   POTASSIUM  4.0   CHLORIDE  101   CO2  26   BUN  27*   CREATININE  0.80   CALCIUM  8.7     Recent Labs      17   2349   GLUCOSE  204*     Recent Labs      17   2349   RBC  4.04*   HEMOGLOBIN  13.0*   HEMATOCRIT  39.1*   PLATELETCT  266     Recent Labs      17   2349   WBC  9.5   NEUTSPOLYS  83.20*   LYMPHOCYTES  7.50*   MONOCYTES  3.70   EOSINOPHILS  0.00   BASOPHILS  0.00           Hemodynamics:  Temp (24hrs), Av.3 °C (97.4 °F), Min:35.9 °C (96.7 °F), Max:36.7 °C (98.1 °F)  Temperature: 36.3 °C (97.4 °F)  Pulse  Av.9  Min: 56  Max: 122   Blood Pressure: 112/75 mmHg     Respiratory:    Respiration: 18, Pulse Oximetry: 98 %     Work Of Breathing / Effort: Mild  RUL Breath Sounds: Clear, RML Breath Sounds: Diminished, RLL Breath Sounds: Diminished, LAYA Breath Sounds: Clear, LLL Breath Sounds: Diminished  Fluids:    Intake/Output Summary (Last 24 hours) at 17 1134  Last data filed at 17 1013   Gross per 24 hour   Intake      0 ml   Output   1275 ml   Net  -1275 ml        GI/Nutrition:  Orders Placed This Encounter   Procedures   • Diet Order     Standing Status: Standing      Number of Occurrences: 1      Standing Expiration Date:      Order Specific Question:  Diet:     Answer:  Diabetic [3]     Order Specific Question:  Texture/Fiber modifications:     Answer:  Dysphagia 2(Pureed/Chopped)specify  fluid consistency(question 6) [2]     Order Specific Question:  Consistency/Fluid modifications:     Answer:  Nectar Thick [2]     Order Specific Question:  Miscellaneous modifications:     Answer:  SLP - 1:1 Supervision by Nursing [21]     Medical Decision Making, by Problem:  Active Hospital Problems    Diagnosis   • 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] - decadron, temodar and xrt per onc/rad-onc   • Seizure disorder (CMS-HCC) [G40.909] - keppra   • Hyponatremia [E87.1] - follow   • Normocytic anemia [D64.9] - follow   • Thrush, oral [B37.0] - nystatin s&s   • Pneumonia [J18.9] - s/p abx, close watch for aspiratoin   • Acute respiratory failure with hypoxia (CMS-HCC) [J96.01] - RT/O2   • Cerebral edema (CMS-HCC) [G93.6] - decadron taper   Poorly controlled DM - decadron exacerbating, NPH, ISS  DVT - xarelto      EKG reviewed, Labs reviewed, Medications reviewed and Radiology images reviewed  Mesa catheter: No Mesa          Ulcer prophylaxis: Yes

## 2017-04-06 NOTE — THERAPY
"Speech Language Therapy dysphagia treatment completed.   Functional Status:  Patient seen this date for dysphagia tx session s/p downgrade to Dys2/NTL diet yesterday. Patient seen with Dys2/NTL diet lunch tray. Patient consumed 95% of lunch tray with direct supervision and intermittent assistance from this clinician. Patient still presented with severe expressive and receptive deficits, but overall mentation and apraxia appeared improved today. Patient was able to feed himself, but required intermittent verbal, tactile, and gestural cues to slow feeding rate and reduce bite/sip size. Patient demonstrated appropriate use of objects today, as he used the napkin to wipe his mouth and put it on his chest to assist with spilling, and used a knife to scoop food onto his fork. Patient had cough x1 on large sip of NTL, but no other overt s/sx of aspiration were noted during entire tx session and meal. Patient did have intermittent oral residue of soft solids, but residue cleared when patient was told to swallow twice or prompted to use NTL wash. At this time, recommend patient continue Dys2/NTL diet with direct supervision and cues for swallow precautions. RN aware. SLP is following.     Recommendations: Continue Dys2/NTL diet with direct supervision for PO intake and strict use of swallow precautions.   Plan of Care: Will benefit from Speech Therapy 3 times per week  Post-Acute Therapy: Discharge to a transitional care facility for continued skilled therapy services    See \"Rehab Therapy-Acute\" Patient Summary Report for complete documentation.     "

## 2017-04-06 NOTE — PROGRESS NOTES
Patient brought down to Radiation Therapy and received treatment 14 out of 30. Patient tolerated procedure well. Will continue treatment as planned.

## 2017-04-07 NOTE — PROGRESS NOTES
Hospital Medicine Progress Note, Adult, Complex               Author: Kalen Smith Date & Time created: 2017  12:32 PM     Interval History:  More alert.  Denies any pain. Tolerating xrt. Sugars improving with decadron taper.    Review of Systems:  Review of Systems   Constitutional: Positive for malaise/fatigue. Negative for fever.   HENT: Negative.    Eyes: Negative.  Negative for blurred vision.   Respiratory: Negative.  Negative for cough.    Cardiovascular: Negative.  Negative for chest pain.   Gastrointestinal: Negative.  Negative for heartburn.   Genitourinary: Negative.  Negative for dysuria.   Musculoskeletal: Positive for myalgias. Negative for neck pain.   Skin: Negative.  Negative for rash.   Neurological: Positive for weakness. Negative for dizziness and headaches.   Endo/Heme/Allergies: Negative.  Does not bruise/bleed easily.   Psychiatric/Behavioral: Negative.  Negative for depression.       Physical Exam:  Physical Exam  A&Ox? Wordy, obese  PERRL EOMI mmm scars  Supple no jvd bruit or ed  RRR no mrg  CTAB JAYDA  Soft abd ntnd bs+ obese  No edema    Labs:        Invalid input(s): [id number]      Recent Labs      17   2349   SODIUM  135   POTASSIUM  4.0   CHLORIDE  101   CO2  26   BUN  27*   CREATININE  0.80   CALCIUM  8.7     Recent Labs      17   2349   GLUCOSE  204*     Recent Labs      17   2349   RBC  4.04*   HEMOGLOBIN  13.0*   HEMATOCRIT  39.1*   PLATELETCT  266     Recent Labs      17   2349   WBC  9.5   NEUTSPOLYS  83.20*   LYMPHOCYTES  7.50*   MONOCYTES  3.70   EOSINOPHILS  0.00   BASOPHILS  0.00           Hemodynamics:  Temp (24hrs), Av.4 °C (97.6 °F), Min:35.9 °C (96.7 °F), Max:36.7 °C (98 °F)  Temperature: 36.5 °C (97.7 °F)  Pulse  Av.3  Min: 56  Max: 122   Blood Pressure: 104/77 mmHg     Respiratory:    Respiration: 18, Pulse Oximetry: 93 %     Work Of Breathing / Effort: Mild  RUL Breath Sounds: Clear, RML Breath Sounds: Diminished, RLL Breath  Sounds: Diminished, LAYA Breath Sounds: Clear, LLL Breath Sounds: Diminished  Fluids:    Intake/Output Summary (Last 24 hours) at 04/07/17 1232  Last data filed at 04/07/17 0600   Gross per 24 hour   Intake     60 ml   Output   1200 ml   Net  -1140 ml        GI/Nutrition:  Orders Placed This Encounter   Procedures   • Diet Order     Standing Status: Standing      Number of Occurrences: 1      Standing Expiration Date:      Order Specific Question:  Diet:     Answer:  Diabetic [3]     Order Specific Question:  Texture/Fiber modifications:     Answer:  Dysphagia 2(Pureed/Chopped)specify fluid consistency(question 6) [2]     Order Specific Question:  Consistency/Fluid modifications:     Answer:  Nectar Thick [2]     Order Specific Question:  Miscellaneous modifications:     Answer:  SLP - 1:1 Supervision by Nursing [21]     Medical Decision Making, by Problem:  Active Hospital Problems    Diagnosis   • Astrocytoma (CMS-[facility]) [C71.9] - decadron, temodar and xrt per onc/rad-onc   • Seizure disorder (CMS-[facility]) [G40.909] - keppra   • Hyponatremia [E87.1] - follow   • Normocytic anemia [D64.9] - follow   • Thrush, oral [B37.0] - nystatin s&s   • Pneumonia [J18.9] - s/p abx, close watch for aspiratoin   • Acute respiratory failure with hypoxia (CMS-[facility]) [J96.01] - RT/O2   • Cerebral edema (CMS-[facility]) [G93.6] - decadron taper   Poorly controlled DM - decadron exacerbating, NPH, ISS  DVT - xarelto      EKG reviewed, Labs reviewed, Medications reviewed and Radiology images reviewed  Mesa catheter: No Mesa          Ulcer prophylaxis: Yes

## 2017-04-07 NOTE — PROGRESS NOTES
Patient NONI bal MD paged. 500cc NS bolus initiated. Will continue to monitor. Call light within reach.

## 2017-04-07 NOTE — PROGRESS NOTES
Pt. Alert oriented to self. Pt. Denies pain. Pt. Is helpful with moving in bed, pt. Has a good appetite no assistance required. Pt. Is Hungarian speaking and knows a little english and communicates well. Pt. Will have 15/30 radiation therapy today. No family present. Call light within reach hourly rounding in practice.

## 2017-04-07 NOTE — PROGRESS NOTES
Patient brought down to Radiation Therapy and received treatment 15 out of 30. Patient tolerated procedure well. Will continue treatment as planned.

## 2017-04-07 NOTE — CARE PLAN
Problem: Safety  Goal: Will remain free from falls  Intervention: Assess risk factors for falls  Bed alarm is in place for pt. safety      Problem: Pain Management  Goal: Pain level will decrease to patient’s comfort goal  Intervention: Follow pain managment plan developed in collaboration with patient and Interdisciplinary Team  Pt. Denies pain at this time

## 2017-04-08 NOTE — CARE PLAN
Problem: Psychosocial Needs:  Goal: Level of anxiety will decrease  Outcome: PROGRESSING AS EXPECTED  Patient, while still aphasic, is not exhibiting anxious behaviors.  He rests calmly in bed, and does not try to get up without assistance.    Problem: Mobility  Goal: Risk for activity intolerance will decrease  Outcome: PROGRESSING SLOWER THAN EXPECTED  I have not been able to get this patient to get up out of bed.  I asked for him to try the commode chair, and he refused.

## 2017-04-08 NOTE — CARE PLAN
Problem: Safety  Goal: Will remain free from injury  Pt requesting commode be left at bedside. Pt educated on why the commode cannot be left at the bedside due to safety reasons. Teaching reinforced to use call light when the pt needs to use the restroom and or the phone system. Pt may require further reinforcement of teaching due to confusion.     Problem: Discharge Barriers/Planning  Goal: Patient’s continuum of care needs will be met  Daughters are in the process of trying to obtain guardianship of pt

## 2017-04-08 NOTE — PROGRESS NOTES
Pt. Alert and oriented X 3, no family present today, pt. Did radiation therapy 15/30 today. Pt. Tolerated well. Pt. Is incontinent of stool, pt.had a bowel movement today the had a very nice shower per the CNA with many thanks. Pt. Has a good appetite. Reviewed POC with pt. Call light within reach hourly rounding in practice. Bed alarm on.

## 2017-04-08 NOTE — PROGRESS NOTES
Hospital Medicine Progress Note, Adult, Complex               Author: Kalen Smith Date & Time created: 2017  12:33 PM     Interval History:  He is more appropriate, less tangential.  Works with staff. Denies any pain. Tolerating xrt. Sugars improving, will again decrease decadron.    Review of Systems:  Review of Systems   Constitutional: Positive for malaise/fatigue. Negative for fever.   HENT: Negative.    Eyes: Negative.  Negative for blurred vision.   Respiratory: Negative.  Negative for cough.    Cardiovascular: Negative.  Negative for chest pain.   Gastrointestinal: Negative.  Negative for heartburn.   Genitourinary: Negative.  Negative for dysuria.   Musculoskeletal: Positive for myalgias. Negative for neck pain.   Skin: Negative.  Negative for rash.   Neurological: Positive for weakness. Negative for dizziness and headaches.   Endo/Heme/Allergies: Negative.  Does not bruise/bleed easily.   Psychiatric/Behavioral: Negative.  Negative for depression.       Physical Exam:  Physical Exam  A&Ox? Wordy, obese  PERRL EOMI mmm scars  Supple no jvd bruit or ed  RRR no mrg  CTAB JAYDA  Soft abd ntnd bs+ obese  No edema    Labs:        Invalid input(s): [id number]      Recent Labs      17   2349   SODIUM  135   POTASSIUM  4.0   CHLORIDE  101   CO2  26   BUN  27*   CREATININE  0.80   CALCIUM  8.7     Recent Labs      17   2349   GLUCOSE  204*     Recent Labs      17   2349   RBC  4.04*   HEMOGLOBIN  13.0*   HEMATOCRIT  39.1*   PLATELETCT  266     Recent Labs      17   2349   WBC  9.5   NEUTSPOLYS  83.20*   LYMPHOCYTES  7.50*   MONOCYTES  3.70   EOSINOPHILS  0.00   BASOPHILS  0.00           Hemodynamics:  Temp (24hrs), Av.3 °C (97.4 °F), Min:36 °C (96.8 °F), Max:37.1 °C (98.7 °F)  Temperature: 36.2 °C (97.1 °F)  Pulse  Av.4  Min: 56  Max: 123   Blood Pressure: (!) 98/64 mmHg (RN notified)     Respiratory:    Respiration: 18, Pulse Oximetry: 96 %     Work Of Breathing / Effort:  Mild  RUL Breath Sounds: Clear, RML Breath Sounds: Diminished, RLL Breath Sounds: Diminished, LAYA Breath Sounds: Diminished, LLL Breath Sounds: Diminished  Fluids:    Intake/Output Summary (Last 24 hours) at 04/08/17 1233  Last data filed at 04/08/17 1000   Gross per 24 hour   Intake      0 ml   Output   1700 ml   Net  -1700 ml        GI/Nutrition:  Orders Placed This Encounter   Procedures   • Diet Order     Standing Status: Standing      Number of Occurrences: 1      Standing Expiration Date:      Order Specific Question:  Diet:     Answer:  Diabetic [3]     Order Specific Question:  Texture/Fiber modifications:     Answer:  Dysphagia 2(Pureed/Chopped)specify fluid consistency(question 6) [2]     Order Specific Question:  Consistency/Fluid modifications:     Answer:  Nectar Thick [2]     Order Specific Question:  Miscellaneous modifications:     Answer:  SLP - 1:1 Supervision by Nursing [21]     Medical Decision Making, by Problem:  Active Hospital Problems    Diagnosis   • 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] - decadron, temodar and xrt per onc/rad-onc   • Seizure disorder (CMS-[facility]) [G40.909] - keppra   • Hyponatremia [E87.1] - follow   • Normocytic anemia [D64.9] - follow   • Thrush, oral [B37.0] - nystatin s&s   • Pneumonia [J18.9] - s/p abx, close watch for aspiratoin   • Acute respiratory failure with hypoxia (CMS-[facility]) [J96.01] - RT/O2   • Cerebral edema (CMS-[facility]) [G93.6] - decadron taper   Poorly controlled DM - decadron exacerbating, NPH, ISS  DVT - xarelto      EKG reviewed, Labs reviewed, Medications reviewed and Radiology images reviewed  Mesa catheter: No Mesa          Ulcer prophylaxis: Yes

## 2017-04-08 NOTE — PROGRESS NOTES
"Assumed care of pt this am.  He is alert, but not oriented.  He is speaking in english and Hungarian, but I am only able to determine what he wants and needs by gestures.  He seems to be in a pleasant mood, and when asked in English and [place] citizen if he has any pain currently, he says he \"does not think so.\"  There are no verbal descriptors that he is in pain.  I will continue to assess and monitor.  "

## 2017-04-08 NOTE — PROGRESS NOTES
RN received report, assumed pt care. Patient is resting in bed watching TV, denies any pain at this time. Call light with in reach, all needs met at this time, RN will continue to monitor.

## 2017-04-09 NOTE — PROGRESS NOTES
Assumed care of pt at 1900.  POC reviewed and updated, unsure of whether pt understood d/t confusion, but agreeable.  Will continue hourly rounding.

## 2017-04-09 NOTE — CARE PLAN
Problem: Communication  Goal: The ability to communicate needs accurately and effectively will improve  Outcome: PROGRESSING AS EXPECTED  Pt encouraged to use call bell for assistance and hourly rounding in place to ensure needs are met.    Problem: Safety  Goal: Will remain free from injury  Outcome: PROGRESSING AS EXPECTED  Bed in low position and call bell within reach.  Room near nurses station and bed alarm enabled.    Problem: Skin Integrity  Goal: Risk for impaired skin integrity will decrease  Outcome: PROGRESSING AS EXPECTED  Encouraged to turn, skin kept CDI, lines away from skin.

## 2017-04-09 NOTE — PROGRESS NOTES
Hospital Medicine Progress Note, Adult, Complex               Author: Kalen Smith Date & Time created: 2017  11:26 AM     Interval History:  Verbose today, evidently refuses ambulation yesterday, lectured, will try again today. Tolerating xrt. Sugars improving with decreasing decadron.    Review of Systems:  Review of Systems   Constitutional: Positive for malaise/fatigue. Negative for fever.   HENT: Negative.    Eyes: Negative.  Negative for blurred vision.   Respiratory: Negative.  Negative for cough.    Cardiovascular: Negative.  Negative for chest pain.   Gastrointestinal: Negative.  Negative for heartburn.   Genitourinary: Negative.  Negative for dysuria.   Musculoskeletal: Positive for myalgias. Negative for neck pain.   Skin: Negative.  Negative for rash.   Neurological: Positive for weakness. Negative for dizziness and headaches.   Endo/Heme/Allergies: Negative.  Does not bruise/bleed easily.   Psychiatric/Behavioral: Negative.  Negative for depression.       Physical Exam:  Physical Exam  A&Ox? Wordy, obese  PERRL EOMI mmm scars  Supple no jvd bruit or ed  RRR no mrg  CTAB JAYDA  Soft abd ntnd bs+ obese  No edema    Labs:        Invalid input(s): [id number]      No results for input(s): SODIUM, POTASSIUM, CHLORIDE, CO2, BUN, CREATININE, MAGNESIUM, PHOSPHORUS, CALCIUM in the last 72 hours.  No results for input(s): ALTSGPT, ASTSGOT, ALKPHOSPHAT, TBILIRUBIN, DBILIRUBIN, GAMMAGT, AMYLASE, LIPASE, ALB, PREALBUMIN, GLUCOSE in the last 72 hours.  No results for input(s): RBC, HEMOGLOBIN, HEMATOCRIT, PLATELETCT, PROTHROMBTM, APTT, INR, IRON, FERRITIN, TOTIRONBC in the last 72 hours.            Hemodynamics:  Temp (24hrs), Av.2 °C (97.2 °F), Min:36.1 °C (97 °F), Max:36.4 °C (97.6 °F)  Temperature: 36.1 °C (97 °F)  Pulse  Av.1  Min: 56  Max: 123   Blood Pressure: 110/74 mmHg     Respiratory:    Respiration: 18, Pulse Oximetry: 94 %     Work Of Breathing / Effort: Mild  RUL Breath Sounds: Clear,  RML Breath Sounds: Diminished, RLL Breath Sounds: Diminished, LAYA Breath Sounds: Diminished, LLL Breath Sounds: Diminished  Fluids:    Intake/Output Summary (Last 24 hours) at 04/09/17 1126  Last data filed at 04/08/17 2000   Gross per 24 hour   Intake      0 ml   Output    150 ml   Net   -150 ml        GI/Nutrition:  Orders Placed This Encounter   Procedures   • Diet Order     Standing Status: Standing      Number of Occurrences: 1      Standing Expiration Date:      Order Specific Question:  Diet:     Answer:  Diabetic [3]     Order Specific Question:  Texture/Fiber modifications:     Answer:  Dysphagia 2(Pureed/Chopped)specify fluid consistency(question 6) [2]     Order Specific Question:  Consistency/Fluid modifications:     Answer:  Nectar Thick [2]     Order Specific Question:  Miscellaneous modifications:     Answer:  SLP - 1:1 Supervision by Nursing [21]     Medical Decision Making, by Problem:  Active Hospital Problems    Diagnosis   • 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] - decadron, temodar and xrt per onc/rad-onc   • Seizure disorder (CMS-[facility]) [G40.909] - keppra   • Hyponatremia [E87.1] - follow   • Normocytic anemia [D64.9] - follow   • Thrush, oral [B37.0] - nystatin s&s   • Pneumonia [J18.9] - s/p abx, close watch for aspiratoin   • Acute respiratory failure with hypoxia (CMS-[facility]) [J96.01] - RT/O2   • Cerebral edema (CMS-[facility]) [G93.6] - decadron taper   Poorly controlled DM - decadron exacerbating, NPH, ISS  DVT - xarelto      EKG reviewed, Labs reviewed, Medications reviewed and Radiology images reviewed  Mesa catheter: No Mesa          Ulcer prophylaxis: Yes

## 2017-04-09 NOTE — PROGRESS NOTES
"Assumed care of patient this am.  /74 mmHg  Pulse 65  Temp(Src) 36.1 °C (97 °F)  Resp 18  Ht 1.575 m (5' 2\")  Wt 99.4 kg (219 lb 2.2 oz)  BMI 40.07 kg/m2  SpO2 94%  Patient reports no pain.  He is sitting up in bed eating breakfast.  Still exhibiting confusion as he believes he is at home.  Still aphasic, and confused about the days.  "

## 2017-04-10 NOTE — PROGRESS NOTES
Patient brought down to Radiation Therapy and received treatment 16 out of 30. Patient tolerated procedure well. Will continue treatment as planned.

## 2017-04-10 NOTE — PROGRESS NOTES
"Pt A&Ox2, disoriented to time and place . VS: /73 mmHg  Pulse 72  Temp(Src) 36.8 °C (98.2 °F)  Resp 18  Ht 1.575 m (5' 2\")  Wt 99.4 kg (219 lb 2.2 oz)  BMI 40.07 kg/m2  SpO2 95%. Pt denies pain, n/v, numbness, tingling, SOB and chest pain. Pt has some receptive aphasia and expressive aphasia. Pt needs met at this time, call light within reach, hourly rounding in effect, and will continue to monitor.       "

## 2017-04-10 NOTE — PROGRESS NOTES
"Spent ten minutes using language line  to assess patient.  Per , when pt asked his birthday, he spoke for several minutes without forming any actual words, in Mongolian or English.  Unable to assess any further.  During this time, the patient appeared to have obvious expressive aphasia and frustrated, but remained calm.  No s/s pain, pt unable to answer this question as well.  Right before leaving the room, the patient began talking, however the only thing I could understand was, \"I need more money or I go home right now\".  Will continue to monitor.  "

## 2017-04-10 NOTE — THERAPY
"Speech Language Therapy dysphagia treatment completed.   Functional Status: Patient currently on Dys2/NTL diet. Patient seen with Dys2/NTL diet breakfast tray with direct supervision and assistance from this clinician. Patient still noted to have severe expressive and receptive language deficits, but pleasant and cooperative during session. Patient required assistance with meal tray set-up and direct supervision during meal to ensure use of swallow precautions, as he appears impulsive. Patient required intermittent verbal and tactile cues to take small bites/sips, utilize slow feeding rate, swallow twice to clear oral residue, and alternate liquids and solids. Patient had intermittent oral residue that cleared with liquid wash, but no other overt s/sx of aspiration were noted on Dys2/NTL diet. Patient was given PO trials of thin liquids via cup sip and presented with intermittent wet/gurgly vocal quality and rapid intake of cup sips, which are both concerning for penetration/aspiration. Laryngeal elevation palpated as weak but initiation of swallow trigger was timely. At this time, recommend patient continue on Dys2/NTL diet with strict use of swallow precautions and direct supervision. RN aware. SLP is following.     Recommendations: Continue on Dys2/NTL diet with strict use of swallow precautions and direct supervision. Float meds in applesauce.   Plan of Care: Will benefit from Speech Therapy 3 times per week  Post-Acute Therapy: Discharge to a transitional care facility for continued skilled therapy services    See \"Rehab Therapy-Acute\" Patient Summary Report for complete documentation.     "

## 2017-04-10 NOTE — PROGRESS NOTES
Patient got up, had a bowel movement, and is now happily sitting in the recliner chair.  Gait was steady, with a mild limp.

## 2017-04-10 NOTE — PROGRESS NOTES
Hospital Medicine Progress Note, Adult, Complex               Author: Kalen Smith Date & Time created: 4/10/2017  10:35 AM     Interval History:  Again verbose, tangential. Tolerating xrt. Sugars improving with decreasing decadron.    Review of Systems:  Review of Systems   Constitutional: Positive for malaise/fatigue. Negative for fever.   HENT: Negative.    Eyes: Negative.  Negative for blurred vision.   Respiratory: Negative.  Negative for cough.    Cardiovascular: Negative.  Negative for chest pain.   Gastrointestinal: Negative.  Negative for heartburn.   Genitourinary: Negative.  Negative for dysuria.   Musculoskeletal: Positive for myalgias. Negative for neck pain.   Skin: Negative.  Negative for rash.   Neurological: Positive for weakness. Negative for dizziness and headaches.   Endo/Heme/Allergies: Negative.  Does not bruise/bleed easily.   Psychiatric/Behavioral: Negative.  Negative for depression.       Physical Exam:  Physical Exam  A&Ox? Wordy, obese  PERRL EOMI mmm scars  Supple no jvd bruit or ed  RRR no mrg  CTAB JAYDA  Soft abd ntnd bs+ obese  No edema    Labs:        Invalid input(s): [id number]      No results for input(s): SODIUM, POTASSIUM, CHLORIDE, CO2, BUN, CREATININE, MAGNESIUM, PHOSPHORUS, CALCIUM in the last 72 hours.  No results for input(s): ALTSGPT, ASTSGOT, ALKPHOSPHAT, TBILIRUBIN, DBILIRUBIN, GAMMAGT, AMYLASE, LIPASE, ALB, PREALBUMIN, GLUCOSE in the last 72 hours.  No results for input(s): RBC, HEMOGLOBIN, HEMATOCRIT, PLATELETCT, PROTHROMBTM, APTT, INR, IRON, FERRITIN, TOTIRONBC in the last 72 hours.            Hemodynamics:  Temp (24hrs), Av.4 °C (97.6 °F), Min:36 °C (96.8 °F), Max:36.8 °C (98.3 °F)  Temperature: 36.8 °C (98.2 °F)  Pulse  Av  Min: 56  Max: 123   Blood Pressure: 102/73 mmHg     Respiratory:    Respiration: 18, Pulse Oximetry: 95 %     Work Of Breathing / Effort: Mild  RUL Breath Sounds: Clear, RML Breath Sounds: Diminished, RLL Breath Sounds: Diminished,  LAYA Breath Sounds: Clear, LLL Breath Sounds: Diminished  Fluids:    Intake/Output Summary (Last 24 hours) at 04/10/17 1035  Last data filed at 04/10/17 0600   Gross per 24 hour   Intake      0 ml   Output   1125 ml   Net  -1125 ml        GI/Nutrition:  Orders Placed This Encounter   Procedures   • Diet Order     Standing Status: Standing      Number of Occurrences: 1      Standing Expiration Date:      Order Specific Question:  Diet:     Answer:  Diabetic [3]     Order Specific Question:  Texture/Fiber modifications:     Answer:  Dysphagia 2(Pureed/Chopped)specify fluid consistency(question 6) [2]     Order Specific Question:  Consistency/Fluid modifications:     Answer:  Nectar Thick [2]     Order Specific Question:  Miscellaneous modifications:     Answer:  SLP - 1:1 Supervision by Nursing [21]     Medical Decision Making, by Problem:  Active Hospital Problems    Diagnosis   • 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] - decadron, temodar and xrt per onc/rad-onc   • Seizure disorder (CMS-[facility]) [G40.909] - keppra   • Hyponatremia [E87.1] - follow   • Normocytic anemia [D64.9] - follow   • Thrush, oral [B37.0] - nystatin s&s   • Pneumonia [J18.9] - s/p abx, close watch for aspiratoin   • Acute respiratory failure with hypoxia (CMS-[facility]) [J96.01] - RT/O2   • Cerebral edema (CMS-[facility]) [G93.6] - decadron taper   Poorly controlled DM - decadron exacerbating, NPH, ISS  DVT - xarelto      EKG reviewed, Labs reviewed, Medications reviewed and Radiology images reviewed  Mesa catheter: No Mesa          Ulcer prophylaxis: Yes

## 2017-04-10 NOTE — DISCHARGE PLANNING
Medical Social Work    MILAN received phone call from Sara HAGER [phone]) with pt's insurance asking for an update on d/c plan and if pt's daughter's were able to obtain guardianship of pt.  MILAN last spoke with pt's daughter, Priscilla, last week and was informed that she would be at hospital on Friday to meet with MILAN to provide paperwork that needed to be completed by physician (FMLA or disability?), but she never came and MILAN has not heard since.  MILAN called and spoke with pt's daughter, Desiree (ph#: [phone]), to ask about paperwork and if she and her sister were able to obtain temporary guardianship of their father.  Desiree informed this SW that they have temporary guardianship of their father through either May 23rd or 24th and have another court hearing to determine if they will be granted permanent guardianship of pt.  MILAN asked that she bring SW a copy of the guardianship paperwork.  Desiree informed this SW that she will be at the hospital later this afternoon visiting her father and will provide SW with a copy at that time.    Discussed in IDT rounds this morning.  Pt has about two weeks left of radiation treatment and is expected to remain in the hospital until he has completed his radiation.  Pt will d/c home with his daughter, Priscilla, once medically cleared.  MILAN called and spoke with MADAN with pt's insurance, Sara, and provided her with an update.  Sara informed this SW that pt only has 10-12 SNF/acute Rehab days left.    Plan:  MILAN will meet with Desiree later this afternoon and remain available to provide support and assist as needed.

## 2017-04-10 NOTE — CARE PLAN
Problem: Safety  Goal: Will remain free from injury  Hourly rounding in effect, pt instructed to call for assistance, bed locked and in lowest position. Bed alarm on    Problem: Knowledge Deficit  Goal: Knowledge of disease process/condition, treatment plan, diagnostic tests, and medications will improve  Outcome: PROGRESSING SLOWER THAN EXPECTED  Pt updated and educated on nursing interventions, medications and POC        Problem: Pain Management  Goal: Pain level will decrease to patient’s comfort goal  Pt denies pain, will continue to reassess and monitor.

## 2017-04-10 NOTE — PROGRESS NOTES
Patient has a MEWS score of 4, but his level of consciousness has not changed from baseline from when he was admitted.  He is alert, but aphasic, his SBP was 82 on the dynamap, but upon taking a manual BP, his SBP was 100.  Pt is not exhibiting any signs or symptoms of decompensation.

## 2017-04-10 NOTE — CARE PLAN
Problem: Communication  Goal: The ability to communicate needs accurately and effectively will improve  Intervention: Use communication aids and/or /Language Line as appropriate  Language line used for assessment.  Goal is for pt to be oriented and able to make needs known.        Problem: Safety  Goal: Will remain free from injury  Outcome: PROGRESSING AS EXPECTED  Bed in low position, call bell within reach, non-slip socks, bed alarm enabled.  Pt in chair with lap belt safely tranfered to bed with two person assist.      Problem: Mobility  Goal: Risk for activity intolerance will decrease  Outcome: PROGRESSING AS EXPECTED  Pt tolerating up in chair, transferred to bed.

## 2017-04-11 NOTE — PROGRESS NOTES
Hospital Medicine Progress Note, Adult, Complex               Author: Jammie Haas Date & Time created: 4/11/2017  4:08 PM     Interval History:  Patient with Astrocytoma s/p surgical excision, he is underway with radiation 17/30 completed today.  He will complete radiation here prior to discharge.  Temporary guardianship given to daughter and permanent guardianship decision pending.  Patient communication has improved drastically since last week, his complaint today is that his thickened water is far too thick and he has some pain in the left flank/side while walking with CNA today.      Review of Systems:  Review of Systems   Constitutional: Negative for fever.   HENT: Negative for nosebleeds.    Eyes: Negative for blurred vision and pain.   Respiratory: Negative for cough and shortness of breath.    Cardiovascular: Negative for chest pain and leg swelling.   Gastrointestinal: Negative for heartburn, abdominal pain and diarrhea.   Genitourinary: Negative for dysuria, hematuria and flank pain.   Musculoskeletal: Negative for myalgias and joint pain.   Skin: Negative for itching and rash.   Neurological: Negative for dizziness, focal weakness, seizures and headaches.   Psychiatric/Behavioral: Negative for depression. The patient is not nervous/anxious and does not have insomnia.        Physical Exam:  Physical Exam   Constitutional: He appears well-developed and well-nourished. No distress.   HENT:   Head: Normocephalic and atraumatic.   Eyes: Conjunctivae are normal. No scleral icterus.   Neck: Neck supple. No JVD present.   Cardiovascular: Normal rate, regular rhythm and intact distal pulses.  Exam reveals no gallop and no friction rub.    No murmur heard.  Pulmonary/Chest: Effort normal and breath sounds normal. No respiratory distress. He exhibits no tenderness.   Abdominal: Soft. Bowel sounds are normal. He exhibits no distension and no mass. There is no tenderness.   Musculoskeletal: Normal range of motion.  He exhibits no edema.   Neurological: He is alert. No cranial nerve deficit.   Patient able to answer some orientation questions, he is much more cognitive today than prior.     Skin: Skin is warm and dry. He is not diaphoretic. No erythema. No pallor.   Psychiatric: He has a normal mood and affect. His behavior is normal.   Nursing note and vitals reviewed.      Labs:        Invalid input(s): [id number]      No results for input(s): SODIUM, POTASSIUM, CHLORIDE, CO2, BUN, CREATININE, MAGNESIUM, PHOSPHORUS, CALCIUM in the last 72 hours.  No results for input(s): ALTSGPT, ASTSGOT, ALKPHOSPHAT, TBILIRUBIN, DBILIRUBIN, GAMMAGT, AMYLASE, LIPASE, ALB, PREALBUMIN, GLUCOSE in the last 72 hours.  No results for input(s): RBC, HEMOGLOBIN, HEMATOCRIT, PLATELETCT, PROTHROMBTM, APTT, INR, IRON, FERRITIN, TOTIRONBC in the last 72 hours.            Hemodynamics:  Temp (24hrs), Av.5 °C (97.7 °F), Min:36.1 °C (97 °F), Max:36.9 °C (98.5 °F)  Temperature: 36.9 °C (98.5 °F)  Pulse  Av.3  Min: 56  Max: 123   Blood Pressure: 100/76 mmHg     Respiratory:    Respiration: 18, Pulse Oximetry: 94 %        RUL Breath Sounds: Clear, RML Breath Sounds: Diminished, RLL Breath Sounds: Diminished, LAYA Breath Sounds: Clear, LLL Breath Sounds: Diminished  Fluids:    Intake/Output Summary (Last 24 hours) at 17 1608  Last data filed at 17 1103   Gross per 24 hour   Intake    150 ml   Output      0 ml   Net    150 ml        GI/Nutrition:  Orders Placed This Encounter   Procedures   • Diet Order     Standing Status: Standing      Number of Occurrences: 1      Standing Expiration Date:      Order Specific Question:  Diet:     Answer:  Diabetic [3]     Order Specific Question:  Texture/Fiber modifications:     Answer:  Dysphagia 2(Pureed/Chopped)specify fluid consistency(question 6) [2]     Order Specific Question:  Consistency/Fluid modifications:     Answer:  Nectar Thick [2]     Order Specific Question:  Miscellaneous  modifications:     Answer:  SLP - 1:1 Supervision by Nursing [21]     Medical Decision Making, by Problem:  Active Hospital Problems    Diagnosis   • 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]s/p surgical excision, radiation underway     • Seizure disorder (CMS-HCC) [G40.909]keppra     • Hyponatremia [E87.1]resolved     • Normocytic anemia [D64.9]mild, monitor     • Thrush, oral [B37.0]completed treatment     • Pneumonia [J18.9]completed antibiotics     • Acute respiratory failure with hypoxia (CMS-HCC) [J96.01]resolved     • Cerebral edema (CMS-HCC) [G93.6]steroids/seizure prophylaxis         Labs reviewed, Medications reviewed and Radiology images reviewed  Mesa catheter: No Mesa      DVT: xarelto.        Assessed for rehab: Patient was assess for and/or received rehabilitation services during this hospitalization

## 2017-04-11 NOTE — PROGRESS NOTES
Pt alert but unable to assess orientation status. When asked for his name he said water. Asked in Venezuelan and english continues to just say water. Denies pain. No signs of pain. Completed day 17/30 of brain radiation. FSBS 79. Offered a snack but pt refused. Voiding well. Up to a chair with one person assist. BA on and sounding. Resting comfortably. Call light within reach. Hourly rounding in place.

## 2017-04-11 NOTE — PROGRESS NOTES
Received shift report from day shift RN. Pt is AO2-3 and reports mild pain in his back/abodomen, rest/repositioned for comfort. Family at bedside, discussed POC. Assessed and administered evening medications. Bed alarm on, bed in lowest position, call light and personal belongings within reach, educated to call if in need of assistance, room near nurses station. All needs met at this time.

## 2017-04-11 NOTE — CARE PLAN
Problem: Safety  Goal: Will remain free from injury  Outcome: PROGRESSING AS EXPECTED  Bed alarm on, bed in lowest position, call light and personal belongings within reach, room near nurses station    Problem: Venous Thromboembolism (VTW)/Deep Vein Thrombosis (DVT) Prevention:  Goal: Patient will participate in Venous Thrombosis (VTE)/Deep Vein Thrombosis (DVT)Prevention Measures  Outcome: PROGRESSING AS EXPECTED  xarelto for DVT

## 2017-04-11 NOTE — DISCHARGE PLANNING
Medical Social Work    Referral:  MILAN was informed that pt's daughter is at pt's bedside and is requesting to speak with SW.    Intervention:  MILAN met with pt's daughter, Priscilla, at pt's bedside.  She asked for updated on d/c plan.  Explained that he is anticipated to remain in the hospital until he completes his radiation (today was day 17 of 30).  Discussed that it may be a possibility for pt to d/c home sooner, if family is able to transport pt to his radiation treatments, and pt is medically cleared for transfer.  Priscilla informed this SW that pt has been approved for RTC Access and that she would take RTC Access with pt to all medical appointments.  Priscilla is at home durring the day an reports that she is able to provide pt with the neccessary care. Priscilla stated that she would like to discuss d/c options with her sisters.    Priscilla informed this SW that she will bring in a copy of pt's temporary guardianship paperwork tomorrow for SW to place in pt's chart.  Priscilla asked that MILAN call Shandra Limon at pt's employer to have them fax paperwork that needs to be completed py physician.  MILAN called, but was not able to talk to Shandra.  MILAN called and spoke with Priscilla to provide with update and ask that she bring in a copy of paperwork to be completed.  Priscilla stated she will bring in paperwork tomorrow.    Plan:  SS will remain available to provide support and assist as needed.

## 2017-04-12 NOTE — PROGRESS NOTES
Pt A&O x's 2-3, VSS, Tajik speaking, slight language barrier. Pt denies any pain at this time, ambulates with standby hand held assist, steady gait. Pt was up to chair for breakfast, tolerating diet and chair well. Daughter Priscilla at bedside with POA documentation. Copies were given to SW and also placed in pt chart. Pt just returning from radiation, bed in lowest positions, call light, urinal and all personal items are within reach,will continue hourly rounding.

## 2017-04-12 NOTE — PROGRESS NOTES
Patient brought down to Radiation Therapy and received treatment 18 out of 30. Patient tolerated procedure well. Will continue treatment as planned.

## 2017-04-12 NOTE — CARE PLAN
Problem: Safety  Goal: Will remain free from injury  Outcome: PROGRESSING AS EXPECTED  Bed alarm on, bed in lowest position, call light and personal belongings within reach, non skid socks, room near nurses station.    Problem: Skin Integrity  Goal: Risk for impaired skin integrity will decrease  Outcome: PROGRESSING AS EXPECTED  Pt educated to turn self in bed. Ambulates with 1ass/fww

## 2017-04-12 NOTE — CARE PLAN
Problem: Communication  Goal: The ability to communicate needs accurately and effectively will improve  Intervention: [place] patient and significant other/support system to call light to alert staff of needs  Pt is Tongan speaking which makes communication difficult. Daughter is at bedside and is able to communicate POC.      Problem: Safety  Goal: Will remain free from injury  Outcome: PROGRESSING AS EXPECTED  Pt assisted with hand held standby assistance, tolerated well.   Goal: Will remain free from falls  Outcome: PROGRESSING AS EXPECTED

## 2017-04-12 NOTE — PROGRESS NOTES
Hospital Medicine Progress Note, Adult, Complex               Author: Jammie Haas Date & Time created: 4/12/2017  3:55 PM     Interval History:  4/11 Patient with Astrocytoma s/p surgical excision, he is underway with radiation 17/30 completed today.  He will complete radiation here prior to discharge.  Temporary guardianship given to daughter and permanent guardianship decision pending.  Patient communication has improved drastically since last week, his complaint today is that his thickened water is far too thick and he has some pain in the left flank/side while walking with CNA today.    4/12 Patient doing okay today, when examined he was changing the  his rotary shaver without issue.  Will continue with therapies and daily radiation until home situation with daughters organized as they would like to take him home and get transportation with RTC access.    Review of Systems:  Review of Systems   Constitutional: Negative for fever.   HENT: Negative for nosebleeds.    Eyes: Negative for blurred vision and pain.   Respiratory: Negative for cough and shortness of breath.    Cardiovascular: Negative for chest pain and leg swelling.   Gastrointestinal: Negative for heartburn, abdominal pain and diarrhea.   Genitourinary: Negative for dysuria, hematuria and flank pain.   Musculoskeletal: Negative for myalgias and joint pain.   Skin: Negative for itching and rash.   Neurological: Negative for dizziness, focal weakness, seizures and headaches.   Psychiatric/Behavioral: Negative for depression. The patient is not nervous/anxious and does not have insomnia.        Physical Exam:  Physical Exam   Constitutional: He appears well-developed and well-nourished. No distress.   HENT:   Head: Normocephalic and atraumatic.   Eyes: Conjunctivae are normal. No scleral icterus.   Neck: Neck supple. No JVD present.   Cardiovascular: Normal rate, regular rhythm and intact distal pulses.  Exam reveals no gallop and no friction  rub.    No murmur heard.  Pulmonary/Chest: Effort normal and breath sounds normal. No respiratory distress. He exhibits no tenderness.   Abdominal: Soft. Bowel sounds are normal. He exhibits no distension and no mass. There is no tenderness.   Musculoskeletal: Normal range of motion. He exhibits no edema.   Neurological: He is alert. No cranial nerve deficit.   Patient able to answer some orientation questions, he is much more cognitive today than prior.     Skin: Skin is warm and dry. He is not diaphoretic. No erythema. No pallor.   Psychiatric: He has a normal mood and affect. His behavior is normal.   Nursing note and vitals reviewed.      Labs:        Invalid input(s): [id number]      No results for input(s): SODIUM, POTASSIUM, CHLORIDE, CO2, BUN, CREATININE, MAGNESIUM, PHOSPHORUS, CALCIUM in the last 72 hours.  No results for input(s): ALTSGPT, ASTSGOT, ALKPHOSPHAT, TBILIRUBIN, DBILIRUBIN, GAMMAGT, AMYLASE, LIPASE, ALB, PREALBUMIN, GLUCOSE in the last 72 hours.  No results for input(s): RBC, HEMOGLOBIN, HEMATOCRIT, PLATELETCT, PROTHROMBTM, APTT, INR, IRON, FERRITIN, TOTIRONBC in the last 72 hours.            Hemodynamics:  Temp (24hrs), Av.2 °C (97.2 °F), Min:36.1 °C (96.9 °F), Max:36.4 °C (97.5 °F)  Temperature: 36.1 °C (96.9 °F)  Pulse  Av.3  Min: 56  Max: 123   Blood Pressure: (!) 97/75 mmHg     Respiratory:    Respiration: 18, Pulse Oximetry: 100 %     Work Of Breathing / Effort: Mild  RUL Breath Sounds: Clear, RML Breath Sounds: Diminished, RLL Breath Sounds: Diminished, LAYA Breath Sounds: Clear, LLL Breath Sounds: Diminished  Fluids:    Intake/Output Summary (Last 24 hours) at 17 1555  Last data filed at 17 1100   Gross per 24 hour   Intake      0 ml   Output   2100 ml   Net  -2100 ml        GI/Nutrition:  Orders Placed This Encounter   Procedures   • Diet Order     Standing Status: Standing      Number of Occurrences: 1      Standing Expiration Date:      Order Specific Question:   Diet:     Answer:  Diabetic [3]     Order Specific Question:  Texture/Fiber modifications:     Answer:  Dysphagia 2(Pureed/Chopped)specify fluid consistency(question 6) [2]     Order Specific Question:  Consistency/Fluid modifications:     Answer:  Nectar Thick [2]     Order Specific Question:  Miscellaneous modifications:     Answer:  SLP - 1:1 Supervision by Nursing [21]     Medical Decision Making, by Problem:  Active Hospital Problems    Diagnosis   • Astrocytoma (CMS-[facility]) [C71.9]s/p surgical excision, radiation underway     • Seizure disorder (CMS-HCC) [G40.909]keppra     • Hyponatremia [E87.1]resolved     • Normocytic anemia [D64.9]mild, monitor     • Thrush, oral [B37.0]completed treatment     • Pneumonia [J18.9]completed antibiotics     • Acute respiratory failure with hypoxia (CMS-[facility]) [J96.01]resolved     • Cerebral edema (CMS-[facility]) [G93.6]steroids/seizure prophylaxis         Labs reviewed, Medications reviewed and Radiology images reviewed  Mesa catheter: No Mesa      DVT: xarelto.        Assessed for rehab: Patient was assess for and/or received rehabilitation services during this hospitalization

## 2017-04-12 NOTE — DISCHARGE PLANNING
I received the court papers for temporary guardianship awarded to the patient's daughters, Priscilla Brooks and Desiree Brooks.  The original papers are on the patient's chart.  Care transitions has a copy of those papers.

## 2017-04-13 NOTE — PROGRESS NOTES
Patient brought down to Radiation Therapy and received treatment 19 out of 30. Patient tolerated procedure well. Will continue treatment as planned.

## 2017-04-13 NOTE — CARE PLAN
Problem: Safety  Goal: Will remain free from injury  Outcome: PROGRESSING AS EXPECTED  Bed alarm, bed in lowest position, call light and personal belongings within reach, room near nurses station, non skid socks    Problem: Skin Integrity  Goal: Risk for impaired skin integrity will decrease  Outcome: PROGRESSING AS EXPECTED  Educated to turn self side to side. Ambulates with standby assist

## 2017-04-13 NOTE — PROGRESS NOTES
Received shift report from day shift RN. Pt is AO2-3 and declines any pain. Discussed POC, assessed and administered evening medications. Bed alarm on, bed in lowest position, call light and personal belongings within reach, educated to call if in need of assistance, non skid socks, room near nurses station, all needs met at this time.

## 2017-04-13 NOTE — CARE PLAN
Problem: Bowel/Gastric:  Goal: Normal bowel function is maintained or improved  Outcome: PROGRESSING AS EXPECTED  Patient given MOM this morning, large bowel movement following.     Problem: Mobility  Goal: Risk for activity intolerance will decrease  Patient up to bathroom and sitting up in chair for most of morning. Denies pain.

## 2017-04-13 NOTE — PROGRESS NOTES
Care assumed at 0700. Patient alert and disoriented x3. Aware of self. Expressive and receptive aphasia, delayed responses. Denies pain. Assessment as charted, radiation scheduled for 1300 today. Bed alarm and fall socks on patient. Close to nurses station. Bedrailsx2. Urinal at bedside. Will continue to monitor.

## 2017-04-14 NOTE — THERAPY
"Speech Language Therapy dysphagia treatment completed.   Functional Status: Patient currently on Dys2/NTL diet and per RN, appears to be tolerating diet without difficulty. Patient still noted to have severe expressive and receptive language deficits, but pleasant and agreeable to PO trials. Patient consumed PO trials of soft solids, mixed consistencies, NTL via cup sip and straw, and thins via cup sip. Patient had intermittent oral residue on PO trials of soft solids and required verbal and gestural cues for second swallow, effortful swallow, and liquid wash if needed. This appeared to clear residue, but patient did require supervision for prompting and to limit bite/sip sizes. Patient had wet/gurgly vocal quality x2 and throat clearing x2 on PO trials of thin liquids and mixed consistencies. Laryngeal elevation palpated as weak, but initiation of swallow trigger was timely, although prolonged mastication was noted intermittently. Patient was able to complete laryngeal elevation exercises with \"fair\" accuracy, but was unable to complete others d/t aphasia and apraxia. At this time, recommend patient continue Dys2/NTL diet with supervision and use of swallow precautions. RN aware. SLP is following.     Recommendations: At this time, recommend patient continue Dys2/NTL diet with supervision and use of swallow precautions  Plan of Care: Will benefit from Speech Therapy 3 times per week  Post-Acute Therapy: Discharge to a transitional care facility for continued skilled therapy services    See \"Rehab Therapy-Acute\" Patient Summary Report for complete documentation.     "

## 2017-04-14 NOTE — PROGRESS NOTES
Problem: Venous Thromboembolism (VTE)/Deep Vein Thrombosis (DVT) Prevention  Goal: Patient will participate in Venous Thrombosis (VTE)/Deep Vein Thrombosis (DVT)Prevention Measures  Outcome: PROGRESSING AS EXPECTED     Intervention: Assess and monitor for anticoagulation complications-  On Xarelto 20 mg po with PM meal.     Intervention: Encourage patient to perform ankle flex, foot rotation, and knee flex exercises in addition to other prophylatic measures every hour while awake      Intervention:Encourage ambulation/mobilization at level directed by Physical Therapy in collaboration with Interdisciplinary Team     -on PT/OT      Problem: Safety  Goal: Will remain free from injury  Outcome: PROGRESSING AS EXPECTED    Intervention: Provide assistance with mobility  Up x 1 person assist to chair and back to bed, with shuffling gait. Treaded socks on, Bed alarm on. Advised to call for assistance when getting out of bed.        Intervention: Collaborate with Interdisciplinary Team for safe transfer and mobilization techniques    Intervention: Educate patient and significant other/support system about adaptive mobility strategies and safe transfers

## 2017-04-14 NOTE — PROGRESS NOTES
Patient is alert and oriented x2-3.  With expressive aphasia. Answers most questions appropriately. Speaks Chinese. ON daily Radiation. Denies any pain as of this time. Bed alarm on. Uses urinal. Advised pt to call for assistance prior to getting out of bed. Fall precautions implemented. Needs attended and anticipated. Call light within reach.

## 2017-04-14 NOTE — PROGRESS NOTES
Bedside report received from night shift, assumed care of pt at 0715. Pt in bed resting comfortably with no s/sx of pain or discomfort.  Pt disoriented to time and situation. Denying pain at this time.  One person assist. Pt does not call  for medication or assistance as needed. Call light within reach, all appropriate fall precautions in place and personal belongings within reach; hourly rounding in place. No needs at this time. See flowsheets for further assessment details.

## 2017-04-14 NOTE — PROGRESS NOTES
Patient was transported to our department for radiation therapy treatment number 20 of 30 to his brain. We will treat him again on Monday.

## 2017-04-14 NOTE — CARE PLAN
Problem: Safety  Goal: Will remain free from falls  Outcome: PROGRESSING AS EXPECTED  Bed alarm in use, treaded slipper socks in place. Patient does not call for assistance. Patient educated regarding fall risk.

## 2017-04-14 NOTE — DISCHARGE PLANNING
Guardianship    Pt's daughter's, Priscilla (ph#: [phone]) and Desiree (ph#: [phone]), have temporary co-guardianship of pt.  Court date is scheduled for May 23rd at 3:00pm to determine with they will be granted permeant guardianship of pt or not.    SW faxed a copy of guardianship paperwork to [employer] to scan in pt's EMR.  Copy has also been placed on pt's paper chart.

## 2017-04-14 NOTE — DISCHARGE PLANNING
Medical Social Work    Ongoing: Discharge Planning    SW called and spoke with pt's daughter, Priscilla (ph#: [phone]), regarding pt's d/c plan.  Priscilla would like to discuss with her sister, Desiree, prior to making a decision and asked that SW talk to Desiree as well.  SW met with pt's daughter, Desiree, at pt's bedside.  Desiree would like for pt to d/c home prior to him completing his radiation treatment and reports that Priscilla would like for pt to remain in the hospital until he has completed his radiation (pt has completed day 19 of 30).  Pt will d/c home with Priscilla once medically cleared.  Pt has been approved for RTC Access.  Desiree informed this SW that she will discuss d/c plan further with her sister, Priscilla, and let this SW know tomorrow what they decide.    Plan:  SS will remain available to provide support and assist with d/c planning as needed.        *Pt's daughters, Desiree and Priscilla, have temporary co-guardianship of pt.  Court date is scheduled for May 23rd at 3:00pm to determine if they will be granted permeant guardianship of pt or not.*

## 2017-04-14 NOTE — PROGRESS NOTES
"Hospital Medicine Progress Note, Adult, Complex               Author: Jammie Haas Date & Time created: 4/13/2017  5:18 PM     Interval History:  4/11 Patient with Astrocytoma s/p surgical excision, he is underway with radiation 17/30 completed today.  He will complete radiation here prior to discharge.  Temporary guardianship given to daughter and permanent guardianship decision pending.  Patient communication has improved drastically since last week, his complaint today is that his thickened water is far too thick and he has some pain in the left flank/side while walking with CNA today.    4/12 Patient doing okay today, when examined he was changing the  his rotary shaver without issue.  Will continue with therapies and daily radiation until home situation with daughters organized as they would like to take him home and get transportation with RTC access.  4/13 Patient doing about the same today, answers most questions appropriately if asked both in english and in Indonesian but also first says, \"IS okay\" to most everything.  Daughters trying to decide if they can care for him at home while he completes radiation - he will be here through the weekend regardless.    Review of Systems:  Review of Systems   Constitutional: Negative for fever.   HENT: Negative for nosebleeds.    Eyes: Negative for blurred vision and pain.   Respiratory: Negative for cough and shortness of breath.    Cardiovascular: Negative for chest pain and leg swelling.   Gastrointestinal: Negative for heartburn, abdominal pain and diarrhea.   Genitourinary: Negative for dysuria, hematuria and flank pain.   Musculoskeletal: Negative for myalgias and joint pain.   Skin: Negative for itching and rash.   Neurological: Negative for dizziness, focal weakness, seizures and headaches.   Psychiatric/Behavioral: Negative for depression. The patient is not nervous/anxious and does not have insomnia.        Physical Exam:  Physical Exam "   Constitutional: He appears well-developed and well-nourished. No distress.   HENT:   Head: Normocephalic and atraumatic.   Eyes: Conjunctivae are normal. No scleral icterus.   Neck: Neck supple. No JVD present.   Cardiovascular: Normal rate, regular rhythm and intact distal pulses.  Exam reveals no gallop and no friction rub.    No murmur heard.  Pulmonary/Chest: Effort normal and breath sounds normal. No respiratory distress. He exhibits no tenderness.   Abdominal: Soft. Bowel sounds are normal. He exhibits no distension and no mass. There is no tenderness.   Musculoskeletal: Normal range of motion. He exhibits no edema.   Neurological: He is alert. No cranial nerve deficit.   Patient able to answer some orientation questions, he is much more cognitive today than prior.     Skin: Skin is warm and dry. He is not diaphoretic. No erythema. No pallor.   Psychiatric: He has a normal mood and affect. His behavior is normal.   Nursing note and vitals reviewed.      Labs:        Invalid input(s): [id number]      No results for input(s): SODIUM, POTASSIUM, CHLORIDE, CO2, BUN, CREATININE, MAGNESIUM, PHOSPHORUS, CALCIUM in the last 72 hours.  No results for input(s): ALTSGPT, ASTSGOT, ALKPHOSPHAT, TBILIRUBIN, DBILIRUBIN, GAMMAGT, AMYLASE, LIPASE, ALB, PREALBUMIN, GLUCOSE in the last 72 hours.  Recent Labs      17   2357   RBC  3.81*   HEMOGLOBIN  12.4*   HEMATOCRIT  38.6*   PLATELETCT  172     Recent Labs      17   2357   WBC  9.4   NEUTSPOLYS  80.10*   LYMPHOCYTES  8.60*   MONOCYTES  5.90   EOSINOPHILS  1.00   BASOPHILS  0.40           Hemodynamics:  Temp (24hrs), Av.7 °C (98.1 °F), Min:36.6 °C (97.8 °F), Max:36.9 °C (98.5 °F)  Temperature: 36.7 °C (98 °F)  Pulse  Av.5  Min: 56  Max: 123   Blood Pressure: 100/73 mmHg     Respiratory:    Respiration: 18, Pulse Oximetry: 97 %     Work Of Breathing / Effort: Mild  RUL Breath Sounds: Diminished;Clear, RML Breath Sounds: Clear;Diminished, RLL Breath  Sounds: Clear;Diminished, LAYA Breath Sounds: Clear;Diminished, LLL Breath Sounds: Clear;Diminished  Fluids:    Intake/Output Summary (Last 24 hours) at 04/13/17 1718  Last data filed at 04/13/17 1555   Gross per 24 hour   Intake    480 ml   Output    550 ml   Net    -70 ml        GI/Nutrition:  Orders Placed This Encounter   Procedures   • Diet Order     Standing Status: Standing      Number of Occurrences: 1      Standing Expiration Date:      Order Specific Question:  Diet:     Answer:  Diabetic [3]     Order Specific Question:  Texture/Fiber modifications:     Answer:  Dysphagia 2(Pureed/Chopped)specify fluid consistency(question 6) [2]     Order Specific Question:  Consistency/Fluid modifications:     Answer:  Nectar Thick [2]     Order Specific Question:  Miscellaneous modifications:     Answer:  SLP - 1:1 Supervision by Nursing [21]     Medical Decision Making, by Problem:  Active Hospital Problems    Diagnosis   • 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]s/p surgical excision, radiation underway     • Seizure disorder (CMS-HCC) [G40.909]keppra     • Hyponatremia [E87.1]resolved     • Normocytic anemia [D64.9]mild, monitor     • Thrush, oral [B37.0]completed treatment     • Pneumonia [J18.9]completed antibiotics     • Acute respiratory failure with hypoxia (CMS-HCC) [J96.01]resolved     • Cerebral edema (CMS-HCC) [G93.6]steroids/seizure prophylaxis         Labs reviewed, Medications reviewed and Radiology images reviewed  Mesa catheter: No Mesa      DVT: xarelto.        Assessed for rehab: Patient was assess for and/or received rehabilitation services during this hospitalization

## 2017-04-14 NOTE — PROGRESS NOTES
"Hospital Medicine Progress Note, Adult, Complex               Author: Jammie Haas Date & Time created: 4/14/2017  3:28 PM     Interval History:  4/11 Patient with Astrocytoma s/p surgical excision, he is underway with radiation 17/30 completed today.  He will complete radiation here prior to discharge.  Temporary guardianship given to daughter and permanent guardianship decision pending.  Patient communication has improved drastically since last week, his complaint today is that his thickened water is far too thick and he has some pain in the left flank/side while walking with CNA today.    4/12 Patient doing okay today, when examined he was changing the  his rotary shaver without issue.  Will continue with therapies and daily radiation until home situation with daughters organized as they would like to take him home and get transportation with RTC access.  4/13 Patient doing about the same today, answers most questions appropriately if asked both in english and in Greenlandic but also first says, \"IS okay\" to most everything.  Daughters trying to decide if they can care for him at home while he completes radiation - he will be here through the weekend regardless.  4/14 Patient with more word salad today in both english and Green[place]ic.  No other significant changes, no complaints. Continue with daily rads m-f.    Review of Systems:  Review of Systems   Constitutional: Negative for fever.   HENT: Negative for nosebleeds.    Eyes: Negative for blurred vision and pain.   Respiratory: Negative for cough and shortness of breath.    Cardiovascular: Negative for chest pain and leg swelling.   Gastrointestinal: Negative for heartburn, abdominal pain and diarrhea.   Genitourinary: Negative for dysuria, hematuria and flank pain.   Musculoskeletal: Negative for myalgias and joint pain.   Skin: Negative for itching and rash.   Neurological: Negative for dizziness, focal weakness, seizures and headaches. "   Psychiatric/Behavioral: Negative for depression. The patient is not nervous/anxious and does not have insomnia.        Physical Exam:  Physical Exam   Constitutional: He appears well-developed and well-nourished. No distress.   HENT:   Head: Normocephalic and atraumatic.   Eyes: Conjunctivae are normal. No scleral icterus.   Neck: Neck supple. No JVD present.   Cardiovascular: Normal rate, regular rhythm, normal heart sounds and intact distal pulses.  Exam reveals no gallop and no friction rub.    No murmur heard.  Pulmonary/Chest: Effort normal and breath sounds normal. No respiratory distress. He exhibits no tenderness.   Abdominal: Soft. Bowel sounds are normal. He exhibits no distension and no mass. There is no tenderness.   Musculoskeletal: Normal range of motion. He exhibits no edema.   Neurological: He is alert. No cranial nerve deficit.   Word salad again       Skin: Skin is warm and dry. He is not diaphoretic. No erythema. No pallor.   Psychiatric: He has a normal mood and affect. His behavior is normal.   Nursing note and vitals reviewed.      Labs:        Invalid input(s): [id number]      No results for input(s): SODIUM, POTASSIUM, CHLORIDE, CO2, BUN, CREATININE, MAGNESIUM, PHOSPHORUS, CALCIUM in the last 72 hours.  No results for input(s): ALTSGPT, ASTSGOT, ALKPHOSPHAT, TBILIRUBIN, DBILIRUBIN, GAMMAGT, AMYLASE, LIPASE, ALB, PREALBUMIN, GLUCOSE in the last 72 hours.  Recent Labs      17   2357   RBC  3.81*   HEMOGLOBIN  12.4*   HEMATOCRIT  38.6*   PLATELETCT  172     Recent Labs      17   2357   WBC  9.4   NEUTSPOLYS  80.10*   LYMPHOCYTES  8.60*   MONOCYTES  5.90   EOSINOPHILS  1.00   BASOPHILS  0.40           Hemodynamics:  Temp (24hrs), Av.6 °C (97.9 °F), Min:36.4 °C (97.6 °F), Max:36.7 °C (98.1 °F)  Temperature: 36.7 °C (98 °F)  Pulse  Av.2  Min: 56  Max: 123   Blood Pressure: 105/65 mmHg     Respiratory:    Respiration: 18, Pulse Oximetry: 94 %     Work Of Breathing /  Effort: Mild  RUL Breath Sounds: Clear, RML Breath Sounds: Clear, RLL Breath Sounds: Clear, LAYA Breath Sounds: Clear, LLL Breath Sounds: Clear  Fluids:    Intake/Output Summary (Last 24 hours) at 04/14/17 1528  Last data filed at 04/14/17 0700   Gross per 24 hour   Intake    250 ml   Output   1450 ml   Net  -1200 ml        GI/Nutrition:  Orders Placed This Encounter   Procedures   • Diet Order     Standing Status: Standing      Number of Occurrences: 1      Standing Expiration Date:      Order Specific Question:  Diet:     Answer:  Diabetic [3]     Order Specific Question:  Texture/Fiber modifications:     Answer:  Dysphagia 2(Pureed/Chopped)specify fluid consistency(question 6) [2]     Order Specific Question:  Consistency/Fluid modifications:     Answer:  Nectar Thick [2]     Order Specific Question:  Miscellaneous modifications:     Answer:  SLP - 1:1 Supervision by Nursing [21]     Medical Decision Making, by Problem:  Active Hospital Problems    Diagnosis   • Astrocytoma (CMS-[facility]) [C71.9]s/p surgical excision, radiation underway     • Seizure disorder (CMS-[facility]) [G40.909]keppra     • Hyponatremia [E87.1]resolved     • Normocytic anemia [D64.9]mild, monitor     • Thrush, oral [B37.0]completed treatment     • Pneumonia [J18.9]completed antibiotics     • Acute respiratory failure with hypoxia (CMS-[facility]) [J96.01]resolved     • Cerebral edema (CMS-[facility]) [G93.6]steroids/seizure prophylaxis         Labs reviewed, Medications reviewed and Radiology images reviewed  Mesa catheter: No Mesa      DVT: xarelto.        Assessed for rehab: Patient was assess for and/or received rehabilitation services during this hospitalization

## 2017-04-15 NOTE — PROGRESS NOTES
RN received report, assumed pt care. Patient is sitting up in the chair at bedside watching TV. Call light with in reach, all needs met at this time, RN will continue to monitor.

## 2017-04-15 NOTE — CARE PLAN
Problem: Discharge Barriers/Planning  Goal: Patient’s continuum of care needs will be met  Pending guardianship hearing, will go home with daughters

## 2017-04-15 NOTE — PROGRESS NOTES
"Hospital Medicine Progress Note, Adult, Complex               Author: Jammie Haas Date & Time created: 4/15/2017  11:29 AM     Interval History:  4/11 Patient with Astrocytoma s/p surgical excision, he is underway with radiation 17/30 completed today.  He will complete radiation here prior to discharge.  Temporary guardianship given to daughter and permanent guardianship decision pending.  Patient communication has improved drastically since last week, his complaint today is that his thickened water is far too thick and he has some pain in the left flank/side while walking with CNA today.    4/12 Patient doing okay today, when examined he was changing the  his rotary shaver without issue.  Will continue with therapies and daily radiation until home situation with daughters organized as they would like to take him home and get transportation with RTC access.  4/13 Patient doing about the same today, answers most questions appropriately if asked both in english and in Estonian but also first says, \"IS okay\" to most everything.  Daughters trying to decide if they can care for him at home while he completes radiation - he will be here through the weekend regardless.  4/14 Patient with more word salad today in both english and Estonian.  No other significant changes, no complaints. Continue with daily rads m-f.  4/15 Patient very expressive today and verbose but still with word salad.  He is eating well and sitting up in chair when examined.  No acute events.    Review of Systems:  Review of Systems   Constitutional: Negative for fever.   HENT: Negative for nosebleeds.    Eyes: Negative for blurred vision and pain.   Respiratory: Negative for cough and shortness of breath.    Cardiovascular: Negative for chest pain and leg swelling.   Gastrointestinal: Negative for heartburn, abdominal pain and diarrhea.   Genitourinary: Negative for dysuria, hematuria and flank pain.   Musculoskeletal: Negative for myalgias and " joint pain.   Skin: Negative for itching and rash.   Neurological: Negative for dizziness, focal weakness, seizures and headaches.   Psychiatric/Behavioral: Negative for depression. The patient is not nervous/anxious and does not have insomnia.        Physical Exam:  Physical Exam   Constitutional: He appears well-developed and well-nourished. No distress.   HENT:   Head: Normocephalic and atraumatic.   Eyes: Conjunctivae are normal. No scleral icterus.   Neck: Neck supple. No JVD present.   Cardiovascular: Normal rate, regular rhythm, normal heart sounds and intact distal pulses.  Exam reveals no gallop and no friction rub.    No murmur heard.  Pulmonary/Chest: Effort normal and breath sounds normal. No respiratory distress. He exhibits no tenderness.   Abdominal: Soft. Bowel sounds are normal. He exhibits no distension and no mass. There is no tenderness.   Musculoskeletal: Normal range of motion. He exhibits no edema.   Neurological: He is alert. No cranial nerve deficit.   Word salad again       Skin: Skin is warm and dry. He is not diaphoretic. No erythema. No pallor.   Psychiatric: He has a normal mood and affect. His behavior is normal.   Nursing note and vitals reviewed.      Labs:        Invalid input(s): [id number]      Recent Labs      17   2349   SODIUM  137   POTASSIUM  3.8   CHLORIDE  105   CO2  26   BUN  16   CREATININE  0.43*   CALCIUM  8.4*     Recent Labs      17   2349   GLUCOSE  89     Recent Labs      17   2357   RBC  3.81*   HEMOGLOBIN  12.4*   HEMATOCRIT  38.6*   PLATELETCT  172     Recent Labs      17   2357   WBC  9.4   NEUTSPOLYS  80.10*   LYMPHOCYTES  8.60*   MONOCYTES  5.90   EOSINOPHILS  1.00   BASOPHILS  0.40           Hemodynamics:  Temp (24hrs), Av.4 °C (97.6 °F), Min:36.2 °C (97.2 °F), Max:36.7 °C (98 °F)  Temperature: 36.4 °C (97.5 °F)  Pulse  Av.1  Min: 56  Max: 123   Blood Pressure: 108/75 mmHg     Respiratory:    Respiration: 18, Pulse  Oximetry: 94 %     Work Of Breathing / Effort: Mild  RUL Breath Sounds: Clear, RML Breath Sounds: Diminished, RLL Breath Sounds: Diminished, LAYA Breath Sounds: Clear, LLL Breath Sounds: Diminished  Fluids:    Intake/Output Summary (Last 24 hours) at 04/15/17 1129  Last data filed at 04/15/17 0900   Gross per 24 hour   Intake      0 ml   Output   1600 ml   Net  -1600 ml        GI/Nutrition:  Orders Placed This Encounter   Procedures   • Diet Order     Standing Status: Standing      Number of Occurrences: 1      Standing Expiration Date:      Order Specific Question:  Diet:     Answer:  Diabetic [3]     Order Specific Question:  Texture/Fiber modifications:     Answer:  Dysphagia 2(Pureed/Chopped)specify fluid consistency(question 6) [2]     Order Specific Question:  Consistency/Fluid modifications:     Answer:  Nectar Thick [2]     Order Specific Question:  Miscellaneous modifications:     Answer:  SLP - 1:1 Supervision by Nursing [21]     Medical Decision Making, by Problem:  Active Hospital Problems    Diagnosis   • Astrocytoma (CMS-[facility]) [C71.9]s/p surgical excision, radiation underway     • Seizure disorder (CMS-[facility]) [G40.909]keppra     • Hyponatremia [E87.1]resolved     • Normocytic anemia [D64.9]mild, monitor     • Thrush, oral [B37.0]completed treatment     • Pneumonia [J18.9]completed antibiotics     • Acute respiratory failure with hypoxia (CMS-[facility]) [J96.01]resolved     • Cerebral edema (CMS-[facility]) [G93.6]steroids/seizure prophylaxis         Labs reviewed, Medications reviewed and Radiology images reviewed  Mesa catheter: No Mesa      DVT: xarelto.        Assessed for rehab: Patient was assess for and/or received rehabilitation services during this hospitalization

## 2017-04-15 NOTE — PROGRESS NOTES
Bedside report received from night shift, assumed care of pt at 0715. Pt in bed resting comfortably with no s/sx of pain or discomfort.  Patient disoriented to time, place, and situation. Patient receiving nectar thick diet and tolerating well. No signs of aspiration. Lung fields clear to auscultation.

## 2017-04-16 NOTE — PROGRESS NOTES
"Hospital Medicine Progress Note, Adult, Complex               Author: Jammie Haas Date & Time created: 4/16/2017  4:51 PM     Interval History:  4/11 Patient with Astrocytoma s/p surgical excision, he is underway with radiation 17/30 completed today.  He will complete radiation here prior to discharge.  Temporary guardianship given to daughter and permanent guardianship decision pending.  Patient communication has improved drastically since last week, his complaint today is that his thickened water is far too thick and he has some pain in the left flank/side while walking with CNA today.    4/12 Patient doing okay today, when examined he was changing the  his rotary shaver without issue.  Will continue with therapies and daily radiation until home situation with daughters organized as they would like to take him home and get transportation with RTC access.  4/13 Patient doing about the same today, answers most questions appropriately if asked both in english and in [place]an but also first says, \"IS okay\" to most everything.  Daughters trying to decide if they can care for him at home while he completes radiation - he will be here through the weekend regardless.  4/14 Patient with more word salad today in both english and [place]an.  No other significant changes, no complaints. Continue with daily rads m-f.  4/15 Patient very expressive today and verbose but still with word salad.  He is eating well and sitting up in chair when examined.  No acute events.  4/16 Patient without acute changes, today he was trying to tell me he had to go to the bathroom but when I asked him in both [place] and english if he needed to go to the bathroom he said not, repeated \"one second, one second.\"  He then got up and walked to the restroom and urgently had a bowel movement.  His actions correlate with what he wants but his words still do not.    Review of Systems:  Review of Systems   Constitutional: Negative for fever. "   HENT: Negative for nosebleeds.    Eyes: Negative for blurred vision and pain.   Respiratory: Negative for cough and shortness of breath.    Cardiovascular: Negative for chest pain and leg swelling.   Gastrointestinal: Negative for heartburn, abdominal pain and diarrhea.   Genitourinary: Negative for dysuria, hematuria and flank pain.   Musculoskeletal: Negative for myalgias and joint pain.   Skin: Negative for itching and rash.   Neurological: Negative for dizziness, focal weakness, seizures and headaches.   Psychiatric/Behavioral: Negative for depression. The patient is not nervous/anxious and does not have insomnia.        Physical Exam:  Physical Exam   Constitutional: He appears well-developed and well-nourished. No distress.   HENT:   Head: Normocephalic and atraumatic.   Eyes: Conjunctivae are normal. No scleral icterus.   Neck: Neck supple. No JVD present.   Cardiovascular: Normal rate, regular rhythm, normal heart sounds and intact distal pulses.  Exam reveals no gallop and no friction rub.    No murmur heard.  Pulmonary/Chest: Effort normal and breath sounds normal. No respiratory distress. He exhibits no tenderness.   Abdominal: Soft. Bowel sounds are normal. He exhibits no distension and no mass. There is no tenderness.   Musculoskeletal: Normal range of motion. He exhibits no edema.   Neurological: He is alert. No cranial nerve deficit.   Word salad again       Skin: Skin is warm and dry. He is not diaphoretic. No erythema. No pallor.   Psychiatric: He has a normal mood and affect. His behavior is normal.   Nursing note and vitals reviewed.      Labs:        Invalid input(s): [id number]      Recent Labs      04/14/17   2349   SODIUM  137   POTASSIUM  3.8   CHLORIDE  105   CO2  26   BUN  16   CREATININE  0.43*   CALCIUM  8.4*     Recent Labs      04/14/17   2349   GLUCOSE  89     No results for input(s): RBC, HEMOGLOBIN, HEMATOCRIT, PLATELETCT, PROTHROMBTM, APTT, INR, IRON, FERRITIN, TOTIRONBC in  the last 72 hours.            Hemodynamics:  Temp (24hrs), Av.4 °C (97.6 °F), Min:36.1 °C (97 °F), Max:36.9 °C (98.4 °F)  Temperature: 36.1 °C (97 °F)  Pulse  Av.9  Min: 56  Max: 123   Blood Pressure: 114/73 mmHg     Respiratory:    Respiration: 18, Pulse Oximetry: 93 %     Work Of Breathing / Effort: Mild  RUL Breath Sounds: Clear, RML Breath Sounds: Diminished, RLL Breath Sounds: Diminished, LAYA Breath Sounds: Clear, LLL Breath Sounds: Diminished  Fluids:    Intake/Output Summary (Last 24 hours) at 17 1651  Last data filed at 17 1147   Gross per 24 hour   Intake      0 ml   Output   1850 ml   Net  -1850 ml        GI/Nutrition:  Orders Placed This Encounter   Procedures   • Diet Order     Standing Status: Standing      Number of Occurrences: 1      Standing Expiration Date:      Order Specific Question:  Diet:     Answer:  Diabetic [3]     Order Specific Question:  Texture/Fiber modifications:     Answer:  Dysphagia 2(Pureed/Chopped)specify fluid consistency(question 6) [2]     Order Specific Question:  Consistency/Fluid modifications:     Answer:  Nectar Thick [2]     Order Specific Question:  Miscellaneous modifications:     Answer:  SLP - 1:1 Supervision by Nursing [21]     Medical Decision Making, by Problem:  Active Hospital Problems    Diagnosis   • 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]s/p surgical excision, radiation underway     • Seizure disorder (CMS-HCC) [G40.909]keppra     • Hyponatremia [E87.1]resolved     • Normocytic anemia [D64.9]mild, monitor     • Thrush, oral [B37.0]completed treatment     • Pneumonia [J18.9]completed antibiotics     • Acute respiratory failure with hypoxia (CMS-HCC) [J96.01]resolved     • Cerebral edema (CMS-HCC) [G93.6]steroids/seizure prophylaxis         Labs reviewed, Medications reviewed and Radiology images reviewed  Mesa catheter: No Mesa      DVT: xarelto.        Assessed for rehab: Patient was assess for and/or received rehabilitation services during this  hospitalization

## 2017-04-17 NOTE — PROGRESS NOTES
Pt alert but unable to assess orientation. Expressive aphasia. Pt up with one person assist and a FWW and tolerates well. PIV to R FA saline locked. Completed radiation 21/30 today. Able to ask to go in to the bathroom but otherwise mostly word salad. Pt up to a chair for meals. Resting comfortably at this time. BA on and sounding. Call light within reach. Hourly rounding in place.

## 2017-04-17 NOTE — CARE PLAN
Problem: Pain Management  Goal: Pain level will decrease to patient’s comfort goal  Outcome: PROGRESSING AS EXPECTED  Patient will notify RN when he is having pain.     Problem: Mobility  Goal: Risk for activity intolerance will decrease  Outcome: PROGRESSING AS EXPECTED  Patient will call out when he needs assistance.

## 2017-04-17 NOTE — PROGRESS NOTES
Patient was transported to our department for radiation therapy treatment number 21 of 30 to his brain. We plan on treating him again tomorrow.

## 2017-04-17 NOTE — DISCHARGE PLANNING
Medical Social Work    Discussed in IDT rounds at pt's bedside this morning.   Pt completed radiation treatment 21 of 30 of his brain this morning.  Pt will d/c home with his daughter, Priscilla, once medically cleared.     Plan:  SS will remain available to provide support and assist with d/c planning as needed.

## 2017-04-17 NOTE — PROGRESS NOTES
"Hospital Medicine Progress Note, Adult, Complex               Author: Jammie Haas Date & Time created: 4/17/2017  2:21 PM     Interval History:  4/11 Patient with Astrocytoma s/p surgical excision, he is underway with radiation 17/30 completed today.  He will complete radiation here prior to discharge.  Temporary guardianship given to daughter and permanent guardianship decision pending.  Patient communication has improved drastically since last week, his complaint today is that his thickened water is far too thick and he has some pain in the left flank/side while walking with CNA today.    4/12 Patient doing okay today, when examined he was changing the  his rotary shaver without issue.  Will continue with therapies and daily radiation until home situation with daughters organized as they would like to take him home and get transportation with RTC access.  4/13 Patient doing about the same today, answers most questions appropriately if asked both in english and in [place] but also first says, \"IS okay\" to most everything.  Daughters trying to decide if they can care for him at home while he completes radiation - he will be here through the weekend regardless.  4/14 Patient with more word salad today in both english and [place].  No other significant changes, no complaints. Continue with daily rads m-f.  4/15 Patient very expressive today and verbose but still with word salad.  He is eating well and sitting up in chair when examined.  No acute events.  4/16 Patient without acute changes, today he was trying to tell me he had to go to the bathroom but when I asked him in both [place] and english if he needed to go to the bathroom he said not, repeated \"one second, one second.\"  He then got up and walked to the restroom and urgently had a bowel movement.  His actions correlate with what he wants but his words still do not.  4/17 Patient seen post radiation treatment today, denies complaints and is doing " about the same.  21/30 radiation treatments completed today, DC home with daughter when she can take him back and forth to rads treatments.    Review of Systems:  Review of Systems   Constitutional: Negative for fever.   HENT: Negative for nosebleeds.    Eyes: Negative for blurred vision and pain.   Respiratory: Negative for cough and shortness of breath.    Cardiovascular: Negative for chest pain and leg swelling.   Gastrointestinal: Negative for heartburn, abdominal pain and diarrhea.   Genitourinary: Negative for dysuria, hematuria and flank pain.   Musculoskeletal: Negative for myalgias and joint pain.   Skin: Negative for itching and rash.   Neurological: Negative for dizziness, focal weakness, seizures and headaches.   Psychiatric/Behavioral: Negative for depression. The patient is not nervous/anxious and does not have insomnia.        Physical Exam:  Physical Exam   Constitutional: He appears well-developed and well-nourished. No distress.   HENT:   Head: Normocephalic and atraumatic.   Eyes: Conjunctivae are normal. No scleral icterus.   Neck: Neck supple. No JVD present.   Cardiovascular: Normal rate, regular rhythm, normal heart sounds and intact distal pulses.  Exam reveals no gallop and no friction rub.    No murmur heard.  Pulmonary/Chest: Effort normal and breath sounds normal. No respiratory distress. He exhibits no tenderness.   Abdominal: Soft. Bowel sounds are normal. He exhibits no distension and no mass. There is no tenderness.   Musculoskeletal: Normal range of motion. He exhibits no edema.   Neurological: He is alert. No cranial nerve deficit.   Word salad again       Skin: Skin is warm and dry. He is not diaphoretic. No erythema. No pallor.   Psychiatric: He has a normal mood and affect. His behavior is normal.   Nursing note and vitals reviewed.      Labs:        Invalid input(s): [id number]      Recent Labs      04/14/17   2349  04/16/17   2341   SODIUM  137  135   POTASSIUM  3.8  3.7    CHLORIDE  105  104   CO2  26  24   BUN  16  20   CREATININE  0.43*  0.44*   CALCIUM  8.4*  8.2*     Recent Labs      17   2349  17   2341   GLUCOSE  89  146*     Recent Labs      17   2341   RBC  3.67*   HEMOGLOBIN  12.0*   HEMATOCRIT  35.8*   PLATELETCT  142*     Recent Labs      17   2341   WBC  6.6   NEUTSPOLYS  84.30*   LYMPHOCYTES  10.40*   MONOCYTES  0.90   EOSINOPHILS  0.00   BASOPHILS  0.90           Hemodynamics:  Temp (24hrs), Av.6 °C (97.8 °F), Min:36.1 °C (97 °F), Max:36.8 °C (98.3 °F)  Temperature: 36.6 °C (97.9 °F)  Pulse  Av.8  Min: 56  Max: 123   Blood Pressure: 120/81 mmHg     Respiratory:    Respiration: 18, Pulse Oximetry: 97 %     Work Of Breathing / Effort: Mild  RUL Breath Sounds: Clear, RML Breath Sounds: Diminished, RLL Breath Sounds: Diminished, LYAA Breath Sounds: Clear, LLL Breath Sounds: Diminished  Fluids:    Intake/Output Summary (Last 24 hours) at 17 1421  Last data filed at 17 1336   Gross per 24 hour   Intake    100 ml   Output   1625 ml   Net  -1525 ml        GI/Nutrition:  Orders Placed This Encounter   Procedures   • Diet Order     Standing Status: Standing      Number of Occurrences: 1      Standing Expiration Date:      Order Specific Question:  Diet:     Answer:  Diabetic [3]     Order Specific Question:  Texture/Fiber modifications:     Answer:  Dysphagia 2(Pureed/Chopped)specify fluid consistency(question 6) [2]     Order Specific Question:  Consistency/Fluid modifications:     Answer:  Nectar Thick [2]     Order Specific Question:  Miscellaneous modifications:     Answer:  SLP - 1:1 Supervision by Nursing [21]     Medical Decision Making, by Problem:  Active Hospital Problems    Diagnosis   • 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]s/p surgical excision, radiation underway     • Seizure disorder (CMS-HCC) [G40.909]keppra     • Hyponatremia [E87.1]resolved     • Normocytic anemia [D64.9]mild, monitor     • Thrush, oral [B37.0]completed  treatment     • Pneumonia [J18.9]completed antibiotics     • Acute respiratory failure with hypoxia (CMS-[facility]) [J96.01]resolved     • Cerebral edema (CMS-[facility]) [G93.6]steroids/seizure prophylaxis         Labs reviewed, Medications reviewed and Radiology images reviewed  Mesa catheter: No Mesa      DVT: xarelto.        Assessed for rehab: Patient was assess for and/or received rehabilitation services during this hospitalization

## 2017-04-18 NOTE — PROGRESS NOTES
Assumed pt care at 0715.  Received report from night RN.  Assessment completed.  Pt AAOx4 and mainly Malay speaking.  Radiation to pick pt up at 9:30am.  Pt has no comlaints of pain at this time.  No other s/s of discomfort or distress. Bed in lowest position, locked, and bed alarm is on.  Pt calls appropriately.  Treaded socks in place, call light within reach and staff numbers provided.  Pt needs met at this time.

## 2017-04-18 NOTE — PROGRESS NOTES
Received report from day RN and assumed care of pt at change of shift.  Expressive aphasia noted.  PIV SL, patent. Dressing is CDI, no pain when flushing IV. Medicated per MAR.   Bed in lowest position, bed alarm on, asked pt to call if needs assistance.    Daughter came to visit during shift.    All needs met, hourly rounding in place.

## 2017-04-18 NOTE — PROGRESS NOTES
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     Today he has completed radiation treatments 22/30 for the astrocytoma. Still positive for word salad.     Review of Systems:  Review of Systems   Constitutional: Negative for fever and chills.   Respiratory: Negative for cough and shortness of breath.    Gastrointestinal: Negative.    Genitourinary: Negative.    Musculoskeletal: Negative.    Neurological: Negative for weakness.   All other systems reviewed and are negative.      Physical Exam:  Physical Exam   Constitutional: He appears well-developed and well-nourished. No distress.   HENT:   Head: Normocephalic and atraumatic.   Eyes: Conjunctivae are normal. No scleral icterus.   Neck: Neck supple. No JVD present.   Cardiovascular: Normal rate, regular rhythm, normal heart sounds and intact distal pulses.  Exam reveals no gallop and no friction rub.    No murmur heard.  Pulmonary/Chest: Effort normal and breath sounds normal. No respiratory distress. He exhibits no tenderness.   Abdominal: Soft. Bowel sounds are normal. He exhibits no distension and no mass. There is no tenderness.   Musculoskeletal: Normal range of motion. He exhibits no edema.   Neurological: He is alert. No cranial nerve deficit.   Persistent Word Salad        Skin: Skin is warm and dry. He is not diaphoretic. No erythema. No pallor.   Psychiatric: He has a normal mood and affect. His behavior is normal.   Nursing note and vitals reviewed.      Labs:        Invalid input(s): [id number]      Recent Labs      04/16/17   2341   SODIUM  135   POTASSIUM  3.7   CHLORIDE  104   CO2  24   BUN  20   CREATININE  0.44*   CALCIUM  8.2*     Recent Labs       17   2341   GLUCOSE  146*     Recent Labs      17   2341   RBC  3.67*   HEMOGLOBIN  12.0*   HEMATOCRIT  35.8*   PLATELETCT  142*     Recent Labs      17   2341   WBC  6.6   NEUTSPOLYS  84.30*   LYMPHOCYTES  10.40*   MONOCYTES  0.90   EOSINOPHILS  0.00   BASOPHILS  0.90           Hemodynamics:  Temp (24hrs), Av.3 °C (97.4 °F), Min:36 °C (96.8 °F), Max:36.7 °C (98 °F)  Temperature: 36.2 °C (97.1 °F)  Pulse  Av.2  Min: 56  Max: 123   Blood Pressure: 111/77 mmHg     Respiratory:    Respiration: 20, Pulse Oximetry: 94 %        RUL Breath Sounds: Clear, RML Breath Sounds: Diminished, RLL Breath Sounds: Diminished, LAYA Breath Sounds: Clear, LLL Breath Sounds: Diminished  Fluids:    Intake/Output Summary (Last 24 hours) at 17 1651  Last data filed at 17 1244   Gross per 24 hour   Intake      0 ml   Output   2100 ml   Net  -2100 ml        GI/Nutrition:  Orders Placed This Encounter   Procedures   • Diet Order     Standing Status: Standing      Number of Occurrences: 1      Standing Expiration Date:      Order Specific Question:  Diet:     Answer:  Diabetic [3]     Order Specific Question:  Texture/Fiber modifications:     Answer:  Dysphagia 2(Pureed/Chopped)specify fluid consistency(question 6) [2]     Order Specific Question:  Consistency/Fluid modifications:     Answer:  Nectar Thick [2]     Order Specific Question:  Miscellaneous modifications:     Answer:  SLP - 1:1 Supervision by Nursing [21]     Medical Decision Making, by Problem:  Active Hospital Problems    Diagnosis   • 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]  · Contributing to word salad   · Partial surgical excision,  radiation treatments completed  · Continue radiation treatments    • Seizure disorder (CMS-HCC) [G40.909]  · Several witnessed at [facility]  · Managed with Keppra    • Hyponatremia [E87.1]  · Resolved, currently at 135    • Normocytic anemia [D64.9]  · Mild, continue to monitor   · F/u with iron panel  studies    • Thrush, oral [B37.0]  · Currently on nystatin    • Pneumonia [J18.9]  · Completed Abx  · Denies any symptoms    • Acute respiratory failure with hypoxia (CMS-[facility]) [J96.01]  · Resolved and not using supplemental O2     • Cerebral edema (CMS-[facility]) [G93.6]  · Managing with Decardron   Steroid Induced Hyperglycemia:  · Managed with insulin NPH, insulin lispro   Acute DVT of Both Lower Extremities   · History of DVTs  · Managed with Xarelto   Chronic Pain Of Right Knee:  · Currently on Fentanyl   Nausea/Vomitting:  · Managed with Zofran, Phenergan, compazine    Cough:  · Managed with Tessalon, getting better   Hypertension:  · Currently not hypertensive   · Being managed with labetalol   Constipation:  · Currently on Miralax and Dulcolax        Radiology images reviewed, EKG reviewed, Labs reviewed and Medications reviewed                    Planned Discussed with RN.    Dispo to home when daughter is able to take him back and forth between radiation treatments.

## 2017-04-18 NOTE — CARE PLAN
Problem: Safety  Goal: Will remain free from injury  Call light within reach, treaded socks in place, bed in lowest position and locked.  Hourly rounding in progress.     Problem: Pain Management  Goal: Pain level will decrease to patient’s comfort goal  Intervention: Follow pain managment plan developed in collaboration with patient and Interdisciplinary Team  Pt denies pain at this time.

## 2017-04-18 NOTE — CARE PLAN
Problem: Safety  Goal: Will remain free from falls  Intervention: Implement fall precautions  Pt remained safe, no unsafe attempts at ambulation. Bed alarm on, pt instructed to call if needs assistance.       Problem: Infection  Goal: Will remain free from infection  Intervention: Assess signs and symptoms of infection  Assessed skin, monitored labs, no s/s of infection.

## 2017-04-19 NOTE — PROGRESS NOTES
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     No events ove rnight. Feeling well. Speech unchanged.     Review of Systems:  Review of Systems   Constitutional: Negative for fever and chills.   HENT: Negative for sore throat.    Eyes: Negative for blurred vision and pain.   Respiratory: Negative for cough and shortness of breath.    Cardiovascular: Negative for chest pain and palpitations.   Gastrointestinal: Negative.  Negative for nausea, vomiting and abdominal pain.   Genitourinary: Negative.  Negative for dysuria and urgency.   Musculoskeletal: Negative.  Negative for back pain and neck pain.   Skin: Negative for itching and rash.   Neurological: Negative for dizziness, tingling, weakness and headaches.   Psychiatric/Behavioral: Negative for depression. The patient does not have insomnia.    All other systems reviewed and are negative.      Physical Exam:  Physical Exam   Constitutional: He is oriented to person, place, and time. He appears well-developed and well-nourished. No distress.   HENT:   Head: Normocephalic and atraumatic.   Right Ear: External ear normal.   Left Ear: External ear normal.   Nose: Nose normal.   Eyes: Conjunctivae are normal. Right eye exhibits no discharge. Left eye exhibits no discharge. No scleral icterus.   Neck: Neck supple. No JVD present. No tracheal deviation present.   Cardiovascular: Normal rate, regular rhythm, normal heart sounds and intact distal pulses.  Exam reveals no gallop and no friction rub.    No murmur heard.  Pulmonary/Chest: Effort normal and breath sounds normal. No respiratory distress. He has no wheezes. He has no rales. He exhibits no tenderness.   Abdominal: Soft.  Bowel sounds are normal. He exhibits no distension and no mass. There is no tenderness. There is no guarding.   Genitourinary: Penile tenderness present.   Musculoskeletal: Normal range of motion. He exhibits no edema or tenderness.   Neurological: He is alert and oriented to person, place, and time. No cranial nerve deficit.   Persistent Word Salad        Skin: Skin is warm and dry. He is not diaphoretic. No erythema. No pallor.   Psychiatric: He has a normal mood and affect. His behavior is normal.   Nursing note and vitals reviewed.      Labs:        Invalid input(s): [id number]      Recent Labs      17   23417   2337   SODIUM  135  134*   POTASSIUM  3.7  3.4*   CHLORIDE  104  101   CO2  24  23   BUN  20  22   CREATININE  0.44*  0.63   CALCIUM  8.2*  8.3*     Recent Labs      17   23417   233   GLUCOSE  146*  156*     Recent Labs      17   233   RBC  3.67*  3.85*   HEMOGLOBIN  12.0*  12.7*   HEMATOCRIT  35.8*  37.8*   PLATELETCT  142*  153*   IRON   --   71   TOTIRONBC   --   316     Recent Labs      17   233   WBC  6.6  6.5   NEUTSPOLYS  84.30*  72.80*   LYMPHOCYTES  10.40*  12.30*   MONOCYTES  0.90  7.90   EOSINOPHILS  0.00  2.60   BASOPHILS  0.90  0.00           Hemodynamics:  Temp (24hrs), Av.2 °C (97.2 °F), Min:35.8 °C (96.5 °F), Max:36.9 °C (98.4 °F)  Temperature: 36.9 °C (98.4 °F)  Pulse  Av.9  Min: 56  Max: 123   Blood Pressure: 108/75 mmHg     Respiratory:    Respiration: 18, Pulse Oximetry: 97 %        RUL Breath Sounds: Clear, RML Breath Sounds: Diminished, RLL Breath Sounds: Diminished, LAYA Breath Sounds: Clear, LLL Breath Sounds: Diminished  Fluids:    Intake/Output Summary (Last 24 hours) at 17 0920  Last data filed at 17 0700   Gross per 24 hour   Intake      0 ml   Output   1700 ml   Net  -1700 ml        GI/Nutrition:  Orders Placed This Encounter   Procedures   • Diet Order     Standing Status:  Standing      Number of Occurrences: 1      Standing Expiration Date:      Order Specific Question:  Diet:     Answer:  Diabetic [3]     Order Specific Question:  Texture/Fiber modifications:     Answer:  Dysphagia 2(Pureed/Chopped)specify fluid consistency(question 6) [2]     Order Specific Question:  Consistency/Fluid modifications:     Answer:  Nectar Thick [2]     Order Specific Question:  Miscellaneous modifications:     Answer:  SLP - 1:1 Supervision by Nursing [21]     Medical Decision Making, by Problem:  Active Hospital Problems    Diagnosis   • 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]  · With Wernicke aphasia.   · Partial surgical excision, 22/30 radiation treatments completed  · Continue radiation    • Seizure disorder (CMS-HCC) [G40.909]  · Several witnessed at [facility]  · Keppra    • Hyponatremia [E87.1]  · Resolved, currently at 135    • Normocytic anemia [D64.9]  · Mild, continue to monitor   · F/u with iron panel studies    • Thrush, oral [B37.0]  · Currently on nystatin    • Pneumonia [J18.9]  · Completed Abx  · Denies any symptoms    • Acute respiratory failure with hypoxia (CMS-HCC) [J96.01]  · Resolved and not using supplemental O2     • Cerebral edema (CMS-HCC) [G93.6]  · Decardron   Steroid Induced Hyperglycemia:  ·  insulin NPH, lispro   Acute DVT of Both Lower Extremities   · History of DVTs  · Xarelto   Chronic Pain Of Right Knee:  · Change Fentanyl to prn oxycodone  Nausea/Vomitting:  ·  Zofran, Phenergan, compazine    Cough:  · Tessalon, getting better   Hypertension:  · Currently not hypertensive   · Prn labetalol   Constipation:  · Currently on Miralax and Dulcolax    K replaced      Planned Discussed with RN.    Dispo: to home when daughter is able to take him back and forth between radiation treatments.     Radiology images reviewed, EKG reviewed, Labs reviewed and Medications reviewed  Mesa catheter: No Mesa      DVT: xarelto.        Assessed for rehab: Patient was assess for  and/or received rehabilitation services during this hospitalization

## 2017-04-19 NOTE — PROGRESS NOTES
Patient brought down to Radiation Therapy and received treatment 23 out of 30. Patient tolerated procedure well. Will continue treatment as planned.

## 2017-04-19 NOTE — THERAPY
"Speech Language Therapy dysphagia treatment completed.   Functional Status: Patient awake, alert, and seen with Dys2/NTL diet breakfast tray with direct supervision and intermittent assistance from this clinician. Patient consumed 90% of breakfast tray with this clinician present and with no overt s/sx of aspiration. Patient did, however, require intermittent verbal and gestural cues to alternate solids and liquids d/t oral residue of soft solids intermittently. Patient consumed thins via cup sip with this clinician and had no overt s/sx of aspiration on small sips. Laryngeal elevation palpated as weak. At this time, patient appears at the level to continue Dys2/NTL diet with small cup sips of thins in between meals. RN aware and sign updated. SLP is following for diet tolerance and trial upgrades.     Recommendations: At this time, patient appears at the level to continue Dys2/NTL diet with direct supervision and assistance as needed with small cup sips of thins in between meals only  Plan of Care: Will benefit from Speech Therapy 3 times per week  Post-Acute Therapy: Discharge to a transitional care facility for continued skilled therapy services    See \"Rehab Therapy-Acute\" Patient Summary Report for complete documentation.     "

## 2017-04-19 NOTE — PROGRESS NOTES
Received report from day RN and assumed care of pt at change of shift.  Medicated per MAR. Word salad. Bed in lowest & locked position, call light within reach, bed alarm on, treaded socks on.   IS provided at bedside and instructed pt on use, pt gave return demonstration, able to reach 1000.   All needs met at this time.

## 2017-04-19 NOTE — CARE PLAN
Problem: Safety  Goal: Will remain free from falls  Intervention: Implement fall precautions  Fall precautions in place      Problem: Infection  Goal: Will remain free from infection  Outcome: PROGRESSING AS EXPECTED  Intervention: Assess signs and symptoms of infection  Sign and symptoms of infection not noted  Intervention: Implement standard precautions and perform hand washing before and after patient contact  Standard precautions in place.

## 2017-04-20 NOTE — PROGRESS NOTES
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     Tolerating radiation. Appears in good spirits today. No other events.     Review of Systems:  Review of Systems   Constitutional: Negative for fever and chills.   HENT: Negative for sore throat.    Eyes: Negative for blurred vision and pain.   Respiratory: Negative for cough and shortness of breath.    Cardiovascular: Negative for chest pain and palpitations.   Gastrointestinal: Negative.  Negative for nausea and abdominal pain.   Genitourinary: Negative.  Negative for dysuria and urgency.   Musculoskeletal: Negative.  Negative for back pain and neck pain.   Skin: Negative for itching and rash.   Neurological: Negative for dizziness, tingling, weakness and headaches.   Psychiatric/Behavioral: Negative for depression. The patient does not have insomnia.    All other systems reviewed and are negative.      Physical Exam:  Physical Exam   Constitutional: He is oriented to person, place, and time. He appears well-developed and well-nourished. No distress.   HENT:   Right Ear: External ear normal.   Left Ear: External ear normal.   Nose: Nose normal.   Eyes: Right eye exhibits no discharge. Left eye exhibits no discharge. No scleral icterus.   Neck: No JVD present. No tracheal deviation present.   Cardiovascular: Normal rate, regular rhythm, normal heart sounds and intact distal pulses.    No murmur heard.  Pulmonary/Chest: Effort normal and breath sounds normal. No respiratory distress. He has no wheezes.   Abdominal: Soft. Bowel sounds are normal. He exhibits no mass. There is no guarding.   Genitourinary: Penile tenderness present.   Musculoskeletal: Normal range of motion. He  exhibits no edema or tenderness.   Neurological: He is alert and oriented to person, place, and time. No cranial nerve deficit.   Persistent Word Salad        Skin: Skin is warm and dry. He is not diaphoretic. No erythema. No pallor.   Psychiatric: He has a normal mood and affect. His behavior is normal.   Nursing note and vitals reviewed.      Labs:        Invalid input(s): [id number]      Recent Labs      17   2344   SODIUM  134*  137   POTASSIUM  3.4*  4.0   CHLORIDE  101  102   CO2  23  25   BUN  22  19   CREATININE  0.63  0.65   MAGNESIUM   --   2.2   CALCIUM  8.3*  8.4*     Recent Labs      17   2344   ALTSGPT   --   43   ASTSGOT   --   14   ALKPHOSPHAT   --   53   TBILIRUBIN   --   0.4   GLUCOSE  156*  146*     Recent Labs      17   2344   RBC  3.85*  3.75*   HEMOGLOBIN  12.7*  12.3*   HEMATOCRIT  37.8*  36.7*   PLATELETCT  153*  157*   IRON  71   --    TOTIRONBC  316   --      Recent Labs      17   2344   WBC  6.5  6.4   NEUTSPOLYS  72.80*  78.10*   LYMPHOCYTES  12.30*  14.90*   MONOCYTES  7.90  2.60   EOSINOPHILS  2.60  0.00   BASOPHILS  0.00  0.90   ASTSGOT   --   14   ALTSGPT   --   43   ALKPHOSPHAT   --   53   TBILIRUBIN   --   0.4           Hemodynamics:  Temp (24hrs), Av.6 °C (97.9 °F), Min:36.1 °C (97 °F), Max:36.8 °C (98.2 °F)  Temperature: 36.1 °C (97 °F)  Pulse  Av  Min: 56  Max: 123   Blood Pressure: 105/70 mmHg     Respiratory:    Respiration: 18, Pulse Oximetry: 98 %     Work Of Breathing / Effort: Mild  RUL Breath Sounds: Clear, RML Breath Sounds: Diminished, RLL Breath Sounds: Diminished, LAYA Breath Sounds: Clear, LLL Breath Sounds: Diminished  Fluids:    Intake/Output Summary (Last 24 hours) at 17 0821  Last data filed at 17 0400   Gross per 24 hour   Intake    820 ml   Output   1200 ml   Net   -380 ml        GI/Nutrition:  Orders Placed This Encounter   Procedures   • Diet  Order     Standing Status: Standing      Number of Occurrences: 1      Standing Expiration Date:      Order Specific Question:  Diet:     Answer:  Diabetic [3]     Order Specific Question:  Texture/Fiber modifications:     Answer:  Dysphagia 2(Pureed/Chopped)specify fluid consistency(question 6) [2]     Order Specific Question:  Consistency/Fluid modifications:     Answer:  Nectar Thick [2]     Order Specific Question:  Miscellaneous modifications:     Answer:  SLP - 1:1 Supervision by Nursing [21]     Medical Decision Making, by Problem:  Active Hospital Problems    Diagnosis   • 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]  · With Wernicke aphasia.   · Partial surgical excision, 22/30 radiation treatments completed  · Continue radiation    • Seizure disorder (CMS-HCC) [G40.909]  · Several witnessed at [facility]  · Keppra    • Hyponatremia [E87.1]  · Resolved, currently at 135    • Normocytic anemia [D64.9]  · Mild, continue to monitor   · F/u with iron panel studies    • Thrush, oral [B37.0]  · Currently on nystatin    • Pneumonia [J18.9]  · Completed Abx  · Denies any symptoms    • Acute respiratory failure with hypoxia (CMS-HCC) [J96.01]  · Resolved and not using supplemental O2     • Cerebral edema (CMS-HCC) [G93.6]  · Decardron   Steroid Induced Hyperglycemia:  ·  insulin NPH, lispro   Acute DVT of Both Lower Extremities   · History of DVTs  · Xarelto   Chronic Pain Of Right Knee:  · Change Fentanyl to prn oxycodone  Nausea/Vomitting:  ·  Zofran, Phenergan, compazine    Cough:  · Tessalon, getting better   Hypertension:  · Currently not hypertensive   · Prn labetalol   Constipation:  · Currently on Miralax and Dulcolax      Planned Discussed with RN.    Dispo: to home when daughter is able to take him back and forth between radiation treatments.     Radiology images reviewed, EKG reviewed, Labs reviewed and Medications reviewed  Mesa catheter: No Mesa      DVT: xarelto.        Assessed for rehab: Patient was  assess for and/or received rehabilitation services during this hospitalization

## 2017-04-20 NOTE — PROGRESS NOTES
Received report from night shift RN, assumed care of patient at 0700. Pt speaks in word salad, with a mix of english and Gabonese. Stand by assist. Pt denies pain or nausea at this time. PIV is saline locked. Bed alarm in place. Call light within reach, bed in low and locked position. No other needs at this time.

## 2017-04-20 NOTE — PROGRESS NOTES
Pt calm and relaxed, mostly Setswana speaking but able to communicate simple questions to pt. Claims no pain or nausea at this time, pt tolerating oral medications and diet well. Bed alarm on for safety, call light in reach and pt calls appropriately, 2 side rails up, treaded socks on, bed in low position.

## 2017-04-20 NOTE — PROGRESS NOTES
Patient came down to Radiation Therapy Department.  Received 24 of 30 Treatments.  Will continue as planned.

## 2017-04-21 NOTE — PROGRESS NOTES
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     Doing well. No complaints.     Review of Systems:  Review of Systems   Constitutional: Negative for fever and chills.   HENT: Negative for sore throat.    Eyes: Negative for blurred vision and pain.   Respiratory: Negative for cough and shortness of breath.    Cardiovascular: Negative for chest pain and palpitations.   Gastrointestinal: Negative.  Negative for nausea and abdominal pain.   Genitourinary: Negative.  Negative for dysuria and urgency.   Musculoskeletal: Negative.  Negative for back pain and neck pain.   Skin: Negative for itching and rash.   Neurological: Negative for dizziness, tingling, weakness and headaches.   Psychiatric/Behavioral: Negative for depression. The patient does not have insomnia.    All other systems reviewed and are negative.      Physical Exam:  Physical Exam   Constitutional: He is oriented to person, place, and time. He appears well-developed and well-nourished. No distress.   HENT:   Right Ear: External ear normal.   Left Ear: External ear normal.   Nose: Nose normal.   Eyes: Right eye exhibits no discharge. Left eye exhibits no discharge. No scleral icterus.   Neck: No JVD present. No tracheal deviation present.   Cardiovascular: Normal rate, regular rhythm, normal heart sounds and intact distal pulses.    No murmur heard.  Pulmonary/Chest: Effort normal and breath sounds normal. No respiratory distress. He has no wheezes.   Abdominal: Soft. Bowel sounds are normal. He exhibits no mass. There is no guarding.   Genitourinary: Penile tenderness present.   Musculoskeletal: Normal range of motion. He exhibits no edema or tenderness.    Neurological: He is alert and oriented to person, place, and time. No cranial nerve deficit.   Persistent Word Salad        Skin: Skin is warm and dry. He is not diaphoretic. No erythema. No pallor.   Psychiatric: He has a normal mood and affect. His behavior is normal.   Nursing note and vitals reviewed.      Labs:        Invalid input(s): [id number]      Recent Labs      17   2344   SODIUM  134*  137   POTASSIUM  3.4*  4.0   CHLORIDE  101  102   CO2  23  25   BUN  22  19   CREATININE  0.63  0.65   MAGNESIUM   --   2.2   CALCIUM  8.3*  8.4*     Recent Labs      17   2344   ALTSGPT   --   43   ASTSGOT   --   14   ALKPHOSPHAT   --   53   TBILIRUBIN   --   0.4   GLUCOSE  156*  146*     Recent Labs      17   2344   RBC  3.85*  3.75*   HEMOGLOBIN  12.7*  12.3*   HEMATOCRIT  37.8*  36.7*   PLATELETCT  153*  157*   IRON  71   --    TOTIRONBC  316   --      Recent Labs      17   2344   WBC  6.5  6.4   NEUTSPOLYS  72.80*  78.10*   LYMPHOCYTES  12.30*  14.90*   MONOCYTES  7.90  2.60   EOSINOPHILS  2.60  0.00   BASOPHILS  0.00  0.90   ASTSGOT   --   14   ALTSGPT   --   43   ALKPHOSPHAT   --   53   TBILIRUBIN   --   0.4           Hemodynamics:  Temp (24hrs), Av.2 °C (97.2 °F), Min:36.1 °C (97 °F), Max:36.5 °C (97.7 °F)  Temperature: 36.1 °C (97 °F)  Pulse  Av.8  Min: 56  Max: 123   Blood Pressure: 111/77 mmHg     Respiratory:    Respiration: 18, Pulse Oximetry: 97 %     Work Of Breathing / Effort: Mild  RUL Breath Sounds: Clear, RML Breath Sounds: Diminished, RLL Breath Sounds: Diminished, LAYA Breath Sounds: Clear, LLL Breath Sounds: Diminished  Fluids:    Intake/Output Summary (Last 24 hours) at 17 0819  Last data filed at 04/20/17 2000   Gross per 24 hour   Intake    220 ml   Output   1350 ml   Net  -1130 ml        GI/Nutrition:  Orders Placed This Encounter   Procedures   • Diet Order     Standing Status:  Standing      Number of Occurrences: 1      Standing Expiration Date:      Order Specific Question:  Diet:     Answer:  Diabetic [3]     Order Specific Question:  Texture/Fiber modifications:     Answer:  Dysphagia 2(Pureed/Chopped)specify fluid consistency(question 6) [2]     Order Specific Question:  Consistency/Fluid modifications:     Answer:  Nectar Thick [2]     Order Specific Question:  Miscellaneous modifications:     Answer:  SLP - 1:1 Supervision by Nursing [21]     Medical Decision Making, by Problem:  Active Hospital Problems    Diagnosis   • 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]  · With Wernicke aphasia.   · Partial surgical excision, 22/30 radiation treatments completed  · Continue radiation    • Seizure disorder (CMS-HCC) [G40.909]  · Several witnessed at [facility]  · Keppra    • Hyponatremia [E87.1]  · Resolved, currently at 135    • Normocytic anemia [D64.9]  · Mild, continue to monitor   · F/u with iron panel studies    • Thrush, oral [B37.0]  · Currently on nystatin    • Pneumonia [J18.9]  · Completed Abx  · Denies any symptoms    • Acute respiratory failure with hypoxia (CMS-HCC) [J96.01]  · Resolved and not using supplemental O2     • Cerebral edema (CMS-HCC) [G93.6]  · Decardron   Steroid Induced Hyperglycemia:  ·  insulin NPH, lispro   Acute DVT of Both Lower Extremities   · History of DVTs  · Xarelto   Chronic Pain Of Right Knee:  · Change Fentanyl to prn oxycodone  Nausea/Vomitting:  ·  Zofran, Phenergan, compazine    Cough:  · Tessalon, getting better   Hypertension:  · Currently not hypertensive   · Prn labetalol   Constipation:  · Currently on Miralax and Dulcolax      Planned Discussed with RN.    Dispo: to home when daughter is able to take him back and forth between radiation treatments.     Radiology images reviewed, EKG reviewed, Labs reviewed and Medications reviewed  Mesa catheter: No Mesa      DVT: xarelto.        Assessed for rehab: Patient was assess for and/or received  rehabilitation services during this hospitalization

## 2017-04-21 NOTE — DISCHARGE PLANNING
Medical Social Work    Pt completed radiation treatment 24 of 30 today.  Pt is anticipated to d/c home with his daughter, Priscilla, once completing radiation treatment, or when daughters are able to able coordinate transport to and from radiation treatments.    Plan:  SS will remain available to provide support and assist with d/c planning as needed.

## 2017-04-21 NOTE — PROGRESS NOTES
Pt calm and relaxed, claims no pain or nausea at this time, pt tolerating oral medications and diet well. Difficult to assess alertness due to mostly Mohawk speaking but pt appears very alert and oriented and able to communicate simple questions despite language barrier. Bed alarm on for safety, call light in reach and pt calls appropriately, 2 side rails up, treaded socks on, bed in low position.

## 2017-04-21 NOTE — CARE PLAN
Problem: Safety  Goal: Will remain free from injury  Outcome: PROGRESSING AS EXPECTED  Bed alarm in use  Pt calling appropriately, but can be impulsive

## 2017-04-22 NOTE — PROGRESS NOTES
Pt calm and relaxed, claims no pain or nausea at this time, pt tolerating oral medications and diet well. Glucose levels continue to trend down, no coverage required after evening meal. Pt more talkative tonight intermixing Japanese and english. Bed alarm on for safety, call light in reach and pt calls appropriately, 2 side rails up, treaded socks on, bed in low position.

## 2017-04-22 NOTE — PROGRESS NOTES
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     Radiation 26/30 today.     Review of Systems:  Review of Systems   Constitutional: Negative for fever and chills.   HENT: Negative for sore throat.    Eyes: Negative for blurred vision and pain.   Respiratory: Negative for cough and shortness of breath.    Cardiovascular: Negative for chest pain and palpitations.   Gastrointestinal: Negative.  Negative for nausea and abdominal pain.   Genitourinary: Negative.  Negative for dysuria and urgency.   Musculoskeletal: Negative.  Negative for back pain and neck pain.   Skin: Negative for itching and rash.   Neurological: Negative for dizziness, tingling, weakness and headaches.   Psychiatric/Behavioral: Negative for depression. The patient does not have insomnia.    All other systems reviewed and are negative.      Physical Exam:  Physical Exam   Constitutional: He is oriented to person, place, and time. He appears well-developed and well-nourished. No distress.   HENT:   Right Ear: External ear normal.   Left Ear: External ear normal.   Nose: Nose normal.   Eyes: Right eye exhibits no discharge. Left eye exhibits no discharge. No scleral icterus.   Neck: No JVD present. No tracheal deviation present.   Cardiovascular: Normal rate, regular rhythm, normal heart sounds and intact distal pulses.    No murmur heard.  Pulmonary/Chest: Effort normal and breath sounds normal. No respiratory distress. He has no wheezes.   Abdominal: Soft. Bowel sounds are normal. He exhibits no mass. There is no guarding.   Genitourinary: Penile tenderness present.   Musculoskeletal: Normal range of motion. He exhibits no edema or tenderness.   Neurological:  He is alert and oriented to person, place, and time. No cranial nerve deficit.   Persistent Word Salad        Skin: Skin is warm and dry. He is not diaphoretic. No erythema. No pallor.   Psychiatric: He has a normal mood and affect. His behavior is normal.   Nursing note and vitals reviewed.      Labs:        Invalid input(s): [id number]      Recent Labs      17   2344   SODIUM  137   POTASSIUM  4.0   CHLORIDE  102   CO2  25   BUN  19   CREATININE  0.65   MAGNESIUM  2.2   CALCIUM  8.4*     Recent Labs      17   2344   ALTSGPT  43   ASTSGOT  14   ALKPHOSPHAT  53   TBILIRUBIN  0.4   GLUCOSE  146*     Recent Labs      17   2344   RBC  3.75*   HEMOGLOBIN  12.3*   HEMATOCRIT  36.7*   PLATELETCT  157*     Recent Labs      17   2344   WBC  6.4   NEUTSPOLYS  78.10*   LYMPHOCYTES  14.90*   MONOCYTES  2.60   EOSINOPHILS  0.00   BASOPHILS  0.90   ASTSGOT  14   ALTSGPT  43   ALKPHOSPHAT  53   TBILIRUBIN  0.4           Hemodynamics:  Temp (24hrs), Av.4 °C (97.5 °F), Min:36.1 °C (96.9 °F), Max:36.6 °C (97.8 °F)  Temperature: 36.5 °C (97.7 °F)  Pulse  Av.4  Min: 56  Max: 123   Blood Pressure: 117/77 mmHg     Respiratory:    Respiration: 16, Pulse Oximetry: 96 %     Work Of Breathing / Effort: Mild  RUL Breath Sounds: Clear, RML Breath Sounds: Diminished, RLL Breath Sounds: Diminished, LAYA Breath Sounds: Clear, LLL Breath Sounds: Diminished  Fluids:    Intake/Output Summary (Last 24 hours) at 17 0847  Last data filed at 17 0741   Gross per 24 hour   Intake    108 ml   Output   1850 ml   Net  -1742 ml        GI/Nutrition:  Orders Placed This Encounter   Procedures   • Diet Order     Standing Status: Standing      Number of Occurrences: 1      Standing Expiration Date:      Order Specific Question:  Diet:     Answer:  Diabetic [3]     Order Specific Question:  Texture/Fiber modifications:     Answer:  Dysphagia 2(Pureed/Chopped)specify fluid consistency(question 6) [2]     Order  Specific Question:  Consistency/Fluid modifications:     Answer:  Nectar Thick [2]     Order Specific Question:  Miscellaneous modifications:     Answer:  SLP - 1:1 Supervision by Nursing [21]     Medical Decision Making, by Problem:  Active Hospital Problems    Diagnosis   • 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]  · With Wernicke aphasia.   · Partial surgical excision, 22/30 radiation treatments completed  · Continue radiation 26/30   • Seizure disorder (CMS-HCC) [G40.909]  · Several witnessed at [facility]  · Keppra    • Hyponatremia [E87.1]  · Resolved, currently at 135    • Normocytic anemia [D64.9]  · Mild, continue to monitor   · F/u with iron panel studies    • Thrush, oral [B37.0]  ·  resolved. Stop nystatin    • Pneumonia [J18.9]  · Completed Abx  · Denies any symptoms    • Acute respiratory failure with hypoxia (CMS-HCC) [J96.01]  · Resolved and not using supplemental O2     • Cerebral edema (CMS-HCC) [G93.6]  · Decardron   Steroid Induced Hyperglycemia:  ·  insulin NPH, lispro   Acute DVT of Both Lower Extremities   · History of DVTs  · Xarelto   Chronic Pain Of Right Knee:  · Change Fentanyl to prn oxycodone  Nausea/Vomitting:  ·  Zofran, Phenergan, compazine    Cough:  · Tessalon prn. Resolved.   Hypertension:  · Currently not hypertensive   · Prn labetalol   Constipation:  · Resolved. Prn.  Miralax and Dulcolax      Planned Discussed with RN.    Dispo: to home when daughter is able to take him back and forth between radiation treatments.     Radiology images reviewed, EKG reviewed, Labs reviewed and Medications reviewed  Mesa catheter: No Mesa      DVT: xarelto.        Assessed for rehab: Patient was assess for and/or received rehabilitation services during this hospitalization

## 2017-04-22 NOTE — PROGRESS NOTES
Pt resting comfortably in bed  Call light within reach, side rails up  No concerns stated  All needs met at this time

## 2017-04-23 NOTE — PROGRESS NOTES
Pt alert and able to tell me his full name this morning when asked. Otherwise unable to answer orientation questions. Denies pain or nausea. Up with one person assist and a FWW. Can be impulsive. BA on and sounding. PIV to R FA saline locked. Per physician ok to dc IV. Pt resting comfortably. Room near nurse's station. Call light within reach. Hourly rounding in place.

## 2017-04-23 NOTE — PROGRESS NOTES
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     Radiation 27/30 today. Speech arrears a little better with full sentences on occasions and will repeat name easily.      Review of Systems:  Review of Systems   Constitutional: Negative for fever and chills.   HENT: Negative for sore throat.    Eyes: Negative for blurred vision and pain.   Respiratory: Negative for cough and shortness of breath.    Cardiovascular: Negative for chest pain and palpitations.   Gastrointestinal: Negative.  Negative for nausea and abdominal pain.   Genitourinary: Negative.  Negative for dysuria and urgency.   Musculoskeletal: Negative.  Negative for back pain and neck pain.   Skin: Negative for itching and rash.   Neurological: Negative for dizziness, tingling, weakness and headaches.   Psychiatric/Behavioral: Negative for depression. The patient does not have insomnia.    All other systems reviewed and are negative.      Physical Exam:  Physical Exam   Constitutional: He appears well-developed and well-nourished. No distress.   HENT:   Right Ear: External ear normal.   Left Ear: External ear normal.   Nose: Nose normal.   Eyes: Right eye exhibits no discharge. Left eye exhibits no discharge. No scleral icterus.   Neck: No JVD present. No tracheal deviation present.   Cardiovascular: Normal rate, regular rhythm, normal heart sounds and intact distal pulses.    No murmur heard.  Pulmonary/Chest: Effort normal and breath sounds normal. No respiratory distress. He has no wheezes.   Abdominal: Soft. Bowel sounds are normal. He exhibits no mass. There is no guarding.   Musculoskeletal: Normal range of motion. He exhibits no edema or tenderness.    Neurological: He is alert. No cranial nerve deficit. Coordination normal.   Improving Word Salad        Skin: Skin is warm and dry. He is not diaphoretic. No erythema. No pallor.   Psychiatric: He has a normal mood and affect. His behavior is normal.   Nursing note and vitals reviewed.      Labs:        Invalid input(s): [id number]      No results for input(s): SODIUM, POTASSIUM, CHLORIDE, CO2, BUN, CREATININE, MAGNESIUM, PHOSPHORUS, CALCIUM in the last 72 hours.  No results for input(s): ALTSGPT, ASTSGOT, ALKPHOSPHAT, TBILIRUBIN, DBILIRUBIN, GAMMAGT, AMYLASE, LIPASE, ALB, PREALBUMIN, GLUCOSE in the last 72 hours.  No results for input(s): RBC, HEMOGLOBIN, HEMATOCRIT, PLATELETCT, PROTHROMBTM, APTT, INR, IRON, FERRITIN, TOTIRONBC in the last 72 hours.            Hemodynamics:  Temp (24hrs), Av.7 °C (98 °F), Min:36.3 °C (97.4 °F), Max:37.1 °C (98.8 °F)  Temperature: 37.1 °C (98.8 °F)  Pulse  Av.3  Min: 56  Max: 123   Blood Pressure: 105/75 mmHg     Respiratory:    Respiration: 18, Pulse Oximetry: 92 %        RUL Breath Sounds: Clear, RML Breath Sounds: Diminished, RLL Breath Sounds: Diminished, LAYA Breath Sounds: Clear, LLL Breath Sounds: Diminished  Fluids:    Intake/Output Summary (Last 24 hours) at 17 0907  Last data filed at 17 0400   Gross per 24 hour   Intake      0 ml   Output    950 ml   Net   -950 ml        GI/Nutrition:  Orders Placed This Encounter   Procedures   • Diet Order     Standing Status: Standing      Number of Occurrences: 1      Standing Expiration Date:      Order Specific Question:  Diet:     Answer:  Diabetic [3]     Order Specific Question:  Texture/Fiber modifications:     Answer:  Dysphagia 2(Pureed/Chopped)specify fluid consistency(question 6) [2]     Order Specific Question:  Consistency/Fluid modifications:     Answer:  Nectar Thick [2]     Order Specific Question:  Miscellaneous modifications:     Answer:  SLP - 1:1 Supervision by Nursing [21]     Medical  Decision Making, by Problem:  Active Hospital Problems    Diagnosis   • Astrocytoma (CMS-[facility]) [C71.9]  · With Wernicke aphasia.   · Partial surgical excision, 22/30 radiation treatments completed  · Continue radiation 26/30   • Seizure disorder (CMS-HCC) [G40.909]  · Several witnessed at [facility]  · Keppra    • Hyponatremia [E87.1]  · Resolved, currently at 135    • Normocytic anemia [D64.9]  · Mild, continue to monitor   · F/u with iron panel studies    • Thrush, oral [B37.0]  ·  resolved. Stop nystatin    • Pneumonia [J18.9]  · Completed Abx  · Denies any symptoms    • Acute respiratory failure with hypoxia (CMS-HCC) [J96.01]  · Resolved and not using supplemental O2     • Cerebral edema (CMS-HCC) [G93.6]  · Decardron   Steroid Induced Hyperglycemia:  ·  insulin NPH, lispro   Acute DVT of Both Lower Extremities   · History of DVTs  · Xarelto   Chronic Pain Of Right Knee:  · Change Fentanyl to prn oxycodone  Nausea/Vomitting:  ·  Zofran, Phenergan, compazine    Cough:  · Tessalon prn. Resolved.   Hypertension:  · Currently not hypertensive   · Prn labetalol   Constipation:  · Resolved. Prn.  Miralax and Dulcolax      Planned Discussed with RN.    Dispo: to home when daughter is able to take him back and forth between radiation treatments.     Radiology images reviewed, EKG reviewed, Labs reviewed and Medications reviewed  Mesa catheter: No Mesa      DVT: xarelto.        Assessed for rehab: Patient was assess for and/or received rehabilitation services during this hospitalization

## 2017-04-23 NOTE — PROGRESS NOTES
Bedside report taken and assumed care of Mr Carmona at 1900.  Patient is A&Ox2, calm and cooperative. Denies any pain or nausea, discussed POC. Unable to communicate very well as patient has expressive aphasia and speaks in a mixture of Ethiopian and english. Call light is within reach, bed is in low and locked position.

## 2017-04-24 NOTE — PROGRESS NOTES
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 Now getting whole brain radiation and has persistent wernickes aphasia.     Radiation 28/30 today (correction from yesterday). Speech arrears a little better again today.     Review of Systems:  Review of Systems   Constitutional: Negative for fever and chills.   HENT: Negative for sore throat.    Eyes: Negative for blurred vision and pain.   Respiratory: Negative for cough and shortness of breath.    Cardiovascular: Negative for chest pain and palpitations.   Gastrointestinal: Negative.  Negative for nausea and abdominal pain.   Genitourinary: Negative.  Negative for dysuria and urgency.   Musculoskeletal: Negative.  Negative for back pain and neck pain.   Skin: Negative for itching and rash.   Neurological: Negative for dizziness, tingling, weakness and headaches.   Psychiatric/Behavioral: Negative for depression. The patient does not have insomnia.    All other systems reviewed and are negative.      Physical Exam:  Physical Exam   Constitutional: He appears well-developed and well-nourished. No distress.   HENT:   Right Ear: External ear normal.   Left Ear: External ear normal.   Nose: Nose normal.   Eyes: Right eye exhibits no discharge. Left eye exhibits no discharge. No scleral icterus.   Neck: No JVD present. No tracheal deviation present.   Cardiovascular: Normal rate, regular rhythm, normal heart sounds and intact distal pulses.    No murmur heard.  Pulmonary/Chest: Effort normal and breath sounds normal. No respiratory distress. He has no wheezes.   Abdominal: Soft. Bowel sounds are normal. He exhibits no mass. There is no guarding.   Musculoskeletal: Normal range of  motion. He exhibits no edema or tenderness.   Neurological: He is alert. No cranial nerve deficit. Coordination normal.   Improving Word Salad        Skin: Skin is warm and dry. He is not diaphoretic. No erythema. No pallor.   Psychiatric: He has a normal mood and affect. His behavior is normal.   Nursing note and vitals reviewed.      Labs:        Invalid input(s): [id number]      No results for input(s): SODIUM, POTASSIUM, CHLORIDE, CO2, BUN, CREATININE, MAGNESIUM, PHOSPHORUS, CALCIUM in the last 72 hours.  No results for input(s): ALTSGPT, ASTSGOT, ALKPHOSPHAT, TBILIRUBIN, DBILIRUBIN, GAMMAGT, AMYLASE, LIPASE, ALB, PREALBUMIN, GLUCOSE in the last 72 hours.  No results for input(s): RBC, HEMOGLOBIN, HEMATOCRIT, PLATELETCT, PROTHROMBTM, APTT, INR, IRON, FERRITIN, TOTIRONBC in the last 72 hours.            Hemodynamics:  Temp (24hrs), Av.4 °C (97.5 °F), Min:36 °C (96.8 °F), Max:36.7 °C (98.1 °F)  Temperature: 36.7 °C (98.1 °F)  Pulse  Av.3  Min: 56  Max: 123   Blood Pressure: (!) 95/70 mmHg     Respiratory:    Respiration: 18, Pulse Oximetry: 97 %     Work Of Breathing / Effort: Mild  RUL Breath Sounds: Clear, RML Breath Sounds: Clear, RLL Breath Sounds: Diminished, LAYA Breath Sounds: Clear, LLL Breath Sounds: Diminished  Fluids:    Intake/Output Summary (Last 24 hours) at 17 0819  Last data filed at 17 2323   Gross per 24 hour   Intake      0 ml   Output   1100 ml   Net  -1100 ml        GI/Nutrition:  Orders Placed This Encounter   Procedures   • Diet Order     Standing Status: Standing      Number of Occurrences: 1      Standing Expiration Date:      Order Specific Question:  Diet:     Answer:  Diabetic [3]     Order Specific Question:  Texture/Fiber modifications:     Answer:  Dysphagia 2(Pureed/Chopped)specify fluid consistency(question 6) [2]     Order Specific Question:  Consistency/Fluid modifications:     Answer:  Nectar Thick [2]     Order Specific Question:  Miscellaneous  modifications:     Answer:  SLP - 1:1 Supervision by Nursing [21]     Medical Decision Making, by Problem:  Active Hospital Problems    Diagnosis   • Astrocytoma (CMS-[facility]) [C71.9]  · With Wernicke aphasia.   · Partial surgical excision, 28/30 radiation treatments completed  ·    • Seizure disorder (CMS-[facility]) [G40.909]  · Several witnessed at [facility]  · Keppra ongoing. Good results    • Hyponatremia [E87.1]  · Resolved   • Normocytic anemia [D64.9]  · Mild, continue to monitor   · F/u with iron panel studies    • Thrush, oral [B37.0]  ·  resolved. Off nystatin    • Pneumonia [J18.9]  · Completed Abx  · Denies any symptoms    • Acute respiratory failure with hypoxia (CMS-[facility]) [J96.01]  · Resolved and not using supplemental O2     • Cerebral edema (CMS-[facility]) [G93.6]  · Decardron   Steroid Induced Hyperglycemia:  ·  insulin NPH, lispro   Acute DVT of Both Lower Extremities   · History of DVTs  · Xarelto   Chronic Pain Of Right Knee:  · Change Fentanyl to prn oxycodone  Nausea/Vomitting:  ·  Zofran, Phenergan, compazine    Cough:  · Tessalon prn. Resolved.   Hypertension:  · Currently not hypertensive   · Prn labetalol   Constipation:  · Resolved. Prn.  Miralax and Dulcolax      Planned Discussed with RN.    Dispo: to home when daughter is able to take him back and forth between radiation treatments.     Radiology images reviewed, EKG reviewed, Labs reviewed and Medications reviewed  Mesa catheter: No Mesa      DVT: xarelto.        Assessed for rehab: Patient was assess for and/or received rehabilitation services during this hospitalization

## 2017-04-24 NOTE — PROGRESS NOTES
Assumed care of patient @ 1900. Bedside report received. Patient AO x2. VSS,  Pain 0/10. Fall precautions in place, No lines. POC discussed with patient, all questions answered, call light within reach, hourly rounding in place.
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
## 2017-04-24 NOTE — PROGRESS NOTES
Pt is pleasantly confused to time and place and situation.  He can state his name.  Word salad continues but pt does have moments of clarity and sentence structure.  Appetite is great.  Voiding and having normal BM this am.  Denies pain and nausea.  Tolerated XRT tx this am.  Presently sitting in chair with bed alarm on.

## 2017-04-24 NOTE — PROGRESS NOTES
Pharmacy Pharmacotherapy Consult for LOS >30 days    Admit Date: 3/23/2017      Medications were reviewed for appropriateness and ongoing need.     Current Facility-Administered Medications   Medication Dose Route Frequency Provider Last Rate Last Dose   • oxycodone immediate-release (ROXICODONE) tablet 5 mg  5 mg Oral Q4HRS PRN Kalen Clement M.D.        Or   • oxycodone immediate release (ROXICODONE) tablet 10 mg  10 mg Oral Q4HRS PRN Kalen Clement M.D.       • dexamethasone (DECADRON) tablet 2 mg  2 mg Oral Q12HRS Kalen Smith M.D.   2 mg at 04/24/17 1116   • sodium chloride (OCEAN) 0.65 % nasal spray 2 Spray  2 Spray Nasal PRN Payal Cronin A.P.N.       • lorazepam (ATIVAN) injection 0.5 mg  0.5 mg Intravenous Q4HRS PRN Nicko Jade M.D.   0.5 mg at 04/05/17 1431   • benzonatate (TESSALON) capsule 100 mg  100 mg Oral TID PRN Long Clemons M.D.   100 mg at 04/12/17 0900   • famotidine (PEPCID) tablet 20 mg  20 mg Oral DAILY Kalen Smith M.D.   20 mg at 04/24/17 1116   • insulin NPH (HUMULIN,NOVOLIN) injection 35 Units  35 Units Subcutaneous BID INSULIN Kalen Smith M.D.   35 Units at 04/24/17 1122   • levetiracetam (KEPPRA) 100 MG/ML solution 1,500 mg  1,500 mg Oral Q12HRS Kalen Smith M.D.   1,500 mg at 04/24/17 1117   • rivaroxaban (XARELTO) tablet 20 mg  20 mg Oral PM MEAL Kalen Smith M.D.   20 mg at 04/23/17 1817   • senna-docusate (PERICOLACE or SENOKOT S) 8.6-50 MG per tablet 2 Tab  2 Tab Oral BID Kalen Smith M.D.   2 Tab at 04/23/17 0903    And   • polyethylene glycol/lytes (MIRALAX) PACKET 1 Packet  1 Packet Oral QDAY PRN Kalen M Luis, M.D.        And   • magnesium hydroxide (MILK OF MAGNESIA) suspension 30 mL  30 mL Oral QDAY PRN Kalen Smith M.D.   30 mL at 04/13/17 0813    And   • bisacodyl (DULCOLAX) suppository 10 mg  10 mg Rectal QDAY PRN Kalen Smith M.D.       • Respiratory Care per Protocol   Nebulization Continuous RT Kalen Smith M.D.       • labetalol  (NORMODYNE,TRANDATE) injection 10 mg  10 mg Intravenous Q4HRS PRN Kalen Smith M.D.       • ondansetron (ZOFRAN) syringe/vial injection 4 mg  4 mg Intravenous Q4HRS PRN Kalen Smith M.D.       • ondansetron (ZOFRAN ODT) dispertab 4 mg  4 mg Oral Q4HRS PRN Kalen Smith M.D.       • promethazine (PHENERGAN) tablet 12.5-25 mg  12.5-25 mg Oral Q4HRS PRN Kalen Smith M.D.       • promethazine (PHENERGAN) suppository 12.5-25 mg  12.5-25 mg Rectal Q4HRS PRN Kalen Smith M.D.       • prochlorperazine (COMPAZINE) injection 5-10 mg  5-10 mg Intravenous Q4HRS PRN Kalen Smith M.D.       • insulin lispro (HUMALOG) injection 2-9 Units  2-9 Units Subcutaneous 4X/DAY ACHS Kalen Smith M.D.   Stopped at 04/24/17 1200   • glucose 4 g chewable tablet 16 g  16 g Oral Q15 MIN PRN Kalen Smith M.D.   16 g at 03/24/17 0705    And   • dextrose 50% (D50W) injection 25 mL  25 mL Intravenous Q15 MIN PRN Kalen Smith M.D.           Recommendations:  -Point of care blood glucose levels have been <200 since 4/8. Consider d/c sliding scale insulin    -PRN nausea/vomiting regimen ordered of admission. Never used. Recommend d/c   -PRN labetalol (NORMODYNE,TRANDATE) injection 10 mg. Ordered on admission. Never used. Recommend d/c.   -All other medications are appropriate.     Maranda Willoughby, PharmAdriaD. Candidate     Above recommendations d/w Dr. MIRTHA Clement- Discontinue inactive prn medications. Continue finger sticks and SS insulin for DM management.   MIRTHA Nur, PharmAdriaD.

## 2017-04-24 NOTE — PROGRESS NOTES
"Touched base with patient who was trying to reach specific channel on TV. He kept repeating 64 but no channel 64 on TV.  Pt eventually found channel he wanted which was 4.  Pt not answering congruently to questions.  Talking about his \"walker\" but then pointing behind his chair to the bed alarm.  Navigator not following what he was trying to communicate.  Pt answering \"no\" he was not ok, but then denied any problems.  No family at bedside.  Pt denying pain and had smile on his face and did not look like he was in distress or had concerns.  Pt did not report any needs or questions when asked.  Will continue to be resource to patient and family on discharge.  "

## 2017-04-25 NOTE — THERAPY
"Speech Language Therapy dysphagia treatment completed.   Functional Status:  Patient drinking thin liquids sitting at EOB. Thin water found in pitcher. The patient had no overt signs of aspiration when drinking thin water from the cup. Throat clear x1 noted when drinking thin water from the straw. His voice remained clear. He is somewhat impulsive with bolus size with liquids as well as solids.   Recommendations: Recommend Dysphagia 2, thin liquids. Float pills in puree.  Monitor during meal (at least 3 times) is recommended to remind patient to go slow and take small bites. Aspiration precautions apply (no straws, HOB at 90 degrees, etc)     Plan of Care: Will benefit from Speech Therapy 3 times per week  Post-Acute Therapy: Discharge to a transitional care facility for continued skilled therapy services.    See \"Rehab Therapy-Acute\" Patient Summary Report for complete documentation.     "

## 2017-04-25 NOTE — PROGRESS NOTES
Patient brought down to Radiation Therapy and received treatment 27 out of 30. Patient tolerated procedure well. Will continue treatment as planned.
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
## 2017-04-25 NOTE — PROGRESS NOTES
"Assumed care of patient @ 1900. Bedside report received. Patient AO x1 to self, pt not disoriented but when asked basic questions pt speaks in a \" word salad\" fasion. VSS,  Pain 0/10. Fall precautions in place, No Lines. POC discussed with patient, call light within reach, hourly rounding in place.     "

## 2017-04-26 NOTE — PROGRESS NOTES
Patient brought down to Radiation Therapy and received partial brain treatment 28 out of 30 . Patient tolerated procedure well. Will continue treatment as planned.

## 2017-04-26 NOTE — CARE PLAN
Problem: Pain Management  Goal: Pain level will decrease to patient’s comfort goal  Outcome: PROGRESSING AS EXPECTED  Will have controlled pain levels        Problem: Skin Integrity  Goal: Risk for impaired skin integrity will decrease  Outcome: PROGRESSING AS EXPECTED  Will have no new skin issues

## 2017-04-26 NOTE — PROGRESS NOTES
Received report from night shift RN, assumed care of patient at 0700. Pt confused and speaks in word salad, with a mixture of english and Albanian. x1 assist. Denies pain or nausea at this time. Pt blood sugar was 71, gave 4oz of orange juice and rechecked 30 min later. Pt blood sugar came up to 111. Pt scheduled for radiation 28/30 this afternoon. Call light within reach, bed in low and locked position. No other needs at this time.

## 2017-04-26 NOTE — PROGRESS NOTES
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 Now getting whole brain radiation and has persistent wernickes aphasia.     Radiation 27/30 today. Tolerated well. Word salad is quite pronounced, but per nursing staff has improved since a few days agoa.     Review of Systems:  Review of Systems   Unable to perform ROS: other   All other systems reviewed and are negative.  Expressive aphasia, pronounced    Physical Exam:  Physical Exam   Constitutional: He appears well-developed and well-nourished. No distress.   HENT:   Right Ear: External ear normal.   Left Ear: External ear normal.   Nose: Nose normal.   Eyes: Right eye exhibits no discharge.   Cardiovascular: Normal rate, regular rhythm, normal heart sounds and intact distal pulses.    No murmur heard.  Pulmonary/Chest: Effort normal and breath sounds normal. No stridor. No respiratory distress.   Abdominal: Soft. Bowel sounds are normal. He exhibits no distension. There is no tenderness.   Musculoskeletal: Normal range of motion. He exhibits no edema.   Neurological: He is alert. No cranial nerve deficit. Coordination normal.   Pronounced expressive aphasia, but slightly better       Skin: Skin is warm and dry. He is not diaphoretic. No erythema. No pallor.   Psychiatric: He has a normal mood and affect. His behavior is normal.   Nursing note and vitals reviewed.      Labs:        Invalid input(s): [id number]      No results for input(s): SODIUM, POTASSIUM, CHLORIDE, CO2, BUN, CREATININE, MAGNESIUM, PHOSPHORUS, CALCIUM in the last 72 hours.  No results for input(s): ALTSGPT, ASTSGOT, ALKPHOSPHAT, TBILIRUBIN, DBILIRUBIN, GAMMAGT, AMYLASE, LIPASE, ALB, PREALBUMIN, GLUCOSE in  the last 72 hours.  No results for input(s): RBC, HEMOGLOBIN, HEMATOCRIT, PLATELETCT, PROTHROMBTM, APTT, INR, IRON, FERRITIN, TOTIRONBC in the last 72 hours.            Hemodynamics:  Temp (24hrs), Av.3 °C (97.3 °F), Min:36.1 °C (97 °F), Max:36.5 °C (97.7 °F)  Temperature: 36.1 °C (97 °F)  Pulse  Av.3  Min: 56  Max: 123   Blood Pressure: 105/69 mmHg     Respiratory:    Respiration: 16, Pulse Oximetry: 96 %     Work Of Breathing / Effort: Mild  RUL Breath Sounds: Clear, RML Breath Sounds: Diminished, RLL Breath Sounds: Diminished, LAYA Breath Sounds: Clear, LLL Breath Sounds: Diminished  Fluids:    Intake/Output Summary (Last 24 hours) at 17 1714  Last data filed at 17 0946   Gross per 24 hour   Intake    250 ml   Output    900 ml   Net   -650 ml        GI/Nutrition:  Orders Placed This Encounter   Procedures   • Diet Order     Standing Status: Standing      Number of Occurrences: 1      Standing Expiration Date:      Order Specific Question:  Diet:     Answer:  Diabetic [3]     Order Specific Question:  Texture/Fiber modifications:     Answer:  Dysphagia 2(Pureed/Chopped)specify fluid consistency(question 6) [2]     Order Specific Question:  Consistency/Fluid modifications:     Answer:  Thin Liquids [3]     Order Specific Question:  Miscellaneous modifications:     Answer:  SLP - 1:1 Supervision by Nursing [21]     Medical Decision Making, by Problem:  Active Hospital Problems    Diagnosis   • 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]  · With Wernicke aphasia, slowly improving, appears to be responding to radiotherapy  · Partial surgical excision,  radiation treatments completed  · Issue with daughter taking patient home eventually   • Seizure disorder (CMS-HCC) [G40.909]  · Several witnessed at [facility]  · Keppra ongoing. Good results    • Hyponatremia [E87.1]  · Resolved   • Normocytic anemia [D64.9]  · Mild, continue to monitor   · F/u with iron panel studies    • Thrush, oral  [B37.0]  ·  resolved. Off nystatin    • Pneumonia [J18.9]  · Completed Abx  · Denies any symptoms    • Acute respiratory failure with hypoxia (CMS-HCC) [J96.01]-no changes today  · Resolved and not using supplemental O2     • Cerebral edema (CMS-[facility]) [G93.6]  · Decardron   Steroid Induced Hyperglycemia-sugars remain ok at this time  ·  insulin NPH, lispro   Acute DVT of Both Lower Extremities   · History of DVTs  · Xarelto, tolerating well, no e/o overt bleeding at this time  Chronic Pain Of Right Knee:  · Continue oxycodone PRN  Nausea/Vomitting-well controlled  ·  Zofran, Phenergan, compazine    Cough:  · Tessalon prn. Resolved.   Hypertension:  · Currently not hypertensive   · Prn labetalol   Constipation:  · Resolved. Prn.  Miralax and Dulcolax      Planned Discussed with RN.    Dispo: to home when daughter is able to take him back and forth between radiation treatments. Might be an issue as explained above.    Radiology images reviewed, EKG reviewed, Labs reviewed and Medications reviewed  Mesa catheter: No Mesa      DVT: xarelto.        Assessed for rehab: Patient was assess for and/or received rehabilitation services during this hospitalization

## 2017-04-27 NOTE — PROGRESS NOTES
Received report from night shift RN, assumed care of patient at 0700. Pt confused, speaks in word salad with a mix of Croatian and english. Pt appears to be in good spirits. Stand by assist. Denies pain or nausea at this time. Completed radiation 29/30 this AM. Call light within reach, bed in low and locked position. No other needs at this time.

## 2017-04-27 NOTE — PROGRESS NOTES
Hospital Medicine Progress Note, Adult, Complex               Author: Jammie Haas Date & Time created: 4/27/2017  1:47 PM     Interval History:  4/26 Patient doing same, tolerating radiation without issue.  Communication is appropriate with his actions and hand gestures but just not words.  He will dc home with daughters once radiation is completed.  4/27 Patient without significant change, 29/30 radiation treatments today.  His primary care taker was in a car accident early this week and doesn't feel she can take care of patient currently.  Will place SNF order so he can transition out of hospital after radiation.    Review of Systems:  Review of Systems   Constitutional: Negative for fever.   HENT: Negative for nosebleeds.    Eyes: Negative for blurred vision and pain.   Respiratory: Negative for cough and shortness of breath.    Cardiovascular: Negative for chest pain and leg swelling.   Gastrointestinal: Negative for heartburn, abdominal pain and diarrhea.   Genitourinary: Negative for dysuria, hematuria and flank pain.   Musculoskeletal: Negative for myalgias and joint pain.   Skin: Negative for itching and rash.   Neurological: Negative for dizziness, focal weakness, seizures and headaches.   Psychiatric/Behavioral: Negative for depression. The patient is not nervous/anxious and does not have insomnia.        Physical Exam:  Physical Exam   Constitutional: He appears well-developed and well-nourished. No distress.   HENT:   Head: Normocephalic and atraumatic.   Eyes: Conjunctivae are normal. No scleral icterus.   Neck: Neck supple. No JVD present.   Cardiovascular: Normal rate, regular rhythm, normal heart sounds and intact distal pulses.  Exam reveals no gallop and no friction rub.    No murmur heard.  Pulmonary/Chest: Effort normal and breath sounds normal. No respiratory distress. He exhibits no tenderness.   Abdominal: Soft. Bowel sounds are normal. He exhibits no distension and no mass. There is no  tenderness.   Musculoskeletal: Normal range of motion. He exhibits no edema.   Neurological: He is alert. No cranial nerve deficit.   Word salad       Skin: Skin is warm and dry. He is not diaphoretic. No erythema. No pallor.   Psychiatric: He has a normal mood and affect. His behavior is normal.   Nursing note and vitals reviewed.      Labs:        Invalid input(s): [id number]      No results for input(s): SODIUM, POTASSIUM, CHLORIDE, CO2, BUN, CREATININE, MAGNESIUM, PHOSPHORUS, CALCIUM in the last 72 hours.  No results for input(s): ALTSGPT, ASTSGOT, ALKPHOSPHAT, TBILIRUBIN, DBILIRUBIN, GAMMAGT, AMYLASE, LIPASE, ALB, PREALBUMIN, GLUCOSE in the last 72 hours.  No results for input(s): RBC, HEMOGLOBIN, HEMATOCRIT, PLATELETCT, PROTHROMBTM, APTT, INR, IRON, FERRITIN, TOTIRONBC in the last 72 hours.            Hemodynamics:  Temp (24hrs), Av.2 °C (97.1 °F), Min:36.1 °C (97 °F), Max:36.3 °C (97.3 °F)  Temperature: 36.3 °C (97.3 °F)  Pulse  Av.9  Min: 56  Max: 123   Blood Pressure: 110/80 mmHg     Respiratory:    Respiration: 18, Pulse Oximetry: 97 %        RUL Breath Sounds: Clear, RML Breath Sounds: Diminished, RLL Breath Sounds: Diminished, LAYA Breath Sounds: Clear, LLL Breath Sounds: Diminished  Fluids:    Intake/Output Summary (Last 24 hours) at 17 1347  Last data filed at 17 0000   Gross per 24 hour   Intake    100 ml   Output    250 ml   Net   -150 ml        GI/Nutrition:  Orders Placed This Encounter   Procedures   • Diet Order     Standing Status: Standing      Number of Occurrences: 1      Standing Expiration Date:      Order Specific Question:  Diet:     Answer:  Diabetic [3]     Order Specific Question:  Texture/Fiber modifications:     Answer:  Dysphagia 2(Pureed/Chopped)specify fluid consistency(question 6) [2]     Order Specific Question:  Consistency/Fluid modifications:     Answer:  Thin Liquids [3]     Order Specific Question:  Miscellaneous modifications:     Answer:  SLP - 1:1  Supervision by Nursing [21]     Medical Decision Making, by Problem:  Active Hospital Problems    Diagnosis   • Astrocytoma (CMS-[facility]) [C71.9]s/p surgical excision, radiation underway 29/30 today     • Seizure disorder (CMS-[facility]) [G40.909]keppra     • Hyponatremia [E87.1]resolved     • Normocytic anemia [D64.9]mild, monitor     • Thrush, oral [B37.0]completed treatment     • Pneumonia [J18.9]completed antibiotics     • Acute respiratory failure with hypoxia (CMS-[facility]) [J96.01]resolved     • Cerebral edema (CMS-[facility]) [G93.6]steroids/seizure prophylaxis         Labs reviewed, Medications reviewed and Radiology images reviewed  Mesa catheter: No Mesa      DVT: xarelto.        Assessed for rehab: Patient was assess for and/or received rehabilitation services during this hospitalization

## 2017-04-27 NOTE — PROGRESS NOTES
Checked patients blood sure it was 67, patient is just now eating breakfast after being gone to radiation. Patient will be re-checked after breakfast.

## 2017-04-27 NOTE — PROGRESS NOTES
Patient brought down to Radiation Therapy and received treatment 29 out of 30. Patient tolerated procedure well. Will continue treatment as planned.

## 2017-04-27 NOTE — PROGRESS NOTES
Pt calm and relaxed, claims no pain or nausea at this time, pt tolerating oral medications and diet well. No insuline coverage required after evening meal. Pt in good spirits, feeling good and excited he is almost done with radiation and looking forward to going home. Bed alarm on for safety, call light in reach and pt calls appropriately, 2 side rails up, treaded socks on, bed in low position.

## 2017-04-27 NOTE — PROGRESS NOTES
Hospital Medicine Progress Note, Adult, Complex               Author: Jammie Haas Date & Time created: 4/26/2017  7:30 PM     Interval History:  4/26 Patient doing same, tolerating radiation without issue.  Communication is appropriate with his actions and hand gestures but just not words.  He will dc home with daughters once radiation is completed.    Review of Systems:  Review of Systems   Constitutional: Negative for fever.   HENT: Negative for nosebleeds.    Eyes: Negative for blurred vision and pain.   Respiratory: Negative for cough and shortness of breath.    Cardiovascular: Negative for chest pain and leg swelling.   Gastrointestinal: Negative for heartburn, abdominal pain and diarrhea.   Genitourinary: Negative for dysuria, hematuria and flank pain.   Musculoskeletal: Negative for myalgias and joint pain.   Skin: Negative for itching and rash.   Neurological: Negative for dizziness, focal weakness, seizures and headaches.   Psychiatric/Behavioral: Negative for depression. The patient is not nervous/anxious and does not have insomnia.        Physical Exam:  Physical Exam   Constitutional: He appears well-developed and well-nourished. No distress.   HENT:   Head: Normocephalic and atraumatic.   Eyes: Conjunctivae are normal. No scleral icterus.   Neck: Neck supple. No JVD present.   Cardiovascular: Normal rate, regular rhythm, normal heart sounds and intact distal pulses.  Exam reveals no gallop and no friction rub.    No murmur heard.  Pulmonary/Chest: Effort normal and breath sounds normal. No respiratory distress. He exhibits no tenderness.   Abdominal: Soft. Bowel sounds are normal. He exhibits no distension and no mass. There is no tenderness.   Musculoskeletal: Normal range of motion. He exhibits no edema.   Neurological: He is alert. No cranial nerve deficit.   Word salad       Skin: Skin is warm and dry. He is not diaphoretic. No erythema. No pallor.   Psychiatric: He has a normal mood and affect.  His behavior is normal.   Nursing note and vitals reviewed.      Labs:        Invalid input(s): [id number]      No results for input(s): SODIUM, POTASSIUM, CHLORIDE, CO2, BUN, CREATININE, MAGNESIUM, PHOSPHORUS, CALCIUM in the last 72 hours.  No results for input(s): ALTSGPT, ASTSGOT, ALKPHOSPHAT, TBILIRUBIN, DBILIRUBIN, GAMMAGT, AMYLASE, LIPASE, ALB, PREALBUMIN, GLUCOSE in the last 72 hours.  No results for input(s): RBC, HEMOGLOBIN, HEMATOCRIT, PLATELETCT, PROTHROMBTM, APTT, INR, IRON, FERRITIN, TOTIRONBC in the last 72 hours.            Hemodynamics:  Temp (24hrs), Av.8 °C (98.2 °F), Min:36.1 °C (97 °F), Max:37.1 °C (98.7 °F)  Temperature: 36.1 °C (97 °F)  Pulse  Av.1  Min: 56  Max: 123   Blood Pressure: 100/69 mmHg     Respiratory:    Respiration: 18, Pulse Oximetry: 94 %     Work Of Breathing / Effort: Mild  RUL Breath Sounds: Clear, RML Breath Sounds: Diminished, RLL Breath Sounds: Diminished, LAYA Breath Sounds: Clear, LLL Breath Sounds: Diminished  Fluids:    Intake/Output Summary (Last 24 hours) at 17 1930  Last data filed at 17 0800   Gross per 24 hour   Intake      0 ml   Output    450 ml   Net   -450 ml        GI/Nutrition:  Orders Placed This Encounter   Procedures   • Diet Order     Standing Status: Standing      Number of Occurrences: 1      Standing Expiration Date:      Order Specific Question:  Diet:     Answer:  Diabetic [3]     Order Specific Question:  Texture/Fiber modifications:     Answer:  Dysphagia 2(Pureed/Chopped)specify fluid consistency(question 6) [2]     Order Specific Question:  Consistency/Fluid modifications:     Answer:  Thin Liquids [3]     Order Specific Question:  Miscellaneous modifications:     Answer:  SLP - 1:1 Supervision by Nursing [21]     Medical Decision Making, by Problem:  Active Hospital Problems    Diagnosis   • 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]s/p surgical excision, radiation underway  today     • Seizure disorder (CMS-HCC)  [G40.909]keppra     • Hyponatremia [E87.1]resolved     • Normocytic anemia [D64.9]mild, monitor     • Thrush, oral [B37.0]completed treatment     • Pneumonia [J18.9]completed antibiotics     • Acute respiratory failure with hypoxia (CMS-HCC) [J96.01]resolved     • Cerebral edema (CMS-HCC) [G93.6]steroids/seizure prophylaxis         Labs reviewed, Medications reviewed and Radiology images reviewed  Mesa catheter: No Mesa      DVT: xarelto.        Assessed for rehab: Patient was assess for and/or received rehabilitation services during this hospitalization

## 2017-04-28 NOTE — PROGRESS NOTES
Pt daughter Desiree brought in paperwork for MILAN Moreau to have Dr. Barrios  Sign. Pt calm and relaxed, claims no pain or nausea at this time, pt tolerating oral medications and diet well. No insuline coverage required after evening meal. Pt in happy mood and looking forward to going home. Bed alarm on for safety, call light in reach and pt calls appropriately, 2 side rails up, treaded socks on, bed in low position.

## 2017-04-28 NOTE — DISCHARGE INSTRUCTIONS
Discharge Instructions  MRI 5/25 as outpatient and f/u with Dr Osman after for results   Do not resume taking Temodar until told to do so by Dr Osman    Discharged to home by car with relative. Discharged via wheelchair, hospital escort: Yes.  Special equipment needed: Not Applicable    Be sure to schedule a follow-up appointment with your primary care doctor or any specialists as instructed.     Discharge Plan:   Diet Plan: Discussed  Activity Level: Discussed  Confirmed Follow up Appointment: Appointment Scheduled  Confirmed Symptoms Management: Discussed  Medication Reconciliation Updated: Yes  Influenza Vaccine Indication: Patient Refuses    I understand that a diet low in cholesterol, fat, and sodium is recommended for good health. Unless I have been given specific instructions below for another diet, I accept this instruction as my diet prescription.   Other diet: Dysphagia II (soft foods)    Special Instructions: None    · Is patient discharged on Warfarin / Coumadin?   No     · Is patient Post Blood Transfusion?  No    Depression / Suicide Risk    As you are discharged from this [facility] facility, it is important to learn how to keep safe from harming yourself.    Recognize the warning signs:  · Abrupt changes in personality, positive or negative- including increase in energy   · Giving away possessions  · Change in eating patterns- significant weight changes-  positive or negative  · Change in sleeping patterns- unable to sleep or sleeping all the time   · Unwillingness or inability to communicate  · Depression  · Unusual sadness, discouragement and loneliness  · Talk of wanting to die  · Neglect of personal appearance   · Rebelliousness- reckless behavior  · Withdrawal from people/activities they love  · Confusion- inability to concentrate     If you or a loved one observes any of these behaviors or has concerns about self-harm, here's what you can do:  · Talk about it- your feelings and reasons for  harming yourself  · Remove any means that you might use to hurt yourself (examples: pills, rope, extension cords, firearm)  · Get professional help from the community (Mental Health, Substance Abuse, psychological counseling)  · Do not be alone:Call your Safe Contact- someone whom you trust who will be there for you.  · Call your local CRISIS HOTLINE 106-1677 or 785-447-8173  · Call your local Children's Mobile Crisis Response Team [place] (118) 966-0883 or www.[employer]  · Call the toll free National Suicide Prevention Hotlines   · National Suicide Prevention Lifeline 343-447-YVOA (7114)  · National Hope Line Network 800-SUICIDE (863-2474)

## 2017-04-28 NOTE — DISCHARGE PLANNING
Medical Social Work    Referral:  Disability Paperwork    Intervention:  Pt's daughter brought in disability paperwork that needs to be completed by Dr. Barrios.  SW called Dr. Barrios's office to ask how to get the paperwork to him to complete.  MILAN was informed that one of his nurses will assist in completing the paperwork and asked that it be faxed to them at [phone].  MILAN faxed paperwork and received confirmation that it went through.    Plan:  Pt is discharging home with family today.  No further SS needs at this time.

## 2017-04-28 NOTE — PROGRESS NOTES
Received report from night shift RN, assumed care of patient at 0700. Pt confused, speaks with a mixture of [place] and english. Able to understand some of patients needs based on his actions. x1 assist. Denies pain. Patient scheduled to discharge this afternoon with his daughter after his last radiation treatment. Call light within reach, bed in low and locked position. No other needs at this time.

## 2017-04-28 NOTE — PROGRESS NOTES
Patient was transported to our department for radiation therapy treatment number 30 of 30 to his brain. He has completed his therapy.

## 2017-04-28 NOTE — DISCHARGE PLANNING
Medical Social Work    SW received VM from pt's daughter, Desiree, requesting that SW call her back (ph#: [phone]).  SW called Desiree back and spoke with her.  Pt will completed his radiation therapy tomorrow.  Plan has been for pt to d/c home with his daughter, Priscilla, but Priscilla informed this SW earlier in the week that she was in a car accident and does not believe she is able to care for pt until after she heals.  Discussed different d/c options including SNF placement.  MILNA informed Desiree that per pt's insurance, pt only has about two weeks left of covered SNF days.  Desiree informed this SW that she would like for pt to d/c home.  Desiree stated that she plans on assisting Priscilla in caring for pt and will be able to help out in the afternoons/evenings after she gets off of work. Jordana stated that she may even quit her job to help care for her father.  Desiree plans on picking her father up tomorrow afternoon after she gets off of work at 1:30pm.  MILAN discussed with hospitalist and bedside RN.    Plan:  SS will remain avaliable to provide support and assist with d/c planning as needed.

## 2017-04-29 NOTE — DISCHARGE SUMMARY
DATE OF ADMISSION:  03/23/2017.    DATE OF DISCHARGE:  04/28/2017.    DISCHARGE DIAGNOSES:  1.  ____ secondary to large astrocytoma status post completion of 30 fractions   of chemotherapy.  2.  Seizure disorder, under control with Keppra.  3.  Hyponatremia, resolved.  4.  Normocytic anemia, resolved.  5.  Thrush, resolved.  6.  Pneumonia, resolved.  7.  Respiratory failure with hypoxia, resolved.  8.  Cerebral edema, improving with treatment of astrocytoma with radiation as   well as titration of steroids.    REASON FOR HOSPITALIZATION:  Patient is a 56-year-old male with astrocytoma   presented with difficulty with breathing.  He had stereotactic resection in   [place] in January 2017; however, could not have complete resection because   of its involvement of his language area.  He was admitted to [facility], had bilateral DVTs, is on Xarelto, and seizures at that   facility, was placed on Keppra after readmission from [facility].  He has had   expressive aphasia since the time of discharge, started chemoradiation and   Temodar 1 week prior.  He went to see nurse practitioner, Lisette Mendoza with   fever, cough and weakness.  He was started on Omnicef 1 week prior without   much improvement and presented for further evaluation.    HOSPITAL COURSE:  Patient was admitted, started on cefepime, doxycycline and   vancomycin as well as oxygen supplementation.  Throughout his hospital stay,   he had definite improvement of his symptomatology; however, because of his   intermittent confusion and the need for daily radiation, he remained in the   hospital to his 30 fractions of radiation until completion.  He was seen by   physical and occupational therapy as well as speech therapy with dysphagia   recommendations which he has done well with.  At this time, as the patient   completed his thirtieth fraction today, he is appropriate for discharge with   his 2 daughters, they do have legal appointed guardianship of  the patient   given his difficulty with communication and intermittent confusion.  Patient   will continue with titration of Decadron and will follow up with his   oncologist, Dr. Osman in the next 3-4 weeks.  Patient is also to follow up   with primary care practitioner, Lisette Mendoza.    DISCHARGE MEDICATIONS:  Decadron 2 mg daily, Keppra 1500 mg every 12 hours,   Pepcid 20 mg daily, NPH 50 units q.a.m., 45 units subcutaneous q.p.m., regular   sliding scale ____ units, Zofran 4 mg every 4 hours as needed for nausea and   vomiting, Xarelto 20 mg p.o. at bedtime.    DIET:  Diabetic.    ACTIVITY:  Ad jhoana.    Discharge instructions and followup were explained to the patient who is   agreeable.  I explained to patient and daughter is agreeable with discharge.    Discharge process lasted approximately 40 minutes.       ____________________________________     DO JAMES Ramos / HARRIET    DD:  04/28/2017 18:24:02  DT:  04/28/2017 20:19:07    D#:  [id number]  Job#:  [id number]

## 2017-06-08 NOTE — PROGRESS NOTES
RADIATION ONCOLOGY FOLLOW-UP    DATE OF SERVICE: 6/8/2017    IDENTIFICATION:   A 56 y.o. male with left temporal anaplastic astrocytoma, primarily nonenhancing. He status post subtotal resection at [facility] January 31, 2017 followed by adjuvant chemoradiotherapy consisting of Temodar and 6000 cGy in 30 fractions completed 4/28/2017.  His adjuvant therapy course was complicated secondary to prolonged hospitalization secondary to confusion and poor tolerance to Temodar. Patient essentially received approximately 1 week of Temodar only.    HISTORY OF PRESENT ILLNESS:   He returns today for follow-up post MRI brain 6/2/2017. Clinically he still continues to be very confused and not oriented to person place or time. Daughter states that he is able to complete all his activities of daily living by himself.    CURRENT MEDICATIONS:  Current Outpatient Prescriptions   Medication Sig Dispense Refill   • dexamethasone (DECADRON) 2 MG tablet Take 1 Tab by mouth every day. (Patient taking differently: Take 2 mg by mouth 2 times a day.) 30 Tab 1   • rivaroxaban (XARELTO) 20 MG Tab tablet Take 1 Tab by mouth with dinner. 30 Tab 5   • insulin NPH (HUMULIN,NOVOLIN) 100 UNIT/ML Suspension inject 50 units SQ in AM and 45 units units SQ in PM 3 Vial 11   • insulin regular (HUMULIN R) 100 Unit/mL Solution Inject -249: 3 units 250-299: 5 units 300-349: 7 units Over 350: 8 units 10 mL 11   • levetiracetam (KEPPRA) 100 MG/ML Solution Take 15 mL by mouth every 12 hours. 240 mL 11     No current facility-administered medications for this encounter.       ALLERGIES:  Review of patient's allergies indicates no known allergies.    PHYSICAL EXAM:   /98 mmHg  Pulse 103  Temp(Src) 36.2 °C (97.2 °F)  SpO2 94%  GENERAL: Confused no acute distress cushingoid appearing  HEENT:  Pupils are equal, round, and reactive to light.  Extraocular muscles   are intact. Sclerae nonicteric.  Conjunctivae pink.  Oral cavity, tongue    protrudes midline.   NEUROLOGIC:  Cranial nerves II through XII were intact.  Strength is 5/5 in   lower extremities bilaterally.  DTRs were symmetrical.  There was no focal   sensory deficit appreciated.    Pain Scale: 0-10  Pain Assessement: chronic bilateral knee pain  Pain Location, Orientation and Scale: unable to give # on pain scale.     LABORATORY DATA:   Lab Results   Component Value Date/Time    SODIUM 136 05/31/2017 02:56 PM    POTASSIUM 3.4* 05/31/2017 02:56 PM    CHLORIDE 104 05/31/2017 02:56 PM    CO2 22 05/31/2017 02:56 PM    GLUCOSE 121* 05/31/2017 02:56 PM    BUN 14 05/31/2017 02:56 PM    CREATININE 0.53 05/31/2017 02:56 PM     Lab Results   Component Value Date/Time    ALKALINE PHOSPHATASE 53 04/19/2017 11:44 PM    AST(SGOT) 14 04/19/2017 11:44 PM    ALT(SGPT) 43 04/19/2017 11:44 PM    TOTAL BILIRUBIN 0.4 04/19/2017 11:44 PM      Lab Results   Component Value Date/Time    WBC 6.4 04/19/2017 11:44 PM    RBC 3.75* 04/19/2017 11:44 PM    HEMOGLOBIN 12.3* 04/19/2017 11:44 PM    HEMATOCRIT 36.7* 04/19/2017 11:44 PM    MCV 97.9* 04/19/2017 11:44 PM    MCH 32.8 04/19/2017 11:44 PM    MCHC 33.5* 04/19/2017 11:44 PM    MPV 8.9* 04/19/2017 11:44 PM    NEUTROPHILS-POLYS 78.10* 04/19/2017 11:44 PM    LYMPHOCYTES 14.90* 04/19/2017 11:44 PM    MONOCYTES 2.60 04/19/2017 11:44 PM    EOSINOPHILS 0.00 04/19/2017 11:44 PM    BASOPHILS 0.90 04/19/2017 11:44 PM    ANISOCYTOSIS 1+ 04/19/2017 11:44 PM        RADIOLOGY DATA:  MR.-BRAIN WITH & WITHOUT  6/2/2017  1.  Post resection changes in the anterior LEFT temporal lobe 2.  Extensive edema in the LEFT frontal lobe, temporal lobe, LEFT parietal lobe and basal ganglia, not demonstrably changed. This could be related to treatment effect or tumoral infiltration. 3.  Increased conspicuity of nodular enhancement in the LEFT temporoparietal lobe suspicious for residual or recurrent tumor. 4.  Persistent edema and abnormal enhancement in the posterior LEFT temporal lobe  suspicious for residual or recurrent tumor, less likely ischemia 5.  Decreased size of LEFT frontoparietal chronic subdural hematoma 6.  Decreased LEFT to RIGHT midline shift 7.  Moderate white matter changes      IMPRESSION:    A 56 y.o. with anaplastic astrocytoma left temporal lobe status post subtotal resection followed by abbreviated course of Temodar and full course of radiotherapy adjuvantly, 6000 cGy in 30 fractions. Follow-up MRI demonstrates what appears to be persistent/progression of disease with increased enhancement.    RECOMMENDATIONS:   Reviewed imaging with the family. Did recommend following up with medical oncology to start chemotherapy. Family is very worried about starting chemotherapy considering that he had considerable problems with Temodar during the combined modality phase. I still encouraged him to see Dr. Osman to have further discussion regarding chemotherapy or comfort care.    At this point, I'm turning over routine follow-up visits to Dr. Osman as there is very limited role for radiotherapy. I will see him on an as-needed basis.    25 minutes was spent face-to-face with patient in the office and more than half of that time was spent counseling patient or coordinating care as described above.    Thank you for the opportunity to participate in his care.  If any questions or comments, please do not hesitate in calling.  Eugenia POOL M.D.  Electronically signed by: Eugenia Barrios V, 6/8/2017 3:48 PM  [phone]

## 2017-06-08 NOTE — ADDENDUM NOTE
Encounter addended by: Eugenia POOL M.D. on: 6/8/2017  4:08 PM<BR>     Documentation filed: Care Teams, Clinical Notes

## 2017-06-14 NOTE — MR AVS SNAPSHOT
"        Dale Robisons   2017 10:20 AM   Office Visit   MRN: [id number]    Department:  [facility]   Dept Phone:  [phone]    Description:  Male : 1960   Provider:  Olivier Osman M.D.           Reason for Visit     Follow-Up           Allergies as of 2017     No Known Allergies      You were diagnosed with     Anaplastic astrocytoma of temporal lobe (CMS-HCC)   [955784]       Malignant neoplasm of overlapping sites of brain (CMS-HCC)   [419994]       Seizure disorder (CMS-HCC)   [375274]         Vital Signs     Blood Pressure Pulse Temperature Respirations Height Weight    100/70 mmHg 73 36.3 °C (97.3 °F) 14 1.575 m (5' 2.01\") 97.5 kg (214 lb 15.2 oz)    Body Mass Index Oxygen Saturation Smoking Status             39.30 kg/m2 96% Former Smoker         Basic Information     Date Of Birth Sex Race Ethnicity Preferred Language    1960 Male White  Origin (Romanian,Israeli,Namibian,Junior, etc) English      Your appointments     2017 10:00 AM   ONCOLOGY EST PATIENT 30 MIN with JERRY Mcconnell   [facility] (--)    [address], [address]  [facility] [address] [phone]              Problem List              ICD-10-CM Priority Class Noted - Resolved    Obesity (BMI 30-39.9) E66.9 Low  5/15/2015 - Present    Chronic pain of right knee M25.561, G89.29   5/15/2015 - Present    Cerebral edema (CMS-HCC) G93.6   2017 - Present    New onset seizure (CMS-HCC) R56.9 High  2017 - Present    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 High  2017 - Present    Malignant neoplasm of brain (CMS-HCC) C71.9   2017 - Present    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, T38.0X5A   3/13/2017 - Present    Acute deep vein thrombosis (DVT) of both lower extremities (CMS-HCC) I82.403   3/13/2017 - Present    Pneumonia J18.9   3/23/2017 - Present    Acute respiratory failure with hypoxia (CMS-HCC) J96.01   3/23/2017 " - Present    Astrocytoma ([facility]) C71.9   3/24/2017 - Present    Seizure disorder (CMS-HCC) G40.909   3/24/2017 - Present    Hyponatremia E87.1   3/24/2017 - Present    Normocytic anemia D64.9   3/24/2017 - Present    Thrush, oral B37.0   3/24/2017 - Present      Health Maintenance        Date Due Completion Dates    IMM HEP B VACCINE (1 of 3 - Primary Series) 1960 ---    IMM DTaP/Tdap/Td Vaccine (1 - Tdap) 11/8/1977 ---            Current Immunizations     No immunizations on file.      Below and/or attached are the medications your provider expects you to take. Review all of your home medications and newly ordered medications with your provider and/or pharmacist. Follow medication instructions as directed by your provider and/or pharmacist. Please keep your medication list with you and share with your provider. Update the information when medications are discontinued, doses are changed, or new medications (including over-the-counter products) are added; and carry medication information at all times in the event of emergency situations     Allergies:  No Known Allergies          Medications  Valid as of: June 14, 2017 - 11:11 AM    Generic Name Brand Name Tablet Size Instructions for use    Dexamethasone (Tab) DECADRON 2 MG Take 1 Tab by mouth 2 times a day.        Hydrocodone-Acetaminophen (Tab) NORCO 5-325 MG Take 1-2 Tabs by mouth every four hours as needed.        Insulin NPH Human (Isophane) (Suspension) HUMULIN,NOVOLIN 100 UNIT/ML inject 50 units SQ in AM and 45 units units SQ in PM        Insulin Regular Human (Solution) HUMULIN R 100 Unit/mL Inject -249: 3 units 250-299: 5 units 300-349: 7 units Over 350: 8 units        LevETIRAcetam (Solution) KEPPRA 100 MG/ML Take 15 mL by mouth every 12 hours.        Rivaroxaban (Tab) XARELTO 20 MG Take 1 Tab by mouth with dinner.        Sennosides-Docusate Sodium (Tab) PERICOLACE or SENOKOT S 8.6-50 MG Take 1 Tab by mouth every day.        .                    Medicines prescribed today were sent to:     [id number] - [place], [place] - [address]    [address] [place] [address]    Phone: [phone] Fax: [phone]    Open 24 Hours?: No      Medication refill instructions:       If your prescription bottle indicates you have medication refills left, it is not necessary to call your provider’s office. Please contact your pharmacy and they will refill your medication.    If your prescription bottle indicates you do not have any refills left, you may request refills at any time through one of the following ways: The online [employer] system (except Urgent Care), by calling your provider’s office, or by asking your pharmacy to contact your provider’s office with a refill request. Medication refills are processed only during regular business hours and may not be available until the next business day. Your provider may request additional information or to have a follow-up visit with you prior to refilling your medication.   *Please Note: Medication refills are assigned a new Rx number when refilled electronically. Your pharmacy may indicate that no refills were authorized even though a new prescription for the same medication is available at the pharmacy. Please request the medicine by name with the pharmacy before contacting your provider for a refill.        Your To Do List     Standing Orders Interval Expires    CBC WITH DIFFERENTIAL  2 weeks until 6/14/2018 6/14/2018    COMP METABOLIC PANEL  oscar until 6/14/2018 6/14/2018         MyChart Status: Patient Declined

## 2017-06-14 NOTE — PROGRESS NOTES
Date of visit: 6/14/2017  10:41 AM      Reason for consultation: Anaplastic astrocytoma grade 3-status post partial resection at [facility].  1p, 19q non deleted, IDH-1 wild type,MGMT non methylated  ATRX wild type, p53- no overexpression Ki-67 5%.    History of presenting illness:      Dale Dorado   is a 56 y.o. year old  male who was otherwise in good health who noticed a left facial deviation in mid December 2016 and  some component of aphasia. An MRI done at [facility] showed abnormal increased signal throughout the left temporal and posterior inferior frontal lobes, possibly representing a low-grade tumor. He was seen by Dr. Presley who recommended awake cranitomy with motor/language mapping to facilitate safe resection at [facility]. He was admitted few days after that with a seizure causing syncope and was started on Keppra.    MRI done at [facility] showed extensive infiltrating predominantly nonenhancing left temperofrontal tumor including involvement of the left insula , frontal lobe and a large portion of the anterior and mid temporal lobe . According to the operative note from 1/31/2017, language mapping was done. He had tumor removal approximately 2 cm anterior to the temporal eloquent speech area extending up to the anterior temporal tip. His speech was not comprehensible at that point  and it was decided not to resect tumor posterior or inferior to the critical language area. According to the note, he has high risk of having residual speech problems. He recovered well from the surgery and is currently at a rehabilitation facility.  He continues to have speech problems and word finding difficulty. A CT of the head done on 2/6/2017 here showed significant left shift and postoperative changes.    He was subsequently diagnosed with bilateral DVT and he is on Xarelto. He still has some expressive aphasia and memory issues. His steroid has been dose reduced to 4 mg 3 times a day.  He has had significant weight gain. He is supposed to start chemoradiation in 2 weeks.    MRI of the brain from 3/10/2017-  .  Interval left temporal craniotomy with resection of the underlying temporal lobe neoplasm. New left frontoparietal subdural fluid collection which measures 9.5 mm in greatest thickness likely representing subacute to chronic blood products.  2.  Thin irregular peripheral rim of enhancement about the resection cavity which extends posteriorly into the sylvian fissure and parasylvian region. Finding could be postsurgical or secondary to residual neoplasm.  3.  New serpiginous irregular enhancement tracking posteriorly from the resection cavity into the posterior temporal and parieto-occipital regions. Differential considerations would include a subacute infarct versus neoplasm.    He started chemoradiation in April. Unfortunately, he had a prolonged hospitalization due to various medical issues including pneumonia causing respiratory failure and the poor performance status. He took temodar only for 4 days and was stopped by the hospitalist.    He was currently discharged home. Most recent MRI  6/2/17 shows-  Extensive edema in the LEFT frontal lobe, temporal lobe, LEFT parietal lobe and basal ganglia, not demonstrably changed. This could be related to treatment effect or tumoral infiltration.  3.  Increased conspicuity of nodular enhancement in the LEFT temporoparietal lobe suspicious for residual or recurrent tumor.  4.  Persistent edema and abnormal enhancement in the posterior LEFT temporal lobe suspicious for residual or recurrent tumor, less likely ischemia  5.  Decreased size of LEFT frontoparietal chronic subdural hematoma  6.  Decreased LEFT to RIGHT midline shift      He does not have significant motor weakness. However, he still has aphasia. Patient's family is concerned about him continuing anymore maintenance Temodar.        Past Medical History:      Past Medical History    Diagnosis Date   • Hyperlipidemia 2010     no meds   • New onset seizure (CMS-HCC) 1/5/2017   • Brain cancer (CMS-HCC)      Glioma   • Bell palsy 12/2016   • Cancer (CMS-HCC) 1/31/2017     Glioma   • Diabetes (CMS-HCC)      R/t steroid use per dtr   • Bell's palsy 1/2017       Past surgical history:       Past Surgical History   Procedure Laterality Date   • Craniotomy tumor  1/31/2017     Lt frontotemporal       Allergies:       Review of patient's allergies indicates no known allergies.    Medications:         Current Outpatient Prescriptions   Medication Sig Dispense Refill   • dexamethasone (DECADRON) 2 MG tablet Take 1 Tab by mouth every day. (Patient taking differently: Take 2 mg by mouth 2 times a day.) 30 Tab 1   • rivaroxaban (XARELTO) 20 MG Tab tablet Take 1 Tab by mouth with dinner. 30 Tab 5   • insulin NPH (HUMULIN,NOVOLIN) 100 UNIT/ML Suspension inject 50 units SQ in AM and 45 units units SQ in PM 3 Vial 11   • insulin regular (HUMULIN R) 100 Unit/mL Solution Inject -249: 3 units 250-299: 5 units 300-349: 7 units Over 350: 8 units 10 mL 11   • levetiracetam (KEPPRA) 100 MG/ML Solution Take 15 mL by mouth every 12 hours. 240 mL 11     No current facility-administered medications for this visit.         Social History:     Social History     Social History   • Marital Status: Single     Spouse Name: N/A   • Number of Children: N/A   • Years of Education: N/A     Occupational History   • Not on file.     Social History Main Topics   • Smoking status: Former Smoker -- 1.00 packs/day for 30 years     Types: Cigarettes   • Smokeless tobacco: Never Used   • Alcohol Use: No   • Drug Use: No   • Sexual Activity: No      Comment:      Other Topics Concern   • Not on file     Social History Narrative       Family History:      Family History   Problem Relation Age of Onset   • Diabetes Neg Hx    • Seizures Daughter        Review of Systems:  All other review of systems are negative except what was  "mentioned above in the HPI.    Limited review of systems due to aphasia. He reports having some pain behind the left ear lobe. He is ambulating well otherwise. His leg swelling has improved. No bleedings related to xarelto.    Physical Exam:  Vitals: /70 mmHg  Pulse 73  Temp(Src) 36.3 °C (97.3 °F)  Resp 14  Ht 1.575 m (5' 2.01\")  Wt 97.5 kg (214 lb 15.2 oz)  BMI 39.30 kg/m2  SpO2 96%    General: Not in acute distress, alert and oriented x 3  HEENT: No pallor, icterus. Oropharynx clear.    The scar from the prior craniotomy. No infections visible   Neck: Supple, no palpable masses.  Lymph nodes: No palpable cervical, supraclavicular, axillary or inguinal lymphadenopathy.    CVS: regular rate and rhythm, no rubs or gallops  RESP: Clear to auscultate bilaterally, no wheezing or crackles.   ABD: Soft, non tender, non distended, positive bowel sounds, no palpable organomegaly  EXT: No edema or cyanosis  CNS: Alert and oriented x3, No focal deficits.  Skin- No rash      Labs:   No visits with results within 1 Week(s) from this visit.  Latest known visit with results is:    Hospital Outpatient Visit on 05/31/2017   Component Date Value Ref Range Status   • Sodium 05/31/2017 136  135 - 145 mmol/L Final   • Potassium 05/31/2017 3.4* 3.6 - 5.5 mmol/L Final   • Chloride 05/31/2017 104  96 - 112 mmol/L Final   • Co2 05/31/2017 22  20 - 33 mmol/L Final   • Glucose 05/31/2017 121* 65 - 99 mg/dL Final   • Bun 05/31/2017 14  8 - 22 mg/dL Final   • Creatinine 05/31/2017 0.53  0.50 - 1.40 mg/dL Final   • Calcium 05/31/2017 8.6  8.5 - 10.5 mg/dL Final   • Anion Gap 05/31/2017 10.0  0.0 - 11.9 Final   • GFR If  05/31/2017 >60  >60 mL/min/1.73 m 2 Final   • GFR If Non  05/31/2017 >60  >60 mL/min/1.73 m 2 Final             Assessment and Plan:  1. 1p, 19q non deleted, MGMT non methylated, IDH-1 wild type anaplastic astrocytoma-grade 3 , status post partial resection.    He finished radiation. " He took only 3 or 4 days of Temodar concurrently and was admitted to the hospital with pneumonia and respiratory failure. Recent MRI shows possible local progression. However, it is unclear whether this is true progression or not. The midline shift has overall improved. However, he has a large area of disease involving the temporoparietal region..    From a prognostic standpoint, I informed them that MGMT non methylated, IDH-1 wild type anaplastic astrocytoma may have his prognosis compared to the IDH one mutated type. However, given his relatively young age, his survival will be more than expected compared to grade 4 GBM's.    Discussed the option of maintenance Temodar. The family has appropriate concerns about his tolerance. We will start him on his prior dose of 75 mg/m² daily 1-5 every 4 weeks to see whether he can tolerate it well. His family has leftover pills and he will start within the next few days. We will check a CBC twice weekly and he will return to clinic in one month for follow-up. I will consider Optune  if this can be approved for him. We will obtain follow-up MRI in 2 months    Seizure prophylaxis.-He will take Keppra    Expressive aphasia secondary to large tumor-suspect that this will be permanent    Headache-overall, the midline shift has improved. Instructed him to take Decadron 2 mg twice a day. Will give him some Percocet if he has any significant flareups    Constipation secondary to constipation I have prescribed Senokot-S  Steroid-induced diabetes mellitus-insulin-dependent. He will follow up with PCP    DVT-he will continue xarelto indefinitely. Aware of  the bleeding risk.    Complex patient requiring complex decision making. Total time spent with patient 40 minutes. More than 70% of this spent on counseling about future management options, prognosis, etc.  He agreed and verbalized his agreement and understanding with the current plan.  I answered all questions and concerns he has at  this time         Please note that this dictation was created using voice recognition software. I have made every reasonable attempt to correct obvious errors, but I expect that there are errors of grammar and possibly content that I did not discover before finalizing the note.      SIGNATURES:  Olivier Osman    CC:  JAGDEEP Kinsey  No ref. provider found

## 2017-06-20 NOTE — PROGRESS NOTES
Call placed to daughter, Desiree, to follow up.  She denies current barriers to care, questions or concerns.  They understand current plan of care and patient will be doing 5 days a month of oral chemo.  They have follow up appointment already scheduled with Dr Osman.  Provided [phone] number to contact navigator if needs come up.  Navigator available if current status changes.  Pt has completed cancer nurse navigation.

## 2017-06-22 PROBLEM — D69.6 THROMBOCYTOPENIA (HCC): Status: ACTIVE | Noted: 2017-01-01

## 2017-06-22 PROBLEM — I26.99 PULMONARY EMBOLISM (HCC): Status: ACTIVE | Noted: 2017-01-01

## 2017-06-22 PROBLEM — D53.9 MACROCYTIC ANEMIA: Status: ACTIVE | Noted: 2017-01-01

## 2017-06-22 NOTE — ASSESSMENT & PLAN NOTE
Diagnosed prior to admission  Was on xarelto previously  Will need to be on coumadin going forward, lovenox bridge

## 2017-06-22 NOTE — IP AVS SNAPSHOT
" <p align=\"LEFT\"><IMG SRC=\"//[email or web address]\" alt=\"Image\" WIDTH=\"50%\" HEIGHT=\"200\" BORDER=\"\"></p>                   Name:Dale Carmona  Medical Record Number:[id number]  CSN: [id number]    YOB: 1960   Age: 56 y.o.  Sex: male  HT:1.6 m (5' 2.99\") WT: 100.2 kg (220 lb 14.4 oz)          Admit Date: 6/22/2017     Discharge Date:   Today's Date: 6/24/2017  Attending Doctor:  Princess Funez M.D.                  Allergies:  Review of patient's allergies indicates no known allergies.          Your appointments     Jun 26, 2017  2:15 PM   New Patient with [facility] EXAM 5   [facility]  (--)    [address] [facility] [address]   [phone]            Jul 11, 2017 10:00 AM   ONCOLOGY EST PATIENT 30 MIN with JERRY Mcconnell   [facility] (--)    [address], [address]  [facility] [id number] [phone]                 Medication List      Take these Medications        Instructions    dexamethasone 2 MG tablet   Commonly known as:  DECADRON    Take 1 Tab by mouth 2 times a day.   Dose:  2 mg       enoxaparin 100 MG/ML Soln inj   Commonly known as:  LOVENOX    Inject 100 mg as instructed every 12 hours.   Dose:  100 mg       hydrocodone-acetaminophen 5-325 MG Tabs per tablet   Commonly known as:  NORCO    Take 1-2 Tabs by mouth every 7 days. Pt takes PRN only   Dose:  1-2 Tab       insulin  UNIT/ML Susp   Commonly known as:  HUMULIN,NOVOLIN    Inject 35-40 Units as instructed 2 Times a Day. 40 units in the morning and 35 units at night   Dose:  35-40 Units       insulin regular 100 Unit/mL Soln   Commonly known as:  HUMULIN R    Inject 3-8 Units as instructed every day. 200-249:3 units 250-299: 5 units 300-349: 7 units over 350 is 8 units  Hasn't used since March   Dose:  3-8 Units       levetiracetam 100 MG/ML Soln   Commonly known as:  KEPPRA    Take 15 mL by mouth every 12 hours.   Dose:  1500 mg      " senna-docusate 8.6-50 MG Tabs   Commonly known as:  PERICOLACE or SENOKOT S    Take 1 Tab by mouth every day.   Dose:  1 Tab       warfarin 5 MG Tabs   Commonly known as:  COUMADIN    Take 1 Tab by mouth every day.   Dose:  5 mg

## 2017-06-22 NOTE — IP AVS SNAPSHOT
6/24/2017    Dale Donahue Carmona  [address] [place] [address]    Dear Dale:    [facility] wants to ensure your discharge home is safe and you or your loved ones have had all of your questions answered regarding your care after you leave the hospital.    Below is a list of resources and contact information should you have any questions regarding your hospital stay, follow-up instructions, or active medical symptoms.    Questions or Concerns Regarding… Contact   Medical Questions Related to Your Discharge  (7 days a week, 8am-5pm) Contact a Nurse Care Coordinator   [phone]   Medical Questions Not Related to Your Discharge  (24 hours a day / 7 days a week)  Contact the Nurse Health Line   [phone]    Medications or Discharge Instructions Refer to your discharge packet   or contact your [facility] Primary Care Provider   [phone]   Follow-up Appointment(s) Schedule your appointment via [employer]   or contact Scheduling [phone]   Billing Review your statement via [employer]  or contact Billing [phone]   Medical Records Review your records via [employer]   or contact Medical Records [phone]     You may receive a telephone call within two days of discharge. This call is to make certain you understand your discharge instructions and have the opportunity to have any questions answered. You can also easily access your medical information, test results and upcoming appointments via the [employer] free online health management tool. You can learn more and sign up at [employer]. For assistance setting up your [employer] account, please call [phone].    Once again, we want to ensure your discharge home is safe and that you have a clear understanding of any next steps in your care. If you have any questions or concerns, please do not hesitate to contact us, we are here for you. Thank you for choosing [facility] for your healthcare needs.    Sincerely,    Your [facility] Healthcare Team

## 2017-06-22 NOTE — PROGRESS NOTES
[facility]    Date of Service: 2017    Chief Complaint  56 y.o. male admitted 2017 with pulmonary embolism due to bilateral DVT, failed outpatient xarelto, history of astrocytoma of brain.    Interval Problem Update  Word salad and aphasia    Consultants/Specialty  None.    Disposition  TBD.        Review of Systems   Unable to perform ROS: medical condition      Physical Exam  Laboratory/Imaging   Hemodynamics  Temp (24hrs), Av.2 °C (97.2 °F), Min:35.9 °C (96.7 °F), Max:36.5 °C (97.7 °F)   Temperature: 35.9 °C (96.7 °F)  Pulse  Av.7  Min: 74  Max: 95 Heart Rate (Monitored): 94  Blood Pressure: 133/97 mmHg, NIBP: 117/86 mmHg      Respiratory      Respiration: 18, Pulse Oximetry: 96 %             Fluids  No intake or output data in the 24 hours ending 17 1129    Nutrition  Orders Placed This Encounter   Procedures   • Diet Order     Standing Status: Standing      Number of Occurrences: 1      Standing Expiration Date:      Order Specific Question:  Diet:     Answer:  Diabetic [3]     Order Specific Question:  Consistency/Fluid modifications:     Answer:  1200 ml Fluid Restriction [8]     Physical Exam   Constitutional: He appears well-developed and well-nourished. No distress.   HENT:   Nose: Nose normal.   Mouth/Throat: Oropharynx is clear and moist. No oropharyngeal exudate.   Cushingoid facies   Eyes: Conjunctivae are normal. Right eye exhibits no discharge. Left eye exhibits no discharge. No scleral icterus.   Neck: No JVD present. No tracheal deviation present.   Cardiovascular: Normal rate, regular rhythm and normal heart sounds.    Pulmonary/Chest: Effort normal and breath sounds normal. No stridor. No respiratory distress. He has no wheezes. He has no rales. He exhibits no tenderness.   Abdominal: Soft. Bowel sounds are normal. He exhibits no distension. There is no tenderness.   Musculoskeletal: He exhibits no edema or tenderness.   Neurological: He is alert. No  cranial nerve deficit. He exhibits normal muscle tone. Coordination normal.   Expressive aphasia, word salad speaking in Belarusian   Skin: Skin is warm and dry. He is not diaphoretic. No pallor.   Psychiatric: His mood appears anxious. His speech is rapid and/or pressured. He is agitated. Cognition and memory are impaired.   Nursing note and vitals reviewed.      Recent Labs      06/22/17   0245   WBC  11.0*   RBC  2.49*   HEMOGLOBIN  8.4*   HEMATOCRIT  25.1*   MCV  100.8*   MCH  33.7*   MCHC  33.5*   RDW  53.1*   PLATELETCT  146*   MPV  9.5     Recent Labs      06/22/17   0245   SODIUM  129*   POTASSIUM  4.2   CHLORIDE  101   CO2  21   GLUCOSE  228*   BUN  18   CREATININE  0.58   CALCIUM  8.0*     Recent Labs      06/22/17   0245   APTT  24.2*   INR  1.85*     Recent Labs      06/22/17   0245   BNPBTYPENAT  43              Assessment/Plan     * Pulmonary embolism (CMS-HCC) (present on admission)  Assessment & Plan  Recurrent despite being on xarelto  Heparin drip  Place IVC filter  Will need coumadin going forward    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)  Assessment & Plan  Continue decadron  S/p surgical resection  Chronic residual neurologic deficits    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)  Assessment & Plan  Sliding scale insulin, NPH  Diabetic diet    Acute deep vein thrombosis (DVT) of both lower extremities (CMS-HCC) (present on admission)  Assessment & Plan  Diagnosed prior to admission  Was on xarelto previously    Seizure disorder (CMS-HCC) (present on admission)  Assessment & Plan  Continue keppra    Hyponatremia (present on admission)  Assessment & Plan  NS infusion  Follow labs    Macrocytic anemia (present on admission)  Assessment & Plan  Iron studies normal  Start MVI, thiamine and folic acid po daily    Thrombocytopenia (CMS-HCC) (present on admission)  Assessment & Plan  Mild, appears episodic  No e/o bleeding    Labs reviewed,  Medications reviewed, Radiology images reviewed and EKG reviewed  Mesa catheter: No Mesa      DVT Prophylaxis: Heparin

## 2017-06-22 NOTE — IP AVS SNAPSHOT
[employer] Access Code: Activation code not generated  Current [employer] Status: Patient Declined    Your email address is not on file at [facility].  Email Addresses are required for you to sign up for [employer], please contact [phone] to verify your personal information and to provide your email address prior to attempting to register for [employer].    Dale Azaelligia Carmona  [address] [place] [address]    [employer]  A secure, online tool to manage your health information     [facility]’s [employer]® is a secure, online tool that connects you to your personalized health information from the privacy of your home -- day or night - making it very easy for you to manage your healthcare. Once the activation process is completed, you can even access your medical information using the [employer] radha, which is available for free in the Apple Radha store or Google Play store.     To learn more about [employer], visit [email or web address]    There are two levels of access available (as shown below):   My Chart Features  [facility] Primary Care Doctor [facility]  Specialists [facility]  Urgent  Care Non-[facility] Primary Care Doctor   Email your healthcare team securely and privately 24/7 X X X    Manage appointments: schedule your next appointment; view details of past/upcoming appointments X      Request prescription refills. X      View recent personal medical records, including lab and immunizations X X X X   View health record, including health history, allergies, medications X X X X   Read reports about your outpatient visits, procedures, consult and ER notes X X X X   See your discharge summary, which is a recap of your hospital and/or ER visit that includes your diagnosis, lab results, and care plan X X  X     How to register for [employer]:  Once your e-mail address has been verified, follow the following steps to sign up for [employer].     1. Go to  [email or web address]  2. Click on the Sign Up Now box, which takes you to  the New Member Sign Up page. You will need to provide the following information:  a. Enter your [employer] Access Code exactly as it appears at the top of this page. (You will not need to use this code after you’ve completed the sign-up process. If you do not sign up before the expiration date, you must request a new code.)   b. Enter your date of birth.   c. Enter your home email address.   d. Click Submit, and follow the next screen’s instructions.  3. Create a [employer] ID. This will be your [employer] login ID and cannot be changed, so think of one that is secure and easy to remember.  4. Create a [employer] password. You can change your password at any time.  5. Enter your Password Reset Question and Answer. This can be used at a later time if you forget your password.   6. Enter your e-mail address. This allows you to receive e-mail notifications when new information is available in [employer].  7. Click Sign Up. You can now view your health information.    For assistance activating your [employer] account, call [phone]

## 2017-06-22 NOTE — PROGRESS NOTES
IR RN note:    MD Mitchell spoke to patient   IVC filter placment by MD Mitchell assisted by RT Vega,Right groin femoral vein access site;  filter placed    [employer] Celect Platinum Vena Cava Filter Femoral approach  REF# [id number]   LOT# [id number]    Patient tolerated procedure, hemodynamically stable; report given to ANÍBAL Monterroso;   patient transported back to room via transport

## 2017-06-22 NOTE — H&P
DATE OF SERVICE:  06/22/2017    CHIEF COMPLAINT:  Chest pain and shortness of breath.    HISTORY OF PRESENT ILLNESS:  This is a 56-year-old male with history of   anaplastic astrocytoma, bilateral lower extremity DVT, was on Xarelto,   diagnosed back in January 2017, came in today because he has been having chest   pain over the past 3 days.  This is associated with dyspnea on exertion.    Denies coughing and fever.  His last chest pain was last night.  He also has   been complaining of dizziness over the past few days associated with nausea   and vomiting, but the daughter believes that this could be secondary to the   pill that he is taking.    PAST MEDICAL HISTORY:  Anaplastic astrocytoma incompletely resected,   steroid-induced diabetes, seizure disorder, hypertension, history of room oral   thrush, Cushing syndrome.    PAST SURGICAL HISTORY:  Incomplete resection of astrocytoma.    SOCIAL HISTORY:  Former smoker.  Denies drinking.  Denies illicit drug use.    FAMILY HISTORY:  No cancer, but diabetes runs in the family.    HOME MEDICATIONS:  Dexamethasone 2 mg b.i.d., Norco 5/325 one to two tablets   every 4 hours as needed, NPH 50 units subQ in the morning and 45 units subQ in   the evening and sliding scale insulin, Keppra 1,500 mg every 12 hours, Xarelto   20 mg q.p.m., Senna docusate 1 tablet daily.    REVIEW OF SYSTEMS:  All other systems reviewed were negative.    PHYSICAL EXAMINATION:  VITAL SIGNS:  Blood pressure is 138/72, pulse of 82, respiratory rate of 18,   temperature of 36.5, oxygen is 96% on room air.  GENERAL:  Patient is a pleasant oliver, lying in bed comfortably, not in   distress.  HEENT:  Head, patient has depression on the left temporal side from previous   surgery.  Eyes, pupils are reactive to light.  Anicteric sclerae.  Slight   redness on the left sclera.  Slightly pinkish palpebral conjunctivae.  Oral   mucosa, no oral lesions noted, moist mucosa.  NECK:  No JVD.  No lymphadenopathy.  No  thyromegaly.  CHEST AND LUNGS:  Equal expansion.  Clear to auscultation bilaterally.  No   crackles.  No wheezing.  No tenderness to palpation.  CARDIOVASCULAR SYSTEM:  Regular rate and rhythm.  S1, S2 heard.  No murmurs   noted.  GASTROINTESTINAL:  Positive bowel sounds.  No tenderness.  No   hepatosplenomegaly.  EXTREMITIES:  Pulses palpable in both upper and lower extremities.  No edema   noted.  NEUROLOGIC:  Cranial nerves II through XII intact.  Alert and oriented x3.    His speech is a little bit slurred, but I am not sure if the he way seems like   he is speaking in Chinese, but the daughter says that he speaks bilingual.  SKIN:  No cyanosis.  Capillary refill time normal.  No rash.    LABORATORY DATA:  WBC is 11, hemoglobin 8.4, hematocrit 25.1, platelet count   of 146.  Sodium 129, potassium 4.2, chloride 101, CO2 21, anion gap of 7,   glucose of 228, BUN of 18, creatinine is 0.58.  INR is 1.85.  Chest x-ray,   minimal perihilar and basilar atelectasis.  CT scan of the chest shows   bilateral lower lobe pulmonary embolism.  Tiny right pleural effusion.    Scattered ground glass opacities in both lungs, which could indicate mild   pulmonary edema.    ASSESSMENT AND PLAN:  1.  Bilateral pulmonary embolism in the setting of a history of lower   extremity deep venous thrombosis, on Xarelto.  We will discontinue Xarelto,   switch him to heparin drip for now.  Might benefit from IVC filter and going   home on Lovenox shot versus putting him back on Xarelto.  We will discuss the   options with the team in the morning.  2.  Macrocytic anemia.  We will check iron levels, vitamin B12, and folate.  3.  Hyponatremia could be secondary to syndrome of inappropriate antidiuretic   hormone secretion.  We will try to restrict fluid at 1200 mL per day.  4.  History of astrocytoma.  Patient received his chemotherapy about 2 days   ago.  5.  Steroid-induced diabetes.  Continue his NPH regimen from home.  We will   put him on  moderate sliding scale insulin here.  6.  Deep venous thrombosis prophylaxis.  Patient will be on heparin drip.    Patient will most likely stay more than 2 midnights.       ____________________________________     MD JAYLEN Love / HARRIET    DD:  06/22/2017 06:45:38  DT:  06/22/2017 07:14:43    D#:  [id number]  Job#:  [id number]

## 2017-06-22 NOTE — OR SURGEON
Immediate Post- Operative Note        PostOp Diagnosis: DVT, astrocytoma      Procedure(s): IVC filter      Estimated Blood Loss: Less than 5 ml        Complications: None            6/22/2017     4:10 PM     Errol Mitchell

## 2017-06-22 NOTE — IP AVS SNAPSHOT
" Home Care Instructions                                                                                                                  Name:Dale Carmona  Medical Record Number:[id number]  CSN: [id number]    YOB: 1960   Age: 56 y.o.  Sex: male  HT:1.6 m (5' 2.99\") WT: 100.2 kg (220 lb 14.4 oz)          Admit Date: 6/22/2017     Discharge Date:   Today's Date: 6/24/2017  Attending Doctor:  Princess Funez M.D.                  Allergies:  Review of patient's allergies indicates no known allergies.            Discharge Instructions       Discharge Instructions    Discharged to home by car with relative. Discharged via wheelchair, hospital escort: Yes.  Special equipment needed: Not Applicable    Be sure to schedule a follow-up appointment with your primary care doctor or any specialists as instructed.     Discharge Plan:   Diet Plan: Discussed (low sodium, diabetic)  Activity Level:  (as tolerated)  Smoking Cessation Offered: Patient Refused  Confirmed Follow up Appointment: Appointment Scheduled (ask for hemoglobin and hematocrit lab as well as coumadin lab draw for monday 6/26)  Confirmed Symptoms Management: Discussed  Medication Reconciliation Updated: Yes  Influenza Vaccine Indication: Indicated: Not available from distributor/    I understand that a diet low in cholesterol, fat, and sodium is recommended for good health. Unless I have been given specific instructions below for another diet, I accept this instruction as my diet prescription.   Other diet: Diabetic/Cardiac    Special Instructions: None    · Is patient discharged on Warfarin / Coumadin?   Yes    You are receiving the drug warfarin. Please understand the importance of monitoring warfarin with scheduled PT/INR blood draws.  Follow-up with the [facility] in one week for INR lab..    IMPORTANT: HOW TO USE THIS INFORMATION:  This is a summary and does NOT have all possible information about this product. This " "information does not assure that this product is safe, effective, or appropriate for you. This information is not individual medical advice and does not substitute for the advice of your health care professional. Always ask your health care professional for complete information about this product and your specific health needs.      WARFARIN - ORAL (WARF-uh-rin)      COMMON BRAND NAME(S): Coumadin      WARNING:  Warfarin can cause very serious (possibly fatal) bleeding. This is more likely to occur when you first start taking this medication or if you take too much warfarin. To decrease your risk for bleeding, your doctor or other health care provider will monitor you closely and check your lab results (INR test) to make sure you are not taking too much warfarin. Keep all medical and laboratory appointments. Tell your doctor right away if you notice any signs of serious bleeding. See also Side Effects section.      USES:  This medication is used to treat blood clots (such as in deep vein thrombosis-DVT or pulmonary embolus-PE) and/or to prevent new clots from forming in your body. Preventing harmful blood clots helps to reduce the risk of a stroke or heart attack. Conditions that increase your risk of developing blood clots include a certain type of irregular heart rhythm (atrial fibrillation), heart valve replacement, recent heart attack, and certain surgeries (such as hip/knee replacement). Warfarin is commonly called a \"blood thinner,\" but the more correct term is \"anticoagulant.\" It helps to keep blood flowing smoothly in your body by decreasing the amount of certain substances (clotting proteins) in your blood.      HOW TO USE:  Read the Medication Guide provided by your pharmacist before you start taking warfarin and each time you get a refill. If you have any questions, ask your doctor or pharmacist. Take this medication by mouth with or without food as directed by your doctor or other health care " professional, usually once a day. It is very important to take it exactly as directed. Do not increase the dose, take it more frequently, or stop using it unless directed by your doctor. Dosage is based on your medical condition, laboratory tests (such as INR), and response to treatment. Your doctor or other health care provider will monitor you closely while you are taking this medication to determine the right dose for you. Use this medication regularly to get the most benefit from it. To help you remember, take it at the same time each day. It is important to eat a balanced, consistent diet while taking warfarin. Some foods can affect how warfarin works in your body and may affect your treatment and dose. Avoid sudden large increases or decreases in your intake of foods high in vitamin K (such as broccoli, cauliflower, cabbage, brussels sprouts, kale, spinach, and other green leafy vegetables, liver, green tea, certain vitamin supplements). If you are trying to lose weight, check with your doctor before you try to go on a diet. Cranberry products may also affect how your warfarin works. Limit the amount of cranberry juice (16 ounces/480 milliliters a day) or other cranberry products you may drink or eat.      SIDE EFFECTS:  Nausea, loss of appetite, or stomach/abdominal pain may occur. If any of these effects persist or worsen, tell your doctor or pharmacist promptly. Remember that your doctor has prescribed this medication because he or she has judged that the benefit to you is greater than the risk of side effects. Many people using this medication do not have serious side effects. This medication can cause serious bleeding if it affects your blood clotting proteins too much (shown by unusually high INR lab results). Even if your doctor stops your medication, this risk of bleeding can continue for up to a week. Tell your doctor right away if you have any signs of serious bleeding, including: unusual  pain/swelling/discomfort, unusual/easy bruising, prolonged bleeding from cuts or gums, persistent/frequent nosebleeds, unusually heavy/prolonged menstrual flow, pink/dark urine, coughing up blood, vomit that is bloody or looks like coffee grounds, severe headache, dizziness/fainting, unusual or persistent tiredness/weakness, bloody/black/tarry stools, chest pain, shortness of breath, difficulty swallowing. Tell your doctor right away if any of these unlikely but serious side effects occur: persistent nausea/vomiting, severe stomach/abdominal pain, yellowing eyes/skin. This drug rarely has caused very serious (possibly fatal) problems if its effects lead to small blood clots (usually at the beginning of treatment). This can lead to severe skin/tissue damage that may require surgery or amputation if left untreated. Patients with certain blood conditions (protein C or S deficiency) may be at greater risk. Get medical help right away if any of these rare but serious side effects occur: painful/red/purplish patches on the skin (such as on the toe, breast, abdomen), change in the amount of urine, vision changes, confusion, slurred speech, weakness on one side of the body. A very serious allergic reaction to this drug is rare. However, get medical help right away if you notice any symptoms of a serious allergic reaction, including: rash, itching/swelling (especially of the face/tongue/throat), severe dizziness, trouble breathing. This is not a complete list of possible side effects. If you notice other effects not listed above, contact your doctor or pharmacist. In the [place] - Call your doctor for medical advice about side effects. You may report side effects to FDA at 6-487-ABC-3386. In [place] - Call your doctor for medical advice about side effects. You may report side effects to [facility] [place] at 1-909.288.3677.      PRECAUTIONS:  Before taking warfarin, tell your doctor or pharmacist if you are allergic to it; or if you  have any other allergies. This product may contain inactive ingredients, which can cause allergic reactions or other problems. Talk to your pharmacist for more details. Before using this medication, tell your doctor or pharmacist your medical history, especially of: blood disorders (such as anemia, hemophilia), bleeding problems (such as bleeding of the stomach/intestines, bleeding in the brain), blood vessel disorders (such as aneurysms), recent major injury/surgery, liver disease, alcohol use, mental/mood disorders (including memory problems), frequent falls/injuries. It is important that all your doctors and dentists know that you take warfarin. Before having surgery or any medical/dental procedures, tell your doctor or dentist that you are taking this medication and about all the products you use (including prescription drugs, nonprescription drugs, and herbal products). Avoid getting injections into the muscles. If you must have an injection into a muscle (for example, a flu shot), it should be given in the arm. This way, it will be easier to check for bleeding and/or apply pressure bandages. This medication may cause stomach bleeding. Daily use of alcohol while using this medicine will increase your risk for stomach bleeding and may also affect how this medication works. Limit or avoid alcoholic beverages. If you have not been eating well, if you have an illness or infection that causes fever, vomiting, or diarrhea for more than 2 days, or if you start using any antibiotic medications, contact your doctor or pharmacist immediately because these conditions can affect how warfarin works. This medication can cause heavy bleeding. To lower the chance of getting cut, bruised, or injured, use great caution with sharp objects like safety razors and nail cutters. Use an electric razor when shaving and a soft toothbrush when brushing your teeth. Avoid activities such as contact sports. If you fall or injure yourself,  "especially if you hit your head, call your doctor immediately. Your doctor may need to check you. The Food & Drug Administration has stated that generic warfarin products are interchangeable. However, consult your doctor or pharmacist before switching warfarin products. Be careful not to take more than one medication that contains warfarin unless specifically directed by the doctor or health care provider who is monitoring your warfarin treatment. Older adults may be at greater risk for bleeding while using this drug. This medication is not recommended for use during pregnancy because of serious (possibly fatal) harm to an unborn baby. Discuss the use of reliable forms of birth control with your doctor. If you become pregnant or think you may be pregnant, tell your doctor immediately. If you are planning pregnancy, discuss a plan for managing your condition with your doctor before you become pregnant. Your doctor may switch the type of medication you use during pregnancy. Very small amounts of this medication may pass into breast milk but is unlikely to harm a nursing infant. Consult your doctor before breast-feeding.      DRUG INTERACTIONS:  Drug interactions may change how your medications work or increase your risk for serious side effects. This document does not contain all possible drug interactions. Keep a list of all the products you use (including prescription/nonprescription drugs and herbal products) and share it with your doctor and pharmacist. Do not start, stop, or change the dosage of any medicines without your doctor's approval. Warfarin interacts with many prescription, nonprescription, vitamin, and herbal products. This includes medications that are applied to the skin or inside the vagina or rectum. The interactions with warfarin usually result in an increase or decrease in the \"blood-thinning\" (anticoagulant) effect. Your doctor or other health care professional should closely monitor you to " prevent serious bleeding or clotting problems. While taking warfarin, it is very important to tell your doctor or pharmacist of any changes in medications, vitamins, or herbal products that you are taking. Some products that may interact with this drug include: capecitabine, imatinib, mifepristone. Aspirin, aspirin-like drugs (salicylates), and nonsteroidal anti-inflammatory drugs (NSAIDs such as ibuprofen, naproxen, celecoxib) may have effects similar to warfarin. These drugs may increase the risk of bleeding problems if taken during treatment with warfarin. Carefully check all prescription/nonprescription product labels (including drugs applied to the skin such as pain-relieving creams) since the products may contain NSAIDs or salicylates. Talk to your doctor about using a different medication (such as acetaminophen) to treat pain/fever. Low-dose aspirin and related drugs (such as clopidogrel, ticlopidine) should be continued if prescribed by your doctor for specific medical reasons such as heart attack or stroke prevention. Consult your doctor or pharmacist for more details. Many herbal products interact with warfarin. Tell your doctor before taking any herbal products, especially bromelains, coenzyme Q10, cranberry, danshen, dong quai, fenugreek, garlic, ginkgo biloba, ginseng, and Matt's wort, among others. This medication may interfere with a certain laboratory test to measure theophylline levels, possibly causing false test results. Make sure laboratory personnel and all your doctors know you use this drug.      OVERDOSE:  If overdose is suspected, contact a poison control center or emergency room immediately. [place] residents can call the [place] National Poison Hotline at 1-811.751.8351. [place] residents can call a provincial poison control center. Symptoms of overdose may include: bloody/black/tarry stools, pink/dark urine, unusual/prolonged bleeding.      NOTES:  Do not share this medication with others.  Laboratory and/or medical tests (such as INR, complete blood count) must be performed periodically to monitor your progress or check for side effects. Consult your doctor for more details.      MISSED DOSE:  For the best possible benefit, do not miss any doses. If you do miss a dose and remember on the same day, take it as soon as you remember. If you remember on the next day, skip the missed dose and resume your usual dosing schedule. Do not double the dose to catch up because this could increase your risk for bleeding. Keep a record of missed doses to give to your doctor or pharmacist. Contact your doctor or pharmacist if you miss 2 or more doses in a row.      STORAGE:  Store at room temperature away from light and moisture. Do not store in the bathroom. Keep all medications away from children and pets. Do not flush medications down the toilet or pour them into a drain unless instructed to do so. Properly discard this product when it is  or no longer needed. Consult your pharmacist or local waste disposal company for more details about how to safely discard your product.      MEDICAL ALERT:  Your condition and medication can cause complications in a medical emergency. For information about enrolling in MedicAlert, call 1-573.462.3181 ([place]) or 1-681.920.9421 ([place]).      Information last revised 2010 Copyright(c) 2010 [employer]         Embolia pulmonar  (Pulmonary Embolism)  Veronica embolia pulmonar (EP) es un coágulo de gen que ha viajado hasta el pulmón y obstruye la circulación sanguínea en el pulmón afectado. La mayor parte de los coágulos provienen de las venas profundas de las piernas o de la pelvis. Es un trastorno peligroso y potencialmente mortal que, si se diagnostica, puede tratarse.  CAUSAS  Los coágulos sanguíneos pueden formarse en las venas por diferentes causas. Generalmente son varios los motivos que causan la formación de coágulos. Entre los que se incluyen:  · El flujo  sanguíneo lento.  · El interior de la vena dañado por algún motivo.  · El paciente tiene algún trastorno que favorece la formación de coágulos.  FACTORES DE RIESGO  Algunas personas son más propensas que otras a tener embolia pulmonar. Los factores de riesgo son:   · Fumar.  · Tener sobrepeso (ser eleanor).  · Permanecer sentado o acostado dez un tiempo prolongado. Aquí se incluyen un viaje de larga distancia, casos de parálisis o en recuperación de maria antonia enfermedad o cirugía.  Otros factores que aumentan el riesgo son:   · La edad avanzada, especialmente después de los 75 años de edad.  · Tener antecedentes familiares de coágulos de gen o roland tenido ya un coágulo de gen.  · Que le hayan practicado maria antonia cirugía mayor o de duración prolongada. Verdel es aún más frecuente en cirugías de cadera, rodilla o estómago (abdomen). La cirugía de cadera es especialmente riesgosa.  · Tener colocado un tubo cristino y claudia (catéter) dentro de maria antonia vena dez un procedimiento médico.  · Fractura de cadera o de pierna.  · Cáncer o tratamiento para el cáncer.  · Los medicamentos que contienen estrógenos. Estos incluyen las píldoras anticonceptivas y la terapia de reemplazo hormonal.  · Otras afecciones circulatorias o cardíacas.  · Embarazo y parto.  ¨ Los cambios hormonales dez el embarazo facilitan la formación de coágulos.  ¨ El feto hace presión sobre las venas de la pelvis.  ¨ Hay un riesgo de que se lesionen las venas dez el parto o la cesárea. El riesgo es mayor inmediatamente después del parto.  PREVENCIÓN   · Ejercite las piernas con regularidad. Camine media hora todos los días.  · Mantenga un peso apropiado para ramirez altura.  · Evite permanecer sentado o parado sin moverse dez períodos prolongados.  · Las mujeres, especialmente las que tienen más de 35 años, deben considerar los riesgos y beneficios de reanna medicamentos que contengan estrógeno, incluidas las píldoras anticonceptivas.  · No fume,  especialmente si rogelio estrógenos.  · Los viajes de larga distancia pueden aumentar el riesgo. Ejercite las piernas caminando o moviendo los músculos maria antonia vez por hora.  · Muchos de los factores de riesgo anteriormente mencionados se refieren a situaciones que ocurren con la hospitalización, ya sea por enfermedad, lesión o cirugía programada. La prevención puede ser con o sin medicamentos.  ¨ El médico lo evaluará para determinar la necesidad de prevenir la tromboembolia venosa cuando ingrese al hospital. Si debe someterse a maria antonia cirugía, ramirez cirujano lo evaluará el mismo día o el día después de la cirugía.  SÍNTOMAS   Los síntomas de maria antonia EP normalmente comienzan de repente, y entre ellos se incluyen:  · Falta de aire.  · Tos.  · Tos con gen o mucosidad sanguinolenta.  · Dolor en el pecho. Dolor que empeora al hacer inspiraciones profundas.  · Latidos cardíacos rápidos.  DIAGNÓSTICO   El médico averiguará kiet antecedentes médicos, le hará un examen físico y descartará posibles causas para evaluar ramirez riesgo de embolia pulmonar. Si tiene un riesgo intermedio o alto, pueden hacerle otros estudios. Estos incluyen los siguientes:  · Análisis de gen, humaira estudios de coagulación.  · Estudios de diagnóstico por imágenes, humaira ecografías, tomografías computarizadas, resonancias magnéticas y otros exámenes para determinar si tiene coágulos de gen en las piernas o los pulmones.  · Un electrocardiograma, para determinar si hay sobrecarga cardíaca debido a coágulos sanguíneos en los pulmones.  TRATAMIENTO   · El tratamiento más frecuente de la EP se realiza con medicamentos anticoagulantes que reducen la tendencia de la gen a coagularse. Los anticoagulantes pueden impedir la formación de nuevos coágulos sanguíneos o el aumento del tamaño de los coágulos existentes. No pueden disolver los coágulos ya formados. El organismo lo hace por sí mismo con el tiempo. Los anticoagulantes pueden administrarse por vía oral,  intravenosa (IV) o inyectable. Ramirez médico determinará la mejor forma para usted.  · Con mike frecuencia, se utilizan medicamentos para disolver los coágulos (trombolíticos) para tratar la EP. Ellos conllevan un alto riesgo de sangrado, por lo que se utilizan principalmente en los casos más graves.  · En contadas ocasiones, un coágulo de gen en la pierna debe eliminarse quirúrgicamente.  · Si usted no puede reanna anticoagulantes, ramirez médico podría indicar la colocación de un filtro en la vena principal del abdomen. Los filtros impiden que los coágulos lleguen a los pulmones.  INSTRUCCIONES PARA EL CUIDADO EN EL HOGAR   · Tarrants todos los medicamentos humaira se lo haya indicado el médico.  · Aprenda tanto humaira pueda sobre la TVP.  · Utilice un brazalete o lleve consigo maria antonia tarjeta de alerta médico.  · Consulte con ramirez médico cuándo podrá volver a kiet actividades normales. Es importante mantenerse activo para prevenir los coágulos sanguíneos. Si está tomando un medicamento anticoagulante, evite los deportes de contacto.  · Es muy importante hacer ejercicios. [place] es especialmente importante dez viajes, o al estar sentado o de pie dez largos períodos. Camine o contraiga y relaje frecuentemente los músculos de las piernas para ejercitarlas. Camine con frecuencia.  · Es posible que deba usar medias de compresión. Son medias elásticas apretadas que aplican presión a las piernas. Esta presión ayuda a evitar que la gen en las piernas se coagule.  Cómo tomarla warfarina  La warfarina es un medicamento que se rogelio diariamente por vía oral. Ramirez médico le indicará la duración del tratamiento (generalmente de 3 a 6 meses y a veces toda la hattie). Si rogelio warfarina:  · Aprenda cómo reanna warfarina y cuáles son los alimentos que pueden influir en el efecto que esta tiene en el organismo.  · Las dosis altas y bajas de warfarina son peligrosas. El exceso de warfarina aumenta el riesgo de sangrado. Dosis demasiado bajas de  warfarina aumentan el riesgo de formación de coágulos.  La warfarina y los análisis de gen periódicos  Mekhi el tratamiento con warfarina, es necesario que se realicen periódicamente análisis de gen que miden el tiempo de coagulación. Estos análisis de gen suelen incluir tanto el tiempo de protrombina (PT) humaira el índice internacional normalizado (INR). Los resultados del PT y del INR permiten al médico ajustar la dosis de warfarina. Es muy importante que le chasidy análisis del PT y el INR con la frecuencia que el médico lo indique.   La warfarina yla dieta  Evite cambios importantes en ramirez dieta, o hable con ramirez médico antes de cambiarla. Programe maria antonia eugenie con un nutricionista matriculado para que responda kiet preguntas. Muchos de los alimentos, especialmente los que contienen vitamina K pueden interferir con la warfarina y afectar los resultados de PT y del INR. Debe ingerir maria antonia cantidad david de alimentos con alto contenido de vitamina K, entre los que se incluyen:   · espinaca, col rizada, brócoli, repollo, acelga y nabos verdes, repollitos de Brus[place]as, guisantes, coliflor, algas y perejil.  · Hígado de osiris y de cerdo.  · Té valdez.  · Aceite de soja.  La warfarina y otros medicamentos  Muchos medicamentos interfieren con la warfarina y afectan los resultados del PT y del INR. Usted debe:  · Informar al médico sobre todos los medicamentos, las vitaminas y los suplementos que rogelio, entre ellos, aspirina y otros antiinflamatorios de venta sandra. Tenga especial cuidado con la aspirina y los medicamentos antiinflamatorios. Consulte a ramirez médico antes de tomarlos.  · No tome ni suspenda ningún medicamento tanto recetado humaira de venta sandra, excepto si se lo indica el médico o el farmacéutico.  Efectos secundarios de la warfarina  La warfarina puede causar efectos secundarios, por ejemplo, hematomas y dificultad para detener el sangrado. Pregúntele al médico o al farmacéutico sobre otros efectos secundarios  de la warfarina. Usted deberá:  · Ejercer presión sobre las heridas cortantes dez más tiempo que lo habitual.  · Informar al dentista y a otros médicos que está tomando warfarina, antes de someterse a cualquier procedimiento que pueda causar sangrado.  La warfarina con alcohol y tabaco   · Beber alcohol con frecuencia puede potenciar el efecto de la warfarina y causar sangrado excesivo. Es preferible evitar reanna bebidas alcohólicas o consumir solo muy pequeñas cantidades cuando se está en tratamiento con warfarina. Hágale saber a ramirez médico si modifica ramirez consumo de alcohol.  · No consuma ningún producto que contenga tabaco, humaira cigarrillos, tabaco de mascar o cigarrillos electrónicos. Si fuma, abandone el hábito. Pídale ayuda al médico, si es necesario.  Medicamentos alternativos de la warfarina: Inhibidores del factor X activado  · Estos anticoagulantes se pratibha por vía oral, generalmente dez varias semanas o más tiempo. Es importante que tome el medicamento a la misma hora todos los días.  · No es necesario realizar análisis de gen periódicos cuando se pratibha estos medicamentos.  · Estos tienen menos interacciones con los alimentos y los medicamentos que la warfarina.  · Los efectos secundarios de esta clase de medicamentos son similares a los que se presentan con la warfarina, incluidos los hematomas y el sangrado excesivos. Pregúnteles al médico o al farmacéutico sobre otros posibles efectos secundarios.  SOLICITE ATENCIÓN MÉDICA SI:   · Nota que el ritmo de kiet latidos cardíacos está acelerado.  · Se siente más débil o más cansado que de costumbre.  · Siente que va a desmayarse.  · Observa un aumento de los hematomas.  · Siente que los síntomas no mejoran en el tiempo esperado.  · Tiene efectos secundarios por el medicamento.  SOLICITE ATENCIÓN MÉDICA DE INMEDIATO SI:   · Siente dolor en el pecho.  · Tiene dificultad para respirar.  · Aumenta la hinchazón o el dolor en maria antonia pierna.  · Tose y escupe  gen.  · Nota gen en el vómito, en las heces o en la orina.  · Tiene fiebre.  Los síntomas de EP pueden representar un problema grave que es maria antonia emergencia.No espere para rosanna si los síntomas desaparecen. Solicite atención médica de inmediato. Comuníquese con el servicio de emergencias de ramirez localidad (911 en los [place]).No conduzca por kiet propios medios hasta el hospital.      Esta información no tiene humaira fin reemplazar el consejo del médico. Asegúrese de hacerle al médico cualquier pregunta que tenga.     Document Released: 09/27/2006 Document Revised: 01/08/2016  [employer] Interactive Patient Education ©2016 [employer]      · Is patient Post Blood Transfusion?  No    Depression / Suicide Risk    As you are discharged from this [facility] facility, it is important to learn how to keep safe from harming yourself.    Recognize the warning signs:  · Abrupt changes in personality, positive or negative- including increase in energy   · Giving away possessions  · Change in eating patterns- significant weight changes-  positive or negative  · Change in sleeping patterns- unable to sleep or sleeping all the time   · Unwillingness or inability to communicate  · Depression  · Unusual sadness, discouragement and loneliness  · Talk of wanting to die  · Neglect of personal appearance   · Rebelliousness- reckless behavior  · Withdrawal from people/activities they love  · Confusion- inability to concentrate     If you or a loved one observes any of these behaviors or has concerns about self-harm, here's what you can do:  · Talk about it- your feelings and reasons for harming yourself  · Remove any means that you might use to hurt yourself (examples: pills, rope, extension cords, firearm)  · Get professional help from the community (Mental Health, Substance Abuse, psychological counseling)  · Do not be alone:Call your Safe Contact- someone whom you trust who will be there for you.  · Call your local CRISIS  HOTLINE 430-4372 or 194-354-6554  · Call your local Children's Mobile Crisis Response Team [place] (408) 225-5303 or www.HEALBE.Lellan  · Call the toll free National Suicide Prevention Hotlines   · National Suicide Prevention Lifeline 369-746-YNTF (9084)  · National Hope Line Network 800-SUICIDE (929-9874)        Your appointments     Jun 26, 2017  2:15 PM   New Patient with [facility] EXAM 5   [facility] [place] [facility]  (--)    [address] [facility] [address]   [phone]            Jul 11, 2017 10:00 AM   ONCOLOGY EST PATIENT 30 MIN with JERRY Mcconnell   [facility] (--)    [address] [facility], [address]  [place] [id number] [phone]                 Discharge Medication Instructions:    Below are the medications your physician expects you to take upon discharge:    Review all your home medications and newly ordered medications with your doctor and/or pharmacist. Follow medication instructions as directed by your doctor and/or pharmacist.    Please keep your medication list with you and share with your physician.               Medication List      START taking these medications        Instructions    Morning Afternoon Evening Bedtime    enoxaparin 100 MG/ML Soln inj   Commonly known as:  LOVENOX   Next Dose Due:  6/24 at 9 pm        Inject 100 mg as instructed every 12 hours.   Dose:  100 mg                        warfarin 5 MG Tabs   Last time this was given:  6 mg on 6/23/2017  6:12 PM   Commonly known as:  COUMADIN   Next Dose Due:  6/24 at 6 pm        Take 1 Tab by mouth every day.   Dose:  5 mg                          CONTINUE taking these medications        Instructions    Morning Afternoon Evening Bedtime    dexamethasone 2 MG tablet   Last time this was given:  2 mg on 6/24/2017  8:29 AM   Commonly known as:  DECADRON   Next Dose Due:  6/24 at 9 pm          Take 1 Tab by mouth 2 times a day.   Dose:  2 mg                         hydrocodone-acetaminophen 5-325 MG Tabs per tablet   Commonly known as:  NORCO   Next Dose Due:  As needed          Take 1-2 Tabs by mouth every 7 days. Pt takes PRN only   Dose:  1-2 Tab                        insulin  UNIT/ML Susp   Last time this was given:  50 Units on 6/24/2017  5:27 AM   Commonly known as:  HUMULIN,NOVOLIN   Next Dose Due:  6/24 at 9 pm (35 units) and 6/25 at 6 am (40 units)        Inject 35-40 Units as instructed 2 Times a Day. 40 units in the morning and 35 units at night   Dose:  35-40 Units                        insulin regular 100 Unit/mL Soln   Commonly known as:  HUMULIN R        Inject 3-8 Units as instructed every day. 200-249:3 units 250-299: 5 units 300-349: 7 units over 350 is 8 units  Hasn't used since March   Dose:  3-8 Units                        levetiracetam 100 MG/ML Soln   Last time this was given:  1,500 mg on 6/24/2017  8:29 AM   Commonly known as:  KEPPRA   Next Dose Due:  6/24 at 9 pm        Take 15 mL by mouth every 12 hours.   Dose:  1500 mg                        senna-docusate 8.6-50 MG Tabs   Last time this was given:  2 Tabs on 6/24/2017  8:29 AM   Commonly known as:  PERICOLACE or SENOKOT S   Next Dose Due:  As needed        Take 1 Tab by mouth every day.   Dose:  1 Tab                          STOP taking these medications     rivaroxaban 20 MG Tabs tablet   Commonly known as:  XARELTO                    Where to Get Your Medications      These medications were sent to [id number] - [place] - [address] [place] [address] [place] AT [address] [place] [address]     Phone:  [phone]    - enoxaparin 100 MG/ML Soln inj  - warfarin 5 MG Tabs            Orders for after discharge     REFERRAL TO ANTICOAGULATION MONITORING    Complete by:  As directed    If this Referral to the anticoagulation clinic is being ordered with a Referral to home health, then schedule the anticoagulation visit after the home health treatments are  completed.             Instructions           Diet / Nutrition:    Follow any diet instructions given to you by your doctor or the dietician, including how much salt (sodium) you are allowed each day.    If you are overweight, talk to your doctor about a weight reduction plan.    Activity:    Remain physically active following your doctor's instructions about exercise and activity.    Rest often.     Any time you become even a little tired or short of breath, SIT DOWN and rest.    Worsening Symptoms:    Report any of the following signs and symptoms to the doctor's office immediately:    *Pain of jaw, arm, or neck  *Chest pain not relieved by medication                               *Dizziness or loss of consciousness  *Difficulty breathing even when at rest   *More tired than usual                                       *Bleeding drainage or swelling of surgical site  *Swelling of feet, ankles, legs or stomach                 *Fever (>100ºF)  *Pink or blood tinged sputum  *Weight gain (3lbs/day or 5lbs /week)           *Shock from internal defibrillator (if applicable)  *Palpitations or irregular heartbeats                *Cool and/or numb extremities    Stroke Awareness    Common Risk Factors for Stroke include:    Age  Atrial Fibrillation  Carotid Artery Stenosis  Diabetes Mellitus  Excessive alcohol consumption  High blood pressure  Overweight   Physical inactivity  Smoking    Warning signs and symptoms of a stroke include:    *Sudden numbness or weakness of the face, arm or leg (especially on one side of the body).  *Sudden confusion, trouble speaking or understanding.  *Sudden trouble seeing in one or both eyes.  *Sudden trouble walking, dizziness, loss of balance or coordination.Sudden severe headache with no known cause.    It is very important to get treatment quickly when a stroke occurs. If you experience any of the above warning signs, call 911 immediately.                   Disclaimer         Quit  Smoking / Tobacco Use:    I understand the use of any tobacco products increases my chance of suffering from future heart disease or stroke and could cause other illnesses which may shorten my life. Quitting the use of tobacco products is the single most important thing I can do to improve my health. For further information on smoking / tobacco cessation call a Toll Free Quit Line at 1-532.543.6252 (*National Cancer [place]) or 1-820.604.7429 (American Lung Association) or you can access the web based program at www.lungusa.org.    [place] Tobacco Users Help Line:  (480) 358-7489       Toll Free: 1-903.150.9646  Quit Tobacco Program [facility] (045)240-1129    Crisis Hotline:    [place] Crisis Hotline:  3-264-GTJHZCH or 1-623.887.2320    [place] Crisis Hotline:    1-320.759.5956 or 509-560-4826    Discharge Survey:   Thank you for choosing [facility]. We hope we did everything we could to make your hospital stay a pleasant one. You may be receiving a phone survey and we would appreciate your time and participation in answering the questions. Your input is very valuable to us in our efforts to improve our service to our patients and their families.        My signature on this form indicates that:    1. I have reviewed and understand the above information.  2. My questions regarding this information have been answered to my satisfaction.  3. I have formulated a plan with my discharge nurse to obtain my prescribed medications for home.                  Disclaimer         __________________________________                     __________       ________                       Patient Signature                                                 Date                    Time

## 2017-06-22 NOTE — PROGRESS NOTES
Talked to daughter Desiree to obtain consent for IR procedure. Daughter would like to discuss the risks and benefits with MD. MD notified, daughter to be here at 2:15 pm

## 2017-06-22 NOTE — ED PROVIDER NOTES
"ED Provider Note    Scribed for Dawson Krause M.D. by Orestes Gill. 6/22/2017, 2:07 AM.    Primary care provider: JAGDEEP Kinsey  Means of arrival: [place]  History obtained from: Patient  History limited by: None    CHIEF COMPLAINT  Chief Complaint   Patient presents with   • Abdominal Pain       HPI  Dale Carmona is a 56 y.o. male who presents to the Emergency Department complaining of an intermittent \"pressure\" chest pain onset 5 days ago. The patient's last episode was around noon and lasted until the patient reached the hospital. He has been having associated chills. He denies any current chest pains. Due to the patient's tumor, he is currently on pill chemotherapy and is on day 3 of 5 of his current course. The patient has had nausea and vomiting but the daughter believes these are related to the pills. He also has usual leg swelling which has gone done with the pill. The daughter reports the tumor has been reduced by 40%. Denies fevers. Patient denies a history of MI, stents, allergies to medications.    Reviewed old medical records which revealed the patient has a tumor with resection and had a DVT in February. The patient is currently on Xarelto.    REVIEW OF SYSTEMS  Review of Systems   Constitutional: Positive for chills. Negative for fever.   Cardiovascular: Positive for chest pain (\"Pressure\").   Gastrointestinal: Positive for nausea and vomiting.   All other systems reviewed and are negative.     C.    PAST MEDICAL HISTORY   has a past medical history of Hyperlipidemia (2010); New onset seizure (CMS-[facility]) (1/5/2017); Brain cancer (CMS-HCC); Bell palsy (12/2016); Cancer (CMS-HCC) (1/31/2017); Diabetes (CMS-HCC); and Bell's palsy (1/2017).    SURGICAL HISTORY   has past surgical history that includes craniotomy tumor (1/31/2017).    SOCIAL HISTORY  Social History   Substance Use Topics   • Smoking status: Former Smoker -- 1.00 packs/day for 30 years     Types: " "Cigarettes   • Smokeless tobacco: Never Used   • Alcohol Use: No      History   Drug Use No       FAMILY HISTORY  Family History   Problem Relation Age of Onset   • Diabetes Neg Hx    • Seizures Daughter        CURRENT MEDICATIONS  No current facility-administered medications on file prior to encounter.     Current Outpatient Prescriptions on File Prior to Encounter   Medication Sig Dispense Refill   • dexamethasone (DECADRON) 2 MG tablet Take 1 Tab by mouth 2 times a day. 60 Tab 1   • hydrocodone-acetaminophen (NORCO) 5-325 MG Tab per tablet Take 1-2 Tabs by mouth every four hours as needed. 20 Tab 0   • senna-docusate (PERICOLACE OR SENOKOT S) 8.6-50 MG Tab Take 1 Tab by mouth every day. 30 Tab 3   • rivaroxaban (XARELTO) 20 MG Tab tablet Take 1 Tab by mouth with dinner. 30 Tab 5   • insulin NPH (HUMULIN,NOVOLIN) 100 UNIT/ML Suspension inject 50 units SQ in AM and 45 units units SQ in PM 3 Vial 11   • insulin regular (HUMULIN R) 100 Unit/mL Solution Inject -249: 3 units 250-299: 5 units 300-349: 7 units Over 350: 8 units 10 mL 11   • levetiracetam (KEPPRA) 100 MG/ML Solution Take 15 mL by mouth every 12 hours. 240 mL 11     ALLERGIES  No Known Allergies    PHYSICAL EXAM  VITAL SIGNS: /74 mmHg  Pulse 74  Temp(Src) 36.5 °C (97.7 °F)  Resp 18  Ht 1.6 m (5' 2.99\")  Wt 99.791 kg (220 lb)  BMI 38.98 kg/m2  SpO2 96%    Constitutional: Mild distress.   HENT: Normocephalic, Atraumatic.  Eyes: Conjunctiva normal, No discharge.   Cardiovascular: Normal heart rate, Normal rhythm, No murmurs, equal pulses.   Pulmonary: Slight rales to the right base, No respiratory distress, No wheezing, No rhonchi.  Chest: No chest wall deformity. Chest pain non reproducible.  Abdomen: Obese, Soft, No tenderness, No masses, no rebound, no guarding.   Back: No CVA tenderness.   Musculoskeletal: Calf tenderness to right, Pitting edema bilaterally 2+  Skin: Warm, Dry, No erythema, No rash.   Neurologic: Alert & oriented x 3, " Normal motor function,  No focal deficits noted.   Psychiatric: Affect normal, Judgment normal, Mood normal.     LABS  Results for orders placed or performed during the hospital encounter of 06/22/17   CBC w/ Differential   Result Value Ref Range    WBC 11.0 (H) 4.8 - 10.8 K/uL    RBC 2.49 (L) 4.70 - 6.10 M/uL    Hemoglobin 8.4 (L) 14.0 - 18.0 g/dL    Hematocrit 25.1 (L) 42.0 - 52.0 %    .8 (H) 81.4 - 97.8 fL    MCH 33.7 (H) 27.0 - 33.0 pg    MCHC 33.5 (L) 33.7 - 35.3 g/dL    RDW 53.1 (H) 35.9 - 50.0 fL    Platelet Count 146 (L) 164 - 446 K/uL    MPV 9.5 9.0 - 12.9 fL    Nucleated RBC 8.50 /100 WBC    NRBC (Absolute) 0.94 K/uL    Neutrophils-Polys 86.40 (H) 44.00 - 72.00 %    Lymphocytes 8.50 (L) 22.00 - 41.00 %    Monocytes 0.90 0.00 - 13.40 %    Eosinophils 0.00 0.00 - 6.90 %    Basophils 0.00 0.00 - 1.80 %    Neutrophils (Absolute) 9.69 (H) 1.82 - 7.42 K/uL    Lymphs (Absolute) 0.94 (L) 1.00 - 4.80 K/uL    Monos (Absolute) 0.10 0.00 - 0.85 K/uL    Eos (Absolute) 0.00 0.00 - 0.51 K/uL    Baso (Absolute) 0.00 0.00 - 0.12 K/uL   Complete Metabolic Panel (CMP)   Result Value Ref Range    Sodium 129 (L) 135 - 145 mmol/L    Potassium 4.2 3.6 - 5.5 mmol/L    Chloride 101 96 - 112 mmol/L    Co2 21 20 - 33 mmol/L    Anion Gap 7.0 0.0 - 11.9    Glucose 228 (H) 65 - 99 mg/dL    Bun 18 8 - 22 mg/dL    Creatinine 0.58 0.50 - 1.40 mg/dL    Calcium 8.0 (L) 8.5 - 10.5 mg/dL    AST(SGOT) 32 12 - 45 U/L    ALT(SGPT) 67 (H) 2 - 50 U/L    Alkaline Phosphatase 37 30 - 99 U/L    Total Bilirubin 0.3 0.1 - 1.5 mg/dL    Albumin 2.9 (L) 3.2 - 4.9 g/dL    Total Protein 4.9 (L) 6.0 - 8.2 g/dL    Globulin 2.0 1.9 - 3.5 g/dL    A-G Ratio 1.5 g/dL   Troponin STAT   Result Value Ref Range    Troponin I <0.01 0.00 - 0.04 ng/mL   Lipase   Result Value Ref Range    Lipase 50 11 - 82 U/L   D-Dimer (only helpful in low pre-test probability wells critieria. Do not order if patient ruled out by PERC criteria. See Weblinks at top of Labs  section)   Result Value Ref Range    D-Dimer Screen 293 (H) <250 ng/mL(D-DU)   Btype Natriuretic Peptide   Result Value Ref Range    B Natriuretic Peptide 43 0 - 100 pg/mL   PT/INR   Result Value Ref Range    PT 21.9 (H) 12.0 - 14.6 sec    INR 1.85 (H) 0.87 - 1.13   PTT   Result Value Ref Range    APTT 24.2 (L) 24.7 - 36.0 sec   ESTIMATED GFR   Result Value Ref Range    GFR If African American >60 >60 mL/min/1.73 m 2    GFR If Non African American >60 >60 mL/min/1.73 m 2   DIFFERENTIAL MANUAL   Result Value Ref Range    Bands-Stabs 1.70 0.00 - 10.00 %    Metamyelocytes 0.80 %    Myelocytes 1.70 %    Manual Diff Status PERFORMED    PERIPHERAL SMEAR REVIEW   Result Value Ref Range    Peripheral Smear Review see below    PLATELET ESTIMATE   Result Value Ref Range    Plt Estimation Normal    MORPHOLOGY   Result Value Ref Range    RBC Morphology Present     Polychromia 1+    EKG (NOW)   Result Value Ref Range    Report       [facility] Emergency Dept.    Test Date:  2017  Pt Name:    MICHAEL HUGO       Department: ER  MRN:        [id number]                      Room:       [facility]  Gender:     M                            Technician: [id number]  :        1960                   Requested By:PAYAL FRANKLIN  Order #:    [id number]                    Reading MD:    Measurements  Intervals                                Axis  Rate:       78                           P:          64  NE:         132                          QRS:        107  QRSD:       112                          T:          27  QT:         384  QTc:        438    Interpretive Statements  SINUS RHYTHM  CONSIDER LEFT ATRIAL ABNORMALITY  NONSPECIFIC INTRAVENTRICULAR CONDUCTION DELAY  BASELINE WANDER IN LEAD(S) I,III,aVL  Compared to ECG 2017 15:50:43  Intraventricular conduction delay now present       All labs reviewed by me.    EKG  12 Lead EKG interpreted by me shows a normal sinus rhythm at a rate of 78. Axis  normal. No ST elevations. No T wave inversions. Nonspecific interventricular conduction delay. W[place] interval 132.  QTc 438.  Old EKG from 2/6/17 shows now shows nonspecific interventricular conduction delay. Nonspecific EKG with a nonspecific interventricular conduction delay.    RADIOLOGY  CT-CTA CHEST PULMONARY ARTERY W/ RECONS   Final Result      1.  Bilateral lower lobe pulmonary emboli as described above.   2.  Tiny right pleural effusion.   3.  Scattered groundglass opacities in both lungs, which could indicate mild pulmonary edema.                        An Emergent Document Only message has been documented for PAYAL FRANKLIN in the Smartsheet  Critical Result system on 6/22/2017 5:37 AM, Message ID [id number].      DX-CHEST-PORTABLE (1 VIEW)   Final Result      Minimal perihilar and basilar atelectasis.        The radiologist's interpretation of all radiological studies have been reviewed by me.    COURSE & MEDICAL DECISION MAKING  Pertinent Labs & Imaging studies reviewed. (See chart for details)    Reviewed old medical records which revealed the patient has a tumor with resection and had a DVT in February. The patient is currently on Xarelto.    2:07 AM - Patient seen and examined at bedside. Ordered DX Chest, PT/INR, PTT, CBC, CMP, Troponin, Lipase, D-Dimer, BNP  to evaluate his symptoms. The differential diagnoses include but are not limited to: Pulmonary Embolism, Myocardial Infarction, Pancreatitis    3:41 AM - Patient seen at bedside. I discussed the labs and radiology results noted above and told I will word a CT-CTA Chest.    5:41 AM - Patient seen at bedside. I discussed the radiology results noted above which indicate bilateral pulmonary emboli as noted above. He will be admitted for further treatment and he understands and verbalizes agreement.     5:57 AM - Consulted with Dr. Harman, Hospitalist, who is aware of the patient and agrees to admit.     Medical Decision Making: Because the  patient has new pulmonary embolism despite being on Zarontin patient will be started on heparin. Patient may be resistant to Xarelto and may need to be on a different anticoagulant..     DISPOSITION:  Patient will be admitted to Dr. Harman, Hospitalist, in guarded condition.     FINAL IMPRESSION  1. Other acute pulmonary embolism without acute cor pulmonale    2. Shortness of breath    3. Pleuritic chest pain          Orestes HENRY (uOsmaneibcurt), am scribing for, and in the presence of, Dawson Krause M.D.    Electronically signed by: Orestes Gill (Scribcurt), 6/22/2017    Dawson HENRY M.D. personally performed the services described in this documentation, as scribed by Orestes Gill in my presence, and it is both accurate and complete.    The note accurately reflects work and decisions made by me.  Dawson Krause  6/22/2017  7:12 AM

## 2017-06-22 NOTE — ED NOTES
Pt has been having abdominal pain and increased swelling to his abdomen and legs the last few days. Family states patient not eating as well with the abdominal pain. No vomiting or diarrhea.  Denies fever or chills.

## 2017-06-22 NOTE — ASSESSMENT & PLAN NOTE
Recurrent despite being on xarelto  Heparin drip  Placed IVC filter 6/22  Will need coumadin going forward, start tonight  Feels ill again and tachycardic, will hold discharge, check cbc and bmp, may be due to persistence of symptoms from existing PEs

## 2017-06-23 PROBLEM — R00.0 TACHYCARDIA: Status: ACTIVE | Noted: 2017-01-01

## 2017-06-23 NOTE — PROGRESS NOTES
Inpatient Anticoagulation Service Note    Date: 6/23/2017  Reason for Anticoagulation: Pulmonary Embolism        Hemoglobin Value: 7.8  Hematocrit Value: 23.4  Lab Platelet Value: 159  Target INR: 2.0 to 3.0    INR from last 7 days     Date/Time INR Value    06/23/17 1107 1.13    06/22/17 0245 (!)1.85        Dose from last 7 days     Date/Time Dose (mg)    06/23/17 1500 6        Average Dose (mg):  (New start)  Significant Interactions: Corticosteroids  Bridge Therapy: Yes (Heparin Wt Based Protocol)  Date of Last VTE Event: 06/22/17  Bridge Therapy Start Date: 06/23/17  Days of Overlap Therapy: 0   INR Value Greater than 2 Prior to Discontinuation of Parenteral Anticoagulation: Not Applicable       Comments: Pt to start warfarin for PE and failed outpt Xarelto. Pt will be bridged with Heparin Wt Based Protocol. Will start warfarin 6 mg daily and trend the INR. IVC filter placed as well (6/22).       Education Material Provided?: No    Pharmacist suggested discharge dosing: TBD, starting at warfarin 6 mg daily     Gary Locke, PharmD

## 2017-06-23 NOTE — RESPIRATORY CARE
COPD EDUCATION by COPD CLINICAL EDUCATOR  6/23/2017 at 6:30 AM by Margy Vieyra     Patient reviewed by COPD education team. Patient does not qualify for COPD program.

## 2017-06-23 NOTE — PROGRESS NOTES
Bedside report completed.  Assumed pt care. Patient sitting up in bed, eating dinner, in no apparent distress.  Safety precautions in place. Call light & personal belongings within reach.  Plan of care discussed.  IVC placed today, post op vital is progress.  Patient reports no pain.  He is on room air, IV is currently running a heparin gtt @ 1200 units/hour.  No needs expressed at this time.  Groin site is CDI, no hematoma, dressing in place.  Will continue to monitor.

## 2017-06-23 NOTE — DISCHARGE SUMMARY
CHIEF COMPLAINT ON ADMISSION  Chief Complaint   Patient presents with   • Abdominal Pain       CODE STATUS  Full Code    HPI & HOSPITAL COURSE  This is a 56 y.o. male here with recurrent pulmonary embolism while on xarelto therapy. Patient has a history of astrocytoma with excision of brain lesion and prior recent diagnosis of bilateral DVT with PEs, discharged on Xarelto therapy. He is left aphasic with limited verbal ability. His daughters make decisions for him.  He was complaining of chest pain and dyspnea then developed dizziness. Family brought him in for evaluation and he was found to have more PEs. An IVC filter was placed without complication after admission. The patient will need to go home on bridging lovenox injections and start warfarin treatment. He will be followed in the anticoagulation clinic.    Therefore, he is discharged in guarded and stable condition with close outpatient follow-up.    SPECIFIC OUTPATIENT FOLLOW-UP  Anticoagulation clinic.  Primary care.    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 *      FOLLOW UP  Future Appointments  Date Time Provider Department Center   7/11/2017 10:00 AM JERRY Mcconnell None     No follow-up provider specified.    MEDICATIONS ON DISCHARGE   Dale Rojas   Home Medication Instructions PEMA:[id number]    Printed on:06/23/17 1053   Medication Information                      dexamethasone (DECADRON) 2 MG  tablet  Take 1 Tab by mouth 2 times a day.             enoxaparin (LOVENOX) 100 MG/ML Solution inj  Inject 100 mg as instructed every 12 hours.             hydrocodone-acetaminophen (NORCO) 5-325 MG Tab per tablet  Take 1-2 Tabs by mouth every 7 days. Pt takes PRN only             insulin NPH (HUMULIN,NOVOLIN) 100 UNIT/ML Suspension  Inject 35-40 Units as instructed 2 Times a Day. 40 units in the morning and 35 units at night             insulin regular (HUMULIN R) 100 Unit/mL Solution  Inject 3-8 Units as instructed every day. 200-249:3 units 250-299: 5 units 300-349: 7 units over 350 is 8 units    Hasn't used since March             levetiracetam (KEPPRA) 100 MG/ML Solution  Take 15 mL by mouth every 12 hours.             senna-docusate (PERICOLACE OR SENOKOT S) 8.6-50 MG Tab  Take 1 Tab by mouth every day.             warfarin (COUMADIN) 5 MG Tab  Take 1 Tab by mouth every day.                 DIET  Orders Placed This Encounter   Procedures   • Diet Order     Standing Status: Standing      Number of Occurrences: 1      Standing Expiration Date:      Order Specific Question:  Diet:     Answer:  Diabetic [3]     Order Specific Question:  Consistency/Fluid modifications:     Answer:  1200 ml Fluid Restriction [8]       ACTIVITY  As tolerated.  No limitations in weight bearing.      CONSULTATIONS  None.    PROCEDURES  IVC filter placement.    LABORATORY  Lab Results   Component Value Date/Time    SODIUM 129* 06/22/2017 02:45 AM    POTASSIUM 4.2 06/22/2017 02:45 AM    CHLORIDE 101 06/22/2017 02:45 AM    CO2 21 06/22/2017 02:45 AM    GLUCOSE 228* 06/22/2017 02:45 AM    BUN 18 06/22/2017 02:45 AM    CREATININE 0.58 06/22/2017 02:45 AM        Lab Results   Component Value Date/Time    WBC 11.0* 06/22/2017 02:45 AM    HEMOGLOBIN 8.4* 06/22/2017 02:45 AM    HEMATOCRIT 25.1* 06/22/2017 02:45 AM    PLATELET COUNT 146* 06/22/2017 02:45 AM        Total time of the discharge process exceeds 36 minutes

## 2017-06-23 NOTE — PROGRESS NOTES
[facility] Hospitalist Progress Note    Date of Service: 2017    Chief Complaint  56 y.o. male admitted 2017 with pulmonary embolism due to bilateral DVT, failed outpatient xarelto, history of astrocytoma of brain.    Interval Problem Update  Able to speak a little better  Was going to go home but became dizzy and c/o weakness, heart rate up to 120s but regular, no chest pain or cough    Consultants/Specialty  None.    Disposition  TBD.        Review of Systems   Unable to perform ROS: medical condition      Physical Exam  Laboratory/Imaging   Hemodynamics  Temp (24hrs), Av.4 °C (97.6 °F), Min:35.9 °C (96.6 °F), Max:37 °C (98.6 °F)   Temperature: 36.4 °C (97.6 °F)  Pulse  Av  Min: 74  Max: 106 Heart Rate (Monitored): (!) 101  Blood Pressure: 125/98 mmHg, NIBP: 122/86 mmHg      Respiratory      Respiration: 16, Pulse Oximetry: 95 %     Work Of Breathing / Effort: Mild;Increased Work of Breathing;Tachypnea  RUL Breath Sounds: Clear, RML Breath Sounds: Clear, RLL Breath Sounds: Diminished, LAYA Breath Sounds: Clear, LLL Breath Sounds: Diminished    Fluids    Intake/Output Summary (Last 24 hours) at 17 1429  Last data filed at 17 0600   Gross per 24 hour   Intake 734.55 ml   Output      0 ml   Net 734.55 ml       Nutrition  Orders Placed This Encounter   Procedures   • Diet Order     Standing Status: Standing      Number of Occurrences: 1      Standing Expiration Date:      Order Specific Question:  Diet:     Answer:  Diabetic [3]     Order Specific Question:  Consistency/Fluid modifications:     Answer:  1200 ml Fluid Restriction [8]     Physical Exam   Constitutional: He appears well-developed and well-nourished. No distress.   HENT:   Nose: Nose normal.   Mouth/Throat: Oropharynx is clear and moist. No oropharyngeal exudate.   Cushingoid facies   Eyes: Conjunctivae are normal. Right eye exhibits no discharge. Left eye exhibits no discharge. No scleral icterus.   Neck: No JVD present. No  tracheal deviation present.   Cardiovascular: Regular rhythm and normal heart sounds.  Tachycardia present.    Pulmonary/Chest: Effort normal and breath sounds normal. No stridor. No respiratory distress. He has no wheezes. He has no rales. He exhibits no tenderness.   Abdominal: Soft. Bowel sounds are normal. He exhibits no distension. There is no tenderness.   Musculoskeletal: He exhibits no edema or tenderness.   Right groin puncture site small bruise only   Neurological: He is alert. No cranial nerve deficit. He exhibits normal muscle tone. Coordination normal.   Expressive aphasia, word salad speaking in [place]   Skin: Skin is warm and dry. He is not diaphoretic. No pallor.   Psychiatric: His mood appears anxious. His speech is rapid and/or pressured. He is agitated. Cognition and memory are impaired.   Nursing note and vitals reviewed.      Recent Labs      06/22/17   0245   WBC  11.0*   RBC  2.49*   HEMOGLOBIN  8.4*   HEMATOCRIT  25.1*   MCV  100.8*   MCH  33.7*   MCHC  33.5*   RDW  53.1*   PLATELETCT  146*   MPV  9.5     Recent Labs      06/22/17   0245   SODIUM  129*   POTASSIUM  4.2   CHLORIDE  101   CO2  21   GLUCOSE  228*   BUN  18   CREATININE  0.58   CALCIUM  8.0*     Recent Labs      06/22/17   0245   06/22/17   1902  06/23/17   0303  06/23/17   1107   APTT  24.2*   < >  53.0*  130.8*  81.9*   INR  1.85*   --    --    --    --     < > = values in this interval not displayed.     Recent Labs      06/22/17   0245   BNPBTYPENAT  43              Assessment/Plan     * Pulmonary embolism (CMS-HCC) (present on admission)  Assessment & Plan  Recurrent despite being on xarelto  Heparin drip  Placed IVC filter 6/22  Will need coumadin going forward, start tonight  Feels ill again and tachycardic, will hold discharge, check cbc and bmp, may be due to persistence of symptoms from existing PEs    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)  Assessment & Plan  Continue decadron  S/p  surgical resection  Chronic residual neurologic deficits    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)  Assessment & Plan  Sliding scale insulin, NPH  Diabetic diet    Acute deep vein thrombosis (DVT) of both lower extremities (CMS-HCC) (present on admission)  Assessment & Plan  Diagnosed prior to admission  Was on xarelto previously  Will need to be on coumadin going forward, lovenox bridge    Seizure disorder (CMS-HCC) (present on admission)  Assessment & Plan  Continue keppra    Hyponatremia (present on admission)  Assessment & Plan  NS infusion  Repeat labs    Macrocytic anemia (present on admission)  Assessment & Plan  Iron studies normal  Start MVI, thiamine and folic acid po daily    Thrombocytopenia (CMS-HCC) (present on admission)  Assessment & Plan  Mild, appears episodic  No e/o bleeding    Tachycardia  Assessment & Plan  Hold discharge, continue to observe  Check cbc, bmp  EKG  Discussed with family      Labs reviewed, Medications reviewed, Radiology images reviewed and EKG reviewed  [place] catheter: No [place]      DVT Prophylaxis: Heparin

## 2017-06-23 NOTE — PROGRESS NOTES
Patient arrived to room, respirations even and unlabored. R groin site intact. Patient placed on tele monitor box. Post-op vitals initiated

## 2017-06-23 NOTE — PROGRESS NOTES
Patient awake as his IV is leaking.  Cleaned up IV site and reapplied new dressing.  IV patent.  Patient reports no pain at this time.  Will continue to monitor.

## 2017-06-23 NOTE — CARE PLAN
Problem: Communication  Goal: The ability to communicate needs accurately and effectively will improve  Outcome: PROGRESSING AS EXPECTED  Intervention: [place] patient and significant other/support system to call light to alert staff of needs  Patient oriented to surroundings.  He has been educated and understands the use of the call button for assistance.      Problem: Safety  Goal: Will remain free from falls  Outcome: PROGRESSING AS EXPECTED  Intervention: Implement fall precautions  Fall precautions in place.  Bed alarm is on, treaded slipper socks on patient.  Patient educated and understands the importance of having assistance to get out of bed and ambulate.

## 2017-06-23 NOTE — DISCHARGE PLANNING
Medical Social Work    Referral: Rx(s)    Intervention: Cale faxed rx for Lovenox to pt's pharmacy, [facility], requesting co-pay amount.    Plan: Await response from pt's pharmacy and verify prior authorization is not required.

## 2017-06-23 NOTE — PROGRESS NOTES
2 RN skin check complete. Well healed incision to the left lateral skull, right femoral artery dressing site is clean, dry and intact. No evidence of hematoma. Skin is otherwise clean, dry and intact

## 2017-06-24 NOTE — CARE PLAN
Problem: Infection  Goal: Will remain free from infection  Promote hand hygiene prior to all encounters with patient    Problem: Discharge Barriers/Planning  Goal: Patient’s continuum of care needs will be met  Collaborate with MILAN and MD to ensure patient has a timely and efficient discharge

## 2017-06-24 NOTE — DISCHARGE INSTRUCTIONS
Discharge Instructions    Discharged to home by car with relative. Discharged via wheelchair, hospital escort: Yes.  Special equipment needed: Not Applicable    Be sure to schedule a follow-up appointment with your primary care doctor or any specialists as instructed.     Discharge Plan:   Diet Plan: Discussed (low sodium, diabetic)  Activity Level:  (as tolerated)  Smoking Cessation Offered: Patient Refused  Confirmed Follow up Appointment: Appointment Scheduled (ask for hemoglobin and hematocrit lab as well as coumadin lab draw for monday 6/26)  Confirmed Symptoms Management: Discussed  Medication Reconciliation Updated: Yes  Influenza Vaccine Indication: Indicated: Not available from distributor/    I understand that a diet low in cholesterol, fat, and sodium is recommended for good health. Unless I have been given specific instructions below for another diet, I accept this instruction as my diet prescription.   Other diet: Diabetic/Cardiac    Special Instructions: None    · Is patient discharged on Warfarin / Coumadin?   Yes    You are receiving the drug warfarin. Please understand the importance of monitoring warfarin with scheduled PT/INR blood draws.  Follow-up with the [facility] in one week for INR lab..    IMPORTANT: HOW TO USE THIS INFORMATION:  This is a summary and does NOT have all possible information about this product. This information does not assure that this product is safe, effective, or appropriate for you. This information is not individual medical advice and does not substitute for the advice of your health care professional. Always ask your health care professional for complete information about this product and your specific health needs.      WARFARIN - ORAL (WARF-uh-rin)      COMMON BRAND NAME(S): Coumadin      WARNING:  Warfarin can cause very serious (possibly fatal) bleeding. This is more likely to occur when you first start taking this medication or if you take too  "much warfarin. To decrease your risk for bleeding, your doctor or other health care provider will monitor you closely and check your lab results (INR test) to make sure you are not taking too much warfarin. Keep all medical and laboratory appointments. Tell your doctor right away if you notice any signs of serious bleeding. See also Side Effects section.      USES:  This medication is used to treat blood clots (such as in deep vein thrombosis-DVT or pulmonary embolus-PE) and/or to prevent new clots from forming in your body. Preventing harmful blood clots helps to reduce the risk of a stroke or heart attack. Conditions that increase your risk of developing blood clots include a certain type of irregular heart rhythm (atrial fibrillation), heart valve replacement, recent heart attack, and certain surgeries (such as hip/knee replacement). Warfarin is commonly called a \"blood thinner,\" but the more correct term is \"anticoagulant.\" It helps to keep blood flowing smoothly in your body by decreasing the amount of certain substances (clotting proteins) in your blood.      HOW TO USE:  Read the Medication Guide provided by your pharmacist before you start taking warfarin and each time you get a refill. If you have any questions, ask your doctor or pharmacist. Take this medication by mouth with or without food as directed by your doctor or other health care professional, usually once a day. It is very important to take it exactly as directed. Do not increase the dose, take it more frequently, or stop using it unless directed by your doctor. Dosage is based on your medical condition, laboratory tests (such as INR), and response to treatment. Your doctor or other health care provider will monitor you closely while you are taking this medication to determine the right dose for you. Use this medication regularly to get the most benefit from it. To help you remember, take it at the same time each day. It is important to eat a " balanced, consistent diet while taking warfarin. Some foods can affect how warfarin works in your body and may affect your treatment and dose. Avoid sudden large increases or decreases in your intake of foods high in vitamin K (such as broccoli, cauliflower, cabbage, brussels sprouts, kale, spinach, and other green leafy vegetables, liver, green tea, certain vitamin supplements). If you are trying to lose weight, check with your doctor before you try to go on a diet. Cranberry products may also affect how your warfarin works. Limit the amount of cranberry juice (16 ounces/480 milliliters a day) or other cranberry products you may drink or eat.      SIDE EFFECTS:  Nausea, loss of appetite, or stomach/abdominal pain may occur. If any of these effects persist or worsen, tell your doctor or pharmacist promptly. Remember that your doctor has prescribed this medication because he or she has judged that the benefit to you is greater than the risk of side effects. Many people using this medication do not have serious side effects. This medication can cause serious bleeding if it affects your blood clotting proteins too much (shown by unusually high INR lab results). Even if your doctor stops your medication, this risk of bleeding can continue for up to a week. Tell your doctor right away if you have any signs of serious bleeding, including: unusual pain/swelling/discomfort, unusual/easy bruising, prolonged bleeding from cuts or gums, persistent/frequent nosebleeds, unusually heavy/prolonged menstrual flow, pink/dark urine, coughing up blood, vomit that is bloody or looks like coffee grounds, severe headache, dizziness/fainting, unusual or persistent tiredness/weakness, bloody/black/tarry stools, chest pain, shortness of breath, difficulty swallowing. Tell your doctor right away if any of these unlikely but serious side effects occur: persistent nausea/vomiting, severe stomach/abdominal pain, yellowing eyes/skin. This drug  rarely has caused very serious (possibly fatal) problems if its effects lead to small blood clots (usually at the beginning of treatment). This can lead to severe skin/tissue damage that may require surgery or amputation if left untreated. Patients with certain blood conditions (protein C or S deficiency) may be at greater risk. Get medical help right away if any of these rare but serious side effects occur: painful/red/purplish patches on the skin (such as on the toe, breast, abdomen), change in the amount of urine, vision changes, confusion, slurred speech, weakness on one side of the body. A very serious allergic reaction to this drug is rare. However, get medical help right away if you notice any symptoms of a serious allergic reaction, including: rash, itching/swelling (especially of the face/tongue/throat), severe dizziness, trouble breathing. This is not a complete list of possible side effects. If you notice other effects not listed above, contact your doctor or pharmacist. In the [place] - Call your doctor for medical advice about side effects. You may report side effects to FDA at 3-957-KAU-4830. In [place] - Call your doctor for medical advice about side effects. You may report side effects to [facility] [place] at 1-108.924.6405.      PRECAUTIONS:  Before taking warfarin, tell your doctor or pharmacist if you are allergic to it; or if you have any other allergies. This product may contain inactive ingredients, which can cause allergic reactions or other problems. Talk to your pharmacist for more details. Before using this medication, tell your doctor or pharmacist your medical history, especially of: blood disorders (such as anemia, hemophilia), bleeding problems (such as bleeding of the stomach/intestines, bleeding in the brain), blood vessel disorders (such as aneurysms), recent major injury/surgery, liver disease, alcohol use, mental/mood disorders (including memory problems), frequent falls/injuries. It is  important that all your doctors and dentists know that you take warfarin. Before having surgery or any medical/dental procedures, tell your doctor or dentist that you are taking this medication and about all the products you use (including prescription drugs, nonprescription drugs, and herbal products). Avoid getting injections into the muscles. If you must have an injection into a muscle (for example, a flu shot), it should be given in the arm. This way, it will be easier to check for bleeding and/or apply pressure bandages. This medication may cause stomach bleeding. Daily use of alcohol while using this medicine will increase your risk for stomach bleeding and may also affect how this medication works. Limit or avoid alcoholic beverages. If you have not been eating well, if you have an illness or infection that causes fever, vomiting, or diarrhea for more than 2 days, or if you start using any antibiotic medications, contact your doctor or pharmacist immediately because these conditions can affect how warfarin works. This medication can cause heavy bleeding. To lower the chance of getting cut, bruised, or injured, use great caution with sharp objects like safety razors and nail cutters. Use an electric razor when shaving and a soft toothbrush when brushing your teeth. Avoid activities such as contact sports. If you fall or injure yourself, especially if you hit your head, call your doctor immediately. Your doctor may need to check you. The Food & Drug Administration has stated that generic warfarin products are interchangeable. However, consult your doctor or pharmacist before switching warfarin products. Be careful not to take more than one medication that contains warfarin unless specifically directed by the doctor or health care provider who is monitoring your warfarin treatment. Older adults may be at greater risk for bleeding while using this drug. This medication is not recommended for use during pregnancy  "because of serious (possibly fatal) harm to an unborn baby. Discuss the use of reliable forms of birth control with your doctor. If you become pregnant or think you may be pregnant, tell your doctor immediately. If you are planning pregnancy, discuss a plan for managing your condition with your doctor before you become pregnant. Your doctor may switch the type of medication you use during pregnancy. Very small amounts of this medication may pass into breast milk but is unlikely to harm a nursing infant. Consult your doctor before breast-feeding.      DRUG INTERACTIONS:  Drug interactions may change how your medications work or increase your risk for serious side effects. This document does not contain all possible drug interactions. Keep a list of all the products you use (including prescription/nonprescription drugs and herbal products) and share it with your doctor and pharmacist. Do not start, stop, or change the dosage of any medicines without your doctor's approval. Warfarin interacts with many prescription, nonprescription, vitamin, and herbal products. This includes medications that are applied to the skin or inside the vagina or rectum. The interactions with warfarin usually result in an increase or decrease in the \"blood-thinning\" (anticoagulant) effect. Your doctor or other health care professional should closely monitor you to prevent serious bleeding or clotting problems. While taking warfarin, it is very important to tell your doctor or pharmacist of any changes in medications, vitamins, or herbal products that you are taking. Some products that may interact with this drug include: capecitabine, imatinib, mifepristone. Aspirin, aspirin-like drugs (salicylates), and nonsteroidal anti-inflammatory drugs (NSAIDs such as ibuprofen, naproxen, celecoxib) may have effects similar to warfarin. These drugs may increase the risk of bleeding problems if taken during treatment with warfarin. Carefully check all " prescription/nonprescription product labels (including drugs applied to the skin such as pain-relieving creams) since the products may contain NSAIDs or salicylates. Talk to your doctor about using a different medication (such as acetaminophen) to treat pain/fever. Low-dose aspirin and related drugs (such as clopidogrel, ticlopidine) should be continued if prescribed by your doctor for specific medical reasons such as heart attack or stroke prevention. Consult your doctor or pharmacist for more details. Many herbal products interact with warfarin. Tell your doctor before taking any herbal products, especially bromelains, coenzyme Q10, cranberry, danshen, dong quai, fenugreek, garlic, ginkgo biloba, ginseng, and [place]'s wort, among others. This medication may interfere with a certain laboratory test to measure theophylline levels, possibly causing false test results. Make sure laboratory personnel and all your doctors know you use this drug.      OVERDOSE:  If overdose is suspected, contact a poison control center or emergency room immediately. [place] residents can call the [place] National Poison Hotline at 1-833.841.2443. [place] residents can call a provincial poison control center. Symptoms of overdose may include: bloody/black/tarry stools, pink/dark urine, unusual/prolonged bleeding.      NOTES:  Do not share this medication with others. Laboratory and/or medical tests (such as INR, complete blood count) must be performed periodically to monitor your progress or check for side effects. Consult your doctor for more details.      MISSED DOSE:  For the best possible benefit, do not miss any doses. If you do miss a dose and remember on the same day, take it as soon as you remember. If you remember on the next day, skip the missed dose and resume your usual dosing schedule. Do not double the dose to catch up because this could increase your risk for bleeding. Keep a record of missed doses to give to your doctor or  pharmacist. Contact your doctor or pharmacist if you miss 2 or more doses in a row.      STORAGE:  Store at room temperature away from light and moisture. Do not store in the bathroom. Keep all medications away from children and pets. Do not flush medications down the toilet or pour them into a drain unless instructed to do so. Properly discard this product when it is  or no longer needed. Consult your pharmacist or local waste disposal company for more details about how to safely discard your product.      MEDICAL ALERT:  Your condition and medication can cause complications in a medical emergency. For information about enrolling in MedicAlert, call 1-878.117.4039 ([place]) or 1-914.705.9821 ([place]).      Information last revised 2010 Copyright(c) 2010 [employer]         Embolia pulmonar  (Pulmonary Embolism)  Maria Antonia embolia pulmonar (EP) es un coágulo de gen que ha viajado hasta el pulmón y obstruye la circulación sanguínea en el pulmón afectado. La mayor parte de los coágulos provienen de las venas profundas de las piernas o de la pelvis. Es un trastorno peligroso y potencialmente mortal que, si se diagnostica, puede tratarse.  CAUSAS  Los coágulos sanguíneos pueden formarse en las venas por diferentes causas. Generalmente son varios los motivos que causan la formación de coágulos. Entre los que se incluyen:  · El flujo sanguíneo lento.  · El interior de la vena dañado por algún motivo.  · El paciente tiene algún trastorno que favorece la formación de coágulos.  FACTORES DE RIESGO  Algunas personas son más propensas que otras a tener embolia pulmonar. Los factores de riesgo son:   · Fumar.  · Tener sobrepeso (ser elenaor).  · Permanecer sentado o acostado dez un tiempo prolongado. Aquí se incluyen un viaje de larga distancia, casos de parálisis o en recuperación de maria antonia enfermedad o cirugía.  Otros factores que aumentan el riesgo son:   · La edad avanzada, especialmente después de los  75 años de edad.  · Tener antecedentes familiares de coágulos de gen o roland tenido ya un coágulo de gen.  · Que le hayan practicado maria antonia cirugía mayor o de duración prolongada. [place] es aún más frecuente en cirugías de cadera, rodilla o estómago (abdomen). La cirugía de cadera es especialmente riesgosa.  · Tener colocado un tubo cristino y claudia (catéter) dentro de maria antonia vena dez un procedimiento médico.  · Fractura de cadera o de pierna.  · Cáncer o tratamiento para el cáncer.  · Los medicamentos que contienen estrógenos. Estos incluyen las píldoras anticonceptivas y la terapia de reemplazo hormonal.  · Otras afecciones circulatorias o cardíacas.  · Embarazo y parto.  ¨ Los cambios hormonales dez el embarazo facilitan la formación de coágulos.  ¨ El feto hace presión sobre las venas de la pelvis.  ¨ Hay un riesgo de que se lesionen las venas dez el parto o la cesárea. El riesgo es mayor inmediatamente después del parto.  PREVENCIÓN   · Ejercite las piernas con regularidad. Camine media hora todos los días.  · Mantenga un peso apropiado para ramirez altura.  · Evite permanecer sentado o parado sin moverse dez períodos prolongados.  · Las mujeres, especialmente las que tienen más de 35 años, deben considerar los riesgos y beneficios de reanna medicamentos que contengan estrógeno, incluidas las píldoras anticonceptivas.  · No fume, especialmente si rogelio estrógenos.  · Los viajes de larga distancia pueden aumentar el riesgo. Ejercite las piernas caminando o moviendo los músculos maria antonia vez por hora.  · Muchos de los factores de riesgo anteriormente mencionados se refieren a situaciones que ocurren con la hospitalización, ya sea por enfermedad, lesión o cirugía programada. La prevención puede ser con o sin medicamentos.  ¨ El médico lo evaluará para determinar la necesidad de prevenir la tromboembolia venosa cuando ingrese al hospital. Si debe someterse a maria antonia cirugía, ramirez cirujano lo evaluará el mismo día o el día  después de la cirugía.  SÍNTOMAS   Los síntomas de maria antonia EP normalmente comienzan de repente, y entre ellos se incluyen:  · Falta de aire.  · Tos.  · Tos con gen o mucosidad sanguinolenta.  · Dolor en el pecho. Dolor que empeora al hacer inspiraciones profundas.  · Latidos cardíacos rápidos.  DIAGNÓSTICO   El médico averiguará kiet antecedentes médicos, le hará un examen físico y descartará posibles causas para evaluar ramirez riesgo de embolia pulmonar. Si tiene un riesgo intermedio o alto, pueden hacerle otros estudios. Estos incluyen los siguientes:  · Análisis de gen, humaira estudios de coagulación.  · Estudios de diagnóstico por imágenes, humaira ecografías, tomografías computarizadas, resonancias magnéticas y otros exámenes para determinar si tiene coágulos de gen en las piernas o los pulmones.  · Un electrocardiograma, para determinar si hay sobrecarga cardíaca debido a coágulos sanguíneos en los pulmones.  TRATAMIENTO   · El tratamiento más frecuente de la EP se realiza con medicamentos anticoagulantes que reducen la tendencia de la gen a coagularse. Los anticoagulantes pueden impedir la formación de nuevos coágulos sanguíneos o el aumento del tamaño de los coágulos existentes. No pueden disolver los coágulos ya formados. El organismo lo hace por sí mismo con el tiempo. Los anticoagulantes pueden administrarse por vía oral, intravenosa (IV) o inyectable. Ramirez médico determinará la mejor forma para usted.  · Con mike frecuencia, se utilizan medicamentos para disolver los coágulos (trombolíticos) para tratar la EP. Ellos conllevan un alto riesgo de sangrado, por lo que se utilizan principalmente en los casos más graves.  · En contadas ocasiones, un coágulo de gen en la pierna debe eliminarse quirúrgicamente.  · Si usted no puede reanna anticoagulantes, ramirez médico podría indicar la colocación de un filtro en la vena principal del abdomen. Los filtros impiden que los coágulos lleguen a los  pulmones.  INSTRUCCIONES PARA EL CUIDADO EN EL HOGAR   · Laureles todos los medicamentos humaira se lo haya indicado el médico.  · Aprenda tanto humaira pueda sobre la TVP.  · Utilice un brazalete o lleve consigo maria antonia tarjeta de alerta médico.  · Consulte con ramirez médico cuándo podrá volver a kiet actividades normales. Es importante mantenerse activo para prevenir los coágulos sanguíneos. Si está tomando un medicamento anticoagulante, evite los deportes de contacto.  · Es muy importante hacer ejercicios. [place] es especialmente importante dez viajes, o al estar sentado o de pie dez largos períodos. [place] o contraiga y relaje frecuentemente los músculos de las piernas para ejercitarlas. [place] con frecuencia.  · Es posible que deba usar medias de compresión. Son medias elásticas apretadas que aplican presión a las piernas. Esta presión ayuda a evitar que la gen en las piernas se coagule.  Cómo tomarla warfarina  La warfarina es un medicamento que se rogelio diariamente por vía oral. Ramirez médico le indicará la duración del tratamiento (generalmente de 3 a 6 meses y a veces toda la hattie). Si rogelio warfarina:  · Aprenda cómo reanna warfarina y cuáles son los alimentos que pueden influir en el efecto que esta tiene en el organismo.  · Las dosis altas y bajas de warfarina son peligrosas. El exceso de warfarina aumenta el riesgo de sangrado. Dosis demasiado bajas de warfarina aumentan el riesgo de formación de coágulos.  La warfarina y los análisis de gen periódicos  Dez el tratamiento con warfarina, es necesario que se realicen periódicamente análisis de gen que miden el tiempo de coagulación. Estos análisis de gen suelen incluir tanto el tiempo de protrombina (PT) humaira el índice internacional normalizado (INR). Los resultados del PT y del INR permiten al médico ajustar la dosis de warfarina. Es muy importante que le chasidy análisis del PT y el INR con la frecuencia que el médico lo indique.   La warfarina yla  dieta  Evite cambios importantes en ramirez dieta, o hable con ramirez médico antes de cambiarla. Programe maria antonia eugenie con un nutricionista matriculado para que responda kiet preguntas. Muchos de los alimentos, especialmente los que contienen vitamina K pueden interferir con la warfarina y afectar los resultados de PT y del INR. Debe ingerir maria antonia cantidad david de alimentos con alto contenido de vitamina K, entre los que se incluyen:   · espinaca, col rizada, brócoli, repollo, acelga y nabos verdes, repollitos de [place], guisantes, coliflor, algas y perejil.  · Hígado de osiris y de cerdo.  · Té valdez.  · Aceite de soja.  La warfarina y otros medicamentos  Muchos medicamentos interfieren con la warfarina y afectan los resultados del PT y del INR. Usted debe:  · Informar al médico sobre todos los medicamentos, las vitaminas y los suplementos que rogelio, entre ellos, aspirina y otros antiinflamatorios de venta sandra. Tenga especial cuidado con la aspirina y los medicamentos antiinflamatorios. Consulte a ramirez médico antes de tomarlos.  · No tome ni suspenda ningún medicamento tanto recetado humaira de venta sandra, excepto si se lo indica el médico o el farmacéutico.  Efectos secundarios de la warfarina  La warfarina puede causar efectos secundarios, por ejemplo, hematomas y dificultad para detener el sangrado. Pregúntele al médico o al farmacéutico sobre otros efectos secundarios de la warfarina. Usted deberá:  · Ejercer presión sobre las heridas cortantes dez más tiempo que lo habitual.  · Informar al dentista y a otros médicos que está tomando warfarina, antes de someterse a cualquier procedimiento que pueda causar sangrado.  La warfarina con alcohol y tabaco   · Beber alcohol con frecuencia puede potenciar el efecto de la warfarina y causar sangrado excesivo. Es preferible evitar reanna bebidas alcohólicas o consumir solo muy pequeñas cantidades cuando se está en tratamiento con warfarina. Hágale saber a ramirez médico si modifica ramirez  consumo de alcohol.  · No consuma ningún producto que contenga tabaco, humaira cigarrillos, tabaco de mascar o cigarrillos electrónicos. Si fuma, abandone el hábito. Pídale ayuda al médico, si es necesario.  Medicamentos alternativos de la warfarina: Inhibidores del factor X activado  · Estos anticoagulantes se pratibha por vía oral, generalmente dez varias semanas o más tiempo. Es importante que tome el medicamento a la misma hora todos los días.  · No es necesario realizar análisis de gen periódicos cuando se pratibha estos medicamentos.  · Estos tienen menos interacciones con los alimentos y los medicamentos que la warfarina.  · Los efectos secundarios de esta clase de medicamentos son similares a los que se presentan con la warfarina, incluidos los hematomas y el sangrado excesivos. Pregúnteles al médico o al farmacéutico sobre otros posibles efectos secundarios.  SOLICITE ATENCIÓN MÉDICA SI:   · Nota que el ritmo de kiet latidos cardíacos está acelerado.  · Se siente más débil o más cansado que de costumbre.  · Siente que va a desmayarse.  · Observa un aumento de los hematomas.  · Siente que los síntomas no mejoran en el tiempo esperado.  · Tiene efectos secundarios por el medicamento.  SOLICITE ATENCIÓN MÉDICA DE INMEDIATO SI:   · Siente dolor en el pecho.  · Tiene dificultad para respirar.  · Aumenta la hinchazón o el dolor en maria antonia pierna.  · Tose y escupe gen.  · Nota gen en el vómito, en las heces o en la orina.  · Tiene fiebre.  Los síntomas de EP pueden representar un problema grave que es maria antonia emergencia.No espere para rosanna si los síntomas desaparecen. Solicite atención médica de inmediato. Comuníquese con el servicio de emergencias de ramirez localidad (911 en los [place]).No conduzca por kiet propios medios hasta el hospital.      Esta información no tiene humaira fin reemplazar el consejo del médico. Asegúrese de hacerle al médico cualquier pregunta que tenga.     Document Released: 09/27/2006 Document  Revised: 01/08/2016  [employer] Interactive Patient Education ©2016 [employer]      · Is patient Post Blood Transfusion?  No    Depression / Suicide Risk    As you are discharged from this [facility], it is important to learn how to keep safe from harming yourself.    Recognize the warning signs:  · Abrupt changes in personality, positive or negative- including increase in energy   · Giving away possessions  · Change in eating patterns- significant weight changes-  positive or negative  · Change in sleeping patterns- unable to sleep or sleeping all the time   · Unwillingness or inability to communicate  · Depression  · Unusual sadness, discouragement and loneliness  · Talk of wanting to die  · Neglect of personal appearance   · Rebelliousness- reckless behavior  · Withdrawal from people/activities they love  · Confusion- inability to concentrate     If you or a loved one observes any of these behaviors or has concerns about self-harm, here's what you can do:  · Talk about it- your feelings and reasons for harming yourself  · Remove any means that you might use to hurt yourself (examples: pills, rope, extension cords, firearm)  · Get professional help from the community (Mental Health, Substance Abuse, psychological counseling)  · Do not be alone:Call your Safe Contact- someone whom you trust who will be there for you.  · Call your local CRISIS HOTLINE 732-7667 or 578-492-1478  · Call your local Children's Mobile Crisis Response Team [place] (447) 040-1680 or www.Quizens  · Call the toll free National Suicide Prevention Hotlines   · National Suicide Prevention Lifeline 964-455-ZPBY (3063)  · National Hope Line Network 800-SUICIDE (488-2422)

## 2017-06-24 NOTE — CARE PLAN
Problem: Knowledge Deficit  Goal: Knowledge of the prescribed therapeutic regimen will improve  Discuss lovenox injections with daughters    Problem: Discharge Barriers/Planning  Goal: Patient’s continuum of care needs will be met  Establish follow up appointment and lab draw appointments

## 2017-06-24 NOTE — PROGRESS NOTES
Report received from nightshift RN. Patient resting in bed, respirations even and unlabored. Heparin gtt infusing. Bed low and in locked position, call light within reach. Will continue to monitor.

## 2017-06-24 NOTE — PROGRESS NOTES
Bedside report completed.  Assumed pt care. Patient in bed, resting, in no apparent distress.  Safety precautions in place. Call light & personal belongings within reach. Plan of care discussed.  Family at bedside.  Patient is on room air, IV is running heparin gtt.  Rate verified with Loc/RN.  No reports of pain or needs expressed at this time.  Will continue to monitor.

## 2017-06-24 NOTE — DISCHARGE SUMMARY
CHIEF COMPLAINT ON ADMISSION  Chief Complaint   Patient presents with   • Abdominal Pain       CODE STATUS  Full Code    HPI & HOSPITAL COURSE  This is a 56 y.o. male here with recurrent pulmonary embolism while on xarelto therapy. Patient has a history of astrocytoma with excision of brain lesion and prior recent diagnosis of bilateral DVT with PEs, discharged on Xarelto therapy. He is left aphasic with limited verbal ability. His daughters make decisions for him.  He was complaining of chest pain and dyspnea then developed dizziness. Family brought him in for evaluation and he was found to have more PEs. An IVC filter was placed without complication after admission. The patient will need to go home on bridging lovenox injections and start warfarin treatment. He will be followed in the anticoagulation clinic.    Therefore, he is discharged in guarded condition with close outpatient follow-up.    UPDATE: Patient monitored for an additional night 2/2 to tachycardia which has since resolved.  His Hgb level had dropped in the last day therefor it was repeated today and it remains stable.  His last BM was formed and brown per RN.  I have requested that he have a repeat HGB/HCT drawn on Monday at the coumadin clinic and a lab slip was provided.    SPECIFIC OUTPATIENT FOLLOW-UP  Anticoagulation clinic.  Primary care.    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 *      FOLLOW  UP  Future Appointments  Date Time Provider Department Center   7/11/2017 10:00 AM JERRY Mcconnell [facility] None     No follow-up provider specified.    MEDICATIONS ON DISCHARGE   Dale Rojas   Home Medication Instructions PEMA:[id number]    Printed on:06/23/17 1053   Medication Information                      dexamethasone (DECADRON) 2 MG tablet  Take 1 Tab by mouth 2 times a day.             enoxaparin (LOVENOX) 100 MG/ML Solution inj  Inject 100 mg as instructed every 12 hours.             hydrocodone-acetaminophen (NORCO) 5-325 MG Tab per tablet  Take 1-2 Tabs by mouth every 7 days. Pt takes PRN only             insulin NPH (HUMULIN,NOVOLIN) 100 UNIT/ML Suspension  Inject 35-40 Units as instructed 2 Times a Day. 40 units in the morning and 35 units at night             insulin regular (HUMULIN R) 100 Unit/mL Solution  Inject 3-8 Units as instructed every day. 200-249:3 units 250-299: 5 units 300-349: 7 units over 350 is 8 units    Hasn't used since March             levetiracetam (KEPPRA) 100 MG/ML Solution  Take 15 mL by mouth every 12 hours.             senna-docusate (PERICOLACE OR SENOKOT S) 8.6-50 MG Tab  Take 1 Tab by mouth every day.             warfarin (COUMADIN) 5 MG Tab  Take 1 Tab by mouth every day.                 DIET  Orders Placed This Encounter   Procedures   • Diet Order     Standing Status: Standing      Number of Occurrences: 1      Standing Expiration Date:      Order Specific Question:  Diet:     Answer:  Diabetic [3]     Order Specific Question:  Consistency/Fluid modifications:     Answer:  1200 ml Fluid Restriction [8]       ACTIVITY  As tolerated.  No limitations in weight bearing.      CONSULTATIONS  None.    PROCEDURES  IVC filter placement.    LABORATORY  Lab Results   Component Value Date/Time    SODIUM 134* 06/23/2017 02:39 PM    POTASSIUM 3.8 06/23/2017 02:39 PM    CHLORIDE 101 06/23/2017 02:39 PM    CO2 24 06/23/2017 02:39 PM    GLUCOSE 138* 06/23/2017  02:39 PM    BUN 26* 06/23/2017 02:39 PM    CREATININE 0.70 06/23/2017 02:39 PM        Lab Results   Component Value Date/Time    WBC 11.7* 06/23/2017 02:39 PM    HEMOGLOBIN 7.5* 06/24/2017 09:36 AM    HEMATOCRIT 23.3* 06/24/2017 09:36 AM    PLATELET COUNT 159* 06/23/2017 02:39 PM        Total time of the discharge process exceeds 36 minutes

## 2017-06-24 NOTE — PROGRESS NOTES
Report received from nightshift RN. Patient sitting up in bed, alert and pleasantly confused. Ambulated to bathroom with standby assistance. Denies pain. Bed low and in locked position, call light within reach, will continue to monitor.

## 2017-06-24 NOTE — PROGRESS NOTES
Discharge instructions reviewed with patient and caregiver (toño Ramírez) regarding lab appointment on 6/26 for PT/INR draw. Educated daughter to ask for hemoglobin and hematocrit lab as well. Medications reviewed with the patient and his daughter. IV removed, catheter tip intact.

## 2017-06-24 NOTE — CARE PLAN
Problem: Infection  Goal: Will remain free from infection  Outcome: PROGRESSING AS EXPECTED  Intervention: Implement standard precautions and perform hand washing before and after patient contact  Patient and family educated and understand the importance of proper hand hygiene for himself, his visitors, and his care team.      Problem: Venous Thromboembolism (VTW)/Deep Vein Thrombosis (DVT) Prevention:  Goal: Patient will participate in Venous Thrombosis (VTE)/Deep Vein Thrombosis (DVT)Prevention Measures  Outcome: PROGRESSING AS EXPECTED  Patient is on a heparin drip for PE and DVT at this time.

## 2017-06-26 PROBLEM — I26.99 RECURRENT PULMONARY EMBOLISM (HCC): Status: ACTIVE | Noted: 2017-01-01

## 2017-06-26 NOTE — MR AVS SNAPSHOT
Dale Azael Godfrey Mathew   2017 2:15 PM   Anticoagulation Visit   MRN: [id number]    Department:  Vascular Medicine   Dept Phone:  [phone]    Description:  Male : 1960   Provider:  [facility] EXAM 5           Allergies as of 2017     No Known Allergies      Vital Signs     Smoking Status                   Former Smoker           Basic Information     Date Of Birth Sex Race Ethnicity Preferred Language    1960 Male White  Origin ([place] citizen,Junior, etc) English      Your appointments     2017  9:30 AM   Established Patient with [facility] EXAM 5   [facility]  (--)    [address] [place] [address]   [phone]            2017 10:00 AM   ONCOLOGY EST PATIENT 30 MIN with JERRY Mcconnell   [facility] (--)    [address], [address]  [facility] [id number] [phone]              Problem List              ICD-10-CM Priority Class Noted - Resolved    Obesity (BMI 30-39.9) E66.9 Low  5/15/2015 - Present    Chronic pain of right knee M25.561, G89.29   5/15/2015 - Present    Cerebral edema (CMS-HCC) G93.6   2017 - Present    New onset seizure (CMS-HCC) R56.9 High  2017 - Present    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 High  2017 - Present    Malignant neoplasm of brain (CMS-HCC) C71.9   2017 - Present    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, T38.0X5A   3/13/2017 - Present    Acute deep vein thrombosis (DVT) of both lower extremities (CMS-HCC) I82.403   3/13/2017 - Present    Pneumonia J18.9   3/23/2017 - Present    Acute respiratory failure with hypoxia (CMS-HCC) J96.01   3/23/2017 - Present    Astrocytoma (CMS-HCC) C71.9   3/24/2017 - Present    Seizure disorder (CMS-HCC) G40.909   3/24/2017 - Present    Hyponatremia E87.1   3/24/2017 - Present    Macrocytic anemia D53.9   3/24/2017 - Present    Thrush, oral B37.0    3/24/2017 - Present    Pulmonary embolism (CMS-HCC) I26.99   6/22/2017 - Present    Thrombocytopenia (CMS-HCC) D69.6   6/22/2017 - Present    Tachycardia R00.0   6/23/2017 - Present      Health Maintenance        Date Due Completion Dates    IMM HEP B VACCINE (1 of 3 - Primary Series) 1960 ---    IMM DTaP/Tdap/Td Vaccine (1 - Tdap) 11/8/1977 ---            Results     POCT Protime      Component    INR    3.4                        Current Immunizations     13-VALENT PCV PREVNAR 6/24/2017 11:18 AM      Below and/or attached are the medications your provider expects you to take. Review all of your home medications and newly ordered medications with your provider and/or pharmacist. Follow medication instructions as directed by your provider and/or pharmacist. Please keep your medication list with you and share with your provider. Update the information when medications are discontinued, doses are changed, or new medications (including over-the-counter products) are added; and carry medication information at all times in the event of emergency situations     Allergies:  No Known Allergies          Medications  Valid as of: June 26, 2017 -  3:08 PM    Generic Name Brand Name Tablet Size Instructions for use    Dexamethasone (Tab) DECADRON 2 MG Take 1 Tab by mouth 2 times a day.        Enoxaparin Sodium (Solution) LOVENOX 100 MG/ML Inject 100 mg as instructed every 12 hours.        Hydrocodone-Acetaminophen (Tab) NORCO 5-325 MG Take 1-2 Tabs by mouth every 7 days. Pt takes PRN only        Insulin NPH Human (Isophane) (Suspension) HUMULIN,NOVOLIN 100 UNIT/ML Inject 35-40 Units as instructed 2 Times a Day. 40 units in the morning and 35 units at night        Insulin Regular Human (Solution) HUMULIN R 100 Unit/mL Inject 3-8 Units as instructed every day. 200-249:3 units 250-299: 5 units 300-349: 7 units over 350 is 8 units    Hasn't used since March        LevETIRAcetam (Solution) KEPPRA 100 MG/ML Take 15 mL by mouth  every 12 hours.        Sennosides-Docusate Sodium (Tab) PERICOLACE or SENOKOT S 8.6-50 MG Take 1 Tab by mouth every day.        Warfarin Sodium (Tab) COUMADIN 5 MG Take 1 Tab by mouth every day.        .                 Medicines prescribed today were sent to:     [id number] - [place], [place] - [address] AT [address] [place] [address]    Phone: [phone] Fax: [phone]    Open 24 Hours?: No      Medication refill instructions:       If your prescription bottle indicates you have medication refills left, it is not necessary to call your provider’s office. Please contact your pharmacy and they will refill your medication.    If your prescription bottle indicates you do not have any refills left, you may request refills at any time through one of the following ways: The online [employer] system (except Urgent Care), by calling your provider’s office, or by asking your pharmacy to contact your provider’s office with a refill request. Medication refills are processed only during regular business hours and may not be available until the next business day. Your provider may request additional information or to have a follow-up visit with you prior to refilling your medication.   *Please Note: Medication refills are assigned a new Rx number when refilled electronically. Your pharmacy may indicate that no refills were authorized even though a new prescription for the same medication is available at the pharmacy. Please request the medicine by name with the pharmacy before contacting your provider for a refill.        Warfarin Dosing Calendar   June 2017 Details    Sun Mon Tue Wed Thu Fri Sat         1               2               3                 4               5               6               7               8               9               10                 11               12               13               14               15               16               17                    18               19               20               21               22               23               24                 25               26   3.4   2.5 mg   See details      27      5 mg         28      2.5 mg         29      5 mg         30      2.5 mg           Date Details   06/26 This INR check   INR: 3.4               How to take your warfarin dose     To take:  2.5 mg Take 0.5 of a 5 mg tablet.    To take:  5 mg Take 1 of the 5 mg tablets.           Warfarin Dosing Calendar   July 2017 Details    Sun Mon Tue Wed Thu Fri Sat           1      5 mg           2      5 mg         3      2.5 mg         4               5               6               7               8                 9               10               11               12               13               14               15                 16               17               18               19               20               21               22                 23               24               25               26               27               28               29                 30               31                     Date Details   No additional details    Date of next INR:  7/3/2017         How to take your warfarin dose     To take:  2.5 mg Take 0.5 of a 5 mg tablet.    To take:  5 mg Take 1 of the 5 mg tablets.              MyChart Status: Patient Declined

## 2017-06-26 NOTE — PROGRESS NOTES
Anticoagulation Summary as of 6/26/2017     INR goal 2.0-3.0    Selected INR 3.4! (6/26/2017)    Maintenance plan 2.5 mg (5 mg x 0.5) on Mon, Wed, Fri; 5 mg (5 mg x 1) all other days    Weekly total 27.5 mg    Plan last modified MARIALUISA ZaldivarD (6/26/2017)    Next INR check 7/3/2017    Target end date       Anticoagulation Episode Summary     INR check location     Preferred lab     Send INR reminders to     Comments         Anticoagulation Patient Findings    A/P: Saw new patient in clinic today. INR is SURAtherapeutic today after a new initiation on warfarin inpatient, d/t recurrent PEs and Xarelto failure. Patient denies any s/sxs of bleeding, medication changes, or changes in diet.  Counseled patient on Indication for warfarin, drug-drug interactions, side effects, INR monitoring and diet.   Instructed patient to start a reduced weekly warfarin regimen as outlined above and discontinue lovenox injections. Confirmed dosing regimen with patient's daughter and reviewed dosing calendar. F/U INR in 1 week (7/3/17 @9:30am in clinic).     Angelika Herndon, MARIALUISAD

## 2017-07-03 NOTE — MR AVS SNAPSHOT
Dale Azael Godfrey Mathew   7/3/2017 3:45 PM   Anticoagulation Visit   MRN: [id number]    Department:  Vascular Medicine   Dept Phone:  [phone]    Description:  Male : 1960   Provider:  [facility] EXAM 5           Allergies as of 7/3/2017     No Known Allergies      Vital Signs     Smoking Status                   Former Smoker           Basic Information     Date Of Birth Sex Race Ethnicity Preferred Language    1960 Male White  Origin (Turkmen,Venezuelan,Swiss,Junior, etc) English      Your appointments     2017  2:00 PM   Established Patient with [facility] EXAM 1   [facility] [place] [facility]  (--)    [address] [facility] [address]   [phone]            2017 10:00 AM   ONCOLOGY EST PATIENT 30 MIN with JERRY Mcconnell   [facility] (--)    [address], [address]  [facility] [id number] [phone]              Problem List              ICD-10-CM Priority Class Noted - Resolved    Obesity (BMI 30-39.9) E66.9 Low  5/15/2015 - Present    Chronic pain of right knee M25.561, G89.29   5/15/2015 - Present    Cerebral edema (CMS-HCC) G93.6   2017 - Present    New onset seizure (CMS-HCC) R56.9 High  2017 - Present    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 High  2017 - Present    Malignant neoplasm of brain (CMS-HCC) C71.9   2017 - Present    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, T38.0X5A   3/13/2017 - Present    Acute deep vein thrombosis (DVT) of both lower extremities (CMS-HCC) I82.403   3/13/2017 - Present    Pneumonia J18.9   3/23/2017 - Present    Acute respiratory failure with hypoxia (CMS-HCC) J96.01   3/23/2017 - Present    Astrocytoma (CMS-HCC) C71.9   3/24/2017 - Present    Seizure disorder (CMS-HCC) G40.909   3/24/2017 - Present    Hyponatremia E87.1   3/24/2017 - Present    Macrocytic anemia D53.9   3/24/2017 - Present    Thrush, oral B37.0   3/24/2017  - Present    Pulmonary embolism (CMS-HCC) I26.99   6/22/2017 - Present    Thrombocytopenia (CMS-HCC) D69.6   6/22/2017 - Present    Tachycardia R00.0   6/23/2017 - Present    Recurrent pulmonary embolism (CMS-HCC) I26.99   6/26/2017 - Present      Health Maintenance        Date Due Completion Dates    IMM HEP B VACCINE (1 of 3 - Primary Series) 1960 ---    IMM DTaP/Tdap/Td Vaccine (1 - Tdap) 11/8/1977 ---    IMM INFLUENZA (1) 9/1/2017 ---            Results     POCT Protime      Component    INR    7.2                        Current Immunizations     13-VALENT PCV PREVNAR 6/24/2017 11:18 AM      Below and/or attached are the medications your provider expects you to take. Review all of your home medications and newly ordered medications with your provider and/or pharmacist. Follow medication instructions as directed by your provider and/or pharmacist. Please keep your medication list with you and share with your provider. Update the information when medications are discontinued, doses are changed, or new medications (including over-the-counter products) are added; and carry medication information at all times in the event of emergency situations     Allergies:  No Known Allergies          Medications  Valid as of: July 03, 2017 -  4:02 PM    Generic Name Brand Name Tablet Size Instructions for use    Dexamethasone (Tab) DECADRON 2 MG Take 1 Tab by mouth 2 times a day.        Enoxaparin Sodium (Solution) LOVENOX 100 MG/ML Inject 100 mg as instructed every 12 hours.        Hydrocodone-Acetaminophen (Tab) NORCO 5-325 MG Take 1-2 Tabs by mouth every 7 days. Pt takes PRN only        Insulin NPH Human (Isophane) (Suspension) HUMULIN,NOVOLIN 100 UNIT/ML Inject 35-40 Units as instructed 2 Times a Day. 40 units in the morning and 35 units at night        Insulin Regular Human (Solution) HUMULIN R 100 Unit/mL Inject 3-8 Units as instructed every day. 200-249:3 units 250-299: 5 units 300-349: 7 units over 350 is 8  units    Hasn't used since March        LevETIRAcetam (Solution) KEPPRA 100 MG/ML Take 15 mL by mouth every 12 hours.        Sennosides-Docusate Sodium (Tab) PERICOLACE or SENOKOT S 8.6-50 MG Take 1 Tab by mouth every day.        Warfarin Sodium (Tab) COUMADIN 5 MG Take 1 Tab by mouth every day.        .                 Medicines prescribed today were sent to:     [facility]/PHARMACY #[id number] - [place], [place] - [address] [place] [address]    Phone: [phone] Fax: [phone]    Open 24 Hours?: No      Medication refill instructions:       If your prescription bottle indicates you have medication refills left, it is not necessary to call your provider’s office. Please contact your pharmacy and they will refill your medication.    If your prescription bottle indicates you do not have any refills left, you may request refills at any time through one of the following ways: The online [employer] system (except Urgent Care), by calling your provider’s office, or by asking your pharmacy to contact your provider’s office with a refill request. Medication refills are processed only during regular business hours and may not be available until the next business day. Your provider may request additional information or to have a follow-up visit with you prior to refilling your medication.   *Please Note: Medication refills are assigned a new Rx number when refilled electronically. Your pharmacy may indicate that no refills were authorized even though a new prescription for the same medication is available at the pharmacy. Please request the medicine by name with the pharmacy before contacting your provider for a refill.        Warfarin Dosing Calendar   July 2017 Details    Sun Mon Tue Wed Thu Fri Sat           1                 2               3   7.2   2.5 mg   See details      4      Hold         5      Hold         6               7               8                 9                10               11               12               13               14               15                 16               17               18               19               20               21               22                 23               24               25               26               27               28               29                 30               31                     Date Details   07/03 This INR check   INR: 7.2       Date of next INR:  7/5/2017         How to take your warfarin dose     To take:  2.5 mg Take 0.5 of a 5 mg tablet.    Hold Do not take your warfarin dose. See the Details table to the right for additional instructions.                   MyChart Status: Patient Declined

## 2017-07-03 NOTE — PROGRESS NOTES
Anticoagulation Summary as of 7/3/2017     INR goal 2.0-3.0    Selected INR 7.2! (7/3/2017)    Maintenance plan 2.5 mg (5 mg x 0.5) every day    Weekly total 17.5 mg    Plan last modified Sara Rodriguez, A.P.NAdria (7/3/2017)    Next INR check 7/5/2017    Target end date       Anticoagulation Episode Summary     INR check location     Preferred lab     Send INR reminders to     Comments         Patient is supratherapeutic today. He is relatively new to warfarin. Per his daughter who is translating, denies any medication or diet changes. No current symptoms of bleeding or thrombosis reported. Will HOLD two doses and follow up on Wednesday. Advised to seek medical attention for any bleeding lasting > 30 minutes. Will forward chart to Dr. Bloch for review and length of therapy determination.    Next Appointment: Wednesday, July 5, 2017 at 2:00 pm.     Sara ROBERTSON

## 2017-07-05 NOTE — PROGRESS NOTES
Anticoagulation Summary as of 7/5/2017     INR goal 2.0-3.0   Selected INR 3.6! (7/5/2017)   Maintenance plan 2.5 mg (5 mg x 0.5) every day   Weekly total 17.5 mg   Plan last modified Sara Rodriguez, A.P.N. (7/3/2017)   Next INR check 7/10/2017   Target end date     Indications   Pulmonary embolism (CMS-HCC) [I26.99]  Recurrent pulmonary embolism (CMS-[facility]) [I26.99]  Acute deep vein thrombosis (DVT) of both lower extremities (CMS-[facility]) [I82.403]         Anticoagulation Episode Summary     INR check location     Preferred lab     Send INR reminders to     Comments       Anticoagulation Care Providers     Provider Role Specialty Phone number    [facility] Responsible  [phone]        Patient is supratherapeutic today. INR down from 7.2 with one dose hold. Diagnosed with recurrent PEs on Xarelto. Per his family who is translating, denies any medication or diet changes. No current symptoms of bleeding or thrombosis reported. Will have patient restart warfarin tonight at 2.5 mg daily. Follow up on Monday.    Next Appointment: Monday, July 10, 2017 at 8:15 am.     Sara ROBERTSON

## 2017-07-05 NOTE — MR AVS SNAPSHOT
Dale Azael Godfrey Mathew   2017 2:00 PM   Anticoagulation Visit   MRN: [id number]    Department:  Vascular Medicine   Dept Phone:  [phone]    Description:  Male : 1960   Provider:  [facility] EXAM 1           Allergies as of 2017     No Known Allergies      You were diagnosed with     Recurrent pulmonary embolism (CMS-HCC)   [936857]       Other acute pulmonary embolism without acute cor pulmonale   [4343648]       Acute deep vein thrombosis (DVT) of other specified vein of both lower extremities   [3003079]         Vital Signs     Smoking Status                   Former Smoker           Basic Information     Date Of Birth Sex Race Ethnicity Preferred Language    1960 Male White  Origin (Yi,Ghanaian,Malagasy,Bruneian, etc) English      Your appointments     Jul 10, 2017  8:15 AM   Established Patient with [facility] EXAM 4   [facility]  (--)    [address] [facility] [place] [address]   [phone]            2017 10:00 AM   ONCOLOGY EST PATIENT 30 MIN with ROCIO McconnellPDARLENE   [facility] (--)    [address], [address]  [place] [id number] [phone]              Problem List              ICD-10-CM Priority Class Noted - Resolved    Obesity (BMI 30-39.9) E66.9 Low  5/15/2015 - Present    Chronic pain of right knee M25.561, G89.29   5/15/2015 - Present    Cerebral edema (CMS-HCC) G93.6   2017 - Present    New onset seizure (CMS-HCC) R56.9 High  2017 - Present    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 High  2017 - Present    Malignant neoplasm of brain (CMS-HCC) C71.9   2017 - Present    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, T38.0X5A   3/13/2017 - Present    Acute deep vein thrombosis (DVT) of both lower extremities (CMS-HCC) I82.403   3/13/2017 - Present    Pneumonia J18.9   3/23/2017 - Present    Acute respiratory failure with hypoxia (CMS-HCC)  J96.01   3/23/2017 - Present    Astrocytoma ([facility]) C71.9   3/24/2017 - Present    Seizure disorder (CMS-HCC) G40.909   3/24/2017 - Present    Hyponatremia E87.1   3/24/2017 - Present    Macrocytic anemia D53.9   3/24/2017 - Present    Thrush, oral B37.0   3/24/2017 - Present    Pulmonary embolism (CMS-HCC) I26.99   6/22/2017 - Present    Thrombocytopenia (CMS-HCC) D69.6   6/22/2017 - Present    Tachycardia R00.0   6/23/2017 - Present    Recurrent pulmonary embolism (CMS-HCC) I26.99   6/26/2017 - Present      Health Maintenance        Date Due Completion Dates    IMM HEP B VACCINE (1 of 3 - Primary Series) 1960 ---    IMM DTaP/Tdap/Td Vaccine (1 - Tdap) 11/8/1977 ---    IMM INFLUENZA (1) 9/1/2017 ---            Results     POCT Protime      Component    INR    3.6                        Current Immunizations     13-VALENT PCV PREVNAR 6/24/2017 11:18 AM      Below and/or attached are the medications your provider expects you to take. Review all of your home medications and newly ordered medications with your provider and/or pharmacist. Follow medication instructions as directed by your provider and/or pharmacist. Please keep your medication list with you and share with your provider. Update the information when medications are discontinued, doses are changed, or new medications (including over-the-counter products) are added; and carry medication information at all times in the event of emergency situations     Allergies:  No Known Allergies          Medications  Valid as of: July 05, 2017 -  2:22 PM    Generic Name Brand Name Tablet Size Instructions for use    Dexamethasone (Tab) DECADRON 2 MG Take 1 Tab by mouth 2 times a day.        Enoxaparin Sodium (Solution) LOVENOX 100 MG/ML Inject 100 mg as instructed every 12 hours.        Hydrocodone-Acetaminophen (Tab) NORCO 5-325 MG Take 1-2 Tabs by mouth every 7 days. Pt takes PRN only        Insulin NPH Human (Isophane) (Suspension) HUMULIN,NOVOLIN 100 UNIT/ML  Inject 35-40 Units as instructed 2 Times a Day. 40 units in the morning and 35 units at night        Insulin Regular Human (Solution) HUMULIN R 100 Unit/mL Inject 3-8 Units as instructed every day. 200-249:3 units 250-299: 5 units 300-349: 7 units over 350 is 8 units    Hasn't used since March        LevETIRAcetam (Solution) KEPPRA 100 MG/ML Take 15 mL by mouth every 12 hours.        Sennosides-Docusate Sodium (Tab) PERICOLACE or SENOKOT S 8.6-50 MG Take 1 Tab by mouth every day.        Warfarin Sodium (Tab) COUMADIN 5 MG Take 1 Tab by mouth every day.        .                 Medicines prescribed today were sent to:     [facility]/PHARMACY #[id number] - [place], [place] - [address] [facility] AT [address] [address]    Phone: [phone] Fax: [phone]    Open 24 Hours?: No      Medication refill instructions:       If your prescription bottle indicates you have medication refills left, it is not necessary to call your provider’s office. Please contact your pharmacy and they will refill your medication.    If your prescription bottle indicates you do not have any refills left, you may request refills at any time through one of the following ways: The online [employer] system (except Urgent Care), by calling your provider’s office, or by asking your pharmacy to contact your provider’s office with a refill request. Medication refills are processed only during regular business hours and may not be available until the next business day. Your provider may request additional information or to have a follow-up visit with you prior to refilling your medication.   *Please Note: Medication refills are assigned a new Rx number when refilled electronically. Your pharmacy may indicate that no refills were authorized even though a new prescription for the same medication is available at the pharmacy. Please request the medicine by name with the pharmacy before contacting your provider for a refill.           Warfarin Dosing Calendar   July 2017 Details    Sun Mon Tue Wed Thu Fri Sat           1                 2               3               4               5   3.6   2.5 mg   See details      6      2.5 mg         7      2.5 mg         8      2.5 mg           9      2.5 mg         10      2.5 mg         11               12               13               14               15                 16               17               18               19               20               21               22                 23               24               25               26               27               28               29                 30               31                     Date Details   07/05 This INR check   INR: 3.6       Date of next INR:  7/10/2017         How to take your warfarin dose     To take:  2.5 mg Take 0.5 of a 5 mg tablet.              MyChart Status: Patient Declined

## 2017-07-07 NOTE — TELEPHONE ENCOUNTER
Initial anticoagulation clinic note most recent discharge summary reviewed.  Patient with recurrent DVT/PE, malignancy associated  May have been a xarelto failure  Pending any further recommendations from PCP or oncology, We'll continue with indefinite anti-coag regulation is recommended at discharge    Michael J. Bloch, MD  Anticoagulation Center    Cc:  VIKA Pina

## 2017-07-07 NOTE — TELEPHONE ENCOUNTER
Patient's daughter called stating that  took his chemo pill for 2 days and on the 3rd. day he ended up at the hospital . They would like to know if he needs to start taking the chemo mediation again.

## 2017-07-10 NOTE — MR AVS SNAPSHOT
Dale Azaelligia Carmona   7/10/2017 8:15 AM   Anticoagulation Visit   MRN: [id number]    Department:  Vascular Medicine   Dept Phone:  [phone]    Description:  Male : 1960   Provider:  [facility] EXAM 4           Allergies as of 7/10/2017     No Known Allergies      You were diagnosed with     Recurrent pulmonary embolism (CMS-HCC)   [389621]       Other acute pulmonary embolism without acute cor pulmonale   [6462946]       Acute deep vein thrombosis (DVT) of other specified vein of both lower extremities   [1841382]         Vital Signs     Smoking Status                   Former Smoker           Basic Information     Date Of Birth Sex Race Ethnicity Preferred Language    1960 Male White  Origin (Polish,Greek,Scottish,Junior, etc) English      Your appointments     2017 10:00 AM   ONCOLOGY EST PATIENT 30 MIN with JERRY Mcconnell   [facility] (--)    [address], [address]  [facility] [address] [phone]            2017  8:15 AM   Established Patient with [facility] EXAM 5   [facility]  (--)    [address] [facility] [address]   [phone]              Problem List              ICD-10-CM Priority Class Noted - Resolved    Obesity (BMI 30-39.9) E66.9 Low  5/15/2015 - Present    Chronic pain of right knee M25.561, G89.29   5/15/2015 - Present    Cerebral edema (CMS-HCC) G93.6   2017 - Present    New onset seizure (CMS-HCC) R56.9 High  2017 - Present    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 High  2017 - Present    Malignant neoplasm of brain (CMS-HCC) C71.9   2017 - Present    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, T38.0X5A   3/13/2017 - Present    Acute deep vein thrombosis (DVT) of both lower extremities (CMS-HCC) I82.403   3/13/2017 - Present    Pneumonia J18.9   3/23/2017 - Present    Acute respiratory failure with hypoxia (CMS-HCC)  J96.01   3/23/2017 - Present    Astrocytoma ([facility]) C71.9   3/24/2017 - Present    Seizure disorder (CMS-HCC) G40.909   3/24/2017 - Present    Hyponatremia E87.1   3/24/2017 - Present    Macrocytic anemia D53.9   3/24/2017 - Present    Thrush, oral B37.0   3/24/2017 - Present    Pulmonary embolism (CMS-HCC) I26.99   6/22/2017 - Present    Thrombocytopenia (CMS-HCC) D69.6   6/22/2017 - Present    Tachycardia R00.0   6/23/2017 - Present    Recurrent pulmonary embolism (CMS-HCC) I26.99   6/26/2017 - Present      Health Maintenance        Date Due Completion Dates    IMM HEP B VACCINE (1 of 3 - Primary Series) 1960 ---    IMM DTaP/Tdap/Td Vaccine (1 - Tdap) 11/8/1977 ---    IMM INFLUENZA (1) 9/1/2017 ---            Results     POCT Protime      Component    INR    2.1                        Current Immunizations     13-VALENT PCV PREVNAR 6/24/2017 11:18 AM      Below and/or attached are the medications your provider expects you to take. Review all of your home medications and newly ordered medications with your provider and/or pharmacist. Follow medication instructions as directed by your provider and/or pharmacist. Please keep your medication list with you and share with your provider. Update the information when medications are discontinued, doses are changed, or new medications (including over-the-counter products) are added; and carry medication information at all times in the event of emergency situations     Allergies:  No Known Allergies          Medications  Valid as of: July 10, 2017 -  8:33 AM    Generic Name Brand Name Tablet Size Instructions for use    Dexamethasone (Tab) DECADRON 2 MG Take 1 Tab by mouth 2 times a day.        Enoxaparin Sodium (Solution) LOVENOX 100 MG/ML Inject 100 mg as instructed every 12 hours.        Hydrocodone-Acetaminophen (Tab) NORCO 5-325 MG Take 1-2 Tabs by mouth every 7 days. Pt takes PRN only        Insulin NPH Human (Isophane) (Suspension) HUMULIN,NOVOLIN 100 UNIT/ML  Inject 35-40 Units as instructed 2 Times a Day. 40 units in the morning and 35 units at night        Insulin Regular Human (Solution) HUMULIN R 100 Unit/mL Inject 3-8 Units as instructed every day. 200-249:3 units 250-299: 5 units 300-349: 7 units over 350 is 8 units    Hasn't used since March        LevETIRAcetam (Solution) KEPPRA 100 MG/ML Take 15 mL by mouth every 12 hours.        Sennosides-Docusate Sodium (Tab) PERICOLACE or SENOKOT S 8.6-50 MG Take 1 Tab by mouth every day.        Warfarin Sodium (Tab) COUMADIN 5 MG Take 1 Tab by mouth every day.        .                 Medicines prescribed today were sent to:     [facility]/PHARMACY #[id number] - [place], [place] - [address] [facility] AT [address] [address]    Phone: [phone] Fax: [phone]    Open 24 Hours?: No      Medication refill instructions:       If your prescription bottle indicates you have medication refills left, it is not necessary to call your provider’s office. Please contact your pharmacy and they will refill your medication.    If your prescription bottle indicates you do not have any refills left, you may request refills at any time through one of the following ways: The online [employer] system (except Urgent Care), by calling your provider’s office, or by asking your pharmacy to contact your provider’s office with a refill request. Medication refills are processed only during regular business hours and may not be available until the next business day. Your provider may request additional information or to have a follow-up visit with you prior to refilling your medication.   *Please Note: Medication refills are assigned a new Rx number when refilled electronically. Your pharmacy may indicate that no refills were authorized even though a new prescription for the same medication is available at the pharmacy. Please request the medicine by name with the pharmacy before contacting your provider for a refill.           Warfarin Dosing Calendar   July 2017 Details    Sun Mon Tue Wed Thu Fri Sat           1                 2               3               4               5               6               7               8                 9               10   2.1   5 mg   See details      11      2.5 mg         12      2.5 mg         13      2.5 mg         14      2.5 mg         15      2.5 mg           16      2.5 mg         17      5 mg         18      2.5 mg         19      2.5 mg         20      2.5 mg         21      2.5 mg         22      2.5 mg           23      2.5 mg         24      5 mg         25      2.5 mg         26      2.5 mg         27      2.5 mg         28      2.5 mg         29      2.5 mg           30      2.5 mg         31      5 mg               Date Details   07/10 This INR check   INR: 2.1      Date of next INR: No date specified         How to take your warfarin dose     To take:  2.5 mg Take 0.5 of a 5 mg tablet.    To take:  5 mg Take 1 of the 5 mg tablets.              MyChart Status: Patient Declined

## 2017-07-10 NOTE — PROGRESS NOTES
OP Anticoagulation Service Note    Date: 7/10/2017    Anticoagulation Summary as of 7/10/2017     INR goal 2.0-3.0   Selected INR 2.1 (7/10/2017)   Maintenance plan 5 mg (5 mg x 1) on Mon; 2.5 mg (5 mg x 0.5) all other days   Weekly total 20 mg   Plan last modified Berenice Sebastian, APRIL (7/10/2017)   Next INR check 7/14/2017   Target end date Indefinite    Indications   Pulmonary embolism (CMS-HCC) [I26.99]  Recurrent pulmonary embolism (CMS-HCC) [I26.99]  Acute deep vein thrombosis (DVT) of both lower extremities (CMS-[facility]) [I82.403]         Anticoagulation Episode Summary     INR check location     Preferred lab     Send INR reminders to     Comments       Anticoagulation Care Providers     Provider Role Specialty Phone number    Princess Funez M.D. [facility] [phone]    [facility] Anticoagulation Services Responsible  [phone]    JAGDEEP Kinsey Responsible [facility] [phone]        Anticoagulation Patient Findings      Plan:  Patient is therapeutic today. He is relatively new to warfarin. Confirmed dosing regimen. Patient denies any changes in medications or diet. Patient denies any signs or symptoms of bleeding or clotting. Instructed patient to call clinic if any unusual bleeding or bruising occurs. INR has trended down quickly, will increase regimen as outlined.  Will follow-up with patient in 4 day(s).        Berenice Sebastian, APRIL

## 2017-07-11 NOTE — PROGRESS NOTES
Subjective:      Dale Carmona is a 56 y.o. male who presents for Follow-Up for astrocytoma.         HPI    Patient seen today in follow-up for astrocytoma. He is accompanied by his daughter for today's visit. Patient is scheduled to start his second cycle of maintenance Temodar soon.    Patient started cycle #1 of maintenance Temodar in the middle of June. According to the daughter after 2 days being on the treatment she noticed that he was not acting himself and she called an ambulance. Patient was hospitalized and found to have a pulmonary embolism. Patient only completed 2 days of Temodar at that time. It is assumed that he may have had a failure of Xarelto as he developed a PE while on the medication. He has since then been converted to Coumadin and is currently working with her anticoagulation clinic to get to an adequate level of Coumadin. Patient did not complete his Temodar for his maintenance cycle.    According to the daughter patient has had an increase in headaches over the last 3 weeks. He has had headaches in the past but he states that they are worse than they have been before. He stated that there is a pressure around his head and feels dizzy at times. He has not fallen as he has to utilize walls and furniture in order to be more steady on his feet. He also complains of changes in his vision. He states that things appear blurry and he cannot focus. He is also been complaining of feeling like his ears are clogged. Patient has been taking hydrocodone for the pain which does seem to help. According to the daughter he is taking about one tablet of hydrocodone with every meal. He is still currently on dexamethasone 2 mg by mouth twice a day.    Patient is eating okay. He states that his appetite has been much better since his last hospital stay. He did have some nausea with the chemotherapy but it was resolved with antiemetics.    Patient denies any fevers or chills. He has had  approximately 5 pound weight loss. He has had some fatigue. He denies any coughing or wheezing, however he does have some shortness of breath with ambulation. He also has bilateral lower extremity swelling. He denies any chest pain or heart palpitations. He currently denies any nausea, vomiting, diarrhea or constipation. He is having normal formed stool. He is also voiding without difficulty.    Patient did have blood work completed on July 8. Does show that he is anemic with a hemoglobin of 7.6.      No Known Allergies  Current Outpatient Prescriptions on File Prior to Visit   Medication Sig Dispense Refill   • enoxaparin (LOVENOX) 100 MG/ML Solution inj Inject 100 mg as instructed every 12 hours. 14 Syringe 1   • warfarin (COUMADIN) 5 MG Tab Take 1 Tab by mouth every day. 30 Tab 3   • insulin NPH (HUMULIN,NOVOLIN) 100 UNIT/ML Suspension Inject 35-40 Units as instructed 2 Times a Day. 40 units in the morning and 35 units at night     • insulin regular (HUMULIN R) 100 Unit/mL Solution Inject 3-8 Units as instructed every day. 200-249:3 units 250-299: 5 units 300-349: 7 units over 350 is 8 units    Hasn't used since March     • dexamethasone (DECADRON) 2 MG tablet Take 1 Tab by mouth 2 times a day. 60 Tab 1   • levetiracetam (KEPPRA) 100 MG/ML Solution Take 15 mL by mouth every 12 hours. 240 mL 11   • hydrocodone-acetaminophen (NORCO) 5-325 MG Tab per tablet Take 1-2 Tabs by mouth every 7 days. Pt takes PRN only     • senna-docusate (PERICOLACE OR SENOKOT S) 8.6-50 MG Tab Take 1 Tab by mouth every day. 30 Tab 3     No current facility-administered medications on file prior to visit.           Review of Systems   Constitutional: Positive for weight loss (5 pounds weight loss) and malaise/fatigue. Negative for fever and chills.   HENT: Positive for ear pain.    Eyes: Positive for blurred vision and double vision. Negative for photophobia.   Respiratory: Positive for shortness of breath (when he ambulates). Negative for  cough.    Cardiovascular: Positive for leg swelling. Negative for chest pain and palpitations.   Gastrointestinal: Negative for nausea, vomiting, diarrhea and constipation.   Genitourinary: Negative for dysuria.   Musculoskeletal: Positive for joint pain (knee pain - chronic).   Neurological: Positive for dizziness and headaches.          Objective:     /72 mmHg  Pulse 70  Temp(Src) 36.4 °C (97.6 °F)  Resp 16  Wt 95.15 kg (209 lb 12.3 oz)  SpO2 99%     Physical Exam   Constitutional: He is oriented to person, place, and time. He appears well-developed and well-nourished. No distress.   HENT:   Head: Normocephalic and atraumatic.   Mouth/Throat: Oropharynx is clear and moist. No oropharyngeal exudate.   Eyes: Conjunctivae and EOM are normal. Pupils are equal, round, and reactive to light. Right eye exhibits no discharge. Left eye exhibits no discharge. No scleral icterus.   Cardiovascular: Normal rate, regular rhythm, normal heart sounds and intact distal pulses.  Exam reveals no gallop and no friction rub.    No murmur heard.  Pulmonary/Chest: Effort normal and breath sounds normal. No respiratory distress. He has no wheezes.   Abdominal: Soft. Bowel sounds are normal. He exhibits no distension. There is no tenderness.   Musculoskeletal: He exhibits edema (2+ pitting edema). He exhibits no tenderness.   Unstable   Neurological: He is alert and oriented to person, place, and time.   Skin: Skin is warm and dry. No rash noted. He is not diaphoretic. No erythema. No pallor.   Psychiatric: He has a normal mood and affect. His behavior is normal.   Vitals reviewed.      Anticoagulation Visit on 07/10/2017   Component Date Value Ref Range Status   • INR 07/10/2017 2.1   Final   • POC INR 07/10/2017 2.1* 0.9 - 1.2 Final    Comment: INR - Non-therapeutic Reference Range: 0.9-1.2  INR - Therapeutic Reference Range: 2.0-4.0     Hospital Outpatient Visit on 07/08/2017   Component Date Value Ref Range Status   • WBC  07/08/2017 6.6  4.8 - 10.8 K/uL Final   • RBC 07/08/2017 2.71* 4.70 - 6.10 M/uL Final   • Hemoglobin 07/08/2017 7.6* 14.0 - 18.0 g/dL Final   • Hematocrit 07/08/2017 25.8* 42.0 - 52.0 % Final   • MCV 07/08/2017 95.2  81.4 - 97.8 fL Final   • MCH 07/08/2017 28.0  27.0 - 33.0 pg Final   • MCHC 07/08/2017 29.5* 33.7 - 35.3 g/dL Final   • RDW 07/08/2017 57.6* 35.9 - 50.0 fL Final   • Platelet Count 07/08/2017 281  164 - 446 K/uL Final   • MPV 07/08/2017 9.4  9.0 - 12.9 fL Final   • Nucleated RBC 07/08/2017 4.90   Final   • NRBC (Absolute) 07/08/2017 0.32   Final   • Neutrophils-Polys 07/08/2017 86.40* 44.00 - 72.00 % Final   • Lymphocytes 07/08/2017 6.40* 22.00 - 41.00 % Final   • Monocytes 07/08/2017 5.20  0.00 - 13.40 % Final   • Eosinophils 07/08/2017 0.00  0.00 - 6.90 % Final   • Basophils 07/08/2017 0.20  0.00 - 1.80 % Final   • Immature Granulocytes 07/08/2017 1.80* 0.00 - 0.90 % Final   • Lymphs (Absolute) 07/08/2017 0.42* 1.00 - 4.80 K/uL Final   • Monos (Absolute) 07/08/2017 0.34  0.00 - 0.85 K/uL Final   • Eos (Absolute) 07/08/2017 0.00  0.00 - 0.51 K/uL Final   • Baso (Absolute) 07/08/2017 0.01  0.00 - 0.12 K/uL Final   • Immature Granulocytes (abs) 07/08/2017 0.12* 0.00 - 0.11 K/uL Final   • Neutrophils (Absolute) 07/08/2017 5.68  1.82 - 7.42 K/uL Final    Includes immature neutrophils, if present.   • Anisocytosis 07/08/2017 1+   Final   • Macrocytosis 07/08/2017 1+   Final   • Microcytosis 07/08/2017 1+   Final   • Sodium 07/08/2017 134* 135 - 145 mmol/L Final   • Potassium 07/08/2017 3.3* 3.6 - 5.5 mmol/L Final   • Chloride 07/08/2017 101  96 - 112 mmol/L Final   • Co2 07/08/2017 23  20 - 33 mmol/L Final   • Anion Gap 07/08/2017 10.0  0.0 - 11.9 Final   • Glucose 07/08/2017 145* 65 - 99 mg/dL Final   • Bun 07/08/2017 12  8 - 22 mg/dL Final   • Creatinine 07/08/2017 0.56  0.50 - 1.40 mg/dL Final   • Calcium 07/08/2017 8.4* 8.5 - 10.5 mg/dL Final   • AST(SGOT) 07/08/2017 18  12 - 45 U/L Final   • ALT(SGPT)  07/08/2017 52* 2 - 50 U/L Final   • Alkaline Phosphatase 07/08/2017 53  30 - 99 U/L Final   • Total Bilirubin 07/08/2017 0.4  0.1 - 1.5 mg/dL Final   • Albumin 07/08/2017 3.3  3.2 - 4.9 g/dL Final   • Total Protein 07/08/2017 5.8* 6.0 - 8.2 g/dL Final   • Globulin 07/08/2017 2.5  1.9 - 3.5 g/dL Final   • A-G Ratio 07/08/2017 1.3   Final   • Peripheral Smear Review 07/08/2017 see below   Final    Comment: Due to instrument suspect flags, further review of peripheral smear is  indicated on this patient sample. This review may or may not result in  abnormal findings.     • Plt Estimation 07/08/2017 Normal   Final   • RBC Morphology 07/08/2017 Present   Final   • Giant Platelets 07/08/2017 1+   Final   • Polychromia 07/08/2017 1+   Final   • Tear Drop Cells 07/08/2017 1+   Final   • Comments-Diff 07/08/2017 see below   Final    Results have been verified by peripheral blood smear review.   • GFR If  07/08/2017 >60  >60 mL/min/1.73 m 2 Final   • GFR If Non  07/08/2017 >60  >60 mL/min/1.73 m 2 Final   Anticoagulation Visit on 07/05/2017   Component Date Value Ref Range Status   • INR 07/05/2017 3.6   Final   • POC INR 07/05/2017 3.6* 0.9 - 1.2 Final    Comment: INR - Non-therapeutic Reference Range: 0.9-1.2  INR - Therapeutic Reference Range: 2.0-4.0              Assessment/Plan:     1. Astrocytoma (CMS-HCC)  REFERRAL TO ONCOLOGY NURSE NAVIGATOR This patient has a screening score of (must be 6 or above):: 5    MR-BRAIN-WITH & W/O    CBC WITH DIFFERENTIAL    IRON    IRON/TOTAL IRON BIND    LDH    VITAMIN B12    RETICULOCYTES COUNT   2. Vision disturbance  MR-BRAIN-WITH & W/O   3. Persistent headaches  MR-BRAIN-WITH & W/O   4. Anemia, unspecified type  CBC WITH DIFFERENTIAL    IRON    IRON/TOTAL IRON BIND    LDH    VITAMIN B12    RETICULOCYTES COUNT       1. Patient with astrocytoma and was started on maintenance Temodar. Unfortunately patient was unable to complete the 5 day course of  Temodar due to hospitalization for pulmonary embolism. He only completed 2 days. At this time patient is having increasing headaches, which is worse than it has been in the past. He is also having visual disturbances feeling like he has blurred vision and at he cannot focus. He also complains of feeling like he has shortness of breath or pressure on his face as well. I discussed with Dr. Osman and recommendation to proceed with a brain MRI. Patient is currently on dexamethasone 2 mg by mouth twice a day. Questioning whether patient is having progression of disease, increased edema, or even a bleed on his brain as patient is currently on Coumadin. At this time I would have patient increase his dexamethasone to 4 mg by mouth twice a day.    I did discuss with the patient and his daughter today that I would like to proceed with a brain MRI. She did express concern of having too many brain normalize to closely together as this may cause seizures. I did explain to the daughter that due to his new onset of symptoms that he needs to have the brain MRI as soon as possible. She also requests that we wait another month until August 1 to get the brain MRI due to insurance issues. He is currently in [facility] and will not establish with his new insurance of [employer] until August 1.    I did express my concern of holding the brain MRI until another 3 weeks due to patient's symptoms. If the patient is having progression of disease or a bleed on his brain then he will need to have interventions immediately. I did discuss with the patient and the daughter that this is a life-threatening situation and that we need to proceed with a brain MRI sooner. After further discussion with the patient and his daughter utilizing a  he has agreed to proceed with the MRI sooner than August 1. We will arrange a MRI and have him follow-up with Dr. Osman to discuss further plans of care. I will also reach out to a  to  assist with their financial concerns.    2. Patient did have labs completed a few days ago and he is anemic with a hemoglobin of 7.6. I discussed with Dr. Osman and patient is not having any symptoms of anemia at this time. I will repeat a CBC today as well as do some iron studies. At the request of Dr. Osman I have also ordered an iron. Total iron binding capacity, LDH, B12, and reticulocyte count.    3. Patient is to follow-up in one week or sooner if needed.

## 2017-07-11 NOTE — MR AVS SNAPSHOT
Dale Spenceentes   2017 1:00 PM   Appointment   MRN: [id number]    Department:  [facility]   Dept Phone:  [phone]    Description:  Male : 1960   Provider:  ONC MA 1           Allergies as of 2017     No Known Allergies      Vital Signs     Smoking Status                   Former Smoker           Basic Information     Date Of Birth Sex Race Ethnicity Preferred Language    1960 Male White  Origin (Welsh,Citizen of [place],Stateless,[place]orean, etc) English      Your appointments     2017  1:00 PM   Non Provider 1 with ONC MA 1   [facility] (--)    [address], [address]  [place] [id number] [phone]           You will be receiving a confirmation call a few days before your appointment from our automated call confirmation system.            2017  8:15 AM   Established Patient with IHV EXAM 5   [facility]  (--)    [address]  [place] [address]   [phone]            2017  4:45 PM   MR HEAD 30 with [address] MRI 2   [facility] - MRI - [address]    [address]  [place] [id number] [phone]            2017  4:40 PM   ONCOLOGY EST PATIENT 30 MIN with Olivier Osman M.D.   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, T38.0X5A   3/13/2017 - Present    Acute deep  vein thrombosis (DVT) of both lower extremities (CMS-HCC) I82.403   3/13/2017 - Present    Pneumonia J18.9   3/23/2017 - Present    Acute respiratory failure with hypoxia (CMS-HCC) J96.01   3/23/2017 - Present    Astrocytoma ([facility]) C71.9   3/24/2017 - Present    Seizure disorder (CMS-HCC) G40.909   3/24/2017 - Present    Hyponatremia E87.1   3/24/2017 - Present    Macrocytic anemia D53.9   3/24/2017 - Present    Thrush, oral B37.0   3/24/2017 - Present    Pulmonary embolism (CMS-HCC) I26.99   6/22/2017 - Present    Thrombocytopenia (CMS-HCC) D69.6   6/22/2017 - Present    Tachycardia R00.0   6/23/2017 - Present    Recurrent pulmonary embolism (CMS-HCC) I26.99   6/26/2017 - Present      Health Maintenance        Date Due Completion Dates    IMM HEP B VACCINE (1 of 3 - Primary Series) 1960 ---    IMM DTaP/Tdap/Td Vaccine (1 - Tdap) 11/8/1977 ---    IMM INFLUENZA (1) 9/1/2017 ---            Current Immunizations     13-VALENT PCV PREVNAR 6/24/2017 11:18 AM      Below and/or attached are the medications your provider expects you to take. Review all of your home medications and newly ordered medications with your provider and/or pharmacist. Follow medication instructions as directed by your provider and/or pharmacist. Please keep your medication list with you and share with your provider. Update the information when medications are discontinued, doses are changed, or new medications (including over-the-counter products) are added; and carry medication information at all times in the event of emergency situations     Allergies:  No Known Allergies          Medications  Valid as of: July 11, 2017 - 11:40 AM    Generic Name Brand Name Tablet Size Instructions for use    Dexamethasone (Tab) DECADRON 2 MG Take 1 Tab by mouth 2 times a day.        Hydrocodone-Acetaminophen (Tab) NORCO 5-325 MG Take 1-2 Tabs by mouth every 7 days. Pt takes PRN only        Insulin NPH Human (Isophane) (Suspension) HUMULIN,NOVOLIN 100  UNIT/ML Inject 35-40 Units as instructed 2 Times a Day. 40 units in the morning and 35 units at night        Insulin Regular Human (Solution) HUMULIN R 100 Unit/mL Inject 3-8 Units as instructed every day. 200-249:3 units 250-299: 5 units 300-349: 7 units over 350 is 8 units    Hasn't used since March        LevETIRAcetam (Solution) KEPPRA 100 MG/ML Take 15 mL by mouth every 12 hours.        Sennosides-Docusate Sodium (Tab) PERICOLACE or SENOKOT S 8.6-50 MG Take 1 Tab by mouth every day.        Warfarin Sodium (Tab) COUMADIN 5 MG Take 1 Tab by mouth every day.        .                 Medicines prescribed today were sent to:     [id number] - [place], [place] - [address] AT [address] [address]    Phone: [phone] Fax: [phone]    Open 24 Hours?: No      Medication refill instructions:       If your prescription bottle indicates you have medication refills left, it is not necessary to call your provider’s office. Please contact your pharmacy and they will refill your medication.    If your prescription bottle indicates you do not have any refills left, you may request refills at any time through one of the following ways: The online [employer] system (except Urgent Care), by calling your provider’s office, or by asking your pharmacy to contact your provider’s office with a refill request. Medication refills are processed only during regular business hours and may not be available until the next business day. Your provider may request additional information or to have a follow-up visit with you prior to refilling your medication.   *Please Note: Medication refills are assigned a new Rx number when refilled electronically. Your pharmacy may indicate that no refills were authorized even though a new prescription for the same medication is available at the pharmacy. Please request the medicine by name with the pharmacy before contacting your provider for a  refill.           MyChart Status: Patient Declined

## 2017-07-11 NOTE — MR AVS SNAPSHOT
Dale Azael Donahue Mathew   2017 10:00 AM   Office Visit   MRN: [id number]    Department:  [facility]   Dept Phone:  [phone]    Description:  Male : 1960   Provider:  Jessica Pina AAdriaPJESUS.           Reason for Visit     Follow-Up           Allergies as of 2017     No Known Allergies      You were diagnosed with     Astrocytoma (CMS-HCC)   [149458]       Vision disturbance   [698165]       Persistent headaches   [238551]       Anemia, unspecified type   [0802875]         Vital Signs     Blood Pressure Pulse Temperature Respirations Weight Oxygen Saturation    102/72 mmHg 70 36.4 °C (97.6 °F) 16 95.15 kg (209 lb 12.3 oz) 99%    Smoking Status                   Former Smoker           Basic Information     Date Of Birth Sex Race Ethnicity Preferred Language    1960 Male White  Origin ([place]an,Northern Irish,Indonesian,Junior, etc) English      Your appointments     2017  8:15 AM   Established Patient with [facility] EXAM 5   [facility]  (--)    [address] [place] [address]   [phone]              Problem List              ICD-10-CM Priority Class Noted - Resolved    Obesity (BMI 30-39.9) E66.9 Low  5/15/2015 - Present    Chronic pain of right knee M25.561, G89.29   5/15/2015 - Present    Cerebral edema (CMS-HCC) G93.6   2017 - Present    New onset seizure (CMS-HCC) R56.9 High  2017 - Present    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 High  2017 - Present    Malignant neoplasm of brain (CMS-HCC) C71.9   2017 - Present    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, T38.0X5A   3/13/2017 - Present    Acute deep vein thrombosis (DVT) of both lower extremities (CMS-HCC) I82.403   3/13/2017 - Present    Pneumonia J18.9   3/23/2017 - Present    Acute respiratory failure with hypoxia (CMS-HCC) J96.01   3/23/2017 - Present    Astrocytoma (CMS-HCC) C71.9   3/24/2017 -  Present    Seizure disorder (CMS-HCC) G40.909   3/24/2017 - Present    Hyponatremia E87.1   3/24/2017 - Present    Macrocytic anemia D53.9   3/24/2017 - Present    Thrush, oral B37.0   3/24/2017 - Present    Pulmonary embolism (CMS-HCC) I26.99   6/22/2017 - Present    Thrombocytopenia (CMS-HCC) D69.6   6/22/2017 - Present    Tachycardia R00.0   6/23/2017 - Present    Recurrent pulmonary embolism (CMS-HCC) I26.99   6/26/2017 - Present      Health Maintenance        Date Due Completion Dates    IMM HEP B VACCINE (1 of 3 - Primary Series) 1960 ---    IMM DTaP/Tdap/Td Vaccine (1 - Tdap) 11/8/1977 ---    IMM INFLUENZA (1) 9/1/2017 ---            Current Immunizations     13-VALENT PCV PREVNAR 6/24/2017 11:18 AM      Below and/or attached are the medications your provider expects you to take. Review all of your home medications and newly ordered medications with your provider and/or pharmacist. Follow medication instructions as directed by your provider and/or pharmacist. Please keep your medication list with you and share with your provider. Update the information when medications are discontinued, doses are changed, or new medications (including over-the-counter products) are added; and carry medication information at all times in the event of emergency situations     Allergies:  No Known Allergies          Medications  Valid as of: July 11, 2017 - 11:00 AM    Generic Name Brand Name Tablet Size Instructions for use    Dexamethasone (Tab) DECADRON 2 MG Take 1 Tab by mouth 2 times a day.        Hydrocodone-Acetaminophen (Tab) NORCO 5-325 MG Take 1-2 Tabs by mouth every 7 days. Pt takes PRN only        Insulin NPH Human (Isophane) (Suspension) HUMULIN,NOVOLIN 100 UNIT/ML Inject 35-40 Units as instructed 2 Times a Day. 40 units in the morning and 35 units at night        Insulin Regular Human (Solution) HUMULIN R 100 Unit/mL Inject 3-8 Units as instructed every day. 200-249:3 units 250-299: 5 units 300-349: 7 units  over 350 is 8 units    Hasn't used since March        LevETIRAcetam (Solution) KEPPRA 100 MG/ML Take 15 mL by mouth every 12 hours.        Sennosides-Docusate Sodium (Tab) PERICOLACE or SENOKOT S 8.6-50 MG Take 1 Tab by mouth every day.        Warfarin Sodium (Tab) COUMADIN 5 MG Take 1 Tab by mouth every day.        .                 Medicines prescribed today were sent to:     [facility]/PHARMACY #[id number] - [place], [place] - [address] AT [address] [place] [address]    Phone: [phone] Fax: [phone]    Open 24 Hours?: No      Medication refill instructions:       If your prescription bottle indicates you have medication refills left, it is not necessary to call your provider’s office. Please contact your pharmacy and they will refill your medication.    If your prescription bottle indicates you do not have any refills left, you may request refills at any time through one of the following ways: The online [employer] system (except Urgent Care), by calling your provider’s office, or by asking your pharmacy to contact your provider’s office with a refill request. Medication refills are processed only during regular business hours and may not be available until the next business day. Your provider may request additional information or to have a follow-up visit with you prior to refilling your medication.   *Please Note: Medication refills are assigned a new Rx number when refilled electronically. Your pharmacy may indicate that no refills were authorized even though a new prescription for the same medication is available at the pharmacy. Please request the medicine by name with the pharmacy before contacting your provider for a refill.        Your To Do List     Future Labs/Procedures Complete By Expires    CBC WITH DIFFERENTIAL  As directed 7/11/2018    IRON/TOTAL IRON BIND  As directed 7/11/2018    IRON  As directed 7/11/2018    LDH  As directed 7/11/2018    MR-BRAIN-WITH & W/O  As  directed 7/11/2018    RETICULOCYTES COUNT  As directed 7/11/2018    VITAMIN B12  As directed 7/11/2018      Referral     A referral request has been sent to our patient care coordination department. Please allow 3-5 business days for us to process this request and contact you either by phone or mail. If you do not hear from us by the 5th business day, please call us at [phone].           MyChart Status: Patient Declined

## 2017-07-11 NOTE — NON-PROVIDER
Dale Carmona is a 56 y.o. male here for a non-provider visit for: Lab Draws  on 7/11/2017 at 11:56 AM    Procedure Performed: Venipuncture     Anatomical site: Left Hand    Equipment used: 21g Butterfly     Labs drawn: CMP, LDH and Iron,Iron/Total IRon BIND, Vitamin B-12, Reticulocytes Count    Ordering Provider: MARCELO Vargas    Herman By: Diana Sierra, Med Ass't   Re-stick Right Hand 23g Butterfly  With no complications

## 2017-07-12 NOTE — TELEPHONE ENCOUNTER
Pt with low iron level.  Per Dr. Osman, pt to take OTC iron supplement.  Informed patient's daughter, Desiree.  Educated her that iron may cause dark stools, which is normal.  Advised her to call if patient develops constipation.  Desiree verbalizes understanding of plan of care.

## 2017-07-14 NOTE — PROGRESS NOTES
Anticoagulation Summary as of 7/14/2017     INR goal 2.0-3.0   Selected INR 3.9! (7/14/2017)   Maintenance plan 2.5 mg (5 mg x 0.5) every day   Weekly total 17.5 mg   Plan last modified Ronaldo Mtz, PHARMD (7/14/2017)   Next INR check 7/21/2017   Target end date Indefinite    Indications   Pulmonary embolism (CMS-HCC) [I26.99]  Recurrent pulmonary embolism (CMS-HCC) [I26.99]  Acute deep vein thrombosis (DVT) of both lower extremities (CMS-[facility]) [I82.403]         Anticoagulation Episode Summary     INR check location     Preferred lab     Send INR reminders to     Comments       Anticoagulation Care Providers     Provider Role Specialty Phone number    Princess Funez M.D. [facility] [phone]    [facility] Responsible  [phone]    JAGDEEP Kinsey Responsible [facility] [phone]        Anticoagulation Patient Findings    Patient's INR was SUPRA therapeutic.  Denies alcohol or cranberry use.  Pt denies any unusual s/s of bleeding, bruising, clotting or any changes to diet or medications other than PO iron.  Hold today then begin decreased regimen.    Earliest pt and his family can return is 1 week.    Ronaldo Mtz, PHARMD

## 2017-07-14 NOTE — MR AVS SNAPSHOT
Dale Azael Godfrey Mathew   2017 8:15 AM   Anticoagulation Visit   MRN: [id number]    Department:  Vascular Medicine   Dept Phone:  [phone]    Description:  Male : 1960   Provider:  [facility] EXAM 5           Allergies as of 2017     No Known Allergies      You were diagnosed with     Recurrent pulmonary embolism (CMS-HCC)   [739690]         Vital Signs     Blood Pressure Pulse Smoking Status             132/82 mmHg 100 Former Smoker         Basic Information     Date Of Birth Sex Race Ethnicity Preferred Language    1960 Male White  Origin (Angolan,German,English,Junior, etc) English      Your appointments     2017  4:45 PM   MR HEAD 30 with [address] MRI 2   [facility] - MRI - [address]    [address]  [facility] [address]   [phone]            2017  4:40 PM   ONCOLOGY EST PATIENT 30 MIN with Olivier Osman M.D.   [facility] (--)    [address], [address]  [facility] [address]   [phone]            2017  8:45 AM   Established Patient with [facility] EXAM 5   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 High  2017 - Present    Malignant neoplasm of brain (CMS-HCC) C71.9   2017 - Present    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, T38.0X5A   3/13/2017 - Present    Acute deep vein thrombosis (DVT) of both lower extremities (CMS-HCC) I82.403   3/13/2017 - Present    Pneumonia J18.9   3/23/2017 - Present    Acute respiratory failure with  hypoxia (CMS-HCC) J96.01   3/23/2017 - Present    Astrocytoma ([facility]) C71.9   3/24/2017 - Present    Seizure disorder (CMS-HCC) G40.909   3/24/2017 - Present    Hyponatremia E87.1   3/24/2017 - Present    Macrocytic anemia D53.9   3/24/2017 - Present    Thrush, oral B37.0   3/24/2017 - Present    Pulmonary embolism (CMS-HCC) I26.99   6/22/2017 - Present    Thrombocytopenia (CMS-HCC) D69.6   6/22/2017 - Present    Tachycardia R00.0   6/23/2017 - Present    Recurrent pulmonary embolism (CMS-HCC) I26.99   6/26/2017 - Present      Health Maintenance        Date Due Completion Dates    IMM HEP B VACCINE (1 of 3 - Primary Series) 1960 ---    IMM DTaP/Tdap/Td Vaccine (1 - Tdap) 11/8/1977 ---    IMM INFLUENZA (1) 9/1/2017 ---            Results     POCT Protime      Component    INR    3.9                        Current Immunizations     13-VALENT PCV PREVNAR 6/24/2017 11:18 AM      Below and/or attached are the medications your provider expects you to take. Review all of your home medications and newly ordered medications with your provider and/or pharmacist. Follow medication instructions as directed by your provider and/or pharmacist. Please keep your medication list with you and share with your provider. Update the information when medications are discontinued, doses are changed, or new medications (including over-the-counter products) are added; and carry medication information at all times in the event of emergency situations     Allergies:  No Known Allergies          Medications  Valid as of: July 14, 2017 -  8:36 AM    Generic Name Brand Name Tablet Size Instructions for use    Dexamethasone (Tab) DECADRON 2 MG Take 1 Tab by mouth 2 times a day.        Hydrocodone-Acetaminophen (Tab) NORCO 5-325 MG Take 1-2 Tabs by mouth every 7 days. Pt takes PRN only        Insulin NPH Human (Isophane) (Suspension) HUMULIN,NOVOLIN 100 UNIT/ML Inject 35-40 Units as instructed 2 Times a Day. 40 units in the morning and 35  units at night        Insulin Regular Human (Solution) HUMULIN R 100 Unit/mL Inject 3-8 Units as instructed every day. 200-249:3 units 250-299: 5 units 300-349: 7 units over 350 is 8 units    Hasn't used since March        LevETIRAcetam (Solution) KEPPRA 100 MG/ML Take 15 mL by mouth every 12 hours.        Sennosides-Docusate Sodium (Tab) PERICOLACE or SENOKOT S 8.6-50 MG Take 1 Tab by mouth every day.        Warfarin Sodium (Tab) COUMADIN 5 MG Take 1 Tab by mouth every day.        .                 Medicines prescribed today were sent to:     [facility]/PHARMACY #[id number] - [place], [place] - [address] [place] [address]    Phone: [phone] Fax: [phone]    Open 24 Hours?: No      Medication refill instructions:       If your prescription bottle indicates you have medication refills left, it is not necessary to call your provider’s office. Please contact your pharmacy and they will refill your medication.    If your prescription bottle indicates you do not have any refills left, you may request refills at any time through one of the following ways: The online [employer] system (except Urgent Care), by calling your provider’s office, or by asking your pharmacy to contact your provider’s office with a refill request. Medication refills are processed only during regular business hours and may not be available until the next business day. Your provider may request additional information or to have a follow-up visit with you prior to refilling your medication.   *Please Note: Medication refills are assigned a new Rx number when refilled electronically. Your pharmacy may indicate that no refills were authorized even though a new prescription for the same medication is available at the pharmacy. Please request the medicine by name with the pharmacy before contacting your provider for a refill.        Warfarin Dosing Calendar   July 2017 Details    Sun Mon Tue Wed Thu Fri Sat            1                 2               3               4               5               6               7               8                 9               10               11               12               13               14   3.9   Hold   See details      15      2.5 mg           16      2.5 mg         17      2.5 mg         18      2.5 mg         19      2.5 mg         20      2.5 mg         21      2.5 mg         22                 23               24               25               26               27               28               29                 30               31                     Date Details   07/14 This INR check   INR: 3.9       Date of next INR:  7/21/2017         How to take your warfarin dose     To take:  2.5 mg Take 0.5 of a 5 mg tablet.    Hold Do not take your warfarin dose. See the Details table to the right for additional instructions.                   MyChart Status: Patient Declined

## 2017-07-17 NOTE — MR AVS SNAPSHOT
"        Dale Carmona   2017 4:40 PM   Office Visit   MRN: [id number]    Department:  [facility]   Dept Phone:  [phone]    Description:  Male : 1960   Provider:  Olivier Osman M.D.           Reason for Visit     Follow-Up post MRI      Allergies as of 2017     No Known Allergies      You were diagnosed with     Anaplastic astrocytoma of temporal lobe (CMS-HCC)   [770474]         Vital Signs     Blood Pressure Pulse Temperature Respirations Height Weight    126/76 mmHg 131 36.7 °C (98.1 °F) 18 1.6 m (5' 3\") 96 kg (211 lb 10.3 oz)    Body Mass Index Oxygen Saturation Smoking Status             37.50 kg/m2 96% Former Smoker         Basic Information     Date Of Birth Sex Race Ethnicity Preferred Language    1960 Male White  Origin (Burkinan,Brazilian,Burmese,Bermudian, etc) English      Your appointments     2017  8:45 AM   Established Patient with [facility] EXAM 5   [facility]  (--)    [address] [facility] [address]   [phone]            Aug 14, 2017  8:00 AM   ONCOLOGY EST PATIENT 30 MIN with Olivier Osman M.D.   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 High  2017 - Present    Malignant neoplasm of brain (CMS-HCC) C71.9   2017 - Present    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, T38.0X5A   3/13/2017 - Present    Acute deep vein thrombosis (DVT) of both lower extremities (CMS-HCC) I82.403   3/13/2017 - Present    Pneumonia J18.9   3/23/2017 - " Present    Acute respiratory failure with hypoxia (CMS-HCC) J96.01   3/23/2017 - Present    Astrocytoma ([facility]) C71.9   3/24/2017 - Present    Seizure disorder (CMS-HCC) G40.909   3/24/2017 - Present    Hyponatremia E87.1   3/24/2017 - Present    Macrocytic anemia D53.9   3/24/2017 - Present    Thrush, oral B37.0   3/24/2017 - Present    Pulmonary embolism (CMS-HCC) I26.99   6/22/2017 - Present    Thrombocytopenia (CMS-HCC) D69.6   6/22/2017 - Present    Tachycardia R00.0   6/23/2017 - Present    Recurrent pulmonary embolism (CMS-HCC) I26.99   6/26/2017 - Present      Health Maintenance        Date Due Completion Dates    IMM HEP B VACCINE (1 of 3 - Primary Series) 1960 ---    IMM DTaP/Tdap/Td Vaccine (1 - Tdap) 11/8/1977 ---    IMM INFLUENZA (1) 9/1/2017 ---            Current Immunizations     13-VALENT PCV PREVNAR 6/24/2017 11:18 AM      Below and/or attached are the medications your provider expects you to take. Review all of your home medications and newly ordered medications with your provider and/or pharmacist. Follow medication instructions as directed by your provider and/or pharmacist. Please keep your medication list with you and share with your provider. Update the information when medications are discontinued, doses are changed, or new medications (including over-the-counter products) are added; and carry medication information at all times in the event of emergency situations     Allergies:  No Known Allergies          Medications  Valid as of: July 17, 2017 -  5:18 PM    Generic Name Brand Name Tablet Size Instructions for use    Dexamethasone (Tab) DECADRON 2 MG Take 1 Tab by mouth 2 times a day.        Hydrocodone-Acetaminophen (Tab) NORCO 5-325 MG Take 1-2 Tabs by mouth every 7 days. Pt takes PRN only        Insulin NPH Human (Isophane) (Suspension) HUMULIN,NOVOLIN 100 UNIT/ML Inject 35-40 Units as instructed 2 Times a Day. 40 units in the morning and 35 units at night        Insulin Regular  Human (Solution) HUMULIN R 100 Unit/mL Inject 3-8 Units as instructed every day. 200-249:3 units 250-299: 5 units 300-349: 7 units over 350 is 8 units    Hasn't used since March        LevETIRAcetam (Solution) KEPPRA 100 MG/ML Take 15 mL by mouth every 12 hours.        Sennosides-Docusate Sodium (Tab) PERICOLACE or SENOKOT S 8.6-50 MG Take 1 Tab by mouth every day.        Warfarin Sodium (Tab) COUMADIN 5 MG Take 1 Tab by mouth every day.        .                 Medicines prescribed today were sent to:     [facility]/PHARMACY #[id number] - [place], [place] - [address] [facility] AT [address] [place] [address]    Phone: [phone] Fax: [phone]    Open 24 Hours?: No      Medication refill instructions:       If your prescription bottle indicates you have medication refills left, it is not necessary to call your provider’s office. Please contact your pharmacy and they will refill your medication.    If your prescription bottle indicates you do not have any refills left, you may request refills at any time through one of the following ways: The online [employer] system (except Urgent Care), by calling your provider’s office, or by asking your pharmacy to contact your provider’s office with a refill request. Medication refills are processed only during regular business hours and may not be available until the next business day. Your provider may request additional information or to have a follow-up visit with you prior to refilling your medication.   *Please Note: Medication refills are assigned a new Rx number when refilled electronically. Your pharmacy may indicate that no refills were authorized even though a new prescription for the same medication is available at the pharmacy. Please request the medicine by name with the pharmacy before contacting your provider for a refill.        Your To Do List     Standing Orders Interval Expires    CBC WITH DIFFERENTIAL   7/17/2018    COMP METABOLIC PANEL   6mon until 7/17/2018 7/17/2018    LDH  month until 7/17/2018 7/17/2018         MyChart Status: Patient Declined

## 2017-07-18 NOTE — PROGRESS NOTES
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  1p, 19q non deleted, IDH-1 wild type,MGMT non methylated  ATRX wild type, p53- no overexpression Ki-67 5%.    History of presenting illness:      Dale Dorado   is a 56 y.o. year old  male who was otherwise in good health who noticed a left facial deviation in mid December 2016 and  some component of aphasia. An MRI done at [facility] showed abnormal increased signal throughout the left temporal and posterior inferior frontal lobes, possibly representing a low-grade tumor. He was seen by Dr. Presley who recommended awake cranitomy with motor/language mapping to facilitate safe resection at [facility]. He was admitted few days after that with a seizure causing syncope and was started on Keppra.    MRI done at [facility] showed extensive infiltrating predominantly nonenhancing left temperofrontal tumor including involvement of the left insula , frontal lobe and a large portion of the anterior and mid temporal lobe . According to the operative note from 1/31/2017, language mapping was done. He had tumor removal approximately 2 cm anterior to the temporal eloquent speech area extending up to the anterior temporal tip. His speech was not comprehensible at that point  and it was decided not to resect tumor posterior or inferior to the critical language area. According to the note, he has high risk of having residual speech problems. He recovered well from the surgery and is currently at a rehabilitation facility.  He continues to have speech problems and word finding difficulty. A CT of the head done on 2/6/2017 here showed significant left shift and postoperative changes.    He was subsequently diagnosed with bilateral DVT and he is on Xarelto. He started chemoradiation in April. Unfortunately, he had a prolonged hospitalization due to various medical  issues including pneumonia causing respiratory failure and the poor performance status. He took temodar only for 4 days and was stopped by the hospitalist.    MRI from June 2017, continues to show extensive left frontal, temporal, parietal and basal ganglia  Edema. He had increased nodularity enhancement in the left frontotemporal area.    After further discussion, he was started on maintenance Temodar at a lower dose of 75 mg/m². He took it for 2 days and then developed chest pain with CT scan showing pulmonary embolism. He was switched to warfarin and had an IVC filter placed.    He presents to clinic along with his daughter. He is a poor historian due to neurological deficit. According to his daughter, his symptoms remains the same. He continues to have chronic headache, weakness with fall episodes.     Due to concerns for her day here in the MRI done 7/14/17- which overall shows stable finding. There is some concern for cortical laminar necrosis      Past Medical History:      Past Medical History   Diagnosis Date   • Hyperlipidemia 2010     no meds   • New onset seizure (CMS-HCC) 1/5/2017   • Brain cancer (CMS-[facility])      Glioma   • Bell palsy 12/2016   • Cancer (CMS-HCC) 1/31/2017     Glioma   • Diabetes (CMS-HCC)      R/t steroid use per dtr   • Bell's palsy 1/2017       Past surgical history:       Past Surgical History   Procedure Laterality Date   • Craniotomy tumor  1/31/2017     Lt frontotemporal       Allergies:       Review of patient's allergies indicates no known allergies.    Medications:         Current Outpatient Prescriptions   Medication Sig Dispense Refill   • warfarin (COUMADIN) 5 MG Tab Take 1 Tab by mouth every day. 30 Tab 3   • hydrocodone-acetaminophen (NORCO) 5-325 MG Tab per tablet Take 1-2 Tabs by mouth every 7 days. Pt takes PRN only     • insulin NPH (HUMULIN,NOVOLIN) 100 UNIT/ML Suspension Inject 35-40 Units as instructed 2 Times a Day. 40 units in the morning and 35 units at night    "  • insulin regular (HUMULIN R) 100 Unit/mL Solution Inject 3-8 Units as instructed every day. 200-249:3 units 250-299: 5 units 300-349: 7 units over 350 is 8 units    Hasn't used since March     • dexamethasone (DECADRON) 2 MG tablet Take 1 Tab by mouth 2 times a day. 60 Tab 1   • senna-docusate (PERICOLACE OR SENOKOT S) 8.6-50 MG Tab Take 1 Tab by mouth every day. 30 Tab 3   • levetiracetam (KEPPRA) 100 MG/ML Solution Take 15 mL by mouth every 12 hours. 240 mL 11     No current facility-administered medications for this visit.         Social History:     Social History     Social History   • Marital Status: Single     Spouse Name: N/A   • Number of Children: N/A   • Years of Education: N/A     Occupational History   • Not on file.     Social History Main Topics   • Smoking status: Former Smoker -- 1.00 packs/day for 30 years     Types: Cigarettes   • Smokeless tobacco: Never Used   • Alcohol Use: No   • Drug Use: No   • Sexual Activity: No      Comment:      Other Topics Concern   • Not on file     Social History Narrative       Family History:      Family History   Problem Relation Age of Onset   • Diabetes Neg Hx    • Seizures Daughter        Review of Systems:  All other review of systems are negative except what was mentioned above in the HPI.    Unable to obtain full review of systems  Physical Exam:  Vitals: /76 mmHg  Pulse 131  Temp(Src) 36.7 °C (98.1 °F)  Resp 18  Ht 1.6 m (5' 3\")  Wt 96 kg (211 lb 10.3 oz)  BMI 37.50 kg/m2  SpO2 96%    General: Not in acute distress, alert and oriented x 3  HEENT: No pallor, icterus. Oropharynx clear.   Neck: Supple, no palpable masses.  Lymph nodes: No palpable cervical, supraclavicular, axillary or inguinal lymphadenopathy.    CVS: regular rate and rhythm, no rubs or gallops  RESP: Clear to auscultate bilaterally, no wheezing or crackles.   ABD: Soft, non tender, non distended, positive bowel sounds, no palpable organomegaly  EXT: No edema or " cyanosis  CNS: Alert and oriented x3, No focal deficits. He has some gait incoordination  Skin- No rash      Labs:   Anticoagulation Visit on 07/14/2017   Component Date Value Ref Range Status   • INR 07/14/2017 3.9   Final   • POC INR 07/14/2017 3.9* 0.9 - 1.2 Final    Comment: INR - Non-therapeutic Reference Range: 0.9-1.2  INR - Therapeutic Reference Range: 2.0-4.0     Hospital Outpatient Visit on 07/11/2017   Component Date Value Ref Range Status   • WBC 07/11/2017 5.8  4.8 - 10.8 K/uL Final   • RBC 07/11/2017 2.91* 4.70 - 6.10 M/uL Final   • Hemoglobin 07/11/2017 8.2* 14.0 - 18.0 g/dL Final   • Hematocrit 07/11/2017 26.7* 42.0 - 52.0 % Final   • MCV 07/11/2017 91.8  81.4 - 97.8 fL Final   • MCH 07/11/2017 28.2  27.0 - 33.0 pg Final   • MCHC 07/11/2017 30.7* 33.7 - 35.3 g/dL Final   • RDW 07/11/2017 57.0* 35.9 - 50.0 fL Final   • Platelet Count 07/11/2017 320  164 - 446 K/uL Final    Results confirmed by repeat testing.   • MPV 07/11/2017 9.0  9.0 - 12.9 fL Final   • Neutrophils-Polys 07/11/2017 82.80* 44.00 - 72.00 % Final   • Lymphocytes 07/11/2017 6.60* 22.00 - 41.00 % Final   • Monocytes 07/11/2017 6.80  0.00 - 13.40 % Final   • Eosinophils 07/11/2017 0.00  0.00 - 6.90 % Final   • Basophils 07/11/2017 0.20  0.00 - 1.80 % Final   • Immature Granulocytes 07/11/2017 3.60* 0.00 - 0.90 % Final   • Nucleated RBC 07/11/2017 6.10   Final   • Neutrophils (Absolute) 07/11/2017 4.78  1.82 - 7.42 K/uL Final    Includes immature neutrophils, if present.   • Lymphs (Absolute) 07/11/2017 0.38* 1.00 - 4.80 K/uL Final   • Monos (Absolute) 07/11/2017 0.39  0.00 - 0.85 K/uL Final   • Eos (Absolute) 07/11/2017 0.00  0.00 - 0.51 K/uL Final   • Baso (Absolute) 07/11/2017 0.01  0.00 - 0.12 K/uL Final   • Immature Granulocytes (abs) 07/11/2017 0.21* 0.00 - 0.11 K/uL Final   • NRBC (Absolute) 07/11/2017 0.35   Final   • Iron 07/11/2017 21* 50 - 180 ug/dL Final   • Total Iron Binding 07/11/2017 441  250 - 450 ug/dL Final   • %  Saturation 07/11/2017 5* 15 - 55 % Final   • LDH Total 07/11/2017 368* 107 - 266 U/L Final   • Vitamin B12 -True Cobalamin 07/11/2017 359  211 - 911 pg/mL Final   • Reticulocyte Count 07/11/2017 5.2* 0.8 - 2.1 % Final   • Retic, Absolute 07/11/2017 0.15* 0.04 - 0.06 M/uL Final   • Imm. 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    He finished radiation. He took only 3 or 4 days of Temodar concurrently and was admitted to the hospital with pneumonia and respiratory failure. Recent MRI shows possible local progression. However, it is unclear whether this is true progression or not. The midline shift has overall improved. However, he has a large area of disease involving the temporoparietal region.    He started maintenance Temodar last month at a lower dose. However, he then developed PE and was again admitted to the hospital. His daughter is making medical decisions on his behalf. She expressed her desire to continue maintenance Temodar. We had a discussion about hospice enrollment. However, she wants to pursue maintenance regimen which I think is reasonable. He will start another course at low dose of 75 mg/m² for 5 days every 4 weeks. We also reviewed the imaging and informed her that overall the MRI shows stable findings. We also discussed the risk of him developing intracranial bleed secondary to anticoagulation. They are waiting for a wheelchair for him. He had few fall episodes, and I am concerned about him developing IC bleed from trauma.     Recurrent DVT/PE- he failed Xarelto and was switched to Coumadin. Continue follow-up with anti-coagulation clinic.  Seizure prophylaxis.-He will take Keppra    Expressive aphasia secondary to large tumor-suspect that this will be  permanent    Headache-overall, the midline shift has improved. Instructed him to lower  Decadron 2 mg twice a day.     Steroid-induced diabetes mellitus-insulin-dependent. He will follow up with PCP  Return to clinic in one month     Please note that this dictation was created using voice recognition software. I have made every reasonable attempt to correct obvious errors, but I expect that there are errors of grammar and possibly content that I did not discover before finalizing the note.      SIGNATURES:  Olivier Osman    CC:  THIAGO KinseyRAdriaN.  No ref. provider found

## 2017-07-21 NOTE — MR AVS SNAPSHOT
Dale Azael Godfrey Mathew   2017 8:45 AM   Anticoagulation Visit   MRN: [id number]    Department:  Vascular Medicine   Dept Phone:  [phone]    Description:  Male : 1960   Provider:  [facility] EXAM 5           Allergies as of 2017     No Known Allergies      You were diagnosed with     Recurrent pulmonary embolism (CMS-HCC)   [268753]         Vital Signs     Smoking Status                   Former Smoker           Basic Information     Date Of Birth Sex Race Ethnicity Preferred Language    1960 Male White  Origin (Croatian,Nepalese,Lebanese,Junior, etc) English      Your appointments     Aug 04, 2017  8:30 AM   Established Patient with [facility] EXAM 5   [facility]  (--)    [address]  [facility] [address]   [phone]            Aug 14, 2017  8:00 AM   ONCOLOGY EST PATIENT 30 MIN with Olivier Osman M.D.   [facility] (--)    [address], [address]  [facility] [id number] [phone]              Problem List              ICD-10-CM Priority Class Noted - Resolved    Obesity (BMI 30-39.9) E66.9 Low  5/15/2015 - Present    Chronic pain of right knee M25.561, G89.29   5/15/2015 - Present    Cerebral edema (CMS-HCC) G93.6   2017 - Present    New onset seizure (CMS-HCC) R56.9 High  2017 - Present    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 High  2017 - Present    Malignant neoplasm of brain (CMS-HCC) C71.9   2017 - Present    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, T38.0X5A   3/13/2017 - Present    Acute deep vein thrombosis (DVT) of both lower extremities (CMS-HCC) I82.403   3/13/2017 - Present    Pneumonia J18.9   3/23/2017 - Present    Acute respiratory failure with hypoxia (CMS-HCC) J96.01   3/23/2017 - Present    Astrocytoma (CMS-HCC) C71.9   3/24/2017 - Present    Seizure disorder (CMS-HCC) G40.909   3/24/2017 - Present    Hyponatremia E87.1   3/24/2017 -  Present    Macrocytic anemia D53.9   3/24/2017 - Present    Thrush, oral B37.0   3/24/2017 - Present    Pulmonary embolism (CMS-[facility]) I26.99   6/22/2017 - Present    Thrombocytopenia (CMS-HCC) D69.6   6/22/2017 - Present    Tachycardia R00.0   6/23/2017 - Present    Recurrent pulmonary embolism (CMS-HCC) I26.99   6/26/2017 - Present      Health Maintenance        Date Due Completion Dates    IMM HEP B VACCINE (1 of 3 - Primary Series) 1960 ---    IMM DTaP/Tdap/Td Vaccine (1 - Tdap) 11/8/1977 ---    IMM INFLUENZA (1) 9/1/2017 ---            Results     POCT Protime      Component    INR    3.5                        Current Immunizations     13-VALENT PCV PREVNAR 6/24/2017 11:18 AM      Below and/or attached are the medications your provider expects you to take. Review all of your home medications and newly ordered medications with your provider and/or pharmacist. Follow medication instructions as directed by your provider and/or pharmacist. Please keep your medication list with you and share with your provider. Update the information when medications are discontinued, doses are changed, or new medications (including over-the-counter products) are added; and carry medication information at all times in the event of emergency situations     Allergies:  No Known Allergies          Medications  Valid as of: July 21, 2017 -  8:58 AM    Generic Name Brand Name Tablet Size Instructions for use    Dexamethasone (Tab) DECADRON 2 MG Take 1 Tab by mouth 2 times a day.        Hydrocodone-Acetaminophen (Tab) NORCO 5-325 MG Take 1-2 Tabs by mouth every 7 days. Pt takes PRN only        Insulin NPH Human (Isophane) (Suspension) HUMULIN,NOVOLIN 100 UNIT/ML Inject 35-40 Units as instructed 2 Times a Day. 40 units in the morning and 35 units at night        Insulin Regular Human (Solution) HUMULIN R 100 Unit/mL Inject 3-8 Units as instructed every day. 200-249:3 units 250-299: 5 units 300-349: 7 units over 350 is 8 units    Hasn't  used since March        LevETIRAcetam (Solution) KEPPRA 100 MG/ML Take 15 mL by mouth every 12 hours.        Sennosides-Docusate Sodium (Tab) PERICOLACE or SENOKOT S 8.6-50 MG Take 1 Tab by mouth every day.        .                 Medicines prescribed today were sent to:     [id number] - [place], [place] - [address] AT [address] [place] [address]    Phone: [phone] Fax: [phone]    Open 24 Hours?: No      Medication refill instructions:       If your prescription bottle indicates you have medication refills left, it is not necessary to call your provider’s office. Please contact your pharmacy and they will refill your medication.    If your prescription bottle indicates you do not have any refills left, you may request refills at any time through one of the following ways: The online [employer] system (except Urgent Care), by calling your provider’s office, or by asking your pharmacy to contact your provider’s office with a refill request. Medication refills are processed only during regular business hours and may not be available until the next business day. Your provider may request additional information or to have a follow-up visit with you prior to refilling your medication.   *Please Note: Medication refills are assigned a new Rx number when refilled electronically. Your pharmacy may indicate that no refills were authorized even though a new prescription for the same medication is available at the pharmacy. Please request the medicine by name with the pharmacy before contacting your provider for a refill.        Warfarin Dosing Calendar   July 2017 Details    Sun Mon Tue Wed Thu Fri Sat           1                 2               3               4               5               6               7               8                 9               10               11               12               13               14               15                 16                17               18               19               20               21   3.5   1.25 mg   See details      22      2.5 mg           23      2.5 mg         24      1.25 mg         25      2.5 mg         26      2.5 mg         27      2.5 mg         28      2.5 mg         29      2.5 mg           30      2.5 mg         31      1.25 mg               Date Details   07/21 This INR check   INR: 3.5               How to take your warfarin dose     To take:  1.25 mg Take 0.5 of a 2.5 mg tablet.    To take:  2.5 mg Take 1 of the 2.5 mg tablets.           Warfarin Dosing Calendar   August 2017 Details    Sun Mon Tue Wed Thu Fri Sat       1      2.5 mg         2      2.5 mg         3      2.5 mg         4      2.5 mg         5                 6               7               8               9               10               11               12                 13               14               15               16               17               18               19                 20               21               22               23               24               25               26                 27               28               29               30               31                  Date Details   No additional details    Date of next INR:  8/4/2017         How to take your warfarin dose     To take:  2.5 mg Take 1 of the 2.5 mg tablets.              MyChart Status: Patient Declined

## 2017-07-21 NOTE — PROGRESS NOTES
Anticoagulation Summary as of 7/21/2017     INR goal 2.0-3.0   Selected INR 3.5! (7/21/2017)   Maintenance plan 1.25 mg (2.5 mg x 0.5) on Mon; 2.5 mg (2.5 mg x 1) all other days   Weekly total 16.25 mg   Plan last modified Ronaldo Mtz, PHARMD (7/21/2017)   Next INR check 8/4/2017   Target end date Indefinite    Indications   Pulmonary embolism (CMS-HCC) [I26.99]  Recurrent pulmonary embolism (CMS-HCC) [I26.99]  Acute deep vein thrombosis (DVT) of both lower extremities (CMS-HCC) [I82.403]         Anticoagulation Episode Summary     INR check location     Preferred lab     Send INR reminders to     Comments       Anticoagulation Care Providers     Provider Role Specialty Phone number    Princess Funez M.D. [facility] [phone]    [facility] Anticoagulation Services Responsible  [phone]    JAGDEEP Kinsey Responsible [facility] Medicine [phone]        Anticoagulation Patient Findings        Dale Carmona seen in clinic today  INR  SURPA-therapeutic.    Denies signs/symptoms of bleeding and/or thrombosis.    Denies changes to diet or medications.   Follow up appointment in 2 week(s).    New strength of warfarin provided.  Pt to reduce today and begin reduced regimen.     Ronaldo Mtz, PHARMD

## 2017-07-24 NOTE — TELEPHONE ENCOUNTER
Chart reviewed in accordance with IVC filter protocol.  Pt is seen regularly by Dr. Osman-- staff msg sent to determine disposition of filter.    Nadia ROBERTSON

## 2017-07-31 NOTE — TELEPHONE ENCOUNTER
Received response from Dr. Osman who would like the pt's IVC filter to be evaluated by the vascular clinic.  To MA to schedule appt.    Nadia ROBERTSON

## 2017-08-04 NOTE — PROGRESS NOTES
Anticoagulation Summary as of 8/4/2017     INR goal 2.0-3.0   Selected INR 3.6! (8/4/2017)   Maintenance plan 1.25 mg (2.5 mg x 0.5) on Mon; 2.5 mg (2.5 mg x 1) all other days   Weekly total 16.25 mg   Plan last modified Denia Lemus, PHARMD (8/4/2017)   Next INR check 8/9/2017   Target end date Indefinite    Indications   Pulmonary embolism (CMS-HCC) [I26.99]  Recurrent pulmonary embolism (CMS-HCC) [I26.99]  Acute deep vein thrombosis (DVT) of both lower extremities (CMS-[facility]) [I82.403]         Anticoagulation Episode Summary     INR check location     Preferred lab     Send INR reminders to     Comments       Anticoagulation Care Providers     Provider Role Specialty Phone number    Princess Funez M.D. [facility] [phone]    [facility] Antico[facility] Services Responsible  [phone]    JAGDEEP Kinsey Responsible [facility] Medicine [phone]        Anticoagulation Patient Findings      Current Outpatient Prescriptions on File Prior to Visit   Medication Sig Dispense Refill   • warfarin (COUMADIN) 2.5 MG Tab Take 1/2 or 1 tablet by mouth daily as instructed by coumadin clinic 30 Tab 5   • hydrocodone-acetaminophen (NORCO) 5-325 MG Tab per tablet Take 1-2 Tabs by mouth every 7 days. Pt takes PRN only     • insulin NPH (HUMULIN,NOVOLIN) 100 UNIT/ML Suspension Inject 35-40 Units as instructed 2 Times a Day. 40 units in the morning and 35 units at night     • insulin regular (HUMULIN R) 100 Unit/mL Solution Inject 3-8 Units as instructed every day. 200-249:3 units 250-299: 5 units 300-349: 7 units over 350 is 8 units    Hasn't used since March     • dexamethasone (DECADRON) 2 MG tablet Take 1 Tab by mouth 2 times a day. 60 Tab 1   • senna-docusate (PERICOLACE OR SENOKOT S) 8.6-50 MG Tab Take 1 Tab by mouth every day. 30 Tab 3   • levetiracetam (KEPPRA) 100 MG/ML Solution Take 15 mL by mouth every 12 hours. 240 mL 11     No current facility-administered medications on file prior to visit.        Lab Results   Component Value Date/Time    SODIUM 134* 07/08/2017 11:40 AM    POTASSIUM 3.3* 07/08/2017 11:40 AM    CHLORIDE 101 07/08/2017 11:40 AM    CO2 23 07/08/2017 11:40 AM    GLUCOSE 145* 07/08/2017 11:40 AM    BUN 12 07/08/2017 11:40 AM    CREATININE 0.56 07/08/2017 11:40 AM          Dale Carmona seen in clinic today  INR  SUPRA-therapeutic.    Denies signs/symptoms of bleeding and/or thrombosis.    Denies changes to diet or medications.   Follow up appointment in 5 day(s).      Will have pt HOLD his warfarin dose today. Pt is going to start 5 days of Temodar today. Advised to give Zofran as needed to keep N/V at bay, without it pt has vomited in the past, but daughter states with it pt keeps his normal diet. Will recheck INR sooner.      Denia Lemus, MARIALUISAD

## 2017-08-04 NOTE — MR AVS SNAPSHOT
Dale Azael Godfrey Mathew   2017 8:30 AM   Anticoagulation Visit   MRN: [id number]    Department:  Vascular Medicine   Dept Phone:  [phone]    Description:  Male : 1960   Provider:  [facility] EXAM 5           Allergies as of 2017     No Known Allergies      You were diagnosed with     Recurrent pulmonary embolism (CMS-HCC)   [[id number]]         Vital Signs     Smoking Status                   Former Smoker           Basic Information     Date Of Birth Sex Race Ethnicity Preferred Language    1960 Male White  Origin (Georgian,Tur[place] and [place] Islander,Micronesian,Estonian, etc) English      Your appointments     Aug 09, 2017  8:30 AM   Established Patient with [facility] EXAM 4   [facility]  (--)    [address] [facility] [address]   [phone]            Aug 14, 2017  8:00 AM   ONCOLOGY EST PATIENT 30 MIN with Olivier Osman M.D.   [facility] (--)    [address], [address]  [facility] [address]   [phone]            Sep 05, 2017  9:40 AM   Initial Visit with VASCULAR NURSE PRACTITIONER, [facility] EXAM 1   [facility]  (--)    [address] [facility] [address]   [phone]              Problem List              ICD-10-CM Priority Class Noted - Resolved    Obesity (BMI 30-39.9) E66.9 Low  5/15/2015 - Present    Chronic pain of right knee M25.561, G89.29   5/15/2015 - Present    Cerebral edema (CMS-HCC) G93.6   2017 - Present    New onset seizure (CMS-HCC) R56.9 High  2017 - Present    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 High  2017 - Present    Malignant neoplasm of brain (CMS-HCC) C71.9   2017 - Present    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, T38.0X5A   3/13/2017 - Present    Acute deep vein thrombosis (DVT) of both lower extremities (CMS-[facility]) I82.403   3/13/2017 - Present    Pneumonia J18.9   3/23/2017 - Present    Acute  respiratory failure with hypoxia (CMS-HCC) J96.01   3/23/2017 - Present    Astrocytoma ([facility]) C71.9   3/24/2017 - Present    Seizure disorder (CMS-HCC) G40.909   3/24/2017 - Present    Hyponatremia E87.1   3/24/2017 - Present    Macrocytic anemia D53.9   3/24/2017 - Present    Thrush, oral B37.0   3/24/2017 - Present    Pulmonary embolism (CMS-HCC) I26.99   6/22/2017 - Present    Thrombocytopenia (CMS-HCC) D69.6   6/22/2017 - Present    Tachycardia R00.0   6/23/2017 - Present    Recurrent pulmonary embolism (CMS-HCC) I26.99   6/26/2017 - Present      Health Maintenance        Date Due Completion Dates    IMM HEP B VACCINE (1 of 3 - Primary Series) 1960 ---    IMM DTaP/Tdap/Td Vaccine (1 - Tdap) 11/8/1977 ---    IMM INFLUENZA (1) 9/1/2017 ---            Results     POCT Protime      Component    INR    3.6                        Current Immunizations     13-VALENT PCV PREVNAR 6/24/2017 11:18 AM      Below and/or attached are the medications your provider expects you to take. Review all of your home medications and newly ordered medications with your provider and/or pharmacist. Follow medication instructions as directed by your provider and/or pharmacist. Please keep your medication list with you and share with your provider. Update the information when medications are discontinued, doses are changed, or new medications (including over-the-counter products) are added; and carry medication information at all times in the event of emergency situations     Allergies:  No Known Allergies          Medications  Valid as of: August 04, 2017 -  8:49 AM    Generic Name Brand Name Tablet Size Instructions for use    Dexamethasone (Tab) DECADRON 2 MG Take 1 Tab by mouth 2 times a day.        Hydrocodone-Acetaminophen (Tab) NORCO 5-325 MG Take 1-2 Tabs by mouth every 7 days. Pt takes PRN only        Insulin NPH Human (Isophane) (Suspension) HUMULIN,NOVOLIN 100 UNIT/ML Inject 35-40 Units as instructed 2 Times a Day. 40  units in the morning and 35 units at night        Insulin Regular Human (Solution) HUMULIN R 100 Unit/mL Inject 3-8 Units as instructed every day. 200-249:3 units 250-299: 5 units 300-349: 7 units over 350 is 8 units    Hasn't used since March        LevETIRAcetam (Solution) KEPPRA 100 MG/ML Take 15 mL by mouth every 12 hours.        Sennosides-Docusate Sodium (Tab) PERICOLACE or SENOKOT S 8.6-50 MG Take 1 Tab by mouth every day.        Warfarin Sodium (Tab) COUMADIN 2.5 MG Take 1/2 or 1 tablet by mouth daily as instructed by coumadin clinic        .                 Medicines prescribed today were sent to:     [id number] - [place], [place] - [address] [place] [address]    Phone: [phone] Fax: [phone]    Open 24 Hours?: No      Medication refill instructions:       If your prescription bottle indicates you have medication refills left, it is not necessary to call your provider’s office. Please contact your pharmacy and they will refill your medication.    If your prescription bottle indicates you do not have any refills left, you may request refills at any time through one of the following ways: The online [employer] system (except Urgent Care), by calling your provider’s office, or by asking your pharmacy to contact your provider’s office with a refill request. Medication refills are processed only during regular business hours and may not be available until the next business day. Your provider may request additional information or to have a follow-up visit with you prior to refilling your medication.   *Please Note: Medication refills are assigned a new Rx number when refilled electronically. Your pharmacy may indicate that no refills were authorized even though a new prescription for the same medication is available at the pharmacy. Please request the medicine by name with the pharmacy before contacting your provider for a refill.        Warfarin  Dosing Calendar   August 2017 Details    Sun Mon Tue Wed Thu Fri Sat       1               2               3               4   3.6   Hold   See details      5      2.5 mg           6      2.5 mg         7      1.25 mg         8      2.5 mg         9      2.5 mg         10      2.5 mg         11               12                 13               14               15               16               17               18               19                 20               21               22               23               24               25               26                 27               28               29               30               31                  Date Details   08/04 This INR check   INR: 3.6       Date of next INR:  8/10/2017         How to take your warfarin dose     To take:  1.25 mg Take 0.5 of a 2.5 mg tablet.    To take:  2.5 mg Take 1 of the 2.5 mg tablets.    Hold Do not take your warfarin dose. See the Details table to the right for additional instructions.                   MyChart Status: Patient Declined

## 2017-08-09 NOTE — DISCHARGE INSTRUCTIONS
Please follow-up with your primary care provider for blood pressure management.    Dolor de espalda en adultos  (Back Pain, Adult)  El dolor de espalda es muy frecuente en los adultos. La causa del dolor de espalda es derek vez peligrosa y el dolor a menudo mejora con el tiempo. Es posible que se desconozca la causa de esta afección. Algunas causas comunes son las siguientes:  · Distensión de los músculos o ligamentos que sostienen la columna vertebral.  · Desgaste (degeneración) de los discos vertebrales.  · Artritis.  · Lesiones directas en la espalda.  En muchas personas, el dolor de espalda es recurrente. Galloway derek vez es peligroso, las personas pueden aprender a manejar esta afección por sí mismas.  INSTRUCCIONES PARA EL CUIDADO EN EL HOGAR  Controle ramirez dolor de espalda a fin de detectar algún cambio. Las siguientes indicaciones ayudarán a aliviar cualquier molestia que pueda sentir:  · Permanezca activo. Si permanece sentado o de pie en un mismo lugar dez mucho tiempo, se tensiona la espalda. No se siente, conduzca o permanezca de pie en un mismo lugar dez más de 30 minutos seguidos. Realice caminatas cortas en superficies mariya tan pronto humaira le sea posible. Trate de caminar un poco más de tiempo cada día.  · Bernie ejercicio regularmente hmuaira se lo haya indicado el médico. El ejercicio ayuda a que ramirez espalda se cure más rápidamente. También ayuda a prevenir futuras lesiones al mantener los músculos reed y flexibles.  · No permanezca en la cama. Si hace reposo más de 1 a 2 días, puede demorar ramirez recuperación.  · Preste atención a ramirez cuerpo al inclinarse y levantarse. Las posiciones más cómodas son las que ejercen menos tensión en la espalda en recuperación. Siempre use técnicas apropiadas para levantar objetos, humaira por ejemplo:  ¨ Flexionar las rodillas.  ¨ Mantener la carga cerca del cuerpo.  ¨ No torcerse.  · Encuentre maria antonia posición cómoda para dormir. Use un colchón firme y recuéstese de costado  con las rodillas ligeramente flexionadas. Si se recuesta sobre la espalda, coloque maria antonia almohada debajo de las rodillas.  · Evite sentir ansiedad o estrés. El estrés aumenta la tensión muscular y puede empeorar el dolor de espalda. Es importante reconocer si se siente ansioso o estresado y aprender maneras de controlarlo, por ejemplo haciendo ejercicio.  · Hollis los medicamentos solamente humaira se lo haya indicado el médico. Los medicamentos de venta sandra para aliviar el dolor y la inflamación a menudo son los más eficaces. El médico puede recetarle relajantes musculares. Estos medicamentos ayudan a calmar el dolor de modo que pueda reanudar más rápidamente kiet actividades normales y el ejercicio saludable.  · Aplique hielo sobre la dafne lesionada.  ¨ Ponga el hielo en maria antonia bolsa plástica.  ¨ Coloque maria antonia toalla entre la piel y la bolsa de hielo.  ¨ Deje el hielo dez 20 minutos, 2 a 3 veces por día, dez los primeros 2 o 3 días. Después de eso, puede alternar el hielo y el calor para reducir el dolor y los espasmos.  · Mantenga un peso saludable. El exceso de peso ejerce presión adicional sobre la espalda y hace que resulte difícil mantener maria antonia buena postura.  SOLICITE ATENCIÓN MÉDICA SI:  · Siente un dolor que no se ludy con reposo o medicamentos.  · Siente mucho dolor que se extiende a las piernas o los glúteos.  · El dolor no mejora en maria antonia semana.  · Siente dolor por la noche.  · Pierde peso.  · Siente escalofríos o fiebre.  SOLICITE ATENCIÓN MÉDICA DE INMEDIATO SI:   · Tiene nuevos problemas para controlar la vejiga o los intestinos.  · Siente debilidad o adormecimiento inusuales en los brazos o en las piernas.  · Siente náuseas o vómitos.  · Siente dolor abdominal.  · Siente que va a desmayarse.     Esta información no tiene humaira fin reemplazar el consejo del médico. Asegúrese de hacerle al médico cualquier pregunta que tenga.     Document Released: 12/18/2006 Document Revised: 01/08/2016  Elsevier  Interactive Patient Education ©2016 [employer]  Dolor en el flanco   (Flank Pain)  El dolor en el flanco se refiere a aquel dolor que se localiza en un lado del cuerpo, entre la dafne superior del abdomen y la espalda. Puede aparecer en un período corto de tiempo (tyler) o puede durar mucho tiempo o ser recurrente (crónico). Puede ser leve o intenso. Puede tener diferentes causas.   CAUSAS   Algunas de las causas más comunes son:   · Esguince muscular.    · Espasmos musculares.    · Problemas en la columna vertebral (enfermedad de los discos).    · Infección en el pulmón (neumonía).    · Líquido en los pulmones (edema pulmonar).    · Infección renal.    · Piedras en el riñón.    · Veronica erupción cutánea muy dolorosa causada por el virus de la varicela (herpes zoster).  · Enfermedad de la vesícula biliar.  INSTRUCCIONES PARA EL CUIDADO EN EL HOGAR   Los cuidados en el hogar dependerán de la causa del dolor. En general:   · Bernie reposo según las indicaciones del médico.  · Debe ingerir gran cantidad de líquido para mantener la orina de kirk kiley o color amarillo pálido.  · Mosses sólo medicamentos de venta sandra o recetados, según las indicaciones del médico. Algunos medicamentos ayudan a aliviar el dolor.  · Informe al médico si tiene algún cambio en el dolor.  · Concurra a las visitas de control, según las indicaciones.  SOLICITE ATENCIÓN MÉDICA DE INMEDIATO SI:   · El dolor no se ludy con los medicamentos.    · Tiene síntomas nuevos o que empeoran.  · El dolor aumenta.    · Siente dolor abdominal.    · Le falta el aire.    · Tiene náuseas o vómitos persistentes.    · El abdomen se hincha.    · Sufre mareos o se desmaya.    · Observa gen en la orina.  · Tiene fiebre o síntomas persistentes dez más de 2 ó 3 yessy.  · Tiene fiebre y los síntomas empeoran repentinamente.  ASEGÚRESE DE QUE:   · Comprende estas instrucciones.  · Controlará ramirez enfermedad.  · Solicitará ayuda de inmediato si no mejora o si  empeora.     Esta información no tiene humaira fin reemplazar el consejo del médico. Asegúrese de hacerle al médico cualquier pregunta que tenga.     Document Released: 03/26/2009 Document Revised: 09/11/2013  Elsevier Interactive Patient Education ©2016 Elsevier Inc.

## 2017-08-09 NOTE — ED PROVIDER NOTES
ED Provider Note    Scribed for Ivonne William M.D. by Patti Le. 8/9/2017  11:20 AM    Primary care provider: JAGDEEP Kinsey  Means of arrival: wheel chair   History obtained from: patient   History limited by: none       CHIEF COMPLAINT  Chief Complaint   Patient presents with   • Flank Pain     left flank radiates to side onset 0730       HPI  Dale Carmona is a 56 y.o. male who presents to the Emergency Department for evaluation of sharp left sided flank pain radiating to his left lower abdomen onset 4 hours ago. His pain has since improved after taking pain medication 90 minutes ago. He has associated nausea but denies vomiting. The patient is currently on chemotherapy for brain cancer and his symptoms today began shortly after he took his oral chemotherapy medication. Additionally, he is taking Coumadin.       REVIEW OF SYSTEMS  GI: nausea, no vomiting.   : left flank pain radiating to left abdomen.   See history of present illness. All other systems are negative. C.       PAST MEDICAL HISTORY   has a past medical history of Hyperlipidemia (2010); New onset seizure (CMS-[facility]) (1/5/2017); Brain cancer (CMS-HCC); Bell palsy (12/2016); Cancer (CMS-HCC) (1/31/2017); Diabetes (CMS-HCC); and Bell's palsy (1/2017).      SURGICAL HISTORY   has past surgical history that includes craniotomy tumor (1/31/2017).      SOCIAL HISTORY  Social History   Substance Use Topics   • Smoking status: Former Smoker -- 1.00 packs/day for 30 years     Types: Cigarettes   • Smokeless tobacco: Never Used   • Alcohol Use: No      History   Drug Use No       FAMILY HISTORY  Family History   Problem Relation Age of Onset   • Diabetes Neg Hx    • Seizures Daughter        CURRENT MEDICATIONS  Home Medications     **Home medications have not yet been reviewed for this encounter**          ALLERGIES  No Known Allergies        PHYSICAL EXAM  VITAL SIGNS: /93 mmHg  Pulse 107  Temp(Src) 36.3 °C (97.3 °F)   "Resp 18  Ht 1.651 m (5' 5\")  SpO2 96%  Constitutional: Well developed, Well nourished, No acute distress, Non-toxic appearance.   HEENT: Normocephalic, Atraumatic,  external ears normal, pharynx pink,  Mucous  Membranes moist, No rhinorrhea or mucosal edema  Eyes: PERRL, EOMI, Conjunctiva normal, No discharge.   Neck: Normal range of motion, No tenderness, Supple, No stridor.   Lymphatic: No lymphadenopathy    Cardiovascular: Regular Rate and Rhythm, No murmurs,  rubs, or gallops.   Thorax & Lungs: Lungs clear to auscultation bilaterally, No respiratory distress, No wheezes, rhales or rhonchi, No chest wall tenderness.   Abdomen: Bowel sounds normal, Soft, obese, no reproducible tenderness, non distended,  No pulsatile masses., no rebound guarding or peritoneal signs.   Skin: Warm, Dry, No erythema, No rash, well healed scar to the left parietal scalp.   Back:  No CVA tenderness,  No spinal tenderness, bony crepitance, step offs, or instability.   Neurologic: Alert & oriented x 3, Normal motor function, Normal sensory function, No focal deficits noted. Normal reflexes. Normal Cranial Nerves.  Extremities: Equal, intact distal pulses, No cyanosis or clubbing. Leg edema,  No tenderness.   Musculoskeletal: Good range of motion in all major joints. No tenderness to palpation or major deformities noted.         DIAGNOSTIC STUDIES / PROCEDURES  LABS  Results for orders placed or performed during the hospital encounter of 08/09/17   CBC WITH DIFFERENTIAL   Result Value Ref Range    WBC 7.0 4.8 - 10.8 K/uL    RBC 4.02 (L) 4.70 - 6.10 M/uL    Hemoglobin 10.6 (L) 14.0 - 18.0 g/dL    Hematocrit 36.1 (L) 42.0 - 52.0 %    MCV 89.8 81.4 - 97.8 fL    MCH 26.4 (L) 27.0 - 33.0 pg    MCHC 29.4 (L) 33.7 - 35.3 g/dL    RDW 67.9 (H) 35.9 - 50.0 fL    Platelet Count 255 164 - 446 K/uL    MPV 8.6 (L) 9.0 - 12.9 fL    Nucleated RBC 0.30 /100 WBC    NRBC (Absolute) 0.02 K/uL    Neutrophils-Polys 90.30 (H) 44.00 - 72.00 %    Lymphocytes " 2.60 (L) 22.00 - 41.00 %    Monocytes 1.80 0.00 - 13.40 %    Eosinophils 0.00 0.00 - 6.90 %    Basophils 0.90 0.00 - 1.80 %    Neutrophils (Absolute) 6.57 1.82 - 7.42 K/uL    Lymphs (Absolute) 0.18 (L) 1.00 - 4.80 K/uL    Monos (Absolute) 0.13 0.00 - 0.85 K/uL    Eos (Absolute) 0.00 0.00 - 0.51 K/uL    Baso (Absolute) 0.06 0.00 - 0.12 K/uL    Anisocytosis 1+     Macrocytosis 1+    COMP METABOLIC PANEL   Result Value Ref Range    Sodium 136 135 - 145 mmol/L    Potassium 3.5 (L) 3.6 - 5.5 mmol/L    Chloride 103 96 - 112 mmol/L    Co2 22 20 - 33 mmol/L    Anion Gap 11.0 0.0 - 11.9    Glucose 157 (H) 65 - 99 mg/dL    Bun 18 8 - 22 mg/dL    Creatinine 0.56 0.50 - 1.40 mg/dL    Calcium 8.4 (L) 8.5 - 10.5 mg/dL    AST(SGOT) 28 12 - 45 U/L    ALT(SGPT) 70 (H) 2 - 50 U/L    Alkaline Phosphatase 96 30 - 99 U/L    Total Bilirubin 0.4 0.1 - 1.5 mg/dL    Albumin 3.4 3.2 - 4.9 g/dL    Total Protein 5.8 (L) 6.0 - 8.2 g/dL    Globulin 2.4 1.9 - 3.5 g/dL    A-G Ratio 1.4 g/dL   LIPASE   Result Value Ref Range    Lipase 57 11 - 82 U/L   URINALYSIS CULTURE, IF INDICATED   Result Value Ref Range    Color Yellow     Character Clear     Specific Gravity 1.011 <1.035    Ph 5.5 5.0-8.0    Glucose Negative Negative mg/dL    Ketones Negative Negative mg/dL    Protein Negative Negative mg/dL    Bilirubin Negative Negative    Urobilinogen, Urine 0.2 Negative    Nitrite Negative Negative    Leukocyte Esterase Negative Negative    Occult Blood Negative Negative    Micro Urine Req see below     Culture Indicated No UA Culture   PT/INR   Result Value Ref Range    PT 34.6 (H) 12.0 - 14.6 sec    INR 3.30 (H) 0.87 - 1.13   PTT   Result Value Ref Range    APTT 26.4 24.7 - 36.0 sec   DIFFERENTIAL MANUAL   Result Value Ref Range    Bands-Stabs 3.50 0.00 - 10.00 %    Metamyelocytes 0.90 %    Manual Diff Status PERFORMED    PERIPHERAL SMEAR REVIEW   Result Value Ref Range    Peripheral Smear Review see below    PLATELET ESTIMATE   Result Value Ref Range     Plt Estimation Normal    MORPHOLOGY   Result Value Ref Range    RBC Morphology Present     Polychromia 1+    ESTIMATED GFR   Result Value Ref Range    GFR If African American >60 >60 mL/min/1.73 m 2    GFR If Non African American >60 >60 mL/min/1.73 m 2   All labs reviewed by me.        RADIOLOGY  CT-RENAL COLIC EVALUATION(A/P W/O)    (Results Pending)   The radiologist's interpretation of all radiological studies have been reviewed by me.      COURSE & MEDICAL DECISION MAKING  Nursing notes, VS, PMSFHx reviewed in chart.    Differential diagnoses include but not limited to: UTI, kidney stones, reaction to chemotherapy.     11:21 AM Obtained and reviewed past medical records.    11:20 AM - Patient seen and examined at bedside. Patient will be treated with IV fluids, morphine 4 mg and zofran 4 mg. Ordered CT renal, CBC, CMP, lipase, urinalysis and INR to evaluate his symptoms.     2:04 PM ct results pending and will be followed up by Dr Cutler who will make final disposition            DISPOSITION:  pending        FINAL IMPRESSION  1. Acute left flank pain         Patti HENRY), am scribing for, and in the presence of, Ivonne William M.D..  Electronically signed by: Patti Jon), 8/9/2017  Ivonne HENRY M.D. personally performed the services described in this documentation, as scribed by Patti Le in my presence, and it is both accurate and complete.    The note accurately reflects work and decisions made by me.  Ivonne William  8/9/2017  2:05 PM

## 2017-08-09 NOTE — PROGRESS NOTES
Anticoagulation Summary as of 8/9/2017     INR goal 2.0-3.0   Selected INR 2.5 (8/9/2017)   Maintenance plan 1.25 mg (2.5 mg x 0.5) on Mon; 2.5 mg (2.5 mg x 1) all other days   Weekly total 16.25 mg   Plan last modified Denia Lemus, PHARMD (8/4/2017)   Next INR check 8/16/2017   Target end date Indefinite    Indications   Pulmonary embolism (CMS-HCC) [I26.99]  Recurrent pulmonary embolism (CMS-HCC) [I26.99]  Acute deep vein thrombosis (DVT) of both lower extremities (CMS-HCC) [I82.403]         Anticoagulation Episode Summary     INR check location     Preferred lab     Send INR reminders to     Comments       Anticoagulation Care Providers     Provider Role Specialty Phone number    Princess Funez M.D. [facility] [phone]    [facility]  [phone]    JAGDEEP Kinsey Responsible [facility] [phone]        Anticoagulation Patient Findings     Patient denies any signs of bleeding or bruising. Patient's INR is therapeutic at 2.5. No recent changes in diet he will be stopping his Temodar tomorrow. Patient instructed to continue the current warfarin dosing as shown above. Follow-up in 1 week(s). Patients wife states that he is having severe sharp pain in his lower back. I advised patient to go to the ER if possible.    Janette Blue, Pharm.D

## 2017-08-09 NOTE — MR AVS SNAPSHOT
Dale Azael Godfrey Mathew   2017 8:30 AM   Anticoagulation Visit   MRN: [id number]    Department:  Vascular Medicine   Dept Phone:  [phone]    Description:  Male : 1960   Provider:  [facility] EXAM 4           Allergies as of 2017     No Known Allergies      You were diagnosed with     Recurrent pulmonary embolism (CMS-HCC)   [146684]         Vital Signs     Smoking Status                   Former Smoker           Basic Information     Date Of Birth Sex Race Ethnicity Preferred Language    1960 Male White  Origin (Polish,Kosovan,Niuean,Jamaican, etc) English      Your appointments     Aug 14, 2017  8:00 AM   ONCOLOGY EST PATIENT 30 MIN with Olivier Osman M.D.   [facility] (--)    [address], [address]  [facility] [address]   [phone]            Aug 16, 2017  8:00 AM   Established Patient with [facility] EXAM 4   [facility]  (--)    [address] [facility] [address] [address]   [phone]            Sep 05, 2017  9:40 AM   Initial Visit with VASCULAR NURSE PRACTITIONER, 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) C71.9   3/24/2017 - Present    Seizure disorder (CMS-HCC) G40.909   3/24/2017 - Present    Hyponatremia E87.1   3/24/2017 - Present    Macrocytic anemia D53.9   3/24/2017 - Present    Thrush, oral B37.0   3/24/2017 - Present    Pulmonary embolism (CMS-HCC) I26.99   6/22/2017 - Present    Thrombocytopenia (CMS-HCC) D69.6   6/22/2017 - Present    Tachycardia R00.0   6/23/2017 - Present    Recurrent pulmonary embolism (CMS-HCC) I26.99   6/26/2017 - Present      Health Maintenance        Date Due Completion Dates    IMM HEP B VACCINE (1 of 3 - Primary Series) 1960 ---    IMM DTaP/Tdap/Td Vaccine (1 - Tdap) 11/8/1977 ---    IMM INFLUENZA (1) 9/1/2017 ---            Results     POCT Protime      Component    INR    2.5                        Current Immunizations     13-VALENT PCV PREVNAR 6/24/2017 11:18 AM      Below and/or attached are the medications your provider expects you to take. Review all of your home medications and newly ordered medications with your provider and/or pharmacist. Follow medication instructions as directed by your provider and/or pharmacist. Please keep your medication list with you and share with your provider. Update the information when medications are discontinued, doses are changed, or new medications (including over-the-counter products) are added; and carry medication information at all times in the event of emergency situations     Allergies:  No Known Allergies          Medications  Valid as of: August 09, 2017 -  8:37 AM    Generic Name Brand Name Tablet Size Instructions for use    Dexamethasone (Tab) DECADRON 2 MG Take 1 Tab by mouth 2 times a day.        Hydrocodone-Acetaminophen (Tab) NORCO 5-325 MG Take 1-2 Tabs by mouth every 7 days. Pt takes PRN only        Insulin NPH Human (Isophane) (Suspension) HUMULIN,NOVOLIN 100 UNIT/ML Inject 35-40 Units as instructed 2 Times a Day. 40  units in the morning and 35 units at night        Insulin Regular Human (Solution) HUMULIN R 100 Unit/mL Inject 3-8 Units as instructed every day. 200-249:3 units 250-299: 5 units 300-349: 7 units over 350 is 8 units    Hasn't used since March        LevETIRAcetam (Solution) KEPPRA 100 MG/ML Take 15 mL by mouth every 12 hours.        Sennosides-Docusate Sodium (Tab) PERICOLACE or SENOKOT S 8.6-50 MG Take 1 Tab by mouth every day.        Warfarin Sodium (Tab) COUMADIN 2.5 MG Take 1/2 or 1 tablet by mouth daily as instructed by coumadin clinic        .                 Medicines prescribed today were sent to:     [facility][id number] - [place], [place] - [address] [place] [address]    Phone: [phone] Fax: [phone]    Open 24 Hours?: No      Medication refill instructions:       If your prescription bottle indicates you have medication refills left, it is not necessary to call your provider’s office. Please contact your pharmacy and they will refill your medication.    If your prescription bottle indicates you do not have any refills left, you may request refills at any time through one of the following ways: The online [employer] system (except Urgent Care), by calling your provider’s office, or by asking your pharmacy to contact your provider’s office with a refill request. Medication refills are processed only during regular business hours and may not be available until the next business day. Your provider may request additional information or to have a follow-up visit with you prior to refilling your medication.   *Please Note: Medication refills are assigned a new Rx number when refilled electronically. Your pharmacy may indicate that no refills were authorized even though a new prescription for the same medication is available at the pharmacy. Please request the medicine by name with the pharmacy before contacting your provider for a refill.        Warfarin  Dosing Calendar   August 2017 Details    Sun Mon Tue Wed Thu Fri Sat       1               2               3               4               5                 6               7               8               9   2.5   2.5 mg   See details      10      2.5 mg         11      1.25 mg         12      2.5 mg           13      2.5 mg         14      1.25 mg         15      2.5 mg         16      2.5 mg         17               18               19                 20               21               22               23               24               25               26                 27               28               29               30               31                  Date Details   08/09 This INR check   INR: 2.5       Date of next INR:  8/16/2017         How to take your warfarin dose     To take:  1.25 mg Take 0.5 of a 2.5 mg tablet.    To take:  2.5 mg Take 1 of the 2.5 mg tablets.              MyChart Status: Patient Declined

## 2017-08-09 NOTE — ED NOTES
Pt and daughter given d/c instructions and f/u info with verbal understanding.  VSS at discharge.  PIV d/c'd with tip intact.  Pt ambulatory from the ED w/ steady gait accompanied by daughter, driving patient home.  All belongings in possession on discharge.  Pt escorted to the lobby by RN.

## 2017-08-09 NOTE — ED AVS SNAPSHOT
8/9/2017    Dale Donahue Carmona  [address] [place] [address]    Dear Dale:    [facility] wants to ensure your discharge home is safe and you or your loved ones have had all of your questions answered regarding your care after you leave the hospital.    Below is a list of resources and contact information should you have any questions regarding your hospital stay, follow-up instructions, or active medical symptoms.    Questions or Concerns Regarding… Contact   Medical Questions Related to Your Discharge  (7 days a week, 8am-5pm) Contact a Nurse Care Coordinator   [phone]   Medical Questions Not Related to Your Discharge  (24 hours a day / 7 days a week)  Contact the Nurse Health Line   [phone]    Medications or Discharge Instructions Refer to your discharge packet   or contact your [facility] Primary Care Provider   [phone]   Follow-up Appointment(s) Schedule your appointment via [employer]   or contact Scheduling [phone]   Billing Review your statement via [employer]  or contact Billing [phone]   Medical Records Review your records via [employer]   or contact Medical Records [phone]     You may receive a telephone call within two days of discharge. This call is to make certain you understand your discharge instructions and have the opportunity to have any questions answered. You can also easily access your medical information, test results and upcoming appointments via the [employer] free online health management tool. You can learn more and sign up at [email or web address]. For assistance setting up your [employer] account, please call [phone].    Once again, we want to ensure your discharge home is safe and that you have a clear understanding of any next steps in your care. If you have any questions or concerns, please do not hesitate to contact us, we are here for you. Thank you for choosing [facility] for your healthcare needs.    Sincerely,    Your [facility] Healthcare Team

## 2017-08-09 NOTE — ED NOTES
.  Chief Complaint   Patient presents with   • Flank Pain     left flank radiates to side onset 0730   per family reports pain started after taking oral Chemo medication 4 th dose. Pt denies urinary difficulty.   Informed of triage process. Awaiting room in senior lobby. Asked to return to triage desk with any questions or concerns.

## 2017-08-09 NOTE — ED PROVIDER NOTES
Scribed for Darrell Cutler M.D. by Faby Willams. 8/9/2017  3:00 PM    This is an addendum to the note on this patient.  For further details and full chart information, see the previously signed note.      3:00 PM I have received this patient from Dr. William to await imaging findings.  Radiology findings below. I informed the patient that his imaging came back benign and all of his labs were within normal limits as well. Patient will be discharged. I advised him to continue using his at home pain medications and try a heating bad to relieve the discomfort.    Radiology  CT-RENAL COLIC EVALUATION(A/P W/O)   Final Result         1.   No renal or ureteral calculus or obstruction.      2.  Bibasilar atelectasis.        Patient turned over to me by Dr. William, please see her original H&P, the patient is feeling with left flank pain, laboratory tests and CT scan is negative. The patient will take his home pain medicines, have the patient follow up with primary care physician.    1. Acute left flank pain               IFaby (Devaughn), am scribing for, and in the presence of, Darrell Cutler M.D..    Electronically signed by: Faby Willams (Devaughn), 8/9/2017    IDarrell M.D. personally performed the services described in this documentation, as scribed by Faby Willams in my presence, and it is both accurate and complete.    The note accurately reflects work and decisions made by me.  Darrell Cutler  8/9/2017  9:00 PM

## 2017-08-09 NOTE — ED AVS SNAPSHOT
Home Care Instructions                                                                                                                Dale Carmona   MRN: [id number]    Department:  [facility], Emergency Dept   Date of Visit:  8/9/2017            [facility], Emergency Dept    [address] [place] [id number]    Phone:  [phone]      You were seen by     1. Ivonne William M.D.    2. Darrell Cutler M.D.      Your Diagnosis Was     Acute left flank pain     R10.9       These are the medications you received during your hospitalization from 08/09/2017 1025 to 08/09/2017 1507     Date/Time Order Dose Route Action    08/09/2017 1149 NS infusion   Intravenous New Bag    08/09/2017 1145 morphine (pf) 4 mg/ml injection 4 mg 4 mg Intravenous Given    08/09/2017 1145 ondansetron (ZOFRAN) syringe/vial injection 4 mg 4 mg Intravenous Given      Follow-up Information     1. Follow up with [facility], Emergency Dept.    Specialty:  Emergency Medicine    Why:  If symptoms worsen    Contact information    [address] [place] [id number] [phone]        2. Follow up with JAGDEEP Kinsey.    Specialty:  Family Medicine    Contact information    [address] [place] [address] [address]  [address]  [facility] [address] [phone]        Medication Information     Review all of your home medications and newly ordered medications with your primary doctor and/or pharmacist as soon as possible. Follow medication instructions as directed by your doctor and/or pharmacist.     Please keep your complete medication list with you and share with your physician. Update the information when medications are discontinued, doses are changed, or new medications (including over-the-counter products) are added; and carry medication information at all times in the event of emergency situations.               Medication List      ASK your doctor about these medications        Instructions    Morning Afternoon Evening Bedtime    dexamethasone 2 MG tablet   Commonly known as:  DECADRON        Take 1 Tab by mouth 2 times a day.   Dose:  2 mg                        hydrocodone-acetaminophen 5-325 MG Tabs per tablet   Commonly known as:  NORCO        Take 1-2 Tabs by mouth every 7 days. Pt takes PRN only   Dose:  1-2 Tab                        insulin  UNIT/ML Susp   Commonly known as:  HUMULIN,NOVOLIN        Inject 35-40 Units as instructed 2 Times a Day. 40 units in the morning and 35 units at night   Dose:  35-40 Units                        insulin regular 100 Unit/mL Soln   Commonly known as:  HUMULIN R        Inject 3-8 Units as instructed every day. 200-249:3 units 250-299: 5 units 300-349: 7 units over 350 is 8 units  Hasn't used since March   Dose:  3-8 Units                        levetiracetam 100 MG/ML Soln   Commonly known as:  KEPPRA        Take 15 mL by mouth every 12 hours.   Dose:  1500 mg                        senna-docusate 8.6-50 MG Tabs   Commonly known as:  PERICOLACE or SENOKOT S        Take 1 Tab by mouth every day.   Dose:  1 Tab                        warfarin 2.5 MG Tabs   Commonly known as:  COUMADIN        Take 1/2 or 1 tablet by mouth daily as instructed by coumadin clinic                                Procedures and tests performed during your visit     CBC WITH DIFFERENTIAL    COMP METABOLIC PANEL    CT-RENAL COLIC EVALUATION(A/P W/O)    DIFFERENTIAL MANUAL    ESTIMATED GFR    IV Saline Lock    LIPASE    MORPHOLOGY    PERIPHERAL SMEAR REVIEW    PLATELET ESTIMATE    PT/INR    PTT    URINALYSIS CULTURE, IF INDICATED        Discharge Instructions       Please follow-up with your primary care provider for blood pressure management.    Dolor de espalda en adultos  (Back Pain, Adult)  El dolor de espalda es muy frecuente en los adultos. La causa del dolor de espalda es derek vez peligrosa y el dolor a menudo mejora con el tiempo. Es posible que se  desconozca la causa de esta afección. Algunas causas comunes son las siguientes:  · Distensión de los músculos o ligamentos que sostienen la columna vertebral.  · Desgaste (degeneración) de los discos vertebrales.  · Artritis.  · Lesiones directas en la espalda.  En muchas personas, el dolor de espalda es recurrente. [place] derek vez es peligroso, las personas pueden aprender a manejar esta afección por sí mismas.  INSTRUCCIONES PARA EL CUIDADO EN EL HOGAR  Controle ramirez dolor de espalda a fin de detectar algún cambio. Las siguientes indicaciones ayudarán a aliviar cualquier molestia que pueda sentir:  · Permanezca activo. Si permanece sentado o de pie en un mismo lugar dez mucho tiempo, se tensiona la espalda. No se siente, conduzca o permanezca de pie en un mismo lugar dez más de 30 minutos seguidos. Realice caminatas cortas en superficies mariya tan pronto humaira le sea posible. Trate de caminar un poco más de tiempo cada día.  · Bernie ejercicio regularmente humaira se lo haya indicado el médico. El ejercicio ayuda a que ramirez espalda se cure más rápidamente. También ayuda a prevenir futuras lesiones al mantener los músculos reed y flexibles.  · No permanezca en la cama. Si hace reposo más de 1 a 2 días, puede demorar ramirez recuperación.  · Preste atención a ramirez cuerpo al inclinarse y levantarse. Las posiciones más cómodas son las que ejercen menos tensión en la espalda en recuperación. Siempre use técnicas apropiadas para levantar objetos, humaira por ejemplo:  ¨ Flexionar las rodillas.  ¨ Mantener la carga cerca del cuerpo.  ¨ No torcerse.  · Encuentre maria antonia posición cómoda para dormir. Use un colchón firme y recuéstese de costado con las rodillas ligeramente flexionadas. Si se recuesta sobre la espalda, coloque maria antonia almohada debajo de las rodillas.  · Evite sentir ansiedad o estrés. El estrés aumenta la tensión muscular y puede empeorar el dolor de espalda. Es importante reconocer si se siente ansioso o estresado y  aprender maneras de controlarlo, por ejemplo haciendo ejercicio.  · [place] los medicamentos solamente humaira se lo haya indicado el médico. Los medicamentos de venta sandra para aliviar el dolor y la inflamación a menudo son los más eficaces. El médico puede recetarle relajantes musculares. Estos medicamentos ayudan a calmar el dolor de modo que pueda reanudar más rápidamente kiet actividades normales y el ejercicio saludable.  · Aplique hielo sobre la dafne lesionada.  ¨ Ponga el hielo en maria antonia bolsa plástica.  ¨ Coloque maria antonia toalla entre la piel y la bolsa de hielo.  ¨ Deje el hielo dez 20 minutos, 2 a 3 veces por día, dez los primeros 2 o 3 días. Después de eso, puede alternar el hielo y el calor para reducir el dolor y los espasmos.  · Mantenga un peso saludable. El exceso de peso ejerce presión adicional sobre la espalda y hace que resulte difícil mantener maria antonia buena postura.  SOLICITE ATENCIÓN MÉDICA SI:  · Siente un dolor que no se ludy con reposo o medicamentos.  · Siente mucho dolor que se extiende a las piernas o los glúteos.  · El dolor no mejora en maria antonia semana.  · Siente dolor por la noche.  · Pierde peso.  · Siente escalofríos o fiebre.  SOLICITE ATENCIÓN MÉDICA DE INMEDIATO SI:   · Tiene nuevos problemas para controlar la vejiga o los intestinos.  · Siente debilidad o adormecimiento inusuales en los brazos o en las piernas.  · Siente náuseas o vómitos.  · Siente dolor abdominal.  · Siente que va a desmayarse.     Esta información no tiene humaira fin reemplazar el consejo del médico. Asegúrese de hacerle al médico cualquier pregunta que tenga.     Document Released: 12/18/2006 Document Revised: 01/08/2016  Elsevier Interactive Patient Education ©2016 Elsevier Inc.  Dolor en el flanco   (Flank Pain)  El dolor en el flanco se refiere a aquel dolor que se localiza en un lado del cuerpo, entre la dafne superior del abdomen y la espalda. Puede aparecer en un período corto de tiempo (tyler) o puede durar mucho  tiempo o ser recurrente (crónico). Puede ser leve o intenso. Puede tener diferentes causas.   CAUSAS   Algunas de las causas más comunes son:   · Esguince muscular.    · Espasmos musculares.    · Problemas en la columna vertebral (enfermedad de los discos).    · Infección en el pulmón (neumonía).    · Líquido en los pulmones (edema pulmonar).    · Infección renal.    · Piedras en el riñón.    · Veronica erupción cutánea muy dolorosa causada por el virus de la varicela (herpes zoster).  · Enfermedad de la vesícula biliar.  INSTRUCCIONES PARA EL CUIDADO EN EL HOGAR   Los cuidados en el hogar dependerán de la causa del dolor. En general:   · Bernie reposo según las indicaciones del médico.  · Debe ingerir gran cantidad de líquido para mantener la orina de kirk kiley o color amarillo pálido.  · [place] sólo medicamentos de venta sandra o recetados, según las indicaciones del médico. Algunos medicamentos ayudan a aliviar el dolor.  · Informe al médico si tiene algún cambio en el dolor.  · Concurra a las visitas de control, según las indicaciones.  SOLICITE ATENCIÓN MÉDICA DE INMEDIATO SI:   · El dolor no se ludy con los medicamentos.    · Tiene síntomas nuevos o que empeoran.  · El dolor aumenta.    · Siente dolor abdominal.    · Le falta el aire.    · Tiene náuseas o vómitos persistentes.    · El abdomen se hincha.    · Sufre mareos o se desmaya.    · Observa gen en la orina.  · Tiene fiebre o síntomas persistentes dez más de 2 ó 3 yessy.  · Tiene fiebre y los síntomas empeoran repentinamente.  ASEGÚRESE DE QUE:   · Comprende estas instrucciones.  · Controlará ramirez enfermedad.  · Solicitará ayuda de inmediato si no mejora o si empeora.     Esta información no tiene humaira fin reemplazar el consejo del médico. Asegúrese de hacerle al médico cualquier pregunta que tenga.     Document Released: 03/26/2009 Document Revised: 09/11/2013  Elsevier Interactive Patient Education ©2016 Elsevier Inc.            Patient Information      Patient Information    Following emergency treatment: all patient requiring follow-up care must return either to a private physician or a clinic if your condition worsens before you are able to obtain further medical attention, please return to the emergency room.     Billing Information    At [facility], we work to make the billing process streamlined for our patients.  Our Representatives are here to answer any questions you may have regarding your hospital bill.  If you have insurance coverage and have supplied your insurance information to us, we will submit a claim to your insurer on your behalf.  Should you have any questions regarding your bill, we can be reached online or by phone as follows:  Online: You are able pay your bills online or live chat with our representatives about any billing questions you may have. We are here to help Monday - Friday from 8:00am to 7:30pm and 9:00am - 12:00pm on Saturdays.  Please visit [email or web address]  for more information.   Phone:  [phone] or [phone]    Please note that your emergency physician, surgeon, pathologist, radiologist, anesthesiologist, and other specialists are not employed by [facility] and will therefore bill separately for their services.  Please contact them directly for any questions concerning their bills at the numbers below:     Emergency Physician Services:  [phone]  [facility]:  [phone]  [facility]:  [phone]  [facility]:  [phone]    1. Your final bill may vary from the amount quoted upon discharge if all procedures are not complete at that time, or if your doctor has additional procedures of which we are not aware. You will receive an additional bill if you return to the Emergency Department at [facility] for suture removal regardless of the facility of which the sutures were placed.     2. Please arrange for settlement of this  account at the emergency registration.    3. All self-pay accounts are due in full at the time of treatment.  If you are unable to meet this obligation then payment is expected within 4-5 days.     4. If you have had radiology studies (CT, X-ray, Ultrasound, MRI), you have received a preliminary result during your emergency department visit. Please contact the radiology department [phone] to receive a copy of your final result. Please discuss the Final result with your primary physician or with the follow up physician provided.     Crisis Hotline:  [place] Crisis Hotline:  2-703-WCYDZJD or 1-333.318.3180  [place] Crisis Hotline:    1-454.906.5373 or 894-013-0774         ED Discharge Follow Up Questions    1. In order to provide you with very good care, we would like to follow up with a phone call in the next few days.  May we have your permission to contact you?     YES /  NO    2. What is the best phone number to call you? (       )_____-__________    3. What is the best time to call you?      Morning  /  Afternoon  /  Evening                   Patient Signature:  ____________________________________________________________    Date:  ____________________________________________________________      Your appointments     Aug 14, 2017  8:00 AM   ONCOLOGY EST PATIENT 30 MIN with Olivier Osman M.D.   [facility] (--)    [address], [address]  [facility] [id number] [phone]            Aug 16, 2017  8:00 AM   Established Patient with [facility] EXAM 4   [facility]  (--)    [address] [facility] [address]   [phone]            Sep 05, 2017  9:40 AM   Initial Visit with VASCULAR NURSE PRACTITIONER, [facility] EXAM 1   [facility]  (--)    [address] [facility] [id number] [phone]

## 2017-08-09 NOTE — ED AVS SNAPSHOT
AndersonBrecon Access Code: Activation code not generated  Current AndersonBrecon Status: Patient Declined    Your email address is not on file at [employer].  Email Addresses are required for you to sign up for Anderson[employer]con, please contact [phone] to verify your personal information and to provide your email address prior to attempting to register for AndersonBrecon.    Dale Azaelligia Carmona  [address] [place] [address]    [employer]  A secure, online tool to manage your health information     [employer]’s AndersonBrecon® is a secure, online tool that connects you to your personalized health information from the privacy of your home -- day or night - making it very easy for you to manage your healthcare. Once the activation process is completed, you can even access your medical information using the AndersonBrecon radha, which is available for free in the Apple Radha store or Google Play store.     To learn more about AndersonBrecon, visit [email or web address]    There are two levels of access available (as shown below):   My Chart Features  [facility] Primary Care Doctor [facility]  Specialists [facility]  Urgent  Care Non-[facility] Primary Care Doctor   Email your healthcare team securely and privately 24/7 X X X    Manage appointments: schedule your next appointment; view details of past/upcoming appointments X      Request prescription refills. X      View recent personal medical records, including lab and immunizations X X X X   View health record, including health history, allergies, medications X X X X   Read reports about your outpatient visits, procedures, consult and ER notes X X X X   See your discharge summary, which is a recap of your hospital and/or ER visit that includes your diagnosis, lab results, and care plan X X  X     How to register for [employer]:  Once your e-mail address has been verified, follow the following steps to sign up for [employer].     1. Go to  [email or web address]  2. Click on the Sign Up Now box, which takes you to  the New Member Sign Up page. You will need to provide the following information:  a. Enter your eBrevia Access Code exactly as it appears at the top of this page. (You will not need to use this code after you’ve completed the sign-up process. If you do not sign up before the expiration date, you must request a new code.)   b. Enter your date of birth.   c. Enter your home email address.   d. Click Submit, and follow the next screen’s instructions.  3. Create a eBrevia ID. This will be your eBrevia login ID and cannot be changed, so think of one that is secure and easy to remember.  4. Create a eBrevia password. You can change your password at any time.  5. Enter your Password Reset Question and Answer. This can be used at a later time if you forget your password.   6. Enter your e-mail address. This allows you to receive e-mail notifications when new information is available in eBrevia.  7. Click Sign Up. You can now view your health information.    For assistance activating your eBre[employer] account, call [phone]

## 2017-08-14 PROBLEM — C71.9 MALIGNANT NEOPLASM OF BRAIN (HCC): Status: RESOLVED | Noted: 2017-01-01 | Resolved: 2017-01-01

## 2017-08-14 PROBLEM — C71.9 ASTROCYTOMA (HCC): Status: RESOLVED | Noted: 2017-01-01 | Resolved: 2017-01-01

## 2017-08-14 NOTE — PROGRESS NOTES
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 year old  male who was otherwise in good health who noticed a left facial deviation in mid December 2016 and  some component of aphasia. An MRI done at  showed abnormal increased signal throughout the left temporal and posterior inferior frontal lobes, possibly representing a low-grade tumor. He was seen by Dr. Presley who recommended awake cranitomy with motor/language mapping to facilitate safe resection at [facility]. He was admitted few days after that with a seizure causing syncope and was started on Keppra.    MRI done at [facility] showed extensive infiltrating predominantly nonenhancing left temperofrontal tumor including involvement of the left insula , frontal lobe and a large portion of the anterior and mid temporal lobe . According to the operative note from 1/31/2017, language mapping was done. He had tumor removal approximately 2 cm anterior to the temporal eloquent speech area extending up to the anterior temporal tip. His speech was not comprehensible at that point  and it was decided not to resect tumor posterior or inferior to the critical language area. According to the note, he has high risk of having residual speech problems.       CT of the head - 2/6/2017 here showed significant left shift and postoperative changes.    He was subsequently diagnosed with bilateral DVT and he was on Xarelto.     He started chemoradiation in April. Unfortunately, he had a prolonged hospitalization due to various medical issues including pneumonia causing respiratory failure and the poor performance status. He took temodar only for 4 days and was stopped by  the hospitalist.    MRI -June 2017, continues to show extensive left frontal, temporal, parietal and basal ganglia  Edema. He had increased nodularity enhancement in the left frontotemporal area.    After further discussion, he was started on maintenance Temodar at a lower dose of 75 mg/m². He took it for 2 days and then developed chest pain with CT scan showing pulmonary embolism. He was switched to warfarin and had an IVC filter placed.    MRI - 7/14/17- overall shows stable finding. There is some concern for cortical laminar necrosis.    Started, dose reduced Temodar last month with good tolerance. He is here for one-month follow-up. His neurological symptoms are stable to mildly improving. He appears to be comprehending questions slightly better. Headache is controlled on Norco. He is currently on Coumadin and tolerating it well.    History of work-related adverse effects including diabetes, weight gain, and bipedal edema        Past Medical History:      Past Medical History   Diagnosis Date   • Hyperlipidemia 2010     no meds   • New onset seizure (CMS-HCC) 1/5/2017   • Brain cancer (CMS-HCC)      Glioma   • Bell palsy 12/2016   • Cancer (CMS-HCC) 1/31/2017     Glioma   • Diabetes (CMS-HCC)      R/t steroid use per dtr   • Bell's palsy 1/2017       Past surgical history:       Past Surgical History   Procedure Laterality Date   • Craniotomy tumor  1/31/2017     Lt frontotemporal       Allergies:       Review of patient's allergies indicates no known allergies.    Medications:         Current Outpatient Prescriptions   Medication Sig Dispense Refill   • warfarin (COUMADIN) 2.5 MG Tab Take 1/2 or 1 tablet by mouth daily as instructed by coumadin clinic 30 Tab 5   • hydrocodone-acetaminophen (NORCO) 5-325 MG Tab per tablet Take 1-2 Tabs by mouth every 7 days. Pt takes PRN only     • insulin NPH (HUMULIN,NOVOLIN) 100 UNIT/ML Suspension Inject 35-40 Units as instructed 2 Times a Day. 40 units in the morning and 35  units at night     • insulin regular (HUMULIN R) 100 Unit/mL Solution Inject 3-8 Units as instructed every day. 200-249:3 units 250-299: 5 units 300-349: 7 units over 350 is 8 units    Hasn't used since March     • dexamethasone (DECADRON) 2 MG tablet Take 1 Tab by mouth 2 times a day. 60 Tab 1   • levetiracetam (KEPPRA) 100 MG/ML Solution Take 15 mL by mouth every 12 hours. 240 mL 11   • senna-docusate (PERICOLACE OR SENOKOT S) 8.6-50 MG Tab Take 1 Tab by mouth every day. 30 Tab 3     No current facility-administered medications for this visit.         Social History:     Social History     Social History   • Marital Status: Single     Spouse Name: N/A   • Number of Children: N/A   • Years of Education: N/A     Occupational History   • Not on file.     Social History Main Topics   • Smoking status: Former Smoker -- 1.00 packs/day for 30 years     Types: Cigarettes   • Smokeless tobacco: Never Used   • Alcohol Use: No   • Drug Use: No   • Sexual Activity: No      Comment:      Other Topics Concern   • Not on file     Social History Narrative       Family History:      Family History   Problem Relation Age of Onset   • Diabetes Neg Hx    • Seizures Daughter        Review of Systems:  All other review of systems are negative except what was mentioned above in the HPI.    Constitutional: Negative for fever, chills, weight loss. Improved malaise/fatigue.   positive for weight gain  HEENT: No new auditory or visual complaints. No sore throat and neck pain.     Respiratory: Negative for cough, sputum production, shortness of breath and wheezing.    Cardiovascular: Negative for chest pain, palpitations, orthopnea. Positive leg swelling.    Gastrointestinal: Negative for heartburn, nausea, vomiting and abdominal pain.    Genitourinary: Negative for dysuria, hematuria    Musculoskeletal: No new arthralgias or myalgias   Skin: Negative for rash and itching.    Neurological: As above  Endo/Heme/Allergies: No abnormal  "bleed/bruise.    Psychiatric/Behavioral: No new depression/anxiety.    Physical Exam:  Vitals: /102 mmHg  Pulse 101  Temp(Src) 36.1 °C (97 °F)  Resp 18  Ht 1.651 m (5' 5\")  Wt 101.1 kg (222 lb 14.2 oz)  BMI 37.09 kg/m2  SpO2 91%    General: Not in acute distress, alert and oriented x 3  HEENT: No pallor, icterus. Oropharynx clear.   Neck: Supple, no palpable masses.  Lymph nodes: No palpable cervical, supraclavicular, axillary or inguinal lymphadenopathy.    CVS: regular rate and rhythm, no rubs or gallops  RESP: Clear to auscultate bilaterally, no wheezing or crackles.   ABD: Soft, non tender, non distended, positive bowel sounds, no palpable organomegaly  EXT: 2+ bipedal pitting edema. No significant tenderness today  CNS: Dysarthria. Otherwise, nonfocal  Skin- No rash      Labs:   Hospital Outpatient Visit on 08/11/2017   Component Date Value Ref Range Status   • WBC 08/11/2017 6.2  4.8 - 10.8 K/uL Final   • RBC 08/11/2017 4.15* 4.70 - 6.10 M/uL Final   • Hemoglobin 08/11/2017 10.9* 14.0 - 18.0 g/dL Final   • Hematocrit 08/11/2017 37.1* 42.0 - 52.0 % Final   • MCV 08/11/2017 89.4  81.4 - 97.8 fL Final   • MCH 08/11/2017 26.3* 27.0 - 33.0 pg Final   • MCHC 08/11/2017 29.4* 33.7 - 35.3 g/dL Final   • RDW 08/11/2017 69.3* 35.9 - 50.0 fL Final   • Platelet Count 08/11/2017 241  164 - 446 K/uL Final   • MPV 08/11/2017 8.8* 9.0 - 12.9 fL Final   • Neutrophils-Polys 08/11/2017 81.50* 44.00 - 72.00 % Final   • Lymphocytes 08/11/2017 7.40* 22.00 - 41.00 % Final   • Monocytes 08/11/2017 6.50  0.00 - 13.40 % Final   • Eosinophils 08/11/2017 0.20  0.00 - 6.90 % Final   • Basophils 08/11/2017 0.20  0.00 - 1.80 % Final   • Immature Granulocytes 08/11/2017 4.20* 0.00 - 0.90 % Final   • Nucleated RBC 08/11/2017 0.50   Final   • Neutrophils (Absolute) 08/11/2017 5.05  1.82 - 7.42 K/uL Final    Includes immature neutrophils, if present.   • Lymphs (Absolute) 08/11/2017 0.46* 1.00 - 4.80 K/uL Final   • Monos (Absolute) " 08/11/2017 0.40  0.00 - 0.85 K/uL Final   • Eos (Absolute) 08/11/2017 0.01  0.00 - 0.51 K/uL Final   • Baso (Absolute) 08/11/2017 0.01  0.00 - 0.12 K/uL Final   • Immature Granulocytes (abs) 08/11/2017 0.26* 0.00 - 0.11 K/uL Final   • NRBC (Absolute) 08/11/2017 0.03   Final   • Sodium 08/11/2017 139  135 - 145 mmol/L Final   • Potassium 08/11/2017 4.2  3.6 - 5.5 mmol/L Final   • Chloride 08/11/2017 103  96 - 112 mmol/L Final   • Co2 08/11/2017 26  20 - 33 mmol/L Final   • Anion Gap 08/11/2017 10.0  0.0 - 11.9 Final   • Glucose 08/11/2017 90  65 - 99 mg/dL Final   • Bun 08/11/2017 15  8 - 22 mg/dL Final   • Creatinine 08/11/2017 0.62  0.50 - 1.40 mg/dL Final   • Calcium 08/11/2017 9.1  8.5 - 10.5 mg/dL Final   • AST(SGOT) 08/11/2017 26  12 - 45 U/L Final   • ALT(SGPT) 08/11/2017 76* 2 - 50 U/L Final   • Alkaline Phosphatase 08/11/2017 87  30 - 99 U/L Final   • Total Bilirubin 08/11/2017 0.3  0.1 - 1.5 mg/dL Final   • Albumin 08/11/2017 3.5  3.2 - 4.9 g/dL Final   • Total Protein 08/11/2017 5.9* 6.0 - 8.2 g/dL Final   • Globulin 08/11/2017 2.4  1.9 - 3.5 g/dL Final   • A-G Ratio 08/11/2017 1.5   Final   • LDH Total 08/11/2017 453* 107 - 266 U/L Final   • GFR If  08/11/2017 >60  >60 mL/min/1.73 m 2 Final   • GFR If Non  08/11/2017 >60  >60 mL/min/1.73 m 2 Final   Admission on 08/09/2017, Discharged on 08/09/2017   Component Date Value Ref Range Status   • WBC 08/09/2017 7.0  4.8 - 10.8 K/uL Final   • RBC 08/09/2017 4.02* 4.70 - 6.10 M/uL Final   • Hemoglobin 08/09/2017 10.6* 14.0 - 18.0 g/dL Final   • Hematocrit 08/09/2017 36.1* 42.0 - 52.0 % Final   • MCV 08/09/2017 89.8  81.4 - 97.8 fL Final   • MCH 08/09/2017 26.4* 27.0 - 33.0 pg Final   • MCHC 08/09/2017 29.4* 33.7 - 35.3 g/dL Final   • RDW 08/09/2017 67.9* 35.9 - 50.0 fL Final   • Platelet Count 08/09/2017 255  164 - 446 K/uL Final   • MPV 08/09/2017 8.6* 9.0 - 12.9 fL Final   • Nucleated RBC 08/09/2017 0.30   Final   • NRBC  (Absolute) 08/09/2017 0.02   Final   • Neutrophils-Polys 08/09/2017 90.30* 44.00 - 72.00 % Final   • Lymphocytes 08/09/2017 2.60* 22.00 - 41.00 % Final   • Monocytes 08/09/2017 1.80  0.00 - 13.40 % Final   • Eosinophils 08/09/2017 0.00  0.00 - 6.90 % Final   • Basophils 08/09/2017 0.90  0.00 - 1.80 % Final   • Neutrophils (Absolute) 08/09/2017 6.57  1.82 - 7.42 K/uL Final    Includes immature neutrophils, if present.   • Lymphs (Absolute) 08/09/2017 0.18* 1.00 - 4.80 K/uL Final   • Monos (Absolute) 08/09/2017 0.13  0.00 - 0.85 K/uL Final   • Eos (Absolute) 08/09/2017 0.00  0.00 - 0.51 K/uL Final   • Baso (Absolute) 08/09/2017 0.06  0.00 - 0.12 K/uL Final   • Anisocytosis 08/09/2017 1+   Final   • Macrocytosis 08/09/2017 1+   Final   • Sodium 08/09/2017 136  135 - 145 mmol/L Final   • Potassium 08/09/2017 3.5* 3.6 - 5.5 mmol/L Final   • Chloride 08/09/2017 103  96 - 112 mmol/L Final   • Co2 08/09/2017 22  20 - 33 mmol/L Final   • Anion Gap 08/09/2017 11.0  0.0 - 11.9 Final   • Glucose 08/09/2017 157* 65 - 99 mg/dL Final   • Bun 08/09/2017 18  8 - 22 mg/dL Final   • Creatinine 08/09/2017 0.56  0.50 - 1.40 mg/dL Final   • Calcium 08/09/2017 8.4* 8.5 - 10.5 mg/dL Final   • AST(SGOT) 08/09/2017 28  12 - 45 U/L Final   • ALT(SGPT) 08/09/2017 70* 2 - 50 U/L Final   • Alkaline Phosphatase 08/09/2017 96  30 - 99 U/L Final   • Total Bilirubin 08/09/2017 0.4  0.1 - 1.5 mg/dL Final   • Albumin 08/09/2017 3.4  3.2 - 4.9 g/dL Final   • Total Protein 08/09/2017 5.8* 6.0 - 8.2 g/dL Final   • Globulin 08/09/2017 2.4  1.9 - 3.5 g/dL Final   • A-G Ratio 08/09/2017 1.4   Final   • Lipase 08/09/2017 57  11 - 82 U/L Final   • Color 08/09/2017 Yellow   Final   • Character 08/09/2017 Clear   Final   • Specific Gravity 08/09/2017 1.011  <1.035 Final   • Ph 08/09/2017 5.5  5.0-8.0 Final   • Glucose 08/09/2017 Negative  Negative mg/dL Final   • Ketones 08/09/2017 Negative  Negative mg/dL Final   • Protein 08/09/2017 Negative  Negative mg/dL  Final   • Bilirubin 08/09/2017 Negative  Negative Final   • Urobilinogen, Urine 08/09/2017 0.2  Negative Final   • Nitrite 08/09/2017 Negative  Negative Final   • Leukocyte Esterase 08/09/2017 Negative  Negative Final   • Occult Blood 08/09/2017 Negative  Negative Final   • Micro Urine Req 08/09/2017 see below   Final    Comment: Microscopic examination not performed when specimen is clear  and chemically negative for protein, blood, leukocyte esterase  and nitrite.     • Culture Indicated 08/09/2017 No   Final   • PT 08/09/2017 34.6* 12.0 - 14.6 sec Final   • INR 08/09/2017 3.30* 0.87 - 1.13 Final    Comment: INR - Non-therapeutic Reference Range: 0.87-1.13  INR - Therapeutic Reference Range: 2.0-4.0     • APTT 08/09/2017 26.4  24.7 - 36.0 sec Final    Therapeutic Heparin Range: 63-96 seconds   • Bands-Stabs 08/09/2017 3.50  0.00 - 10.00 % Final   • Metamyelocytes 08/09/2017 0.90   Final   • Manual Diff Status 08/09/2017 PERFORMED   Final   • Peripheral Smear Review 08/09/2017 see below   Final    Comment: Due to instrument suspect flags, further review of peripheral smear is  indicated on this patient sample. This review may or may not result in  abnormal findings.     • Plt Estimation 08/09/2017 Normal   Final   • RBC Morphology 08/09/2017 Present   Final   • Polychromia 08/09/2017 1+   Final   • GFR If  08/09/2017 >60  >60 mL/min/1.73 m 2 Final   • GFR If Non  08/09/2017 >60  >60 mL/min/1.73 m 2 Final   Anticoagulation Visit on 08/09/2017   Component Date Value Ref Range Status   • INR 08/09/2017 2.5   Final   • POC INR 08/09/2017 2.5* 0.9 - 1.2 Final    Comment: INR - Non-therapeutic Reference Range: 0.9-1.2  INR - Therapeutic Reference Range: 2.0-4.0               Assessment and Plan:  1. 1p, 19q non deleted, MGMT non methylated, IDH-1 wild type anaplastic astrocytoma-grade 3 , status post partial resection.  He had extensive infiltrating lesion and had only partial resection.He  took only 3 or 4 days of Temodar concurrently and was admitted to the hospital with pneumonia and respiratory failure. He had a rough course postoperatively and after radiation.  Suspect that he probably had some pseudo-progression related to radiation which may be settling down now. He finished radiation.  He was started on low dose Temodar as maintenance last month. He did well this time. Instructed him to increase his dose to 300 mg day 1-5 given monthly.   Would recommend one year course of maintenance Temodar given the CATNON data. We will do an MRI in around 3 months from now depending on his symptoms.    2. Recurrent DVT/PE- he failed Xarelto and was switched to Coumadin. Continue follow-up with anti-coagulation clinic.  3Seizure prophylaxis.-He will take Keppra    4Expressive aphasia secondary to large tumor-suspect that this will be permanent, but appeared to be having slight improvement    5 Headache-overall, the midline shift has improved. Continue Norco when necessary     Steroid-induced diabetes mellitus-insulin-dependent.      Bipedal edema secondary to prior DVT and Decadron      Weight gain secondary to Decadron       Instructed him to lower Decadron to 2 mg daily given the above problems. Consider starting Decadron next month once he returns from his trip to [place].        He agreed and verbalized his agreement and understanding with the current plan.  I answered all questions and concerns he has at this time         Please note that this dictation was created using voice recognition software. I have made every reasonable attempt to correct obvious errors, but I expect that there are errors of grammar and possibly content that I did not discover before finalizing the note.      SIGNATURES:  Olivier Osman    CC:  THIAGO KinseyRAdriaN.  No ref. provider found

## 2017-08-14 NOTE — MR AVS SNAPSHOT
"        Dale Carmona   2017 8:00 AM   Office Visit   MRN: [id number]    Department:  [facility]   Dept Phone:  [phone]    Description:  Male : 1960   Provider:  Olivier Osman M.D.           Reason for Visit     Follow-Up 1mo with labs prior      Allergies as of 2017     No Known Allergies      Vital Signs     Blood Pressure Pulse Temperature Respirations Height Weight    140/102 mmHg 101 36.1 °C (97 °F) 18 1.651 m (5' 5\") 101.1 kg (222 lb 14.2 oz)    Body Mass Index Oxygen Saturation Smoking Status             37.09 kg/m2 91% Former Smoker         Basic Information     Date Of Birth Sex Race Ethnicity Preferred Language Language for Written Material    1960 Male White  Origin (Icelandic,Uruguayan,Moroccan,Andorran, etc) English Icelandic      Your appointments     Aug 16, 2017  8:00 AM   Established Patient with [facility] EXAM 4   [facility]  (--)    [address] [address]   [phone]            Sep 05, 2017  9:40 AM   Initial Visit with VASCULAR NURSE PRACTITIONER, [facility] EXAM 1   [facility]  (--)    [address] [address]   [phone]            Sep 15, 2017  9:40 AM   ONCOLOGY EST PATIENT 30 MIN with Olivier Osman M.D.   [facility] (--)    [address], [address]  [facility] [id number] [phone]              Problem List              ICD-10-CM Priority Class Noted - Resolved    Obesity (BMI 30-39.9) E66.9 Low  5/15/2015 - Present    Chronic pain of right knee M25.561, G89.29   5/15/2015 - Present    Cerebral edema (CMS-HCC) G93.6   2017 - Present    New onset seizure (CMS-HCC) R56.9 High  2017 - Present    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 High  2017 - Present    Malignant neoplasm of brain (CMS-HCC) C71.9   2017 - Present    Steroid-induced diabetes mellitus " (CMS-HCC) E09.9, T38.0X5A   3/13/2017 - Present    Acute deep vein thrombosis (DVT) of both lower extremities (CMS-HCC) I82.403   3/13/2017 - Present    Pneumonia J18.9   3/23/2017 - Present    Acute respiratory failure with hypoxia (CMS-HCC) J96.01   3/23/2017 - Present    Astrocytoma ([facility]) C71.9   3/24/2017 - Present    Seizure disorder (CMS-HCC) G40.909   3/24/2017 - Present    Hyponatremia E87.1   3/24/2017 - Present    Macrocytic anemia D53.9   3/24/2017 - Present    Thrush, oral B37.0   3/24/2017 - Present    Pulmonary embolism (CMS-HCC) I26.99   6/22/2017 - Present    Thrombocytopenia (CMS-HCC) D69.6   6/22/2017 - Present    Tachycardia R00.0   6/23/2017 - Present    Recurrent pulmonary embolism (CMS-HCC) I26.99   6/26/2017 - Present      Health Maintenance        Date Due Completion Dates    IMM HEP B VACCINE (1 of 3 - Primary Series) 1960 ---    IMM DTaP/Tdap/Td Vaccine (1 - Tdap) 11/8/1977 ---    IMM INFLUENZA (1) 9/1/2017 ---            Current Immunizations     13-VALENT PCV PREVNAR 6/24/2017 11:18 AM      Below and/or attached are the medications your provider expects you to take. Review all of your home medications and newly ordered medications with your provider and/or pharmacist. Follow medication instructions as directed by your provider and/or pharmacist. Please keep your medication list with you and share with your provider. Update the information when medications are discontinued, doses are changed, or new medications (including over-the-counter products) are added; and carry medication information at all times in the event of emergency situations     Allergies:  No Known Allergies          Medications  Valid as of: August 14, 2017 -  8:56 AM    Generic Name Brand Name Tablet Size Instructions for use    Dexamethasone (Tab) DECADRON 2 MG Take 1 Tab by mouth 2 times a day.        Hydrocodone-Acetaminophen (Tab) NORCO 5-325 MG Take 1-2 Tabs by mouth every 7 days. Pt takes PRN only         Insulin NPH Human (Isophane) (Suspension) HUMULIN,NOVOLIN 100 UNIT/ML Inject 35-40 Units as instructed 2 Times a Day. 40 units in the morning and 35 units at night        Insulin Regular Human (Solution) HUMULIN R 100 Unit/mL Inject 3-8 Units as instructed every day. 200-249:3 units 250-299: 5 units 300-349: 7 units over 350 is 8 units    Hasn't used since March        LevETIRAcetam (Solution) KEPPRA 100 MG/ML Take 15 mL by mouth every 12 hours.        Sennosides-Docusate Sodium (Tab) PERICOLACE or SENOKOT S 8.6-50 MG Take 1 Tab by mouth every day.        Warfarin Sodium (Tab) COUMADIN 2.5 MG Take 1/2 or 1 tablet by mouth daily as instructed by coumadin clinic        .                 Medicines prescribed today were sent to:     [facility] #[id number] - [place], [place] - [address] AT [address] [address]    Phone: [phone] Fax: [phone]    Open 24 Hours?: No      Medication refill instructions:       If your prescription bottle indicates you have medication refills left, it is not necessary to call your provider’s office. Please contact your pharmacy and they will refill your medication.    If your prescription bottle indicates you do not have any refills left, you may request refills at any time through one of the following ways: The online [employer] system (except Urgent Care), by calling your provider’s office, or by asking your pharmacy to contact your provider’s office with a refill request. Medication refills are processed only during regular business hours and may not be available until the next business day. Your provider may request additional information or to have a follow-up visit with you prior to refilling your medication.   *Please Note: Medication refills are assigned a new Rx number when refilled electronically. Your pharmacy may indicate that no refills were authorized even though a new prescription for the same medication is available at the  pharmacy. Please request the medicine by name with the pharmacy before contacting your provider for a refill.           MyChart Status: Patient Declined

## 2017-08-16 NOTE — PROGRESS NOTES
Anticoagulation Summary as of 8/16/2017     INR goal 2.0-3.0   Selected INR 1.8! (8/16/2017)   Maintenance plan 1.25 mg (2.5 mg x 0.5) on Mon; 2.5 mg (2.5 mg x 1) all other days   Weekly total 16.25 mg   Plan last modified Denia Lemus, PHARMD (8/4/2017)   Next INR check 8/23/2017   Target end date Indefinite    Indications   Pulmonary embolism (CMS-HCC) [I26.99]  Recurrent pulmonary embolism (CMS-HCC) [I26.99]  Acute deep vein thrombosis (DVT) of both lower extremities (CMS-HCC) [I82.403]         Anticoagulation Episode Summary     INR check location     Preferred lab     Send INR reminders to     Comments       Anticoagulation Care Providers     Provider Role Specialty Phone number    Princess Funez M.D. [facility] [phone]    [facility] Responsible  [phone]    JAGDEEP Kinsey Responsible [facility] [phone]        Patient is subtherapeutic today. Pt's daughter reports he took 1.25 mg last Friday as instructed but was unsure why he needed a lower dose that night. Decadron is decreasing to 2 mg indefinitely starting today. Denies any diet changes. No current symptoms of bleeding or thrombosis reported. Discussed possibility of bridging, however, INRs labile and INR had been trending high. Will give one time loading dose tonight and increase weekly regimen to account for steroid decrease. Follow up in 1 week.    Next Appointment: Wednesday, August 23, 2017 at 7:45 am.     Sara ROBERTSON

## 2017-08-16 NOTE — MR AVS SNAPSHOT
Dale Addison Godfrey Mathew   2017 8:00 AM   Anticoagulation Visit   MRN: [id number]    Department:  Vascular Medicine   Dept Phone:  [phone]    Description:  Male : 1960   Provider:  [facility] EXAM 4           Allergies as of 2017     No Known Allergies      You were diagnosed with     Recurrent pulmonary embolism (CMS-HCC)   [748263]       Pulmonary embolism, other   [4685414]       Acute deep vein thrombosis (DVT) of distal vein of both lower extremities (CMS-HCC)   [9403139]         Vital Signs     Blood Pressure Pulse Smoking Status             137/98 mmHg 75 Former Smoker         Basic Information     Date Of Birth Sex Race Ethnicity Preferred Language Language for Written Material    1960 Male White  Origin (Gibraltarian,Solomon Islander,Mauritian,Senegalese, etc) English Gibraltarian      Your appointments     Aug 23, 2017  7:45 AM   Established Patient with [facility] EXAM 5   [facility]  (--)    [address] [facility] [address]   [phone]            Sep 05, 2017  9:40 AM   Initial Visit with VASCULAR NURSE PRACTITIONER, [facility] EXAM 1   [facility]  (--)    [address] [facility] [address]   [phone]            Sep 15, 2017  9:40 AM   ONCOLOGY EST PATIENT 30 MIN with Olivier Osman M.D.   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, T38.0X5A   3/13/2017 - Present    Acute  deep vein thrombosis (DVT) of both lower extremities (CMS-HCC) I82.403   3/13/2017 - Present    Pneumonia J18.9   3/23/2017 - Present    Acute respiratory failure with hypoxia (CMS-HCC) J96.01   3/23/2017 - Present    Seizure disorder (CMS-HCC) G40.909   3/24/2017 - Present    Hyponatremia E87.1   3/24/2017 - Present    Macrocytic anemia D53.9   3/24/2017 - Present    Thrush, oral B37.0   3/24/2017 - Present    Pulmonary embolism (CMS-HCC) I26.99   6/22/2017 - Present    Thrombocytopenia (CMS-HCC) D69.6   6/22/2017 - Present    Tachycardia R00.0   6/23/2017 - Present    Recurrent pulmonary embolism (CMS-HCC) I26.99   6/26/2017 - Present      Health Maintenance        Date Due Completion Dates    IMM HEP B VACCINE (1 of 3 - Primary Series) 1960 ---    IMM DTaP/Tdap/Td Vaccine (1 - Tdap) 11/8/1977 ---    IMM INFLUENZA (1) 9/1/2017 ---            Results     POCT Protime      Component    INR    1.8                        Current Immunizations     13-VALENT PCV PREVNAR 6/24/2017 11:18 AM      Below and/or attached are the medications your provider expects you to take. Review all of your home medications and newly ordered medications with your provider and/or pharmacist. Follow medication instructions as directed by your provider and/or pharmacist. Please keep your medication list with you and share with your provider. Update the information when medications are discontinued, doses are changed, or new medications (including over-the-counter products) are added; and carry medication information at all times in the event of emergency situations     Allergies:  No Known Allergies          Medications  Valid as of: August 16, 2017 -  8:13 AM    Generic Name Brand Name Tablet Size Instructions for use    Dexamethasone (Tab) DECADRON 2 MG Take 1 Tab by mouth 2 times a day.        Hydrocodone-Acetaminophen (Tab) NORCO 5-325 MG Take 1-2 Tabs by mouth every 7 days. Pt takes PRN only        Insulin NPH Human (Isophane)  (Suspension) HUMULIN,NOVOLIN 100 UNIT/ML Inject 35-40 Units as instructed 2 Times a Day. 40 units in the morning and 35 units at night        Insulin Regular Human (Solution) HUMULIN R 100 Unit/mL Inject 3-8 Units as instructed every day. 200-249:3 units 250-299: 5 units 300-349: 7 units over 350 is 8 units    Hasn't used since March        LevETIRAcetam (Solution) KEPPRA 100 MG/ML Take 15 mL by mouth every 12 hours.        Sennosides-Docusate Sodium (Tab) PERICOLACE or SENOKOT S 8.6-50 MG Take 1 Tab by mouth every day.        Warfarin Sodium (Tab) COUMADIN 2.5 MG Take 1/2 or 1 tablet by mouth daily as instructed by coumadin clinic        .                 Medicines prescribed today were sent to:     [facility]Y #[id number] - [place], [place] - [address] AT [address] [address]    Phone: [phone] Fax: [phone]    Open 24 Hours?: No      Medication refill instructions:       If your prescription bottle indicates you have medication refills left, it is not necessary to call your provider’s office. Please contact your pharmacy and they will refill your medication.    If your prescription bottle indicates you do not have any refills left, you may request refills at any time through one of the following ways: The online [employer] system (except Urgent Care), by calling your provider’s office, or by asking your pharmacy to contact your provider’s office with a refill request. Medication refills are processed only during regular business hours and may not be available until the next business day. Your provider may request additional information or to have a follow-up visit with you prior to refilling your medication.   *Please Note: Medication refills are assigned a new Rx number when refilled electronically. Your pharmacy may indicate that no refills were authorized even though a new prescription for the same medication is available at the pharmacy. Please request the  medicine by name with the pharmacy before contacting your provider for a refill.        Warfarin Dosing Calendar   August 2017 Details    Sun Mon Tue Wed Thu Fri Sat       1               2               3               4               5                 6               7               8               9               10               11               12                 13               14               15               16   1.8   3.75 mg   See details      17      2.5 mg         18      2.5 mg         19      2.5 mg           20      2.5 mg         21      2.5 mg         22      2.5 mg         23      2.5 mg         24               25               26                 27               28               29               30               31                  Date Details   08/16 This INR check   INR: 1.8       Date of next INR:  8/23/2017         How to take your warfarin dose     To take:  2.5 mg Take 1 of the 2.5 mg tablets.    To take:  3.75 mg Take 1.5 of the 2.5 mg tablets.              MyChart Status: Patient Declined

## 2017-08-23 NOTE — MR AVS SNAPSHOT
Dale Azael Godfrey Mathew   2017 7:45 AM   Anticoagulation Visit   MRN: [id number]    Department:  Vascular Medicine   Dept Phone:  [phone]    Description:  Male : 1960   Provider:  [facility] EXAM 5           Allergies as of 2017     No Known Allergies      You were diagnosed with     Recurrent pulmonary embolism (CMS-HCC)   [102179]       Pulmonary embolism, other   [3374709]       Acute deep vein thrombosis (DVT) of distal vein of both lower extremities (CMS-HCC)   [3271803]         Vital Signs     Blood Pressure Pulse Smoking Status             129/88 mmHg 98 Former Smoker         Basic Information     Date Of Birth Sex Race Ethnicity Preferred Language Language for Written Material    1960 Male White  Origin (Salvadorean,Georgian,Senegalese,Danish, etc) English Salvadorean      Your appointments     Sep 05, 2017  9:40 AM   Initial Visit with VASCULAR NURSE PRACTITIONER, [facility] EXAM 1   [facility]  (--)    [address] [facility] [address]   [phone]            Sep 15, 2017  9:40 AM   ONCOLOGY EST PATIENT 30 MIN with Olivier Osman M.D.   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, T38.0X5A   3/13/2017 - Present    Acute deep vein thrombosis (DVT) of both lower extremities (CMS-HCC) I82.403   3/13/2017 - Present    Pneumonia J18.9   3/23/2017 - Present    Acute respiratory failure with hypoxia (CMS-HCC) J96.01   3/23/2017  - Present    Seizure disorder (CMS-HCC) G40.909   3/24/2017 - Present    Hyponatremia E87.1   3/24/2017 - Present    Macrocytic anemia D53.9   3/24/2017 - Present    Thrush, oral B37.0   3/24/2017 - Present    Pulmonary embolism (CMS-HCC) I26.99   6/22/2017 - Present    Thrombocytopenia (CMS-HCC) D69.6   6/22/2017 - Present    Tachycardia R00.0   6/23/2017 - Present    Recurrent pulmonary embolism (CMS-HCC) I26.99   6/26/2017 - Present      Health Maintenance        Date Due Completion Dates    IMM HEP B VACCINE (1 of 3 - Primary Series) 1960 ---    IMM DTaP/Tdap/Td Vaccine (1 - Tdap) 11/8/1977 ---    IMM INFLUENZA (1) 9/1/2017 ---            Results     POCT Protime      Component    INR    2.3                        Current Immunizations     13-VALENT PCV PREVNAR 6/24/2017 11:18 AM      Below and/or attached are the medications your provider expects you to take. Review all of your home medications and newly ordered medications with your provider and/or pharmacist. Follow medication instructions as directed by your provider and/or pharmacist. Please keep your medication list with you and share with your provider. Update the information when medications are discontinued, doses are changed, or new medications (including over-the-counter products) are added; and carry medication information at all times in the event of emergency situations     Allergies:  No Known Allergies          Medications  Valid as of: August 23, 2017 -  8:03 AM    Generic Name Brand Name Tablet Size Instructions for use    Dexamethasone (Tab) DECADRON 2 MG Take 1 Tab by mouth 2 times a day.        Hydrocodone-Acetaminophen (Tab) NORCO 5-325 MG Take 1-2 Tabs by mouth every 7 days. Pt takes PRN only        Insulin NPH Human (Isophane) (Suspension) HUMULIN,NOVOLIN 100 UNIT/ML Inject 35-40 Units as instructed 2 Times a Day. 40 units in the morning and 35 units at night        Insulin Regular Human (Solution) HUMULIN R 100 Unit/mL Inject 3-8  Units as instructed every day. 200-249:3 units 250-299: 5 units 300-349: 7 units over 350 is 8 units    Hasn't used since March        LevETIRAcetam (Solution) KEPPRA 100 MG/ML Take 15 mL by mouth every 12 hours.        Sennosides-Docusate Sodium (Tab) PERICOLACE or SENOKOT S 8.6-50 MG Take 1 Tab by mouth every day.        Warfarin Sodium (Tab) COUMADIN 2.5 MG Take 1/2 or 1 tablet by mouth daily as instructed by coumadin clinic        .                 Medicines prescribed today were sent to:     [facility]/PHARMACY #[id number] - [place], [place] - [address] [facility] AT [address] [place] [address]    Phone: [phone] Fax: [phone]    Open 24 Hours?: No      Medication refill instructions:       If your prescription bottle indicates you have medication refills left, it is not necessary to call your provider’s office. Please contact your pharmacy and they will refill your medication.    If your prescription bottle indicates you do not have any refills left, you may request refills at any time through one of the following ways: The online [employer] system (except Urgent Care), by calling your provider’s office, or by asking your pharmacy to contact your provider’s office with a refill request. Medication refills are processed only during regular business hours and may not be available until the next business day. Your provider may request additional information or to have a follow-up visit with you prior to refilling your medication.   *Please Note: Medication refills are assigned a new Rx number when refilled electronically. Your pharmacy may indicate that no refills were authorized even though a new prescription for the same medication is available at the pharmacy. Please request the medicine by name with the pharmacy before contacting your provider for a refill.        Warfarin Dosing Calendar   August 2017 Details    Sun Mon Tue Wed Thu Fri Sat       1               2               3                 4               5                 6               7               8               9               10               11               12                 13               14               15               16               17               18               19                 20               21               22               23   2.3   2.5 mg   See details      24      2.5 mg         25      2.5 mg         26      2.5 mg           27      2.5 mg         28      2.5 mg         29      2.5 mg         30      2.5 mg         31      2.5 mg            Date Details   08/23 This INR check   INR: 2.3               How to take your warfarin dose     To take:  2.5 mg Take 1 of the 2.5 mg tablets.           Warfarin Dosing Calendar   September 2017 Details    Sun Mon Tue Wed Thu Fri Sat          1      2.5 mg         2      2.5 mg           3      2.5 mg         4      2.5 mg         5      2.5 mg         6               7               8               9                 10               11               12               13               14               15               16                 17               18               19               20               21               22               23                 24               25               26               27               28               29               30                Date Details   No additional details    Date of next INR:  9/5/2017         How to take your warfarin dose     To take:  2.5 mg Take 1 of the 2.5 mg tablets.              MyChart Status: Patient Declined

## 2017-08-23 NOTE — PROGRESS NOTES
Anticoagulation Summary as of 8/23/2017     INR goal 2.0-3.0   Selected INR 2.3 (8/23/2017)   Maintenance plan 2.5 mg (2.5 mg x 1) every day   Weekly total 17.5 mg   Plan last modified Sara Rodriguez A.P.NAdria (8/16/2017)   Next INR check 9/5/2017   Target end date Indefinite    Indications   Pulmonary embolism (CMS-HCC) [I26.99]  Recurrent pulmonary embolism (CMS-[facility]) [I26.99]  Acute deep vein thrombosis (DVT) of both lower extremities (CMS-[facility]) [I82.403]         Anticoagulation Episode Summary     INR check location     Preferred lab     Send INR reminders to     Comments       Anticoagulation Care Providers     Provider Role Specialty Phone number    Princess Funez M.D. [facility] Medicine [phone]    [facility] Anticoagulation Services Responsible  [phone]    JAGDEEP Kinsey Responsible [facility] Medicine [phone]        Anticoagulation Patient Findings      Current Outpatient Prescriptions on File Prior to Visit   Medication Sig Dispense Refill   • warfarin (COUMADIN) 2.5 MG Tab Take 1/2 or 1 tablet by mouth daily as instructed by coumadin clinic 30 Tab 5   • hydrocodone-acetaminophen (NORCO) 5-325 MG Tab per tablet Take 1-2 Tabs by mouth every 7 days. Pt takes PRN only     • insulin NPH (HUMULIN,NOVOLIN) 100 UNIT/ML Suspension Inject 35-40 Units as instructed 2 Times a Day. 40 units in the morning and 35 units at night     • insulin regular (HUMULIN R) 100 Unit/mL Solution Inject 3-8 Units as instructed every day. 200-249:3 units 250-299: 5 units 300-349: 7 units over 350 is 8 units    Hasn't used since March     • dexamethasone (DECADRON) 2 MG tablet Take 1 Tab by mouth 2 times a day. 60 Tab 1   • senna-docusate (PERICOLACE OR SENOKOT S) 8.6-50 MG Tab Take 1 Tab by mouth every day. 30 Tab 3   • levetiracetam (KEPPRA) 100 MG/ML Solution Take 15 mL by mouth every 12 hours. 240 mL 11     No current facility-administered medications on file prior to visit.       Lab Results   Component Value  Date/Time    SODIUM 137 08/14/2017 09:50 AM    POTASSIUM 3.6 08/14/2017 09:50 AM    CHLORIDE 103 08/14/2017 09:50 AM    CO2 24 08/14/2017 09:50 AM    GLUCOSE 80 08/14/2017 09:50 AM    BUN 16 08/14/2017 09:50 AM    CREATININE 0.46* 08/14/2017 09:50 AM          Dale Singletaryligia HernandezGodfrey Mathew seen in clinic today  INR  is-therapeutic.    Denies signs/symptoms of bleeding and/or thrombosis.    Denies changes to diet or medications.   Follow up appointment in 2 week(s).      Pt traveling to [place] 8/28-9/4, was instructed by MD to take 4mg of decadron on the days of his flight. Otherwise pt to take 2mg decadron daily. Due to this will have pt continue with same warfarin dosing and will recheck his INR in 2 weeks when pt is here for [facility]ACS f/u appt.     Denia Lemus, MARIALUISAD

## 2017-09-07 NOTE — PROGRESS NOTES
Anticoagulation Summary  As of 9/7/2017    INR goal:   2.0-3.0   TTR:   38.1 % (2.1 mo)   Today's INR:   3.7!   Maintenance plan:   2.5 mg (2.5 mg x 1) every day   Weekly total:   17.5 mg   Plan last modified:   ROCIO ResendizPDARLENE (8/16/2017)   Next INR check:   9/20/2017   Target end date:   Indefinite    Indications    Pulmonary embolism (CMS-HCC) [I26.99]  Recurrent pulmonary embolism (CMS-HCC) [I26.99]  Acute deep vein thrombosis (DVT) of both lower extremities (CMS-HCC) [I82.403]             Anticoagulation Episode Summary     INR check location:       Preferred lab:       Send INR reminders to:       Comments:         Anticoagulation Care Providers     Provider Role Specialty Phone number    Princess Funez M.D. [facility] [phone]    [facility] Anticoagulation Services Responsible  [phone]    JAGDEEP Kinsey Responsible [facility] Medicine [phone]        Anticoagulation Patient Findings    Dale Carmona seen in clinic today  INR  supra-therapeutic.    Denies signs/symptoms of bleeding and/or thrombosis.    Denies changes to diet or medications.   Follow up appointment in 2 week(s).    Hold today then continue weekly warfarin dose as noted       Dwain Ling, PharmD

## 2017-09-21 NOTE — TELEPHONE ENCOUNTER
Patients daughter called and stated Dr. Osman wanted to do a CT Scan of the brain in October? Rescheduled appointment that he missed on 09/20/17 for 10/10/17 at 3:40. Please advise

## 2017-10-10 NOTE — PROGRESS NOTES
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 year old  male who was otherwise in good health who noticed a left facial deviation in mid December 2016 and  some component of aphasia. An MRI done at [facility] showed abnormal increased signal throughout the left temporal and posterior inferior frontal lobes, possibly representing a low-grade tumor. He was seen by Dr. Presley who recommended awake cranitomy with motor/language mapping to facilitate safe resection at [facility]. He was admitted few days after that with a seizure causing syncope and was started on Keppra.     MRI done at [facility] showed extensive infiltrating predominantly nonenhancing left temperofrontal tumor including involvement of the left insula , frontal lobe and a large portion of the anterior and mid temporal lobe . According to the operative note from 1/31/2017, language mapping was done. He had tumor removal approximately 2 cm anterior to the temporal eloquent speech area extending up to the anterior temporal tip. His speech was not comprehensible at that point  and it was decided not to resect tumor posterior or inferior to the critical language area. According to the note, he has high risk of having residual speech problems.         CT of the head - 2/6/2017 here showed significant left shift and postoperative changes.     He was subsequently diagnosed with bilateral DVT and he was on Xarelto.      He started chemoradiation in April. Unfortunately, he had a prolonged hospitalization due to various medical issues including pneumonia causing respiratory failure and the poor performance status. He took temodar only for 4 days and was  stopped by the hospitalist.     MRI -June 2017, continues to show extensive left frontal, temporal, parietal and basal ganglia  Edema. He had increased nodularity enhancement in the left frontotemporal area.     After further discussion, he was started on maintenance Temodar at a lower dose of 75 mg/m². He took it for 2 days and then developed chest pain with CT scan showing pulmonary embolism. He was switched to warfarin and had an IVC filter placed.     MRI - 7/14/17- overall shows stable finding. There is some concern for cortical laminar necrosis.  Started Temodar at 150 mg dose 5 days on every 4 weeks. He tolerated it reasonably well.    -Recommended continuing the maintenance Temodar. Recommended him to go off of Decadron.  -. He took the dose in August, however, missed the clinic visit last month as his daughter was working.    Dale is here for a follow-up. He appears to a significant improvement in his neurological symptoms. His aphasia has improved significantly. He still has problems related to weight gain and bipedal edema. He still takes periodic Decadron whenever he has some headache. His last dose of Temodar was 6  weeks ago.      Past Medical History:      Past Medical History:   Diagnosis Date   • Cancer (CMS-HCC) 1/31/2017    Glioma   • New onset seizure (CMS-HCC) 1/5/2017   • Bell's palsy 1/2017   • Bell palsy 12/2016   • Hyperlipidemia 2010    no meds   • Brain cancer (CMS-HCC)     Glioma   • Diabetes (CMS-HCC)     R/t steroid use per dtr       Past surgical history:       Past Surgical History:   Procedure Laterality Date   • CRANIOTOMY TUMOR  1/31/2017    Lt frontotemporal       Allergies:       Review of patient's allergies indicates no known allergies.    Medications:         Current Outpatient Prescriptions   Medication Sig Dispense Refill   • warfarin (COUMADIN) 2.5 MG Tab Take 1/2 or 1 tablet by mouth daily as instructed by coumadin clinic 30 Tab 5   • hydrocodone-acetaminophen (NORCO)  5-325 MG Tab per tablet Take 1-2 Tabs by mouth every 7 days. Pt takes PRN only     • insulin NPH (HUMULIN,NOVOLIN) 100 UNIT/ML Suspension Inject 35-40 Units as instructed 2 Times a Day. 40 units in the morning and 35 units at night     • insulin regular (HUMULIN R) 100 Unit/mL Solution Inject 3-8 Units as instructed every day. 200-249:3 units 250-299: 5 units 300-349: 7 units over 350 is 8 units    Hasn't used since March     • dexamethasone (DECADRON) 2 MG tablet Take 1 Tab by mouth 2 times a day. 60 Tab 1   • senna-docusate (PERICOLACE OR SENOKOT S) 8.6-50 MG Tab Take 1 Tab by mouth every day. 30 Tab 3   • levetiracetam (KEPPRA) 100 MG/ML Solution Take 15 mL by mouth every 12 hours. 240 mL 11     No current facility-administered medications for this visit.          Social History:     Social History     Social History   • Marital status: Single     Spouse name: N/A   • Number of children: N/A   • Years of education: N/A     Occupational History   • Not on file.     Social History Main Topics   • Smoking status: Former Smoker     Packs/day: 1.00     Years: 30.00     Types: Cigarettes   • Smokeless tobacco: Never Used   • Alcohol use No   • Drug use: No   • Sexual activity: No      Comment:      Other Topics Concern   • Not on file     Social History Narrative   • No narrative on file       Family History:      Family History   Problem Relation Age of Onset   • Diabetes Neg Hx    • Seizures Daughter        Review of Systems:  All other review of systems are negative except what was mentioned above in the HPI.    Constitutional: Negative for fever, chills, weight loss. Positive for weight gain and malaise/fatigue.    HEENT: No new auditory or visual complaints. No sore throat and neck pain.     Respiratory: Negative for cough, sputum production, shortness of breath and wheezing.    Cardiovascular: Negative for chest pain, palpitations, orthopnea and leg swelling.    Gastrointestinal: Negative for heartburn,  "nausea, vomiting and abdominal pain.    Genitourinary: Negative for dysuria, hematuria    Musculoskeletal: Nonspecific arthralgias or myalgias. Bipedal edema   Skin: Negative for rash and itching.    Neurological: Negative for focal weakness and headaches.    Endo/Heme/Allergies: No abnormal bleed/bruise.    Psychiatric/Behavioral: No new depression/anxiety.    Physical Exam:  Vitals: /80   Pulse 96   Temp 36.8 °C (98.2 °F)   Resp (!) 22   Ht 1.651 m (5' 5\")   Wt 102.7 kg (226 lb 6.6 oz)   SpO2 92%   BMI 37.68 kg/m²     General: Not in acute distress, significantly  improved Speech    HEENT: No pallor, icterus. Oropharynx clear.   Neck: Supple, no palpable masses.  Lymph nodes: No palpable cervical, supraclavicular, axillary or inguinal lymphadenopathy.    CVS: regular rate and rhythm, no rubs or gallops  RESP: Clear to auscultate bilaterally, no wheezing or crackles.   ABD: Soft, non tender, non distended, positive bowel sounds, no palpable organomegaly  EXT: Bipedal edema, more on the left lower extremity. Nontender     CNS: Alert and oriented x3, No focal deficits. Improved aphasia  Skin- No rash      Labs:   No visits with results within 1 Week(s) from this visit.   Latest known visit with results is:   Hospital Outpatient Visit on 09/16/2017   Component Date Value Ref Range Status   • WBC 09/16/2017 6.7  4.8 - 10.8 K/uL Final   • RBC 09/16/2017 4.95  4.70 - 6.10 M/uL Final   • Hemoglobin 09/16/2017 13.5* 14.0 - 18.0 g/dL Final   • Hematocrit 09/16/2017 44.0  42.0 - 52.0 % Final   • MCV 09/16/2017 88.9  81.4 - 97.8 fL Final   • MCH 09/16/2017 27.3  27.0 - 33.0 pg Final   • MCHC 09/16/2017 30.7* 33.7 - 35.3 g/dL Final   • RDW 09/16/2017 65.1* 35.9 - 50.0 fL Final   • Platelet Count 09/16/2017 242  164 - 446 K/uL Final   • MPV 09/16/2017 8.9* 9.0 - 12.9 fL Final   • Nucleated RBC 09/16/2017 0.00  /100 WBC Final   • NRBC (Absolute) 09/16/2017 0.00  K/uL Final   • Neutrophils-Polys 09/16/2017 76.10* " 44.00 - 72.00 % Final   • Lymphocytes 09/16/2017 12.80* 22.00 - 41.00 % Final   • Monocytes 09/16/2017 7.10  0.00 - 13.40 % Final   • Eosinophils 09/16/2017 1.80  0.00 - 6.90 % Final   • Basophils 09/16/2017 0.60  0.00 - 1.80 % Final   • Immature Granulocytes 09/16/2017 1.60* 0.00 - 0.90 % Final   • Lymphs (Absolute) 09/16/2017 0.86* 1.00 - 4.80 K/uL Final   • Monos (Absolute) 09/16/2017 0.48  0.00 - 0.85 K/uL Final   • Eos (Absolute) 09/16/2017 0.12  0.00 - 0.51 K/uL Final   • Baso (Absolute) 09/16/2017 0.04  0.00 - 0.12 K/uL Final   • Immature Granulocytes (abs) 09/16/2017 0.11  0.00 - 0.11 K/uL Final   • Neutrophils (Absolute) 09/16/2017 5.13  1.82 - 7.42 K/uL Final   • Anisocytosis 09/16/2017 1+   Final   • Microcytosis 09/16/2017 1+   Final   • Sodium 09/16/2017 140  135 - 145 mmol/L Final   • Potassium 09/16/2017 3.3* 3.6 - 5.5 mmol/L Final   • Chloride 09/16/2017 108  96 - 112 mmol/L Final   • Co2 09/16/2017 22  20 - 33 mmol/L Final   • Anion Gap 09/16/2017 10.0  0.0 - 11.9 Final   • Glucose 09/16/2017 134* 65 - 99 mg/dL Final   • Bun 09/16/2017 11  8 - 22 mg/dL Final   • Creatinine 09/16/2017 0.49* 0.50 - 1.40 mg/dL Final   • Calcium 09/16/2017 8.4* 8.5 - 10.5 mg/dL Final   • AST(SGOT) 09/16/2017 24  12 - 45 U/L Final   • ALT(SGPT) 09/16/2017 50  2 - 50 U/L Final   • Alkaline Phosphatase 09/16/2017 187* 30 - 99 U/L Final   • Total Bilirubin 09/16/2017 0.4  0.1 - 1.5 mg/dL Final   • Albumin 09/16/2017 3.4  3.2 - 4.9 g/dL Final   • Total Protein 09/16/2017 6.0  6.0 - 8.2 g/dL Final   • Globulin 09/16/2017 2.6  1.9 - 3.5 g/dL Final   • A-G Ratio 09/16/2017 1.3  g/dL Final   • LDH Total 09/16/2017 407* 107 - 266 U/L Final   • Peripheral Smear Review 09/16/2017 see below   Final    Comment: Due to instrument suspect flags, further review of peripheral smear is  indicated on this patient sample. This review may or may not result in  abnormal findings.     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  He had extensive infiltrating lesion and had only partial resection.He took only 3 or 4 days of Temodar concurrently and was admitted to the hospital with pneumonia and respiratory failure. He had a rough course postoperatively and after radiation.    He tolerated the lower dose of maintenance Temodar well for the first month. He took Temodar at 300 mg dose 6 weeks ago. He did not take the Temodar this month. Meanwhile, he has significant improvement in his aphasia. Suspect that he probably had some pseudo-progression related to radiation which may be settling down now.  Encouraged the patient to restart the Temodar and he and his daughter voiced understanding. I will see him back in a month with a repeat MRI of the brain. Would recommend one year course of maintenance Temodar given the CATNON data.    2.  Recurrent DVT/PE- he failed Xarelto and was switched to Coumadin. Continue follow-up with anti-coagulation clinic. He has some bipedal edema.  3Seizure prophylaxis.-He will take Keppra     4Expressive aphasia secondary to large tumor-significant improvement     5 Headache-overall, the midline shift has improved. Informed him not to take Decadron for intermittent headache as this does not appear to be from any cerebral edema.        Steroid-induced diabetes mellitus-insulin-dependent. Apparently improved with the Decadron reduction      Bipedal edema secondary to prior DVT and Decadron. He will discuss with PCP about when necessary, Lasix      Weight gain  secondary to Decadron -encouraged him to actively do physical therapy and reduce carbohydrates in his diet.    He agreed and verbalized his agreement and understanding with the current plan.  I answered all questions and concerns he has at this time         Please note that this dictation was created using voice recognition software. I have made every reasonable attempt to correct obvious errors, but I expect that there are errors of grammar and possibly content that I did not discover before finalizing the note.      SIGNATURES:  Olivier Osman    CC:  THIAGO KinseyRAdriaN.  No ref. provider found

## 2017-10-11 NOTE — TELEPHONE ENCOUNTER
I left a message for the patients daughter to return our call. The patient has been scheduled for his MRI and follow up with Dr. Osman. I also printed a copy of the schedule and mailed it to the patient.

## 2017-10-14 PROBLEM — A41.9 SEPSIS (HCC): Status: ACTIVE | Noted: 2017-01-01

## 2017-10-14 PROBLEM — I50.33 ACUTE ON CHRONIC DIASTOLIC HEART FAILURE (HCC): Status: ACTIVE | Noted: 2017-01-01

## 2017-10-14 PROBLEM — E87.8 ELECTROLYTE DISTURBANCE: Status: ACTIVE | Noted: 2017-01-01

## 2017-10-14 PROBLEM — G93.40 ENCEPHALOPATHY ACUTE: Status: ACTIVE | Noted: 2017-01-01

## 2017-10-14 NOTE — ED NOTES
Family arrives back at bedside.  Updated med rec based on information  Received.  Pt willing to bring in pts temodar if needed.

## 2017-10-14 NOTE — ED NOTES
Pt voiding multiple times in urinal with assistance.  Continues to follow simple commands.  Moves all extremities.  Daughter at bedside.

## 2017-10-14 NOTE — NON-PROVIDER
Internal Medicine Medical Student Admitting History and Physical  Note Author: Cornelia Monk, Student    Name Dale Rojas     1960   Age/Sex 56 y.o. male   MRN [id number]   Code Status      After 5PM or if no immediate response to page, please call for cross-coverage  Attending/Team:  See Patient List for primary contact information  Call [phone] to page after hours   1st Call - Day Intern (R1):    2nd Call - Day Sr. Resident (R2/R3):        Chief Complaint:  Altered mental status     HPI: Dale Dorado is a 57 y/o  male with a h/o Anaplastic Astrocytoma of Temporal Lobe with partial resection on 2017, HTN, HLD, T2DM, hx of PE, DVT and falls, who was brought in to the ED via EMS for altered mental status this morning. Per daughter, patient awoke at 9:30am this morning mumbling incoherently and appearing grossly confused. Per daughter, patient was hallucinating as he mimicking eating. Daughter reports patient had an episode of diarrhea and decreased appetite two days ago but denies any recent fever, chills, sick contacts or travel. Patient currently on his fourth round of chemotherapy with Temozolamide for brain cancer, of which he finished 5 of the 7 doses. Daughter reports prior rounds complicated by PNA and respiratory failure but denies any prior episodes of confusion. Patient stopped his home Lasix las week before starting his chemotherapy.     ROS  Unable to perform due to patient's altered mental status.         Past Medical History  And current medications (link outpatient medications  with diagnosis)    Anaplastic Astrocytoma of Temporal Lobe- partial resection at [facility] on 2017 complicated by residual speech problems and word difficulty, followed by Dr. Osman (oncology), currently taking Temozolamide  Hx of seizures 2/2 brain cancer - controlled on Keppra 100mg twice daily   HTN- controlled with Metoprolol 25 mg twice daily  HLD-  "controlled with Lipitor 20 mg daily   T2DM 2/2 to steroid use - controlled with 35-40 units of NPH twice daily, 3-8 units of regular insulin as sliding scale  Hx of PEs and DVTs likely from extensive hospital stay and cancer- IVC filter put in 6/2017, failed Xarelto and now on warfarin 2.5 mg daily   Acute back pain 2/2 to hx of falls: daughter reports 3 falls in one day roughly a month ago, currently taking Norco 5-325mg q6h prn for pain     Past Surgical History:  Past Surgical History:   Procedure Laterality Date   • CRANIOTOMY TUMOR  1/31/2017    Lt frontotemporal   • OTHER ORTHOPEDIC SURGERY  1980s    Knee surgery from soccer injury     Medication Allergy/Sensitivities:  No Known Allergies    Family History:  Mother: DM   Father: DM    Social History:  Smoking: Former smoker, 30 pack year hx   Alcohol: Occasional social drinking   Illictis: None   Other: None   Living situation: Lives with one of his five daughters. . Currently single.     Physical Exam     Vitals:    10/14/17 1156 10/14/17 1300 10/14/17 1401 10/14/17 1430   BP:       Pulse: (!) 113 (!) 109 (!) 122 (!) 123   Temp:       SpO2: 97% 96% 92% 90%   Weight:       Height:         Body mass index is 37.44 kg/m².  /89   Pulse (!) 123   Temp 37.2 °C (99 °F)   Ht 1.651 m (5' 5\")   Wt 102.1 kg (225 lb)   SpO2 90%   BMI 37.44 kg/m²   O2 therapy: Pulse Oximetry: 90 %, O2 (LPM): 2, O2 Delivery: Nasal Cannula    Physical Exam   Constitutional: He is well-developed, well-nourished, and in no distress.   Large body habitus    HENT:   Head: Normocephalic and atraumatic.   Eyes: EOM are normal. Pupils are equal, round, and reactive to light.   Cardiovascular: Regular rhythm, normal heart sounds and intact distal pulses.  Tachycardia present.    Pulmonary/Chest: Breath sounds normal. No respiratory distress.   Mild crackles in the lower lobes b/l    Abdominal: Soft. Bowel sounds are normal. He exhibits distension.   Musculoskeletal: He exhibits " edema.   3+ pitting edema b/l in LE    Neurological: He is alert. He has normal strength. He is disoriented. He is not agitated. He displays abnormal speech.   Reflex Scores:       Patellar reflexes are 2+ on the right side and 2+ on the left side.  Patient follows simple commands with his daughter interpreting. Patient mumbling incoherently. Patient is not orientated.    Skin: Skin is warm.   Striae noted in the groin area     Assessment/Plan     Dale Dorado is a 55 y/o  male with a h/o Anaplastic Astrocytoma of Temporal Lobe with partial resection, HTN, T2DM, hx of PE and DVT, who was brought in to the ED via EMS for altered mental status this morning.     # Altered mental status secondary to PNA vs vasogenic edema vs unknown cause   Patient confused with associated mumbled speech, which daughter reports as below baseline. Patient currently on chemo and subsequently immunocompromised putting patient as risk for infection, including opportunistic organisms. PE and CXR indicate lower lung consolidation which is consistent with PNA and likely cause of acute AMS. Procalcitonin high at 0.44, supporting infection. Head CT in the ED shows no changes from prior imaging, making AMS less likely an effect from brain cancer.   Plan  - Neuro check q4h to assess for neuro deficits  - Continue treating empirically with Rocephin 2g IV and Azithromycin 500mg IV (QTc reported 459)        # PNA, bacterial vs viral vs opportunistic   Patient currently immunocompromised and  experienced diarrhea with decreased appetite 2 days ago, pointing to potential infection. PE positive for tachycardia and lower lung crackles and CXR in the ED evidenced diffuse hazy opacities with superimposed consolidation, consistent with PNA. Patient has h/o prior hospital stay complicated by PNA. SMART- risk score 2 (Alb 3.1, +confusion)   Plan   - Continue treating empirically with Rocephin 2g IV and Azithromycin 500mg IV (QTc  reported 459)   - Order sputum culture and gram stain   - Consider opportunistic organisms as source- PCP, CMV, HSV, fungi

## 2017-10-14 NOTE — SENIOR ADMIT NOTE
Senior Admit Note     HPI: 56 year old male with a PMH of anaplastic astrocytoma of temporal lobe s/p resection + chemo + radiation, seizures, b/l DVT's, multiple PE's on warfarin s/p IVC filter 6/2017, HLD, and steroid induced DM was brought into the ED by his 3 sister's who are at bedside for confusion. Patient was his normal self 1 day prior and this AM he woke up and was acting strangely grabbing at objects, smacking his lips. Patient was diagnosed with brain cancer in December 2016  And underwent resection in Feb 2017 with radiation from March-April 2017 and 3 rounds of chemo (finishing 3rd round w/ last does tomorrow although he didn't get his dose today). He was admitted in March 2017 for pneumonia. Case was discussed with Dr. Palomares, oncology, who recommended holding his chemo. Patient is altered, laying on his back in the ED in no acute distress, unable to answer any questions. Patient's lasix has been held for the past week and he appears edematous with pitting edema up to his waist.     A/P:      1) Acute encephalopathy  - Patient with history of brain cancer, seizures, opiate use, and pneumonia. May be 2/2 infectious process (PNA) vs seizure vs opiate use vs hypoxia (volume overloaded). Patient has history of clotting with multiple DVT's and PE's, but is therapeutic on warfarin. Ct head was negative.  - Temp 99, 's, 02 sat 90% RA  - CXR shows consolidation left lung base  - CT head with no acute hemorrhage or mass effect, small residual subdural effusion  - WBC 6.6  - Lactic acid WNL  - UDS pos opiates  - Blood Cx pending, urine Cx pending  - Consider MRI if patient's mentation does not improve  - Duoneb PRN  - Supplemental 02 PRN  - Diurese patient  - Cover with IV antibiotics  - Continue seizure meds. Consider EEG.     2) Anasarca  - CXR with diffuse hazy opacities  - Lasix held x1 week  - Pitting edema up to waist  - Diurese with IV lasix  - Monitor pulmonary status    3) Temporal Lobe  Astrocytoma  - CT head with no acute hemorrhage or mass effect, small residual subdural effusion  - Hold chemo  - Discussed case with Dr. Palomares, oncology.    4) Hyponatremia  - Patient volume overloaded  - Diurese with IV lasix  - Monitor

## 2017-10-14 NOTE — ED NOTES
Pt bib ems from home.  Pt has been altered since yesterday.  Pt moans to painful stimuli.  Pt started oral chemo 3 days ago and had same aloc in past after starting.  Pt with ble edema up to abdomen.  Pt was found to be 86% on RA at home supposed to be on home o2 but oxygen has not been delivered.  Pt went to see md earlier in week for cold symptoms and was told he has a fx to his back.  Sepsis orders placed.

## 2017-10-14 NOTE — H&P
Internal Medicine Admitting History and Physical    Note Author: Margy Mon M.D.       Name Dale Rojas     1960   Age/Sex 56 y.o. male   MRN [id number]   Code Status FULL CODE     After 5PM or if no immediate response to page, please call for cross-coverage  Attending/Team: Dr. Buckley/ Joe See Patient List for primary contact information  Call [phone] to page    1st Call - Day Intern (R1):   Dr. Mon 2nd Call - Day Sr. Resident (R2/R3):   Dr. Nunez       Chief Complaint:  Altered Mental Status     HPI:  Dale Donahue is 57 y/o male with a h/o anaplastic astrocytoma of the temporal lobe, HTN, T2DM, who was brought in to the ED by EMS for acute onset of altered mental status upon waking this morning at 9:30am. Per daughter, he woke up this morning mumbling and appeared very confused. Daughter thought he was hallucinating as he mimicked eating something with his hand.  Patient had diarrhea and decreased appetite two days ago but daughter denies known fever, chills, recent travel or sick contacts. Patient is currently on Temozolamide chemotherapy for his brain cancer and yesterday received his 5th of 7 doses in the fourth trial of treatment. Daughter reports no complications with prior trials. He stopped his home Lasix before starting chemotherapy. He has never experienced confusion with prior treatments.  He has a history of recurrent DVTs and PEs, failed Xarelto, and is currently anticoagulated on warfarin.  He has a history of falls. He has had back and knee trauma due to this and takes Norco for pain.    Review of Systems   Unable to perform ROS: Mental status change     Past Medical History:   Past Medical History:   Diagnosis Date   • Cancer (CMS-HCC) 2017    Glioma   • New onset seizure (CMS-HCC) 2017   • Bell's palsy 2017   • Hyperlipidemia 2010    no meds   • Brain cancer (CMS-[facility])     Glioma   • Diabetes (CMS-[facility])     R/t steroid use per dtr    • DVT, bilateral lower limbs (CMS-HCC)    • Hypertension    • Recurrent pulmonary embolism (CMS-HCC)        Past Surgical History:  Past Surgical History:   Procedure Laterality Date   • CRANIOTOMY TUMOR  1/31/2017    Lt frontotemporal   • OTHER ORTHOPEDIC SURGERY  1980s    Knee surgery from soccer injury       Current Outpatient Medications:  Home Medications     Reviewed by Kimberly Blas (Pharmacy Tech) on 10/14/17 at 1507  Med List Status: Complete   Medication Last Dose Status   atorvastatin (LIPITOR) 20 MG Tab 10/13/2017 Active   hydrocodone-acetaminophen (NORCO) 5-325 MG Tab per tablet 10/13/2017 Active   insulin NPH (HUMULIN,NOVOLIN) 100 UNIT/ML Suspension 10/13/2017 Active   insulin regular (HUMULIN R) 100 Unit/mL Solution 10/13/2017 Active   levetiracetam (KEPPRA) 100 MG/ML Solution 10/13/2017 Active   metoprolol (LOPRESSOR) 25 MG Tab 10/13/2017 Active   senna-docusate (PERICOLACE OR SENOKOT S) 8.6-50 MG Tab 10/13/2017 Active   Temozolomide (TEMODAR) 180 MG Cap 10/13/2017 Active   warfarin (COUMADIN) 2.5 MG Tab 10/13/2017 Active                Medication Allergy/Sensitivities:  No Known Allergies      Family History:  Family History   Problem Relation Age of Onset   • Diabetes Mother    • Diabetes Father    • Seizures Daughter        Social History:  Social History     Social History   • Marital status: Single     Spouse name: N/A   • Number of children: N/A   • Years of education: N/A     Occupational History   • Not on file.     Social History Main Topics   • Smoking status: Former Smoker     Packs/day: 1.00     Years: 30.00     Types: Cigarettes   • Smokeless tobacco: Never Used   • Alcohol use Yes      Comment: Occasional social drinking    • Drug use: No   • Sexual activity: No      Comment:      Other Topics Concern   • Not on file     Social History Narrative   • No narrative on file     Living situation: Patient lives with one of his five daughters.   PCP: Mohsen Tamasaby, M.D.  "    Physical Exam     Vitals:    10/14/17 1156 10/14/17 1300 10/14/17 1401 10/14/17 1430   BP:       Pulse: (!) 113 (!) 109 (!) 122 (!) 123   Temp:       SpO2: 97% 96% 92% 90%   Weight:       Height:         Body mass index is 37.44 kg/m².  /89   Pulse (!) 123   Temp 37.2 °C (99 °F)   Ht 1.651 m (5' 5\")   Wt 102.1 kg (225 lb)   SpO2 90%   BMI 37.44 kg/m²   O2 therapy: Pulse Oximetry: 90 %, O2 (LPM): 2, O2 Delivery: Nasal Cannula    Physical Exam   Constitutional: No distress.   Large body habitus.    HENT:   Head: Normocephalic.   Eyes: EOM are normal. Pupils are equal, round, and reactive to light.   Cardiovascular: Normal rate, normal heart sounds and intact distal pulses.    Pulmonary/Chest:   Mild crackles in the lower lobes b/l    Abdominal: Bowel sounds are normal. He exhibits distension.   Genitourinary:   Genitourinary Comments: Edematous scrotum noted    Musculoskeletal: He exhibits edema.   3+ pitting edema in b/l LE    Neurological: He has normal strength.   Reflex Scores:       Patellar reflexes are 2+ on the right side and 2+ on the left side.  Patient can follow simple commands with daughter translating. Patient is not orientated.    Skin:   Striae noted by the groin.      Data Review       Old Records Request:   Completed  Current Records review and summary: Completed    Lab Data Review:  Recent Results (from the past 24 hour(s))   Lactic acid (lactate)    Collection Time: 10/14/17 11:15 AM   Result Value Ref Range    Lactic Acid 1.6 0.5 - 2.0 mmol/L   CBC WITH DIFFERENTIAL    Collection Time: 10/14/17 11:15 AM   Result Value Ref Range    WBC 6.6 4.8 - 10.8 K/uL    RBC 4.45 (L) 4.70 - 6.10 M/uL    Hemoglobin 12.7 (L) 14.0 - 18.0 g/dL    Hematocrit 38.6 (L) 42.0 - 52.0 %    MCV 86.7 81.4 - 97.8 fL    MCH 28.5 27.0 - 33.0 pg    MCHC 32.9 (L) 33.7 - 35.3 g/dL    RDW 58.8 (H) 35.9 - 50.0 fL    Platelet Count 166 164 - 446 K/uL    MPV 8.5 (L) 9.0 - 12.9 fL    Neutrophils-Polys 87.40 (H) 44.00 - " 72.00 %    Lymphocytes 6.10 (L) 22.00 - 41.00 %    Monocytes 4.70 0.00 - 13.40 %    Eosinophils 0.30 0.00 - 6.90 %    Basophils 0.30 0.00 - 1.80 %    Immature Granulocytes 1.20 (H) 0.00 - 0.90 %    Nucleated RBC 0.00 /100 WBC    Neutrophils (Absolute) 5.77 1.82 - 7.42 K/uL    Lymphs (Absolute) 0.40 (L) 1.00 - 4.80 K/uL    Monos (Absolute) 0.31 0.00 - 0.85 K/uL    Eos (Absolute) 0.02 0.00 - 0.51 K/uL    Baso (Absolute) 0.02 0.00 - 0.12 K/uL    Immature Granulocytes (abs) 0.08 0.00 - 0.11 K/uL    NRBC (Absolute) 0.00 K/uL   COMP METABOLIC PANEL    Collection Time: 10/14/17 11:15 AM   Result Value Ref Range    Sodium 131 (L) 135 - 145 mmol/L    Potassium 3.3 (L) 3.6 - 5.5 mmol/L    Chloride 94 (L) 96 - 112 mmol/L    Co2 28 20 - 33 mmol/L    Anion Gap 9.0 0.0 - 11.9    Glucose 103 (H) 65 - 99 mg/dL    Bun 7 (L) 8 - 22 mg/dL    Creatinine 0.41 (L) 0.50 - 1.40 mg/dL    Calcium 8.4 (L) 8.5 - 10.5 mg/dL    AST(SGOT) 30 12 - 45 U/L    ALT(SGPT) 51 (H) 2 - 50 U/L    Alkaline Phosphatase 111 (H) 30 - 99 U/L    Total Bilirubin 1.0 0.1 - 1.5 mg/dL    Albumin 3.1 (L) 3.2 - 4.9 g/dL    Total Protein 6.0 6.0 - 8.2 g/dL    Globulin 2.9 1.9 - 3.5 g/dL    A-G Ratio 1.1 g/dL   ESTIMATED GFR    Collection Time: 10/14/17 11:15 AM   Result Value Ref Range    GFR If African American >60 >60 mL/min/1.73 m 2    GFR If Non African American >60 >60 mL/min/1.73 m 2   PROTHROMBIN TIME    Collection Time: 10/14/17 11:15 AM   Result Value Ref Range    PT 23.8 (H) 12.0 - 14.6 sec    INR 2.05 (H) 0.87 - 1.13   APTT    Collection Time: 10/14/17 11:15 AM   Result Value Ref Range    APTT 48.7 (H) 24.7 - 36.0 sec   BTYPE NATRIURETIC PEPTIDE    Collection Time: 10/14/17 11:15 AM   Result Value Ref Range    B Natriuretic Peptide 77 0 - 100 pg/mL   LIPASE    Collection Time: 10/14/17 11:15 AM   Result Value Ref Range    Lipase 5 (L) 11 - 82 U/L   TROPONIN    Collection Time: 10/14/17 11:15 AM   Result Value Ref Range    Troponin I 0.04 0.00 - 0.04 ng/mL    EKG (NOW)    Collection Time: 10/14/17 11:35 AM   Result Value Ref Range    Report       [facility]    Test Date:  2017-10-14  Pt Name:    MICHAEL HUGO       Department: ER  MRN:        [id number]                      Room:       [facility]  Gender:     M                            Technician: [id number]  :        1960                   Requested By:EVA LU  Order #:    [id number]                    Reading MD:    Measurements  Intervals                                Axis  Rate:       113                          P:          41  IL:         148                          QRS:        138  QRSD:       112                          T:          -10  QT:         348  QTc:        478    Interpretive Statements  SINUS TACHYCARDIA  PROBABLE LEFT ATRIAL ABNORMALITY  NONSPECIFIC INTRAVENTRICULAR CONDUCTION DELAY  Compared to ECG 2017 16:01:55  No significant changes     URINE DRUG SCREEN (TRIAGE)    Collection Time: 10/14/17 12:47 PM   Result Value Ref Range    Amphetamines Urine Negative Negative    Barbiturates Negative Negative    Benzodiazepines Negative Negative    Cocaine Metabolite Negative Negative    Methadone Negative Negative    Opiates Positive (A) Negative    Oxycodone Positive (A) Negative    Phencyclidine -Pcp Negative Negative    Propoxyphene Negative Negative    Cannabinoid Metab Negative Negative   URINALYSIS,CULTURE IF INDICATED    Collection Time: 10/14/17 12:47 PM   Result Value Ref Range    Color DK Yellow     Character Clear     Specific Gravity 1.024 <1.035    Ph 6.0 5.0 - 8.0    Glucose Negative Negative mg/dL    Ketones 80 (A) Negative mg/dL    Protein Negative Negative mg/dL    Bilirubin Small (A) Negative    Urobilinogen, Urine 1.0 Negative    Nitrite Negative Negative    Leukocyte Esterase Negative Negative    Occult Blood Negative Negative    Micro Urine Req see below     Culture Indicated No UA Culture   Lactic acid (lactate)    Collection  Time: 10/14/17  1:18 PM   Result Value Ref Range    Lactic Acid 1.8 0.5 - 2.0 mmol/L   AMMONIA    Collection Time: 10/14/17  1:18 PM   Result Value Ref Range    Ammonia 28 11 - 45 umol/L   MAGNESIUM    Collection Time: 10/14/17  1:18 PM   Result Value Ref Range    Magnesium 2.0 1.5 - 2.5 mg/dL   PHOSPHORUS    Collection Time: 10/14/17  1:18 PM   Result Value Ref Range    Phosphorus 2.0 (L) 2.5 - 4.5 mg/dL       Imaging/Procedures Review:    ndependant Imaging Review: Completed  CT-HEAD W/O   Final Result      1.  Postoperative changes in the left temporal lobe anteriorly and inferiorly and in the left temporoparietal junction region.      2.  Small residual left subdural effusion.      3.  No acute hemorrhage or mass effect.      4.  Left craniotomy changes.      DX-CHEST-PORTABLE (1 VIEW)   Final Result      Stable low lung volumes with marked cardiac silhouette enlargement and evidence of failure      Superimposed consolidation from pneumonia at the left base is possible      Findings were discussed with LINA ROY on 10/14/2017 1:14 PM.         EKG:   EKG Independant Review: Completed  QTc: 478, HR: 113, Sinus Tachycardia, similar to previous EKG on file         Assessment/Plan     * Encephalopathy acute- (present on admission)   Assessment & Plan    Suspect pneumonia given Hx with chemotherapy treatments, tachycardic, tachypneic, supplemental O2 requirement, CXR w LLL consolidation, procalcitonin 0.44. Also Hx multiple DVTs and PEs but is therapeutic on warfarin with a negative head CT. Hx brain cancer with associated seizure disorder.  - admit to neurology [facility]   - treat underlying conditions as noted  - blood, urine, sputum Cx pending  - seizure, aspiration, fall precautions        Sepsis (CMS-HCC)- (present on admission)   Assessment & Plan    This is sepsis (without associated acute organ dysfunction).   SIRS: , RR 22  Source: suspected pneumonia given Hx with chemotherapy treatments, tachycardic,  tachypneic, supplemental O2 requirement, CXR w LLL consolidation  Lactate: 1.8  Procalcitonin: 0.44  - IVF contraindicated in the setting of acute on chronic heart failure and fluid overload  - Ceftriaxone, Azithromycin  - duoneb, supplemental O2 prn  - blood, sputum Cx pending        Acute on chronic diastolic heart failure (CMS-HCC)- (present on admission)   Assessment & Plan    Anasarca, increased O2 requirements. Lasix held for the past week for chemotherapy, per family. Echo 3/26/17 with Grade II diastolic dysfunction. 40 mg IV lasix given in ED with appropriate diuresis.  - daily IV lasix  - holding metoprolol for pressure support in the setting of sepsis  - supplemental O2 prn        Anaplastic astrocytoma of temporal lobe (CMS-HCC)- (present on admission)   Assessment & Plan    S/p partial resection at [facility], XRT. Current Temozolamide chemotherapy. CT head without evidence of acute hemorrhage or mass effect.  - consulted Dr. Palomares, Oncology, appreciate recommendations  - holding chemotherapy for now        Electrolyte disturbance   Assessment & Plan    Hyponatremia, hypokalemia, hypophosphatemia. Likely 2/2 fluid overload, possibly a chemotherapy component.  - replace electrolytes prn  - diuresis as noted  - monitor daily        Steroid-induced diabetes mellitus (CMS-HCC)- (present on admission)   Assessment & Plan    Glucose wnl currently. Pt family reports daily insulin but report none given since yesterday and pt unable to corroborate.  - POC glucose TID AC and HS  - SSI  - monitor for additional insulin requirements        Chronic pain of right knee- (present on admission)   Assessment & Plan    With associated back pain 2/2 multiple falls in past year. Takes Norco at home.  - continue home [place]        Recurrent pulmonary embolism (CMS-HCC)- (present on admission)   Assessment & Plan    With associated DVTs. Most recent CT chest 6/22/17 with bilateral PEs. Therapeutic on warfarin.  - continue  warfarin        Seizure disorder (CMS-HCC)- (present on admission)   Assessment & Plan    Recent onset 2/2 brain cancer.  - continue home seizure medications        Obesity (BMI 30-39.9)- (present on admission)   Assessment & Plan    BMI 37.44. Recent steroids associated with brain cancer treatment.  - will  on discharge               Anticipated Hospital stay:  >2 midnights        Quality Measures    Reviewed items::  EKG reviewed, Medications reviewed, Labs reviewed and Radiology images reviewed  [place] catheter::  No [place]  DVT prophylaxis pharmacological::  Enoxaparin (Lovenox)  DVT prophylaxis - mechanical:  SCDs  Ulcer Prophylaxis::  Not indicated  Antibiotics:  Treating active infection/contamination beyond 24 hours perioperative coverage

## 2017-10-14 NOTE — ED NOTES
Pt to imaging and back.  Per daughter pt has been off lasix for a week.  3+ edema to ble and lungs with fine crackles.

## 2017-10-14 NOTE — ED NOTES
EKG given to Dr. Vernon. Dr. Vernon at bedside for eval.  Pt only moans.  Usually oriented.  Does not know name.

## 2017-10-14 NOTE — ED NOTES
Pt able to state he needs to void.  Voided 500cc yellow urine in urinal.  Following simple commands of raising legs and moving arms.

## 2017-10-14 NOTE — ED PROVIDER NOTES
ED Provider Note    Scribed for Mikey Vernon M.D. by Daphney Galvan. 10/14/2017  11:45 AM    Primary Care Provider: JAGDEEP Kinsey  Means of arrival: EMS  History limited by: Altered level of consciousness    CHIEF COMPLAINT  Chief Complaint   Patient presents with   • ALOC       HPI  Dale Carmona is a 56 y.o. male who presents to the ED for altered level of consciousness that began yesterday morning when the patient did not want to get out of bed to take his medication secondary to weakness. He was mumbling when he tried to speak yesterday and became confused today. He was recently ill with a cough and had diarrhea two days ago. Patient stopped taking his water pill one week ago in order to start the chemo pill and began to have leg swelling immediately after. Patient has been taking Oxycodone for back pain. He has history of brain cancer and takes chemo pills. Patient took this pill one month ago with no symptoms. Patient is on Keppra and Coumadin. Patient's family member states the patient was hallucinating today and it appeared he was trying to eat something that was not there.     Further history of present illness cannot be obtained due to the patient's altered level of consciousness.       REVIEW OF SYSTEMS  CONSITUTIONAL: positive for weakness  PSYCHIATRIC: positive for hallucinations  See HPI for further details. Further review of systems is unobtainable as noted above.  C.    PAST MEDICAL HISTORY  Past Medical History:   Diagnosis Date   • Cancer (CMS-HCC) 1/31/2017    Glioma   • New onset seizure (CMS-HCC) 1/5/2017   • Bell's palsy 1/2017   • Bell palsy 12/2016   • Hyperlipidemia 2010    no meds   • Brain cancer (CMS-HCC)     Glioma   • Diabetes (CMS-HCC)     R/t steroid use per dtr   • Hypertension        FAMILY HISTORY  Family History   Problem Relation Age of Onset   • Diabetes Mother    • Diabetes Father    • Seizures Daughter        SOCIAL HISTORY   reports that  "he has quit smoking. His smoking use included Cigarettes. He has a 30.00 pack-year smoking history. He has never used smokeless tobacco. He reports that he drinks alcohol. He reports that he does not use drugs.    SURGICAL HISTORY  Past Surgical History:   Procedure Laterality Date   • CRANIOTOMY TUMOR  1/31/2017    Lt frontotemporal   • OTHER ORTHOPEDIC SURGERY  1980s    Knee surgery from soccer injury       CURRENT MEDICATIONS  No current facility-administered medications on file prior to encounter.      Current Outpatient Prescriptions on File Prior to Encounter   Medication Sig Dispense Refill   • hydrocodone-acetaminophen (NORCO) 5-325 MG Tab per tablet Take 1-2 Tabs by mouth every 6 hours as needed.     • insulin NPH (HUMULIN,NOVOLIN) 100 UNIT/ML Suspension Inject 35-40 Units as instructed 2 Times a Day. 40 units in the morning and 35 units at night     • insulin regular (HUMULIN R) 100 Unit/mL Solution Inject 3-8 Units as instructed every day. 200-249:3 units 250-299: 5 units 300-349: 7 units over 350 is 8 units    Hasn't used since March     • senna-docusate (PERICOLACE OR SENOKOT S) 8.6-50 MG Tab Take 1 Tab by mouth every day. 30 Tab 3   • levetiracetam (KEPPRA) 100 MG/ML Solution Take 15 mL by mouth every 12 hours. 240 mL 11       ALLERGIES  No Known Allergies    PHYSICAL EXAM  VITAL SIGNS: /89   Pulse (!) 114   Temp 37.2 °C (99 °F)   Ht 1.651 m (5' 5\")   Wt 102.1 kg (225 lb)   SpO2 94%   BMI 37.44 kg/m²      Constitutional: Patient is awake, answers questions but is confused. . Positive anasarca  HENT: Normocephalic, Atraumatic, Bilateral external ears normal, Oropharynx pink, dry with no exudates, Nose patent.  Eyes: PERRLA, EOMI, Sclera and conjunctiva clear, No discharge.   Neck:  Supple no nuchal rigidity, no thyromegaly or mass. Non-tender  Lymphatic: No supraclavicular  lymph nodes.   Cardiovascular: Heart is tachycardic rate and distant with regular rhythm. No murmur, rub or thrill. "   Thorax & Lungs: Chest is symmetrical, with decreased breath sounds throughout with crackles. No wheezing. No respiratory distress, No chest tenderness.   Abdomen: Soft, Obese, and distended. No tenderness no hepatosplenomegaly there is no guarding or rebound, No masses, No pulsatile masses.   Skin: Warm, Dry, no petechia, purpura, or rash.   Back: Non tender with palpation, No CVA tenderness.   Extremities: 3+ pitting edema to bilateral extremities. Non tender.   Musculoskeletal: Good range of motion to upper very weak to his lower extremities he can wiggle his toes that's it.  Neurologic: Wiggling toes but cannot raise legs up. Patient is able to wiggle his toes and raise his arms. Very confused.  Psychiatric: Unable to assess.     LABS  Results for orders placed or performed during the hospital encounter of 10/14/17   Lactic acid (lactate)   Result Value Ref Range    Lactic Acid 1.6 0.5 - 2.0 mmol/L   Lactic acid (lactate)   Result Value Ref Range    Lactic Acid 1.8 0.5 - 2.0 mmol/L   CBC WITH DIFFERENTIAL   Result Value Ref Range    WBC 6.6 4.8 - 10.8 K/uL    RBC 4.45 (L) 4.70 - 6.10 M/uL    Hemoglobin 12.7 (L) 14.0 - 18.0 g/dL    Hematocrit 38.6 (L) 42.0 - 52.0 %    MCV 86.7 81.4 - 97.8 fL    MCH 28.5 27.0 - 33.0 pg    MCHC 32.9 (L) 33.7 - 35.3 g/dL    RDW 58.8 (H) 35.9 - 50.0 fL    Platelet Count 166 164 - 446 K/uL    MPV 8.5 (L) 9.0 - 12.9 fL    Neutrophils-Polys 87.40 (H) 44.00 - 72.00 %    Lymphocytes 6.10 (L) 22.00 - 41.00 %    Monocytes 4.70 0.00 - 13.40 %    Eosinophils 0.30 0.00 - 6.90 %    Basophils 0.30 0.00 - 1.80 %    Immature Granulocytes 1.20 (H) 0.00 - 0.90 %    Nucleated RBC 0.00 /100 WBC    Neutrophils (Absolute) 5.77 1.82 - 7.42 K/uL    Lymphs (Absolute) 0.40 (L) 1.00 - 4.80 K/uL    Monos (Absolute) 0.31 0.00 - 0.85 K/uL    Eos (Absolute) 0.02 0.00 - 0.51 K/uL    Baso (Absolute) 0.02 0.00 - 0.12 K/uL    Immature Granulocytes (abs) 0.08 0.00 - 0.11 K/uL    NRBC (Absolute) 0.00 K/uL   COMP  METABOLIC PANEL   Result Value Ref Range    Sodium 131 (L) 135 - 145 mmol/L    Potassium 3.3 (L) 3.6 - 5.5 mmol/L    Chloride 94 (L) 96 - 112 mmol/L    Co2 28 20 - 33 mmol/L    Anion Gap 9.0 0.0 - 11.9    Glucose 103 (H) 65 - 99 mg/dL    Bun 7 (L) 8 - 22 mg/dL    Creatinine 0.41 (L) 0.50 - 1.40 mg/dL    Calcium 8.4 (L) 8.5 - 10.5 mg/dL    AST(SGOT) 30 12 - 45 U/L    ALT(SGPT) 51 (H) 2 - 50 U/L    Alkaline Phosphatase 111 (H) 30 - 99 U/L    Total Bilirubin 1.0 0.1 - 1.5 mg/dL    Albumin 3.1 (L) 3.2 - 4.9 g/dL    Total Protein 6.0 6.0 - 8.2 g/dL    Globulin 2.9 1.9 - 3.5 g/dL    A-G Ratio 1.1 g/dL   ESTIMATED GFR   Result Value Ref Range    GFR If African American >60 >60 mL/min/1.73 m 2    GFR If Non African American >60 >60 mL/min/1.73 m 2   PROTHROMBIN TIME   Result Value Ref Range    PT 23.8 (H) 12.0 - 14.6 sec    INR 2.05 (H) 0.87 - 1.13   APTT   Result Value Ref Range    APTT 48.7 (H) 24.7 - 36.0 sec   BTYPE NATRIURETIC PEPTIDE   Result Value Ref Range    B Natriuretic Peptide 77 0 - 100 pg/mL   URINE DRUG SCREEN (TRIAGE)   Result Value Ref Range    Amphetamines Urine Negative Negative    Barbiturates Negative Negative    Benzodiazepines Negative Negative    Cocaine Metabolite Negative Negative    Methadone Negative Negative    Opiates Positive (A) Negative    Oxycodone Positive (A) Negative    Phencyclidine -Pcp Negative Negative    Propoxyphene Negative Negative    Cannabinoid Metab Negative Negative   LIPASE   Result Value Ref Range    Lipase 5 (L) 11 - 82 U/L   TROPONIN   Result Value Ref Range    Troponin I 0.04 0.00 - 0.04 ng/mL   URINALYSIS,CULTURE IF INDICATED   Result Value Ref Range    Color DK Yellow     Character Clear     Specific Gravity 1.024 <1.035    Ph 6.0 5.0 - 8.0    Glucose Negative Negative mg/dL    Ketones 80 (A) Negative mg/dL    Protein Negative Negative mg/dL    Bilirubin Small (A) Negative    Urobilinogen, Urine 1.0 Negative    Nitrite Negative Negative    Leukocyte Esterase Negative  Negative    Occult Blood Negative Negative    Micro Urine Req see below     Culture Indicated No UA Culture   AMMONIA   Result Value Ref Range    Ammonia 28 11 - 45 umol/L       All labs reviewed by me.    RADIOLOGY/PROCEDURES  CT-HEAD W/O   Final Result      1.  Postoperative changes in the left temporal lobe anteriorly and inferiorly and in the left temporoparietal junction region.      2.  Small residual left subdural effusion.      3.  No acute hemorrhage or mass effect.      4.  Left craniotomy changes.      DX-CHEST-PORTABLE (1 VIEW)   Final Result      Stable low lung volumes with marked cardiac silhouette enlargement and evidence of failure      Superimposed consolidation from pneumonia at the left base is possible      Findings were discussed with LINA ROY on 10/14/2017 1:14 PM.        The radiologist's interpretations of all radiological studies have been reviewed by me.     COURSE & MEDICAL DECISION MAKING  Pertinent Labs & Imaging studies reviewed. (See chart for details)    Differential diagnoses include but are not limited to intercranial bleed or mass or swelling, pneumonia, infections including UTI or skin,  electrolyte imbalances.     11:45 AM - Patient seen and examined at bedside. Ordered lactic acid, CT-Head, Lipase, Prothrombin Time, APTT, Troponin, urinalysis culture, BNP, Urine drug screen, estimated GFR, CBC with differential, CMP, urinalysis, urine cluture, blood culture, DX-chest.      1:06 PM Recheck: Patient re-evaluated at beside. Additional history was obtained from additional family member.       1:10 PM Ordered Ammonia and BNP.     1:13 PM - I discussed the patient's case and the above findings with Radiology who believes patient has pneumonia.     1:16 PM - I discussed the patient's case and the above findings with [facility] who agrees to admit the patient.    1:17 PM - I discussed the patient's case and the above findings with pharmacy who advises to treat patient  as community acquired pneumonia.     1:20 PM Patient treated with 40mg Lasix, 2g Rocephin, and 500mg Zithromax.       Decision Making  Patient who 1 week ago stopped his Lasix. Started chemotherapy 3 days ago now with decreased mental status. He had what the family feels like hallucinations yesterday but then decreased mental status today. He is markedly edematous. Appears to have pneumonia. This time we get him IV antibiotics for a community acquired pneumonia. We'll give Lasix for his anasarca. Again he will need further workup and evaluation for his confusion and mental status changes Allis nail be from the pneumonia and medications. He is also taking narcotics for his chronic back pain. I have discussed the case with the [facility] internal medicine service doctors and they've come in to see him for further care and evaluation.    DISPOSITION:  Patient will be admitted to [facility] in guarded condition.      FINAL IMPRESSION  1. Confused    2. Pneumonia of left lower lobe due to infectious organism (CMS-HCC)    3. Anasarca        PLAN  1.[facility]mission [facility]  2. Continue treatment of his pneumonia treatment of his anasarca and edema  3. Continue workup and evaluation of his mental status changes       Daphney HENRY (Scribe), am scribing for, and in the presence of, Mikey Vernon M.D..    Electronically signed by: Daphney Galvan (Ousmaneibe), 10/14/2017    Mikey HENRY M.D. personally performed the services described in this documentation, as scribed by Daphney Galvan in my presence, and it is both accurate and complete.    The note accurately reflects work and decisions made by me.  Mikey Vernon  10/14/2017  7:18 PM

## 2017-10-15 NOTE — CONSULTS
"Reason for PC Consult: Advance Care Planning    Consulted by: Dr. Mon    Assessment:  General:       56 yr  male admitted 10/14/17 with altered level of consciousness; R/O new onset seizure activity. 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 Treatment of radiation Mar-April 2017, then 3 rounds of chemo-3rd round was to be completed 10/15/17.  He has had 2 admission during his treatment for pneumonia, per Priscilla, daughter. Added medical history of DVTs/PE on Coumadin, anasarca.      Dyspnea: Yes- 96% on 2L/NC  Last BM: 10/13/17- Per RN assessment  Pain: Unable to determine-  Aphasic  Depression: Unable to determine-      Spiritual:  Is Mormonism or spirituality important for coping with this illness? Unable to determine-    Has a  or spiritual provider visit been requested? Unable to determine    Palliative Performance Scale: 10%    Advance Directive: None on File  DPOA: None on File  SOPHIE Stovall and Desiree, daughters stated to have guardianship.  Will need to obtain legal documentation.  POLST: None on FIle  Desiree Gill Daughter   [phone]     Priscilla Chowdhury Daughter [phone]     DavidanastasiyaSia Daughter [phone]       Code Status: Full-      Outcome:  I met with Priscilla, daughter, at bedside and introduced Palliative Care services and education.  She stated understanding.  She expressed that she and her sister understand that the patient may being having new seizure activity, that there may be new/more tumor growth and the chemo treatment \"has been stopped\".  This the \"consequence of what he has\".    The patient has been aphasic and total care since resection of tumor in 2/2017.  He currently lives with Desiree, who has 2 children, and Priscilla/Sia help to care for him.      At this time the family is awaiting further determination for the testing to make further medical decisions.  I provided contact " information for Palliative Care and offered to schedule a family meeting Tuesday 10/17/17 3:30pm  to review and talk.    Priscilla denied any further needs and will be in for the meeting on Tuesday.      Updated:     Plan: Follow patient test results and meet Tuesday.  Continue to support and provided education.      Thank you for allowing Palliative Care to participate in this patient's care. Please feel free to call [phone] with any questions or concerns.

## 2017-10-15 NOTE — ASSESSMENT & PLAN NOTE
- as above  - Recent onset 2/2 brain cancer. Encephalopathy with variable mentation concerning for atonic seizure.  - Neurology following, appreciate recs  - IV ativan prn for seizure or agitation  - continue decadron, AEDs

## 2017-10-15 NOTE — CARE PLAN
Problem: Safety  Goal: Will remain free from injury  Outcome: PROGRESSING AS EXPECTED  Assessed the patient for fall risk factors. Provided education regarding the need to call for assistance. Bed alarm in place, bed in lowest position, call light in reach.      Problem: Infection  Goal: Will remain free from infection  Outcome: PROGRESSING AS EXPECTED  Assessed pt for signs and symptoms of infection.  Educated pt and family regarding hand hygiene.

## 2017-10-15 NOTE — ASSESSMENT & PLAN NOTE
Resolved.  This is sepsis (without associated acute organ dysfunction).   SIRS on admission: , RR 22  Source: suspected encephalopathy vs meningitis vs pneumonia given immunocompromise, tachycardia, tachypnea, supplemental O2 requirement  Lactate: 1.8  Procalcitonin: 0.44  - IVF resuscitation contraindicated in the setting of acute on chronic heart failure and fluid overload  - continue IV abx as noted  - LP contraindicated due to necessary anticoagulation  - duoneb, supplemental O2 prn  - blood, sputum Cx w no growth

## 2017-10-15 NOTE — PROGRESS NOTES
Internal Medicine Interval Note  Note Author: Margy Mon M.D.     Name Dale Rojas     1960   Age/Sex 56 y.o. male   MRN [id number]   Code Status FULL CODE     After 5PM or if no immediate response to page, please call for cross-coverage  Attending/Team: Dr. Buckley/ Joe See Patient List for primary contact information  Call [phone] to page    1st Call - Day Intern (R1):   Dr. Mon 2nd Call - Day Sr. Resident (R2/R3):   Dr. Nunez         Reason for interval visit  (Principal Problem)   Encephalopathy acute    Interval Problem Daily Status Update  (24 hours)   Nausea and vomiting overnight, responded to Zofran, phenergan. Fluctuating mentation including lucidity, somnolence, agitation concerning for atonic seizure. Pt finished months-long decadron taper on 10/10. Low threshold to intubate for airway protection. Neurology consulted this am, ordered MRI, EEG. Pt's IVC filter is MRI-safe.      Review of Systems   Unable to perform ROS: Patient nonverbal       Consultants/Specialty  Oncology    Disposition  Inpatient for evaluation and treatment of confusion    Quality Measures    Reviewed items::  EKG reviewed, Labs reviewed, Medications reviewed and Radiology images reviewed  Mesa catheter::  No Mesa  DVT prophylaxis pharmacological::  Enoxaparin (Lovenox)  DVT prophylaxis - mechanical:  SCDs  Ulcer Prophylaxis::  Not indicated  Antibiotics:  Treating active infection/contamination beyond 24 hours perioperative coverage          Physical Exam       Vitals:    10/14/17 1730 10/14/17 2000 10/15/17 0000 10/15/17 0326   BP: 119/83 129/81 118/83 122/78   Pulse: (!) 118 (!) 114 (!) 106 96   Resp: (!) 22 (!) 21 (!) 21 20   Temp: 36.7 °C (98.1 °F) 36.7 °C (98.1 °F) 36.7 °C (98.1 °F) 36.8 °C (98.2 °F)   SpO2: 96% 98% 95% 96%   Weight:       Height:         Body mass index is 37.44 kg/m². Weight: 102.1 kg (225 lb)  Oxygen Therapy:  Pulse Oximetry: 96 %, O2 (LPM): 2, O2 Delivery:  Nasal Cannula    Physical Exam   Constitutional: He is well-developed, well-nourished, and in no distress.   HENT:   Head: Normocephalic and atraumatic.   Eyes: Conjunctivae are normal. Pupils are equal, round, and reactive to light.   Neck: Normal range of motion. Neck supple.   Cardiovascular: Regular rhythm, normal heart sounds and intact distal pulses.  Tachycardia present.    Pulmonary/Chest: Effort normal and breath sounds normal.   Abdominal: Soft. Bowel sounds are normal. He exhibits distension. There is no tenderness.   Musculoskeletal: He exhibits edema (3+ pitting edema).   Neurological: He is alert. He displays abnormal speech (aphasia).   Follows commands   Skin: Skin is warm and dry.         Lab Data Review:       Recent Labs      10/14/17   1115  10/14/17   1318  10/14/17   2357   SODIUM  131*   --   130*   POTASSIUM  3.3*   --   3.1*   CHLORIDE  94*   --   93*   CO2  28   --   29   BUN  7*   --   6*   CREATININE  0.41*   --   0.38*   MAGNESIUM   --   2.0  1.8   PHOSPHORUS   --   2.0*  3.4   CALCIUM  8.4*   --   7.7*       Recent Labs      10/14/17   1115  10/14/17   2357   ALTSGPT  51*  43   ASTSGOT  30  20   ALKPHOSPHAT  111*  100*   TBILIRUBIN  1.0  0.6   LIPASE  5*   --    PREALBUMIN   --   8.0*   GLUCOSE  103*  101*       Recent Labs      10/14/17   1115  10/14/17   2357   RBC  4.45*  4.23*   HEMOGLOBIN  12.7*  11.8*   HEMATOCRIT  38.6*  36.5*   PLATELETCT  166  180   PROTHROMBTM  23.8*   --    APTT  48.7*   --    INR  2.05*   --        Recent Labs      10/14/17   1115  10/14/17   2357   WBC  6.6  5.5   NEUTSPOLYS  87.40*  77.60*   LYMPHOCYTES  6.10*  13.10*   MONOCYTES  4.70  6.60   EOSINOPHILS  0.30  0.50   BASOPHILS  0.30  1.10   ASTSGOT  30  20   ALTSGPT  51*  43   ALKPHOSPHAT  111*  100*   TBILIRUBIN  1.0  0.6           Assessment/Plan     * Encephalopathy acute- (present on admission)   Assessment & Plan    Concerning for seizure vs sepsis. Hx brain cancer with associated seizure disorder,  and fluctuating mentation. AMS also concerning for encephalopathy vs meningitis. Possible pneumonia given Hx with chemotherapy treatments, tachycardic, tachypneic, supplemental O2 requirement, CXR w LLL consolidation, procalcitonin 0.44. Also Hx multiple DVTs and PEs but is therapeutic on warfarin with a negative head CT.  - strict NPO  - consult Neurology, appreciate recs  - Empiric abx: Vancomycin, cefepime, acyclovir  - LP contraindicated due to necessary continued anticoagulation  - treat underlying conditions as noted  - blood, urine, sputum Cx pending  - seizure, aspiration, fall precautions        Sepsis (CMS-HCC)- (present on admission)   Assessment & Plan    This is sepsis (without associated acute organ dysfunction).   SIRS: , RR 22  Source: suspected encephalopathy vs meningitis vs pneumonia given immunocompromise, tachycardia, tachypnea, supplemental O2 requirement  Lactate: 1.8  Procalcitonin: 0.44  - IVF contraindicated in the setting of acute on chronic heart failure and fluid overload  - Vancomycin, Cefepime, acyclovir  - LP contraindicated due to necessary anticoagulation  - duoneb, supplemental O2 prn  - blood, sputum Cx pending        Acute on chronic diastolic heart failure (CMS-HCC)- (present on admission)   Assessment & Plan    Anasarca, increased O2 requirements. Lasix held for the past week for chemotherapy, per family. Echo 3/26/17 with Grade II diastolic dysfunction. 40 mg IV lasix given in ED with appropriate diuresis.  - daily IV lasix  - holding metoprolol for pressure support in the setting of sepsis  - supplemental O2 prn        Anaplastic astrocytoma of temporal lobe (CMS-HCC)- (present on admission)   Assessment & Plan    S/p partial resection at [place], XRT. Current Temozolamide chemotherapy. CT head without evidence of acute hemorrhage or mass effect.  - consulted Dr. Palomares, Oncology, appreciate recommendations  - holding chemotherapy for now  - consult palliative care for  goals of care discussion        Electrolyte disturbance- (present on admission)   Assessment & Plan    Hyponatremia, hypokalemia, hypophosphatemia. Likely 2/2 fluid overload, possibly a chemotherapy component.  - replace electrolytes prn  - diuresis as noted  - monitor daily        Steroid-induced diabetes mellitus (CMS-HCC)- (present on admission)   Assessment & Plan    Glucose wnl currently. Pt family reports daily insulin but report none given since day prior to admission and pt unable to corroborate. Decadron discontinued on 10/10/17.  - POC glucose TID AC and HS  - SSI  - monitor for additional insulin requirements        Chronic pain of right knee- (present on admission)   Assessment & Plan    With associated back pain 2/2 multiple falls in past year. Takes Norco at home.  - continue home Norco  - low dose dilaudid prn while NPO        Recurrent pulmonary embolism (CMS-HCC)- (present on admission)   Assessment & Plan    With associated DVTs. Most recent CT chest 6/22/17 with bilateral PEs. Therapeutic on warfarin. MRI-safe IVC filter present.  - bridge with lovenox while NPO        Seizure disorder (CMS-HCC)- (present on admission)   Assessment & Plan    Recent onset 2/2 brain cancer. Encephalopathy with variable mentation concerning for atonic seizure.  - NPO  - IV Keppra  - consulted Neurology, ordered MRI, EEG  - IV ativan prn for seizure, agitation, or pre-MRI        Obesity (BMI 30-39.9)- (present on admission)   Assessment & Plan    BMI 37.44. Recent steroids associated with brain cancer treatment.  - will  on discharge

## 2017-10-15 NOTE — ASSESSMENT & PLAN NOTE
Glucose low normal since admission. Pt family reports daily insulin but report none given since day prior to admission and pt unable to corroborate. Decadron discontinued on 10/10/17.  - POC glucose TID AC and HS  - SSI  - monitor for additional insulin requirements

## 2017-10-15 NOTE — ASSESSMENT & PLAN NOTE
S/p partial resection at [place], XRT.  Now w/ recurrence. Seen by oncology Dr Osman (outpatient) and Dr Christy (inpatient). Was on Temozolamide chemotherapy. CT head without evidence of acute hemorrhage or mass effect. MRI consistent w/ diagnosis of diffuse infiltrating anaplastic astrocytoma. Seems to not have progressed significantly compared to prior.  - consulted Oncology, appreciate recommendations  - holding chemotherapy for now  - palliative care to hold goals of care discussion. Attempting to contact family

## 2017-10-15 NOTE — CONSULTS
NEUROLOGY NOTE    Referring Physician    Margy Mon M.D.    CHIEF COMPLAINT:  Seizure and brain tumor  Chief Complaint   Patient presents with   • ALOC           IMPRESSION:    1.  Change of mental status due to 2. 3, and 5  2.  Seizures-- CPS-- the last seizure was this morning ( mouthing, starring)  3.  Brain Tumor status post surgery, RT, CT   4.  Hx of DM, HTN, PE  5.  Pneurmonia    PLAN/RECOMMENDATIONS:    Will optimize the seizure medication  EEG  MRI of brain with and without contrast    SIGNATURE:  Sara Yuan    10/14/2017  1.  Postoperative changes in the left temporal lobe anteriorly and inferiorly and in the left temporoparietal junction region.  2.  Small residual left subdural effusion.    MRI of brain 7/2017  1.  Postoperative left frontal/temporal craniotomy.  2.  Small 3 mm thick left frontal-temporal extra-axial fluid collection, no significant change.  3.  Prominent postoperative encephalomalacic cavity in the left inferior temporal lobe.  4.  Confluent and gyriform T1 hyperintensity in the left far posterior temporal lobe most consistent with myelin degradation products which can be seen associated with cortical laminar necrosis. No evidence of acute infarction on today's exam.  5.  Nonenhancing FLAIR hyperintense masslike lesions in the left posterior sylvian temporal lobe and left low anterior frontal lobe consistent with infiltrating glioma. Stable findings since the previous exam.  6.  Moderate supratentorial white matter disease consistent with microvascular ischemic change versus demyelination or gliosis.  7.  Old infarct left cerebellar hemisphere, unchanged.  8.  Apparent 4.5 mm anterior to indicating artery aneurysm. No significant change from the previous exam.        PRESENT ILLNESS:         The patient started having confusion one day prior to admission. He has intermittentseizures( mouthing and starring once in a while) secondary to brain tumor but he remained awake till days  ago. 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 Patient became acutely confused and lethargic and was brought into the ED by his 3 sister's.       PAST MEDICAL HISTORY:  Past Medical History:   Diagnosis Date   • Cancer (CMS-[facility]) 1/31/2017    Glioma   • New onset seizure (CMS-HCC) 1/5/2017   • Bell's palsy 1/2017   • Hyperlipidemia 2010    no meds   • Brain cancer (CMS-[facility])     Glioma   • Diabetes (CMS-[facility])     R/t steroid use per dtr   • DVT, bilateral lower limbs (CMS-[facility])    • Hypertension    • Recurrent pulmonary embolism (CMS-[facility])        PAST SURGICAL HISTORY:  Past Surgical History:   Procedure Laterality Date   • CRANIOTOMY TUMOR  1/31/2017    Lt frontotemporal   • OTHER ORTHOPEDIC SURGERY  1980s    Knee surgery from soccer injury       FAMILY HISTORY:  Family History   Problem Relation Age of Onset   • Diabetes Mother    • Diabetes Father    • Seizures Daughter        SOCIAL HISTORY:  Social History     Social History   • Marital status: Single     Spouse name: N/A   • Number of children: N/A   • Years of education: N/A     Occupational History   • Not on file.     Social History Main Topics   • Smoking status: Former Smoker     Packs/day: 1.00     Years: 30.00     Types: Cigarettes   • Smokeless tobacco: Never Used   • Alcohol use Yes      Comment: Occasional social drinking    • Drug use: No   • Sexual activity: No      Comment:      Other Topics Concern   • Not on file     Social History Narrative   • No narrative on file     ALLERGIES:  No Known Allergies  TOBHX  History   Smoking Status   • Former Smoker   • Packs/day: 1.00   • Years: 30.00   • Types: Cigarettes   Smokeless Tobacco   • Never Used     ALCHX  History   Alcohol Use   • Yes     Comment: Occasional social drinking      DRUGHX  History    Drug Use No           MEDICATIONS:  Current Facility-Administered Medications   Medication Dose   • potassium chloride in water (KCL) ivpb 10 mEq  10 mEq   • acyclovir (ZOVIRAX) 615 mg in  mL IVPB  10 mg/kg (Ideal)   • MD ALERT... vancomycin per pharmacy protocol     • piperacillin-tazobactam (ZOSYN) IVPB premix 4.5 g  4.5 g   • senna-docusate (PERICOLACE or SENOKOT S) 8.6-50 MG per tablet 2 Tab  2 Tab    And   • polyethylene glycol/lytes (MIRALAX) PACKET 1 Packet  1 Packet    And   • magnesium hydroxide (MILK OF MAGNESIA) suspension 30 mL  30 mL    And   • bisacodyl (DULCOLAX) suppository 10 mg  10 mg   • enoxaparin (LOVENOX) inj 40 mg  40 mg   • clonidine (CATAPRES) tablet 0.1 mg  0.1 mg   • acetaminophen (TYLENOL) tablet 650 mg  650 mg   • furosemide (LASIX) injection 40 mg  40 mg   • hydrocodone-acetaminophen (NORCO) 5-325 MG per tablet 1-2 Tab  1-2 Tab   • levetiracetam (KEPPRA) tablet 1,500 mg  1,500 mg   • warfarin (COUMADIN) tablet 2.5 mg  2.5 mg   • MD ALERT... warfarin (COUMADIN) per Physician daily order     • insulin lispro (HUMALOG) injection 3-14 Units  3-14 Units   • glucose 4 g chewable tablet 16 g  16 g    And   • dextrose 50% (D50W) injection 25 mL  25 mL   • promethazine (PHENERGAN) suppository 25 mg  25 mg   • ondansetron (ZOFRAN) syringe/vial injection 4 mg  4 mg       REVIEW OF SYSTEM:    Constitutional: Denies fevers, Denies weight changes   Eyes: Denies changes in vision, no eye pain   Ears/Nose/Throat/Mouth: Denies nasal congestion or sore throat   Cardiovascular: HTN  Respiratory: PE  Gastrointestinal/Hepatic: Denies abdominal pain, nausea, vomiting, diarrhea, constipation or GI bleeding   Genitourinary: Denies bladder dysfunction, dysuria or frequency   Musculoskeletal/Rheum: Denies joint pain and swelling   Skin/Breast: Denies rash, denies breast lumps or discharge   Neurological: seizure, brain tumor,    Psychiatric: denies mood disorder   Endocrine: DM  Heme/Oncology/Lymph  "Nodes: Denies enlarged lymph nodes, denies brusing or known bleeding disorder   Allergic/Immunologic: Denies hx of allergies         PHYSICAL AND NEUROLOGICAL EXMAINATIONS:  VITAL SIGNS: /92   Pulse 99   Temp 37 °C (98.6 °F)   Resp 18   Ht 1.651 m (5' 5\")   Wt 102.1 kg (225 lb)   SpO2 97%   BMI 37.44 kg/m²   CURRENT WEIGHT:   BMI: Body mass index is 37.44 kg/m².  PREVIOUS WEIGHTS:  Wt Readings from Last 25 Encounters:   10/14/17 102.1 kg (225 lb)   10/10/17 102.7 kg (226 lb 6.6 oz)   08/14/17 101.1 kg (222 lb 14.2 oz)   07/17/17 96 kg (211 lb 10.3 oz)   07/11/17 95.1 kg (209 lb 12.3 oz)   07/11/17 95.1 kg (209 lb 12.3 oz)   06/23/17 100.2 kg (220 lb 14.4 oz)   06/14/17 97.5 kg (214 lb 15.2 oz)   03/30/17 99.4 kg (219 lb 2.2 oz)   03/03/17 92.5 kg (204 lb)   03/23/17 96.6 kg (213 lb)   03/20/17 97.5 kg (215 lb)   03/13/17 94.8 kg (209 lb)   03/06/17 93.7 kg (206 lb 9.6 oz)   02/26/17 90.2 kg (198 lb 13.7 oz)   02/14/17 89 kg (196 lb 1.6 oz)   02/08/17 96.8 kg (213 lb 6.5 oz)   02/05/17 89 kg (196 lb 3.4 oz)   01/05/17 93.4 kg (206 lb)   01/03/17 92.1 kg (203 lb)   05/15/15 91.6 kg (202 lb)       General appearance of patient: WDWN(+) NAD(+)    EYES  o Fundus : Papilledem(-) Exudates(-) Hemorrhage(-)  Nervous System  Drowsy, not able to express himself, move limbs with nociceptive stimulation  Orientation to time, place and person(-)  Memory normal(-)  Knowledge: past(-) Current(-)  Attention(-)  Cranial Nerves  • Nerve 2: intact  • Nerve 3,4,6: intact  • Nerve 5 : intact  • Nerve 7: intact  • Nerve 8: intact  • Nerve 9 & 10: intact  • Nerve 11: intact  • Nerve 12: intact  Muscle Power and muscle tone: symmetric  Sensory System: Pin sensation intact(+)  Reflexes: symmetric throughout  Gait : bed ridden   Heart and Vascular  Peripheral Vasucular system : Edema (-) Swelling(-)  RHB, Breathing sound clear  abdomen bowel sound normoactive  Extremities freely moveable  Joints no contracture       NEUROIMAGING: " I reviewed the MRI/CT of brain       LAB:  Recent Labs      10/14/17   1115  10/14/17   2357   WBC  6.6  5.5   RBC  4.45*  4.23*   HEMOGLOBIN  12.7*  11.8*   HEMATOCRIT  38.6*  36.5*   MCV  86.7  86.3   MCH  28.5  27.9   MCHC  32.9*  32.3*   RDW  58.8*  60.2*   PLATELETCT  166  180   MPV  8.5*  8.5*           IMPRESSION:    1.  Change of mental status due to 2. 3, and 5  2.  Seizures-- CPS-- the last seizure was this morning ( mouthing, starring)  3.  Brain Tumor status post surgery, RT, CT   4.  Hx of DM, HTN, PE  5.  Pneurmonia    PLAN/RECOMMENDATIONS:    Will optimize the seizure medication  EEG  MRI of brain with and without contrast    SIGNATURE:  FrancoisgiannaDominique Kwabena    10/14/2017  1.  Postoperative changes in the left temporal lobe anteriorly and inferiorly and in the left temporoparietal junction region.  2.  Small residual left subdural effusion.    MRI of brain 7/2017  1.  Postoperative left frontal/temporal craniotomy.  2.  Small 3 mm thick left frontal-temporal extra-axial fluid collection, no significant change.  3.  Prominent postoperative encephalomalacic cavity in the left inferior temporal lobe.  4.  Confluent and gyriform T1 hyperintensity in the left far posterior temporal lobe most consistent with myelin degradation products which can be seen associated with cortical laminar necrosis. No evidence of acute infarction on today's exam.  5.  Nonenhancing FLAIR hyperintense masslike lesions in the left posterior sylvian temporal lobe and left low anterior frontal lobe consistent with infiltrating glioma. Stable findings since the previous exam.  6.  Moderate supratentorial white matter disease consistent with microvascular ischemic change versus demyelination or gliosis.  7.  Old infarct left cerebellar hemisphere, unchanged.  8.  Apparent 4.5 mm anterior to indicating artery aneurysm. No significant change from the previous exam.

## 2017-10-15 NOTE — PROGRESS NOTES
"Pharmacy Kinetics 56 y.o. male on vancomycin day # 1 10/15/2017    Currently on Vancomycin 2600 mg iv once (25 mg/kg load)    Indication for Treatment: R/O nosocomial meningitis    Pertinent history per medical record: Admitted on 10/14/2017 for altered mental status.  He has a history of anaplastic astrocytoma of the temporal lobe, CT without evidence of acute hemorrhage or mass effect.  New onset seizures during admission and concern for possible nosocomial meningitis due to his history of brain surgery.  Empiric antibiotics were initiated.    Other antibiotics: Cefepime 2g IV q8H, acyclovir 10 mg/kg IV q6H    Allergies: Review of patient's allergies indicates no known allergies.     List concerns for renal function : BMI (37.4 kg/m2), hypoalbuminemia (Alb 2.8), nephrotoxic drug (acyclovir)    Pertinent cultures to date:   10/14 - peripheral blood x2 - NGTD    Recent Labs      10/14/17   1115  10/14/17   2357   WBC  6.6  5.5   NEUTSPOLYS  87.40*  77.60*     Recent Labs      10/14/17   1115  10/14/17   2357  10/15/17   1101   BUN  7*  6*  6*   CREATININE  0.41*  0.38*  0.38*   ALBUMIN  3.1*  2.9*  2.8*     No results for input(s): VANCOTROUGH, VANCOPEAK, VANCORANDOM in the last 72 hours.  Intake/Output Summary (Last 24 hours) at 10/15/17 1236  Last data filed at 10/15/17 0600   Gross per 24 hour   Intake              900 ml   Output             3150 ml   Net            -2250 ml      Blood pressure 129/92, pulse 99, temperature 37 °C (98.6 °F), resp. rate 18, height 1.651 m (5' 5\"), weight 102.1 kg (225 lb), SpO2 97 %. Temp (24hrs), Av.6 °C (97.9 °F), Min:36.2 °C (97.2 °F), Max:37 °C (98.6 °F)      A/P   1. Vancomycin dose change: Vancomycin 2000 mg IV q 12H (20 mg/kg)  2. Next vancomycin level: 10/16 prior to 3rd dose (10/16 at 1130)  3. Goal trough: 18 - 22 mcg/mL  4. Comments: LP has not been obtained yet at this time. Blood cultures are NGTD.  Some concerns for accumulation, will dose patient at vancomycin " 20 mg/kg IV and check a trough prior to 3rd dose.  Urine output is adequate per charted I/Os.  Recommend obtaining LP for de-escalation of antibiotics if possible and possibly ID consultation.  Pharmacy will continue to monitor.    Kayli Martins, PharmD.  PGY-2 Critical Care Resident  [phone]

## 2017-10-15 NOTE — ASSESSMENT & PLAN NOTE
2/2 seizure. Hx brain cancer with associated seizure disorder and fluctuating mentation, most likely cause. Initially we had a broad ddx: including but not limited to Meningitis a possibility, but less likely given improving mentation.Possible pneumonia given Hx with chemotherapy treatments, tachycardic, tachypneic, supplemental O2 requirement, CXR w LLL consolidation, procalcitonin 0.44. Also Hx multiple DVTs and PEs but is anticoagulated with a negative head CT. Encephalitis unlikely, given patient's alertness and ability to follow commands. Neurology/EEG consistent w/ seizure disorder- on AEDs.  - continue TF at goal  - Neurology following, appreciate recs  - ID consulted, appreciate recs  - LP was not done due to anticoagulation, but now not needed since infection unlikely.   - treat underlying conditions as noted  - seizure, aspiration, fall precautions  - patient will likely not get accepted at [facility] due to insurance. Will work on SNF placement.

## 2017-10-15 NOTE — ASSESSMENT & PLAN NOTE
Hyponatremia, hypokalemia, hypophosphatemia. Likely 2/2 fluid overload, possibly a chemotherapy component.  - replace electrolytes prn  - diuresis as noted  - monitor daily

## 2017-10-15 NOTE — PROGRESS NOTES
Monitor summary: SR/ST with on episode of BBB , [place] .16, QRS .12, QT .36 per strip from monitor room.

## 2017-10-15 NOTE — PROGRESS NOTES
"Pt experiencing n/v, RN sat HOB at 90 degrees, gave pt emesis bag and notified on Petre LLAMAS. RN received orders for PRN IV Zofran and Suppository Phenergan.     /81   Pulse (!) 114   Temp 36.7 °C (98.1 °F)   Resp (!) 21   Ht 1.651 m (5' 5\")   Wt 102.1 kg (225 lb)   SpO2 98%   BMI 37.44 kg/m²       "

## 2017-10-15 NOTE — ASSESSMENT & PLAN NOTE
With associated back pain 2/2 multiple falls in past year. Takes Norco at home.  - continue home Norco  - low dose dilaudid prn while NPO

## 2017-10-15 NOTE — ASSESSMENT & PLAN NOTE
Anasarca, increased O2 requirements. Lasix held for the past week for chemotherapy, per family. Echo 3/26/17 with Grade II diastolic dysfunction. 40 mg IV lasix given in ED with appropriate diuresis.  - daily IV lasix  - holding metoprolol for pressure support in the setting of sepsis  - supplemental O2 prn

## 2017-10-15 NOTE — PROGRESS NOTES
Pt is flushed in the face and has some red small bumps on chest that appear to be new. Per Dr. Mon, RN to stop zithromax.

## 2017-10-15 NOTE — PROGRESS NOTES
Spoke with Dr Mon about 2nd order for additional Keppra, order given to d/c 2nd dose of keppra and hang additional 500mg instead, written by Dr Mon.

## 2017-10-15 NOTE — PROGRESS NOTES
Pt had chewing motion with all RRT RNs at bedside. Pt still NUNEZ and able to respond to questions during chewing motion.

## 2017-10-15 NOTE — ASSESSMENT & PLAN NOTE
With associated DVTs. Most recent CT chest 6/22/17 with bilateral PEs. Therapeutic on warfarin on admission. MRI-safe IVC filter present. Likely hypercoagulable 2/2 malignancy. No current lower extremity DVT per duplex.  - continue lovenox

## 2017-10-15 NOTE — PROGRESS NOTES
Pt is confused.  Moves all extremities 5/5.  Not OOB this shift.  Pt is experiencing nausea/vomiting, zofran given PRN.  Pt voiding without difficulty/schulz in place.    POC discussed, family verbalizes understanding.  Bed alarm on.  Call light within reach, bed in lowest position.

## 2017-10-15 NOTE — PROGRESS NOTES
Rapid called on pt for suspected seizure activity, which per the family includes pt making a gum chewing motion.

## 2017-10-15 NOTE — PROGRESS NOTES
Pt now awake but remains confused, now agitated, per family members, attempting to take out schulz, attempting to d/c iv to both arms, daughter at bedside holding pt arms down, reporting pt is attempting to get out of bed, daughter requesting restraints for pt.  Daughter reporting pt has had to have restraints in the past after seizures. Dr Elieser fay notified of family request and change of condition from this am.

## 2017-10-15 NOTE — PROGRESS NOTES
Pt's left shin/calf appears to be red and hot. Per hx in Epic, pt has recurrent DVTs. Dr. Mon notified.

## 2017-10-16 PROBLEM — I50.32 CHRONIC DIASTOLIC CHF (CONGESTIVE HEART FAILURE) (HCC): Status: ACTIVE | Noted: 2017-01-01

## 2017-10-16 PROBLEM — E46 PROTEIN CALORIE MALNUTRITION (HCC): Status: ACTIVE | Noted: 2017-01-01

## 2017-10-16 NOTE — DIETARY
"Nutrition Support Assessment - Male    Dale Carmona is a 56 y.o. male with admitting DX of Encephalopathy acute    Pertinent History: cancer, new onset seizure, Bell's palsy, hyperlipidemia, brain cancer, DM, DVT bilateral lower limbs, HTN, recurrent pulmonary embolism  Allergies:  Review of patient's allergies indicates no active allergies.    Height: 165.1 cm (5' 5\")  Weight: 102.1 kg (225 lb) - estimated  Ideal Body Weight: 61.7 kg (136 lb)  Percent Ideal Body Weight: 165.4  Body mass index is 37.44 kg/m².     Pertinent Labs: Na 134, K 3.3, BUN 4, Creatinine 0.33  Pertinent Medications: unasyn, lovenox, lasix, humalog, keppra, pericolace  Pertinent Fluids: D5 LR with KCl 20 mEq infusion @ 75 ml/hr  Surgery / Procedures: EEG  Skin: No skin breakdown noted in chart  Last BM: 10/13/17     Estimated Needs: REE per MSJ x 1 = 1769 kcal/day  Total Calories / day: 1700 - 2000  (Calories / k - 20)  Total Grams Protein / day: 93 - 123  (Grams Protein / kg IBW: 1.5 - 2)(Grams Protein / k.9 - 1.2)  Total Fluids ml / day: 2556 ml         Assessment / Evaluation:   Consult received for TF assessment. Pt failed SLP evaluation. Only estimated wt in chart. Attempted to obtain weight, per bed scale wt pt weighed 29 kg this is not accurate. Replete with Fiber appropriate to meet pt's estimated protein needs.    Plan / Recommendation:   Start Replete with Fiber @ 25 ml/hr and advance per protocol to 75 ml/hr (goal rate) to provide 1800 kcal (18 kcal/kg), 115 grams protein (1.1 gm/kg, 1.9 gm/kg IBW), and 1494 ml free water per day.   Fluids per MD.   Please obtain measured weight when feasible. Weekly weights to monitor fluid and nutrition status    RD following.         "

## 2017-10-16 NOTE — PROCEDURES
DATE OF SERVICE:  10/16/2017    This is an inpatient EEG that was done on 10/16/2017.    ROUTINE ELECTROENCEPHALOGRAM REPORT        NAME:  Dale Rojas     REFERRING Dr:     INDICATION:      Seizure, Brain Tumor     TECHNIQUE: 30 channel routine electroencephalogram (EEG) was performed in accordance with the international 10-20 system. The study was reviewed in bipolar and referential montages. The recording examined the patient during wakeful and drowsy state(s).      DESCRIPTION OF THE RECORD:        Background rhythm during awake stage shows well-organized, well-developed, average voltage 8 to 9 hertz alpha activity in the right posterior regions.There are continuous epileptiform spike-and-wave discharges every one to two seconds and lateralizing delta abnormalities over left -- especially C3 P3 are seen . At times, these delta bursts spread to the whole left hemisphere-- lasting for 15 seconds or so. Stage I sleep was achieved.           ACTIVATION PROCEDURES:          ICTAL AND/OR INTERICTAL FINDINGS:       There are continuous epileptiform spike-and-wave discharges every one to two seconds and lateralizing delta abnormalities over left -- especially C3 P3 are seen . At times, these delta bursts spread to the whole left hemisphere-- lasting for 15 seconds or soNo clinical events or seizures were reported or recorded during the study.       EKG: sampling of the EKG recording demonstrated sinus rhythm.          INTERPRETATION:        ________________________________________________________________________     This scalp EEG is consistent with active focal seizure from left central region     ________________________________________________________________________                         ____________________________________     MD RITA KRUEGER / HARRIET    DD:  10/16/2017 15:06:24  DT:  10/16/2017 15:12:04    D#:  [id number]  Job#:  [id number]

## 2017-10-16 NOTE — PROGRESS NOTES
Pt is AAOx1 to self and lethargic.  Moves all extremities 2/5.  Not OOB in this shift.   Pt NPO, no vomiting this shift.  [place] in place.    POC discussed, pt's family verbalizes understanding.  Bed alarm on.  Call light within reach, bed in lowest position, wrist restraints in place.

## 2017-10-16 NOTE — PROGRESS NOTES
IMPRESSION:     1.  Change of mental status due to 2. 3, and 5-- especially 2  2.  Seizures-- CPS-- intractable( mouthing, starring)  3.  Brain Tumor status post surgery, RT, CT   4.  Hx of DM, HTN, PE  5.  Pneurmonia     PLAN/RECOMMENDATIONS:     ________________________________________________________________________      Will optimize the seizure medication--- reload Keppra  Will add IV vimpat  Once the patient is able to swallow, I will use other anti-seizure medication with better price  Such as Zonegran, Lamictal, Depakote  ________________________________________________________________________      Vitals:    10/15/17 2356 10/16/17 0400 10/16/17 0725 10/16/17 1105   BP: 138/78 129/79 124/84 128/79   Pulse: 93 (!) 103 (!) 107 (!) 106   Resp: 18 18 17 17   Temp: 36.4 °C (97.6 °F) 36.4 °C (97.5 °F) 36.2 °C (97.2 °F) 36.3 °C (97.4 °F)   SpO2: 95% 99% 96% 95%   Weight:       Height:         Physical Exam   Constitutional: He is well-developed, well-nourished, and in no distress.   HENT:   Head: Normocephalic.   Eyes: Pupils are equal, round, and reactive to light.   Pulmonary/Chest: Effort normal.   Abdominal: Soft.   Musculoskeletal: Normal range of motion.   Neurological:   At least, could answer to questions, although I could not understand the patient's reply ( language barrier or confusion in the patient's part)   Skin: Skin is warm.     Review of Systems   HENT: Negative for ear discharge.    Eyes: Negative for redness.   Cardiovascular: Negative for leg swelling.   Gastrointestinal: Negative for blood in stool.   Genitourinary: Positive for hematuria.   Neurological: Positive for seizures, loss of consciousness and weakness.   Psychiatric/Behavioral: Negative for substance abuse.

## 2017-10-16 NOTE — EEG PROGRESS NOTE
EEG 10/16/17 2:56 PM      ROUTINE ELECTROENCEPHALOGRAM REPORT      NAME:  Dale Rojas    REFERRING Dr:    INDICATION:     Seizure, Brain Tumor    TECHNIQUE: 30 channel routine electroencephalogram (EEG) was performed in accordance with the international 10-20 system. The study was reviewed in bipolar and referential montages. The recording examined the patient during wakeful and drowsy state(s).     DESCRIPTION OF THE RECORD:      Background rhythm during awake stage shows well-organized, well-developed, average voltage 8 to 9 hertz alpha activity in the right posterior regions.There are continuous epileptiform spike-and-wave discharges every one to two seconds and lateralizing delta abnormalities over left -- especially C3 P3 are seen . At times, these delta bursts spread to the whole left hemisphere-- lasting for 15 seconds or so. Stage I sleep was achieved.        ACTIVATION PROCEDURES:        ICTAL AND/OR INTERICTAL FINDINGS:      There are continuous epileptiform spike-and-wave discharges every one to two seconds and lateralizing delta abnormalities over left -- especially C3 P3 are seen . At times, these delta bursts spread to the whole left hemisphere-- lasting for 15 seconds or soNo clinical events or seizures were reported or recorded during the study.      EKG: sampling of the EKG recording demonstrated sinus rhythm.        INTERPRETATION:      ________________________________________________________________________    This scalp EEG is consistent with active focal seizure from left central region    ________________________________________________________________________

## 2017-10-16 NOTE — PROGRESS NOTES
Cortrak Placement    Tube Team verified patient name and medical record number prior to tube placement.  Cortrak tube (55 inches, 10 [place]) placed at 54 cm in right nare.  Per Cortrak picture, tube appears to be in the small bowel.  Nursing Instructions: Awaiting KUB to confirm placement before use for medications or feeding. Once placement confirmed, flush tube with 30 ml of water, and then remove and save stylet, in patient medication drawer.

## 2017-10-16 NOTE — PROGRESS NOTES
Internal Medicine Interval Note  Note Author: Margy Mon M.D.     Name Dale Rojas     1960   Age/Sex 56 y.o. male   MRN [id number]   Code Status FULL CODE     After 5PM or if no immediate response to page, please call for cross-coverage  Attending/Team: Dr. Buckley/ Joe See Patient List for primary contact information  Call [phone] to page    1st Call - Day Intern (R1):   Dr. Mon 2nd Call - Day Sr. Resident (R2/R3):   Dr. Nunez         Reason for interval visit  (Principal Problem)   Encephalopathy acute    Interval Problem Daily Status Update  (24 hours)   Agitation yesterday, requiring temporary restraints and prn ativan. Mentation improved this am. Patient follows commands and looks at person speaking to him. Pt attempted to speak but output unintelligible; he appeared frustrated. No evidence of pain, though assessment limited by communication. Last BM 10/13.  MRI with appropriate findings for astrocytoma treatment history. EEG planned today.      Review of Systems   Unable to perform ROS: Patient nonverbal       Consultants/Specialty  Oncology    Disposition  Inpatient for evaluation and treatment of confusion    Quality Measures    Reviewed items::  EKG reviewed, Labs reviewed, Medications reviewed and Radiology images reviewed  Mesa catheter::  No Mesa  DVT prophylaxis pharmacological::  Enoxaparin (Lovenox)  DVT prophylaxis - mechanical:  SCDs  Ulcer Prophylaxis::  Not indicated  Antibiotics:  Treating active infection/contamination beyond 24 hours perioperative coverage          Physical Exam       Vitals:    10/15/17 1600 10/15/17 2000 10/15/17 2356 10/16/17 0400   BP: 155/89 147/91 138/78 129/79   Pulse: (!) 105 (!) 104 93 (!) 103   Resp: 18 20 18 18   Temp: 36.7 °C (98 °F) 36.4 °C (97.5 °F) 36.4 °C (97.6 °F) 36.4 °C (97.5 °F)   SpO2: 92% 98% 95% 99%   Weight:       Height:         Body mass index is 37.44 kg/m².    Oxygen Therapy:  Pulse Oximetry: 99 %,  O2 (LPM): 3, O2 Delivery: Nasal Cannula    Physical Exam   Constitutional: He is well-developed, well-nourished, and in no distress.   HENT:   Head: Normocephalic and atraumatic.   Eyes: Conjunctivae are normal. Pupils are equal, round, and reactive to light.   Neck: Normal range of motion. Neck supple.   Cardiovascular: Regular rhythm, normal heart sounds and intact distal pulses.  Tachycardia present.    Pulmonary/Chest: Effort normal and breath sounds normal.   Abdominal: Soft. Bowel sounds are normal. He exhibits distension. There is no tenderness.   Musculoskeletal: He exhibits edema (3+ pitting edema).   Neurological: He is alert. He displays abnormal speech (aphasia).   Follows commands   Skin: Skin is warm and dry.         Lab Data Review:       Recent Labs      10/14/17   1318  10/14/17   2357  10/15/17   1101  10/16/17   0611   SODIUM   --   130*  131*  134*   POTASSIUM   --   3.1*  3.3*  3.3*   CHLORIDE   --   93*  95*  96   CO2   --   29  28  28   BUN   --   6*  6*  4*   CREATININE   --   0.38*  0.38*  0.33*   MAGNESIUM  2.0  1.8   --   1.9   PHOSPHORUS  2.0*  3.4   --   2.7   CALCIUM   --   7.7*  8.1*  8.2*       Recent Labs      10/14/17   1115  10/14/17   2357  10/15/17   1101  10/16/17   0611   ALTSGPT  51*  43  36  34   ASTSGOT  30  20  19  20   ALKPHOSPHAT  111*  100*  95  95   TBILIRUBIN  1.0  0.6  0.5  0.6   LIPASE  5*   --    --    --    PREALBUMIN   --   8.0*   --    --    GLUCOSE  103*  101*  95  75       Recent Labs      10/14/17   1115  10/14/17   2357  10/15/17   1101  10/16/17   0611   RBC  4.45*  4.23*   --   4.37*   HEMOGLOBIN  12.7*  11.8*   --   12.2*   HEMATOCRIT  38.6*  36.5*   --   38.4*   PLATELETCT  166  180   --   185   PROTHROMBTM  23.8*   --   24.6*   --    APTT  48.7*   --    --    --    INR  2.05*   --   2.14*   --        Recent Labs      10/14/17   1115  10/14/17   6707  10/15/17   1101  10/16/17   0611   WBC  6.6  5.5   --   4.6*   NEUTSPOLYS  87.40*  77.60*   --   73.30*    LYMPHOCYTES  6.10*  13.10*   --   13.60*   MONOCYTES  4.70  6.60   --   9.20   EOSINOPHILS  0.30  0.50   --   1.50   BASOPHILS  0.30  1.10   --   1.10   ASTSGOT  30  20  19  20   ALTSGPT  51*  43  36  34   ALKPHOSPHAT  111*  100*  95  95   TBILIRUBIN  1.0  0.6  0.5  0.6           Assessment/Plan     * Encephalopathy acute- (present on admission)   Assessment & Plan    Concerning for seizure vs sepsis. Hx brain cancer with associated seizure disorder and fluctuating mentation. Encephalitis unlikely, given patient's alertness and ability to follow commands. Meningitis a possibility, but less likely given improving mentation. Possible pneumonia given Hx with chemotherapy treatments, tachycardic, tachypneic, supplemental O2 requirement, CXR w LLL consolidation, procalcitonin 0.44. Also Hx multiple DVTs and PEs but is anticoagulated with a negative head CT.  - strict NPO  - SLP swallow evaluation  - Neurology following, appreciate recs  - Deescalate Vancomycin, cefepime to Unasyn  - Continue acyclovir  - LP contraindicated due to necessary continued anticoagulation  - treat underlying conditions as noted  - blood, urine, sputum Cx pending  - seizure, aspiration, fall precautions        Chronic diastolic CHF (congestive heart failure) (CMS-HCC)- (present on admission)   Assessment & Plan    Anasarca, increased O2 requirements. Lasix held for the past week for chemotherapy, per family. Echo 3/26/17 with EF 50%, Grade II diastolic dysfunction. 40 mg IV lasix given in ED with appropriate diuresis.  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 Current Temozolamide chemotherapy. CT head without evidence of acute hemorrhage or mass effect.  - consulted Dr. Palomares, Oncology, appreciate recommendations  - holding chemotherapy  for now  - palliative care to hold goals of care discussion on 10/17        Electrolyte disturbance- (present on admission)   Assessment & Plan    Hyponatremia, hypokalemia, hypophosphatemia. Likely 2/2 fluid overload, possibly a chemotherapy component.  - replace electrolytes prn  - diuresis as noted  - monitor daily        Steroid-induced diabetes mellitus (CMS-HCC)- (present on admission)   Assessment & Plan    Glucose low normal since admission. Pt family reports daily insulin but report none given since day prior to admission and pt unable to corroborate. Decadron discontinued on 10/10/17.  - POC glucose TID AC and HS  - SSI  - monitor for additional insulin requirements        Chronic pain of right knee- (present on admission)   Assessment & Plan    With associated back pain 2/2 multiple falls in past year. Takes Norco at home.  - continue home Norco  - low dose dilaudid prn while NPO        Protein calorie malnutrition (CMS-HCC)- (present on admission)   Assessment & Plan    Prealbumin 8. Likely aspiration risk due to mental status.  - Diet pending SLP evaluation. If unable to swallow safely, will place Cortrak and start TF.        Recurrent pulmonary embolism (CMS-[facility])- (present on admission)   Assessment & Plan    With associated DVTs. Most recent CT chest 6/22/17 with bilateral PEs. Therapeutic on warfarin on admission. MRI-safe IVC filter present.  - bridge with lovenox while NPO        Seizure disorder (CMS-[facility])- (present on admission)   Assessment & Plan    Recent onset 2/2 brain cancer. Encephalopathy with variable mentation concerning for atonic seizure.  - NPO  - IV Keppra  - consulted Neurology, ordered MRI, EEG  - IV ativan prn for seizure, agitation, or pre-MRI        Sepsis (CMS-[facility])- (present on admission)   Assessment & Plan    Resolved.  This is sepsis (without associated acute organ dysfunction).   SIRS on admission: , RR 22  Source: suspected encephalopathy vs meningitis vs pneumonia  given immunocompromise, tachycardia, tachypnea, supplemental O2 requirement  Lactate: 1.8  Procalcitonin: 0.44  - IVF resuscitation contraindicated in the setting of acute on chronic heart failure and fluid overload  - continue IV abx as noted  - LP contraindicated due to necessary anticoagulation  - duoneb, supplemental O2 prn  - blood, sputum Cx pending        Obesity (BMI 30-39.9)- (present on admission)   Assessment & Plan    BMI 37.44. Recent steroids associated with brain cancer treatment.  - will  on discharge

## 2017-10-16 NOTE — NON-PROVIDER
Internal Medicine Medical Student Note  Note Author: Cornelia Monk, Student    Name Dale Rojas     1960   Age/Sex 56 y.o. male   MRN [id number]   Code Status      After 5PM or if no immediate response to page, please call for cross-coverage  Attending/Team:  See Patient List for primary contact information  Call [phone] to page after hours   1st Call - Day Intern (R1):    2nd Call - Day Sr. Resident (R2/R3):        Reason for interval visit  (Principal Problem)   Encephalopathy acute    Interval Problem Daily Status Update  (24 hours)   Dale Dorado is a 57 y/o  male with a h/o Anaplastic Astrocytoma of Temporal Lobe with partial resection 2017, HTN, T2DM /2 to chronic steroids, hx of PE and DVT s/p IVC filter 2017, who was brought in to the ED via EMS for confusion.     HD #2 Overnight patient had an episode of acute confusion and agitation with attempts to pull out IVs and schulz, as reported by family. Ativan prn given and restraints were provided.  Patient still lethargic but does awaken to movement. No improvement in speech.      Review of Systems   Unable to perform ROS: Mental status change     Physical Exam     Vitals:    10/15/17 2356 10/16/17 0400 10/16/17 0725 10/16/17 1105   BP: 138/78 129/79 124/84 128/79   Pulse: 93 (!) 103 (!) 107 (!) 106   Resp: 18 18 17 17   Temp: 36.4 °C (97.6 °F) 36.4 °C (97.5 °F) 36.2 °C (97.2 °F) 36.3 °C (97.4 °F)   SpO2: 95% 99% 96% 95%   Weight:       Height:         Body mass index is 37.44 kg/m².    Oxygen Therapy:  Pulse Oximetry: 95 %, O2 (LPM): 3, O2 Delivery: Nasal Cannula    Physical Exam   Constitutional:   Patient appears frustrated. He mumbles but incoherently.    Eyes: EOM are normal. Pupils are equal, round, and reactive to light.   Neck: Normal range of motion.   Cardiovascular: Regular rhythm, normal heart sounds and intact distal pulses.  Tachycardia present.    Pulmonary/Chest: He has rales  in the right lower field and the left lower field.   Shallow breathing noted.    Musculoskeletal: He exhibits edema.   Lymphadenopathy:     He has no cervical adenopathy.   Neurological: He has normal strength. He displays abnormal speech and abnormal reflex. He displays no Babinski's sign on the right side. He displays no Babinski's sign on the left side.   Patient follows simple commands to lift legs or turn his head. Patient mumbles but incoherently.  Poor dexterity unable to hold pen.      Assessment/Plan     #Acute encephalopathy, secondary to seizure vs sepsis   Patient acutely presented with mumbling, confusion and lethargy. Patient follows simple commands but mentation fluctuates. Patient has intermittent seizures seen by mouthing and starring. Patient hx positive for seizure disorder secondary to partial resection of anaplastic astrocytoma. EEG consistent with active focal seizure from L central region. Patient meets criteria of sepsis on admission with source of PNA vs encephalitis, potentially a nidus for AMS.   Plan   - Neurology consulted, EEG and MRI ordered   - Start Unasyn 3g IV q6h empirically   - Continue IV Keppra 1500 mg q12h and IV Vimpat 400mg q12h  for seizure prophylaxis   - Speech eval ordered, recommends NPO with alternative nutrition, consider NG tube feeds   - Seizure, aspiration, fall precautions     #Sepsis, secondary to PNA vs encephalitis   Patient meets sepsis: SIRS (, RR 22 and requiring supplemental O2) with presumed source of encephalitis vs PNA. Patient immunocompromised given chemo. Elevated procalcitonin with lower lung consolidation indicate underlying PNA, although patient is fluid overloaded with pulmonary edema which can present similarly. Patient's fluctuating mental status points to an encephalitis.   Plan  - Start Unasyn 3g IV q6h  - Re-order CXR to assess pulmonary changes   - Consider stopping empiric Acyclovir  - Sputum, blood Cx pending, no growth yet   -  Supplemental O2 prn, continue continuous pulse ox

## 2017-10-16 NOTE — PROGRESS NOTES
Met with Dr. Chou at 0030 to assess pt and discuss POC.  Dr. Chou was informed of pt's increasing lethargy and peripheral edema.  Pt was ordered lasix, will continue to monitor.

## 2017-10-16 NOTE — CARE PLAN
Problem: Safety  Goal: Will remain free from injury  Outcome: PROGRESSING AS EXPECTED  Assessed the patient for fall risk factors. Provided education regarding the need to call for assistance. Bed alarm in place, bed in lowest position, call light in reach.      Problem: Fluid Volume:  Goal: Will maintain balanced intake and output  Outcome: PROGRESSING SLOWER THAN EXPECTED  Collaborated with physician to discuss increase in edema.  Lasix administered.

## 2017-10-16 NOTE — HEART FAILURE PROGRAM
Cardiovascular Nurse Navigator () Progress Note:      Pt has shown up on the Acutely Decompensated HF Inpatient List due to the following on her problems list:    Acute on chronic diastolic heart failure (CMS-[facility]) I50.33 High  10/14/2017 - Present Yes     BNP 77, EF is 50%, Grade II diastolic dysfunction.     If it is determined that HF exacerbation is not present or that pt does not have HF at all, request that problems list be updated to reflect chronic status or remove HF altogether.     This will help the team deliver the appropriate interventions or conserve resources where they are not needed.     Thank you, please call with questions.

## 2017-10-17 NOTE — ASSESSMENT & PLAN NOTE
Prealbumin 8. Likely aspiration risk due to mental status. Failed swallow eval. Cortrak placed and TF started. Failed re-attempt of swallow eval.  - titrate TF to goal  - dispo to include long term care with speech/swallow therapy and feeding tube care

## 2017-10-17 NOTE — PALLIATIVE CARE
Palliative Care follow-up  Family meeting scheduled today at 15:30.  No family arrived.  Dr. Mon spoke with Oncology and there is 1 possible abx to try but overall no treatment options available.          Updated: Collaboration with Dr. Mon    Plan: Call to family tomorrow for follow up and planning.     Thank you for allowing Palliative Care to participate in this patient's care. Please feel free to call [phone] with any questions or concerns.

## 2017-10-17 NOTE — PROGRESS NOTES
This RN was rounding as RRT tonight and discovered cortrak placement was placed in the gastroesophageal junction with TF running.  Tube feed was stopped and primary RN was notified.  Cortrak was advanced further into stomach and x-ray was done to verify tube placement.  Now cortrak is shown to be in the distal stomach per x-ray interpretation.  Primary RN will be notified.

## 2017-10-17 NOTE — PROGRESS NOTES
Internal Medicine Interval Note  Note Author: Margy Mon M.D.     Name Dale Rojas     1960   Age/Sex 56 y.o. male   MRN [id number]   Code Status FULL CODE     After 5PM or if no immediate response to page, please call for cross-coverage  Attending/Team: Dr. Buckley/ Joe See Patient List for primary contact information  Call [phone] to page    1st Call - Day Intern (R1):   Dr. Mon 2nd Call - Day Sr. Resident (R2/R3):   Dr. Nunez         Reason for interval visit  (Principal Problem)   Encephalopathy acute    Interval Problem Daily Status Update  (24 hours)   Mild agitation overnight with pulling of tubes, requiring restraints. Decadron started. Pt follows commands and interacts, able to speak single words intelligibly.  No evidence of pain, though assessment limited by communication. Last BM 10/13.  EEG shows continuous focal seizure activity. Failed swallow study, cortrak inserted and tube feeds started. Tolerating TF titration, currently at 50.      Review of Systems   Unable to perform ROS: Patient nonverbal       Consultants/Specialty  Oncology  Neurology  Palliative Care    Disposition  Inpatient for evaluation and treatment of confusion    Quality Measures    Reviewed items::  EKG reviewed, Labs reviewed, Medications reviewed and Radiology images reviewed  Mesa catheter::  No Mesa  DVT prophylaxis pharmacological::  Enoxaparin (Lovenox)  DVT prophylaxis - mechanical:  SCDs  Ulcer Prophylaxis::  Not indicated  Antibiotics:  Treating active infection/contamination beyond 24 hours perioperative coverage          Physical Exam       Vitals:    10/16/17 1520 10/16/17 1946 10/17/17 0000 10/17/17 0400   BP: 118/75 124/90 129/90 128/91   Pulse: 98 (!) 114 97 96   Resp:    Temp: 36.5 °C (97.7 °F) 36.4 °C (97.5 °F) 36.2 °C (97.1 °F) 36.2 °C (97.2 °F)   SpO2: 98% 95% 97% 98%   Weight:       Height:         Body mass index is 37.44 kg/m².    Oxygen Therapy:   Pulse Oximetry: 98 %, O2 (LPM): 2, O2 Delivery: Nasal Cannula    Physical Exam   Constitutional: He is well-developed, well-nourished, and in no distress.   HENT:   Head: Normocephalic and atraumatic.   Eyes: Conjunctivae are normal. Pupils are equal, round, and reactive to light.   Neck: Normal range of motion. Neck supple.   Cardiovascular: Regular rhythm, normal heart sounds and intact distal pulses.  Tachycardia present.    Pulmonary/Chest: Effort normal and breath sounds normal.   Abdominal: Soft. Bowel sounds are normal. He exhibits distension. There is no tenderness.   Musculoskeletal: He exhibits edema (2+ pitting edema to calves, improved).   Neurological: He is alert. He displays weakness (global) and abnormal speech (aphasia). He displays no Babinski's sign on the right side. He displays no Babinski's sign on the left side.   Reflex Scores:       Patellar reflexes are 0 on the right side and 0 on the left side.       Achilles reflexes are 0 on the right side and 0 on the left side.  Follows commands   Skin: Skin is warm and dry.         Lab Data Review:       Recent Labs      10/14/17   2357  10/15/17   1101  10/16/17   0611  10/17/17   0235   SODIUM  130*  131*  134*  132*   POTASSIUM  3.1*  3.3*  3.3*  4.0   CHLORIDE  93*  95*  96  96   CO2  29  28  28  30   BUN  6*  6*  4*  6*   CREATININE  0.38*  0.38*  0.33*  0.32*   MAGNESIUM  1.8   --   1.9  2.0   PHOSPHORUS  3.4   --   2.7  3.2   CALCIUM  7.7*  8.1*  8.2*  8.6       Recent Labs      10/14/17   1115  10/14/17   2357  10/15/17   1101  10/16/17   0611  10/17/17   0235   ALTSGPT  51*  43  36  34  31   ASTSGOT  30  20  19  20  20   ALKPHOSPHAT  111*  100*  95  95  107*   TBILIRUBIN  1.0  0.6  0.5  0.6  0.7   LIPASE  5*   --    --    --    --    PREALBUMIN   --   8.0*   --    --    --    GLUCOSE  103*  101*  95  75  137*       Recent Labs      10/14/17   1115  10/14/17   2357  10/15/17   1101  10/16/17   0611  10/17/17   0235   RBC  4.45*  4.23*   --    4.37*  4.52*   HEMOGLOBIN  12.7*  11.8*   --   12.2*  12.8*   HEMATOCRIT  38.6*  36.5*   --   38.4*  39.6*   PLATELETCT  166  180   --   185  213   PROTHROMBTM  23.8*   --   24.6*   --    --    APTT  48.7*   --    --    --    --    INR  2.05*   --   2.14*   --    --        Recent Labs      10/14/17   2357  10/15/17   1101  10/16/17   0611  10/17/17   0235   WBC  5.5   --   4.6*  4.1*   NEUTSPOLYS  77.60*   --   73.30*  86.30*   LYMPHOCYTES  13.10*   --   13.60*  9.00*   MONOCYTES  6.60   --   9.20  2.70   EOSINOPHILS  0.50   --   1.50  0.00   BASOPHILS  1.10   --   1.10  0.50   ASTSGOT  20  19  20  20   ALTSGPT  43  36  34  31   ALKPHOSPHAT  100*  95  95  107*   TBILIRUBIN  0.6  0.5  0.6  0.7           Assessment/Plan     * Encephalopathy acute- (present on admission)   Assessment & Plan    Concerning for known seizure vs sepsis. Hx brain cancer with associated seizure disorder and fluctuating mentation, most likely cause. Meningitis a possibility, but less likely given improving mentation. Possible pneumonia given Hx with chemotherapy treatments, tachycardic, tachypneic, supplemental O2 requirement, CXR w LLL consolidation, procalcitonin 0.44. Also Hx multiple DVTs and PEs but is anticoagulated with a negative head CT. Encephalitis unlikely, given patient's alertness and ability to follow commands.   - continue TF, titrate to goal  - Neurology following, appreciate recs  - continue Unasyn  - discontinued acyclovir  - LP contraindicated due to continued anticoagulation  - treat underlying conditions as noted  - blood, urine, sputum Cx pending  - seizure, aspiration, fall precautions        Chronic diastolic CHF (congestive heart failure) (CMS-HCC)- (present on admission)   Assessment & Plan    Anasarca, increased O2 requirements. Lasix held for the past week for chemotherapy, per family. Echo 3/26/17 with EF 50%, Grade II diastolic dysfunction. BNP 77. 40 mg IV lasix given in ED with appropriate diuresis.   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 Current Temozolamide chemotherapy. CT head without evidence of acute hemorrhage or mass effect.  - consulted Oncology, appreciate recommendations  - holding chemotherapy for now  - palliative care to hold goals of care discussion on 10/17        Electrolyte disturbance- (present on admission)   Assessment & Plan    Hyponatremia, hypokalemia, hypophosphatemia. Likely 2/2 fluid overload, possibly a chemotherapy component.  - replace electrolytes prn  - diuresis as noted  - monitor daily        Steroid-induced diabetes mellitus (CMS-HCC)- (present on admission)   Assessment & Plan    Glucose low normal since admission. Pt family reports daily insulin but report none given since day prior to admission and pt unable to corroborate. Decadron discontinued on 10/10/17.  - POC glucose TID AC and HS  - SSI  - monitor for additional insulin requirements        Chronic pain of right knee- (present on admission)   Assessment & Plan    With associated back pain 2/2 multiple falls in past year. Takes Norco at home.  - continue home Norco  - low dose dilaudid prn while NPO        Protein calorie malnutrition (CMS-HCC)- (present on admission)   Assessment & Plan    Prealbumin 8. Likely aspiration risk due to mental status. Failed swallow eval. Cortrak placed and TF started.  - titrate TF to goal        Recurrent pulmonary embolism (CMS-HCC)- (present on admission)   Assessment & Plan    With associated DVTs. Most recent CT chest 6/22/17 with bilateral PEs. Therapeutic on warfarin on admission. MRI-safe IVC filter present.  - continue lovenox while NPO  - duplex BLE, IVC filter        Seizure disorder (CMS-HCC)- (present on admission)   Assessment & Plan    Recent onset 2/2 brain cancer.  Encephalopathy with variable mentation concerning for atonic seizure.  - NPO  - IV Keppra  - consulted Neurology, ordered MRI, EEG  - IV ativan prn for seizure, agitation, or pre-MRI  - continue decadron        Sepsis (CMS-HCC)- (present on admission)   Assessment & Plan    Resolved.  This is sepsis (without associated acute organ dysfunction).   SIRS on admission: , RR 22  Source: suspected encephalopathy vs meningitis vs pneumonia given immunocompromise, tachycardia, tachypnea, supplemental O2 requirement  Lactate: 1.8  Procalcitonin: 0.44  - IVF resuscitation contraindicated in the setting of acute on chronic heart failure and fluid overload  - continue IV abx as noted  - LP contraindicated due to necessary anticoagulation  - duoneb, supplemental O2 prn  - blood, sputum Cx pending        Obesity (BMI 30-39.9)- (present on admission)   Assessment & Plan    BMI 37.44. Recent steroids associated with brain cancer treatment.  - will  on discharge

## 2017-10-17 NOTE — ASSESSMENT & PLAN NOTE
Anasarca, increased O2 requirements. Lasix held for the past week for chemotherapy, per family. Echo this admission with EF 60%, mild LV hypertrophy. BNP 77. 40 mg IV lasix given in ED with appropriate diuresis.   - discontinue IV lasix  - holding metoprolol for pressure support  - supplemental O2 prn

## 2017-10-17 NOTE — PROGRESS NOTES
Spoke w/ Payal about the VL duplex- she said the pt was next on the list and spoke w/ the Echo kristian Gregory and she said she would be up to do the echo in an hour.

## 2017-10-17 NOTE — THERAPY
"Speech Language Therapy Evaluation completed to address cognition  Functional Status:  Patient was seen today for speech-language assessment today. Patient was asleep but was able to wake with verbal stimuli. It was attempted to speak to patient in English however, it was noted that patient was more responsive when questions and instructions were given in Danish. Patient currently presents with severe dysarthria and fluent but unintelligible speech. He responded appropriately and verbally to social greetings (cindy, adios, libertad) but all other verbal responses were unintelligible and consisting of nonwords. Patient was unable to produced automatic speech despite multiple attempts with max verbal cues. It was difficult to determine orientation as well as obtain reliable case history as patient perseverated on \"yes\" responses with head nod. Patient was assessed using portions of the Western Aphasia Battery Revised Bedside. Patient responded to spontaneous speech question tasks with unintelligible speech that lacked content. Patient did respond appropriately to some personal yes/no questions but patient again perseverated on \"yes\" response for remainder of questions. He was also unable to follow simple commands despite given verbal, visual and tactile cues. Patient was unable to complete other assessment tasks. At this time, patient is currently presenting with severe expressive and receptive aphasia as well as severe dysarthria. SLP will continue to follow for ongoing assessment to determine patient's functional communication and to address current deficits as well as follow for dysphagia therapy.    Recommendations:  Comprehension training; Expression training;Community Re-Integration;Patient / Family / Caregiver Education    Plan of Care: Will benefit from Speech Therapy 3 times per week and will increase tolerance as appropriate.  Post-Acute Therapy: Discharge to a transitional care facility for continued skilled " "therapy services.    See \"Rehab Therapy-Acute\" Patient Summary Report for complete documentation.   "

## 2017-10-17 NOTE — CARE PLAN
Problem: Safety  Goal: Will remain free from falls  Outcome: PROGRESSING AS EXPECTED    Intervention: Assess risk factors for falls  Bed locked in lowest position, call bell in hand, non-skid socks in use, bed alarm on.      Problem: Infection  Goal: Will remain free from infection  Outcome: PROGRESSING AS EXPECTED    Intervention: Assess signs and symptoms of infection  Antibiotic therapy in progress, tolerating well with no s/s of adverse reactions noted at this time.      Problem: Venous Thromboembolism (VTW)/Deep Vein Thrombosis (DVT) Prevention:  Goal: Patient will participate in Venous Thrombosis (VTE)/Deep Vein Thrombosis (DVT)Prevention Measures  Outcome: PROGRESSING AS EXPECTED    Intervention: Assess and monitor for anticoagulation complications  Lovenox in progress.  SCD's off due to lower extremity 4+ pitting edema      Problem: Bowel/Gastric:  Goal: Will not experience complications related to bowel motility  Outcome: PROGRESSING AS EXPECTED    Intervention: Assess baseline bowel pattern  Cortrax in place verified by x-ray, tube secured at 73cm, tube feeding in progress as per MD orders and to be increased as per MD orders and as tolerated.      Problem: Pain Management  Goal: Pain level will decrease to patient's comfort goal  Outcome: PROGRESSING AS EXPECTED    Intervention: Follow pain managment plan developed in collaboration with patient and Interdisciplinary Team  Patient medicated for pain as per MAR and MD orders.  Patient noted to be grimacing, withdrawing, restless and noisy breathing.      Problem: Respiratory:  Goal: Respiratory status will improve  Outcome: PROGRESSING AS EXPECTED    Intervention: Assess and monitor pulmonary status  O2 at 2l/min via NC in progress.  O2 sats >94%.  Crackles to all fields noted.  No dyspnea, tachypnea or labored breathing noted.      Problem: Fluid Volume:  Goal: Will maintain balanced intake and output  Outcome: PROGRESSING AS EXPECTED    Intervention:  Monitor, educate, and encourage compliance with therapeutic intake of liquids  I&O's monitored and documented as per MD orders.  Pitting edema to lower and upper extremities and generalized edema to face, neck and abdomen.  Sclera edema noted as well.  Lasix in progress as ordered tolerated well.  No s/s of adverse reactions noted at this time.      Problem: Urinary Elimination:  Goal: Ability to reestablish a normal urinary elimination pattern will improve  Outcome: PROGRESSING AS EXPECTED    Intervention: Evaluate need to continue indwelling urinary catheter  Mesa catheter in place, patent draining well.  Urine blood tinged with occasional clots noted.  MD aware.  Patient critically ill in need for accurate measurement of intake and output.

## 2017-10-17 NOTE — NON-PROVIDER
"       Internal Medicine Medical Student Note  Note Author: Cornelia Monk, Student    Name Dale Rojas     1960   Age/Sex 56 y.o. male   MRN [id number]   Code Status      After 5PM or if no immediate response to page, please call for cross-coverage  Attending/Team:  See Patient List for primary contact information  Call [phone] to page after hours   1st Call - Day Intern (R1):    2nd Call - Day Sr. 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      HD #3 No acute events overnight. Patient received Cortrak for nutrition with minor complication in placement, fixed. Patient appears more alert today. Patient continues to follow simple commands and will smile back. Patient mumbling incoherently, but Russian-speaking CNA reports patient saying \"thank you\" in Russian after vitals were taken this morning. Family Care planning meeting scheduled later today to discuss care options.     Review of Systems   Unable to perform ROS: Mental status change     Physical Exam     Vitals:    10/16/17 1946 10/17/17 0000 10/17/17 0400 10/17/17 0800   BP: 124/90 129/90 128/91 132/97   Pulse: (!) 114 97 96 (!) 102   Resp: 16 16 16 20   Temp: 36.4 °C (97.5 °F) 36.2 °C (97.1 °F) 36.2 °C (97.2 °F) 36.4 °C (97.6 °F)   SpO2: 95% 97% 98% 96%   Weight:       Height:         Body mass index is 37.44 kg/m².    Oxygen Therapy:  Pulse Oximetry: 96 %, O2 (LPM): 2, O2 Delivery: Nasal Cannula    Physical Exam   Constitutional: He is well-developed, well-nourished, and in no distress.   Eyes: EOM are normal. Pupils are equal, round, and reactive to light.   Neck: " Normal range of motion.   Cardiovascular: Regular rhythm, normal heart sounds and intact distal pulses.  Tachycardia present.    Pulmonary/Chest: Effort normal. No respiratory distress. He has rales in the right lower field and the left lower field.   Musculoskeletal: He exhibits edema.   Neurological: He is alert. He has normal strength. He displays abnormal speech.   Patient follows simple commands to squeeze fingers or turn his head. Patient mumbles but incoherently.     Assessment/Plan     #Acute encephalopathy, secondary to seizure activity   Patient acutely presented with mumbling, confusion and lethargy. Patient follows simple commands but mentation fluctuates and speech incoherent. Patient has intermittent seizures given by mouthing and starring as reported by family and nursing. Patient hx positive for seizure disorder secondary to partial resection of anaplastic astrocytoma. EEG consistent with active focal seizure from L central region. Patient meets criteria of sepsis on admission with source of PNA vs encephalitis, potentially a nidus for AMS but less likely as patient can follow commands.   Plan   - Neurology following, EEG and MRI ordered and in support of seizure activity   - Continue Unasyn 3g IV q6h empirically   - Continue IV Keppra 1500 mg q12h and IV Vimpat 400mg q12h  for seizure prophylaxis   - Added Decadron 4mg q6h for cerebral edema   - IV ativan PRN for seizure or agitation   - Strict NPO, on Cortrak for NG tube feeds, d/c IV fluids   - Seizure, aspiration, fall precautions   - Neuro checks q4h to assess mentation   - Family care plan meeting scheduled later for this afternoon      #Sepsis, secondary to PNA vs encephalitis   Patient meets sepsis on admission: SIRS (, RR 22 and requiring supplemental O2) with presumed source of encephalitis vs PNA. Patient immunocompromised given chemo. Elevated procalcitonin with lower lung consolidation indicate underlying PNA, although patient is  fluid overloaded with pulmonary edema which can present similarly. Patient's fluctuating mental status points to an encephalitis.   Plan  - Continue Unasyn 3g IV q6h   - Re-order CXR to assess pulmonary changes   - Sputum, blood Cx pending, no growth yet   - Supplemental O2 prn, continue continuous pulse ox

## 2017-10-17 NOTE — PROGRESS NOTES
Spoke w/ Margy Mon MD. She is going to renew restraint orders, told me to d/c fluids since patient is on tube feedings now. Also made sure she saw the results of the MRI which she did

## 2017-10-18 NOTE — PROGRESS NOTES
Internal Medicine Interval Note  Note Author: Margy Mon M.D.     Name Dale Rojas     1960   Age/Sex 56 y.o. male   MRN [id number]   Code Status FULL CODE     After 5PM or if no immediate response to page, please call for cross-coverage  Attending/Team: Dr. Buckley/ Joe See Patient List for primary contact information  Call [phone] to page    1st Call - Day Intern (R1):   Dr. Mon 2nd Call - Day Sr. Resident (R2/R3):   Dr. Nunez         Reason for interval visit  (Principal Problem)   Encephalopathy acute    Interval Problem Daily Status Update  (24 hours)   Still requiring restraints at night for agitation. Pt pulled out schulz catheter this morning, with resulting light bleeding. Bladder irrigated and schulz replaced.  Pt appears more lucid this am and is able to speak haltingly in Ethiopian. He is able to follow commands, particularly when given in Ethiopian. Strength improved. Tolerating TF at goal. BM this am.      Review of Systems   Unable to perform ROS: Patient nonverbal       Consultants/Specialty  Oncology  Neurology  Palliative Care    Disposition  Inpatient for evaluation and treatment of confusion    Quality Measures    Reviewed items::  EKG reviewed, Labs reviewed, Medications reviewed and Radiology images reviewed  Schulz catheter::  No Schulz  DVT prophylaxis pharmacological::  Enoxaparin (Lovenox)  DVT prophylaxis - mechanical:  SCDs  Ulcer Prophylaxis::  Not indicated  Antibiotics:  Treating active infection/contamination beyond 24 hours perioperative coverage          Physical Exam       Vitals:    10/17/17 1900 10/17/17 1943 10/18/17 0000 10/18/17 0400   BP:  135/89 134/83 125/84   Pulse:  83 82 78   Resp:  16 16 16   Temp:  36.4 °C (97.5 °F) 36.2 °C (97.1 °F) 36.2 °C (97.2 °F)   SpO2:  97% 99% 98%   Weight: 101.3 kg (223 lb 5.2 oz)      Height:         Body mass index is 37.16 kg/m². Weight: 101.3 kg (223 lb 5.2 oz)  Oxygen Therapy:  Pulse Oximetry: 98 %,  O2 (LPM): 2, O2 Delivery: Nasal Cannula    Physical Exam   Constitutional: He is well-developed, well-nourished, and in no distress.   HENT:   Head: Normocephalic and atraumatic.   Eyes: Conjunctivae are normal. Pupils are equal, round, and reactive to light.   Neck: Normal range of motion. Neck supple.   Cardiovascular: Normal rate, regular rhythm, normal heart sounds and intact distal pulses.    Pulmonary/Chest: Effort normal and breath sounds normal.   Abdominal: Soft. Bowel sounds are normal. He exhibits distension. There is no tenderness.   Musculoskeletal: He exhibits edema (2+ pitting edema to calves, improved).   Neurological: He is alert. He displays weakness (improved to 4/5 strength) and abnormal speech (some aphasia, some halting speech). He displays no Babinski's sign on the right side. He displays no Babinski's sign on the left side.   Follows commands   Skin: Skin is warm and dry.         Lab Data Review:       Recent Labs      10/16/17   0611  10/17/17   0235  10/18/17   0239   SODIUM  134*  132*  137   POTASSIUM  3.3*  4.0  3.5*   CHLORIDE  96  96  98   CO2  28  30  29   BUN  4*  6*  13   CREATININE  0.33*  0.32*  0.32*   MAGNESIUM  1.9  2.0  2.2   PHOSPHORUS  2.7  3.2  1.7*   CALCIUM  8.2*  8.6  8.6       Recent Labs      10/16/17   0611  10/17/17   0235  10/18/17   0239   ALTSGPT  34  31  36   ASTSGOT  20  20  31   ALKPHOSPHAT  95  107*  107*   TBILIRUBIN  0.6  0.7  0.3   GLUCOSE  75  137*  239*       Recent Labs      10/15/17   1101  10/16/17   0611  10/17/17   0235  10/18/17   0239   RBC   --   4.37*  4.52*  4.34*   HEMOGLOBIN   --   12.2*  12.8*  12.4*   HEMATOCRIT   --   38.4*  39.6*  37.7*   PLATELETCT   --   185  213  256   PROTHROMBTM  24.6*   --    --    --    INR  2.14*   --    --    --        Recent Labs      10/16/17   0611  10/17/17   0235  10/18/17   0239   WBC  4.6*  4.1*  6.1   NEUTSPOLYS  73.30*  86.30*  82.90*   LYMPHOCYTES  13.60*  9.00*  9.80*   MONOCYTES  9.20  2.70  5.60    EOSINOPHILS  1.50  0.00  0.00   BASOPHILS  1.10  0.50  0.20   ASTSGOT  20  20  31   ALTSGPT  34  31  36   ALKPHOSPHAT  95  107*  107*   TBILIRUBIN  0.6  0.7  0.3           Assessment/Plan     * Encephalopathy acute- (present on admission)   Assessment & Plan    Concerning for known seizure vs sepsis. Hx brain cancer with associated seizure disorder and fluctuating mentation, most likely cause. Meningitis a possibility, but less likely given improving mentation. Possible pneumonia given Hx with chemotherapy treatments, tachycardic, tachypneic, supplemental O2 requirement, CXR w LLL consolidation, procalcitonin 0.44. Also Hx multiple DVTs and PEs but is anticoagulated with a negative head CT. Encephalitis unlikely, given patient's alertness and ability to follow commands.   - continue TF, titrate to goal  - Neurology following, appreciate recs  - continue Unasyn  - discontinued acyclovir  - ID consulted  - LP contraindicated due to continued anticoagulation  - treat underlying conditions as noted  - blood, urine, sputum Cx pending  - seizure, aspiration, fall precautions        Chronic diastolic CHF (congestive heart failure) (CMS-HCC)- (present on admission)   Assessment & Plan    Anasarca, increased O2 requirements. Lasix held for the past week for chemotherapy, per family. Echo 3/26/17 with EF 50%, Grade II diastolic dysfunction. BNP 77. 40 mg IV lasix given in ED with appropriate diuresis.   - daily IV lasix  - holding metoprolol for pressure support in the setting of sepsis  - supplemental O2 prn  - TTE        Anaplastic astrocytoma of temporal lobe (CMS-HCC)- (present on admission)   Assessment & Plan    S/p partial resection at [place], XRT. Current Temozolamide chemotherapy. CT head without evidence of acute hemorrhage or mass effect.  - consulted Oncology, appreciate recommendations  - holding chemotherapy for now  - palliative care to hold goals of care discussion on 10/17        Electrolyte disturbance-  (present on admission)   Assessment & Plan    Hyponatremia, hypokalemia, hypophosphatemia. Likely 2/2 fluid overload, possibly a chemotherapy component.  - replace electrolytes prn  - diuresis as noted  - monitor daily        Steroid-induced diabetes mellitus (CMS-[facility])- (present on admission)   Assessment & Plan    Glucose low normal since admission. Pt family reports daily insulin but report none given since day prior to admission and pt unable to corroborate. Decadron discontinued on 10/10/17.  - POC glucose TID AC and HS  - SSI  - monitor for additional insulin requirements        Chronic pain of right knee- (present on admission)   Assessment & Plan    With associated back pain 2/2 multiple falls in past year. Takes Norco at home.  - continue home Norco  - low dose dilaudid prn while NPO        Protein calorie malnutrition (CMS-[facility])- (present on admission)   Assessment & Plan    Prealbumin 8. Likely aspiration risk due to mental status. Failed swallow eval. Cortrak placed and TF started.  - titrate TF to goal        Recurrent pulmonary embolism (CMS-HCC)- (present on admission)   Assessment & Plan    With associated DVTs. Most recent CT chest 6/22/17 with bilateral PEs. Therapeutic on warfarin on admission. MRI-safe IVC filter present.  - continue lovenox while NPO  - duplex BLE, IVC filter        Seizure disorder (CMS-[facility])- (present on admission)   Assessment & Plan    Recent onset 2/2 brain cancer. Encephalopathy with variable mentation concerning for atonic seizure.  - NPO  - IV Keppra  - consulted Neurology, ordered MRI, EEG  - IV ativan prn for seizure, agitation, or pre-MRI  - continue decadron        Sepsis (CMS-[facility])- (present on admission)   Assessment & Plan    Resolved.  This is sepsis (without associated acute organ dysfunction).   SIRS on admission: , RR 22  Source: suspected encephalopathy vs meningitis vs pneumonia given immunocompromise, tachycardia, tachypnea, supplemental O2  requirement  Lactate: 1.8  Procalcitonin: 0.44  - IVF resuscitation contraindicated in the setting of acute on chronic heart failure and fluid overload  - continue IV abx as noted  - LP contraindicated due to necessary anticoagulation  - duoneb, supplemental O2 prn  - blood, sputum Cx pending        Obesity (BMI 30-39.9)- (present on admission)   Assessment & Plan    BMI 37.44. Recent steroids associated with brain cancer treatment.  - will  on discharge

## 2017-10-18 NOTE — PROGRESS NOTES
Unable to assess level of orientation  Pt. Moves all extremeties,  Denies numbness and tingling  Denies pain  18 ga in RAC Patent, site CDI  Bed alarm on  SCD's refused  Treaded socks in place  Call light within reach

## 2017-10-18 NOTE — CARE PLAN
Problem: Venous Thromboembolism (VTW)/Deep Vein Thrombosis (DVT) Prevention:  Goal: Patient will participate in Venous Thrombosis (VTE)/Deep Vein Thrombosis (DVT)Prevention Measures    Intervention: Ensure patient wears graduated elastic stockings (KRYSTEN hose) and/or SCDs, if ordered, when in bed or chair (Remove at least once per shift for skin check)  Pt. Also taking Lovenox.      Problem: Pain Management  Goal: Pain level will decrease to patient's comfort goal    Intervention: Follow pain managment plan developed in collaboration with patient and Interdisciplinary Team  Pt. Declining pain at this time.

## 2017-10-18 NOTE — PROGRESS NOTES
IMPRESSION:     1.  Change of mental status due to 2. 3, and 5-- especially 2-- improving  2.  Seizures-- CPS-- intractable( mouthing, starring)  3.  Brain Tumor status post surgery, RT, CT   4.  Hx of DM, HTN, PE  5.  Pneurmonia     PLAN/RECOMMENDATIONS:     ________________________________________________________________________    More responsive, apparently, right hemiparesis, able to reply to questions ( not English) and [place] speaker could not understand either--- could be paraphasia?    Right hemiplegia     Continue Keppra and Vimpat for now  Once the patient is able to swallow, I will use other anti-seizure medication with reasonable price ( VIMPAT would be too expensive)    Such as Zonegran, Lamictal, Depakote  ________________________________________________________________________      Vitals:    10/17/17 0400 10/17/17 0800 10/17/17 1200 10/17/17 1600   BP: 128/91 132/97 128/79 128/89   Pulse: 96 (!) 102 94 98   Resp: 16 20 16 16   Temp: 36.2 °C (97.2 °F) 36.4 °C (97.6 °F) 36.6 °C (97.8 °F) 36.6 °C (97.8 °F)   SpO2: 98% 96% 97% 94%   Weight:       Height:         Physical Exam   Constitutional: He is well-developed, well-nourished, and in no distress.   HENT:   Head: Normocephalic.   Eyes: Pupils are equal, round, and reactive to light.   Pulmonary/Chest: Effort normal.   Abdominal: Soft.   Musculoskeletal: Normal range of motion.   Neurological:   could answer to questions, although we could not understand the patient's reply ( language barrier or aphasia in the patient's part), could squeeze my hand with left hand. Not in right hand    Skin: Skin is warm.     Review of Systems   HENT: Negative for ear discharge.    Eyes: Negative for redness.   Cardiovascular: Negative for leg swelling.   Gastrointestinal: Negative for blood in stool.   Genitourinary: Positive for hematuria.   Neurological: Positive for seizures, loss of consciousness and weakness.   Psychiatric/Behavioral: Negative for substance  abuse.

## 2017-10-18 NOTE — CARE PLAN
Problem: Nutritional:  Goal: Nutrition support tolerated and meeting greater than 85% of estimated needs  Outcome: MET Date Met: 10/18/17  TF running at goal rate of 75 ml/hr w/ Replete Fiber    RD following

## 2017-10-18 NOTE — PROGRESS NOTES
"IMPRESSION:     1.  Change of mental status due to 2. 3, and 5-- especially 2-- improving  2.  Seizures-- CPS-- intractable( mouthing, starring)  3.  Brain Tumor status post surgery, RT, CT   4.  Hx of DM, HTN, PE  5.  Pneurmonia     PLAN/RECOMMENDATIONS:     ________________________________________________________________________    More responsive, apparently, right hemiparesis, able to reply to questions ( not English)     Right hemiplegia     Continue Keppra and Vimpat for now    Once the patient is able to swallow, Zonegran 200mg Daily through mouth ( order in EPIC)  Keppra IV could be switched to Keppra oral \"the same dose\"    Stop IV vimapt--- the rationel: VIMPAT is too expensive      ________________________________________________________________________    If circumstances change do not hesitate to re-consult neurology.     At this point, patient remains stable.        Thank you for the consult.     Sara Yuan M.D.  [facility]  2:45 PM10/18/17    ________________________________________________________________________        ________________________________________________________________________      Vitals:    10/18/17 0000 10/18/17 0400 10/18/17 0800 10/18/17 1145   BP: 134/83 125/84 145/90    Pulse: 82 78 93 91   Resp: 16 16 20 16   Temp: 36.2 °C (97.1 °F) 36.2 °C (97.2 °F) 36.4 °C (97.6 °F)    SpO2: 99% 98% 96% 93%   Weight:       Height:         Physical Exam   Constitutional: He is well-developed, well-nourished, and in no distress.   HENT:   Head: Normocephalic.   Eyes: Pupils are equal, round, and reactive to light.   Pulmonary/Chest: Effort normal.   Abdominal: Soft.   Musculoskeletal: Normal range of motion.   Neurological:   could answer to questions, although we could not understand the patient's reply ( language barrier or aphasia in the patient's part), could squeeze my hand with left hand. Not in right hand    Skin: Skin is warm.     Review of Systems   HENT: " Negative for ear discharge.    Eyes: Negative for redness.   Cardiovascular: Negative for leg swelling.   Gastrointestinal: Negative for blood in stool.   Genitourinary: Positive for hematuria.   Neurological: Positive for seizures, loss of consciousness and weakness.   Psychiatric/Behavioral: Negative for substance abuse.

## 2017-10-18 NOTE — CARE PLAN
Problem: Respiratory:  Goal: Respiratory status will improve  Pt on nasal cannula with 3L of oxygen.   in use.    Problem: Urinary Elimination:  Goal: Ability to reestablish a normal urinary elimination pattern will improve  Pt has schulz catheter in place.

## 2017-10-18 NOTE — CONSULTS
DATE OF SERVICE:  10/17/2017    REQUESTING PHYSICIAN:  Dr. Facundo Hein    REASON FOR CONSULTATION:  Brain tumor.    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  It was wild   type with no 1p, 19q detected, MGMT non-methylation, IDH-1 also wild type.  He   was treated at [place] with an awake temporal lobe and posterior inferior   frontal lobe resection on 01/31/2017.  The tumor was removed approximately 2   cm anterior to the temporal eloquent speech area extending up to the anterior   temporal lip.  He initially had incomprehensible speech which gradually   improved.  He received standard chemotherapy and radiation in April, although   he apparently was unable to complete the concurrent Temodar, which he only   took for a very short period of time.  In June, his MRI continued to show   extensive involvement.  He was started on maintenance Temodar initially at 75   per meter squared, but then developed chest pain and a CT showed pulmonary   emboli.  He was switched to warfarin and had an IVC filter placed.  His MRI in   July 14th showed stable findings with some cortical laminar necrosis.  He was   started on a dose of Temodar 150 mg.  He tolerated this reasonably well, and   when he was last seen by Dr. Osman in the oncology clinic, he was felt to be   improving.  He was still taking some periodic Decadron whenever he developed   any headaches.  His aphasia was apparently much better.  He has continued to   have a major problem with weight gain and bipedal edema most likely related to   his continuous steroid usage.  He was admitted to the hospital at this time   because of recurrent seizures, which caused sort of permanent postictal state.    He has been seen by a neurologist, Dr. Yuan.  At the present time, when he   came in, he had an MRI brain scan, which did not seem to show a dramatic   change, although he  still has quite a bit of enhancement and postoperative   changes in the left temporal lobe anteriorly and inferiorly as well as a small   residual left subdural effusion.  He has been placed on some Keppra and he is   more awake, but his speech is completely confabulatory at this time.  He also   has been on anticoagulation with warfarin, but he also had an IVC filter   placed.  At the present time, the question is whether or not he is clearly   failing or is there another option that might be helpful as opposed to be   Temodar.    PAST MEDICAL HISTORY:  In addition to his brain tumor, he has also had a   pulmonary embolus for which he has had an IVC filter placed and he is   presently on warfarin.  He has also had significant problems with   hyperglycemia and pedal edema as well as anasarca from his continued steroids   which have not responded much to Lasix.  His seizures are ____.    SOCIAL HISTORY:  The patient does have family who are very interested in his   care, they are not present when I came to meet the patient.    CURRENT MEDICATIONS:  Unasyn, Decadron 4 mg q. 6 hours, Lovenox 40 mg   subcutaneously, Lasix 40 mg daily, Humalog, Vimpat 200 mg in normal saline IV   piggyback, Keppra 1500 mg IV q. 12 hours, omeprazole, Senokot.    PHYSICAL EXAMINATION:  VITAL SIGNS:  Blood pressure is 132/97, respirations 20, pulse 120,   temperature is 36.4 degrees centigrade.  GENERAL:  Markedly edematous 56-year-old gentleman with oxygen, who is resting   easily, but can only communicate in very confabulatory speech.  He also   understands primarily Citizen of [place], but does not respond to verbal commands at   present.  HEENT:  In addition to his edema, he does not have any scleral icterus.    Pupils are equal.  EOMs appear to be full.    NECK:  Supple.  LUNGS:  Clear.  HEART:  Regular rate and rhythm.  ABDOMEN:  Somewhat distended.  EXTREMITIES:  Significant bipedal edema.    LABORATORY STUDIES:  WBC 4.1, Hb 12.8, HCT is 39.6%,  platelet count 214,000.    Chemistry panel:  Creatinine 0.32, sodium is low at 132, BUN 6, alkaline   phosphatase 107, , lactic acid 1.1.    IMPRESSION:  1.  Anaplastic grade III astrocytoma very close to be speech center, resected   in January of this year and wild type regarding known potential mutations,   which are actionable.  He received radiation therapy and partial oral Temodar   and has recently been on maintenance Temodar at about 150 mg for 5 days on a   monthly basis, his last treatment was about 6 weeks ago.  MRI appears to show   reasonably stable lesion, although there may perhaps be more uptake and more   edema.  2.  Recurrent seizures causing persistent _____.  3.  Anasarca with poor response to Lasix.  4.  Pulmonary emboli -- presently on warfarin and had an IVC filter placed.    PLAN:  He clearly has not recovered to a status that will be very functional   at the present time.  It is hoped that if we get his seizures under control   that hopefully he will return to more rational  and responsive status.    Whether the Temodar is effective is questionable.  Other options that could be   considered if we think edema is making a major portion to him probably would   be bevacizumab intravenously although there could be significant adverse side   effects from this treatment as well such as bleeding and then maybe even   further thromboembolic phenomenon.  His overall prognosis, I think is quite   poor, but I will discuss his situation with Dr. Osman, who has been following   him for the past 8 months.       ____________________________________     MD PRUDENCIO MOBLEY / HARRIET    DD:  10/17/2017 16:13:15  DT:  10/17/2017 17:02:51    D#:  [id number]  Job#:  [id number]

## 2017-10-19 NOTE — PROGRESS NOTES
Received report and assumed pt care.  Montenegrin-speaking and aphasic.  Bed alarm and treaded socks on.  Cortrak and schulz in place.  Bilateral soft wrist restraints in place.  No s/s of distress or pain.

## 2017-10-19 NOTE — CARE PLAN
Problem: Bowel/Gastric:  Goal: Will not experience complications related to bowel motility    Intervention: Assess baseline bowel pattern  Stool softeners held as pt. Has had several loose bowel movements today.       Problem: Skin Integrity  Goal: Risk for impaired skin integrity will decrease    Intervention: Implement precautions to protect skin integrity in collaboration with the interdisciplinary team  Q 2 turns and a waffle cushion in use as well.

## 2017-10-19 NOTE — THERAPY
"Speech Language Therapy dysphagia treatment completed.   Functional Status:  Patient was seen for dysphagia therapy today. Patient was awake and alert and oriented to self. He currently presents with aphasia as well as dysarthria. Patient is a [place]ian speaker. Although patient's intelligibility has improved since last session, patient's speech continues to mostly lack content and unrelated to current topic. Patient was unable to follow most of the simple oral motor mechanism commands given and demonstrated perseveration on opening his mouth and producing \"oh\" and \"ah\" sound. Pre-feeding trials included ice chips (x3), nectar thick liquids, and purees (applesauce). It was noted that patient would not close lips around spoon when given ice chip and would require max cueing to make him aware of ice chip sitting in his mouth and to get him to chew and swallow. Patient had to be given nectar thick liquids via cup as patient would remain with mouth wide open if attempted to be given via spoon. After patient was given sip of NTL via cup, patient still indicated that he did \"not feel\" liquid, while liquid was still in his mouth and would require max cueing to swallow. Delayed throat clearing was also noted after presentation of nectars. Patient was given a couple of trials of applesauce via spoon and he was noted to appropriately close lips around spoon and swallow. Due to patient's reduced sensation in oral cavity, as well as subtle overt s/sx of aspiration after presentation of one of the textures, it is recommended that patient be followed up with diagnostic evaluation to determine safe and functional swallow prior to clearing him for a diet. Recommendation was explained to patient but there was no learning evidence. SLP will continue to follow.    Recommendations: ALYSE w/ Vi. Follow up for FEES.  Plan of Care: Will benefit from Speech Therapy 3 times per week  Post-Acute Therapy: Discharge to a transitional care " "facility for continued skilled therapy services.    See \"Rehab Therapy-Acute\" Patient Summary Report for complete documentation.     "

## 2017-10-19 NOTE — PALLIATIVE CARE
"Palliative Care follow-up  Ongoing attempts to reach family unsuccessful. Voicemail's are full, not set up.   According to Dr. Christy note patient not recovering to a functional state and  Further treatments may have more burden vs benefit.  Dr. Christy states that his overall prognosis is \"poor\".     Updated: NA    Plan: Continue to reach out to family for family meeting to discuss disease progression, treatment options or lack there of and possible discharge plan.    Thank you for allowing Palliative Care to participate in this patient's care. Please feel free to call [phone] with any questions or concerns.  "

## 2017-10-19 NOTE — PROGRESS NOTES
Unable to assess pt.'s level of orientation.  Kiswahili speaking staff report that the pt. Is still intermittently confused.  Pt. Moves all extremeties,  Denies numbness and tingling  Denies pain  18 ga in RAC Patent, site CDI  Bed alarm on  SCD's refused, Lovenox given  Treaded socks in place  Call light within reach

## 2017-10-20 PROBLEM — I26.99 PULMONARY EMBOLISM (HCC): Chronic | Status: ACTIVE | Noted: 2017-01-01

## 2017-10-20 NOTE — PROGRESS NOTES
Internal Medicine Interval Note  Note Author: Umer Hein M.D.     Name Dale Rojas     1960   Age/Sex 56 y.o. male   MRN [id number]   Code Status FULL CODE     After 5PM or if no immediate response to page, please call for cross-coverage  Attending/Team: Dr. Buckley/ Joe See Patient List for primary contact information  Call [phone] to page    1st Call - Day Intern (R1):   Dr. Mon 2nd Call - Day Sr. Resident (R2/R3):   Dr. Nunez         Reason for interval visit  (Principal Problem)   Encephalopathy acute    Interval Problem Daily Status Update  (24 hours)   More awake alert, but difficult to understand  Failed swallow eval, still needs cortrack w/ feeds  Improved w/ AEDs  Oncology rec'd f/u as outpatient in 2 weeks after discharge, no need for anticoagulation given risk of bleeding, and fact that pt already has ivc filter in place, plus mri w/ cortical hemorrhage which can contribute to further development of seizures.       Review of Systems   Unable to perform ROS: Patient nonverbal       Consultants/Specialty  Oncology  Neurology  Infectious Disease  Palliative Care    Disposition  Inpatient for evaluation and treatment of confusion, weakness, aphasia    Quality Measures    Reviewed items::  EKG reviewed, Labs reviewed, Medications reviewed and Radiology images reviewed  Mesa catheter::  No Mesa  DVT prophylaxis pharmacological::  Enoxaparin (Lovenox)  DVT prophylaxis - mechanical:  SCDs  Ulcer Prophylaxis::  Not indicated  Antibiotics:  Treating active infection/contamination beyond 24 hours perioperative coverage          Physical Exam       Vitals:    10/19/17 1955 10/20/17 0000 10/20/17 0400 10/20/17 0800   BP: 109/71 133/81 141/93 130/86   Pulse: 77 77 73 88   Resp: 16 16 16 16   Temp: 36.2 °C (97.1 °F) 36.2 °C (97.2 °F) 36.2 °C (97.1 °F) 36.2 °C (97.1 °F)   SpO2: 98% 98% 97% 95%   Weight:       Height:         Body mass index is 37.16 kg/m².    Oxygen  Therapy:  Pulse Oximetry: 95 %, O2 (LPM): 2, O2 Delivery: Nasal Cannula    Physical Exam   Constitutional: He is well-developed, well-nourished, and in no distress.   HENT:   Head: Normocephalic and atraumatic.   Eyes: Conjunctivae are normal. Pupils are equal, round, and reactive to light.   Neck: Normal range of motion. Neck supple.   Cardiovascular: Normal rate, regular rhythm, normal heart sounds and intact distal pulses.    Pulmonary/Chest: Effort normal and breath sounds normal.   Abdominal: Soft. Bowel sounds are normal. He exhibits distension (decreased). There is no tenderness.   Musculoskeletal: He exhibits edema (1+ pitting edema to ankles, improved).   Neurological: He is alert. He displays weakness (improved to 4+/5 strength ) and abnormal speech (some aphasia, some halting speech, improving). He displays no Babinski's sign on the right side. He displays no Babinski's sign on the left side.   Follows commands, and anticipates exam   Skin: Skin is warm and dry.         Lab Data Review:       Recent Labs      10/18/17   0239  10/19/17   0414  10/20/17   0345   SODIUM  137  138  136   POTASSIUM  3.5*  3.4*  4.2   CHLORIDE  98  104  105   CO2  29  26  21   BUN  13  16  15   CREATININE  0.32*  0.35*  0.32*   MAGNESIUM  2.2  2.4  2.5   PHOSPHORUS  1.7*  2.3*  2.3*   CALCIUM  8.6  8.7  8.7       Recent Labs      10/18/17   0239  10/19/17   0414  10/20/17   0345   ALTSGPT  36  60*  73*   ASTSGOT  31  48*  48*   ALKPHOSPHAT  107*  97  103*   TBILIRUBIN  0.3  0.3  0.3   GLUCOSE  239*  225*  139*       Recent Labs      10/18/17   0239  10/19/17   0414  10/20/17   0345   RBC  4.34*  4.23*  4.53*   HEMOGLOBIN  12.4*  12.1*  12.8*   HEMATOCRIT  37.7*  37.4*  39.8*   PLATELETCT  256  257  275       Recent Labs      10/18/17   0239  10/19/17   0414  10/20/17   0345   WBC  6.1  7.1  6.8   NEUTSPOLYS  82.90*  82.40*  81.60*   LYMPHOCYTES  9.80*  7.80*  10.60*   MONOCYTES  5.60  7.90  5.60   EOSINOPHILS  0.00  0.00  0.00    BASOPHILS  0.20  0.30  0.10   ASTSGOT  31  48*  48*   ALTSGPT  36  60*  73*   ALKPHOSPHAT  107*  97  103*   TBILIRUBIN  0.3  0.3  0.3           Assessment/Plan     * Encephalopathy acute- (present on admission)   Assessment & Plan    2/2 seizure. Hx brain cancer with associated seizure disorder and fluctuating mentation, most likely cause. Initially we had a broad ddx: including but not limited to Meningitis a possibility, but less likely given improving mentation. Possible pneumonia given Hx with chemotherapy treatments, tachycardic, tachypneic, supplemental O2 requirement, CXR w LLL consolidation, procalcitonin 0.44. Also Hx multiple DVTs and PEs but is anticoagulated with a negative head CT. Encephalitis unlikely, given patient's alertness and ability to follow commands.   - Neurology/EEG agree w/ seizure disorder- on AEDs  - continue TF at goal  - Neurology following, appreciate recs  - continue Unasyn  - ID consulted, appreciate recs  - LP was not done due to anticoagulation, but now not needed since infection not considered likely.  - treat underlying conditions as noted  - seizure, aspiration, fall precautions        Chronic diastolic CHF (congestive heart failure) (CMS-HCC)- (present on admission)   Assessment & Plan    Anasarca, increased O2 requirements. Lasix held for the past week for chemotherapy, per family. Echo this admission with EF 60%, mild LV hypertrophy. BNP 77. 40 mg IV lasix given in ED with appropriate diuresis.   - daily IV lasix  - holding metoprolol for pressure support in the setting of sepsis  - supplemental O2 prn        Anaplastic astrocytoma of temporal lobe (CMS-HCC)- (present on admission)   Assessment & Plan    S/p partial resection at [place], XRT.  Now w/ recurrence. Seen by oncology Dr Osman (outpatient) and Dr Christy (inpatient)  - Was on Temozolamide chemotherapy. CT head without evidence of acute hemorrhage or mass effect.  - MRI consistent w/ diagnosis of diffuse  infiltrating anaplastic astrocytoma. Seems to not have progressed significantly compared to prior.  - consulted Oncology, appreciate recommendations  - holding chemotherapy for now  - palliative care to hold goals of care discussion        Electrolyte disturbance- (present on admission)   Assessment & Plan    Hyponatremia, hypokalemia, hypophosphatemia. Likely 2/2 fluid overload, possibly a chemotherapy component.  - replace electrolytes prn  - diuresis as noted  - monitor daily        Recurrent pulmonary embolism (CMS-HCC)- (present on admission)   Assessment & Plan    With associated DVTs. Most recent CT chest 6/22/17 with bilateral PEs. Therapeutic on warfarin on admission. MRI-safe IVC filter present.  - continue lovenox while NPO  - duplex BLE, IVC filter        Steroid-induced diabetes mellitus (CMS-HCC)- (present on admission)   Assessment & Plan    Glucose low normal since admission. Pt family reports daily insulin but report none given since day prior to admission and pt unable to corroborate. Decadron discontinued on 10/10/17.  - POC glucose TID AC and HS  - SSI  - monitor for additional insulin requirements        Chronic pain of right knee- (present on admission)   Assessment & Plan    With associated back pain 2/2 multiple falls in past year. Takes Norco at home.  - continue home Norco  - low dose dilaudid prn while NPO        Protein calorie malnutrition (CMS-HCC)- (present on admission)   Assessment & Plan    Prealbumin 8. Likely aspiration risk due to mental status. Failed swallow eval. Cortrak placed and TF started.  - titrate TF to goal  - re-attempt swallow eval in the setting of improved mentation        Pulmonary embolism (CMS-[facility])   Assessment & Plan    Hx w/ hx of PE/DVT, s/p IVC filter. Was on NOAC at the time of admission, then was on full dose lovenox for few days. We spoke to oncology Dr Christy:  It was felt that there is no strong need for anticoagulation given risk of bleeding, and fact  that pt already has ivc filter in place, plus mri w/ cortical hemorrhage which can contribute to further development of seizures and risk of ICH.            Seizure disorder (CMS-HCC)- (present on admission)   Assessment & Plan    - as above  - Recent onset 2/2 brain cancer. Encephalopathy with variable mentation concerning for atonic seizure.  - Neurology following, appreciate recs  - IV ativan prn for seizure or agitation  - continue decadron, AEDs        Sepsis (CMS-HCC)- (present on admission)   Assessment & Plan    Resolved.  This is sepsis (without associated acute organ dysfunction).   SIRS on admission: , RR 22  Source: suspected encephalopathy vs meningitis vs pneumonia given immunocompromise, tachycardia, tachypnea, supplemental O2 requirement  Lactate: 1.8  Procalcitonin: 0.44  - IVF resuscitation contraindicated in the setting of acute on chronic heart failure and fluid overload  - continue IV abx as noted  - LP contraindicated due to necessary anticoagulation  - duoneb, supplemental O2 prn  - blood, sputum Cx w no growth        Obesity (BMI 30-39.9)- (present on admission)   Assessment & Plan    BMI 37.44. Recent steroids associated with brain cancer treatment.  - will  on discharge

## 2017-10-20 NOTE — NON-PROVIDER
Internal Medicine Medical Student Note  Note Author: Cornelia Monk, Student    Name Dale Rojas     1960   Age/Sex 56 y.o. male   MRN [id number]   Code Status      After 5PM or if no immediate response to page, please call for cross-coverage  Attending/Team:  See Patient List for primary contact information  Call [phone] to page after hours   1st Call - Day Intern (R1):    2nd Call - Day Sr. Resident (R2/R3):        Reason for interval visit  (Principal Problem)   Encephalopathy acute    Interval Problem Daily Status Update  (24 hours)   Dale Dorado is a 57 y/o  male with a h/o anaplastic astrocytoma of temporal lobe with partial resection 2017, HTN, T2DM 2/2 chronic steroid use, hx of PE and DVT s/p IVC filter 2017, who was brought in to the ED via EMS for confusion.      HD #6 No acute events overnight. Patient very chatty this morning although speech is still largely incomprehensible. Patient is saying more words in English. Daughter at bedside this morning and reports no concerns. Patient shows improved swallowing. Patient having normal BM.     Review of Systems   Unable to perform ROS: Patient nonverbal     Physical Exam     Vitals:    10/19/17 1955 10/20/17 0000 10/20/17 0400 10/20/17 0800   BP: 109/71 133/81 141/93 130/86   Pulse: 77 77 73 88   Resp: 16 16 16 16   Temp: 36.2 °C (97.1 °F) 36.2 °C (97.2 °F) 36.2 °C (97.1 °F) 36.2 °C (97.1 °F)   SpO2: 98% 98% 97% 95%   Weight:       Height:         Body mass index is 37.16 kg/m².    Oxygen Therapy:  Pulse Oximetry: 95 %, O2 (LPM): 2, O2 Delivery: Nasal Cannula    Physical Exam   Constitutional: He is well-developed, well-nourished, and in no distress.   HENT:   Head: Normocephalic and atraumatic.   Eyes: EOM are normal. Pupils are equal, round, and reactive to light.   Neck: Normal range of motion.   Cardiovascular: Normal rate, regular rhythm, normal heart sounds and intact distal pulses.     Pulmonary/Chest: Effort normal and breath sounds normal. No respiratory distress.   Abdominal: Soft. He exhibits distension.   Large body habitus    Musculoskeletal: He exhibits edema.   1+ pitting edema b/l on LE, improving    Neurological: He is alert. He has normal strength. He displays abnormal speech.   Patient speaking more coherent words, both in English and Indonesian. Patient follows and anticipates commands.   Skin: Skin is warm and dry.     Assessment/Plan     #Acute encephalopathy, secondary to seizure activity   Patient acutely presented with mumbling, confusion and lethargy. Patient follows simple commands but mentation fluctuates and speech incoherent. Patient has intermittent seizures given by mouthing and starring as reported by family and nursing. Patient hx positive for seizure disorder secondary to partial resection of anaplastic astrocytoma. EEG consistent with active focal seizure from L central region. Patient meets criteria of sepsis on admission with source of PNA vs encephalitis, potentially a nidus for AMS but less likely as patient can follow commands.   Plan   - Neurology following, EEG and MRI in support of seizure activity   - Continue Unasyn 3g IV q6h empirically   - Switch to PO Keppra 1500 mg as PO tolerated   - Continue Zonisamide 200 mg PO daily for seizure prophylaxis  - Continue to taper steriod, Decadron 4mg q12h for cerebral edema   - IV ativan PRN for seizure or agitation   - Seizure, aspiration, fall precautions   - Per speech, FEES study ordered in hopes to remove Cortrak.tomorrow      #Anaplastic astrocytoma of temporal lobe   Partial resection at [place] in 1/2017. Current chemotherapy Temozolomide, stopped on day prior to admission. CT head shows no evidence of acute hemorrhage or new mass outside tumor. Vasogenic edema noted.   Plan  - Consult oncology for next steps in treatment planning  - Holding chemotherapy   - Reschedule family care plan meeting to discuss future  plan of care     #Sepsis, secondary to PNA vs encephalitis   Patient meets sepsis on admission: SIRS (, RR 22 and requiring supplemental O2) with presumed source of encephalitis vs PNA. Patient immunocompromised given chemo. Elevated procalcitonin with lower lung consolidation indicate underlying PNA, although patient is fluid overloaded with pulmonary edema which can present similarly. Patient's fluctuating mental status points to an encephalitis.   Plan  - Continue Unasyn 3g IV q6h empirically   - Re-order CXR to assess pulmonary changes   - Sputum, blood Cx pending, no growth yet   - Supplemental O2 prn, continue continuous pulse ox

## 2017-10-20 NOTE — CARE PLAN
Problem: Venous Thromboembolism (VTW)/Deep Vein Thrombosis (DVT) Prevention:  Goal: Patient will participate in Venous Thrombosis (VTE)/Deep Vein Thrombosis (DVT)Prevention Measures    Intervention: Ensure patient wears graduated elastic stockings (KRYSTEN hose) and/or SCDs, if ordered, when in bed or chair (Remove at least once per shift for skin check)  Pt. Is receiving Lovenox      Problem: Pain Management  Goal: Pain level will decrease to patient's comfort goal    Intervention: Follow pain managment plan developed in collaboration with patient and Interdisciplinary Team  Pt. Declines pain at this time.

## 2017-10-20 NOTE — PROGRESS NOTES
Unable to assess pt.'s level of orientation  Pt. Exhibits expressive and receptive aphasia  Pt. Moves all extremeties,  Denies numbness and tingling  Denies pain  20 ga in LW Patent, site CDI  Bed alarm on  Treaded socks in place  Call light within reach  Family present at bedside.

## 2017-10-20 NOTE — ASSESSMENT & PLAN NOTE
Hx of recurrent PE/DVT, s/p IVC filter. Was on NOAC at the time of admission, then was on full dose lovenox for few days. We spoke to oncology Dr Christy:  It was felt that there is no strong need for anticoagulation given risk of bleeding, and fact that pt already has ivc filter in place, plus mri w/ cortical hemorrhage which can contribute to further development of seizures and risk of ICH.  - continue DVT ppx only for now

## 2017-10-20 NOTE — DOCUMENTATION QUERY
DOCUMENTATION QUERY    PROVIDERS: Please select “Cosign w/ note”to reply to query.    To better represent the severity of illness of your patient, please review the following information and exercise your independent professional judgment in responding to this query. Please reference the information at the bottom of this query for clinical criteria to support malnutrition.    10/19/17 progress note documents protein calorie malnutrition without specified degree.       Based upon the clinical findings, risk factors, and treatment, can this diagnosis of malnutrition  be further specified?     • Mild Protein Calorie Malnutrition  • Moderate Protein Calorie Malnutrition  • Severe Protein Calorie Malnutrition  • Other explanation of clinical findings  • Unable to determine      The medical record reflects the following:   Clinical Findings Documented malnutrition   Albumin 2.8 - 3.1  Pre albumin 8.0  Documented likely aspiration risk due to mental status.  Failed swallow eval.   Treatment Registered dietician consult  Goal: Nutrition support tolerated and meeting greater than 85% of estimated needs  Outcome: NOT MET  TF assessment   Swallow evaluation   Start Replete with Fiber @ 25 ml/hr and advance per protocol to 75 ml/hr (goal rate) to provide 1800 kcal (18 kcal/kg), 115 grams protein (1.1 gm/kg, 1.9 gm/kg IBW), and 1494 ml free water per day  co  Weekly weights to monitor fluid and nutrition status  Titrate TF to goal   Re attempt swallow evaluation in the setting of improved mentation    Risk Factors Sepsis  Malignant neoplasm of temporal lobe  Pulmonary embolism  Encephalopathy   Hyponatremia   Pneumonia  Epilepsy   Obesity   Long term us of anticoagulants    Location within medical record Progress Notes, Lab Results and registered dietician notes      Thank you,   Trang Colon RN  Clinical   [phone]

## 2017-10-20 NOTE — CARE PLAN
Problem: Respiratory:  Goal: Respiratory status will improve  2L oxygen via nasal cannula.   in use.      Problem: Skin Integrity  Goal: Risk for impaired skin integrity will decrease  Q2 turns, incontinence, red spots are blanchable.

## 2017-10-20 NOTE — THERAPY
"Speech Language Therapy FEES completed.  Functional Status: FEES procedure explained to patient in English and Japanese and patient agreed to proceed. Patient tolerated procedure well, but needed consistent cues to stop talking during the evaluation.  Patient continues to present with expressive and receptive aphasia as well as apraxia which made it very difficult for him to follow commands or follow through with any compensatory swallowing strategies.  Presentation of PO consisted of ice, nectars, thin liquids and soft solids. Patient is presenting with oropharyngeal dysphagia as evidenced in impaired base of tongue retraction, impaired laryngeal elevation and impaired pharyngeal constriction.  Patient with JB SILENT PENETRATION AND ASPIRATION BEFORE AND AFTER THE SWALLOW on thin liquids.  On all other textures, he had some level of SILENT PENETRATION before the swallow with SILENT PENETRATION AND INCONSISTENT SILENT ASPIRATION after the swallow from residue in the lateral channels, pyriform sinuses and at the level of the UES.  At times, there was what appeared a backflow of material that came up through the UES leading to MINIMAL AMOUNTS OF SILENT penetration/aspiration.  At this time, would recommend continuation of NPO with cortrak.  Given the fact that overall alertness has improved over the past few days, would like to follow for aggressive SLP intervention to maximize swallow function and hopefully prevent a more permanent source of artificial nutrition. SLP will follow for aphasia, apraxia and swallowing therapy.    Recommendations - Diet:  NPO with continuation of cortrak, Pre-Feeding Trials with SLP Only                          Medication Administration: via cortrak  Plan of Care: Will benefit from Speech Therapy 5 times per week  Post-Acute Therapy: Discharge to a transitional care facility for continued skilled therapy services.    See \"Rehab Therapy-Acute\" Patient Summary Report for complete " documentation.

## 2017-10-20 NOTE — PROGRESS NOTES
Monitor Summary: SR-ST , WV 0.16, QRS 0.10, QT 0.40 with one tachycardic episode to 150 per strip from monitor room.

## 2017-10-20 NOTE — PROGRESS NOTES
Received report and assumed pt care.  Pt Chadian-speaking and asphasic.  Bed alarm on.  Cortrak and bilateral soft wrist restraints in place.  No s/s of distress or pain.  Family at bedside.

## 2017-10-20 NOTE — PROGRESS NOTES
Internal Medicine Interval Note  Note Author: Margy Mon M.D.     Name Dale Rojas     1960   Age/Sex 56 y.o. male   MRN [id number]   Code Status FULL CODE     After 5PM or if no immediate response to page, please call for cross-coverage  Attending/Team: Dr. Buckley/ Joe See Patient List for primary contact information  Call [phone] to page    1st Call - Day Intern (R1):   Dr. Mon 2nd Call - Day Sr. Resident (R2/R3):   Dr. Nunez         Reason for interval visit  (Principal Problem)   Encephalopathy acute    Interval Problem Daily Status Update  (24 hours)   Occasional agitation at night requiring restraints. Pt improving in mental status and strength. Tolerating TF at goal. Sometimes short intelligible sentences in Czech.      Review of Systems   Unable to perform ROS: Patient nonverbal       Consultants/Specialty  Oncology  Neurology  Infectious Disease  Palliative Care    Disposition  Inpatient for evaluation and treatment of confusion, weakness, aphasia    Quality Measures    Reviewed items::  EKG reviewed, Labs reviewed, Medications reviewed and Radiology images reviewed  Mesa catheter::  No Mesa  DVT prophylaxis pharmacological::  Enoxaparin (Lovenox)  DVT prophylaxis - mechanical:  SCDs  Ulcer Prophylaxis::  Not indicated  Antibiotics:  Treating active infection/contamination beyond 24 hours perioperative coverage          Physical Exam       Vitals:    10/19/17 0000 10/19/17 0400 10/19/17 0800 10/19/17 1536   BP: 133/87 135/85 148/87 126/85   Pulse: 73 96 88 74   Resp: 16 16 16 16   Temp: 36.2 °C (97.2 °F) 36.3 °C (97.3 °F) 36 °C (96.8 °F) 36 °C (96.8 °F)   SpO2: 98% 98% 98% 94%   Weight:       Height:         Body mass index is 37.16 kg/m².    Oxygen Therapy:  Pulse Oximetry: 94 %, O2 (LPM): 2, O2 Delivery: Nasal Cannula    Physical Exam   Constitutional: He is well-developed, well-nourished, and in no distress.   HENT:   Head: Normocephalic and  atraumatic.   Eyes: Conjunctivae are normal. Pupils are equal, round, and reactive to light.   Neck: Normal range of motion. Neck supple.   Cardiovascular: Normal rate, regular rhythm, normal heart sounds and intact distal pulses.    Pulmonary/Chest: Effort normal and breath sounds normal.   Abdominal: Soft. Bowel sounds are normal. He exhibits distension (decreased). There is no tenderness.   Musculoskeletal: He exhibits edema (1+ pitting edema to ankles, improved).   Neurological: He is alert. He displays weakness (improved to 4+/5 strength ) and abnormal speech (some aphasia, some halting speech, improving). He displays no Babinski's sign on the right side. He displays no Babinski's sign on the left side.   Follows commands, and anticipates exam   Skin: Skin is warm and dry.         Lab Data Review:       Recent Labs      10/17/17   0235  10/18/17   0239  10/19/17   0414   SODIUM  132*  137  138   POTASSIUM  4.0  3.5*  3.4*   CHLORIDE  96  98  104   CO2  30  29  26   BUN  6*  13  16   CREATININE  0.32*  0.32*  0.35*   MAGNESIUM  2.0  2.2  2.4   PHOSPHORUS  3.2  1.7*  2.3*   CALCIUM  8.6  8.6  8.7       Recent Labs      10/17/17   0235  10/18/17   0239  10/19/17   0414   ALTSGPT  31  36  60*   ASTSGOT  20  31  48*   ALKPHOSPHAT  107*  107*  97   TBILIRUBIN  0.7  0.3  0.3   GLUCOSE  137*  239*  225*       Recent Labs      10/17/17   0235  10/18/17   0239  10/19/17   0414   RBC  4.52*  4.34*  4.23*   HEMOGLOBIN  12.8*  12.4*  12.1*   HEMATOCRIT  39.6*  37.7*  37.4*   PLATELETCT  213  256  257       Recent Labs      10/17/17   0235  10/18/17   0239  10/19/17   0414   WBC  4.1*  6.1  7.1   NEUTSPOLYS  86.30*  82.90*  82.40*   LYMPHOCYTES  9.00*  9.80*  7.80*   MONOCYTES  2.70  5.60  7.90   EOSINOPHILS  0.00  0.00  0.00   BASOPHILS  0.50  0.20  0.30   ASTSGOT  20  31  48*   ALTSGPT  31  36  60*   ALKPHOSPHAT  107*  107*  97   TBILIRUBIN  0.7  0.3  0.3           Assessment/Plan     * Encephalopathy acute- (present on  admission)   Assessment & Plan    2/2 seizure. Hx brain cancer with associated seizure disorder and fluctuating mentation, most likely cause. Meningitis a possibility, but less likely given improving mentation. Possible pneumonia given Hx with chemotherapy treatments, tachycardic, tachypneic, supplemental O2 requirement, CXR w LLL consolidation, procalcitonin 0.44. Also Hx multiple DVTs and PEs but is anticoagulated with a negative head CT. Encephalitis unlikely, given patient's alertness and ability to follow commands.   - continue TF at goal  - Neurology following, appreciate recs  - continue Unasyn  - ID consulted, appreciate recs  - LP contraindicated due to continued anticoagulation  - treat underlying conditions as noted  - blood, urine, sputum Cx pending  - seizure, aspiration, fall precautions        Chronic diastolic CHF (congestive heart failure) (CMS-HCC)- (present on admission)   Assessment & Plan    Anasarca, increased O2 requirements. Lasix held for the past week for chemotherapy, per family. Echo this admission with EF 60%, mild LV hypertrophy. BNP 77. 40 mg IV lasix given in ED with appropriate diuresis.   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 Current Temozolamide chemotherapy. CT head without evidence of acute hemorrhage or mass effect.  - consulted Oncology, appreciate recommendations  - holding chemotherapy for now  - palliative care to hold goals of care discussion        Electrolyte disturbance- (present on admission)   Assessment & Plan    Hyponatremia, hypokalemia, hypophosphatemia. Likely 2/2 fluid overload, possibly a chemotherapy component.  - replace electrolytes prn  - diuresis as noted  - monitor daily        Steroid-induced diabetes mellitus (CMS-HCC)- (present on admission)    Assessment & Plan    Glucose low normal since admission. Pt family reports daily insulin but report none given since day prior to admission and pt unable to corroborate. Decadron discontinued on 10/10/17.  - POC glucose TID AC and HS  - SSI  - monitor for additional insulin requirements        Chronic pain of right knee- (present on admission)   Assessment & Plan    With associated back pain 2/2 multiple falls in past year. Takes Norco at home.  - continue home Norco  - low dose dilaudid prn while NPO        Protein calorie malnutrition (CMS-HCC)- (present on admission)   Assessment & Plan    Prealbumin 8. Likely aspiration risk due to mental status. Failed swallow eval. Cortrak placed and TF started.  - titrate TF to goal  - re-attempt swallow eval in the setting of improved mentation        Recurrent pulmonary embolism (CMS-HCC)- (present on admission)   Assessment & Plan    With associated DVTs. Most recent CT chest 6/22/17 with bilateral PEs. Therapeutic on warfarin on admission. MRI-safe IVC filter present.  - continue lovenox while NPO  - duplex BLE, IVC filter        Seizure disorder (CMS-HCC)- (present on admission)   Assessment & Plan    Recent onset 2/2 brain cancer. Encephalopathy with variable mentation concerning for atonic seizure.  - Neurology following, appreciate recs  - IV ativan prn for seizure or agitation  - continue decadron        Sepsis (CMS-HCC)- (present on admission)   Assessment & Plan    Resolved.  This is sepsis (without associated acute organ dysfunction).   SIRS on admission: , RR 22  Source: suspected encephalopathy vs meningitis vs pneumonia given immunocompromise, tachycardia, tachypnea, supplemental O2 requirement  Lactate: 1.8  Procalcitonin: 0.44  - IVF resuscitation contraindicated in the setting of acute on chronic heart failure and fluid overload  - continue IV abx as noted  - LP contraindicated due to necessary anticoagulation  - duoneb, supplemental O2 prn  - blood,  sputum Cx w no growth        Obesity (BMI 30-39.9)- (present on admission)   Assessment & Plan    BMI 37.44. Recent steroids associated with brain cancer treatment.  - will  on discharge

## 2017-10-21 PROBLEM — E44.0 MODERATE PROTEIN-CALORIE MALNUTRITION (HCC): Status: ACTIVE | Noted: 2017-01-01

## 2017-10-21 NOTE — THERAPY
"57 y/o male adm for AMS/acute encephalopathy. pt with hx of anaplatic astrocytoma temporal lobe s/p resection, ble DVT and PE's with IVC filter placement.  Pt  presents with impaired command following thus impacting functional mobility OOB. Commands reinforced with tactile cues. Acute PT to address aforementioned problems for pt to achieve higher level of function.    Physical Therapy Evaluation completed.   Bed Mobility:  Supine to Sit: Moderate Assist  Transfers: Sit to Stand: Contact Guard Assist  Gait: Level Of Assist: Unable to Participate (due to command following deficits) with Front-Wheel Walker       Plan of Care: Will benefit from Physical Therapy 3 times per week  Discharge Recommendations: Equipment: Will Continue to Assess for Equipment Needs. Post-acute therapy Discharge to a transitional care facility for continued skilled therapy services.    See \"Rehab Therapy-Acute\" Patient Summary Report for complete documentation.     "

## 2017-10-21 NOTE — CARE PLAN
Problem: Pain Management  Goal: Pain level will decrease to patient's comfort goal    Intervention: Follow pain managment plan developed in collaboration with patient and Interdisciplinary Team  Pt. Declines pain at this time.       Problem: Skin Integrity  Goal: Risk for impaired skin integrity will decrease    Intervention: Assess risk factors for impaired skin integrity and/or pressure ulcers  2 RN skin check preformed r/t low Timo Score

## 2017-10-21 NOTE — PROGRESS NOTES
Internal Medicine Interval Note  Note Author: Margy Mon M.D.     Name Dale Rojas     1960   Age/Sex 56 y.o. male   MRN [id number]   Code Status FULL CODE     After 5PM or if no immediate response to page, please call for cross-coverage  Attending/Team: Dr. Buckley/ Joe See Patient List for primary contact information  Call [phone] to page    1st Call - Day Intern (R1):   Dr. Mon 2nd Call - Day Sr. Resident (R2/R3):   Dr. Nunez         Reason for interval visit  (Principal Problem)   Encephalopathy acute    Interval Problem Daily Status Update  (24 hours)   Continued intermittent confusion at night requiring restraints to avoid pulling of tubes. Pt with stable improvement of mental status and strength. Speaking phrases in Romansh. Tolerating TF at goal. Mesa removed yesterday and pt voided. No new concerns.      Review of Systems   Unable to perform ROS: Language       Consultants/Specialty  Oncology  Neurology  Infectious Disease  Palliative Care    Disposition  Inpatient for evaluation and treatment of confusion, weakness, aphasia    Quality Measures    Reviewed items::  EKG reviewed, Labs reviewed, Medications reviewed and Radiology images reviewed  Mesa catheter::  No Mesa  DVT prophylaxis pharmacological::  Enoxaparin (Lovenox)  DVT prophylaxis - mechanical:  SCDs  Ulcer Prophylaxis::  Not indicated  Antibiotics:  Treating active infection/contamination beyond 24 hours perioperative coverage          Physical Exam       Vitals:    10/20/17 1600 10/20/17 1948 10/21/17 0000 10/21/17 0400   BP: 138/96 150/95 140/91 126/79   Pulse: 80 74 71 72   Resp: 14 16 16 16   Temp: 36.3 °C (97.4 °F) 36.2 °C (97.1 °F) 36.1 °C (96.9 °F) 36.2 °C (97.1 °F)   SpO2: 95% 95% 96% 97%   Weight:       Height:         Body mass index is 37.16 kg/m².    Oxygen Therapy:  Pulse Oximetry: 97 %, O2 (LPM): 0, O2 Delivery: None (Room Air)    Physical Exam   Constitutional: He is  well-developed, well-nourished, and in no distress.   HENT:   Head: Normocephalic and atraumatic.   Eyes: Conjunctivae are normal. Pupils are equal, round, and reactive to light.   Neck: Normal range of motion. Neck supple.   Cardiovascular: Normal rate, regular rhythm, normal heart sounds and intact distal pulses.    Pulmonary/Chest: Effort normal and breath sounds normal.   Abdominal: Soft. Bowel sounds are normal. He exhibits no distension. There is no tenderness.   Musculoskeletal: He exhibits edema (1+ pitting edema to ankles, improved).   Neurological: He is alert. He displays weakness (improved to 4+/5 strength ) and abnormal speech (some aphasia, some halting speech, improving). He displays no Babinski's sign on the right side. He displays no Babinski's sign on the left side.   Follows commands, and anticipates exam   Skin: Skin is warm and dry.         Lab Data Review:       Recent Labs      10/19/17   0414  10/20/17   0345  10/21/17   0216   SODIUM  138  136  131*   POTASSIUM  3.4*  4.2  4.5   CHLORIDE  104  105  103   CO2  26  21  20   BUN  16  15  19   CREATININE  0.35*  0.32*  0.44*   MAGNESIUM  2.4  2.5  2.6*   PHOSPHORUS  2.3*  2.3*  3.2   CALCIUM  8.7  8.7  8.6       Recent Labs      10/19/17   0414  10/20/17   0345  10/21/17   0216   ALTSGPT  60*  73*  87*   ASTSGOT  48*  48*  53*   ALKPHOSPHAT  97  103*  102*   TBILIRUBIN  0.3  0.3  0.4   GLUCOSE  225*  139*  184*       Recent Labs      10/19/17   0414  10/20/17   0345  10/21/17   0216   RBC  4.23*  4.53*  4.61*   HEMOGLOBIN  12.1*  12.8*  13.2*   HEMATOCRIT  37.4*  39.8*  40.5*   PLATELETCT  257  275  282       Recent Labs      10/19/17   0414  10/20/17   0345  10/21/17   0216   WBC  7.1  6.8  6.0   NEUTSPOLYS  82.40*  81.60*  81.10*   LYMPHOCYTES  7.80*  10.60*  7.70*   MONOCYTES  7.90  5.60  6.20   EOSINOPHILS  0.00  0.00  0.00   BASOPHILS  0.30  0.10  0.30   ASTSGOT  48*  48*  53*   ALTSGPT  60*  73*  87*   ALKPHOSPHAT  97  103*  102*    TBILIRUBIN  0.3  0.3  0.4           Assessment/Plan     * Encephalopathy acute- (present on admission)   Assessment & Plan    2/2 seizure. Hx brain cancer with associated seizure disorder and fluctuating mentation, most likely cause. Initially we had a broad ddx: including but not limited to Meningitis a possibility, but less likely given improving mentation. Possible pneumonia given Hx with chemotherapy treatments, tachycardic, tachypneic, supplemental O2 requirement, CXR w LLL consolidation, procalcitonin 0.44. Also Hx multiple DVTs and PEs but is anticoagulated with a negative head CT. Encephalitis unlikely, given patient's alertness and ability to follow commands.   - Neurology/EEG agree w/ seizure disorder- on AEDs  - continue TF at goal  - Neurology following, appreciate recs  - continue Unasyn  - ID consulted, appreciate recs  - LP was not done due to anticoagulation, but now not needed since infection not considered likely.  - treat underlying conditions as noted  - seizure, aspiration, fall precautions        Chronic diastolic CHF (congestive heart failure) (CMS-HCC)- (present on admission)   Assessment & Plan    Anasarca, increased O2 requirements. Lasix held for the past week for chemotherapy, per family. Echo this admission with EF 60%, mild LV hypertrophy. BNP 77. 40 mg IV lasix given in ED with appropriate diuresis.   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  Now w/ recurrence. Seen by oncology Dr Osman (outpatient) and Dr Christy (inpatient)  - Was on Temozolamide chemotherapy. CT head without evidence of acute hemorrhage or mass effect.  - MRI consistent w/ diagnosis of diffuse infiltrating anaplastic astrocytoma. Seems to not have progressed significantly compared to prior.  - consulted  Oncology, appreciate recommendations  - holding chemotherapy for now  - palliative care to hold goals of care discussion        Electrolyte disturbance- (present on admission)   Assessment & Plan    Hyponatremia, hypokalemia, hypophosphatemia. Likely 2/2 fluid overload, possibly a chemotherapy component.  - replace electrolytes prn  - diuresis as noted  - monitor daily        Recurrent pulmonary embolism (CMS-HCC)- (present on admission)   Assessment & Plan    With associated DVTs. Most recent CT chest 6/22/17 with bilateral PEs. Therapeutic on warfarin on admission. MRI-safe IVC filter present.  - continue lovenox while NPO  - duplex BLE, IVC filter        Steroid-induced diabetes mellitus (CMS-HCC)- (present on admission)   Assessment & Plan    Glucose low normal since admission. Pt family reports daily insulin but report none given since day prior to admission and pt unable to corroborate. Decadron discontinued on 10/10/17.  - POC glucose TID AC and HS  - SSI  - monitor for additional insulin requirements        Chronic pain of right knee- (present on admission)   Assessment & Plan    With associated back pain 2/2 multiple falls in past year. Takes Norco at home.  - continue home Norco  - low dose dilaudid prn while NPO        Moderate protein-calorie malnutrition (CMS-[facility])- (present on admission)   Assessment & Plan    Prealbumin 8. Likely aspiration risk due to mental status. Failed swallow eval. Cortrak placed and TF started. Failed re-attempt of swallow eval.  - titrate TF to goal  - dispo to include long term care with speech/swallow therapy and feeding tube care        Pulmonary embolism (CMS-HCC)   Assessment & Plan    Hx w/ hx of PE/DVT, s/p IVC filter. Was on NOAC at the time of admission, then was on full dose lovenox for few days. We spoke to oncology Dr Christy:  It was felt that there is no strong need for anticoagulation given risk of bleeding, and fact that pt already has ivc filter in place, plus  mri w/ cortical hemorrhage which can contribute to further development of seizures and risk of ICH.  - continue DVT ppx only        Seizure disorder (CMS-HCC)- (present on admission)   Assessment & Plan    - as above  - Recent onset 2/2 brain cancer. Encephalopathy with variable mentation concerning for atonic seizure.  - Neurology following, appreciate recs  - IV ativan prn for seizure or agitation  - continue decadron, AEDs        Sepsis (CMS-[facility])- (present on admission)   Assessment & Plan    Resolved.  This is sepsis (without associated acute organ dysfunction).   SIRS on admission: , RR 22  Source: suspected encephalopathy vs meningitis vs pneumonia given immunocompromise, tachycardia, tachypnea, supplemental O2 requirement  Lactate: 1.8  Procalcitonin: 0.44  - IVF resuscitation contraindicated in the setting of acute on chronic heart failure and fluid overload  - continue IV abx as noted  - LP contraindicated due to necessary anticoagulation  - duoneb, supplemental O2 prn  - blood, sputum Cx w no growth        Obesity (BMI 30-39.9)- (present on admission)   Assessment & Plan    BMI 37.44. Recent steroids associated with brain cancer treatment.  - will  on discharge

## 2017-10-21 NOTE — CARE PLAN
Problem: Communication  Goal: The ability to communicate needs accurately and effectively will improve  Outcome: PROGRESSING SLOWER THAN EXPECTED  Patient does not make sense during assessment or orientation questions. Appears to acknowledge questions but begins to talk about something else, which still does not make sense. Patient laughing and in good spirits.     Problem: Fluid Volume:  Goal: Will maintain balanced intake and output  Outcome: PROGRESSING AS EXPECTED  Mesa catheter removed during day shift. Although incontinent, patient voiding fine. Tube feeding in place.

## 2017-10-21 NOTE — THERAPY
"Occupational Therapy Evaluation completed.   Functional Status:  Pt s/p acute encephalopathy, demonstrating significant decline with ADLs at this time. Pt present s with impaired balance, decreased cognition, decreased ability to follow 1-step commands, decreased motor planning, generalized weakness all limiting pt's safe participation with ADLs. Pt would benefit from acute skilled services while in house and post acute skilled therapy recommended prior to d/c home.   Plan of Care: Will benefit from Occupational Therapy 3 times per week  Discharge Recommendations:  Equipment: Will Continue to Assess for Equipment Needs. Post-acute therapy Discharge to a transitional care facility for continued skilled therapy services.    See \"Rehab Therapy-Acute\" Patient Summary Report for complete documentation.    "

## 2017-10-21 NOTE — PROGRESS NOTES
Patient resting in bed, no S/S of distress, daughter assisting in translation, still unable to assess orientation, patient not making sense when asked questions. No S/sx of SOB or pain. Bed alarm on, call light within reach, patient does not use call light. R and L soft wrist restraints in place. Bed in lowest position, bed locked, RN and CNA numbers provided, no further needs at this time. No changes from EPIC. Hourly rounding in place.

## 2017-10-21 NOTE — PROGRESS NOTES
Cortrak pulled out about 5cm by patient.  Advanced back to 73cm.  X-ray ordered to confirm placement.

## 2017-10-21 NOTE — PROGRESS NOTES
Unable to assess pt.'s level of orientation  Pt. Moves all extremeties,  Denies numbness and tingling  Denies pain  Pt. Up with 2 person assistance  20 ga in [place], site CDI  Bed alarm on  SCD's on  Treaded socks in place  Call light within reach  Verified restraints in place

## 2017-10-22 NOTE — PROGRESS NOTES
1600 - Dr. Mon paged to notify her that pt needs restraints order renewed.    1605 - Per Dr. Mon, RN to monitor how pt responds without restraints.     Restraints discontinued. RN will notify MD if pt needs restraints again based on assessment.

## 2017-10-22 NOTE — CARE PLAN
Problem: Communication  Goal: The ability to communicate needs accurately and effectively will improve    Intervention: Reorient patient to environment as needed  Continually reorienting pt to time, situation and place during hourly rounding. Pt is unable to verbalize understanding and remains confused. Pt has no questions or comments for staff at this time.       Problem: Safety  Goal: Will remain free from falls    Intervention: Assess risk factors for falls  Pt was educated on how and when to use the call light when needing assistance with mobility. Patient is very confused and unable to verbalize understanding and needs periodic reinforcement. Will continue to re-orient pt to situation and place during hourly rounding. All fall risk precautions in place.

## 2017-10-22 NOTE — PALLIATIVE CARE
Palliative Care follow-up  Palliative Care follow up visit with ANÍBAL Moore.  No improvement with patient and no plans noted for further chemo/radiation.  Dr. Christy' note stated that IF the patient functionally could improve it may be considered and he would collaboration with Dr. Osman    Attempt to contact family for meeting- No answer, no voicemail set up      Updated: BS RN and Palliative Care team    Plan: Attempt to meet with family for updates, questions and planning.     Thank you for allowing Palliative Care to participate in this patient's care. Please feel free to call [phone] with any questions or concerns.

## 2017-10-22 NOTE — PROGRESS NOTES
" called to assist communication with pt. However, per the , pt is not answering questions appropriately. For example, when asked what his name is, pt mumbles (according to the ) and keeps saying \"tomorrow.\" Pt also keeps saying \"tomorrow\" (according to ) when asked what date it is, where he is, if he knew why he is in the hospital, if he has any numbness or tingling, if he is in pain.    Pt does not appear to be in any type of pain or distress. Call light within reach.  "

## 2017-10-22 NOTE — PROGRESS NOTES
Patient is very confused and needs constant hourly rounding and reorientation to his situation and surroundings. All fall risk precautions in place. Patient has soft BUE wrist restraints (see documentation for observation on restraints per protocol). Patient has no questions or concerns for staff at this time.

## 2017-10-22 NOTE — PROGRESS NOTES
Internal Medicine Interval Note  Note Author: Margy Mon M.D.     Name Dale Rojas     1960   Age/Sex 56 y.o. male   MRN [id number]   Code Status FULL CODE     After 5PM or if no immediate response to page, please call for cross-coverage  Attending/Team: Dr. Buckley/ Joe See Patient List for primary contact information  Call [phone] to page    1st Call - Day Intern (R1):   Dr. Mon 2nd Call - Day Sr. Resident (R2/R3):   Dr. Nunez          Reason for interval visit  (Principal Problem)   Encephalopathy acute    Interval Problem Daily Status Update  (24 hours)   Stable improvement of mental status and strength. Azerbaijani words do not form meaningful phrases, per . Continued intermittent confusion. Tolerating TF at goal. Pt voiding spontaneously. No new concerns.  PT, OT, SLP all recommend discharge to post-acute skilled therapy.      Review of Systems   Unable to perform ROS: Language       Consultants/Specialty  Oncology  Neurology  Infectious Disease  Palliative Care    Disposition  Inpatient for evaluation and treatment of confusion, weakness, aphasia    Quality Measures    Reviewed items::  EKG reviewed, Labs reviewed, Medications reviewed and Radiology images reviewed  Mesa catheter::  No Mesa  DVT prophylaxis pharmacological::  Enoxaparin (Lovenox)  DVT prophylaxis - mechanical:  SCDs  Ulcer Prophylaxis::  Not indicated  Antibiotics:  Treating active infection/contamination beyond 24 hours perioperative coverage          Physical Exam       Vitals:    10/21/17 1600 10/21/17 1900 10/22/17 0400 10/22/17 0800   BP: 129/95 117/77 122/70 136/96   Pulse: 67 75 72 95   Resp: 16 16 16 16   Temp: 35.8 °C (96.5 °F) 36.1 °C (96.9 °F) 36.2 °C (97.2 °F) 36.9 °C (98.4 °F)   SpO2: 95% 96% 95% 96%   Weight:       Height:         Body mass index is 37.16 kg/m².    Oxygen Therapy:  Pulse Oximetry: 96 %, O2 (LPM): 0, O2 Delivery: None (Room Air)    Physical Exam    Constitutional: He is well-developed, well-nourished, and in no distress.   HENT:   Head: Normocephalic and atraumatic.   Eyes: Conjunctivae are normal. Pupils are equal, round, and reactive to light.   Neck: Normal range of motion. Neck supple.   Cardiovascular: Normal rate, regular rhythm, normal heart sounds and intact distal pulses.    Pulmonary/Chest: Effort normal and breath sounds normal.   Abdominal: Soft. Bowel sounds are normal. He exhibits no distension. There is no tenderness.   Musculoskeletal: He exhibits edema (1+ pitting edema to ankles, improved).   Neurological: He is alert. He displays weakness (improved to 4+/5 strength ) and abnormal speech (some aphasia, some halting speech, improving). He displays no Babinski's sign on the right side. He displays no Babinski's sign on the left side.   Follows commands, and anticipates exam   Skin: Skin is warm and dry.         Lab Data Review:       Recent Labs      10/20/17   0345  10/21/17   0216  10/22/17   0416   SODIUM  136  131*  135   POTASSIUM  4.2  4.5  3.7   CHLORIDE  105  103  105   CO2  21  20  20   BUN  15  19  21   CREATININE  0.32*  0.44*  0.37*   MAGNESIUM  2.5  2.6*  2.5   PHOSPHORUS  2.3*  3.2  3.3   CALCIUM  8.7  8.6  8.8       Recent Labs      10/20/17   0345  10/21/17   0216  10/22/17   0416   ALTSGPT  73*  87*  99*   ASTSGOT  48*  53*  31   ALKPHOSPHAT  103*  102*  103*   TBILIRUBIN  0.3  0.4  0.3   GLUCOSE  139*  184*  212*       Recent Labs      10/20/17   0345  10/21/17   0216  10/22/17   0416   RBC  4.53*  4.61*  4.90   HEMOGLOBIN  12.8*  13.2*  14.0   HEMATOCRIT  39.8*  40.5*  43.1   PLATELETCT  275  282  327       Recent Labs      10/20/17   0345  10/21/17   0216  10/22/17   0416   WBC  6.8  6.0  6.6   NEUTSPOLYS  81.60*  81.10*  84.20*   LYMPHOCYTES  10.60*  7.70*  8.60*   MONOCYTES  5.60  6.20  3.90   EOSINOPHILS  0.00  0.00  0.00   BASOPHILS  0.10  0.30  0.30   ASTSGOT  48*  53*  31   ALTSGPT  73*  87*  99*   ALKPHOSPHAT  103*   102*  103*   TBILIRUBIN  0.3  0.4  0.3           Assessment/Plan     * Encephalopathy acute- (present on admission)   Assessment & Plan    2/2 seizure. Hx brain cancer with associated seizure disorder and fluctuating mentation, most likely cause. Initially we had a broad ddx: including but not limited to Meningitis a possibility, but less likely given improving mentation. Possible pneumonia given Hx with chemotherapy treatments, tachycardic, tachypneic, supplemental O2 requirement, CXR w LLL consolidation, procalcitonin 0.44. Also Hx multiple DVTs and PEs but is anticoagulated with a negative head CT. Encephalitis unlikely, given patient's alertness and ability to follow commands. Neurology/EEG consistent w/ seizure disorder- on AEDs.  - continue TF at goal  - Neurology following, appreciate recs  - continue Unasyn  - ID consulted, appreciate recs  - LP was not done due to anticoagulation, but now not needed since infection unlikely  - treat underlying conditions as noted  - seizure, aspiration, fall precautions        Chronic diastolic CHF (congestive heart failure) (CMS-HCC)- (present on admission)   Assessment & Plan    Anasarca, increased O2 requirements. Lasix held for the past week for chemotherapy, per family. Echo this admission with EF 60%, mild LV hypertrophy. BNP 77. 40 mg IV lasix given in ED with appropriate diuresis.   - daily IV lasix  - holding metoprolol for pressure support  - supplemental O2 prn        Seizure disorder (CMS-HCC)- (present on admission)   Assessment & Plan    - as above  - Recent onset 2/2 brain cancer. Encephalopathy with variable mentation concerning for atonic seizure.  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  Now w/ recurrence. Seen by oncology Dr Osman (outpatient)  and Dr Christy (inpatient). Was on Temozolamide chemotherapy. CT head without evidence of acute hemorrhage or mass effect. MRI consistent w/ diagnosis of diffuse infiltrating anaplastic astrocytoma. Seems to not have progressed significantly compared to prior.  - consulted Oncology, appreciate recommendations  - holding chemotherapy for now  - palliative care to hold goals of care discussion        Electrolyte disturbance- (present on admission)   Assessment & Plan    Hyponatremia, hypokalemia, hypophosphatemia. Likely 2/2 fluid overload, possibly a chemotherapy component.  - replace electrolytes prn  - diuresis as noted  - monitor daily        Recurrent pulmonary embolism (CMS-HCC)- (present on admission)   Assessment & Plan    With associated DVTs. Most recent CT chest 6/22/17 with bilateral PEs. Therapeutic on warfarin on admission. MRI-safe IVC filter present. Likely hypercoagulable 2/2 malignancy. No current lower extremity DVT per duplex.  - continue ppx lovenox        Steroid-induced diabetes mellitus (CMS-HCC)- (present on admission)   Assessment & Plan    Glucose low normal since admission. Pt family reports daily insulin but report none given since day prior to admission and pt unable to corroborate. Decadron discontinued on 10/10/17.  - POC glucose TID AC and HS  - SSI  - monitor for additional insulin requirements        Chronic pain of right knee- (present on admission)   Assessment & Plan    With associated back pain 2/2 multiple falls in past year. Takes Norco at home.  - continue home Norco  - low dose dilaudid prn while NPO        Moderate protein-calorie malnutrition (CMS-HCC)- (present on admission)   Assessment & Plan    Prealbumin 8. Likely aspiration risk due to mental status. Failed swallow eval. Cortrak placed and TF started. Failed re-attempt of swallow eval.  - titrate TF to goal  - dispo to include long term care with speech/swallow therapy and feeding tube care        Pulmonary embolism  (CMS-HCC)- (present on admission)   Assessment & Plan    Hx of recurrent PE/DVT, s/p IVC filter. Was on NOAC at the time of admission, then was on full dose lovenox for few days. We spoke to oncology Dr Christy:  It was felt that there is no strong need for anticoagulation given risk of bleeding, and fact that pt already has ivc filter in place, plus mri w/ cortical hemorrhage which can contribute to further development of seizures and risk of ICH.  - continue DVT ppx only        Sepsis ([facility])- (present on admission)   Assessment & Plan    Resolved.  This is sepsis (without associated acute organ dysfunction).   SIRS on admission: , RR 22  Source: suspected encephalopathy vs meningitis vs pneumonia given immunocompromise, tachycardia, tachypnea, supplemental O2 requirement  Lactate: 1.8  Procalcitonin: 0.44  - IVF resuscitation contraindicated in the setting of acute on chronic heart failure and fluid overload  - continue IV abx as noted  - LP contraindicated due to necessary anticoagulation  - duoneb, supplemental O2 prn  - blood, sputum Cx w no growth        Obesity (BMI 30-39.9)- (present on admission)   Assessment & Plan    BMI 37.44. Recent steroids associated with brain cancer treatment.  - will  on discharge

## 2017-10-22 NOTE — PROGRESS NOTES
Monitor notified RN that pt's HR has gone up to 130-140s. RN checked pt, reassured pt and HR went down to high 90s.

## 2017-10-23 NOTE — DIETARY
WEEKLY TF UPDATE - Day 9 of admit. TF started 10/16 2' to failed SLP evaluation. Most recent SLP visit from 10/20 continues to recommend NPO w/ cortrak for nutrition support. TF tolerated @ goal.    TF via small bowel (proximal duodenum) cortrak: Replete Fiber, goal rate 75 ml/hr, providing 1800 kcals, 115 grams protein, 1494 mL free water.    Pertinent Labs: Na+ 134, glucose 176, creat 0.37  Pertinent Meds: SSI, phosphorus, bowel meds  Fluids: none at this time, estimated fluid needs = 2537 ml/day (25 ml/kg)  GI: last BM 10/23  Wt: most recent wt from 10/17 via bedscale = 101.3 kg (223 lbs) - no other measured wts recorded  Skin: no skin breakdown documented at this time    PLAN/RECOMMEND -   1. Continue current TF formula and rate  2. Fluids per MD - TF formula is not providing adequate free water alone and pt to likely benefit from additional free water flushes (250 ml QID would be adequate)  3. PO diet when safe/feasible per SLP    RD following.

## 2017-10-23 NOTE — CARE PLAN
Problem: Safety  Goal: Will remain free from injury  Bed alarm in place. Bed in low position, call bell within reach half side rails up. Educate pt on fall risk. Verbalized understands to call when needs assistance.

## 2017-10-23 NOTE — PROGRESS NOTES
Oncology/Hematology Progress Note               Author: Olivier Jacqui Date & Time created: 10/23/2017  2:46 PM     CCAnaplastic astrocytoma . Admitted with seizure.   Interval History:  Continues to have mental status changes    Review of Systems:  ROS could not obtain     Physical Exam:  Physical Exam   Constitutional: He appears well-developed and well-nourished. He appears lethargic.   HENT:   Right Ear: External ear normal.   Eyes: Pupils are equal, round, and reactive to light.   Cardiovascular: Normal rate.    Abdominal: Soft. He exhibits distension.   Musculoskeletal: Normal range of motion.   Neurological: He appears lethargic.       Labs:        Invalid input(s): [id number]      Recent Labs      10/21/17   0216  10/22/17   0416  10/23/17   0352   SODIUM  131*  135  134*   POTASSIUM  4.5  3.7  3.7   CHLORIDE  103  105  105   CO2  20  20  21   BUN  19  21  20   CREATININE  0.44*  0.37*  0.37*   MAGNESIUM  2.6*  2.5  2.4   PHOSPHORUS  3.2  3.3  3.3   CALCIUM  8.6  8.8  8.7     Recent Labs      10/21/17   0216  10/22/17   0416  10/23/17   0352   ALTSGPT  87*  99*  90*   ASTSGOT  53*  31  24   ALKPHOSPHAT  102*  103*  100*   TBILIRUBIN  0.4  0.3  0.3   PREALBUMIN   --    --   32.0   GLUCOSE  184*  212*  176*     Recent Labs      10/21/17   0216  10/22/17   0416  10/23/17   0352   RBC  4.61*  4.90  4.74   HEMOGLOBIN  13.2*  14.0  13.6*   HEMATOCRIT  40.5*  43.1  41.6*   PLATELETCT  282  327  365     Recent Labs      10/21/17   0216  10/22/17   0416  10/23/17   0352   WBC  6.0  6.6  8.2   NEUTSPOLYS  81.10*  84.20*  86.00*   LYMPHOCYTES  7.70*  8.60*  7.20*   MONOCYTES  6.20  3.90  4.50   EOSINOPHILS  0.00  0.00  0.00   BASOPHILS  0.30  0.30  0.20   ASTSGOT  53*  31  24   ALTSGPT  87*  99*  90*   ALKPHOSPHAT  102*  103*  100*   TBILIRUBIN  0.4  0.3  0.3     Recent Labs      10/21/17   0216  10/22/17   0416  10/23/17   0352   SODIUM  131*  135  134*   POTASSIUM  4.5  3.7  3.7   CHLORIDE  103  105  105   CO2   20  20     GLUCOSE  184*  212*  176*   BUN     CREATININE  0.44*  0.37*  0.37*   CALCIUM  8.6  8.8  8.7     Hemodynamics:  Temp (24hrs), Av.4 °C (97.6 °F), Min:36.1 °C (97 °F), Max:37 °C (98.6 °F)  Temperature: 36.1 °C (97 °F)  Pulse  Av.6  Min: 62  Max: 123  Blood Pressure: 119/87     Respiratory:    Respiration: 17, Pulse Oximetry: 98 %     Work Of Breathing / Effort: Shallow;Mild  RUL Breath Sounds: Clear, RML Breath Sounds: Diminished, RLL Breath Sounds: Diminished, LAYA Breath Sounds: Clear, LLL Breath Sounds: Diminished  Fluids:    Intake/Output Summary (Last 24 hours) at 10/23/17 1446  Last data filed at 10/22/17 1800   Gross per 24 hour   Intake               90 ml   Output                0 ml   Net               90 ml        GI/Nutrition:  Orders Placed This Encounter   Procedures   • DIET NPO     Standing Status:   Standing     Number of Occurrences:   1     Order Specific Question:   Restrict to:     Answer:   Strict [1]     Medical Decision Making, by Problem:  Active Hospital Problems    Diagnosis   • *Encephalopathy acute [G93.40]   • Chronic diastolic CHF (congestive heart failure) (CMS-[facility]) [I50.32]   • Seizure disorder (CMS-[facility]) [G40.909]   • Anaplastic astrocytoma of temporal lobe (CMS-HCC) [C71.2]   • Electrolyte disturbance [E87.8]   • Recurrent pulmonary embolism (CMS-[facility]) [I26.99]   • Steroid-induced diabetes mellitus (CMS-HCC) [E09.9, T38.0X5A]   • Chronic pain of right knee [M25.561, G89.29]   • Sepsis (CMS-[facility]) [A41.9]   • Obesity (BMI 30-39.9) [E66.9]   • Moderate protein-calorie malnutrition (CMS-HCC) [E44.0]   • Pulmonary embolism (CMS-[facility]) [I26.99]       Plan:  1p, 19q non deleted, MGMT non methylated, IDH-1 wild type anaplastic astrocytoma-grade 3 , status post partial resection.  He had extensive infiltrating lesion and had only partial resection.He took only 3 or 4 days of Temodar concurrently and was admitted to the hospital with pneumonia and respiratory failure.  He had a rough course postoperatively and after radiation    Subsequently, his performance status improved and he was started on maintenance Temodar. I saw him in the clinic around 2 weeks ago and he was doing pretty good at that point. He was then admitted with acute mental status changes, which appears to be secondary to seizures. Infection has been ruled out. He is still disoriented.    MRI does not show clear-cut progression. This is a difficult situation. He had prolonged hospitalizations before, from which he recovered. Unclear whether that will happen again. Given the extent and grade of astrocytoma, multiple comorbidities related to steroids and hospitalizations, hospice will be reasonable if the family decides on it. We will hold further maintenance Temodar.     Continue Decadron and antiseizure medications. He is on DVT prophylaxis only. Due to presence of cortical petechial hemorrhages.       Quality-Core Measures

## 2017-10-23 NOTE — CARE PLAN
Problem: Communication  Goal: The ability to communicate needs accurately and effectively will improve    Intervention: Reorient patient to environment as needed  Continually educating and re-orienting patient to surroundings. Pt needs additional reinforcement. Will continue to monitor and re-orient during hourly roundings from staff.       Problem: Safety  Goal: Will remain free from falls    Intervention: Assess risk factors for falls  All fall risk precautions in place. Pt educated on using the call light for assistance with mobility. Pt is unable to verbalize understanding and needs additional reinforcement. Will continue to monitor pt closely during hourly rounding from staff.

## 2017-10-23 NOTE — PROGRESS NOTES
Pt is resting comfortably in bed. Pt is confused and needs constant re-orientation to surroundings. Will continue to monitor pt closely. Pt does not express any concerns or questions at this time.

## 2017-10-23 NOTE — DISCHARGE PLANNING
Received choice form from Roula(MILAN). Referral sent to [facility].

## 2017-10-23 NOTE — CARE PLAN
Problem: Pain Management  Goal: Pain level will decrease to patient's comfort goal  Assess pain q2h and prn. Educate pt on pain scale and pain meds/side effects/other alternatives to pain meds.

## 2017-10-23 NOTE — CARE PLAN
Problem: Safety  Goal: Will remain free from injury  Restraints in place, seizure/fall/aspiration precautions in place    Problem: Pain Management  Goal: Pain level will decrease to patient's comfort goal  Routine pain assessment performed and appropriate pain management interventions implemented.

## 2017-10-23 NOTE — HEART FAILURE PROGRAM
Cardiovascular Nurse Navigator () Progress Note:     This [employer] spoke with Dr. Garnt via telephone. She believes that the patient is still chronic HF at this time and will change the diagnosis if he has become acute.    Thank you,  Faby Cardio RN Navigator [phone]

## 2017-10-23 NOTE — PALLIATIVE CARE
Palliative Care follow-up  Message left for Priscilla requesting a call or to know when they will visiting with the patient for a meeting as last meeting was not attended.  Requested to talk about GOC/advance care planning, next step in patient care.        Updated: Notes    Plan: Will continue to reach to family.    Thank you for allowing Palliative Care to participate in this patient's care. Please feel free to call [phone] with any questions or concerns.

## 2017-10-23 NOTE — PROGRESS NOTES
CNA stated walked by room and pt shaking. Entered pt room. Continued to shake not responding. VSS. Called tele informed pt hr 77. Took seizure safety measure. Gave ativan. Seizure lasted about 2mins. Informed [facility] blue team. No new orders given. Pt still alert x1. Bed in low position call bell within reach half side rails up.

## 2017-10-23 NOTE — PROGRESS NOTES
Monitor Summary: SB/SR 52-71, NV .14, QRS .12, QT .38 with BBB and HR as high as 156 per strip from monitor room

## 2017-10-23 NOTE — PROGRESS NOTES
Received report from night RN at bedside. Pt shows no signs of pain or distress. Bed in low position call bell within reach half side rails up. Pt resting quietly. Will continue to assess. Bilateral wrist restraints in place and assessed

## 2017-10-24 PROBLEM — A41.9 SEPSIS (HCC): Status: RESOLVED | Noted: 2017-01-01 | Resolved: 2017-01-01

## 2017-10-24 NOTE — DISCHARGE PLANNING
Medical Social Work    SW spoke to pt's daughter Desiree today about DC planning - Desiree expects to take father home with her after placement from [facility].  Daughter does work but she has another sister Sia who will be taking care of him four hours a day and she will be hiring a caregiver for the other couple hours.

## 2017-10-24 NOTE — CARE PLAN
Problem: Safety  Goal: Will remain free from injury  Outcome: PROGRESSING SLOWER THAN EXPECTED  Pt in soft wrist restraints. Bed alarm in place.     Problem: Venous Thromboembolism (VTW)/Deep Vein Thrombosis (DVT) Prevention:  Goal: Patient will participate in Venous Thrombosis (VTE)/Deep Vein Thrombosis (DVT)Prevention Measures  Outcome: PROGRESSING AS EXPECTED  Pt has Lovenox ordered for VTE prophylaxis

## 2017-10-24 NOTE — PROGRESS NOTES
RN notified MD Grant that pt's Na was 131 this am and restraint order will  soon and if we can reschedule pt's Accu check to Q6H since pt is NPO.

## 2017-10-24 NOTE — PROGRESS NOTES
"Language line used. RN asked  if pt is answering questions appropriately,  stated no (for example, when asked about pain, pt is just mumbling random words according to the , when asked what his name is, pt kept stating \"tomorrow\").    Pt does not appear to be in any type of distress or pain.    Bed alarm on, call light within reach.      "

## 2017-10-24 NOTE — THERAPY
"Physical Therapy Treatment completed.   Bed Mobility:  Supine to Sit: Minimal Assist  Transfers: Sit to Stand: Contact Guard Assist  Gait: Level Of Assist: Minimal Assist with Front-Wheel Walker       Plan of Care: Will benefit from Physical Therapy 3 times per week  Discharge Recommendations: Equipment: Will Continue to Assess for Equipment Needs.     See \"Rehab Therapy-Acute\" Patient Summary Report for complete documentation.     Pt presenting w/ improved functional mobility w/ manual assist for initiation. Pt incosistently following cues for functional mobility. Pt easily distracted and needing redirection throughout entire tx. Pt does demonstrate good strength to perform functional activities but is limited by poor cue following and initiation. Pt also demonstrating poor safety awareness as pt began to fatigue while walking w/ pt needing to be told to stop.  "

## 2017-10-24 NOTE — PROGRESS NOTES
Gave report to night rn at bedside pt shows no signs of pain or distress bed in low position call bell within reach half side rails up.

## 2017-10-24 NOTE — PROGRESS NOTES
Internal Medicine Interval Note  Note Author: Neri Grant M.D.     Name Dale Rojas     1960   Age/Sex 56 y.o. male   MRN [id number]   Code Status FULL     After 5PM or if no immediate response to page, please call for cross-coverage  Attending/Team: Dr. Jiménez/nya See Patient List for primary contact information  Call [phone] to page    1st Call - Day Intern (R1):   Dr. Grant 2nd Call - Day Sr. Resident (R2/R3):   Dr. Hein         Reason for interval visit  (Principal Problem)   Encephalopathy acute    Interval Problem Daily Status Update  (24 hours)   No seizure activity today. Patient continues to have expressive aphasia. According to  and friends he does not making sense in [place]. Continue TP. Appreciate input from Oncology and ID.  Will continue to work on placement.       Review of Systems   Unable to perform ROS: Mental acuity       Consultants/Specialty  Oncology  Neurology  ID  Palliative Care    Disposition  Inpatient for confusion and aphasia    Quality Measures    Reviewed items::  EKG reviewed, Labs reviewed, Medications reviewed and Radiology images reviewed  Mesa catheter::  No Mesa  DVT prophylaxis pharmacological::  Enoxaparin (Lovenox)  DVT prophylaxis - mechanical:  SCDs  Ulcer Prophylaxis::  Yes          Physical Exam       Vitals:    10/23/17 1115 10/23/17 1530 10/23/17 2003 10/24/17 0300   BP: 119/87 137/82 114/77 125/82   Pulse: 73 71 75 61   Resp: 17 19 18 18   Temp: 36.1 °C (97 °F) 36.2 °C (97.1 °F) 36.1 °C (96.9 °F) 36.1 °C (96.9 °F)   SpO2: 98% 95% 97% 97%   Weight:       Height:         Body mass index is 37.16 kg/m².    Oxygen Therapy:  Pulse Oximetry: 97 %, O2 (LPM): 0, O2 Delivery: None (Room Air)    Physical Exam   Constitutional: He is well-developed, well-nourished, and in no distress.   HENT:   Head: Normocephalic and atraumatic.   Eyes: Pupils are equal, round, and reactive to light.   Cardiovascular: Normal rate, regular  rhythm and normal heart sounds.    Pulmonary/Chest: Effort normal and breath sounds normal.   Unable to fully appreciate breath sounds as patient was speaking throughout exam   Abdominal: Soft. Bowel sounds are normal. He exhibits no distension. There is no tenderness.   Musculoskeletal: Normal range of motion.   Neurological: He is alert.   Not able to assess orientation.   Continued word salad.    Skin: Skin is warm and dry.         Lab Data Review:         10/23/2017  7:25 PM    Recent Labs      10/22/17   0416  10/23/17   0352  10/24/17   0236   SODIUM  135  134*  131*   POTASSIUM  3.7  3.7  3.8   CHLORIDE  105  105  105   CO2  20  21  19*   BUN  21  20  19   CREATININE  0.37*  0.37*  0.31*   MAGNESIUM  2.5  2.4  2.4   PHOSPHORUS  3.3  3.3  3.1   CALCIUM  8.8  8.7  8.5       Recent Labs      10/22/17   0416  10/23/17   0352  10/24/17   0236   ALTSGPT  99*  90*  77*   ASTSGOT  31 24  19   ALKPHOSPHAT  103*  100*  100*   TBILIRUBIN  0.3  0.3  0.3   PREALBUMIN   --   32.0   --    GLUCOSE  212*  176*  235*       Recent Labs      10/22/17   0416  10/23/17   0352  10/24/17   0236   RBC  4.90  4.74  4.82   HEMOGLOBIN  14.0  13.6*  13.9*   HEMATOCRIT  43.1  41.6*  42.7   PLATELETCT  327  365  355       Recent Labs      10/22/17   0416  10/23/17   0352  10/24/17   0236   WBC  6.6  8.2  8.9   NEUTSPOLYS  84.20*  86.00*  86.30*   LYMPHOCYTES  8.60*  7.20*  6.70*   MONOCYTES  3.90  4.50  3.80   EOSINOPHILS  0.00  0.00  0.00   BASOPHILS  0.30  0.20  0.30   ASTSGOT  31  24  19   ALTSGPT  99*  90*  77*   ALKPHOSPHAT  103*  100*  100*   TBILIRUBIN  0.3  0.3  0.3           Assessment/Plan     * Encephalopathy acute- (present on admission)   Assessment & Plan    2/2 seizure. Hx brain cancer with associated seizure disorder and fluctuating mentation, most likely cause. Initially we had a broad ddx: including but not limited to Meningitis a possibility, but less likely given improving mentation.Possible pneumonia given Hx with  chemotherapy treatments, tachycardic, tachypneic, supplemental O2 requirement, CXR w LLL consolidation, procalcitonin 0.44. Also Hx multiple DVTs and PEs but is anticoagulated with a negative head CT. Encephalitis unlikely, given patient's alertness and ability to follow commands. Neurology/EEG consistent w/ seizure disorder- on AEDs.  - continue TF at goal  - Neurology following, appreciate recs  - ID consulted, appreciate recs  - LP was not done due to anticoagulation, but now not needed since infection unlikely.   - treat underlying conditions as noted  - seizure, aspiration, fall precautions  - patient will likely not get accepted at LTAC due to insurance. Will work on SNF placement.         Chronic diastolic CHF (congestive heart failure) (CMS-HCC)- (present on admission)   Assessment & Plan    Anasarca, increased O2 requirements. Lasix held for the past week for chemotherapy, per family. Echo this admission with EF 60%, mild LV hypertrophy. BNP 77. 40 mg IV lasix given in ED with appropriate diuresis.   - discontinue IV lasix  - holding metoprolol for pressure support  - supplemental O2 prn        Seizure disorder (CMS-[facility])- (present on admission)   Assessment & Plan    - as above  - Recent onset 2/2 brain cancer. Encephalopathy with variable mentation concerning for atonic seizure.  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  Now w/ recurrence. Seen by oncology Dr Osman (outpatient) and Dr Christy (inpatient). Was on Temozolamide chemotherapy. CT head without evidence of acute hemorrhage or mass effect. MRI consistent w/ diagnosis of diffuse infiltrating anaplastic astrocytoma. Seems to not have progressed significantly compared to prior.  - consulted Oncology, appreciate recommendations  - holding  chemotherapy for now  - palliative care to hold goals of care discussion. Attempting to contact family        Electrolyte disturbance- (present on admission)   Assessment & Plan    Hyponatremia, hypokalemia, hypophosphatemia. Likely 2/2 fluid overload, possibly a chemotherapy component.  - replace electrolytes prn  - diuresis as noted  - monitor daily        Recurrent pulmonary embolism (CMS-HCC)- (present on admission)   Assessment & Plan    With associated DVTs. Most recent CT chest 6/22/17 with bilateral PEs. Therapeutic on warfarin on admission. MRI-safe IVC filter present. Likely hypercoagulable 2/2 malignancy. No current lower extremity DVT per duplex.  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 Pt family reports daily insulin but report none given since day prior to admission and pt unable to corroborate. Decadron discontinued on 10/10/17.  - POC glucose TID AC and HS  - SSI  - monitor for additional insulin requirements        Chronic pain of right knee- (present on admission)   Assessment & Plan    With associated back pain 2/2 multiple falls in past year. Takes Norco at home.  - continue home Norco  - low dose dilaudid prn while NPO        Moderate protein-calorie malnutrition (CMS-HCC)- (present on admission)   Assessment & Plan    Prealbumin 8. Likely aspiration risk due to mental status. Failed swallow eval. Cortrak placed and TF started. Failed re-attempt of swallow eval.  - titrate TF to goal  - dispo to include long term care with speech/swallow therapy and feeding tube care        Pulmonary embolism (CMS-HCC)- (present on admission)   Assessment & Plan    Hx of recurrent PE/DVT, s/p IVC filter. Was on NOAC at the time of admission, then was on full dose lovenox for few days. We spoke to oncology Dr Christy:  It was felt that there is no strong need for anticoagulation given risk of bleeding,  and fact that pt already has ivc filter in place, plus mri w/ cortical hemorrhage which can contribute to further development of seizures and risk of ICH.  - continue DVT ppx only for now        Obesity (BMI 30-39.9)- (present on admission)   Assessment & Plan    BMI 37.44. Recent steroids associated with brain cancer treatment.  - will  on discharge

## 2017-10-24 NOTE — CARE PLAN
Problem: Safety  Goal: Will remain free from injury  Restraints in place.    Problem: Venous Thromboembolism (VTW)/Deep Vein Thrombosis (DVT) Prevention:  Goal: Patient will participate in Venous Thrombosis (VTE)/Deep Vein Thrombosis (DVT)Prevention Measures  Lovenox administered.    Problem: Skin Integrity  Goal: Risk for impaired skin integrity will decrease  Q2H turns, waffle mattress in place, skin assessment performed, barrier wipes used, pillows used to float heels, pt routinely monitored for incontinence and cleaned promptly.

## 2017-10-24 NOTE — PROGRESS NOTES
Internal Medicine Interval Note  Note Author: Neri Grant M.D.     Name Dale Rojas     1960   Age/Sex 56 y.o. male   MRN [id number]   Code Status FULL     After 5PM or if no immediate response to page, please call for cross-coverage  Attending/Team: Dr. Jiménez/nya See Patient List for primary contact information  Call [phone] to page    1st Call - Day Intern (R1):   Dr. Grant 2nd Call - Day Sr. Resident (R2/R3):   Dr. Hein         Reason for interval visit  (Principal Problem)   Encephalopathy acute    Interval Problem Daily Status Update  (24 hours)   Patient had seizure today lasting about 2 minutes given ativan. Increased zonisamide. Patient continues to have expressive aphasia. Speaking mostly in Portuguese- word salad. Antibiotics discontinued.   Palliative care still attempting to contact family.  PT/OT/SLP recommend discharge to post acute skilled therapy.     Review of Systems   Unable to perform ROS: Mental acuity       Consultants/Specialty  Oncology  Neurology  ID  Palliative Care    Disposition  Inpatient for confusion and aphasia    Quality Measures    Reviewed items::  EKG reviewed, Labs reviewed, Medications reviewed and Radiology images reviewed  Mesa catheter::  No Mesa  DVT prophylaxis pharmacological::  Enoxaparin (Lovenox)  DVT prophylaxis - mechanical:  SCDs  Ulcer Prophylaxis::  Yes          Physical Exam       Vitals:    10/23/17 0720 10/23/17 0759 10/23/17 1115 10/23/17 1530   BP: 112/83 128/96 119/87 137/82   Pulse: 89 94 73 71   Resp:    Temp: 36.2 °C (97.1 °F)  36.1 °C (97 °F) 36.2 °C (97.1 °F)   SpO2: 96%  98% 95%   Weight:       Height:         Body mass index is 37.16 kg/m².    Oxygen Therapy:  Pulse Oximetry: 95 %, O2 (LPM): 0, O2 Delivery: None (Room Air)    Physical Exam   Constitutional: He is well-developed, well-nourished, and in no distress.   HENT:   Head: Normocephalic and atraumatic.   Eyes: Pupils are equal, round, and  reactive to light.   Cardiovascular: Normal rate, regular rhythm and normal heart sounds.    Pulmonary/Chest: Effort normal and breath sounds normal.   Unable to fully appreciate breath sounds as patient was speaking throughout exam   Abdominal: Soft. Bowel sounds are normal. He exhibits no distension. There is no tenderness.   Musculoskeletal: Normal range of motion.   Neurological: He is alert.   Not able to assess orientation.   Continued word salad.    Skin: Skin is warm and dry.         Lab Data Review:         10/23/2017  7:25 PM    Recent Labs      10/21/17   0216  10/22/17   0416  10/23/17   0352   SODIUM  131*  135  134*   POTASSIUM  4.5  3.7  3.7   CHLORIDE  103  105  105   CO2  20  20  21   BUN  19  21  20   CREATININE  0.44*  0.37*  0.37*   MAGNESIUM  2.6*  2.5  2.4   PHOSPHORUS  3.2  3.3  3.3   CALCIUM  8.6  8.8  8.7       Recent Labs      10/21/17   0216  10/22/17   0416  10/23/17   0352   ALTSGPT  87*  99*  90*   ASTSGOT  53*  31  24   ALKPHOSPHAT  102*  103*  100*   TBILIRUBIN  0.4  0.3  0.3   PREALBUMIN   --    --   32.0   GLUCOSE  184*  212*  176*       Recent Labs      10/21/17   0216  10/22/17   0416  10/23/17   0352   RBC  4.61*  4.90  4.74   HEMOGLOBIN  13.2*  14.0  13.6*   HEMATOCRIT  40.5*  43.1  41.6*   PLATELETCT  282  327  365       Recent Labs      10/21/17   0216  10/22/17   0416  10/23/17   0352   WBC  6.0  6.6  8.2   NEUTSPOLYS  81.10*  84.20*  86.00*   LYMPHOCYTES  7.70*  8.60*  7.20*   MONOCYTES  6.20  3.90  4.50   EOSINOPHILS  0.00  0.00  0.00   BASOPHILS  0.30  0.30  0.20   ASTSGOT  53*  31  24   ALTSGPT  87*  99*  90*   ALKPHOSPHAT  102*  103*  100*   TBILIRUBIN  0.4  0.3  0.3           Assessment/Plan     * Encephalopathy acute- (present on admission)   Assessment & Plan    2/2 seizure. Hx brain cancer with associated seizure disorder and fluctuating mentation, most likely cause. Initially we had a broad ddx: including but not limited to Meningitis a possibility, but less likely  given improving mentation.Possible pneumonia given Hx with chemotherapy treatments, tachycardic, tachypneic, supplemental O2 requirement, CXR w LLL consolidation, procalcitonin 0.44. Also Hx multiple DVTs and PEs but is anticoagulated with a negative head CT. Encephalitis unlikely, given patient's alertness and ability to follow commands. Neurology/EEG consistent w/ seizure disorder- on AEDs.  - continue TF at goal  - Neurology following, appreciate recs  - Discontinue Unasyn  - ID consulted, appreciate recs  - LP was not done due to anticoagulation, but now not needed since infection unlikely.   - treat underlying conditions as noted  - seizure, aspiration, fall precautions        Chronic diastolic CHF (congestive heart failure) (CMS-HCC)- (present on admission)   Assessment & Plan    Anasarca, increased O2 requirements. Lasix held for the past week for chemotherapy, per family. Echo this admission with EF 60%, mild LV hypertrophy. BNP 77. 40 mg IV lasix given in ED with appropriate diuresis.   - discontinue IV lasix  - holding metoprolol for pressure support  - supplemental O2 prn        Seizure disorder (CMS-HCC)- (present on admission)   Assessment & Plan    - as above  - Recent onset 2/2 brain cancer. Encephalopathy with variable mentation concerning for atonic seizure.  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  Now w/ recurrence. Seen by oncology Dr Osman (outpatient) and Dr Christy (inpatient). Was on Temozolamide chemotherapy. CT head without evidence of acute hemorrhage or mass effect. MRI consistent w/ diagnosis of diffuse infiltrating anaplastic astrocytoma. Seems to not have progressed significantly compared to prior.  - consulted Oncology, appreciate recommendations  - holding chemotherapy for  now  - palliative care to hold goals of care discussion. Attempting to contact family        Electrolyte disturbance- (present on admission)   Assessment & Plan    Hyponatremia, hypokalemia, hypophosphatemia. Likely 2/2 fluid overload, possibly a chemotherapy component.  - replace electrolytes prn  - diuresis as noted  - monitor daily        Recurrent pulmonary embolism (CMS-HCC)- (present on admission)   Assessment & Plan    With associated DVTs. Most recent CT chest 6/22/17 with bilateral PEs. Therapeutic on warfarin on admission. MRI-safe IVC filter present. Likely hypercoagulable 2/2 malignancy. No current lower extremity DVT per duplex.  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 Pt family reports daily insulin but report none given since day prior to admission and pt unable to corroborate. Decadron discontinued on 10/10/17.  - POC glucose TID AC and HS  - SSI  - monitor for additional insulin requirements        Chronic pain of right knee- (present on admission)   Assessment & Plan    With associated back pain 2/2 multiple falls in past year. Takes Norco at home.  - continue home Norco  - low dose dilaudid prn while NPO        Moderate protein-calorie malnutrition (CMS-HCC)- (present on admission)   Assessment & Plan    Prealbumin 8. Likely aspiration risk due to mental status. Failed swallow eval. Cortrak placed and TF started. Failed re-attempt of swallow eval.  - titrate TF to goal  - dispo to include long term care with speech/swallow therapy and feeding tube care        Pulmonary embolism (CMS-HCC)- (present on admission)   Assessment & Plan    Hx of recurrent PE/DVT, s/p IVC filter. Was on NOAC at the time of admission, then was on full dose lovenox for few days. We spoke to oncology Dr Christy:  It was felt that there is no strong need for anticoagulation given risk of bleeding, and fact that pt  already has ivc filter in place, plus mri w/ cortical hemorrhage which can contribute to further development of seizures and risk of ICH.  - continue DVT ppx only for now        Sepsis (CMS-HCC)- (present on admission)   Assessment & Plan    Resolved.  This is sepsis (without associated acute organ dysfunction).   SIRS on admission: , RR 22  Source: suspected encephalopathy vs meningitis vs pneumonia given immunocompromise, tachycardia, tachypnea, supplemental O2 requirement  Lactate: 1.8  Procalcitonin: 0.44  - IVF resuscitation contraindicated in the setting of acute on chronic heart failure and fluid overload  - continue IV abx as noted  - LP contraindicated due to necessary anticoagulation  - duoneb, supplemental O2 prn  - blood, sputum Cx w no growth        Obesity (BMI 30-39.9)- (present on admission)   Assessment & Plan    BMI 37.44. Recent steroids associated with brain cancer treatment.  - will  on discharge

## 2017-10-25 NOTE — CARE PLAN
Problem: Discharge Barriers/Planning  Goal: Patient's continuum of care needs will be met  Pending acceptance from LTAC

## 2017-10-25 NOTE — DISCHARGE SUMMARY
Internal Medicine Discharge Summary  Note Author: Umer Hein M.D.       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      Overview: Joe pathology in media      Anaplastic astrocytoma, Grade III    Seizure disorder (CMS-HCC) POA: Yes    Chronic diastolic CHF (congestive heart failure) (CMS-HCC) POA: Yes    Chronic pain of right knee POA: Yes    Steroid-induced diabetes mellitus (CMS-HCC) POA: Yes    Recurrent pulmonary embolism (CMS-HCC) POA: Yes    Electrolyte disturbance POA: Yes    Obesity (BMI 30-39.9) POA: Yes    Pulmonary embolism (CMS-HCC) (Chronic) POA: Yes    Moderate protein-calorie malnutrition (CMS-HCC) POA: Yes  Resolved Problems:    Sepsis (CMS-HCC) POA: Yes      Hospital Summary (Brief Narrative):         Mr. Donahue is a 56 year old male with past medical history of anaplastic astrocytoma of temporal lobe s/p resection + chemo + radiation, seizures, b/l DVT's, multiple PE's on warfarin s/p IVC filter 6/2017, HLD, and steroid induced DM admitted for further evaluation of AMS. Initially felt to related to seizures vs less likely infectious/meningitis. Patient had an abnormal EEG and was started on Vimpat/Keppra, and changed to Zonisamide/Keppra per neurology recs. MRI of brain showed diffuse infiltrating T2 hyperintensity noted involving left frontal and temporal lobes and anterior basal ganglia with chronic cortical petechial hemorrhages in the left temporal region. This is stable since previous examination dated 7/14/2015. Patient was also seen by  oncology who recommended continue Decadron and antiseizure medications, is on DVT prophylaxis only due to presence of cortical petechial hemorrhages, discontnue warfarin even though he is risk of developing DVT, but he has a IVC filter in place. As patient's seizures resolved, his mental status improved. However he has significant aphasia,dysphagia due to brain tumor, and will need further PT/OT, SLP and medication/lab monitoring and will be discharged to LTAC for further care.    Patient /Hospital Summary (Details -- Problem Oriented) :          Chronic diastolic CHF (congestive heart failure) (CMS-[facility])   Assessment & Plan    On admission had Anasarca, increased O2 requirements. Per family, Lasix held for the past week prior to admin for chemotherapy.  Echo this admission with EF 60%, mild LV hypertrophy. BNP 77. 40 mg IV lasix given in ED with appropriate diuresis.   - discontinue IV lasix, can start po lasix prn  - resumed home metoprolol  - supplemental O2 prn        Seizure disorder (CMS-HCC)   Assessment & Plan    - as above  - Recent onset 2/2 brain cancer. Encephalopathy with variable mentation concerning for atonic seizure.  - on zonisamide 200mg bid po / keppra iv 1500mg q12  - Neurology following, appreciate recs  - IV ativan prn for seizure or agitation  - continue decadron, AEDs        Anaplastic astrocytoma of temporal lobe (CMS-HCC)   Assessment & Plan    S/p partial resection at [facility], XRT.  Now w/ recurrence. Seen by oncology Dr Osman (outpatient) and Dr Christy (inpatient). Was on Temozolamide chemotherapy. CT head without evidence of acute hemorrhage or mass effect. MRI consistent w/ diagnosis of diffuse infiltrating anaplastic astrocytoma. Seems to not have progressed significantly compared to prior.  - consulted Oncology, appreciate recommendations  - holding chemotherapy for now  - consider further palliative care to hold goals of care discussion.         * Encephalopathy acute   Assessment  & Plan    2/2 seizure. Hx brain cancer with associated seizure disorder and fluctuating mentation, most likely cause. Initially we had a broad ddx: including but not limited to Meningitis a possibility, but less likely given improving mentation.Possible pneumonia given Hx with chemotherapy treatments, tachycardic, tachypneic, supplemental O2 requirement, CXR w LLL consolidation, procalcitonin 0.44. Also Hx multiple DVTs and PEs but is anticoagulated with a negative head CT. Encephalitis unlikely, given patient's alertness and ability to follow commands. Neurology/EEG consistent w/ seizure disorder- on AEDs.  - continue TF at goal  - Neurology following, appreciate recs  - ID consulted, appreciate recs  - LP was not done due to anticoagulation, but now not needed since infection unlikely.   - treat underlying conditions as noted  - seizure, aspiration, fall precautions  - patient will likely not get accepted at LTAC due to insurance. Will work on SNF placement.         Electrolyte disturbance   Assessment & Plan    Hyponatremia, hypokalemia, hypophosphatemia. Likely 2/2 fluid overload, possibly a chemotherapy component.  - replace electrolytes prn  - diuresis as noted  - monitor daily        Recurrent pulmonary embolism (CMS-HCC)   Assessment & Plan    With associated DVTs. Most recent CT chest 6/22/17 with bilateral PEs. Therapeutic on warfarin on admission. MRI-safe IVC filter present. Likely hypercoagulable 2/2 malignancy. No current lower extremity DVT per duplex.  - continue lovenox dvt ppx. Risk of bleeding higher w/ full dose lovenox (tone since cortical hemorrhages noted on MRI- which can provoke/exacerate underlying seizures)        Steroid-induced diabetes mellitus (CMS-HCC)   Assessment & Plan    Glucose low normal since admission. Pt family reports daily insulin but report none given since day prior to admission and pt unable to corroborate. Decadron discontinued on 10/10/17.  - POC glucose TID AC and  HS  - SSI  - monitor for additional insulin requirements        Chronic pain of right knee   Assessment & Plan    With associated back pain 2/2 multiple falls in past year. Takes Norco at home.  - continue home Norco  - low dose dilaudid prn while NPO        Moderate protein-calorie malnutrition (CMS-HCC)   Assessment & Plan    Prealbumin 8. Likely aspiration risk due to mental status. Failed swallow eval. Cortrak placed and TF started. Failed re-attempt of swallow eval.  - titrate TF to goal  - dispo to include long term care with speech/swallow therapy and feeding tube care        Pulmonary embolism (CMS-HCC)   Assessment & Plan    Hx of recurrent PE/DVT, s/p IVC filter. Was on NOAC at the time of admission, then was on full dose lovenox for few days. We spoke to oncology Dr Christy:  It was felt that there is no strong need for anticoagulation given risk of bleeding, and fact that pt already has ivc filter in place, plus mri w/ cortical hemorrhage which can contribute to further development of seizures and risk of ICH.  - continue DVT ppx only for now        Obesity (BMI 30-39.9)   Assessment & Plan    BMI 37.44. Recent steroids associated with brain cancer treatment.  - will  on discharge        Sepsis (CMS-HCC)-resolved as of 10/24/2017   Assessment & Plan    Resolved.  This is sepsis (without associated acute organ dysfunction).   SIRS on admission: , RR 22  Source: suspected encephalopathy vs meningitis vs pneumonia given immunocompromise, tachycardia, tachypnea, supplemental O2 requirement  Lactate: 1.8  Procalcitonin: 0.44  - IVF resuscitation contraindicated in the setting of acute on chronic heart failure and fluid overload  - continue IV abx as noted  - LP contraindicated due to necessary anticoagulation  - duoneb, supplemental O2 prn  - blood, sputum Cx w no growth            Consultants:     NEUROLOGY  HEME-ONC    Procedures:        ROUTINE ELECTROENCEPHALOGRAM REPORT        NAME:  Godfrey  Dale Carmona     REFERRING Dr:     INDICATION:      Seizure, Brain Tumor     TECHNIQUE: 30 channel routine electroencephalogram (EEG) was performed in accordance with the international 10-20 system. The study was reviewed in bipolar and referential montages. The recording examined the patient during wakeful and drowsy state(s).      DESCRIPTION OF THE RECORD:        Background rhythm during awake stage shows well-organized, well-developed, average voltage 8 to 9 hertz alpha activity in the right posterior regions.There are continuous epileptiform spike-and-wave discharges every one to two seconds and lateralizing delta abnormalities over left -- especially C3 P3 are seen . At times, these delta bursts spread to the whole left hemisphere-- lasting for 15 seconds or so. Stage I sleep was achieved.           ACTIVATION PROCEDURES:          ICTAL AND/OR INTERICTAL FINDINGS:       There are continuous epileptiform spike-and-wave discharges every one to two seconds and lateralizing delta abnormalities over left -- especially C3 P3 are seen . At times, these delta bursts spread to the whole left hemisphere-- lasting for 15 seconds or soNo clinical events or seizures were reported or recorded during the study.       EKG: sampling of the EKG recording demonstrated sinus rhythm.          INTERPRETATION:        ________________________________________________________________________     This scalp EEG is consistent with active focal seizure from left central region     ________________________________________________________________________      Imaging/ Testing:      DX-ABDOMEN FOR TUBE PLACEMENT   Final Result      Feeding tube appears to extend through the stomach with tip at the level of the proximal duodenum.      LE VENOUS DUPLEX (DVT)   Final Result      IVC/ILIAC VENOUS DUPLEX   Final Result      ECHOCARDIOGRAM-COMP W/ CONT   Final Result      DX-CHEST-PORTABLE (1 VIEW)   Final Result      Hypoinflation.    Mild/moderate diffuse atelectasis/interstitial edema.   No airspace consolidation identified.      DX-ABDOMEN FOR TUBE PLACEMENT   Final Result         Feeding tube with tip in the distal stomach.      DX-ABDOMEN FOR TUBE PLACEMENT   Final Result      Feeding tube tip overlies the gastroesophageal junction.      DX-CHEST-LIMITED (1 VIEW)   Final Result      1.  Hypoinflation with mild pulmonary vascular congestion.   2.  No pneumonia or overt pulmonary edema.   3.  Stable cardiomegaly.      MR-BRAIN-WITH & W/O   Final Result      1.  Diffuse infiltrating T2 hyperintensity noted involving left frontal and temporal lobes and anterior basal ganglia with chronic cortical petechial hemorrhages in the left temporal region. This is stable since previous examination dated 7/14/2015. This    finding is in keeping with the diagnosis of diffuse infiltrating anaplastic astrocytoma.   2.  There is no evidence of new area of infiltrating T2 hyperintensity in the brain parenchyma to suggest any obvious tumor progression.   3.  Postsurgical changes as described above. There is epidural fluid collection adjacent to the left frontoparietal craniotomy with mild dural enhancement. When compared with the previous MRI, there is minimal increase in the size of epidural fluid    collection. There is also interval evidence of dural enhancement. Restricted diffusion is noted within the fluid collection. This finding still represent postsurgical changes. However the possibility of secondary infection cannot be excluded based on    this finding.   4.  Prominent abnormal flow void in the region of anterior communicating artery likely representing stable anterior communicating artery aneurysm.      DX-CHEST-PORTABLE (1 VIEW)   Final Result         1. Similar low lung volume with similar mild pulmonary edema.      CT-HEAD W/O   Final Result      1.  Postoperative changes in the left temporal lobe anteriorly and inferiorly and in the left  temporoparietal junction region.      2.  Small residual left subdural effusion.      3.  No acute hemorrhage or mass effect.      4.  Left craniotomy changes.      DX-CHEST-PORTABLE (1 VIEW)   Final Result      Stable low lung volumes with marked cardiac silhouette enlargement and evidence of failure      Superimposed consolidation from pneumonia at the left base is possible      Findings were discussed with LINA ROY on 10/14/2017 1:14 PM.          Discharge Medications:         Medication Reconciliation: Completed     Medication List      START taking these medications      Instructions   acetaminophen 325 MG Tabs  Commonly known as:  TYLENOL   Take 2 Tabs by mouth every 6 hours as needed (Mild Pain; (Pain scale 1-3); Temp greater than 100.5 F).  Dose:  650 mg     D5W 5 % SOLN 100 mL with levetiracetam 500 MG/5ML SOLN 1,500 mg   1,500 mg by Intravenous route every 12 hours.  Dose:  1500 mg     dexamethasone 4 MG Tabs  Commonly known as:  DECADRON   Take 1 Tab by mouth every 8 hours.  Dose:  4 mg     enoxaparin 40 MG/0.4ML Soln inj  Commonly known as:  LOVENOX   Inject 40 mg as instructed every day.  Dose:  40 mg     insulin lispro 100 UNIT/ML Soln  Commonly known as:  HUMALOG   Inject 3-14 Units as instructed every 6 hours.  Dose:  3-14 Units     lorazepam 2 MG/ML Soln  Commonly known as:  ATIVAN   0.5 mL by Intravenous route every four hours as needed (or prior to MRI).  Dose:  1 mg     omeprazole 2 mg/mL in sodium bicarbonate  Commonly known as:  PRILOSEC   Take 20 mL by mouth every day.  Dose:  40 mg     zonisamide 100 MG Caps  Commonly known as:  ZONEGRAN   Take 2 Caps by mouth 2 Times a Day.  Dose:  200 mg        CHANGE how you take these medications      Instructions   metoprolol 25 MG Tabs  What changed:  · how much to take  · when to take this  Commonly known as:  LOPRESSOR   Take 1 Tab by mouth 2 Times a Day.  Dose:  25 mg        CONTINUE taking these medications      Instructions   atorvastatin 20  MG Tabs  Commonly known as:  LIPITOR   Take 20 mg by mouth every bedtime.  Dose:  20 mg     hydrocodone-acetaminophen 5-325 MG Tabs per tablet  Commonly known as:  NORCO   Take 1-2 Tabs by mouth every 6 hours as needed.  Dose:  1-2 Tab     senna-docusate 8.6-50 MG Tabs  Commonly known as:  PERICOLACE or SENOKOT S   Take 1 Tab by mouth every day.  Dose:  1 Tab        STOP taking these medications    insulin  UNIT/ML Susp  Commonly known as:  HUMULIN,NOVOLIN     insulin regular 100 Unit/mL Soln  Commonly known as:  HUMULIN R     levetiracetam 100 MG/ML Soln  Commonly known as:  KEPPRA     TEMODAR 180 MG Caps  Generic drug:  Temozolomide     warfarin 2.5 MG Tabs  Commonly known as:  COUMADIN                Disposition:   DC to LTAC    Diet:   On NGT feeds    Activity:   Per PT    Instructions:      The patient was instructed to return to the ER in the event of worsening symptoms. I have counseled the patient on the importance of compliance and the patient has agreed to proceed with all medical recommendations and follow up plan indicated above.   The patient understands that all medications come with benefits and risks. Risks may include permanent injury or death and these risks can be minimized with close reassessment and monitoring.        Primary Care Provider:    Mohsen Tamasaby, M.D.    Discharge summary faxed to primary care provider:  Deferred  Copy of discharge summary given to the patient: Deferred      Follow up appointment details :      none    Pending Studies:        none    Time spent on discharge day patient visit, preparing discharge paperwork and arranging for patient follow up.      Discharge Time (Minutes) :    65mins        Condition on Discharge    ______________________________________________________________________    Interval history/exam for day of discharge:      No acute events overnight  Still on ngt feeds, moves all ext, follows commands  Aphasic-cannot understand him      Vitals:     10/24/17 2000 10/25/17 0400 10/25/17 0800 10/25/17 1200   BP: 129/88 132/83 130/85 130/84   Pulse: 88 65 63 70   Resp: 16 16 16 16   Temp: 36 °C (96.8 °F) 36 °C (96.8 °F) (!) 35.6 °C (96 °F) 36.3 °C (97.3 °F)   SpO2: 93% 95% 98% 96%   Weight:       Height:         Weight/BMI: Body mass index is 37.16 kg/m².  Pulse Oximetry: 96 %, O2 (LPM): 0, O2 Delivery: None (Room Air)    Physical Exam   Constitutional: He is well-developed, well-nourished, and in no distress.   HENT:   Head: Normocephalic and atraumatic.   Eyes: Pupils are equal, round, and reactive to light.   Cardiovascular: Normal rate, regular rhythm and normal heart sounds.    Pulmonary/Chest: Effort normal and breath sounds normal.   Abdominal: Soft. Bowel sounds are normal. He exhibits no distension. There is no tenderness.   Musculoskeletal: Normal range of motion.   Neurological: He is alert.   Not able to assess orientation.   Continued word salad.    Skin: Skin is warm and dry.     Most Recent Labs:    Lab Results   Component Value Date/Time    WBC 8.6 10/25/2017 03:59 AM    RBC 4.85 10/25/2017 03:59 AM    HEMOGLOBIN 14.0 10/25/2017 03:59 AM    HEMATOCRIT 43.0 10/25/2017 03:59 AM    MCV 88.7 10/25/2017 03:59 AM    MCH 28.9 10/25/2017 03:59 AM    MCHC 32.6 (L) 10/25/2017 03:59 AM    MPV 9.1 10/25/2017 03:59 AM    NEUTSPOLYS 85.90 (H) 10/25/2017 03:59 AM    LYMPHOCYTES 6.40 (L) 10/25/2017 03:59 AM    MONOCYTES 5.10 10/25/2017 03:59 AM    EOSINOPHILS 0.00 10/25/2017 03:59 AM    BASOPHILS 0.40 10/25/2017 03:59 AM    ANISOCYTOSIS 1+ 09/16/2017 01:13 PM      Lab Results   Component Value Date/Time    SODIUM 134 (L) 10/25/2017 03:59 AM    POTASSIUM 3.5 (L) 10/25/2017 03:59 AM    CHLORIDE 103 10/25/2017 03:59 AM    CO2 22 10/25/2017 03:59 AM    GLUCOSE 208 (H) 10/25/2017 03:59 AM    BUN 18 10/25/2017 03:59 AM    CREATININE 0.35 (L) 10/25/2017 03:59 AM      Lab Results   Component Value Date/Time    ALTSGPT 70 (H) 10/25/2017 03:59 AM    ASTSGOT 18 10/25/2017  03:59 AM    ALKPHOSPHAT 94 10/25/2017 03:59 AM    TBILIRUBIN 0.3 10/25/2017 03:59 AM    LIPASE 5 (L) 10/14/2017 11:15 AM    ALBUMIN 3.4 10/25/2017 03:59 AM    GLOBULIN 2.5 10/25/2017 03:59 AM    PREALBUMIN 32.0 10/23/2017 03:52 AM    INR 2.14 (H) 10/15/2017 11:01 AM    MACROCYTOSIS 1+ 08/09/2017 11:21 AM     Lab Results   Component Value Date/Time    PROTHROMBTM 24.6 (H) 10/15/2017 11:01 AM    INR 2.14 (H) 10/15/2017 11:01 AM

## 2017-10-25 NOTE — DISCHARGE PLANNING
Received PCS form from [facility]). Arranged patient's transport to [facility] at 1730 via [facility]. Contacted [facility] as patient has Medicaid HMO, however, patient does not have transport benefit. [facility]) notified of transport time via voicemail.

## 2017-10-25 NOTE — PROGRESS NOTES
Patient is currently stable in room. Pt speaks little English, primary language being Kyrgyz. Dialed in a Kyrgyz Interpretor. Pt is alert and oriented to self and able to feliz. Pt was unable to fc. Pt is in nonviolent soft wrist restraints. Pulses +3 for all extremities. PERRLA. Pt is frequently turned in bed.

## 2017-10-25 NOTE — PROGRESS NOTES
Monitor Summary: SR 60-88, CT .16, QRS .14, QT .40 with HR as low as 47 per strip from monitor room

## 2017-10-25 NOTE — DISCHARGE INSTRUCTIONS
Discharge Instructions    Discharged to other by ambulance with escort. Discharged via ambulance, hospital escort: Yes.  Special equipment needed: Not Applicable    Be sure to schedule a follow-up appointment with your primary care doctor or any specialists as instructed.     Discharge Plan:   Diet Plan: Discussed  Activity Level: Discussed  Confirmed Follow up Appointment: No (Comments) ([place] to arrange)  Confirmed Symptoms Management: Discussed  Medication Reconciliation Updated: Yes  Influenza Vaccine Indication: Indicated: 9 to 64 years of age  Influenza Vaccine Given - only chart on this line when given: Influenza Vaccine Given (See MAR)    I understand that a diet low in cholesterol, fat, and sodium is recommended for good health. Unless I have been given specific instructions below for another diet, I accept this instruction as my diet prescription.   Other diet: cortrak, tube feed (replete fiber)    Special Instructions: None    · Is patient discharged on Warfarin / Coumadin?   No     · Is patient Post Blood Transfusion?  No    Depression / Suicide Risk    As you are discharged from this [facility] facility, it is important to learn how to keep safe from harming yourself.    Recognize the warning signs:  · Abrupt changes in personality, positive or negative- including increase in energy   · Giving away possessions  · Change in eating patterns- significant weight changes-  positive or negative  · Change in sleeping patterns- unable to sleep or sleeping all the time   · Unwillingness or inability to communicate  · Depression  · Unusual sadness, discouragement and loneliness  · Talk of wanting to die  · Neglect of personal appearance   · Rebelliousness- reckless behavior  · Withdrawal from people/activities they love  · Confusion- inability to concentrate     If you or a loved one observes any of these behaviors or has concerns about self-harm, here's what you can do:  · Talk about it- your feelings  and reasons for harming yourself  · Remove any means that you might use to hurt yourself (examples: pills, rope, extension cords, firearm)  · Get professional help from the community (Mental Health, Substance Abuse, psychological counseling)  · Do not be alone:Call your Safe Contact- someone whom you trust who will be there for you.  · Call your local CRISIS HOTLINE 541-6552 or 366-540-3939  · Call your local Children's Mobile Crisis Response Team [place] (806) 149-6279 or www.[place]  · Call the toll free National Suicide Prevention Hotlines   · National Suicide Prevention Lifeline 259-436-ZXCF (5490)  · National Hope Line Network 800-SUICIDE (766-1047)

## 2017-10-25 NOTE — CARE PLAN
Problem: Skin Integrity  Goal: Risk for impaired skin integrity will decrease    Intervention: Assess risk factors for impaired skin integrity and/or pressure ulcers  Pt is turned frequently. Goal partially met.       Problem: Urinary Elimination:  Goal: Ability to reestablish a normal urinary elimination pattern will improve    Intervention: Assist patient to sit on commode or toilet for voiding  Goal not met.

## 2017-10-25 NOTE — PROGRESS NOTES
Oncology/Hematology Progress Note               Author: Olivier Osman Date & Time created: 10/24/2017  5:21 PM     CCAnaplastic astrocytoma . Admitted with seizure.   Interval History:  Continues to have mental status changes. Not oriented    Review of Systems:  ROS could not obtain     Physical Exam:  Physical Exam   Constitutional: He appears well-developed and well-nourished. He appears lethargic.   HENT:   Right Ear: External ear normal.   Eyes: Pupils are equal, round, and reactive to light.   Cardiovascular: Normal rate.    Abdominal: Soft. He exhibits distension.   Musculoskeletal: Normal range of motion.   Neurological: He appears lethargic.       Labs:        Invalid input(s): [id number]      Recent Labs      10/22/17   0416  10/23/17   0352  10/24/17   0236   SODIUM  135  134*  131*   POTASSIUM  3.7  3.7  3.8   CHLORIDE  105  105  105   CO2  20  21  19*   BUN  21  20  19   CREATININE  0.37*  0.37*  0.31*   MAGNESIUM  2.5  2.4  2.4   PHOSPHORUS  3.3  3.3  3.1   CALCIUM  8.8  8.7  8.5     Recent Labs      10/22/17   0416  10/23/17   0352  10/24/17   0236   ALTSGPT  99*  90*  77*   ASTSGOT  31  24  19   ALKPHOSPHAT  103*  100*  100*   TBILIRUBIN  0.3  0.3  0.3   PREALBUMIN   --   32.0   --    GLUCOSE  212*  176*  235*     Recent Labs      10/22/17   0416  10/23/17   0352  10/24/17   0236   RBC  4.90  4.74  4.82   HEMOGLOBIN  14.0  13.6*  13.9*   HEMATOCRIT  43.1  41.6*  42.7   PLATELETCT  327  365  355     Recent Labs      10/22/17   0416  10/23/17   0352  10/24/17   0236   WBC  6.6  8.2  8.9   NEUTSPOLYS  84.20*  86.00*  86.30*   LYMPHOCYTES  8.60*  7.20*  6.70*   MONOCYTES  3.90  4.50  3.80   EOSINOPHILS  0.00  0.00  0.00   BASOPHILS  0.30  0.20  0.30   ASTSGOT  31  24  19   ALTSGPT  99*  90*  77*   ALKPHOSPHAT  103*  100*  100*   TBILIRUBIN  0.3  0.3  0.3     Recent Labs      10/22/17   0416  10/23/17   0352  10/24/17   0236   SODIUM  135  134*  131*   POTASSIUM  3.7  3.7  3.8   CHLORIDE  105  105   105   CO2  20  21  19*   GLUCOSE  212*  176*  235*   BUN  21  20  19   CREATININE  0.37*  0.37*  0.31*   CALCIUM  8.8  8.7  8.5     Hemodynamics:  Temp (24hrs), Av.2 °C (97.1 °F), Min:36.1 °C (96.9 °F), Max:36.5 °C (97.7 °F)  Temperature: 36.5 °C (97.7 °F)  Pulse  Av.2  Min: 61  Max: 123  Blood Pressure: 138/85     Respiratory:    Respiration: 16, Pulse Oximetry: 98 %     Work Of Breathing / Effort: Shallow;Mild  RUL Breath Sounds: Clear, RML Breath Sounds: Diminished, RLL Breath Sounds: Diminished, LAYA Breath Sounds: Clear, LLL Breath Sounds: Diminished  Fluids:    Intake/Output Summary (Last 24 hours) at 10/23/17 1446  Last data filed at 10/22/17 1800   Gross per 24 hour   Intake               90 ml   Output                0 ml   Net               90 ml        GI/Nutrition:  Orders Placed This Encounter   Procedures   • DIET NPO     Standing Status:   Standing     Number of Occurrences:   1     Order Specific Question:   Restrict to:     Answer:   Strict [1]     Medical Decision Making, by Problem:  Active Hospital Problems    Diagnosis   • *Encephalopathy acute [G93.40]   • Chronic diastolic CHF (congestive heart failure) (CMS-HCC) [I50.32]   • Seizure disorder (CMS-HCC) [G40.909]   • 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  He had extensive infiltrating lesion and had only partial resection.He took only 3 or 4 days of Temodar concurrently and was admitted to the hospital with pneumonia and  respiratory failure. He had a rough course postoperatively and after radiation    Subsequently, his performance status improved and he was started on maintenance Temodar. I saw him in the clinic around 2 weeks ago and he was doing pretty good at that point. He was then admitted with acute mental status changes, which appears to be secondary to seizures. Infection has been ruled out. He is still disoriented.    MRI does not show clear-cut progression. This is a difficult situation. He had prolonged hospitalizations before, from which he recovered. Unclear whether that will happen again. Given the extent and grade of astrocytoma, multiple comorbidities related to steroids and hospitalizations, hospice will be reasonable if the family decides on it. We will hold further maintenance Temodar. He does not appear to have significant cerebral edema. 2 recommend any Avastin-type treatment. Unfortunately, he may again develop steroid related adverse effects.     Continue Decadron and antiseizure medications. He is on DVT prophylaxis only due to presence of cortical petechial hemorrhages. High risk of developing DVT, but he has a filter in place.    We will sign off for now. Call with questions  Quality-Core Measures

## 2017-10-25 NOTE — CARE PLAN
Problem: Safety  Goal: Will remain free from injury  Restraints to bilat wrists in place, released and assessed q2h and prn. No s/s breakdown noted. Educated pt on need for restraints and discontinuation criteria, no s/s understanding, pt confused. Will continue to monitor.    Problem: Venous Thromboembolism (VTW)/Deep Vein Thrombosis (DVT) Prevention:  Goal: Patient will participate in Venous Thrombosis (VTE)/Deep Vein Thrombosis (DVT)Prevention Measures  lovenox given, see MAR.    Problem: Skin Integrity  Goal: Risk for impaired skin integrity will decrease  Pt turn q2h and prn. Pt incontinent, linens changed as needed. Creams applied. Skin assessed by 2 RNs.

## 2017-10-25 NOTE — PROGRESS NOTES
Recd report, assumed care. Pt confused,  used, unable to follow commands, pt has expressive aphasia, no s/s distress or pain. Assessment complete. Cortrak in place with bridle, HOB >30degrees. Restraints in place. No family at bedside. All needs met. Fall precautions in place, call light in reach, white board updated, hourly rounding in place. Will continue to monitor.

## 2017-10-25 NOTE — THERAPY
"Occupational Therapy Treatment completed with focus on ADLs, ADL transfers, patient education, caregiver training and cognition.  Functional Status:  Pt was seen for Occupational Therapy treatment today, see Therapy Kardex for details.   Treatment included education in breath control with activity and at rest, self pacing techs and energy conservation for pain management. Educated pt in safety awareness techs as well.  Pt's dtr present for family training and to interpret with pt's permission. Pt required Max A for supine to sit at EOB. Pt presents with a posterior lean needing trunk support. Pt confused attempting to remove a some Kerlix wrapped around right wrist to protect an IV sight. Pt insisted and became upset when told to leave it. He was easily distracted when asked to attempt to don pants seated EOB. Pt ws incontinent of urine in bed and in brief. Pt requried Max A for sit to stand and to doff wet brief. Pt able to do some frank care standing with FWW to support dynamic standing balance. Pt required Max A for pant donning and Total A for sock donning. Pt demonstrated Mod A for UB dressing for changing gown. Pt's primary language is [place] and 35-40% of the  time demo empty speech per dtr stating, \"He's not really saying anything\". Lacking context. Pt did think he was communicating not aware. Pt required Mod A for sit to stand with FWW and became very upset when requested pt get BTB. Wrist restraints were attempted to be placed back on but pt's dtr stated she would watch her father to give him a break from wearing restraints to prevent pulling out NG tube and IV.  Pt was left up in bed , call light in reach, bed alarm on, dtr present in room and nursing is aware. RN updated on OT treatment findings and recommendations. Family training is on going. Continue Occupational Therapy services as per plan.    Plan of Care: Will benefit from Occupational Therapy 3 times per week  Discharge Recommendations:  " "Equipment Will Continue to Assess for Equipment Needs. Post-acute therapy Discharge to a transitional care facility for continued skilled therapy services.    See \"Rehab Therapy-Acute\" Patient Summary Report for complete documentation.   "

## 2017-10-25 NOTE — PROGRESS NOTES
Notified by tele monitor tech, HR at 130s and sustaining. Entered pt's room, pt crying, pulling apart padding to siderails for seizure precaut. Used language line,  unable to understand pt, pt blending english and [place]. Explained to pt the need for seizure precautions and its padding, and POC. Pt confused. HR now down to 90's. Call light in reach, will continue to monitor.

## 2017-10-25 NOTE — PROGRESS NOTES
Name:  Ally   ID Number:  [id number]    Content Discussed:  Assessed orientation, pain, explain POC, fall precautions, restraints.

## 2017-10-26 NOTE — PROGRESS NOTES
Monitor Summary: SR 61-90, NV 0.16, QRS 0.10, QT 0.40, d/c from tele at 1540 per strip from monitor room.

## 2017-10-26 NOTE — [facility]
Hospital Medicine Progress Note, Adult, Complex               Author: Cathy Ovalle Date & Time created: 10/26/2017  5:22 AM     CC: aftercare    [place] History:  [place] [facility] [place] confused, in restraints  Incontinent of urine    Review of Systems:  Review of Systems   Unable to perform ROS: Language     T 98.7 P 82 /93 RR 20 SaO2 96% I/O 690/inc  Physical Exam:  Physical Exam   Constitutional: He appears well-developed. No distress.   HENT:   Head: Normocephalic.   Mouth/Throat: No oropharyngeal exudate.   cortrack   Eyes: Conjunctivae are normal. Right eye exhibits no discharge. Left eye exhibits no discharge. No scleral icterus.   Neck: No tracheal deviation present.   Cardiovascular: Normal rate, regular rhythm and intact distal pulses.    Pulmonary/Chest: Effort normal. No respiratory distress. He has no wheezes. He exhibits no tenderness.   diminished   Abdominal: Bowel sounds are normal. He exhibits no distension. There is no tenderness.   obese   Musculoskeletal: He exhibits no edema.   Neurological: He is alert.   Skin: Skin is warm.   Psychiatric:   confused   Vitals reviewed.      Labs:        Invalid input(s): [id number]  Recent Labs      10/26/17   0100   BNPBTYPENAT  4     Recent Labs      10/24/17   0236  10/25/17   0359  10/26/17   0100   SODIUM  131*  134*  135   POTASSIUM  3.8  3.5*  4.0   CHLORIDE  105  103  105   CO2  19*  22  23   BUN  19  18  18   CREATININE  0.31*  0.35*  0.54   MAGNESIUM  2.4  2.5  2.5   PHOSPHORUS  3.1  3.4  3.6   CALCIUM  8.5  8.9  8.5     Recent Labs      10/24/17   0236  10/25/17   0359  10/26/17   0100   ALTSGPT  77*  70*  81*   ASTSGOT  19  18  24   ALKPHOSPHAT  100*  94  91   TBILIRUBIN  0.3  0.3  0.4   GLUCOSE  235*  208*  219*     Recent Labs      10/24/17   0236  10/25/17   0359  10/26/17   0100   RBC  4.82  4.85  5.00   HEMOGLOBIN  13.9*  14.0  14.1   HEMATOCRIT  42.7  43.0  42.9   PLATELETCT  355  384  398   PROTHROMBTM   --    --   12.5    APTT   --    --   20.1*   INR   --    --   0.95     Recent Labs      10/24/17   0236  10/25/17   0359  10/26/17   0100   WBC  8.9  8.6  10.7   NEUTSPOLYS  86.30*  85.90*  86.10*   LYMPHOCYTES  6.70*  6.40*  3.70*   MONOCYTES  3.80  5.10  5.30   EOSINOPHILS  0.00  0.00  0.00   BASOPHILS  0.30  0.40  0.20   ASTSGOT  19  18  24   ALTSGPT  77*  70*  81*   ALKPHOSPHAT  100*  94  91   TBILIRUBIN  0.3  0.3  0.4   Medical Decision Making, by Problem:  Grade 3 astrocytoma with associated petechial hemorrhage s/p partial resection, chemo/XRT   -Temador held per onc   -outpatient follow up   -decadron, change to po  Seizure secondary to above   -zonegran, keppra   -ativan prn  Tx CAP PTA   -CXR improving  PE/DVT s/p IVC filter   -off anticoagulation secondary to risk of ICH   -LE dopplers negative on admit to [facility]  Improved elevated LFTs   -follow   -? MILLER  DM, steroid induced   -add NPH   -RISS  HTN   -metoprolol  Encephalopathy   -restraints for safety   -consider seroquel  Dysphagia   -SLP, TF  ? ANNA aneurysm  Debility   -therapy eval P  Place condom cath          Reviewed items::  Labs reviewed, Medications reviewed and Radiology images reviewed  Mesa catheter::  No Mesa  DVT prophylaxis pharmacological::  Enoxaparin (Lovenox)

## 2017-10-26 NOTE — PROGRESS NOTES
Pt D/C'd. Tele removed. Report given to ANÍBAL Cui at [facility]. Discharge instructions provided to pt.  Pt states understanding.  Pt states all questions have been answered.  Copy of discharge provided to pt.  Signed copy in chart.  Pt states that all personal belongings are in possession. Pt escorted off unit with assistance from Ken with [employer], verified by badholly and pts daughter without incident.

## 2017-10-26 NOTE — DISCHARGE PLANNING
Received call from Charge RN, MEHRAN has still not come to  pt ([id number]).    TC to [facility] for f/u with MEHRAN.

## 2017-10-27 NOTE — [facility]
Hospital Medicine Progress Note, Adult, Complex               Author: Cathy Ovalle Date & Time created: 10/27/2017  5:24 AM     CC: aftercare    Interval History:  Started PT  SLP started ice chips  Patients smiling    Review of Systems:  Review of Systems   Unable to perform ROS: Language     T 97.8P98 /84 RR 22 SaO2 94% I/O 2.1/1.2 no BM  Physical Exam:  Physical Exam   Constitutional: He appears well-developed. No distress.   HENT:   Head: Normocephalic.   Mouth/Throat: No oropharyngeal exudate.   cortrack  cushingoid   Eyes: Conjunctivae are normal. Right eye exhibits no discharge. Left eye exhibits no discharge. No scleral icterus.   Neck: No tracheal deviation present.   Cardiovascular: Normal rate, regular rhythm and intact distal pulses.    Pulmonary/Chest: Effort normal. No respiratory distress. He exhibits no tenderness.   diminished   Abdominal: Bowel sounds are normal. He exhibits no distension. There is no tenderness.   obese   Musculoskeletal: He exhibits no edema.   Neurological: He is alert.   Skin: Skin is warm.   Psychiatric:   confused   Vitals reviewed.      Labs:        Invalid input(s): [id number]  Recent Labs      10/26/17   0100   BNPBTYPENAT  4     Recent Labs      10/25/17   0359  10/26/17   0100   SODIUM  134*  135   POTASSIUM  3.5*  4.0   CHLORIDE  103  105   CO2  22  23   BUN  18  18   CREATININE  0.35*  0.54   MAGNESIUM  2.5  2.5   PHOSPHORUS  3.4  3.6   CALCIUM  8.9  8.5     Recent Labs      10/25/17   0359  10/26/17   0100   ALTSGPT  70*  81*   ASTSGOT  18  24   ALKPHOSPHAT  94  91   TBILIRUBIN  0.3  0.4   PREALBUMIN   --   33.0   GLUCOSE  208*  219*     Recent Labs      10/25/17   0359  10/26/17   0100   RBC  4.85  5.00   HEMOGLOBIN  14.0  14.1   HEMATOCRIT  43.0  42.9   PLATELETCT  384  398   PROTHROMBTM   --   12.5   APTT   --   20.1*   INR   --   0.95     Recent Labs      10/25/17   0359  10/26/17   0100   WBC  8.6  10.7   NEUTSPOLYS  85.90*  86.10*   LYMPHOCYTES   6.40*  3.70*   MONOCYTES  5.10  5.30   EOSINOPHILS  0.00  0.00   BASOPHILS  0.40  0.20   ASTSGOT  18  24   ALTSGPT  70*  81*   ALKPHOSPHAT  94  91   TBILIRUBIN  0.3  0.4   Medical Decision Making, by Problem:  Grade 3 astrocytoma with associated petechial hemorrhage s/p partial resection, chemo/XRT   -Temador held per onc   -outpatient follow up   -decadron  Seizure secondary to above, no seizure since admit   -zonegran, keppra   -ativan prn  Tx CAP PTA   -CXR improved   -no clinical symptoms  PE/DVT s/p IVC filter   -off anticoagulation secondary to risk of ICH   -LE dopplers negative on [facility] to [facility]  Improved elevated LFTs   -follow   -? MILLER  DM, steroid induced   -DC NPH and change to metformin, HbA1C 6.8%   -RISS  HTN   -metoprolol  Encephalopathy   -restraints for safety  Dysphagia   -SLP, TF  ? ANNA aneurysm  Debility   -therapy      Reviewed items::  Medications reviewed  Mesa catheter::  No Mesa  DVT prophylaxis pharmacological::  Enoxaparin (Lovenox)

## 2017-10-28 NOTE — [facility]
Hospital Medicine Progress Note, Adult, Complex               Author: Cathy Ovalle Date & Time created: 10/28/2017  9:35 AM     CC: aftercare    Interval History:  Walked 180 feet with physical therapy  No complaints, smiles    Review of Systems:  Review of Systems   Unable to perform ROS: Language     T 98.5P98 /66 RR 18 SaO2 96% I/O 2.6/1.5 no BM  Physical Exam:  Physical Exam   Constitutional: He appears well-developed. No distress.   HENT:   Head: Normocephalic.   Mouth/Throat: No oropharyngeal exudate.   cortrack  cushingoid   Eyes: Conjunctivae are normal. Right eye exhibits no discharge. Left eye exhibits no discharge.   Neck: No tracheal deviation present.   Cardiovascular: Normal rate, regular rhythm and intact distal pulses.    Pulmonary/Chest: Effort normal. No respiratory distress. He has no wheezes. He exhibits no tenderness.   diminished   Abdominal: Bowel sounds are normal. He exhibits no distension. There is no tenderness.   obese   Musculoskeletal: He exhibits no edema.   Neurological: He is alert.   Skin: Skin is warm. No erythema.   Psychiatric:   confused   Vitals reviewed.      Labs:        Invalid input(s): [id number]  Recent Labs      10/26/17   0100   BNPBTYPENAT  4     Recent Labs      10/26/17   0100   SODIUM  135   POTASSIUM  4.0   CHLORIDE  105   CO2  23   BUN  18   CREATININE  0.54   MAGNESIUM  2.5   PHOSPHORUS  3.6   CALCIUM  8.5     Recent Labs      10/26/17   0100   ALTSGPT  81*   ASTSGOT  24   ALKPHOSPHAT  91   TBILIRUBIN  0.4   PREALBUMIN  33.0   GLUCOSE  219*     Recent Labs      10/26/17   0100   RBC  5.00   HEMOGLOBIN  14.1   HEMATOCRIT  42.9   PLATELETCT  398   PROTHROMBTM  12.5   APTT  20.1*   INR  0.95     Recent Labs      10/26/17   0100   WBC  10.7   NEUTSPOLYS  86.10*   LYMPHOCYTES  3.70*   MONOCYTES  5.30   EOSINOPHILS  0.00   BASOPHILS  0.20   ASTSGOT  24   ALTSGPT  81*   ALKPHOSPHAT  91   TBILIRUBIN  0.4   Medical Decision Making, by Problem:  Grade 3  astrocytoma with associated petechial hemorrhage s/p partial resection, chemo/XRT   -Temador held per onc   -outpatient follow up   -decadron  Seizure secondary to above, no seizure since admit   -zonegran, keppra   -ativan prn  Tx CAP PTA   -CXR improved   -no clinical symptoms  PE/DVT s/p IVC filter   -off anticoagulation secondary to risk of ICH   -LE dopplers negative on admit to [facility]  Improved elevated LFTs   -follow   -? MILLER  DM, steroid induced   -metformin, HbA1C 6.8%   -RISS  HTN   -metoprolol  Encephalopathy   -restraints for safety  Dysphagia   -SLP, TF  ? ANNA aneurysm  Debility   -therapy      Reviewed items::  Medications reviewed  Mesa catheter::  No Mesa  DVT prophylaxis pharmacological::  Enoxaparin (Lovenox)

## 2017-10-29 NOTE — [facility]
Hospital Medicine Progress Note, Adult, Complex               Author: Cathy Ovalle Date & Time created: 10/29/2017  9:17 AM     CC: aftercare    Interval History:  Mesa placed for incontinence and retention  No complaints in my basic questions in Bangladeshi    Review of Systems:  Review of Systems   Unable to perform ROS: Language     T 97.6P104 /96 RR 18 SaO2 93% I/O 2.2/2.1 no BM  Physical Exam:  Physical Exam   Constitutional: He appears well-developed. No distress.   HENT:   Head: Normocephalic.   Mouth/Throat: No oropharyngeal exudate.   cortrack  Cushingoid  smiling   Eyes: Conjunctivae are normal. Right eye exhibits no discharge. Left eye exhibits no discharge. No scleral icterus.   Neck: No tracheal deviation present.   Cardiovascular: Normal rate, regular rhythm and intact distal pulses.    Pulmonary/Chest: Effort normal. No respiratory distress. He has no wheezes. He exhibits no tenderness.   diminished   Abdominal: Bowel sounds are normal. He exhibits no distension. There is no tenderness.   obese   Musculoskeletal: He exhibits no edema.   Neurological: He is alert.   Skin: Skin is warm. No erythema.   Psychiatric:   confused   Vitals reviewed.      Labs:        Invalid input(s): [id number]      No results for input(s): SODIUM, POTASSIUM, CHLORIDE, CO2, BUN, CREATININE, MAGNESIUM, PHOSPHORUS, CALCIUM in the last 72 hours.  No results for input(s): ALTSGPT, ASTSGOT, ALKPHOSPHAT, TBILIRUBIN, DBILIRUBIN, GAMMAGT, AMYLASE, LIPASE, ALB, PREALBUMIN, GLUCOSE in the last 72 hours.  No results for input(s): RBC, HEMOGLOBIN, HEMATOCRIT, PLATELETCT, PROTHROMBTM, APTT, INR, IRON, FERRITIN, TOTIRONBC in the last 72 hours.    Medical Decision Making, by Problem:  Grade 3 astrocytoma with associated petechial hemorrhage s/p partial resection, chemo/XRT   -Temador held per onc   -outpatient follow up   -decadron, ? taper  Seizure secondary to above, no seizure since admit   -zonegran, keppra   -ativan prn  Tx  CAP PTA   -CXR improved   -no clinical symptoms  PE/DVT s/p IVC filter   -off anticoagulation secondary to risk of ICH   [facility]  Improved elevated LFTs   -follow   -? MILLER  DM, steroid induced   -metformin, HbA1C 6.8%   -RISS  HTN   -metoprolol  Encephalopathy   -restraints for safety  Dysphagia   -SLP, TF  ? ANNA aneurysm  Debility   -therapy  Am labs    Reviewed items::  Medications reviewed  Mesa catheter::  Urinary Tract Retention or Urinary Tract Obstruction  DVT prophylaxis pharmacological::  Enoxaparin (Lovenox)

## 2017-10-30 NOTE — [facility]
Hospital Medicine Progress Note, Adult, Complex               Author: Cathy Ovalle Date & Time created: 10/30/2017  10:34 AM     CC: aftercare    Interval History:  Feels well  Metformin increased    Review of Systems:  Review of Systems   Unable to perform ROS: Language     T 98.2P99 /87 RR 18 SaO2 94% I/O 2.1/1.5 no BM  Physical Exam:  Physical Exam   Constitutional: He appears well-developed. No distress.   HENT:   Head: Normocephalic.   Mouth/Throat: No oropharyngeal exudate.   cortrack  Cushingoid  smiling   Eyes: Conjunctivae are normal. Right eye exhibits no discharge. Left eye exhibits no discharge. No scleral icterus.   Neck: No tracheal deviation present.   Cardiovascular: Normal rate, regular rhythm and intact distal pulses.    Pulmonary/Chest: Effort normal. No respiratory distress. He has no wheezes. He exhibits no tenderness.   diminished   Abdominal: Bowel sounds are normal. He exhibits no distension. There is no tenderness.   obese   Musculoskeletal: He exhibits no edema.   Neurological: He is alert.   Skin: Skin is warm. No erythema.   Few bruises   Psychiatric:   confused   Vitals reviewed.      Labs:        Invalid input(s): [id number]      Recent Labs      10/30/17   0555   SODIUM  133*   POTASSIUM  3.7   CHLORIDE  99   CO2  22   BUN  20   CREATININE  0.36*   CALCIUM  8.7     Recent Labs      10/30/17   0555   ALTSGPT  103*   ASTSGOT  31   ALKPHOSPHAT  78   TBILIRUBIN  0.3   GLUCOSE  170*     Recent Labs      10/30/17   0555   RBC  4.80   HEMOGLOBIN  13.8*   HEMATOCRIT  42.0   PLATELETCT  303     Recent Labs      10/30/17   0555   WBC  9.3   ASTSGOT  31   ALTSGPT  103*   ALKPHOSPHAT  78   TBILIRUBIN  0.3   Medical Decision Making, by Problem:  Grade 3 astrocytoma with associated petechial hemorrhage s/p partial resection, chemo/XRT   -Temador held per onc   -outpatient follow up   -decadron, ? taper  Seizure secondary to above, no seizure since admit   -zonegran, keppra   -ativan  prn  Tx CAP PTA   -CXR improved   -no clinical symptoms  PE/DVT s/p IVC filter   -off anticoagulation secondary to risk of ICH   [facility]  Improved elevated LFTs   -follow   -? MILLER  DM, steroid induced   -metformin, HbA1C 6.8%   -RISS  HTN   -metoprolol  Encephalopathy   -restraints for safety  Constipation   -miralax  Dysphagia   -SLP, TF  ? ANNA aneurysm  Debility   -therapy    Reviewed items::  Medications reviewed and Labs reviewed  [place] catheter::  Urinary Tract Retention or Urinary Tract Obstruction  DVT prophylaxis pharmacological::  Enoxaparin (Lovenox)

## 2017-11-01 NOTE — [facility]
Hospital Medicine Progress Note, Adult, Complex               Author: Galindo Kitty Date & Time created: 10/31/2017  5:52 PM     Interval History:  CC - aftercare  Abd very bloated.  Bloody urine in [place] bag.    Review of Systems:  Review of Systems   Unable to perform ROS: Medical condition       Physical Exam:  Physical Exam   Constitutional: No distress.   HENT:   Right Ear: External ear normal.   Left Ear: External ear normal.   Nose: Nose normal.   Eyes: Right eye exhibits no discharge. Left eye exhibits no discharge.   Neck: No tracheal deviation present.   Cardiovascular: Normal rate and regular rhythm.    Pulmonary/Chest: Effort normal and breath sounds normal. No stridor. No respiratory distress. He has no wheezes.   Abdominal: Soft. He exhibits distension. There is no tenderness. There is no rebound and no guarding.   Hypoactive BS   Musculoskeletal: He exhibits no edema.   Skin: Skin is warm and dry. He is not diaphoretic.   Vitals reviewed.      Labs:        Invalid input(s): [id number]      Recent Labs      10/30/17   0555   SODIUM  133*   POTASSIUM  3.7   CHLORIDE  99   CO2  22   BUN  20   CREATININE  0.36*   CALCIUM  8.7     Recent Labs      10/30/17   0555   ALTSGPT  103*   ASTSGOT  31   ALKPHOSPHAT  78   TBILIRUBIN  0.3   GLUCOSE  170*     Recent Labs      10/30/17   0555   RBC  4.80   HEMOGLOBIN  13.8*   HEMATOCRIT  42.0   PLATELETCT  303     Recent Labs      10/30/17   0555   WBC  9.3   ASTSGOT  31   ALTSGPT  103*   ALKPHOSPHAT  78   TBILIRUBIN  0.3             Medical Decision Making, by Problem:  GRADE 3 ASTROCYTOMA S/P PARTIAL RESECTION/CHEMO/XRT - outpt Oncology F/U, taper Decadron?  SKYLA TUMOR PETECHIAL HEMORRHAGE  SZ - Zonegran and Keppra  ANNA ANEURYSM  DVT/PE S/P IVC FILTER - anticoagulation contraindicated  S/P CAP PTA  HTN - Metoprolol  DM - Metformin  ABD DISTENSION - KUB unremarkable, add Simethicone for gaseous distension, hold TF for now  TRANSAMINITIS - follow LFT's  GROSS HEMATURIA -  likely from [place] trauma, flush until clear  ENCEPHALOPATHY  DYSPHAGIA - TF, SLP  PROPH - needs Culturelle    Reviewed w/ RN          Reviewed items::  Medications reviewed and Labs reviewed  [place] catheter::  Strict Intake and Output During Sepisis or Shock  DVT prophylaxis pharmacological::  Enoxaparin (Lovenox)  Ulcer Prophylaxis::  Yes

## 2017-11-01 NOTE — [facility]
Hospital Medicine Progress Note, Adult, Complex               Author: Josie Tang Date & Time created: 11/1/2017  2:36 PM     Interval History:  CC - aftercare  Up in chair, out in corridor.  But PT/OT reports pt appears more sedated than usual.    Review of Systems:  Review of Systems   Unable to perform ROS: Medical condition       Physical Exam:  Physical Exam   Constitutional: No distress.   HENT:   Right Ear: External ear normal.   Left Ear: External ear normal.   Nose: Nose normal.   Eyes: Right eye exhibits no discharge. Left eye exhibits no discharge.   Neck: No tracheal deviation present.   Cardiovascular: Normal rate and regular rhythm.    Pulmonary/Chest: Effort normal and breath sounds normal. No stridor. No respiratory distress. He has no wheezes.   Abdominal: Soft. Bowel sounds are normal. He exhibits distension. There is no tenderness. There is no rebound and no guarding.   Slightly decreased distension   Musculoskeletal: He exhibits no edema.   Neurological:   confused   Skin: Skin is warm and dry. He is not diaphoretic.   Vitals reviewed.      Labs:        Invalid input(s): [id number]      Recent Labs      10/30/17   0555  11/01/17   0239   SODIUM  133*  137   POTASSIUM  3.7  4.6   CHLORIDE  99  104   CO2  22  22   BUN  20  19   CREATININE  0.36*  0.40*   CALCIUM  8.7  9.0     Recent Labs      10/30/17   0555  11/01/17   0239   ALTSGPT  103*  90*   ASTSGOT  31  42   ALKPHOSPHAT  78  69   TBILIRUBIN  0.3  1.4   GLUCOSE  170*  145*     Recent Labs      10/30/17   0555  11/01/17   0619   RBC  4.80  4.90   HEMOGLOBIN  13.8*  14.1   HEMATOCRIT  42.0  43.0   PLATELETCT  303  204     Recent Labs      10/30/17   0555  11/01/17   0239  11/01/17   0619   WBC  9.3   --   17.4*   NEUTSPOLYS   --    --   83.60*   LYMPHOCYTES   --    --   6.30*   MONOCYTES   --    --   5.40   EOSINOPHILS   --    --   0.00   BASOPHILS   --    --   0.30   ASTSGOT  31  42   --    ALTSGPT  103*  90*   --    ALKPHOSPHAT  78  69   --     TBILIRUBIN  0.3  1.4   --              Medical Decision Making, by Problem:  GRADE 3 ASTROCYTOMA S/P PARTIAL RESECTION/CHEMO/XRT - outpt Oncology F/U, taper Decadron?  SKYLA TUMOR PETECHIAL HEMORRHAGE  SZ - Zonegran and Keppra  ANNA ANEURYSM  DVT/PE S/P IVC FILTER - anticoagulation contraindicated  S/P CAP PTA  HTN - Metoprolol  DM - Metformin  ABD DISTENSION - KUB unremarkable, increase Simethicone for gaseous distension, resume trickle TF  LEUKOCYTOSIS - request pancultures, doubt steroids as has been on maintenance dose chronically  TRANSAMINITIS - follow LFT's  GROSS HEMATURIA - likely from [place] trauma, flushed until clear  ENCEPHALOPATHY - decrease sedatives  DYSPHAGIA - TF, SLP    Reviewed w/ Staff          Reviewed items::  Medications reviewed and Labs reviewed  [place] catheter::  Strict Intake and Output During Sepisis or Shock  DVT prophylaxis pharmacological::  Enoxaparin (Lovenox)  Ulcer Prophylaxis::  Yes

## 2017-11-02 NOTE — TELEPHONE ENCOUNTER
Dr. Tang called regarding this patient. She said she did not need an urgent call back. She can be reached at [phone].

## 2017-11-03 NOTE — [facility]
Hospital Medicine Progress Note, Adult, Complex               Author: Josie Tang Date & Time created: 11/2/2017  6:14 PM     Interval History:  CC - aftercare  Abd much less bloated today and pt comfortable.    Review of Systems:  Review of Systems   Unable to perform ROS: Medical condition       Physical Exam:  Physical Exam   Constitutional: No distress.   HENT:   Right Ear: External ear normal.   Left Ear: External ear normal.   Nose: Nose normal.   Eyes: Right eye exhibits no discharge. Left eye exhibits no discharge.   Neck: No tracheal deviation present.   Cardiovascular: Normal rate and regular rhythm.    Pulmonary/Chest: Effort normal and breath sounds normal. No stridor. No respiratory distress. He has no wheezes.   Abdominal: Soft. Bowel sounds are normal. He exhibits no distension. There is no tenderness. There is no rebound and no guarding.   Mild distension   Musculoskeletal: He exhibits no edema.   Neurological:   Awake but confused   Skin: Skin is warm and dry. He is not diaphoretic.   Vitals reviewed.      Labs:        Invalid input(s): [id number]      Recent Labs      11/01/17 0239   SODIUM  137   POTASSIUM  4.6   CHLORIDE  104   CO2  22   BUN  19   CREATININE  0.40*   CALCIUM  9.0     Recent Labs      11/01/17 0239   ALTSGPT  90*   ASTSGOT  42   ALKPHOSPHAT  69   TBILIRUBIN  1.4   GLUCOSE  145*     Recent Labs      11/01/17 0619 11/02/17   0042   RBC  4.90  4.71   HEMOGLOBIN  14.1  13.5*   HEMATOCRIT  43.0  41.2*   PLATELETCT  204  245     Recent Labs      11/01/17 0239 11/01/17 0619 11/02/17   0042   WBC   --   17.4*  10.1   NEUTSPOLYS   --   83.60*  83.80*   LYMPHOCYTES   --   6.30*  5.40*   MONOCYTES   --   5.40  6.10   EOSINOPHILS   --   0.00  0.00   BASOPHILS   --   0.30  0.30   ASTSGOT  42   --    --    ALTSGPT  90*   --    --    ALKPHOSPHAT  69   --    --    TBILIRUBIN  1.4   --    --              Medical Decision Making, by Problem:  GRADE 3 ASTROCYTOMA S/P PARTIAL  RESECTION/CHEMO/XRT - see below  SKYLA TUMOR PETECHIAL HEMORRHAGE  SZ - Zonegran and Keppra  ANNA ANEURYSM  DVT/PE S/P IVC FILTER - anticoagulation contraindicated  S/P CAP PTA  HTN - Metoprolol  DM - Metformin  ABD DISTENSION - KUB unremarkable, gaseous distension improved w/ Simethicone, increase trickle TF  LEUKOCYTOSIS - resolved spontaneously, spurious result?, pancultures negative  TRANSAMINITIS - follow LFT's  GROSS HEMATURIA - likely from [place] trauma, flushed clear  ENCEPHALOPATHY - decreased sedatives  DYSPHAGIA - TF, SLP    Reviewed w/ Dr. Osman, who recommends slow steroid taper to final Decadron dosing of 1 mg BID.  Prognosis poor:  6-12 months per Oncology.  Hospice appropriate if not improved in 2-3 weeks per Dr. Osman.          Reviewed items::  Medications reviewed and Labs reviewed  [place] catheter::  Strict Intake and Output During Sepisis or Shock  DVT prophylaxis pharmacological::  Enoxaparin (Lovenox)  Ulcer Prophylaxis::  Yes

## 2017-11-03 NOTE — [facility]
Hospital Medicine Progress Note, Adult, Complex               Author: Josie Tang Date & Time created: 11/3/2017  10:40 AM     Interval History:  CC - aftercare  No BM x 3 days.  Pt lamenting d/t restraints.    Review of Systems:  Review of Systems   Unable to perform ROS: Medical condition       Physical Exam:  Physical Exam   Constitutional: No distress.   HENT:   Right Ear: External ear normal.   Left Ear: External ear normal.   Nose: Nose normal.   Eyes: Right eye exhibits no discharge. Left eye exhibits no discharge. No scleral icterus.   Neck: No tracheal deviation present.   Cardiovascular: Normal rate and regular rhythm.    Pulmonary/Chest: Effort normal and breath sounds normal. No stridor. No respiratory distress. He has no wheezes.   Abdominal: Soft. Bowel sounds are normal. He exhibits no distension. There is no tenderness. There is no rebound and no guarding.   Mild distension   Musculoskeletal: He exhibits no edema.   Neurological:   Awake but confused   Skin: Skin is warm and dry. He is not diaphoretic.   Vitals reviewed.      Labs:        Invalid input(s): [id number]      Recent Labs      11/01/17 0239   SODIUM  137   POTASSIUM  4.6   CHLORIDE  104   CO2  22   BUN  19   CREATININE  0.40*   CALCIUM  9.0     Recent Labs      11/01/17 0239   ALTSGPT  90*   ASTSGOT  42   ALKPHOSPHAT  69   TBILIRUBIN  1.4   GLUCOSE  145*     Recent Labs      11/01/17 0619 11/02/17   0042   RBC  4.90  4.71   HEMOGLOBIN  14.1  13.5*   HEMATOCRIT  43.0  41.2*   PLATELETCT  204  245     Recent Labs      11/01/17 0239 11/01/17 0619 11/02/17   0042   WBC   --   17.4*  10.1   NEUTSPOLYS   --   83.60*  83.80*   LYMPHOCYTES   --   6.30*  5.40*   MONOCYTES   --   5.40  6.10   EOSINOPHILS   --   0.00  0.00   BASOPHILS   --   0.30  0.30   ASTSGOT  42   --    --    ALTSGPT  90*   --    --    ALKPHOSPHAT  69   --    --    TBILIRUBIN  1.4   --    --              Medical Decision Making, by Problem:  GRADE 3 ASTROCYTOMA S/P  PARTIAL RESECTION/CHEMO/XRT - see below  SKYLA TUMOR PETECHIAL HEMORRHAGE  SZ - Zonegran and Keppra  ANNA ANEURYSM  DVT/PE S/P IVC FILTER - anticoagulation contraindicated  S/P CAP PTA  HTN - Metoprolol  DM - Metformin  ABD DISTENSION - gaseous distension worse again today, will increase Simethicone and F/U KUB, increase trickle TF  LEUKOCYTOSIS - resolved spontaneously, spurious result?, pancultures NGTD  TRANSAMINITIS - follow LFT's  GROSS HEMATURIA - likely from [place] trauma, flushed clear  ENCEPHALOPATHY - decreased sedatives  DYSPHAGIA - TF, SLP  CONSTIPATION - add Senna    Reviewed w/ Dr. Osman 11/2/17.  He recommends slow steroid taper to final Decadron dosing of 1 mg BID.  Prognosis poor:  6-12 months per Oncology.  Hospice appropriate if not improved in 2-3 weeks per Dr. Osman.          Reviewed items::  Medications reviewed and Labs reviewed  [place] catheter::  Strict Intake and Output During Sepisis or Shock  DVT prophylaxis pharmacological::  Enoxaparin (Lovenox)  Ulcer Prophylaxis::  Yes

## 2017-11-05 NOTE — [facility]
Hospital Medicine Progress Note, Adult, Complex               Author: Josie Kitty Date & Time created: 11/4/2017  6:47 PM     Interval History:  CC - aftercare  No BM x 4 days; no emesis or abd pain.    Review of Systems:  Review of Systems   Unable to perform ROS: Medical condition       Physical Exam:  Physical Exam   Constitutional: No distress.   HENT:   Right Ear: External ear normal.   Left Ear: External ear normal.   Nose: Nose normal.   Eyes: Conjunctivae and EOM are normal. Right eye exhibits no discharge. Left eye exhibits no discharge.   Neck: No tracheal deviation present.   Cardiovascular: Normal rate and regular rhythm.    Pulmonary/Chest: Effort normal and breath sounds normal. No stridor. No respiratory distress. He has no wheezes.   Abdominal: Soft. Bowel sounds are normal. He exhibits no distension. There is no tenderness. There is no rebound and no guarding.   Musculoskeletal: He exhibits no edema or tenderness.   Neurological:   arousable   Skin: Skin is warm and dry. He is not diaphoretic.   Vitals reviewed.      Labs:        Invalid input(s): [id number]      No results for input(s): SODIUM, POTASSIUM, CHLORIDE, CO2, BUN, CREATININE, MAGNESIUM, PHOSPHORUS, CALCIUM in the last 72 hours.  No results for input(s): ALTSGPT, ASTSGOT, ALKPHOSPHAT, TBILIRUBIN, DBILIRUBIN, GAMMAGT, AMYLASE, LIPASE, ALB, PREALBUMIN, GLUCOSE in the last 72 hours.  Recent Labs      11/02/17   0042   RBC  4.71   HEMOGLOBIN  13.5*   HEMATOCRIT  41.2*   PLATELETCT  245     Recent Labs      11/02/17   0042   WBC  10.1   NEUTSPOLYS  83.80*   LYMPHOCYTES  5.40*   MONOCYTES  6.10   EOSINOPHILS  0.00   BASOPHILS  0.30             Medical Decision Making, by Problem:  GRADE 3 ASTROCYTOMA S/P PARTIAL RESECTION/CHEMO/XRT - see below  SKYLA TUMOR PETECHIAL HEMORRHAGE  SZ - Zonegran and Keppra  ANNA ANEURYSM  DVT/PE S/P IVC FILTER - anticoagulation contraindicated given above  S/P CAP PTA  HTN - Metoprolol  DM - Metformin  ABD DISTENSION  - gaseous distension improved w/ increased Simethicone, F/U KUB negative, cont to increase trickle TF  LEUKOCYTOSIS - resolved spontaneously, spurious result?, pancultures NGTD  TRANSAMINITIS - follow LFT's  GROSS HEMATURIA - likely from [place] trauma, flushed clear  ENCEPHALOPATHY - more alert w/ decreased sedatives  DYSPHAGIA - TF, SLP  CONSTIPATION - add Colace to Miralax and Senna    Reviewed w/ Dr. Osman 11/2/17.  He recommends slow steroid taper to final Decadron dosing of 1 mg BID.  Prognosis poor:  6-12 months per Oncology.  Hospice appropriate if not improved in 2-3 weeks per Dr. Osman.          Reviewed items::  Medications reviewed and Labs reviewed  [place] catheter::  Strict Intake and Output During Sepisis or Shock  DVT prophylaxis pharmacological::  Enoxaparin (Lovenox)  Ulcer Prophylaxis::  Yes

## 2017-11-06 NOTE — [facility]
Hospital Medicine Progress Note, Adult, Complex               Author: Josie Tang Date & Time created: 11/5/2017  4:02 PM     Interval History:  CC - aftercare  Finally had BM x 2 last 24 hrs.    Review of Systems:  Review of Systems   Unable to perform ROS: Medical condition       Physical Exam:  Physical Exam   Constitutional: No distress.   HENT:   Right Ear: External ear normal.   Left Ear: External ear normal.   Nose: Nose normal.   Eyes: Right eye exhibits no discharge. Left eye exhibits no discharge.   Neck: No tracheal deviation present.   Cardiovascular: Normal rate and regular rhythm.    Pulmonary/Chest: Effort normal and breath sounds normal. No stridor. No respiratory distress. He has no wheezes.   Abdominal: Soft. Bowel sounds are normal. He exhibits no distension. There is no tenderness. There is no rebound and no guarding.   Musculoskeletal: He exhibits no edema or tenderness.   Neurological:   arousable   Skin: Skin is warm and dry. He is not diaphoretic.   Vitals reviewed.      Labs:        Invalid input(s): [id number]      No results for input(s): SODIUM, POTASSIUM, CHLORIDE, CO2, BUN, CREATININE, MAGNESIUM, PHOSPHORUS, CALCIUM in the last 72 hours.  No results for input(s): ALTSGPT, ASTSGOT, ALKPHOSPHAT, TBILIRUBIN, DBILIRUBIN, GAMMAGT, AMYLASE, LIPASE, ALB, PREALBUMIN, GLUCOSE in the last 72 hours.  No results for input(s): RBC, HEMOGLOBIN, HEMATOCRIT, PLATELETCT, PROTHROMBTM, APTT, INR, IRON, FERRITIN, TOTIRONBC in the last 72 hours.              Medical Decision Making, by Problem:  GRADE 3 ASTROCYTOMA S/P PARTIAL RESECTION/CHEMO/XRT - see below  SKYLA TUMOR PETECHIAL HEMORRHAGE  SZ - Zonegran and Keppra  ANNA ANEURYSM  DVT/PE S/P IVC FILTER - anticoagulation contraindicated given above  S/P CAP PTA  HTN - Metoprolol  DM - Metformin  ABD DISTENSION - gaseous distension improved w/ increased Simethicone, F/U KUB negative, cont to increase TF to goal as tolerated  LEUKOCYTOSIS - resolved  spontaneously, spurious result?, pancultures NGTD  TRANSAMINITIS - follow LFT's  GROSS HEMATURIA - likely from [place] trauma, flushed clear  ENCEPHALOPATHY - more alert w/ decreased sedatives  DYSPHAGIA - TF, SLP  CONSTIPATION - bowel regimen    Reviewed w/ Dr. Osman 11/2/17.  He recommends slow steroid taper to final Decadron dosing of 1 mg BID.  Prognosis poor:  6-12 months per Oncology.  Hospice appropriate if not improved in 2-3 weeks per Dr. Osman.          Reviewed items::  Medications reviewed and Labs reviewed  [place] catheter::  Strict Intake and Output During Sepisis or Shock  DVT prophylaxis pharmacological::  Enoxaparin (Lovenox)  Ulcer Prophylaxis::  Yes

## 2017-11-06 NOTE — [facility]
Hospital Medicine Progress Note, Adult, Complex               Author: Josie Kitty Date & Time created: 11/6/2017  3:51 PM     Interval History:  CC - aftercare  This morning's labs not done as ordered.  No clinical changes.  Daughter at bedside.    Review of Systems:  Review of Systems   Unable to perform ROS: Medical condition       Physical Exam:  Physical Exam   Constitutional: No distress.   HENT:   Right Ear: External ear normal.   Left Ear: External ear normal.   Nose: Nose normal.   Eyes: Right eye exhibits no discharge. Left eye exhibits no discharge. No scleral icterus.   Neck: No tracheal deviation present.   Cardiovascular: Normal rate and regular rhythm.    Pulmonary/Chest: Effort normal and breath sounds normal. No stridor. No respiratory distress. He has no wheezes.   Abdominal: Soft. Bowel sounds are normal. He exhibits no distension. There is no tenderness. There is no rebound and no guarding.   Musculoskeletal: He exhibits no edema or tenderness.   Neurological: He is alert.   Awake but confused   Skin: Skin is warm and dry. He is not diaphoretic.   Vitals reviewed.      Labs:        Invalid input(s): [id number]      No results for input(s): SODIUM, POTASSIUM, CHLORIDE, CO2, BUN, CREATININE, MAGNESIUM, PHOSPHORUS, CALCIUM in the last 72 hours.  No results for input(s): ALTSGPT, ASTSGOT, ALKPHOSPHAT, TBILIRUBIN, DBILIRUBIN, GAMMAGT, AMYLASE, LIPASE, ALB, PREALBUMIN, GLUCOSE in the last 72 hours.  No results for input(s): RBC, HEMOGLOBIN, HEMATOCRIT, PLATELETCT, PROTHROMBTM, APTT, INR, IRON, FERRITIN, TOTIRONBC in the last 72 hours.              Medical Decision Making, by Problem:  GRADE 3 ASTROCYTOMA S/P PARTIAL RESECTION/CHEMO/XRT - see below  SKYLA TUMOR PETECHIAL HEMORRHAGE  SZ - Zonegran and Keppra  ANNA ANEURYSM  DVT/PE S/P IVC FILTER - anticoagulation contraindicated given above  S/P CAP PTA  HTN - Metoprolol  DM - Metformin  ABD DISTENSION - gaseous distension improved w/ increased Simethicone,  F/U KUB negative, cont to increase TF to goal as tolerated  LEUKOCYTOSIS - resolved spontaneously, spurious result?, pancultures NGTD  TRANSAMINITIS - follow LFT's  RECURRENT GROSS HEMATURIA - likely from [place] trauma, flush until clear  ENCEPHALOPATHY - more alert w/ decreased sedatives  DYSPHAGIA - TF, SLP  CONSTIPATION - resolved w/ bowel regimen    Reviewed w/ Dr. Osman 11/2/17.  He recommends slow steroid taper to final Decadron dosing of 1 mg BID.  Prognosis poor:  6-12 months per Oncology.  Hospice appropriate if not improved in 2-3 weeks per Dr. Osman.    Dr. Osman's recommendations discussed w/ pt's daughter today.  She wishes to cont full support at this time.  Also reviewed w/ CM.          Reviewed items::  Medications reviewed and Labs reviewed  [place] catheter::  Strict Intake and Output During Sepisis or Shock  DVT prophylaxis pharmacological::  Enoxaparin (Lovenox)  Ulcer Prophylaxis::  Yes

## 2017-11-07 NOTE — [facility]
Hospital Medicine Progress Note, Adult, Complex               Author: Cathy Ovalle Date & Time created: 11/7/2017  6:20 AM     CC: aftercare    Interval History:  No events  Unable to effectively participate in therapy yesterday    Review of Systems:  Review of Systems   Unable to perform ROS: Language     T 98.1P97 /84 RR 18 SaO2 97% I/O 1.7/1.3  3BM  Physical Exam:  Physical Exam   Constitutional: He appears well-developed. No distress.   HENT:   Head: Normocephalic.   Mouth/Throat: No oropharyngeal exudate.   cortrack  Cushingoid  smiling   Eyes: Right eye exhibits no discharge. Left eye exhibits no discharge.   Neck: No tracheal deviation present.   Cardiovascular: Normal rate, regular rhythm and intact distal pulses.    Pulmonary/Chest: Effort normal. No respiratory distress. He has no wheezes. He exhibits no tenderness.   diminished   Abdominal: Bowel sounds are normal. He exhibits distension. There is no tenderness.   obese   Musculoskeletal: He exhibits no edema.   Neurological:   asleep   Skin: Skin is warm. No erythema.   Few bruises   Vitals reviewed.      Labs:        Invalid input(s): [id number]      Recent Labs      11/07/17   0415   SODIUM  138   POTASSIUM  3.6   CHLORIDE  104   CO2  23   BUN  15   CREATININE  0.51   CALCIUM  8.5     Recent Labs      11/07/17   0415   GLUCOSE  136*     Recent Labs      11/07/17   0415   RBC  4.90   HEMOGLOBIN  14.3   HEMATOCRIT  44.0   PLATELETCT  173     Recent Labs      11/07/17   0415   WBC  9.2   Medical Decision Making, by Problem:  Grade 3 astrocytoma with associated petechial hemorrhage s/p partial resection, chemo/XRT   -decadron taper to 1mg BID, prognosis 6-12 months, hospice recommended if no progress in next few weeks   -no further chemo  Seizure secondary to above, last 10/30   -zonegran, keppra   -ativan prn  Tx CAP PTA   -CXR improved   -no clinical symptoms  PE/DVT s/p IVC filter   -off anticoagulation secondary to risk of ICH   -LE  dopplers negative on [facility]  Improved elevated LFTs   -follow   -? MILLER  Ileus   -tolerating TF  DM, steroid induced   -decrease metformin, HbA1C 6.8%   -RISS  Episodic gross hematuria secondary to schulz trauma   -add flomax to aid schulz removal  HTN   -metoprolol  Encephalopathy   -restraints for safety  Constipation   -miralax  Dysphagia   -SLP, TF  ? ANNA aneurysm  Debility   -therapy as able    Reviewed items::  Medications reviewed and Labs reviewed  Schulz catheter::  Urinary Tract Retention or Urinary Tract Obstruction  DVT prophylaxis pharmacological::  Enoxaparin (Lovenox)

## 2017-11-08 NOTE — [facility]
Hospital Medicine Progress Note, Adult, Complex               Author: Cathy Ovalle Date & Time created: 11/8/2017  7:19 AM     CC: aftercare    Interval History:  Pulled schulz, hematuria clears with flushing  No further events noted    Review of Systems:  Review of Systems   Unable to perform ROS: Language     T 97.5P102 /86 RR 26 SaO2 96% I/O2/1.3  3BM  Physical Exam:  Physical Exam   Constitutional: He appears well-developed. No distress.   HENT:   Head: Normocephalic.   Mouth/Throat: No oropharyngeal exudate.   cortrack  Cushingoid  smiling   Eyes: Right eye exhibits no discharge. Left eye exhibits no discharge.   Neck: No tracheal deviation present.   Cardiovascular: Normal rate, regular rhythm and intact distal pulses.    Pulmonary/Chest: Effort normal. No respiratory distress. He has no wheezes. He exhibits no tenderness.   diminished   Abdominal: Bowel sounds are normal. He exhibits distension. There is no tenderness. There is no rebound.   obese   Musculoskeletal: He exhibits no edema.   Neurological: He is alert.   Skin: Skin is warm. No erythema.   Vitals reviewed.      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 MILLER  Ileus   -tolerating TF  DM, steroid induced   -decrease metformin, HbA1C 6.8%   -RISS  Episodic gross hematuria secondary to schulz trauma   -flomax to aid schulz removal  HTN   -metoprolol  Encephalopathy   -restraints for safety  Constipation   -miralax  Dysphagia   -SLP, TF  ? ANNA aneurysm  Debility   -therapy as able    Reviewed items::  Medications reviewed  Schulz catheter::  Urinary Tract Retention or Urinary Tract Obstruction  DVT prophylaxis pharmacological::  Enoxaparin (Lovenox)

## 2017-11-09 NOTE — [facility]
Hospital Medicine Progress Note, Adult, Complex               Author: Cathy Ovalle Date & Time created: 11/9/2017  6:10 AM     CC: aftercare    Interval History:  Started diet, no oral intake recorded  No events per RN  Tolerating TF    Review of Systems:  Review of Systems   Unable to perform ROS: Language     T 98.2P97 /87RR 15 SaO2 94% I/O1.8/1.3 2BM  Physical Exam:  Physical Exam   Constitutional: He appears well-developed. No distress.   HENT:   Head: Normocephalic.   Mouth/Throat: No oropharyngeal exudate.   cortrack  Cushingoid     Eyes: Right eye exhibits no discharge. Left eye exhibits no discharge. No scleral icterus.   Neck: No tracheal deviation present.   Cardiovascular: Normal rate, regular rhythm and intact distal pulses.    Pulmonary/Chest: Effort normal. No respiratory distress. He has no wheezes. He exhibits no tenderness.   diminished   Abdominal: Soft. Bowel sounds are normal. He exhibits distension. There is no tenderness. There is no rebound.   obese   Musculoskeletal: He exhibits no edema.   Neurological: He is alert.   Skin: Skin is warm. No erythema.   Vitals reviewed.      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 MILLER  Ileus   -tolerating TF   -transition to nocturnal with start of diet  DM, steroid induced   -decrease metformin, HbA1C 6.8%   -RISS  Episodic gross hematuria secondary to schulz trauma   -flomax to aid schulz removal  HTN   -metoprolol  Encephalopathy   -restraints for safety  Constipation   -miralax  Dysphagia   -SLP, TF, po  ? ANNA aneurysm  Debility   -therapy as able    Reviewed items::  Medications reviewed  Schulz catheter::  Urinary Tract Retention or Urinary Tract Obstruction  DVT prophylaxis pharmacological::  Enoxaparin (Lovenox)

## 2017-11-10 NOTE — [facility]
Hospital Medicine Progress Note, Adult, Complex               Author: Cathy Ovalle Date & Time created: 11/10/2017  6:30 AM     CC: aftercare    Interval History:  Eating well, cortrack out  Wbc up  No events per staff    Review of Systems:  Review of Systems   Unable to perform ROS: Language     T 97.6P93 /98RR 18SaO2 94% I/O1.8/1.7 1BM  Physical Exam:  Physical Exam   Constitutional: He appears well-developed. No distress.   HENT:   Head: Normocephalic.   Mouth/Throat: No oropharyngeal exudate.   Cushingoid     Eyes: Right eye exhibits no discharge. Left eye exhibits no discharge. No scleral icterus.   Neck: No tracheal deviation present.   Cardiovascular: Normal rate, regular rhythm and intact distal pulses.    Pulmonary/Chest: Effort normal. No respiratory distress. He has no wheezes. He exhibits no tenderness.   diminished   Abdominal: Soft. Bowel sounds are normal. He exhibits distension. There is no tenderness. There is no rebound.   obese   Musculoskeletal: He exhibits no edema.   Neurological: He is alert.   Skin: Skin is warm. No erythema.   Vitals reviewed.      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 Steroid taper  Tx CAP PTA   -CXR improved   -no clinical symptoms  PE/DVT s/p IVC filter   -off anticoagulation secondary to risk of ICH   [facility]  Improved elevated LFTs   -follow   -? MILLER, todays not drawn   Ileus, resolved  DM, steroid induced   -metformin, HbA1C 6.8%   -RISS  Episodic gross hematuria secondary to schulz trauma   -flomax to aid schulz removal  HTN   -metoprolol increase  Encephalopathy   -restraints for safety  Constipation   -miralax  Dysphagia   -po  ? ANNA aneurysm  Debility   -therapy as able    Reviewed items::  Medications reviewed and Labs reviewed  Schulz catheter::  Urinary Tract Retention or Urinary Tract Obstruction  DVT prophylaxis pharmacological::  Enoxaparin (Lovenox)

## 2017-11-11 NOTE — [facility]
Hospital Medicine Progress Note, Adult, Complex               Author: Cathy Ovalle Date & Time created: 11/11/2017  9:44 AM     CC: aftercare    Interval History:  No obvious UTI, few wbc, await cx  Hematuria from trauma resolved  C/o mouth pain    Review of Systems:  Review of Systems   Unable to perform ROS: Language     T 97.9P97 /78RR 20SaO2 96% I/O1.1/1 2BM  Physical Exam:  Physical Exam   Constitutional: He appears well-developed.   HENT:   Head: Normocephalic.   Mouth/Throat: No oropharyngeal exudate.   Cushingoid     Eyes: Right eye exhibits no discharge. Left eye exhibits no discharge. No scleral icterus.   Neck: No tracheal deviation present.   Cardiovascular: Normal rate, regular rhythm and intact distal pulses.    Pulmonary/Chest: Effort normal. No respiratory distress. He exhibits no tenderness.   diminished   Abdominal: Soft. Bowel sounds are normal. He exhibits distension. There is no tenderness. There is no rebound.   obese   Musculoskeletal: He exhibits no edema.   Neurological: He is alert.   Skin: Skin is warm. No erythema.   Vitals reviewed.      Labs:        Invalid input(s): [id number]      Recent Labs      11/10/17   0415   SODIUM  140   POTASSIUM  3.8   CHLORIDE  105   CO2  24   BUN  15   CREATININE  0.62   CALCIUM  9.0     Recent Labs      11/10/17   0415   ALTSGPT  81*   ASTSGOT  29   ALKPHOSPHAT  100*   TBILIRUBIN  0.4   DBILIRUBIN  <0.1   GLUCOSE  141*     Recent Labs      11/10/17   0415   RBC  5.00   HEMOGLOBIN  14.5   HEMATOCRIT  44.2   PLATELETCT  164     Recent Labs      11/10/17   0415   WBC  12.7*   NEUTSPOLYS  88.00*   LYMPHOCYTES  6.10*   MONOCYTES  3.70   EOSINOPHILS  0.10   BASOPHILS  0.20   ASTSGOT  29   ALTSGPT  81*   ALKPHOSPHAT  100*   TBILIRUBIN  0.4   Medical Decision Making, by Problem:  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 Steroid taper  Tx CAP PTA   -CXR improved   -no clinical symptoms  PE/DVT s/p IVC filter   -off anticoagulation secondary to risk of ICH   -LE dopplers negative on admit to [facility]  Improved elevated LFTs   -follow   -? MILLER  Ileus, resolved  DM, steroid induced   -metformin, HbA1C 6.8%   -RISS  Episodic gross hematuria secondary to schulz trauma   -flomax   -DC schulz and check PVR  Stomatitis   -add MMW, nystatin  HTN   -metoprolol   Encephalopathy   -restraints for safety  Constipation   -miralax  Dysphagia   -po  ? ANNA aneurysm  Debility   -therapy as able    Reviewed items::  Medications reviewed  Schulz catheter::  Urinary Tract Retention or Urinary Tract Obstruction  DVT prophylaxis pharmacological::  Enoxaparin (Lovenox)

## 2017-11-12 NOTE — [facility]
Hospital Medicine Progress Note, Adult, Complex               Author: Cathy Ovalle Date & Time created: 11/12/2017  9:20 AM     CC: aftercare    Interval History:  Urine cx negative to date  Sedated now, got xanax/oxy at 5am  Tolerating schulz out  Platelets trending down  Eating well    Review of Systems:  Review of Systems   Unable to perform ROS: Language     T 98.3P88 /88RR 20SaO2 93% I/O770/inc 1BM  Physical Exam:  Physical Exam   Constitutional: He appears well-developed.   HENT:   Head: Normocephalic.   Mouth/Throat: No oropharyngeal exudate.   Cushingoid     Eyes: Right eye exhibits no discharge. Left eye exhibits no discharge. No scleral icterus.   Neck: No tracheal deviation present.   Cardiovascular: Normal rate, regular rhythm and intact distal pulses.    Pulmonary/Chest: Effort normal. No respiratory distress. He exhibits no tenderness.   diminished   Abdominal: Soft. Bowel sounds are normal. He exhibits distension. There is no tenderness. There is no rebound.   obese   Musculoskeletal: He exhibits no edema.   Neurological:   sedated   Skin: Skin is warm. No erythema.   Vitals reviewed.      Labs:        Invalid input(s): [id number]      Recent Labs      11/10/17   0415   SODIUM  140   POTASSIUM  3.8   CHLORIDE  105   CO2  24   BUN  15   CREATININE  0.62   CALCIUM  9.0     Recent Labs      11/10/17   0415   ALTSGPT  81*   ASTSGOT  29   ALKPHOSPHAT  100*   TBILIRUBIN  0.4   DBILIRUBIN  <0.1   GLUCOSE  141*     Recent Labs      11/10/17   0415  11/12/17   0400   RBC  5.00  4.55*   HEMOGLOBIN  14.5  13.2*   HEMATOCRIT  44.2  40.8*   PLATELETCT  164  138*     Recent Labs      11/10/17   0415  11/12/17   0400   WBC  12.7*  10.0   NEUTSPOLYS  88.00*  76.00*   LYMPHOCYTES  6.10*  5.00*   MONOCYTES  3.70  1.00   EOSINOPHILS  0.10  1.00   BASOPHILS  0.20  0.00   ASTSGOT  29   --    ALTSGPT  81*   --    ALKPHOSPHAT  100*   --    TBILIRUBIN  0.4   --    Medical Decision Making, by Problem:  Grade 3  astrocytoma with associated petechial hemorrhage s/p partial resection, chemo/XRT   -decadron taper to 1mg BID, prognosis 6-12 months, hospice recommended if no progress in next few weeks   -no further chemo   -repeat CT with lower mentation  Seizure secondary to above, last 10/30   -zonegran, keppra   -ativan prn  Leukocytosis   -resolved   -suspect decadron  Tx CAP PTA   -CXR improved   -no clinical symptoms  PE/DVT s/p IVC filter   -off anticoagulation secondary to risk of ICH   -LE dopplers negative on [facility]  Improved elevated LFTs   -follow   -? MILLER  Ileus, resolved  DM, steroid induced   -metformin, HbA1C 6.8%   -RISS  Episodic gross hematuria secondary to schulz trauma   -voiding without retention (per PVR), incontinent  Stomatitis   -MMW, nystatin  Thrombocytopenia   -check HIT, follow   HTN   -metoprolol   Encephalopathy   -restraints for safety  Constipation   -miralax  Dysphagia   -po  ? ANNA aneurysm  Debility   -therapy as able    Reviewed items::  Medications reviewed and Labs reviewed  Schulz catheter::  No Schulz  DVT prophylaxis pharmacological::  Enoxaparin (Lovenox)

## 2017-11-13 NOTE — [facility]
"Hospital Medicine Progress Note, Adult, Complex               Author: Cathy Ovalle Date & Time created: 11/13/2017  5:55 AM     CC: aftercare    Interval History:  Possible aspiration, NGT replaced  CT with \"evolving infarct\"  Zosyn given for possible pna, CXR clear  [place] replaced for elevated PVR    Review of Systems:  Review of Systems   Unable to perform ROS: Language     T 98.6P70 /79RR 20SaO2 98% I/O1.6/600 1BM  Physical Exam:  Physical Exam   Constitutional: He appears well-developed.   HENT:   Head: Normocephalic.   Mouth/Throat: No oropharyngeal exudate.   Cushingoid     Eyes: Conjunctivae are normal. Right eye exhibits no discharge. Left eye exhibits no discharge. No scleral icterus.   Neck: No tracheal deviation present.   Cardiovascular: Normal rate, regular rhythm and intact distal pulses.    Pulmonary/Chest: Effort normal. No respiratory distress. He has no wheezes. He exhibits no tenderness.   diminished   Abdominal: Soft. Bowel sounds are normal. He exhibits distension. There is no tenderness. There is no rebound.   obese   Musculoskeletal: He exhibits no edema.   Neurological:   Arouses but not alert   Skin: Skin is warm. No erythema.   Vitals reviewed.      Labs:        Invalid input(s): [id number]      No results for input(s): SODIUM, POTASSIUM, CHLORIDE, CO2, BUN, CREATININE, MAGNESIUM, PHOSPHORUS, CALCIUM in the last 72 hours.  No results for input(s): ALTSGPT, ASTSGOT, ALKPHOSPHAT, TBILIRUBIN, DBILIRUBIN, GAMMAGT, AMYLASE, LIPASE, ALB, PREALBUMIN, GLUCOSE in the last 72 hours.  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, chemo/XRT   -decadron taper to 1mg " BID, prognosis 6-12 months, hospice recommended if no progress in next few weeks   -no further chemo   -repeat CT with lower mentation describing evolving infarct, suspect tumor (unable to receive anti-platelets due to hemorrhage)   -check MRI for progression, may assist in care decisions  Seizure secondary to above, last 10/30   -zonegran, keppra   -ativan prn  Dysphagia with aspiration   -CXR clear, DC zosyn   -NPO, TF  Tx CAP PTA   -CXR improved   -no clinical symptoms  PE/DVT s/p IVC filter   -off anticoagulation secondary to risk of ICH   -LE dopplers negative on admit to [facility]  Improved elevated LFTs   -follow   -? MILLER  Ileus, resolved  DM, steroid induced   -metformin, HbA1C 6.8%   -RISS  Episodic gross hematuria secondary to schulz trauma   -voiding without retention (per PVR), incontinent  Stomatitis   -MMW, nystatin  Thrombocytopenia-HIT negavtive  HTN   -metoprolol   Encephalopathy   -restraints for safety  ? ANNA aneurysm  Debility   -therapy as able    Reviewed items::  Medications reviewed and Radiology images reviewed  Schulz catheter::  No Schulz  DVT prophylaxis pharmacological::  Enoxaparin (Lovenox)

## 2017-11-15 NOTE — [facility]
Hospital Medicine Progress Note, Adult, Complex               Author: Josie Tang Date & Time created: 11/15/2017  3:43 PM     Interval History:  CC - aftercare  Had MRI done today, results noted.    Review of Systems:  Review of Systems   Unable to perform ROS: Medical condition       Physical Exam:  Physical Exam   Constitutional: No distress.   HENT:   Right Ear: External ear normal.   Left Ear: External ear normal.   Nose: Nose normal.   Eyes: Right eye exhibits no discharge. Left eye exhibits no discharge.   Neck: No tracheal deviation present.   Cardiovascular: Normal rate and regular rhythm.    Pulmonary/Chest: Effort normal and breath sounds normal. No stridor. No respiratory distress. He has no wheezes.   Abdominal: Soft. Bowel sounds are normal. He exhibits no distension. There is no tenderness. There is no rebound and no guarding.   Musculoskeletal: He exhibits no edema or tenderness.   Neurological:   Resting comfortably   Skin: Skin is warm and dry. He is not diaphoretic.   Vitals reviewed.      Labs:        Invalid input(s): [id number]      Recent Labs      11/14/17 0425   SODIUM  141   POTASSIUM  3.6   CHLORIDE  107   CO2  23   BUN  17   CREATININE  0.51   CALCIUM  8.8     Recent Labs      11/14/17 0425   ALTSGPT  103*   ASTSGOT  24   ALKPHOSPHAT  95   TBILIRUBIN  0.7   GLUCOSE  160*     Recent Labs      11/13/17   0540  11/14/17 0425   RBC  4.40*  4.53*   HEMOGLOBIN  12.8*  13.2*   HEMATOCRIT  39.7*  40.6*   PLATELETCT  132*  144*     Recent Labs      11/13/17 0540  11/14/17 0425   WBC  7.4  5.8   NEUTSPOLYS  84.00*   --    LYMPHOCYTES  4.00*   --    MONOCYTES  7.00   --    EOSINOPHILS  0.00   --    BASOPHILS  0.00   --    ASTSGOT   --   24   ALTSGPT   --   103*   ALKPHOSPHAT   --   95   TBILIRUBIN   --   0.7             Medical Decision Making, by Problem:  GRADE 3 ASTROCYTOMA S/P PARTIAL RESECTION/CHEMO/XRT - Dr. Osman (Onc) recommends slow steroid taper to final Decadron dosing of 1 mg  BID, prognosis 6-12 months, Hospice appropriate if not improved in few weeks  SKYLA TUMOR PETECHIAL HEMORRHAGE - F/U MRI no interval changes  SZ - Zonegran and Keppra  ANNA ANEURYSM  DVT/PE S/P IVC FILTER - anticoagulation contraindicated given above  S/P CAP PTA  HTN - Metoprolol  DM - Metformin  ILEUS - resolved  TRANSAMINITIS - following LFT's  RECURRENT GROSS HEMATURIA - likely from [place] trauma, flush until clear  ENCEPHALOPATHY - no significant improvement  DYSPHAGIA - TF, SLP  FULL CODE - family wants to cont full support at this time    Reviewed w/ Staff        Reviewed items::  Medications reviewed and Labs reviewed  [place] catheter::  Strict Intake and Output During Sepisis or Shock  DVT prophylaxis pharmacological::  Contraindicated - High bleeding risk  DVT prophylaxis - mechanical:  SCDs  Ulcer Prophylaxis::  Yes

## 2017-11-17 NOTE — [facility]
Hospital Medicine Progress Note, Adult, Complex               Author: Josie Tang Date & Time created: 11/16/2017  5:24 PM     Interval History:  CC - aftercare  RN reports that pt agitated and requesting sedation.    Review of Systems:  Review of Systems   Unable to perform ROS: Medical condition       Physical Exam:  Physical Exam   Constitutional: No distress.   HENT:   Right Ear: External ear normal.   Left Ear: External ear normal.   Nose: Nose normal.   Eyes: Right eye exhibits no discharge. Left eye exhibits no discharge. No scleral icterus.   Neck: No tracheal deviation present.   Cardiovascular: Normal rate and regular rhythm.    Pulmonary/Chest: Effort normal and breath sounds normal. No stridor. No respiratory distress. He has no wheezes.   Abdominal: Soft. Bowel sounds are normal. He exhibits no distension. There is no tenderness. There is no rebound and no guarding.   Musculoskeletal: He exhibits no edema or tenderness.   Neurological:   Resting comfortably   Skin: Skin is warm and dry. He is not diaphoretic.   Vitals reviewed.      Labs:        Invalid input(s): [id number]      Recent Labs      11/14/17 0425   SODIUM  141   POTASSIUM  3.6   CHLORIDE  107   CO2  23   BUN  17   CREATININE  0.51   CALCIUM  8.8     Recent Labs      11/14/17 0425   ALTSGPT  103*   ASTSGOT  24   ALKPHOSPHAT  95   TBILIRUBIN  0.7   GLUCOSE  160*     Recent Labs      11/14/17 0425   RBC  4.53*   HEMOGLOBIN  13.2*   HEMATOCRIT  40.6*   PLATELETCT  144*     Recent Labs      11/14/17 0425   WBC  5.8   ASTSGOT  24   ALTSGPT  103*   ALKPHOSPHAT  95   TBILIRUBIN  0.7             Medical Decision Making, by Problem:  GRADE 3 ASTROCYTOMA S/P PARTIAL RESECTION/CHEMO/XRT - Dr. Osman (Onc) recommends slow steroid taper to final Decadron dosing of 1 mg BID, prognosis 6-12 months, Hospice appropriate if not improved in few weeks  SKYLA TUMOR PETECHIAL HEMORRHAGE - F/U MRI no interval changes  SZ - Zonegran and Keppra  ANNA  ANEURYSM  DVT/PE S/P IVC FILTER - anticoagulation contraindicated given above  S/P CAP PTA  HTN - Metoprolol  DM - Metformin  ILEUS - recurrent, trial trickle TF  TRANSAMINITIS - following LFT's  RECURRENT GROSS HEMATURIA - likely from [place] trauma, flush until clear  ENCEPHALOPATHY - no significant improvement  DYSPHAGIA - TF, SLP  FULL CODE - family considering withdrawal of care    Reviewed w/ pt's dtr and RN    ADDENDUM  Met w/ pt's three daughters.  Updated on Dx, Tx, and Px.  Relayed Dr. Osman's recommendations.  They are requesting Hospice consult and DNR code status, which are appropriate given pt's lack of progress and poor prognosis.  Also reviewed w/ Staff.  Face-to-face >40 min.          Reviewed items::  Medications reviewed and Labs reviewed  [place] catheter::  Strict Intake and Output During Sepisis or Shock  DVT prophylaxis pharmacological::  Contraindicated - High bleeding risk  DVT prophylaxis - mechanical:  SCDs  Ulcer Prophylaxis::  Yes

## 2017-11-18 NOTE — [facility]
Hospital Medicine Progress Note, Adult, Complex               Author: Josie Tang Date & Time created: 11/17/2017  8:08 PM     Interval History:  CC - aftercare  Pt pulled out NGT again today, replaced by nursing staff.    Review of Systems:  Review of Systems   Unable to perform ROS: Medical condition       Physical Exam:  Physical Exam   Constitutional: No distress.   HENT:   Right Ear: External ear normal.   Left Ear: External ear normal.   Nose: Nose normal.   Eyes: Right eye exhibits no discharge. Left eye exhibits no discharge.   Neck: No tracheal deviation present.   Cardiovascular: Normal rate and regular rhythm.    Pulmonary/Chest: Effort normal and breath sounds normal. No stridor. No respiratory distress. He has no wheezes.   Abdominal: Soft. Bowel sounds are normal. He exhibits no distension. There is no tenderness. There is no rebound and no guarding.   Musculoskeletal: He exhibits no edema or tenderness.   Neurological:   Resting comfortably   Skin: Skin is warm and dry. He is not diaphoretic.   Vitals reviewed.      Labs:        Invalid input(s): [id number]      Recent Labs      11/17/17   0405   SODIUM  139   POTASSIUM  3.3*   CHLORIDE  108   CO2  22   BUN  9   CREATININE  0.44*   CALCIUM  8.8     Recent Labs      11/17/17   0405   ALTSGPT  91*   ASTSGOT  29   ALKPHOSPHAT  95   TBILIRUBIN  0.7   GLUCOSE  95     Recent Labs      11/17/17   0405   RBC  4.94   HEMOGLOBIN  14.5   HEMATOCRIT  44.3   PLATELETCT  165     Recent Labs      11/17/17   0405   WBC  5.8   NEUTSPOLYS  73.00*   LYMPHOCYTES  20.00*   MONOCYTES  3.00   EOSINOPHILS  0.00   BASOPHILS  0.00   ASTSGOT  29   ALTSGPT  91*   ALKPHOSPHAT  95   TBILIRUBIN  0.7             Medical Decision Making, by Problem:  GRADE 3 ASTROCYTOMA S/P PARTIAL RESECTION/CHEMO/XRT - Dr. Osman (Onc) recommends slow steroid taper to final Decadron dosing of 1 mg BID, prognosis 6-12 months, Hospice appropriate if not improved in few weeks  SKYLA TUMOR PETECHIAL  HEMORRHAGE - F/U MRI no interval changes  SZ - Zonegran and Keppra  ANNA ANEURYSM  DVT/PE S/P IVC FILTER - anticoagulation contraindicated given above  S/P CAP PTA  HTN - Metoprolol  DM - Metformin  ILEUS - recurrent and resolving, cont trickle TF  HYPOKALEMIA - replete  TRANSAMINITIS - following LFT's  S/P RECURRENT GROSS HEMATURIA - likely from [place] trauma, resolved w/ flushing  ENCEPHALOPATHY - no significant improvement  DYSPHAGIA - TF, SLP  DNR - family considering Hospice    Reviewed w/ CM          Reviewed items::  Medications reviewed and Labs reviewed  [place] catheter::  Strict Intake and Output During Sepisis or Shock  DVT prophylaxis pharmacological::  Contraindicated - High bleeding risk  DVT prophylaxis - mechanical:  SCDs  Ulcer Prophylaxis::  Yes

## 2017-11-19 NOTE — [facility]
Hospital Medicine Progress Note, Adult, Complex               Author: Josie Kitty Date & Time created: 11/19/2017  3:28 PM     Interval History:  CC - aftercare  No overnight issues.    Review of Systems:  Review of Systems   Unable to perform ROS: Medical condition       Physical Exam:  Physical Exam   Constitutional: No distress.   HENT:   Right Ear: External ear normal.   Left Ear: External ear normal.   Nose: Nose normal.   Eyes: Right eye exhibits no discharge. Left eye exhibits no discharge.   Neck: No tracheal deviation present.   Cardiovascular: Normal rate and regular rhythm.    Pulmonary/Chest: Effort normal and breath sounds normal. No stridor. No respiratory distress. He has no wheezes.   Abdominal: Soft. Bowel sounds are normal. He exhibits no distension. There is no tenderness. There is no rebound and no guarding.   Musculoskeletal: He exhibits no edema or tenderness.   Neurological:   Resting comfortably   Skin: Skin is warm and dry. He is not diaphoretic.   Vitals reviewed.      Labs:        Invalid input(s): [id number]      Recent Labs      11/17/17   0405   SODIUM  139   POTASSIUM  3.3*   CHLORIDE  108   CO2  22   BUN  9   CREATININE  0.44*   CALCIUM  8.8     Recent Labs      11/17/17   0405   ALTSGPT  91*   ASTSGOT  29   ALKPHOSPHAT  95   TBILIRUBIN  0.7   GLUCOSE  95     Recent Labs      11/17/17   0405   RBC  4.94   HEMOGLOBIN  14.5   HEMATOCRIT  44.3   PLATELETCT  165     Recent Labs      11/17/17   0405   WBC  5.8   NEUTSPOLYS  73.00*   LYMPHOCYTES  20.00*   MONOCYTES  3.00   EOSINOPHILS  0.00   BASOPHILS  0.00   ASTSGOT  29   ALTSGPT  91*   ALKPHOSPHAT  95   TBILIRUBIN  0.7             Medical Decision Making, by Problem:  GRADE 3 ASTROCYTOMA S/P PARTIAL RESECTION/CHEMO/XRT - Dr. Osman (Onc) recommends slow steroid taper to final Decadron dosing of 1 mg BID, prognosis 6-12 months, Hospice appropriate if not improved in few weeks  SKYLA TUMOR PETECHIAL HEMORRHAGE - F/U MRI no interval  changes  SZ - Zonegran and Keppra  ANNA ANEURYSM  DVT/PE S/P IVC FILTER - anticoagulation contraindicated given above  S/P CAP PTA  HTN - Metoprolol  DM - Metformin  ILEUS - recurrent and resolving, cont to increase TF to goal as tolerated  HYPOKALEMIA - repleted  TRANSAMINITIS - following LFT's  RECURRENT GROSS HEMATURIA - likely from [place] trauma, resolves w/ flushing  ENCEPHALOPATHY - no significant improvement  DYSPHAGIA - TF, SLP  DNR - family considering Hospice            Reviewed items::  Medications reviewed and Labs reviewed  [place] catheter::  Strict Intake and Output During Sepisis or Shock  DVT prophylaxis pharmacological::  Contraindicated - High bleeding risk  DVT prophylaxis - mechanical:  SCDs  Ulcer Prophylaxis::  Yes

## 2017-11-19 NOTE — [facility]
Hospital Medicine Progress Note, Adult, Complex               Author: Josie Tang Date & Time created: 11/18/2017  4:17 PM     Interval History:  CC - aftercare  No 24 hr clinical changes.    Review of Systems:  Review of Systems   Unable to perform ROS: Medical condition       Physical Exam:  Physical Exam   Constitutional: No distress.   HENT:   Right Ear: External ear normal.   Left Ear: External ear normal.   Nose: Nose normal.   Eyes: Right eye exhibits no discharge. Left eye exhibits no discharge. No scleral icterus.   Neck: No tracheal deviation present.   Cardiovascular: Normal rate and regular rhythm.    Pulmonary/Chest: Effort normal and breath sounds normal. No stridor. No respiratory distress. He has no wheezes.   Abdominal: Soft. Bowel sounds are normal. He exhibits no distension. There is no tenderness. There is no rebound and no guarding.   Musculoskeletal: He exhibits no edema or tenderness.   Neurological:   Resting comfortably   Skin: Skin is warm and dry. He is not diaphoretic.   Vitals reviewed.      Labs:        Invalid input(s): [id number]      Recent Labs      11/17/17   0405   SODIUM  139   POTASSIUM  3.3*   CHLORIDE  108   CO2  22   BUN  9   CREATININE  0.44*   CALCIUM  8.8     Recent Labs      11/17/17   0405   ALTSGPT  91*   ASTSGOT  29   ALKPHOSPHAT  95   TBILIRUBIN  0.7   GLUCOSE  95     Recent Labs      11/17/17   0405   RBC  4.94   HEMOGLOBIN  14.5   HEMATOCRIT  44.3   PLATELETCT  165     Recent Labs      11/17/17   0405   WBC  5.8   NEUTSPOLYS  73.00*   LYMPHOCYTES  20.00*   MONOCYTES  3.00   EOSINOPHILS  0.00   BASOPHILS  0.00   ASTSGOT  29   ALTSGPT  91*   ALKPHOSPHAT  95   TBILIRUBIN  0.7             Medical Decision Making, by Problem:  GRADE 3 ASTROCYTOMA S/P PARTIAL RESECTION/CHEMO/XRT - Dr. Osman (Onc) recommends slow steroid taper to final Decadron dosing of 1 mg BID, prognosis 6-12 months, Hospice appropriate if not improved in few weeks  SKYLA TUMOR PETECHIAL HEMORRHAGE -  F/U MRI no interval changes  SZ - Zonegran and Keppra  ANNA ANEURYSM  DVT/PE S/P IVC FILTER - anticoagulation contraindicated given above  S/P CAP PTA  HTN - Metoprolol  DM - Metformin  ILEUS - recurrent and resolving, cont to increase trickle TF  HYPOKALEMIA - repleted  TRANSAMINITIS - following LFT's  S/P RECURRENT GROSS HEMATURIA - likely from [place] trauma, resolved w/ flushing  ENCEPHALOPATHY - no significant improvement  DYSPHAGIA - TF, SLP  DNR - family considering Hospice            Reviewed items::  Medications reviewed and Labs reviewed  [place] catheter::  Strict Intake and Output During Sepisis or Shock  DVT prophylaxis pharmacological::  Contraindicated - High bleeding risk  DVT prophylaxis - mechanical:  SCDs  Ulcer Prophylaxis::  Yes

## 2017-11-21 NOTE — [facility]
Hospital Medicine Progress Note, Adult, Complex               Author: Cathy Ovalle Date & Time created: 11/21/2017  5:44 AM     CC: aftercare    Interval History:  No events per staff  Remains in restraints  TF at 48  Blood tinged urine in schulz    Review of Systems:  Review of Systems   Unable to perform ROS: Language     T 98.9Z805IZ 122/84RR 19SaO2 97% I/O1.2/900 noBM  Physical Exam:  Physical Exam   Constitutional: He appears well-developed.   HENT:   Head: Normocephalic.   Mouth/Throat: No oropharyngeal exudate.   Cushingoid  NGT     Eyes: Conjunctivae are normal. Right eye exhibits no discharge. Left eye exhibits no discharge.   Neck: No tracheal deviation present.   Cardiovascular: Normal rate, regular rhythm and intact distal pulses.    Pulmonary/Chest: Effort normal. No respiratory distress. He has no wheezes. He exhibits no tenderness.   diminished   Abdominal: Soft. Bowel sounds are normal. He exhibits distension. There is no tenderness. There is no rebound.   obese   Musculoskeletal: He exhibits no edema.   Neurological:   Awake, restless, NUNEZ   Skin: Skin is warm. No erythema.   Vitals reviewed.      Labs:        Invalid input(s): [id number]      No results for input(s): SODIUM, POTASSIUM, CHLORIDE, CO2, BUN, CREATININE, MAGNESIUM, PHOSPHORUS, CALCIUM in the last 72 hours.  No results for input(s): ALTSGPT, ASTSGOT, ALKPHOSPHAT, TBILIRUBIN, DBILIRUBIN, GAMMAGT, AMYLASE, LIPASE, ALB, PREALBUMIN, GLUCOSE in the last 72 hours.  No results for input(s): RBC, HEMOGLOBIN, HEMATOCRIT, PLATELETCT, PROTHROMBTM, APTT, INR, IRON, FERRITIN, TOTIRONBC in the last 72 hours.    Medical Decision Making, by Problem:  Grade 3 astrocytoma with associated petechial hemorrhage s/p partial resection, chemo/XRT   -decadron taper to 1mg BID (on 2mg BID)   -no further chemo   -awaiting hospice consult  Seizure secondary to above, last 10/30   -zonegran, keppra   -ativan prn  Dysphagia    -NGT  Tx CAP PTA  PE/DVT s/p IVC  filter   -off anticoagulation secondary to risk of ICH   -LE dopplers negative on admit to [facility]  Increased LFTs   -follow   -? MILLER  Ileus, intermittent   -tolerating TF at 48  DM, steroid induced   -metformin, HbA1C 6.8%   -RISS  Episodic gross hematuria secondary to schulz trauma   -flush prn  Urinary retention   -schulz, flomax  HTN   -metoprolol   Encephalopathy   -restraints for safety  ANNA aneurysm  Debility  DNR  Await family decision about hospice      Reviewed items::  Medications reviewed  Schulz catheter::  Urinary Tract Retention or Urinary Tract Obstruction  DVT: hematuria.

## 2017-11-21 NOTE — [facility]
Hospital Medicine Progress Note, Adult, Complex               Author: Josie Tang Date & Time created: 11/20/2017  4:25 PM     Interval History:  CC - aftercare  No 24 hr clinical changes.    Review of Systems:  Review of Systems   Unable to perform ROS: Medical condition       Physical Exam:  Physical Exam   Constitutional: No distress.   HENT:   Right Ear: External ear normal.   Left Ear: External ear normal.   Nose: Nose normal.   Eyes: Right eye exhibits no discharge. Left eye exhibits no discharge.   Neck: No tracheal deviation present.   Cardiovascular: Normal rate and regular rhythm.    Pulmonary/Chest: Effort normal and breath sounds normal. No stridor. No respiratory distress. He has no wheezes.   Abdominal: Soft. Bowel sounds are normal. He exhibits no distension. There is no tenderness. There is no rebound and no guarding.   Musculoskeletal: He exhibits no edema or tenderness.   Neurological:   Resting comfortably   Skin: Skin is warm and dry. He is not diaphoretic.   Vitals reviewed.      Labs:        Invalid input(s): [id number]      No results for input(s): SODIUM, POTASSIUM, CHLORIDE, CO2, BUN, CREATININE, MAGNESIUM, PHOSPHORUS, CALCIUM in the last 72 hours.  No results for input(s): ALTSGPT, ASTSGOT, ALKPHOSPHAT, TBILIRUBIN, DBILIRUBIN, GAMMAGT, AMYLASE, LIPASE, ALB, PREALBUMIN, GLUCOSE in the last 72 hours.  No results for input(s): RBC, HEMOGLOBIN, HEMATOCRIT, PLATELETCT, PROTHROMBTM, APTT, INR, IRON, FERRITIN, TOTIRONBC in the last 72 hours.              Medical Decision Making, by Problem:  GRADE 3 ASTROCYTOMA S/P PARTIAL RESECTION/CHEMO/XRT - Dr. Osman (Onc) recommends slow steroid taper to final Decadron dosing of 1 mg BID, prognosis 6-12 months, Hospice appropriate if not improving  SKYLA TUMOR PETECHIAL HEMORRHAGE - F/U MRI no interval changes  SZ - Zonegran and Keppra  ANNA ANEURYSM  DVT/PE S/P IVC FILTER - anticoagulation contraindicated given above  S/P CAP PTA  HTN - Metoprolol  DM -  Metformin  ILEUS - recurrent and resolving, cont to increase TF to goal as tolerated  HYPOKALEMIA - repleted  TRANSAMINITIS - resolved  RECURRENT GROSS HEMATURIA - likely from [place] trauma, resolves w/ flushing  ENCEPHALOPATHY - no significant improvement  DYSPHAGIA - TF, SLP  DNR - family considering Hospice, Summary/Med Rec completed    Reviewed w/ CM          Reviewed items::  Medications reviewed and Labs reviewed  [place] catheter::  Strict Intake and Output During Sepisis or Shock  DVT prophylaxis pharmacological::  Contraindicated - High bleeding risk  DVT prophylaxis - mechanical:  SCDs  Ulcer Prophylaxis::  Yes

## 2017-11-22 NOTE — [facility]
Hospital Medicine Progress Note, Adult, Complex               Author: Cathy Ovalle Date & Time created: 11/22/2017  8:41 AM     CC: aftercare    Interval History:  Low UOP  BMP P  I ordered IVF this am  Per case management, daughters have decided on hospice    Review of Systems:  Review of Systems   Unable to perform ROS: Language     T 98.2P66BP 136/84RR 19SaO2 96% I/O1.8/400 1BM  Physical Exam:  Physical Exam   Constitutional: He appears well-developed.   HENT:   Head: Normocephalic.   Cushingoid  NGT     Eyes: Conjunctivae are normal. Right eye exhibits no discharge. Left eye exhibits no discharge.   Neck: No tracheal deviation present.   Cardiovascular: Normal rate, regular rhythm and intact distal pulses.    Pulmonary/Chest: Effort normal. No respiratory distress. He exhibits no tenderness.   diminished   Abdominal: Soft. Bowel sounds are normal. He exhibits distension. There is no tenderness.   obese   Musculoskeletal: He exhibits no edema.   Neurological:   Easily aroused   Skin: Skin is warm. No erythema.   Vitals reviewed.      Labs:        Invalid input(s): [id number]      No results for input(s): SODIUM, POTASSIUM, CHLORIDE, CO2, BUN, CREATININE, MAGNESIUM, PHOSPHORUS, CALCIUM in the last 72 hours.  No results for input(s): ALTSGPT, ASTSGOT, ALKPHOSPHAT, TBILIRUBIN, DBILIRUBIN, GAMMAGT, AMYLASE, LIPASE, ALB, PREALBUMIN, GLUCOSE in the last 72 hours.  No results for input(s): RBC, HEMOGLOBIN, HEMATOCRIT, PLATELETCT, PROTHROMBTM, APTT, INR, IRON, FERRITIN, TOTIRONBC in the last 72 hours.    Medical Decision Making, by Problem:  Grade 3 astrocytoma with associated petechial hemorrhage s/p partial resection, chemo/XRT   -decadron taper to 1mg BID (on 2mg BID)   -no further chemo   -awaiting hospice company decision, family has decided to enter hospice per report  Seizure secondary to above, last 10/30   -zonegran, keppra   -ativan prn  Low UOP   -fluid   -await BMP  Dysphagia    -NGT  Tx CAP  PTA  PE/DVT s/p IVC filter   -off anticoagulation secondary to risk of ICH   -LE dopplers negative on admit to [facility]  Increased LFTs   -follow   -? MILLER  Ileus, intermittent   -tolerating TF at 48  DM, steroid induced   -metformin, HbA1C 6.8%   -RISS  Episodic gross hematuria secondary to schulz trauma   -flush prn  Urinary retention   -schulz, flomax  HTN   -metoprolol   Encephalopathy   -restraints for safety  ANNA aneurysm  Debility  DNR        Reviewed items::  Medications reviewed  Schulz catheter::  Urinary Tract Retention or Urinary Tract Obstruction  DVT: hematuria.

## 2017-11-23 NOTE — [facility]
Hospital Medicine Progress Note, Adult, Complex               Author: Cathy Ovalle Date & Time created: 11/23/2017  10:47 AM     CC: aftercare    Interval History:  Pulled NGT 3x yesterday, replaced  Daughters still planning on hospice  Renal function stable despite low UOP Tuesday, I and O sheet not in chart for last 24 hour UOP    Review of Systems:  Review of Systems   Unable to perform ROS: Language     T 97.4P94BP 115/75RR 18SaO2 97% I/O?  Physical Exam:  Physical Exam   Constitutional: He appears well-developed.   HENT:   Head: Normocephalic.   Cushingoid  NGT     Eyes: Conjunctivae are normal. Right eye exhibits no discharge. Left eye exhibits no discharge. No scleral icterus.   Neck: No tracheal deviation present.   Cardiovascular: Normal rate, regular rhythm and intact distal pulses.    Pulmonary/Chest: Effort normal. No respiratory distress. He has no wheezes. He exhibits no tenderness.   diminished   Abdominal: Soft. Bowel sounds are normal. He exhibits distension. There is no tenderness.   obese   Musculoskeletal: He exhibits no edema.   Neurological:   Awake, no command following   Skin: Skin is warm. No erythema.   Vitals reviewed.      Labs:        Invalid input(s): [id number]      Recent Labs      11/22/17   0829   SODIUM  139   POTASSIUM  3.6   CHLORIDE  105   CO2  25   BUN  16   CREATININE  0.51   CALCIUM  8.4     Recent Labs      11/22/17   0829   GLUCOSE  119*     No results for input(s): RBC, HEMOGLOBIN, HEMATOCRIT, PLATELETCT, PROTHROMBTM, APTT, INR, IRON, FERRITIN, TOTIRONBC in the last 72 hours.    Medical Decision Making, by Problem:  Grade 3 astrocytoma with associated petechial hemorrhage s/p partial resection, chemo/XRT   -decadron taper to 1mg BID (on 2mg BID)   -no further chemo   -awaiting hospice company decision, family has decided to enter hospice per report  Seizure secondary to above, last 10/30   -zonegran keppra   -ativan prn  Low UOP, stable renal function   -I and O  not available for me  Dysphagia    -NGT  Tx CAP PTA  PE/DVT s/p IVC filter   -off anticoagulation secondary to risk of ICH   -LE dopplers negative on admit to [facility]  Increased LFTs   -follow   -? MILLER  Ileus, intermittent   -tolerating TF at 48  DM, steroid induced   -metformin, HbA1C 6.8%   -RISS  Episodic gross hematuria secondary to schulz trauma   -flush prn  Urinary retention   -schulz, flomax  HTN   -metoprolol   Encephalopathy   -restraints for safety  ANNA aneurysm  Debility  DNR        Reviewed items::  Medications reviewed, Labs reviewed and Radiology images reviewed  Schulz catheter::  Urinary Tract Retention or Urinary Tract Obstruction  DVT: hematuria.

## 2017-11-24 NOTE — [facility]
Hospital Medicine Progress Note, Adult, Complex               Author: Cathy Ovalle Date & Time created: 11/24/2017  10:01 AM     CC: aftercare    Interval History:  Cries when awake per RN  TF at goal without residuals  Not able to participate with exam/questions    Review of Systems:  Review of Systems   Unable to perform ROS: Language     T 99.2P86BP 106/80 RR 20 SaO2 96% I/O 2/1.3 2BM  Physical Exam:  Physical Exam   Constitutional: He appears well-developed.   HENT:   Head: Normocephalic.   Cushingoid  NGT     Eyes: Conjunctivae are normal. Right eye exhibits no discharge. Left eye exhibits no discharge.   Neck: No tracheal deviation present.   Cardiovascular: Normal rate, regular rhythm and intact distal pulses.    Pulmonary/Chest: Effort normal. No respiratory distress. He exhibits no tenderness.   diminished   Abdominal: Soft. Bowel sounds are normal. He exhibits no distension. There is no tenderness.   obese   Musculoskeletal: He exhibits no edema.   Neurological:   asleep   Skin: Skin is warm. No erythema.   Vitals reviewed.      Labs:        Invalid input(s): [id number]      Recent Labs      11/22/17   0829   SODIUM  139   POTASSIUM  3.6   CHLORIDE  105   CO2  25   BUN  16   CREATININE  0.51   CALCIUM  8.4     Recent Labs      11/22/17   0829   GLUCOSE  119*     No results for input(s): RBC, HEMOGLOBIN, HEMATOCRIT, PLATELETCT, PROTHROMBTM, APTT, INR, IRON, FERRITIN, TOTIRONBC in the last 72 hours.    Medical Decision Making, by Problem:  Grade 3 astrocytoma with associated petechial hemorrhage s/p partial resection, chemo/XRT   -decadron taper to 1mg BID (on 2mg BID)   -no further chemo   -awaiting hospice company decision, family has decided to enter hospice per report  Seizure secondary to above, last 10/30   -zonegran, keppra   -ativan prn  Low UOP-resolved  Dysphagia    -NGT  Tx CAP PTA  PE/DVT s/p IVC filter   -off anticoagulation secondary to risk of ICH   -LE dopplers negative [facility]  Increased LFTs   -? MILLER  Ileus, intermittent   -tolerating TF at 70  DM, steroid induced   -metformin, HbA1C 6.8%   -RISS  Episodic gross hematuria secondary to schulz trauma   -flush prn  Urinary retention   -schulz, flomax  HTN   -metoprolol   Encephalopathy   -restraints for safety  ANNA aneurysm  Debility  DNR        Reviewed items::  Medications reviewed  Schulz catheter::  Urinary Tract Retention or Urinary Tract Obstruction  DVT: hematuria.

## 2017-11-25 NOTE — [facility]
Hospital Medicine Progress Note, Adult, Complex               Author: Cathy Ovalle Date & Time created: 11/25/2017  6:44 AM     CC: aftercare    Interval History:  [place] without my ability to understand  [place] replaced after he pulled yesterday  No fever  Awaiting word from case management about discharge home with hospice    Review of Systems:  Review of Systems   Unable to perform ROS: Language     T 98.1P85BP 150/90 RR 20 SaO2 94% I/O 2.4/2.4 2BM  Physical Exam:  Physical Exam   Constitutional: He appears well-developed.   HENT:   Head: Normocephalic.   Cushingoid  NGT     Eyes: Conjunctivae are normal. Right eye exhibits no discharge. Left eye exhibits no discharge. No scleral icterus.   Neck: No tracheal deviation present.   Cardiovascular: Normal rate, regular rhythm and intact distal pulses.    Pulmonary/Chest: Effort normal. No respiratory distress. He exhibits no tenderness.   diminished   Abdominal: Soft. Bowel sounds are normal. He exhibits no distension. There is no tenderness.   obese   Musculoskeletal: He exhibits no edema.   Neurological: He is alert.   Skin: Skin is warm. No erythema.   Vitals reviewed.      Labs:        Invalid input(s): [id number]      Recent Labs      11/22/17   0829   SODIUM  139   POTASSIUM  3.6   CHLORIDE  105   CO2  25   BUN  16   CREATININE  0.51   CALCIUM  8.4     Recent Labs      11/22/17   0829   GLUCOSE  119*     No results for input(s): RBC, HEMOGLOBIN, HEMATOCRIT, PLATELETCT, PROTHROMBTM, APTT, INR, IRON, FERRITIN, TOTIRONBC in the last 72 hours.    Medical Decision Making, by Problem:  Grade 3 astrocytoma with associated petechial hemorrhage s/p partial resection, chemo/XRT   -decadron taper to 1mg BID (on 2mg BID)   -no further chemo   -awaiting discharge with hospice  Seizure secondary to above, last 10/30   -zonegran, keppra   -ativan prn  Low UOP-resolved  Dysphagia    -NGT/TF  Tx CAP PTA  PE/DVT s/p IVC filter   -off anticoagulation secondary to risk of  ICH   -LE dopplers negative on admit [facility]  Increased LFTs   -? MILLER  Ileus, intermittent   -tolerating TF at 70  DM, steroid induced   -metformin, HbA1C 6.8%   -RISS  Episodic gross hematuria secondary to schulz trauma   -flush prn  Urinary retention   -schulz, flomax  HTN   -metoprolol   Encephalopathy   -restraints for safety  ANNA aneurysm  Debility  DNR        Reviewed items::  Medications reviewed  Schulz catheter::  Urinary Tract Retention or Urinary Tract Obstruction  DVT: hematuria.

## 2017-11-26 NOTE — [facility]
Hospital Medicine Progress Note, Adult, Complex               Author: Cathy Ovalle Date & Time created: 11/26/2017  9:33 AM     CC: aftercare    Interval History:  Received 2 doses xanax, 2 doses oxycodone since midnight yesterday    Review of Systems:  Review of Systems   Unable to perform ROS: Language     T 99.2P90BP 112/78 RR 20 SaO2 96% I/O 2.6/1.6 5BM  Physical Exam:  Physical Exam   Constitutional: He appears well-developed.   HENT:   Head: Normocephalic.   Cushingoid  NGT     Eyes: Conjunctivae are normal. Right eye exhibits no discharge. Left eye exhibits no discharge.   Neck: No tracheal deviation present.   Cardiovascular: Normal rate, regular rhythm and intact distal pulses.    Pulmonary/Chest: Effort normal. No respiratory distress. He exhibits no tenderness.   diminished   Abdominal: Soft. Bowel sounds are normal. He exhibits no distension. There is no tenderness.   obese   Musculoskeletal: He exhibits no edema.   Neurological: He is alert.   NUNEZ   Skin: Skin is warm. Rash (eczematous on ankle) noted. No erythema.   Vitals reviewed.      Labs:        Invalid input(s): [id number]      No results for input(s): SODIUM, POTASSIUM, CHLORIDE, CO2, BUN, CREATININE, MAGNESIUM, PHOSPHORUS, CALCIUM in the last 72 hours.  No results for input(s): ALTSGPT, ASTSGOT, ALKPHOSPHAT, TBILIRUBIN, DBILIRUBIN, GAMMAGT, AMYLASE, LIPASE, ALB, PREALBUMIN, GLUCOSE in the last 72 hours.  No results for input(s): RBC, HEMOGLOBIN, HEMATOCRIT, PLATELETCT, PROTHROMBTM, APTT, INR, IRON, FERRITIN, TOTIRONBC in the last 72 hours.    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 MILLER  Ileus, intermittent   -tolerating TF at 70  DM, steroid induced   -metformin, HbA1C 6.8%   -RISS  Episodic gross hematuria secondary to schulz trauma   -flush prn  Urinary retention   -schulz, flomax  HTN   -metoprolol   Encephalopathy   -restraints for safety  Rash   -triamcinolone trial  ANNA aneurysm  Debility  DNR  Am labs      Reviewed items::  Medications reviewed  Schulz catheter::  Urinary Tract Retention or Urinary Tract Obstruction  DVT: hematuria.

## 2017-11-27 NOTE — [facility]
Hospital Medicine Progress Note, Adult, Complex               Author: Cathy Ovalle Date & Time created: 11/27/2017  7:09 AM     CC: aftercare    Interval History:  Received 1 dose xanax/oxydone last night  k low  Pulled NGT again, replaced  Cbc P  Family wants patient to stay a few more days per RN report    Review of Systems:  Review of Systems   Unable to perform ROS: Language     T 96.0N473VE 138/96 RR 19 SaO2 97% I/O 2.1/1 3BM  Physical Exam:  Physical Exam   Constitutional: He appears well-developed. No distress.   HENT:   Head: Normocephalic.   Cushingoid  NGT     Eyes: Conjunctivae are normal. Right eye exhibits no discharge. Left eye exhibits no discharge.   Neck: No tracheal deviation present.   Cardiovascular: Normal rate, regular rhythm and intact distal pulses.    Pulmonary/Chest: Effort normal. No respiratory distress. He exhibits no tenderness.   diminished   Abdominal: Soft. Bowel sounds are normal. He exhibits no distension.   obese   Musculoskeletal: He exhibits no edema.   Neurological:   asleep   Skin: Skin is warm. Rash (eczematous on ankle) noted. No erythema.   Vitals reviewed.      Labs:        Invalid input(s): [id number]      Recent Labs      11/27/17   0555   SODIUM  139   POTASSIUM  3.4*   CHLORIDE  105   CO2  24   BUN  15   CREATININE  0.53   CALCIUM  8.3*     Recent Labs      11/27/17   0555   ALTSGPT  66*   ASTSGOT  26   ALKPHOSPHAT  154*   TBILIRUBIN  0.7   GLUCOSE  147*     No results for input(s): RBC, HEMOGLOBIN, HEMATOCRIT, PLATELETCT, PROTHROMBTM, APTT, INR, IRON, FERRITIN, TOTIRONBC in the last 72 hours.  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 MILLER   -unchanged  Ileus, intermittent   -tolerating TF at 70  DM, steroid induced   -metformin, HbA1C 6.8%   -RISS  Episodic gross hematuria secondary to schulz trauma   -flush prn  Urinary retention   -schulz, flomax  HTN   -metoprolol   Encephalopathy   -restraints for safety  Rash   -triamcinolone  ANNA aneurysm  Debility  DNR        Reviewed items::  Medications reviewed and Labs reviewed  Schulz catheter::  Urinary Tract Retention or Urinary Tract Obstruction  DVT: hematuria.

## 2017-11-29 NOTE — [facility]
Hospital Medicine Progress Note, Adult, Complex               Author: Josie Tang Date & Time created: 11/29/2017  1:36 PM     Interval History:  CC - aftercare  No new problems reported.    Review of Systems:  Review of Systems   Unable to perform ROS: Medical condition       Physical Exam:  Physical Exam   Constitutional: No distress.   HENT:   Right Ear: External ear normal.   Left Ear: External ear normal.   Nose: Nose normal.   Eyes: Right eye exhibits no discharge. Left eye exhibits no discharge.   Neck: No tracheal deviation present.   Cardiovascular: Normal rate and regular rhythm.    Pulmonary/Chest: Effort normal and breath sounds normal. No stridor. No respiratory distress. He has no wheezes.   Abdominal: Soft. Bowel sounds are normal. He exhibits no distension. There is no tenderness. There is no rebound and no guarding.   Musculoskeletal: He exhibits no edema or tenderness.   Neurological:   Resting comfortably w/ intermittent agitation   Skin: Skin is warm and dry. He is not diaphoretic.   Vitals reviewed.      Labs:        Invalid input(s): [id number]      Recent Labs      11/27/17   0555   SODIUM  139   POTASSIUM  3.4*   CHLORIDE  105   CO2  24   BUN  15   CREATININE  0.53   CALCIUM  8.3*     Recent Labs      11/27/17   0555   ALTSGPT  66*   ASTSGOT  26   ALKPHOSPHAT  154*   TBILIRUBIN  0.7   GLUCOSE  147*     Recent Labs      11/27/17   0555   RBC  4.29*   HEMOGLOBIN  12.8*   HEMATOCRIT  38.6*   PLATELETCT  257     Recent Labs      11/27/17   0555   WBC  8.1   ASTSGOT  26   ALTSGPT  66*   ALKPHOSPHAT  154*   TBILIRUBIN  0.7             Medical Decision Making, by Problem:  GRADE 3 ASTROCYTOMA S/P PARTIAL RESECTION/CHEMO/XRT - Dr. Osman (Onc) recommends slow steroid taper to final Decadron dosing of 1 mg BID, prognosis 6-12 months, Hospice appropriate  SKYLA TUMOR PETECHIAL HEMORRHAGE - F/U MRI no interval changes  SZ - Zonegran and Keppra  ANNA ANEURYSM  DVT/PE S/P IVC FILTER - anticoagulation  contraindicated given above  S/P CAP PTA  HTN - Metoprolol  DM - off Metformin  S/P RECURRENT ILEUS  HYPOKALEMIA - 2/2 steroids, repleted  TRANSAMINITIS - resolved  RECURRENT GROSS HEMATURIA - likely from [place] trauma, resolves w/ flushing  ENCEPHALOPATHY - no significant improvement  DYSPHAGIA - D/C NGT/[place]esa per family's request, request Swallow Eval and Bladder Scan  DNR - D/C home w/ Hospice 12/1/17, Summary/Med Rec completed    Reviewed w/ Staff          Reviewed items::  Medications reviewed and Labs reviewed  Mesa catheter::  Strict Intake and Output During Sepisis or Shock  DVT prophylaxis pharmacological::  Contraindicated - High bleeding risk  DVT prophylaxis - mechanical:  SCDs  Ulcer Prophylaxis::  Yes

## 2017-11-29 NOTE — [facility]
Hospital Medicine Progress Note, Adult, Complex               Author: Josie Tang Date & Time created: 11/28/2017  5:51 PM     Interval History:  CC - aftercare  Remains nonpurposeful.    Review of Systems:  Review of Systems   Unable to perform ROS: Medical condition       Physical Exam:  Physical Exam   Constitutional: No distress.   HENT:   Right Ear: External ear normal.   Left Ear: External ear normal.   Nose: Nose normal.   Eyes: Right eye exhibits no discharge. Left eye exhibits no discharge.   Neck: No tracheal deviation present.   Cardiovascular: Normal rate and regular rhythm.    Pulmonary/Chest: Effort normal and breath sounds normal. No stridor. No respiratory distress. He has no wheezes.   Abdominal: Soft. Bowel sounds are normal. He exhibits no distension. There is no tenderness. There is no rebound and no guarding.   Musculoskeletal: He exhibits no edema or tenderness.   Neurological:   Resting comfortably   Skin: Skin is warm and dry. He is not diaphoretic.   Vitals reviewed.      Labs:        Invalid input(s): [id number]      Recent Labs      11/27/17   0555   SODIUM  139   POTASSIUM  3.4*   CHLORIDE  105   CO2  24   BUN  15   CREATININE  0.53   CALCIUM  8.3*     Recent Labs      11/27/17   0555   ALTSGPT  66*   ASTSGOT  26   ALKPHOSPHAT  154*   TBILIRUBIN  0.7   GLUCOSE  147*     Recent Labs      11/27/17   0555   RBC  4.29*   HEMOGLOBIN  12.8*   HEMATOCRIT  38.6*   PLATELETCT  257     Recent Labs      11/27/17   0555   WBC  8.1   ASTSGOT  26   ALTSGPT  66*   ALKPHOSPHAT  154*   TBILIRUBIN  0.7             Medical Decision Making, by Problem:  GRADE 3 ASTROCYTOMA S/P PARTIAL RESECTION/CHEMO/XRT - Dr. Osman (Onc) recommends slow steroid taper to final Decadron dosing of 1 mg BID, prognosis 6-12 months, Hospice appropriate  SKYLA TUMOR PETECHIAL HEMORRHAGE - F/U MRI no interval changes  SZ - Zonegran and Keppra  ANNA ANEURYSM  DVT/PE S/P IVC FILTER - anticoagulation contraindicated given above  S/P  CAP PTA  HTN - Metoprolol  DM - D/C Metformin as TF to be stopped tomorrow  S/P RECURRENT ILEUS  HYPOKALEMIA - 2/2 steroids, replete  TRANSAMINITIS - resolved  RECURRENT GROSS HEMATURIA - likely from Mes[place] trauma, resolves w/ flushing  ENCEPHALOPATHY - no significant improvement  DYSPHAGIA - need to advance malpositioned NGT  DNR - D/C home w/ Hospice 12/1/17, Summary/Med Rec completed    Reviewed w/ pt's daughters and CM          Reviewed items::  Medications reviewed and Labs reviewed  [place] catheter::  Strict Intake and Output During Sepisis or Shock  DVT prophylaxis pharmacological::  Contraindicated - High bleeding risk  DVT prophylaxis - mechanical:  SCDs  Ulcer Prophylaxis::  Yes

## 2017-12-01 NOTE — [facility]
Hospital Medicine Progress Note, Adult, Complex               Author: Josie Kitty Date & Time created: 11/30/2017  8:51 PM     Interval History:  CC - aftercare  Having urinary incontinence and no retention w/ [place] out.    Review of Systems:  Review of Systems   Unable to perform ROS: Medical condition       Physical Exam:  Physical Exam   Constitutional: No distress.   HENT:   Right Ear: External ear normal.   Left Ear: External ear normal.   Nose: Nose normal.   Eyes: Right eye exhibits no discharge. Left eye exhibits no discharge.   Neck: No tracheal deviation present.   Cardiovascular: Normal rate and regular rhythm.    Pulmonary/Chest: Effort normal and breath sounds normal. No stridor. No respiratory distress. He has no wheezes.   Abdominal: Soft. Bowel sounds are normal. He exhibits no distension. There is no tenderness. There is no rebound and no guarding.   Musculoskeletal: He exhibits no edema or tenderness.   Neurological:   Resting comfortably w/ intermittent agitation   Skin: Skin is warm and dry. He is not diaphoretic.   Vitals reviewed.      Labs:        Invalid input(s): [id number]      No results for input(s): SODIUM, POTASSIUM, CHLORIDE, CO2, BUN, CREATININE, MAGNESIUM, PHOSPHORUS, CALCIUM in the last 72 hours.  No results for input(s): ALTSGPT, ASTSGOT, ALKPHOSPHAT, TBILIRUBIN, DBILIRUBIN, GAMMAGT, AMYLASE, LIPASE, ALB, PREALBUMIN, GLUCOSE in the last 72 hours.  No results for input(s): RBC, HEMOGLOBIN, HEMATOCRIT, PLATELETCT, PROTHROMBTM, APTT, INR, IRON, FERRITIN, TOTIRONBC in the last 72 hours.              Medical Decision Making, by Problem:  GRADE 3 ASTROCYTOMA S/P PARTIAL RESECTION/CHEMO/XRT - Dr. Osman (Onc) recommends slow steroid taper to final Decadron dosing of 1 mg BID, prognosis 6-12 months, Hospice appropriate  SKYLA TUMOR PETECHIAL HEMORRHAGE - F/U MRI no interval changes  SZ - Zonegran and Keppra  ANNA ANEURYSM  DVT/PE S/P IVC FILTER - anticoagulation contraindicated given  above  S/P CAP PTA  HTN - Metoprolol  DM - off Metformin, D/C SSI on discharge  S/P RECURRENT ILEUS  HYPOKALEMIA - 2/2 steroids, repleted  TRANSAMINITIS - resolved  RECURRENT GROSS HEMATURIA - likely from [place] trauma, resolved w/ flushing  ENCEPHALOPATHY - no significant improvement  DYSPHAGIA - NGT/[place] out per family's request  DNR - D/C home w/ Hospice 12/1/17, Summary/Med Rec completed    Reviewed w/ CM          Reviewed items::  Medications reviewed and Labs reviewed  [place] catheter::  No [place]  DVT prophylaxis pharmacological::  Contraindicated - High bleeding risk  DVT prophylaxis - mechanical:  SCDs  Ulcer Prophylaxis::  Yes

## 2017-12-09 VITALS — HEART RATE: 84 BPM | RESPIRATION RATE: 30 BRPM | OXYGEN SATURATION: 94 %

## 2018-10-23 NOTE — PROGRESS NOTES
Rechecked pts blood sugar, it was 111.    treated with individualized crisis intervention, psychotropic medication, individual therapy, group therapy and psychotherapy. Patient was started on Effexor XR. Patient responded well to the treatment. Her mood improved. Suicidal thoughts resolved with treatment. Patient showed improvement in mood, insight and judgement. Internal medicine consult was obtained for management of multiple medical problems. On 10/21/18, she c/o pain in her arm, where she had the surgery; the pain gradually intensified, and she developed a fever. She was transferred to a medical floor for treatment of possible infection.      MENTAL STATUS EXAM: Orientation:     Date/Time: [x] yes  [] no    Place: [x] yes  [] no                           Person: [x] yes  [] no  Level of Consciousness: [x] alert  [] drowsy  [] tired  [] lethargic  [] distractable  [] asleep  [] could not be assessed     Manner:   [x] Cooperative  [] Guarded  [] Suspicious  [] Irritable  [] Hostile  [] Withdrawn  [] Other     Motor Activity:   [x] Normal  [] Agitation  [] Motor retardation  [] Tremor  [] Other     Musculoskeletal:   [x] Normal  [] Rigidity  [] Cogwheel  [] Flaccid  [] Tics/TD  [] Other     Speech:   [] Normal  [x] Soft  [x] Slow  [] Dysarthria  [] Incoherent  [] Other     Language:  [x] Normal  [] Expressive Aphasia  [] Fluent Aphasia  [] Other     Mood:  [] Euthymic  [x] Depressed, improving [] Irritable  [] Angry  [] Anxious  [] Fearful  [] Apathetic  [] Euphoric  [] Other     Affect:  [] Euthymic  [x] Depressed, improving  [] Blunted  [] Flat  [] Irritable  [] Angry  [] Anxious    [] Labile  [] Expansive  [] Exaggerated  [] Other     Thought Process/Association:   [x] Normal  [] Tangential  [] Circumstantial  [] Poverty of Thought  [] Gardiner  [] Disorganized  [] Racing Thoughts  [] Flight of Ideas  [] Loose  [] Other     Thought Contents:   [] Hopelessness  [] Worthlessness  [] Hypochondriasis  [] Delusions  [] Paranoia  [x] Ruminations  [] Obsessions/Compulsions  [] Confused [] Hopeful   [] Future Oriented [] Other     Perception:  [x] Normal  [] Hallucinations  [] Auditory  [] Visual  [] Olfactory  [] Tactile    [] Dissociation  [] Flashbacks  [] Other     Attention/Concentration:  [x] Intact  [] Poor  [x] Distractible  [] Other     Cognition:  [x] Intact  [] Impaired   Insight: [] Intact  [x] Fair  [] Limited   Short Term Memory:  [x] Intact  [] Impaired  [] Poor  Judgement:  [] Intact  [x] Fair  [] Limited  Remote Memory:  [x] Intact  [] Impaired  [] Poor    LABS:   Admission on 10/17/2018, Discharged on 10/21/2018   Component Date Value Ref Range Status    Sodium 10/17/2018 140  132 - 144 mEq/L Final    Potassium 10/17/2018 4.6  3.5 - 5.1 mEq/L Final    Chloride 10/17/2018 100  98 - 107 mEq/L Final    CO2 10/17/2018 20* 22 - 29 mEq/L Final    Anion Gap 10/17/2018 20* 7 - 13 mEq/L Final    Glucose 10/17/2018 96  74 - 109 mg/dL Final    BUN 10/17/2018 56* 6 - 20 mg/dL Final    CREATININE 10/17/2018 11.15* 0.50 - 0.90 mg/dL Final    GFR Non- 10/17/2018 3.8* >60 Final    Comment: >60 mL/min/1.73m2 EGFR, calc. for ages 25 and older using the  MDRD formula (not corrected for weight), is valid for stable  renal function.  GFR  10/17/2018 4.6* >60 Final    Comment: >60 mL/min/1.73m2 EGFR, calc. for ages 25 and older using the  MDRD formula (not corrected for weight), is valid for stable  renal function.       Calcium 10/17/2018 10.0  8.6 - 10.2 mg/dL Final    Total Protein 10/17/2018 7.1  6.4 - 8.1 g/dL Final    Alb 10/17/2018 4.3  3.9 - 4.9 g/dL Final    Total Bilirubin 10/17/2018 0.3  0.0 - 1.2 mg/dL Final    Alkaline Phosphatase 10/17/2018 83  40 - 130 U/L Final    ALT 10/17/2018 9  0 - 33 U/L Final    AST 10/17/2018 16  0 - 35 U/L Final    Globulin 10/17/2018 2.8  2.3 - 3.5 g/dL Final    WBC 10/17/2018 9.1  4.8 - 10.8 K/uL Final    RBC 10/17/2018 4.21  4.20 - 5.40 M/uL Final    Hemoglobin 10/17/2018 13.1  12.0 - 16.0 g/dL Final    Hematocrit 10/17/2018 39.7  37.0 - 47.0 % Final    MCV 10/17/2018 94.2  82.0 - 100.0 fL Final    MCH 10/17/2018 31.1  27.0 - 31.3 pg Final    MCHC 10/17/2018 33.1  33.0 - 37.0 % Final    RDW 10/17/2018 18.4* 11.5 - 14.5 % Final    Platelets 40/47/2848 189  130 - 400 K/uL Final    Neutrophils % 10/17/2018 64.9  % Final    Lymphocytes % 10/17/2018 27.7  % Final    Monocytes % 10/17/2018 5.6  % Final    Eosinophils % 10/17/2018 0.6  % Final    Basophils % 10/17/2018 1.2  % Final    Neutrophils # 10/17/2018 5.9  1.4 - 6.5 K/uL Final    Lymphocytes # 10/17/2018 2.5  1.0 - 4.8 K/uL Final    Monocytes # 10/17/2018 0.5  0.2 - 0.8 K/uL Final    Eosinophils # 10/17/2018 0.1  0.0 - 0.7 K/uL Final    Basophils # 10/17/2018 0.1  0.0 - 0.2 K/uL Final    Color, UA 10/17/2018 Yellow  Straw/Yellow Final    Clarity, UA 10/17/2018 Clear  Clear Final    Glucose, Ur 10/17/2018 100* Negative mg/dL Final    Bilirubin Urine 10/17/2018 Negative  Negative Final    Ketones, Urine 10/17/2018 Negative  Negative mg/dL Final    Specific Gravity, UA 10/17/2018 1.010  1.005 - 1.030 Final    Blood, Urine 10/17/2018 TRACE* Negative Final    pH, UA 10/17/2018 8.0  5.0 - 9.0 Final    Protein, UA 10/17/2018 100* Negative mg/dL Final    Urobilinogen, Urine 10/17/2018 0.2  <2.0 E.U./dL Final    Nitrite, Urine 10/17/2018 Negative  Negative Final    Leukocyte Esterase, Urine 10/17/2018 Negative  Negative Final    Urine Reflex to Culture 10/17/2018 YES   Final    Total CK 10/17/2018 149  0 - 170 U/L Final    Amphetamine Screen, Urine 10/17/2018 Neg  Negative <1000 ng/mL Final    Barbiturate Screen, Ur 10/17/2018 Neg  Negative < 200 ng/mL Final    Benzodiazepine Screen, Urine 10/17/2018 POSITIVE* Negative < 200 ng/mL Final    Cannabinoid Scrn, Ur 10/17/2018 POSITIVE* Negative < 50 ng/mL Final    Cocaine Metabolite Screen, Urine 10/17/2018 POSITIVE* Negative < 300 ng/mL Final   

## 2018-12-26 NOTE — PROGRESS NOTES
Pt arrived to unit. Currently on tele. Voiding in urinal and following simple commands.    [facility]    Medicine Progress Note - Hospitalist Service       Date of Admission:  12/21/2018  Assessment & Plan  Paul Milligan is an 79-year-old male with past medical history of remote paroxysmal atrial fibrillation and subjective progressive cognitive decline as reported by daughter since March 2018.  He presented to the Emergency Department for evaluation of 3-day history of acute worsening confusion with associated tachycardia and identified to be in atrial fibrillation with RVR with possible underlying UTI.     AFib with RVR   - Heart Failure mild, unknown EF.- h/o paroxysmal afib. TSH nl.  BNP elevated with small right pleural effusion on CXR, and patchy infiltrate/possible edema, no SOB/hypoxia.    - Started metoprolol 50 mg BID at admission; Started diltiazem  mg daily on 12/23/18   - HR overnight 12/24 noted in high 30s and then increases to 130s with activity  - evaluated by EP; Given dementia suggesting conservative medical therapy; decreased metoprolol further to 12.5 mg po Bid  - Echo with EF 45 % and dilated IVC with some s/o fluid overload but clinically euvolumic  - Given lasix 20 mg daily x 2 days,> discharge further doses on 12/24/18;   - slightly worsening Cr 1.18---1.27---1.12; stabilizing off diuresis    - CHADsVASc 3-4. (+/-CHF)  No evidence of stroke on MRI. Dr Herrera Discussed with patient's daughterVelma that preventing stroke likely outweighs risk of bleed. Despite dementia, physically fit, no history of falls. Both daughters agree.   - Started eliquis 5 mg BID on 12/24.     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 Daughter staying with parents over the 3 days PTA and noted significant confusion overnight, and this is what prompted ER visit (not afib). Head CT showed pronounced atrophy within the hippocampus, MRI negative for stroke, shows small vessel  ischemic disease.   - intermittently agitated; Code Green was called today, required IM Zyprexa  - Appreciate psychiatry consult on 12/24/18. Increased seroquel to 50 mg HS with 12.5 mg BID during the day.  - will reconsult psych   - Zyprexa IM available for emergency.   - Needs TCU at discharge.     Prolonged QTc reviewed EKG shows QT of 510 and due to afib , QTc may be overestimated. Dr york Discussed with EP cardiologist, Dr. Logan. We reviewed EKG. ; repeat EKG with qTc 480    Foul smelling urine -  - RN's reporting foul smell on 12/23/18. Repeat UA remains negative, appears dehydrated. No indication for UCx     Diet: Combination Diet Regular Diet Adult; Low Lactose Diet    DVT Prophylaxis: Pneumatic Compression Devices  Petersen Catheter: not present  Code Status: Full Code      Communication: discussed care plan with his daughter and answered several questions she had; also discussed with care coordinator Alejandra     Disposition Plan   Expected discharge: required sitter again today;  following for disposition; recommended to transitional care unit once off sitter for >24 hrs. PT, OT, Social work consulted.     Entered: Nik Davis MD 12/26/2018, 1:14 PM       Nik Davis MD  Hospitalist Service  [facility]    ______________________________________________________________________    Interval History    -has been noted with intermittent agitation since yesterday requiring sitting again today.     Data reviewed today: I reviewed all medications, new labs and imaging results over the last 24 hours.    Physical Exam   Vital Signs: Temp: 97.1  F (36.2  C) Temp src: Axillary BP: 109/86 Pulse: 70 Heart Rate: 63 Resp: 16 SpO2: 94 % O2 Device: None (Room air)    Weight: 142 lbs 3.18 oz  Constitutional:  NAD, sitting in chair  Neuropsyche:  Not oriented; cooperative and calm sitting in chair with daughter by bedside.  Respiratory:  Breathing comfortably, good air exchange, no  wheezes, no crackles.   Cardiovascular:  Irregular rate and rhythm, tachy, no edema.  GI:  soft, NT/ND, BS normal   Skin/Integumen:  No acute rash or bruising        Data   Recent Labs   Lab 12/26/18  0828 12/25/18  0530 12/24/18  0649 12/23/18  0548 12/22/18  0533 12/21/18  2255 12/21/18  1644 12/21/18  1050   WBC  --   --   --   --   --   --   --  4.4   HGB  --   --   --   --   --   --   --  13.4   MCV  --   --   --   --   --   --   --  98   PLT  --   --   --   --   --   --   --  233    136  --  137 138  --   --  138   POTASSIUM 3.9 3.6 4.0 3.7 3.4  --   --  4.0   CHLORIDE 106 103  --  104 104  --   --  102   CO2 28 27  --  23 25  --   --  28   BUN 24 27  --   --  14  --   --  15   CR 1.12 1.27*  --  1.18 1.10  --   --  1.14   ANIONGAP 6 6  --   --  9  --   --  8   LILIANA 9.2 8.3*  --  8.3* 8.6  --   --  9.2   GLC 94 76  --  81 78  --   --  111*   TROPI  --   --   --   --   --  0.015 0.018 0.016     No results found for this or any previous visit (from the past 24 hour(s)).  Medications     Reason anticoagulant not prescribed for atrial fibrillation         sterile water (preservative free)         apixaban ANTICOAGULANT  5 mg Oral BID     diltiazem ER  120 mg Oral Daily     metoprolol tartrate  12.5 mg Oral BID     QUEtiapine  12.5 mg Oral BID     QUEtiapine  50 mg Oral At Bedtime

## 2019-03-29 NOTE — MR AVS SNAPSHOT
"        Dale DonahueLeonel   3/6/2017 1:00 PM   Office Visit   MRN: [id number]    Department:  [facility]   Dept Phone:  [phone]    Description:  Male : 1960   Provider:  Olivier Osman M.D.           Reason for Visit     Follow-Up 2wk      Allergies as of 3/6/2017     No Known Allergies      You were diagnosed with     Anaplastic astrocytoma of temporal lobe (CMS-HCC)   [600591]         Vital Signs     Blood Pressure Pulse Temperature Respirations Height Weight    106/82 mmHg 119 37.1 °C (98.7 °F) 16 1.58 m (5' 2.19\") 93.713 kg (206 lb 9.6 oz)    Body Mass Index Oxygen Saturation Smoking Status             37.54 kg/m2 96% Former Smoker         Basic Information     Date Of Birth Sex Race Ethnicity Preferred Language    1960 Male White  Origin ([place],Turkish,Sammarinese,Kosovan, etc) [place]      Your appointments     Mar 08, 2017  3:45 PM   LOWER EXTREMITY VENOUS DUPLEX with VASCULAR LAB [facility] [place] [place] EXAM 6   NON-INVASIVE LAB [facility] [place] [place] ([facility])    [address] [facility]  [place] [address]   [phone]           No prep            Mar 13, 2017 11:40 AM   Established Patient with JAGDEEP Kinsey   [address]  [facility] [id number] [phone]           You will be receiving a confirmation call a few days before your appointment from our automated call confirmation system.            Mar 13, 2017  1:40 PM   ONCOLOGY EST PATIENT 30 MIN with Olivier Osman M.D.   [facility] (--)    [address]  [facility] [address] [phone]            Mar 13, 2017  2:15 PM   Non Provider 1 with ONC RN 1   [facility] (--)    [address]  [facility] [address] [phone]           You will be receiving a confirmation call a few days before your appointment from our automated call confirmation system.              Problem List              ICD-10-CM Priority Class Noted - Resolved   " Obesity (BMI 30-39.9) E66.9 Low  5/15/2015 - Present    Chronic pain of right knee M25.561, G89.29   5/15/2015 - Present    Cerebral edema (CMS-HCC) G93.6   1/5/2017 - Present    New onset seizure (CMS-HCC) R56.9 High  1/5/2017 - Present    Anaplastic astrocytoma of temporal lobe (CMS-[facility]) C71.2 High  2/4/2017 - Present      Health Maintenance        Date Due Completion Dates    IMM HEP B VACCINE (1 of 3 - Primary Series) 1960 ---    A1C SCREENING 5/8/1959 ---    DIABETES MONOFILAMENT / LE EXAM 5/8/1959 ---    RETINAL SCREENING 11/8/1976 ---    URINE ACR / MICROALBUMIN 11/8/1976 ---    IMM DTaP/Tdap/Td Vaccine (1 - Tdap) 11/8/1977 ---    COLONOSCOPY 11/8/2008 ---    IMM INFLUENZA (1) 9/1/2016 ---    FASTING LIPID PROFILE 2/16/2018 2/16/2017, 2/7/2017    SERUM CREATININE 2/28/2018 2/28/2017, 2/27/2017, 2/26/2017, 2/23/2017, 2/17/2017, 2/16/2017, 2/11/2017, 2/10/2017, 2/9/2017, 2/8/2017, 2/7/2017, 2/6/2017, 2/5/2017            Current Immunizations     No immunizations on file.      Below and/or attached are the medications your provider expects you to take. Review all of your home medications and newly ordered medications with your provider and/or pharmacist. Follow medication instructions as directed by your provider and/or pharmacist. Please keep your medication list with you and share with your provider. Update the information when medications are discontinued, doses are changed, or new medications (including over-the-counter products) are added; and carry medication information at all times in the event of emergency situations     Allergies:  No Known Allergies          Medications  Valid as of: March 06, 2017 -  1:52 PM    Generic Name Brand Name Tablet Size Instructions for use    Dexamethasone (Tab) DECADRON 4 MG Take 4 mg q 6 hours. Patient has oncologist appointment on        Insulin NPH Human (Isophane) (Suspension) HUMULIN,NOVOLIN 100 UNIT/ML Inject 35 Units as instructed every morning.        Insulin  NPH Human (Isophane) (Suspension) HUMULIN,NOVOLIN 100 UNIT/ML Inject 35 Units as instructed 1/2 hour before dinner.        LevETIRAcetam (Solution) KEPPRA 100 MG/ML Take 15 mL by mouth every 12 hours.        Lidocaine (Patch) LIDODERM 5 % Apply 1 Patch to skin as directed every day.        Metoprolol Tartrate (Tab) LOPRESSOR 25 MG Take 0.75 Tabs by mouth 2 Times a Day.        Rivaroxaban (Tab) XARELTO 15 MG Take 1 Tab by mouth 2 Times a Day.        .                 Medicines prescribed today were sent to:     [id number] - [place], [place] - [address] [facility] [address]    [address] [place] [address]    Phone: [phone] Fax: [phone]    Open 24 Hours?: No    [id number] - [place], [place] - [address]    [address] [facility] [place] [address]    Phone: [phone] Fax: [phone]    Open 24 Hours?: Yes      Medication refill instructions:       If your prescription bottle indicates you have medication refills left, it is not necessary to call your provider’s office. Please contact your pharmacy and they will refill your medication.    If your prescription bottle indicates you do not have any refills left, you may request refills at any time through one of the following ways: The online [employer] system (except Urgent Care), by calling your provider’s office, or by asking your pharmacy to contact your provider’s office with a refill request. Medication refills are processed only during regular business hours and may not be available until the next business day. Your provider may request additional information or to have a follow-up visit with you prior to refilling your medication.   *Please Note: Medication refills are assigned a new Rx number when refilled electronically. Your pharmacy may indicate that no refills were authorized even though a new prescription for the same medication is available at the pharmacy. Please request the  medicine by name with the pharmacy before contacting your provider for a refill.        Your To Do List     Future Labs/Procedures Complete By Expires    CBC WITH DIFFERENTIAL  As directed 3/6/2018    US-EXTREMITY VENOUS BILATERAL LOWER  As directed 3/6/2018    Comments:    VERIFIED W/MD. LE EXTREMITY ONLY         MyChart Status: Patient Declined         No

## 2019-07-14 NOTE — ED PROVIDER NOTES
ED Provider Note    Scribed for Edis Barboza M.D. by Clemente Monk. 3/23/2017, 10:08 PM.    Primary care provider: JAGDEEP Kinsey  Means of arrival: Ambulance  History obtained from: Patient's daughter  History limited by: Persistent mental status changes    CHIEF COMPLAINT  Chief Complaint   Patient presents with   • Fever     Per pt's family, pt. has a fever of 100.2F. Upon arrival pt's temperature was 99.9F. Pt. has brain cancer and has been receiving chemotherapy and radiation 5 times a week per family. Pt. had brain surgery Jan 30 to remove brain tumor. Since then pt. has only been ANO to self.   • Foot Pain     Per family pt. keeps complaining of bilateral foot pain. Pt. has 3+ pitting edema bilaterally.       HPI  Dale Calzada is a 56 y.o. male with a history of glioma brain cancer brought in by ambulance to the Emergency Department with fever, cough, and foot pain. His daughter states his fever has been as high as 101.6°F today. Patient has also had a cough for two weeks. He has been complaining of bilateral foot and leg pain to his daughter, but this has improved since he was started on Lasix. He has loss of appetite and has not been drinking fluids as much as usual. He arrived to the ED with an oxygen saturation in the 70s. Patient has been on chemotherapy and radiation and was seen last week by his primary care provider, at which time he was started on Omnicef. He was then was started on azithromycin today by his primary care provider. He had a craniotomy 1/30/17 and has since only been oriented to self. Patient takes temozolomide. He has a history of DVT and takes Xarelto.     Further history of present illness cannot be obtained due to persistent mental status changes.    REVIEW OF SYSTEMS  See HPI for further details. Further review of systems cannot be obtained at this time secondary to persistent mental status changes. C.    PAST MEDICAL HISTORY   has a past medical  no dysuria, no frequency, and no hematuria. "history of Hyperlipidemia (2010); New onset seizure (CMS-HCC) (1/5/2017); Brain cancer (CMS-HCC); Bell palsy (12/2016); Cancer (CMS-[facility]) (1/31/2017); Diabetes (CMS-HCC); and Bell's palsy (1/2017).    SURGICAL HISTORY   has past surgical history that includes craniotomy tumor (1/31/2017).    SOCIAL HISTORY  Social History   Substance Use Topics   • Smoking status: Former Smoker -- 1.00 packs/day for 30 years     Types: Cigarettes   • Smokeless tobacco: Never Used   • Alcohol Use: No      History   Drug Use No       FAMILY HISTORY  Family History   Problem Relation Age of Onset   • Diabetes Neg Hx    • Seizures Daughter        CURRENT MEDICATIONS  Reviewed.  See Encounter Summary.     ALLERGIES  No Known Allergies    PHYSICAL EXAM  VITAL SIGNS: /64 mmHg  Pulse 102  Temp(Src) 37.7 °C (99.9 °F)  Resp 22  Ht 1.575 m (5' 2\")  Wt 96.616 kg (213 lb)  BMI 38.95 kg/m2  SpO2 93%  Constitutional: Awake, alert.  HENT: Normocephalic, Bilateral external ears normal. Nose normal.   Eyes: Conjunctiva normal, non-icteric, EOMI.    Thorax & Lungs: Crackles in the right lower lobe. No wheezing.   Cardiovascular: Regular rate, Regular rhythm, No murmurs, rubs or gallops.  Abdomen:  Soft, nontender, no masses    Skin: Visualized skin is  Dry, No erythema, No rash.   Extremities: 3+ pitting edema to bilateral lower extremities. No cyanosis or clubbing.  Neurologic: Alert, Grossly non-focal.     DIAGNOSTIC STUDIES / PROCEDURES     RADIOLOGY  CT-CTA CHEST PULMONARY ARTERY W/ RECONS   Final Result         1.  No pulmonary embolus appreciated.   2.  Extensive severe bilateral pulmonary infiltrates, appearance favors atypical infiltrates   3.  Hepatomegaly and diffuse hepatic steatosis.   4.  Small pericardial effusion.        The radiologist's interpretation of all radiological studies have been reviewed by me.    COURSE & MEDICAL DECISION MAKING  Pertinent Labs & Imaging studies reviewed. (See chart for details)    Differential " diagnoses include but are not limited to: Pulmonary embolus, pneumonia, malignancy    10:08 PM - Patient seen and examined at bedside. Ordered lactic acid, urinalysis, urine culture, blood culture, CBC, CMP, eGFR, CBC, CMP, peripheral smear review, platelet estimate, morphology to evaluate his symptoms.     10:24 PM Reviewed patient's results and ordered CT-CTA chest pulmonary artery for further evaluation.     11:42 PM Spoke with Dr. Smith, hospitalist, concerning patient case. Agreed to admit patient for further treatment and evaluation.     Decision Making:  This is a 56 y.o. year old male who presents with significant hypoxia, cough for past week and low-grade fevers. Laboratory evaluation is unremarkable, I did review chest x-ray from earlier today that showed possible pneumonitis. Because of profound hypoxia was concerned about acute pulmonary embolus. For this reason a CT scan was performed. It is negative for PE however he does have severe extensive pulmonary infiltrates. There is an atypical pattern. The patient will be placed on azithromycin as well as Rocephin and admitted to the hospitalist for further management.    DISPOSITION:  Patient will be admitted to Dr. Smith in guarded condition.     FINAL IMPRESSION  Hypoxia, atypical pneumonia     Clemente HENRY (Scribe), am scribing for, and in the presence of, Edis Barboza M.D..    Electronically signed by: Clemente Monk (Scribe), 3/23/2017    Edis HENRY M.D. personally performed the services described in this documentation, as scribed by Clemente Monk in my presence, and it is both accurate and complete.    The note accurately reflects work and decisions made by me.  Edis Barboza  3/24/2017  5:42 AM

## 2020-01-02 NOTE — PROGRESS NOTES
Received report on pt, lethargic, unable to follow commands, unable to remain awake, responds to noxious stimuli, resp shallow PO 96 on 2L of 02, no response to move extremeties. Physician in to see pt, notified of change from night shift report. No new orders   requires prosthetic device/unable to perform

## 2020-01-05 NOTE — DISCHARGE PLANNING
Anticipate a return to [facility] once medically cleared.    
Case is under review by Dr. Vanegas.  Per Amber Frederick RN, Dale is medically cleared today.  TCC to follow  
Dale has been accepted back to [facility] once medically cleared and a D/C Summary has been dictated.  Adriane, MILAN [id number], is aware.   
Insurance has authorized.  [facility] to follow up.  
PMR referral from Dr. Elizabeth Hunter out acute from IRF for break through seizure will discuss case with Dr. Vanegas and insurance company for potential to return to IR[facility]. IP consult for physiatry placed. MILAN Thacker notified [phone], Sitter may be a barrier. Thank you for the opportunity to participate in the patients care.   
Pt transferred from [facility] for new onset seizures. Rehab TCC to follow for potential return.   
Submitted to insurance.   
Thank you for the opportunity to assist with this patient as they transition to post acute services.  We are aware of the Rehab referral from Dr. Washington.  Dr. Vanegas to consult this referral. Our Transitional Case Coordinator will follow. At this time patient is showing to have [employer] as their coverage.   Please do not hesitate to call us if you require additional assistance my phone number is [phone] Frank.    
External electronic FM/Augmentation of labor/Antibiotics in labor

## 2021-05-28 NOTE — PROGRESS NOTES
Assumed care of pt at 0710. Assessment completed. Pt awake, alert and oriented x4. Pt is a SBA. Denies pain. Understands plan of care. All needs met, bed in lowest locked position, call light within reach. Bed alarm on. Hourly rounding in place.    [id number] Wound Check/Suture Removal (no E&M)

## 2023-01-01 NOTE — PROGRESS NOTES
Cortrak Placement    Tube Team verified patient name and medical record number prior to tube advancement.  Cortrak tube (55 inches, 10 Vietnamese) placed at 73 cm in right nare.  Per Cortrak picture, tube appears to be in the stomach.  Nursing Instructions: Awaiting KUB to confirm placement before use for medications or feeding. Once placement confirmed, flush tube with 30 ml of water, and then remove and save stylet, in patient medication drawer.             Holli Serra  (RN)  2023 04:36:03

## 2023-03-15 NOTE — [facility]
For the next 24 hours patient needs to be with a responsible adult.    For 24 hours DO NOT drive, operate machinery, appliances, drink alcohol, make important decisions or sign legal documents.    Start with a light or bland diet if you are feeling sick to your stomach otherwise advance to regular diet as tolerated.    Follow recommendations on procedure report if provided by your doctor.    Call Dr Denney for problems [phone]    Problems may include but not limited to: large amounts of bleeding, trouble breathing, repeated vomiting, severe unrelieved pain, fever or chills.        Hospital Medicine Progress Note, Adult, Complex               Author: Josie Tang Date & Time created: 11/14/2017  6:46 PM     Interval History:   - aftercare  Chart and EPIC reviewed.  No 24 hr clinical changes.    Review of Systems:  Review of Systems   Unable to perform ROS: Medical condition       Physical Exam:  Physical Exam   Constitutional: No distress.   HENT:   Right Ear: External ear normal.   Left Ear: External ear normal.   Nose: Nose normal.   Eyes: Right eye exhibits no discharge. Left eye exhibits no discharge.   Neck: No tracheal deviation present.   Cardiovascular: Normal rate and regular rhythm.    Pulmonary/Chest: Effort normal and breath sounds normal. No stridor. No respiratory distress. He has no wheezes.   Abdominal: Soft. Bowel sounds are normal. He exhibits no distension. There is no tenderness. There is no rebound and no guarding.   Musculoskeletal: He exhibits no edema or tenderness.   Neurological:   Resting comfortably   Skin: Skin is warm and dry. He is not diaphoretic.   Vitals reviewed.      Labs:        Invalid input(s): [id number]      Recent Labs      11/14/17 0425   SODIUM  141   POTASSIUM  3.6   CHLORIDE  107   CO2  23   BUN  17   CREATININE  0.51   CALCIUM  8.8     Recent Labs      11/14/17 0425   ALTSGPT  103*   ASTSGOT  24   ALKPHOSPHAT  95   TBILIRUBIN  0.7   GLUCOSE  160*     Recent Labs      11/12/17 0400 11/13/17 0540 11/14/17 0425   RBC  4.55*  4.40*  4.53*   HEMOGLOBIN  13.2*  12.8*  13.2*   HEMATOCRIT  40.8*  39.7*  40.6*   PLATELETCT  138*  132*  144*     Recent Labs      11/12/17 0400 11/13/17 0540 11/14/17 0425   WBC  10.0  7.4  5.8   NEUTSPOLYS  76.00*  84.00*   --    LYMPHOCYTES  5.00*  4.00*   --    MONOCYTES  1.00  7.00   --    EOSINOPHILS  1.00  0.00   --    BASOPHILS  0.00  0.00   --    ASTSGOT   --    --   24   ALTSGPT   --    --   103*   ALKPHOSPHAT   --    --   95   TBILIRUBIN   --    --   0.7             Medical Decision Making, by  Problem:  GRADE 3 ASTROCYTOMA S/P PARTIAL RESECTION/CHEMO/XRT - Dr. Osman (Onc) recommends slow steroid taper to final Decadron dosing of 1 mg BID, prognosis 6-12 months, Hospice appropriate if not improved in 2-3 weeks  SKYLA TUMOR PETECHIAL HEMORRHAGE - F/U MRI pending  SZ - Zonegran and Keppra  ANNA ANEURYSM  DVT/PE S/P IVC FILTER - anticoagulation contraindicated given above  S/P CAP PTA  HTN - Metoprolol  DM - Metformin  ILEUS - resolved  TRANSAMINITIS - follow LFT's  RECURRENT GROSS HEMATURIA - likely from [place] trauma, flush until clear  ENCEPHALOPATHY - no significant improvement  DYSPHAGIA - TF, SLP  FULL CODE - family wants to cont full support at this time          Reviewed items::  Medications reviewed and Labs reviewed  [place] catheter::  Strict Intake and Output During Sepisis or Shock  DVT prophylaxis - mechanical:  SCDs  Ulcer Prophylaxis::  Yes

## 2023-08-28 NOTE — RESPIRATORY CARE
COPD EDUCATION by COPD CLINICAL EDUCATOR  10/16/2017 at 7:28 AM by Lida Menon     Patient reviewed by COPD education team. Patient does not qualify for COPD program.   Curettage Text: The wound bed was treated with curettage after the biopsy was performed.

## 2023-10-28 NOTE — PROGRESS NOTES
[facility] ([facility])  [facility] Medicine  Discharge Summary      Patient Name: Debbie Zepeda  MRN: [id number]  BLAISE: [id number]  Patient Class: IP- Inpatient  Admission Date: 10/25/2023  Hospital Length of Stay: 3 days  Discharge Date and Time:  10/28/2023 3:20 PM  Attending Physician: Barron Garcia MD   Discharging Provider: Danny Law MD  Primary Care Provider: Nigel Webb MD    Primary Care Team: Networked reference to record PCT     HPI:   Patient is an 84 year old male with medical history of HTN, HLD, PAF, CAD, HF EF45%, DM type 2, CKD stage 3 and prior stroke who was admitted to ED for urosepsis.  He was sent from nursing home with weakness, fever and hypoxia.  Patient denies any acute complaints such as cough, CP, SOB, nausea, vomiting dysuria and urinary retention.  Additionally there has been an outbreak of covid at NH and patient tested positive.        Admitted for urosepsis.  H/o neurogenic bladder and urinary retention.        * No surgery found *      Hospital Course:   PT admitted yesterday for urosepsis.  BP on lower side.  Gentle hydration.  JACKELIN mildly improving.  Will continue to monitor fluid status.  He is at high risk for fluid overload due to pulmonary HTN.  Additionally pleural effusions seen on CXR.      Urine culture gram negative rods.  Continue levofloxacin.      Urine is nearly pan sensitive. Will go home today on flouroquinolone ]         Goals of Care Treatment Preferences:  Code Status: DNR      Consults:     Pulmonary  Pleural effusion  BNP mildly elevated   Seen on CTAP moderate left and small right pleural effusion with adjacent atelectasis   Gentle hydration but at high risk for increasing pulmonary edema/pleural effusions      Cardiac/Vascular  Pulmonary hypertension  Seen on Echo   Moderate PHTN   Pulmonary effusion     Atrial fibrillation  Rate controlled  Continue eliquis     Renal/  Complicated urinary tract infection  U/A with 1+ leukocytes and 20  Received bedside report from ANÍBAL Martin. Pt A/OX1, consistent with baseline. No c/o pain or discomfort. Bed alarm on, bed at low position and locked. Call bell within patient reach. Will continue with plan of care.    WBC on microscopic  Urine cultures gram negative rods   Continue IV levofloxacin , home on po     Acute kidney injury superimposed on chronic kidney disease  Patient with acute kidney injury/acute renal failure likely due to pre-renal azotemia due to dehydration JACKELIN is currently stable. Baseline creatinine 1.0 - Labs reviewed- Renal function/electrolytes with Estimated Creatinine Clearance: 61 mL/min (based on SCr of 1.1 mg/dL). according to latest data. Monitor urine output and serial BMP and adjust therapy as needed. Avoid nephrotoxins and renally dose meds for GFR listed above.    IV fluids.  In the setting of JACKELIN/UTI CT AP ordered and no renal stones seen.      Slowly improving     Back to baseline       ID  * Sepsis due to gram-negative UTI  This patient does have evidence of infective focus  My overall impression is sepsis.  Source: Respiratory and Urinary Tract  Antibiotics given-   Antibiotics (72h ago, onward)    Start     Stop Route Frequency Ordered    10/27/23 1100  levoFLOXacin 750 mg/150 mL IVPB 750 mg         -- IV Every 24 hours (non-standard times) 10/27/23 0957    10/26/23 0900  mupirocin 2 % ointment         10/31/23 0859 Nasl 2 times daily 10/26/23 0046        Latest lactate reviewed-  Recent Labs   Lab 10/25/23  1804   LACTATE 0.7     Organ dysfunction indicated by Acute kidney injury and Acute heart failure    Fluid challenge Patient recevied 1 L bolus in the ED.  Will give IV fluids.  Pleural effusions seen on CTAP   Post- resuscitation assessment No - Post resuscitation assessment not needed       Will Not start Pressors- Levophed for MAP of 65  Source control achieved by: IV levofloxacin   Blood CX pending and urine culture gram negative rods     Home on levaquin for 3 more ays       COVID-19  Patient is identified as positive for COVID   Initiate standard COVID protocols; COVID-19 testing Collection Date: 10/12/2023 Collection Time:  12:01 PM ,Infection Control notification  and isolation-  respiratory, contact and droplet per protocol    Diagnostics: daily labs     Management: none at this time.  Pt is not hypoxic     Advance Care Planning  Patient is a DNR.  Lapost in chart     Endocrine  Type 2 diabetes mellitus with circulatory disorder  Patient's FSGs are controlled on current medication regimen.  Last A1c reviewed-   Lab Results   Component Value Date    HGBA1C 5.4 10/02/2023     Most recent fingerstick glucose reviewed-   Recent Labs   Lab 10/27/23  1622 10/27/23  2016 10/28/23  0720 10/28/23  1119   POCTGLUCOSE 138* 109 115* 137*     Current correctional scale  Low  Maintain anti-hyperglycemic dose as follows-   Antihyperglycemics (From admission, onward)    Start     Stop Route Frequency Ordered    10/26/23 1315  insulin aspart U-100 pen 0-5 Units         -- SubQ Before meals & nightly PRN 10/26/23 1216        Hold Oral hypoglycemics while patient is in the hospital.    Other  Acute pulmonary embolism  Continue home Eliquis         Final Active Diagnoses:    Diagnosis Date Noted POA    PRINCIPAL PROBLEM:  Sepsis due to gram-negative UTI [A41.50, N39.0] 10/26/2023 Yes    Pulmonary hypertension [I27.20] 10/27/2023 Yes    Acute kidney injury superimposed on chronic kidney disease [N17.9, N18.9] 10/26/2023 Yes    COVID-19 [U07.1] 10/26/2023 Yes    Pleural effusion [J90] 10/26/2023 Yes    Complicated urinary tract infection [N39.0] 10/26/2023 Yes    Atrial fibrillation [I48.91] 10/02/2023 Yes    Acute pulmonary embolism [I26.99] 02/15/2019 Yes    Type 2 diabetes mellitus with circulatory disorder [E11.59] 01/12/2019 Yes      Problems Resolved During this Admission:       Discharged Condition: good    Disposition: Home or Self Care    Follow Up:   Follow-up Information     [facility] Follow up.           Chantell Abbasi NP Follow up in 3 day(s).    Specialty: Cardiology  Contact information:  [address]  [place] [address]  [phone]                        Patient Instructions:   No discharge procedures on file.    Significant Diagnostic Studies: Labs:   BMP:   Recent Labs   Lab 10/27/23  0546 10/28/23  0603   GLU 88 97    138   K 3.6 3.5    108   CO2 23 20*   BUN 20 16   CREATININE 1.3 1.1   CALCIUM 7.3* 7.3*    and Troponin   Recent Labs   Lab 10/25/23  1804 10/25/23  2058   TROPONINI 0.050* 0.087*       Pending Diagnostic Studies:     None         Medications:  Reconciled Home Medications:      Medication List      START taking these medications    levoFLOXacin 500 MG tablet  Commonly known as: LEVAQUIN  Take 1 tablet (500 mg total) by mouth once daily. for 3 days        CONTINUE taking these medications    acetaminophen 650 MG Supp  Commonly known as: TYLENOL  Place 1 suppository (650 mg total) rectally every 4 (four) hours as needed.     apixaban 5 mg Tab  Commonly known as: ELIQUIS  Take 1 tablet (5 mg total) by mouth 2 (two) times daily.     ascorbic acid (vitamin C) 500 MG tablet  Commonly known as: VITAMIN C  Take 500 mg by mouth once daily.     atorvastatin 40 MG tablet  Commonly known as: LIPITOR  Take 40 mg by mouth every evening.     furosemide 20 MG tablet  Commonly known as: LASIX  Take 1 tablet (20 mg total) by mouth once daily.     gabapentin 100 MG capsule  Commonly known as: NEURONTIN  Take 1 capsule (100 mg total) by mouth every evening.     HYDROcodone-acetaminophen  mg per tablet  Commonly known as: NORCO  Take 1 tablet by mouth every 6 (six) hours as needed for Pain.     omeprazole 20 MG capsule  Commonly known as: PRILOSEC  Take 20 mg by mouth once daily.     ondansetron 4 MG tablet  Commonly known as: ZOFRAN  Take 4 mg by mouth every 8 (eight) hours as needed for Nausea.     ONE DAILY MULTIVITAMIN per tablet  Generic drug: multivitamin  Take 1 tablet by mouth once daily.     potassium chloride 10 MEQ Tbsr  Commonly known as: KLOR-CON  Take 1 tablet (10 mEq total) by mouth every other day.     thiamine 100 MG  tablet  Take 100 mg by mouth once daily.     vitamin D 1000 units Tab  Commonly known as: VITAMIN D3  Take 1,000 Units by mouth once daily.     zinc gluconate 50 mg tablet  Take 50 mg by mouth once daily.        ASK your doctor about these medications    L.acidophil,parac-S.therm-Bif. Cap capsule  Commonly known as: Risaquad  Take 1 capsule by mouth once daily. for 14 days  Ask about: Should I take this medication?            Indwelling Lines/Drains at time of discharge:   Lines/Drains/Airways     Drain  Duration                Urethral Catheter 10/25/23 1830 16 Fr. 2 days                Time spent on the discharge of patient: 20 minutes         Danny Law MD  Department of Hospital Medicine  [facility] (3rd Fl)

## 2023-11-02 NOTE — TELEPHONE ENCOUNTER
Spoke to patient's daughter Priscilla due to patient unable to communicate.  Priscilla is on the Emergency Contact list.     1) Priscilla stated patient has been going to the bathroom to urinate since lasix increased. Patient weight on 03/21/17 210 lbs, and weight today 211 lbs.    2) Priscilla informed of chest x-ray results and recommendations from provider for breathing exercise and antibiotic for URI sx's.   3) Also, test results regarding liver enzymes, and to call us back and informed if patient appetite does not improved.   Follow up with PCP to repeat liver test.  Patient scheduled to see Lisette ROBERTSON on 3/28/2017  Priscilla voiced understanding of results, medications and provider's recommendations.      No restrictions

## 2024-03-16 NOTE — PROGRESS NOTES
"Pharmacy Kinetics 56 y.o. male on vancomycin day # 1 3/24/2017    Currently on Vancomycin 2400 mg iv load followed by Vancomycin 1600mg q12 hours    Indication for Treatment: Pneumonia    Pertinent history per medical record: Admitted on 3/23/2017 for acute respiratory failure.  Patient was recently started on chemotherapy.  Patient arrives complaining of fever, cough, and weakness.  He recently was started on Omnicef without improvement.  Xray on admission shows bilateral pneumonia.  Empiric antibiotic initiated.      Other antibiotics: Cefepime 2g q12 hours, Doxycycline 100mg IV q12 hours    Allergies: Review of patient's allergies indicates no known allergies.     List concerns for renal function: BMI 39    Pertinent cultures to date:     MRSA nares swab -- ordered    Resp culture -- ordered    Blood culture -- in process    Urine culture -- in process    Recent Labs      17   2200  17   0002   WBC  6.9  7.0   NEUTSPOLYS  80.70*   --    BANDSSTABS  3.50   --      Recent Labs      17   0002   BUN  11   CREATININE  0.52   ALBUMIN  2.6*     No results for input(s): VANCOTROUGH, VANCOPEAK, VANCORANDOM in the last 72 hours.No intake or output data in the 24 hours ending 17 0130   Blood pressure 98/69, pulse 62, temperature 36.7 °C (98 °F), resp. rate 18, height 1.575 m (5' 2\"), weight 96.616 kg (213 lb), SpO2 95 %. Temp (24hrs), Av.2 °C (99 °F), Min:36.7 °C (98 °F), Max:37.7 °C (99.9 °F)      A/P   1. Vancomycin dose change: New start  2. Next vancomycin level: Prior to 4th dose (not ordered)  3. Goal trough: 16-20 mcg/mL  4. Comments: Start vancomycin 1600mg q12 hours per protocol.  Recommend de-escalation of therapy if MRSA nares swab is negative.  Clinical pharmacist to follow.      Dwain Mata, PHARMD    ]    " (2) good, crying

## 2024-06-05 NOTE — PROGRESS NOTES
Patient was transported to our department for radiation therapy treatment number 11 of 30 to his brain. We will transport him again tomorrow.   normal , good dentition